# Patient Record
Sex: MALE | Race: WHITE | NOT HISPANIC OR LATINO | Employment: FULL TIME | URBAN - METROPOLITAN AREA
[De-identification: names, ages, dates, MRNs, and addresses within clinical notes are randomized per-mention and may not be internally consistent; named-entity substitution may affect disease eponyms.]

---

## 2020-09-24 ENCOUNTER — HOSPITAL ENCOUNTER (OUTPATIENT)
Dept: FAMILY MEDICINE CLINIC | Facility: CLINIC | Age: 55
Discharge: HOME | End: 2020-09-24
Attending: NURSE PRACTITIONER

## 2020-09-24 ENCOUNTER — OFFICE VISIT CONVERTED (OUTPATIENT)
Dept: FAMILY MEDICINE CLINIC | Facility: CLINIC | Age: 55
End: 2020-09-24
Attending: NURSE PRACTITIONER

## 2020-09-27 LAB
BACTERIA SPEC AEROBE CULT: ABNORMAL
CIPROFLOXACIN SUSC ISLT: <=0.5
CLINDAMYCIN SUSC ISLT: 0.25
DAPTOMYCIN SUSC ISLT: 0.25
DOXYCYCLINE SUSC ISLT: <=0.5
ERYTHROMYCIN SUSC ISLT: 0.5
GENTAMICIN SUSC ISLT: <=0.5
LEVOFLOXACIN SUSC ISLT: <=0.12
OXACILLIN SUSC ISLT: 0.5
RIFAMPIN SUSC ISLT: <=0.5
TETRACYCLINE SUSC ISLT: <=1
TIGECYCLINE SUSC ISLT: <=0.12
TMP SMX SUSC ISLT: <=10
VANCOMYCIN SUSC ISLT: 1

## 2020-10-06 ENCOUNTER — HOSPITAL ENCOUNTER (INPATIENT)
Facility: HOSPITAL | Age: 55
LOS: 8 days | Discharge: HOME HEALTH | End: 2020-10-14
Attending: THORACIC SURGERY (CARDIOTHORACIC VASCULAR SURGERY) | Admitting: THORACIC SURGERY (CARDIOTHORACIC VASCULAR SURGERY)

## 2020-10-06 ENCOUNTER — TELEPHONE (OUTPATIENT)
Dept: CARDIAC SURGERY | Facility: CLINIC | Age: 55
End: 2020-10-06

## 2020-10-06 DIAGNOSIS — Z98.890 POSTOPERATIVE HYPOXIA: ICD-10-CM

## 2020-10-06 DIAGNOSIS — I25.118 CORONARY ARTERY DISEASE OF NATIVE ARTERY OF NATIVE HEART WITH STABLE ANGINA PECTORIS (HCC): Primary | ICD-10-CM

## 2020-10-06 DIAGNOSIS — Z95.1 S/P CABG (CORONARY ARTERY BYPASS GRAFT): ICD-10-CM

## 2020-10-06 DIAGNOSIS — R09.02 POSTOPERATIVE HYPOXIA: ICD-10-CM

## 2020-10-06 LAB — GLUCOSE BLDC GLUCOMTR-MCNC: 251 MG/DL (ref 70–130)

## 2020-10-06 PROCEDURE — 82962 GLUCOSE BLOOD TEST: CPT

## 2020-10-06 RX ORDER — LISINOPRIL AND HYDROCHLOROTHIAZIDE 12.5; 1 MG/1; MG/1
1 TABLET ORAL DAILY
COMMUNITY
End: 2020-10-14 | Stop reason: HOSPADM

## 2020-10-06 RX ORDER — HYDRALAZINE HYDROCHLORIDE 20 MG/ML
10 INJECTION INTRAMUSCULAR; INTRAVENOUS ONCE
Status: COMPLETED | OUTPATIENT
Start: 2020-10-07 | End: 2020-10-06

## 2020-10-06 RX ADMIN — HYDRALAZINE HYDROCHLORIDE 10 MG: 20 INJECTION INTRAMUSCULAR; INTRAVENOUS at 23:37

## 2020-10-06 NOTE — TELEPHONE ENCOUNTER
I S/W JAMI AT [facility] BED BOARD [phone] TO REQUEST IN PATIENT CVU/CVI BED PER DR IBARRA. PT BEING ADMITTED BY DR IBARRA FOR CAD PER MARLINE ROUSSEAU. PT BEING TRANSFERRED FROM [facility] ROOM 214 PCU. NURSE TAKING CARE OF PT IS RAFAEL AND HER CONTACT NUMBER [phone]. PER FAX FROM JOSE LUIS MIR AT [facility], PT'S CATH TEST WAS NOT AVAILABLE WHEN RECORDS WERE REQUESTED. PER JAMI AT [facility] BED BOARD, THERE IS A WAIT ON PT BEDS AND PT WILL BE ADDED TO WAIT LIST.

## 2020-10-07 ENCOUNTER — ANESTHESIA EVENT (OUTPATIENT)
Dept: PERIOP | Facility: HOSPITAL | Age: 55
End: 2020-10-07

## 2020-10-07 ENCOUNTER — APPOINTMENT (OUTPATIENT)
Dept: GENERAL RADIOLOGY | Facility: HOSPITAL | Age: 55
End: 2020-10-07

## 2020-10-07 ENCOUNTER — APPOINTMENT (OUTPATIENT)
Dept: CARDIOLOGY | Facility: HOSPITAL | Age: 55
End: 2020-10-07

## 2020-10-07 ENCOUNTER — APPOINTMENT (OUTPATIENT)
Dept: RESPIRATORY THERAPY | Facility: HOSPITAL | Age: 55
End: 2020-10-07

## 2020-10-07 PROBLEM — I25.118 CORONARY ARTERY DISEASE OF NATIVE ARTERY OF NATIVE HEART WITH STABLE ANGINA PECTORIS (HCC): Status: ACTIVE | Noted: 2020-10-06

## 2020-10-07 PROBLEM — I25.10 CAD (CORONARY ARTERY DISEASE): Status: ACTIVE | Noted: 2020-10-07

## 2020-10-07 LAB
ABO GROUP BLD: NORMAL
ALBUMIN SERPL-MCNC: 3.9 G/DL (ref 3.5–5.2)
ALBUMIN/GLOB SERPL: 1.3 G/DL
ALP SERPL-CCNC: 119 U/L (ref 39–117)
ALT SERPL W P-5'-P-CCNC: 13 U/L (ref 1–41)
ANION GAP SERPL CALCULATED.3IONS-SCNC: 9.5 MMOL/L (ref 5–15)
APTT PPP: 25.7 SECONDS (ref 22.7–35.4)
APTT PPP: 30.7 SECONDS (ref 22.7–35.4)
ARTERIAL PATENCY WRIST A: POSITIVE
ARTICHOKE IGE QN: 89 MG/DL (ref 0–100)
AST SERPL-CCNC: 16 U/L (ref 1–40)
ATMOSPHERIC PRESS: 753.9 MMHG
B PARAPERT DNA SPEC QL NAA+PROBE: NOT DETECTED
B PERT DNA SPEC QL NAA+PROBE: NOT DETECTED
BACTERIA UR QL AUTO: NORMAL /HPF
BASE EXCESS BLDA CALC-SCNC: 1.3 MMOL/L (ref 0–2)
BASOPHILS # BLD AUTO: 0.05 10*3/MM3 (ref 0–0.2)
BASOPHILS NFR BLD AUTO: 0.6 % (ref 0–1.5)
BDY SITE: ABNORMAL
BH CV XLRA MEAS - DIST GSV CALF DIST LEFT: 0.2 CM
BH CV XLRA MEAS - DIST GSV CALF DIST RIGHT: 0.14 CM
BH CV XLRA MEAS - DIST GSV THIGH DIST LEFT: 0.4 CM
BH CV XLRA MEAS - DIST GSV THIGH DIST RIGHT: 0.25 CM
BH CV XLRA MEAS - DIST LSV CALF DIST LEFT: 0.26 CM
BH CV XLRA MEAS - DIST LSV CALF DIST RIGHT: 0.15 CM
BH CV XLRA MEAS - GSV ANKLE DIST LEFT: 0.16 CM
BH CV XLRA MEAS - GSV ANKLE DIST RIGHT: 0.14 CM
BH CV XLRA MEAS - GSV KNEE DIST LEFT: 0.32 CM
BH CV XLRA MEAS - GSV KNEE DIST RIGHT: 0.4 CM
BH CV XLRA MEAS - GSV ORIGIN DIST LEFT: 0.84 CM
BH CV XLRA MEAS - GSV ORIGIN DIST RIGHT: 0.77 CM
BH CV XLRA MEAS - MID GSV CALF LEFT: 0.3 CM
BH CV XLRA MEAS - MID GSV CALF RIGHT: 0.31 CM
BH CV XLRA MEAS - MID GSV THIGH  LEFT: 0.39 CM
BH CV XLRA MEAS - MID GSV THIGH  RIGHT: 0.24 CM
BH CV XLRA MEAS - MID LSV CALF DIST LEFT: 0.26 CM
BH CV XLRA MEAS - MID LSV CALF DIST RIGHT: 0.17 CM
BH CV XLRA MEAS - PROX GSV CALF DIST LEFT: 0.35 CM
BH CV XLRA MEAS - PROX GSV CALF DIST RIGHT: 0.19 CM
BH CV XLRA MEAS - PROX GSV THIGH  LEFT: 0.49 CM
BH CV XLRA MEAS - PROX GSV THIGH  RIGHT: 0.34 CM
BH CV XLRA MEAS - PROX LSV CALF DIST LEFT: 0.26 CM
BH CV XLRA MEAS - PROX LSV CALF DIST RIGHT: 0.39 CM
BH CV XLRA MEAS LEFT DIST CCA EDV: -28.1 CM/SEC
BH CV XLRA MEAS LEFT DIST CCA PSV: -106 CM/SEC
BH CV XLRA MEAS LEFT DIST ICA EDV: -32.4 CM/SEC
BH CV XLRA MEAS LEFT DIST ICA PSV: -90.5 CM/SEC
BH CV XLRA MEAS LEFT ICA/CCA RATIO: 1.02
BH CV XLRA MEAS LEFT MID ICA EDV: -48.7 CM/SEC
BH CV XLRA MEAS LEFT MID ICA PSV: -105 CM/SEC
BH CV XLRA MEAS LEFT PROX CCA EDV: 30.6 CM/SEC
BH CV XLRA MEAS LEFT PROX CCA PSV: 114 CM/SEC
BH CV XLRA MEAS LEFT PROX ECA EDV: -10 CM/SEC
BH CV XLRA MEAS LEFT PROX ECA PSV: -86.8 CM/SEC
BH CV XLRA MEAS LEFT PROX ICA EDV: -45.1 CM/SEC
BH CV XLRA MEAS LEFT PROX ICA PSV: -109 CM/SEC
BH CV XLRA MEAS LEFT PROX SCLA PSV: 131 CM/SEC
BH CV XLRA MEAS LEFT VERTEBRAL A EDV: -8.3 CM/SEC
BH CV XLRA MEAS LEFT VERTEBRAL A PSV: -36.1 CM/SEC
BH CV XLRA MEAS RIGHT DIST CCA EDV: -15.5 CM/SEC
BH CV XLRA MEAS RIGHT DIST CCA PSV: -90.7 CM/SEC
BH CV XLRA MEAS RIGHT DIST ICA EDV: -22.7 CM/SEC
BH CV XLRA MEAS RIGHT DIST ICA PSV: -49.6 CM/SEC
BH CV XLRA MEAS RIGHT ICA/CCA RATIO: 1.06
BH CV XLRA MEAS RIGHT MID ICA EDV: -35.4 CM/SEC
BH CV XLRA MEAS RIGHT MID ICA PSV: -95.9 CM/SEC
BH CV XLRA MEAS RIGHT PROX CCA EDV: 13.5 CM/SEC
BH CV XLRA MEAS RIGHT PROX CCA PSV: 93.2 CM/SEC
BH CV XLRA MEAS RIGHT PROX ECA EDV: -18.5 CM/SEC
BH CV XLRA MEAS RIGHT PROX ECA PSV: -126 CM/SEC
BH CV XLRA MEAS RIGHT PROX ICA EDV: -23.2 CM/SEC
BH CV XLRA MEAS RIGHT PROX ICA PSV: -49.1 CM/SEC
BH CV XLRA MEAS RIGHT PROX SCLA PSV: 174 CM/SEC
BH CV XLRA MEAS RIGHT VERTEBRAL A EDV: -15.2 CM/SEC
BH CV XLRA MEAS RIGHT VERTEBRAL A PSV: -40 CM/SEC
BILIRUB SERPL-MCNC: 0.4 MG/DL (ref 0–1.2)
BILIRUB UR QL STRIP: NEGATIVE
BLD GP AB SCN SERPL QL: NEGATIVE
BUN SERPL-MCNC: 12 MG/DL (ref 6–20)
BUN/CREAT SERPL: 16 (ref 7–25)
C PNEUM DNA NPH QL NAA+NON-PROBE: NOT DETECTED
CALCIUM SPEC-SCNC: 9.3 MG/DL (ref 8.6–10.5)
CHLORIDE SERPL-SCNC: 97 MMOL/L (ref 98–107)
CHOLEST SERPL-MCNC: 213 MG/DL (ref 0–200)
CLARITY UR: CLEAR
CLOSE TME COLL+ADP + EPINEP PNL BLD: 91 %
CO2 SERPL-SCNC: 22.5 MMOL/L (ref 22–29)
COLOR UR: YELLOW
CREAT SERPL-MCNC: 0.75 MG/DL (ref 0.76–1.27)
DEPRECATED RDW RBC AUTO: 44.2 FL (ref 37–54)
EOSINOPHIL # BLD AUTO: 0.13 10*3/MM3 (ref 0–0.4)
EOSINOPHIL NFR BLD AUTO: 1.6 % (ref 0.3–6.2)
ERYTHROCYTE [DISTWIDTH] IN BLOOD BY AUTOMATED COUNT: 13.2 % (ref 12.3–15.4)
FLUAV H1 2009 PAND RNA NPH QL NAA+PROBE: NOT DETECTED
FLUAV H1 HA GENE NPH QL NAA+PROBE: NOT DETECTED
FLUAV H3 RNA NPH QL NAA+PROBE: NOT DETECTED
FLUAV SUBTYP SPEC NAA+PROBE: NOT DETECTED
FLUBV RNA ISLT QL NAA+PROBE: NOT DETECTED
GFR SERPL CREATININE-BSD FRML MDRD: 109 ML/MIN/1.73
GLOBULIN UR ELPH-MCNC: 3.1 GM/DL
GLUCOSE BLDC GLUCOMTR-MCNC: 233 MG/DL (ref 70–130)
GLUCOSE BLDC GLUCOMTR-MCNC: 249 MG/DL (ref 70–130)
GLUCOSE BLDC GLUCOMTR-MCNC: 264 MG/DL (ref 70–130)
GLUCOSE BLDC GLUCOMTR-MCNC: 296 MG/DL (ref 70–130)
GLUCOSE SERPL-MCNC: 289 MG/DL (ref 65–99)
GLUCOSE UR STRIP-MCNC: ABNORMAL MG/DL
HADV DNA SPEC NAA+PROBE: NOT DETECTED
HBA1C MFR BLD: 9.4 % (ref 4.8–5.6)
HCO3 BLDA-SCNC: 26.2 MMOL/L (ref 22–28)
HCOV 229E RNA SPEC QL NAA+PROBE: NOT DETECTED
HCOV HKU1 RNA SPEC QL NAA+PROBE: NOT DETECTED
HCOV NL63 RNA SPEC QL NAA+PROBE: NOT DETECTED
HCOV OC43 RNA SPEC QL NAA+PROBE: NOT DETECTED
HCT VFR BLD AUTO: 48.5 % (ref 37.5–51)
HDLC SERPL-MCNC: 29 MG/DL (ref 40–60)
HGB BLD-MCNC: 16.3 G/DL (ref 13–17.7)
HGB UR QL STRIP.AUTO: NEGATIVE
HMPV RNA NPH QL NAA+NON-PROBE: NOT DETECTED
HPIV1 RNA SPEC QL NAA+PROBE: NOT DETECTED
HPIV2 RNA SPEC QL NAA+PROBE: NOT DETECTED
HPIV3 RNA NPH QL NAA+PROBE: NOT DETECTED
HPIV4 P GENE NPH QL NAA+PROBE: NOT DETECTED
HYALINE CASTS UR QL AUTO: NORMAL /LPF
IMM GRANULOCYTES # BLD AUTO: 0.08 10*3/MM3 (ref 0–0.05)
IMM GRANULOCYTES NFR BLD AUTO: 1 % (ref 0–0.5)
INR PPP: 0.95 (ref 0.9–1.1)
KETONES UR QL STRIP: NEGATIVE
LDLC SERPL CALC-MCNC: ABNORMAL MG/DL
LDLC/HDLC SERPL: ABNORMAL {RATIO}
LEFT ARM BP: NORMAL MMHG
LEUKOCYTE ESTERASE UR QL STRIP.AUTO: NEGATIVE
LYMPHOCYTES # BLD AUTO: 2.1 10*3/MM3 (ref 0.7–3.1)
LYMPHOCYTES NFR BLD AUTO: 25.9 % (ref 19.6–45.3)
M PNEUMO IGG SER IA-ACNC: NOT DETECTED
MAGNESIUM SERPL-MCNC: 1.9 MG/DL (ref 1.6–2.6)
MCH RBC QN AUTO: 30.7 PG (ref 26.6–33)
MCHC RBC AUTO-ENTMCNC: 33.6 G/DL (ref 31.5–35.7)
MCV RBC AUTO: 91.3 FL (ref 79–97)
MODALITY: ABNORMAL
MONOCYTES # BLD AUTO: 0.87 10*3/MM3 (ref 0.1–0.9)
MONOCYTES NFR BLD AUTO: 10.7 % (ref 5–12)
NEUTROPHILS NFR BLD AUTO: 4.89 10*3/MM3 (ref 1.7–7)
NEUTROPHILS NFR BLD AUTO: 60.2 % (ref 42.7–76)
NITRITE UR QL STRIP: NEGATIVE
NRBC BLD AUTO-RTO: 0 /100 WBC (ref 0–0.2)
NT-PROBNP SERPL-MCNC: 220.6 PG/ML (ref 0–900)
PCO2 BLDA: 41.2 MM HG (ref 35–45)
PH BLDA: 7.41 PH UNITS (ref 7.35–7.45)
PH UR STRIP.AUTO: 6 [PH] (ref 5–8)
PLATELET # BLD AUTO: 315 10*3/MM3 (ref 140–450)
PMV BLD AUTO: 9.3 FL (ref 6–12)
PO2 BLDA: 65.1 MM HG (ref 80–100)
POTASSIUM SERPL-SCNC: 3.9 MMOL/L (ref 3.5–5.2)
PROT SERPL-MCNC: 7 G/DL (ref 6–8.5)
PROT UR QL STRIP: ABNORMAL
PROTHROMBIN TIME: 12.6 SECONDS (ref 11.7–14.2)
RBC # BLD AUTO: 5.31 10*6/MM3 (ref 4.14–5.8)
RBC # UR: NORMAL /HPF
REF LAB TEST METHOD: NORMAL
RH BLD: NEGATIVE
RHINOVIRUS RNA SPEC NAA+PROBE: NOT DETECTED
RIGHT ARM BP: NORMAL MMHG
RSV RNA NPH QL NAA+NON-PROBE: NOT DETECTED
SAO2 % BLDCOA: 92.6 % (ref 92–99)
SARS-COV-2 RNA NPH QL NAA+NON-PROBE: NOT DETECTED
SODIUM SERPL-SCNC: 129 MMOL/L (ref 136–145)
SP GR UR STRIP: >=1.03 (ref 1–1.03)
SQUAMOUS #/AREA URNS HPF: NORMAL /HPF
T&S EXPIRATION DATE: NORMAL
TOTAL RATE: 16 BREATHS/MINUTE
TRIGL SERPL-MCNC: 790 MG/DL (ref 0–150)
UROBILINOGEN UR QL STRIP: ABNORMAL
VLDLC SERPL-MCNC: ABNORMAL MG/DL
WBC # BLD AUTO: 8.12 10*3/MM3 (ref 3.4–10.8)
WBC UR QL AUTO: NORMAL /HPF

## 2020-10-07 PROCEDURE — 81001 URINALYSIS AUTO W/SCOPE: CPT | Performed by: NURSE PRACTITIONER

## 2020-10-07 PROCEDURE — 25010000002 HYDRALAZINE PER 20 MG: Performed by: NURSE PRACTITIONER

## 2020-10-07 PROCEDURE — 25010000002 HYDRALAZINE PER 20 MG: Performed by: THORACIC SURGERY (CARDIOTHORACIC VASCULAR SURGERY)

## 2020-10-07 PROCEDURE — 86850 RBC ANTIBODY SCREEN: CPT | Performed by: NURSE PRACTITIONER

## 2020-10-07 PROCEDURE — 80053 COMPREHEN METABOLIC PANEL: CPT | Performed by: NURSE PRACTITIONER

## 2020-10-07 PROCEDURE — 85730 THROMBOPLASTIN TIME PARTIAL: CPT | Performed by: NURSE PRACTITIONER

## 2020-10-07 PROCEDURE — 36600 WITHDRAWAL OF ARTERIAL BLOOD: CPT

## 2020-10-07 PROCEDURE — 85610 PROTHROMBIN TIME: CPT | Performed by: NURSE PRACTITIONER

## 2020-10-07 PROCEDURE — 71046 X-RAY EXAM CHEST 2 VIEWS: CPT

## 2020-10-07 PROCEDURE — 80061 LIPID PANEL: CPT | Performed by: NURSE PRACTITIONER

## 2020-10-07 PROCEDURE — 83735 ASSAY OF MAGNESIUM: CPT | Performed by: NURSE PRACTITIONER

## 2020-10-07 PROCEDURE — 83036 HEMOGLOBIN GLYCOSYLATED A1C: CPT | Performed by: NURSE PRACTITIONER

## 2020-10-07 PROCEDURE — 86920 COMPATIBILITY TEST SPIN: CPT

## 2020-10-07 PROCEDURE — 82803 BLOOD GASES ANY COMBINATION: CPT

## 2020-10-07 PROCEDURE — 93010 ELECTROCARDIOGRAM REPORT: CPT | Performed by: INTERNAL MEDICINE

## 2020-10-07 PROCEDURE — 83880 ASSAY OF NATRIURETIC PEPTIDE: CPT | Performed by: NURSE PRACTITIONER

## 2020-10-07 PROCEDURE — 86900 BLOOD TYPING SEROLOGIC ABO: CPT | Performed by: NURSE PRACTITIONER

## 2020-10-07 PROCEDURE — 93970 EXTREMITY STUDY: CPT

## 2020-10-07 PROCEDURE — 99222 1ST HOSP IP/OBS MODERATE 55: CPT | Performed by: NURSE PRACTITIONER

## 2020-10-07 PROCEDURE — 82962 GLUCOSE BLOOD TEST: CPT

## 2020-10-07 PROCEDURE — 93005 ELECTROCARDIOGRAM TRACING: CPT | Performed by: NURSE PRACTITIONER

## 2020-10-07 PROCEDURE — 85576 BLOOD PLATELET AGGREGATION: CPT | Performed by: NURSE PRACTITIONER

## 2020-10-07 PROCEDURE — 0202U NFCT DS 22 TRGT SARS-COV-2: CPT | Performed by: NURSE PRACTITIONER

## 2020-10-07 PROCEDURE — 25010000002 HEPARIN (PORCINE) PER 1000 UNITS: Performed by: NURSE PRACTITIONER

## 2020-10-07 PROCEDURE — 86901 BLOOD TYPING SEROLOGIC RH(D): CPT | Performed by: NURSE PRACTITIONER

## 2020-10-07 PROCEDURE — 63710000001 INSULIN LISPRO (HUMAN) PER 5 UNITS: Performed by: NURSE PRACTITIONER

## 2020-10-07 PROCEDURE — 94640 AIRWAY INHALATION TREATMENT: CPT

## 2020-10-07 PROCEDURE — 83721 ASSAY OF BLOOD LIPOPROTEIN: CPT | Performed by: NURSE PRACTITIONER

## 2020-10-07 PROCEDURE — 94060 EVALUATION OF WHEEZING: CPT

## 2020-10-07 PROCEDURE — 93880 EXTRACRANIAL BILAT STUDY: CPT

## 2020-10-07 PROCEDURE — 85025 COMPLETE CBC W/AUTO DIFF WBC: CPT | Performed by: NURSE PRACTITIONER

## 2020-10-07 RX ORDER — DEXTROSE MONOHYDRATE 25 G/50ML
25 INJECTION, SOLUTION INTRAVENOUS
Status: DISCONTINUED | OUTPATIENT
Start: 2020-10-07 | End: 2020-10-08

## 2020-10-07 RX ORDER — CHLORHEXIDINE GLUCONATE 500 MG/1
1 CLOTH TOPICAL EVERY 12 HOURS
Status: DISCONTINUED | OUTPATIENT
Start: 2020-10-07 | End: 2020-10-08

## 2020-10-07 RX ORDER — LISINOPRIL 20 MG/1
20 TABLET ORAL EVERY 12 HOURS SCHEDULED
Status: DISCONTINUED | OUTPATIENT
Start: 2020-10-07 | End: 2020-10-08

## 2020-10-07 RX ORDER — MIDAZOLAM HYDROCHLORIDE 1 MG/ML
1 INJECTION INTRAMUSCULAR; INTRAVENOUS ONCE
Status: CANCELLED | OUTPATIENT
Start: 2020-10-08 | End: 2020-10-07

## 2020-10-07 RX ORDER — CHLORHEXIDINE GLUCONATE 0.12 MG/ML
15 RINSE ORAL EVERY 12 HOURS SCHEDULED
Status: DISCONTINUED | OUTPATIENT
Start: 2020-10-07 | End: 2020-10-08

## 2020-10-07 RX ORDER — CARVEDILOL 3.12 MG/1
3.12 TABLET ORAL
Status: COMPLETED | OUTPATIENT
Start: 2020-10-08 | End: 2020-10-08

## 2020-10-07 RX ORDER — NICOTINE POLACRILEX 4 MG
15 LOZENGE BUCCAL
Status: DISCONTINUED | OUTPATIENT
Start: 2020-10-07 | End: 2020-10-08

## 2020-10-07 RX ORDER — ISOSORBIDE MONONITRATE 60 MG/1
60 TABLET, EXTENDED RELEASE ORAL
Status: DISCONTINUED | OUTPATIENT
Start: 2020-10-07 | End: 2020-10-08

## 2020-10-07 RX ORDER — IPRATROPIUM BROMIDE AND ALBUTEROL SULFATE 2.5; .5 MG/3ML; MG/3ML
3 SOLUTION RESPIRATORY (INHALATION) EVERY 4 HOURS PRN
Status: DISCONTINUED | OUTPATIENT
Start: 2020-10-07 | End: 2020-10-08

## 2020-10-07 RX ORDER — NITROGLYCERIN 0.4 MG/1
0.4 TABLET SUBLINGUAL
Status: DISCONTINUED | OUTPATIENT
Start: 2020-10-07 | End: 2020-10-08

## 2020-10-07 RX ORDER — HYDRALAZINE HYDROCHLORIDE 20 MG/ML
20 INJECTION INTRAMUSCULAR; INTRAVENOUS EVERY 6 HOURS PRN
Status: DISCONTINUED | OUTPATIENT
Start: 2020-10-07 | End: 2020-10-08

## 2020-10-07 RX ORDER — PRAVASTATIN SODIUM 20 MG
20 TABLET ORAL NIGHTLY
Status: DISCONTINUED | OUTPATIENT
Start: 2020-10-07 | End: 2020-10-08

## 2020-10-07 RX ORDER — HEPARIN SODIUM 10000 [USP'U]/100ML
8.7 INJECTION, SOLUTION INTRAVENOUS
Status: DISCONTINUED | OUTPATIENT
Start: 2020-10-07 | End: 2020-10-08

## 2020-10-07 RX ORDER — CEFAZOLIN SODIUM 2 G/100ML
2 INJECTION, SOLUTION INTRAVENOUS
Status: COMPLETED | OUTPATIENT
Start: 2020-10-08 | End: 2020-10-08

## 2020-10-07 RX ORDER — ACETAMINOPHEN 325 MG/1
650 TABLET ORAL EVERY 4 HOURS PRN
Status: DISCONTINUED | OUTPATIENT
Start: 2020-10-07 | End: 2020-10-08

## 2020-10-07 RX ORDER — ASPIRIN 81 MG/1
81 TABLET, CHEWABLE ORAL DAILY
Status: DISCONTINUED | OUTPATIENT
Start: 2020-10-07 | End: 2020-10-08

## 2020-10-07 RX ORDER — HYDRALAZINE HYDROCHLORIDE 20 MG/ML
10 INJECTION INTRAMUSCULAR; INTRAVENOUS EVERY 6 HOURS PRN
Status: DISCONTINUED | OUTPATIENT
Start: 2020-10-07 | End: 2020-10-07

## 2020-10-07 RX ORDER — CARVEDILOL 12.5 MG/1
12.5 TABLET ORAL EVERY 12 HOURS SCHEDULED
Status: DISCONTINUED | OUTPATIENT
Start: 2020-10-07 | End: 2020-10-08

## 2020-10-07 RX ORDER — AMLODIPINE BESYLATE 5 MG/1
5 TABLET ORAL
Status: DISCONTINUED | OUTPATIENT
Start: 2020-10-07 | End: 2020-10-08

## 2020-10-07 RX ORDER — FAMOTIDINE 10 MG/ML
20 INJECTION, SOLUTION INTRAVENOUS ONCE
Status: CANCELLED | OUTPATIENT
Start: 2020-10-08 | End: 2020-10-07

## 2020-10-07 RX ORDER — ALBUTEROL SULFATE 2.5 MG/3ML
2.5 SOLUTION RESPIRATORY (INHALATION) ONCE
Status: COMPLETED | OUTPATIENT
Start: 2020-10-07 | End: 2020-10-07

## 2020-10-07 RX ORDER — HYDRALAZINE HYDROCHLORIDE 20 MG/ML
10 INJECTION INTRAMUSCULAR; INTRAVENOUS EVERY 4 HOURS PRN
Status: DISCONTINUED | OUTPATIENT
Start: 2020-10-07 | End: 2020-10-08

## 2020-10-07 RX ADMIN — CHLORHEXIDINE GLUCONATE 1 APPLICATION: 500 CLOTH TOPICAL at 22:32

## 2020-10-07 RX ADMIN — LISINOPRIL 20 MG: 20 TABLET ORAL at 18:11

## 2020-10-07 RX ADMIN — METOPROLOL TARTRATE 25 MG: 25 TABLET, FILM COATED ORAL at 09:06

## 2020-10-07 RX ADMIN — PRAVASTATIN SODIUM 20 MG: 20 TABLET ORAL at 22:26

## 2020-10-07 RX ADMIN — INSULIN LISPRO 6 UNITS: 100 INJECTION, SOLUTION INTRAVENOUS; SUBCUTANEOUS at 22:32

## 2020-10-07 RX ADMIN — MUPIROCIN 1 APPLICATION: 20 OINTMENT TOPICAL at 22:27

## 2020-10-07 RX ADMIN — ISOSORBIDE MONONITRATE 60 MG: 60 TABLET ORAL at 13:44

## 2020-10-07 RX ADMIN — AMLODIPINE BESYLATE 5 MG: 5 TABLET ORAL at 10:22

## 2020-10-07 RX ADMIN — HYDRALAZINE HYDROCHLORIDE 10 MG: 20 INJECTION INTRAMUSCULAR; INTRAVENOUS at 22:26

## 2020-10-07 RX ADMIN — HYDRALAZINE HYDROCHLORIDE 20 MG: 20 INJECTION INTRAMUSCULAR; INTRAVENOUS at 12:41

## 2020-10-07 RX ADMIN — ALBUTEROL SULFATE 2.5 MG: 2.5 SOLUTION RESPIRATORY (INHALATION) at 16:02

## 2020-10-07 RX ADMIN — INSULIN LISPRO 4 UNITS: 100 INJECTION, SOLUTION INTRAVENOUS; SUBCUTANEOUS at 18:11

## 2020-10-07 RX ADMIN — ASPIRIN 81 MG: 81 TABLET, CHEWABLE ORAL at 09:06

## 2020-10-07 RX ADMIN — INSULIN LISPRO 4 UNITS: 100 INJECTION, SOLUTION INTRAVENOUS; SUBCUTANEOUS at 13:43

## 2020-10-07 RX ADMIN — CARVEDILOL 12.5 MG: 12.5 TABLET, FILM COATED ORAL at 22:31

## 2020-10-07 RX ADMIN — CHLORHEXIDINE GLUCONATE 15 ML: 1.2 RINSE ORAL at 22:26

## 2020-10-07 RX ADMIN — HYDRALAZINE HYDROCHLORIDE 10 MG: 20 INJECTION INTRAMUSCULAR; INTRAVENOUS at 18:15

## 2020-10-07 RX ADMIN — ACETAMINOPHEN 650 MG: 325 TABLET, FILM COATED ORAL at 04:11

## 2020-10-07 RX ADMIN — HEPARIN SODIUM 8.7 UNITS/KG/HR: 10000 INJECTION, SOLUTION INTRAVENOUS at 10:21

## 2020-10-07 RX ADMIN — INSULIN LISPRO 6 UNITS: 100 INJECTION, SOLUTION INTRAVENOUS; SUBCUTANEOUS at 09:06

## 2020-10-07 RX ADMIN — HEPARIN SODIUM 14.7 UNITS/KG/HR: 10000 INJECTION, SOLUTION INTRAVENOUS at 23:12

## 2020-10-07 NOTE — PAYOR COMM NOTE
"Jd Velazquez (55 y.o. Male)     ATTN:  INITIAL CLINICALS FOR NURSE REVIEW, # [id number]    PLEASE REPLY TO [facility] DEPT: RASHAAD JOE LPN/CCP- [phone], [phone]        Date of Birth Social Security Number Address Home Phone MRN    1965  [id number] [place] [address] [phone] [id number]    [employer]abapt[employer] Marital Status          Christian        Admission Date Admission Type Admitting Provider Attending Provider Department, Room/Bed    10/6/20 Urgent Angelo Egan MD Pagni, Sebastian, MD [address],     Discharge Date Discharge Disposition Discharge Destination                       Attending Provider: Angelo Egan MD    Allergies: Hydrocodone    Isolation: None   Infection: None   Code Status: CPR    Ht: 182.9 cm (72\")   Wt: 115 kg (252 lb 11.2 oz)    Admission Cmt: None   Principal Problem: Coronary artery disease of native artery of native heart with stable angina pectoris (CMS/[facility]) [I25.118] More...                 Active Insurance as of 10/6/2020     Primary Coverage     Payor Plan Insurance Group Employer/Plan Group    [facility] [employer] [facility] [employer] [facility] PPO [id number]     Payor Plan Address Payor Plan Phone Number Payor Plan Fax Number Effective Dates    [address] [phone]  2020 - None Entered    [place] [address]       Subscriber Name Subscriber Birth Date Member ID       ARDEN VELAZQUEZ  [id number]                 Emergency Contacts      (Rel.) Home Phone Work Phone Mobile Phone    ARDEN VELAZQUEZ (Spouse) [phone] -- --               History & Physical      Suzy Kitchen APRN at 10/07/20 0740              Name: Jd Velazquez ADMIT: 10/6/2020   : 1965  PCP: Dacia Newsome APRN    MRN: [id number] LOS: 1 days   AGE/SEX: 55 y.o. male  ROOM:      CC: Chest Pain    Subjective     History of Present Illness  Patient is a 55 y.o. male with a past medical " "history of CAD with previous PCI to circumflex in 2014, hyperlipidemia, SHAHLA with home CPAP, hypertension, nicotine dependence, DM II, and obesity. He is a current smoker, reporting that he has smoked 1 PPD since he was 14. He denies any ETOH or illicit drug use. He states that he has been on statins but this was stopped by the patient due to muscle weakness which he associated with the statin. He also stopped his oral antihyperglycemic medications \"years ago\" because he lost 60lbs and felt that he did not really need them any more. [facility] complaints of substernal chest pressure while driving to work. The pain radiated down his left arm. He also reports that palpitations, nausea, and diaphoresis were associated with this episode of chest pain. He denies any dizziness, shortness of breath, lower extremity edema, cough, or fever. Upon arrival to the ER, his troponin was noted to be elevated and EKG showed ST depression. Nitro paste and heparin were given with relief of pain. He was taken for heart catherization which revealed multi-vessel coronary artery disease. He was placed on a heparin gtt following the catherization. He was transferred here to [facility] for cardiac surgery evaluation. He denies any chest pain or shortness of breath at this time. He is unsure about family history of coronary disease, as he is adopted.       Past Medical History:   Diagnosis Date   • CAD (coronary artery disease)    • Diabetes mellitus (CMS/HCC)    • Hyperlipidemia    • Hypertension    • Obesity      Past Surgical History:   Procedure Laterality Date   • ANKLE ARTHROSCOPY W/ OPEN REPAIR     • APPENDECTOMY     • CARDIAC CATHETERIZATION  2014    PCI to circumflex   • HERNIA REPAIR     • TONSILLECTOMY       History reviewed. No pertinent family history.  Social History     Tobacco Use   • Smoking status: Current Every Day Smoker     Packs/day: 1.00   • Smokeless tobacco: Never Used   Substance Use " Topics   • Alcohol use: Not on file   • Drug use: Not on file     Medications Prior to Admission   Medication Sig Dispense Refill Last Dose   • lisinopril-hydrochlorothiazide (PRINZIDE,ZESTORETIC) 10-12.5 MG per tablet Take 1 tablet by mouth Daily.        Allergies:  Hydrocodone    Review of Systems   Constitutional: Positive for diaphoresis and fatigue. Negative for chills.   HENT: Negative.    Eyes: Negative.    Respiratory: Positive for chest tightness. Negative for cough and shortness of breath.    Cardiovascular: Positive for chest pain and palpitations. Negative for leg swelling.   Gastrointestinal: Positive for nausea. Negative for constipation and vomiting.   Endocrine: Negative.    Genitourinary: Negative for dysuria, frequency and urgency.   Musculoskeletal: Negative for back pain and gait problem.   Skin: Negative for rash and wound.   Allergic/Immunologic: Negative.    Neurological: Negative for dizziness, seizures and syncope.   Hematological: Negative.    Psychiatric/Behavioral: Negative.         Objective    Vital Signs  Temp:  [96.9 °F (36.1 °C)-98 °F (36.7 °C)] 98 °F (36.7 °C)  Heart Rate:  [79-84] 82  Resp:  [18] 18  BP: (164-176)/(90-94) 164/90  SpO2:  [90 %-91 %] 90 %  on   ;   Device (Oxygen Therapy): room air  Body mass index is 34.27 kg/m².    Physical Exam  Vitals signs and nursing note reviewed.   Constitutional:       Appearance: Normal appearance.   HENT:      Head: Normocephalic and atraumatic.      Mouth/Throat:      Mouth: Mucous membranes are moist.      Pharynx: Oropharynx is clear.   Eyes:      Extraocular Movements: Extraocular movements intact.      Pupils: Pupils are equal, round, and reactive to light.   Neck:      Musculoskeletal: Normal range of motion and neck supple.   Cardiovascular:      Rate and Rhythm: Normal rate and regular rhythm.      Pulses: Normal pulses.      Heart sounds: Normal heart sounds. No murmur.   Pulmonary:      Effort: Pulmonary effort is normal.       Breath sounds: Decreased breath sounds present.   Abdominal:      General: Bowel sounds are normal.      Palpations: Abdomen is soft.   Musculoskeletal: Normal range of motion.      Right lower leg: Edema (trace) present.      Left lower leg: Edema (trace) present.   Skin:     General: Skin is warm and dry.      Capillary Refill: Capillary refill takes less than 2 seconds.   Neurological:      General: No focal deficit present.      Mental Status: He is alert and oriented to person, place, and time.   Psychiatric:         Mood and Affect: Mood normal.         Behavior: Behavior normal.         Thought Content: Thought content normal.         Judgment: Judgment normal.         Results Review:  I reviewed the patient's new clinical results.    Assessment & Plan   - multivessel coronary artery disease with previous PCI 2014  - hypertension--poorly controlled  - hyperlipidemia--patient reports previous intolerance to Lipitor--will try pravastatin  - DM II--non-compliant with medications  - SHAHLA with home CPAP  - nicotine dependence--encourage cessation--patient declines nicotine patch at this time  - obesity  **all new to this provider    Dr. Egan and Dr. Chiu have reviewed the films and recommends surgical revascularization  Will reorder heparin gtt--hold on call to OR  Tentative plan for surgery tomorrow morning with Dr. Chiu  Orders placed for preoperative testing    I discussed the patients findings and my recommendations with patient and nursing staff.    SOHAM Arce  10/07/20  07:40 EDT     Electronically signed by Jr Girma Chiu MD at 10/07/20 1542       Emergency Department Notes    No notes of this type exist for this encounter.         {Outbreak/Travel/Exposure Documentation......;  Question Available Choices Patient Response   Outbreak Screen: Do you currently have a new onset of the following symptoms?        Fever/Chils, Cough, Shortness of air, Loss of taste or smell, No,  Unknown  No (10/06/20 2258)   Outbreak Screen: In the last 14 days, have you had contact with anyone who is ill, has show any of the symptoms listed above and/or has been diagnosis with the 2019 Novel Coronavirus? This includes any immediate household members but excludes any patients with whom you have been in contact within your normal work duties wearing proper PPE, if you are a healthcare worker.  Yes, No, Unknown              No (10/06/20 2258)   Outbreak Screen: Who was notified?    Free text  (not recorded)   Travel Screen: Have you traveled in the last month? If so, to what country have you traveled? If [place] what state? Yes, No, Unknown  List of all countries  List of all States No (10/06/20 2258)  (not recorded)  (not recorded)   Infection Risk: Do you currently have the following symptoms?  (If cough is selected, the Tuberculosis Screen is performed.) Cough, Fever, Rash, No No (10/06/20 2258)   Tuberculosis Screen: Do you have any of the following Tuberculosis Risks?  · Have you lived or spent time with anyone who had or may have TB?  · Have you lived in or visited any of the following areas for more than one month: [place], [place], [place], [place] or [place], the [place] or [place]?  · Do you have HIV/AIDS?  · Have you lived in or worked in a nursing home, homeless shelter, correctional facility, or substance abuse treatment facility?   · No    If Yes do you have any of the following symptoms? Yes responses display to the right    If Yes, symptoms listed are:  Cough greater than or equal to 3 weeks, Loss of appetite, Unexplained weight loss, Night sweats, Bloody sputum or hemoptysis, Hoarseness, Fever, Fatigue, Chest pain, No (not recorded)  (not recorded)   Exposure Screen: Have you been exposed to any of these contagious diseases in the last month? Measles, Chickenpox, Meningitis, Pertussis, Whooping Cough, No No (10/06/20 2258)       Vital Signs (last day)     Date/Time   Temp   Temp src    Pulse   Resp   BP   Patient Position   SpO2    10/07/20 1221   97.3 (36.3)   Infrared   73   18   (!) 173/103   Sitting   --    10/07/20 0822   97.1 (36.2)   Infrared   93   18   (!) 181/104   Sitting   91    10/07/20 0321   98 (36.7)   Temporal   82   --   164/90   Lying   90    10/06/20 2337   --   --   84   --   --   --   --    10/06/20 2251   96.9 (36.1)   Temporal   79   18   176/94   Lying   91              Oxygen Therapy (last day)     Date/Time   SpO2   Device (Oxygen Therapy)   Flow (L/min)   Oxygen Concentration (%)   ETCO2 (mmHg)    10/07/20 0822   91   room air   --   --   --    10/07/20 0411   --   room air   --   --   --    10/07/20 0321   90   --   --   --   --    10/06/20 2307   --   room air   --   --   --    10/06/20 2251   91   room air   --   --   --              Intake & Output (last day)       10/06 0701 - 10/07 0700 10/07 0701 - 10/08 0700    P.O. 530 360    Total Intake(mL/kg) 530 (4.6) 360 (3.1)    Net +530 +360              Lines, Drains & Airways    Active LDAs     Name:   Placement date:   Placement time:   Site:   Days:    Peripheral IV 10/06/20 2336 Left Antecubital   10/06/20    2336    Antecubital   less than 1             Facility-Administered Medications as of 10/7/2020   Medication Dose Route Frequency Provider Last Rate Last Dose   • acetaminophen (TYLENOL) tablet 650 mg  650 mg Oral Q4H PRN Angelo Egan MD   650 mg at 10/07/20 0411   • amLODIPine (NORVASC) tablet 5 mg  5 mg Oral Q24H Suzy Kitchen APRN   5 mg at 10/07/20 1022   • aspirin chewable tablet 81 mg  81 mg Oral Daily Suzy Kitchen APRN   81 mg at 10/07/20 0906   • carvedilol (COREG) tablet 12.5 mg  12.5 mg Oral Q12H Suzy Kitchen APRN       • [START ON 10/8/2020] carvedilol (COREG) tablet 3.125 mg  3.125 mg Oral On Call to OR Suzy Kitchen, SOHAM       • [START ON 10/8/2020] ceFAZolin in dextrose (ANCEF) IVPB solution 2 g  2 g Intravenous On Call to OR Suzy Kitchen, APRN        • chlorhexidine (PERIDEX) 0.12 % solution 15 mL  15 mL Mouth/Throat Q12H Suzy Kitchen APRN       • Chlorhexidine Gluconate Cloth 2 % pads 1 application  1 application Topical Q12H Suzy Kitchen APRN       • dextrose (D50W) 25 g/ 50mL Intravenous Solution 25 g  25 g Intravenous Q15 Min PRN Suzy Kitchen APRN       • dextrose (GLUTOSE) oral gel 15 g  15 g Oral Q15 Min PRN Suzy Kitchen APRN       • glucagon (human recombinant) (GLUCAGEN DIAGNOSTIC) injection 1 mg  1 mg Subcutaneous Q15 Min PRN Suzy Kitchen APRN       • heparin 60642 units/250 mL (100 units/mL) in 0.45 % NaCl infusion  8.7 Units/kg/hr Intravenous Titrated Suzy Kitchen APRN 13.45 mL/hr at 10/07/20 1249 11.7 Units/kg/hr at 10/07/20 1249   • [COMPLETED] hydrALAZINE (APRESOLINE) injection 10 mg  10 mg Intravenous Once Angelo Egan MD   10 mg at 10/06/20 2337   • hydrALAZINE (APRESOLINE) injection 20 mg  20 mg Intravenous Q6H PRN Suzy Kitchen APRN   20 mg at 10/07/20 1241   • insulin lispro (humaLOG) injection 0-9 Units  0-9 Units Subcutaneous 4x Daily With Meals & Nightly Suzy Kitchen APRN   4 Units at 10/07/20 1343   • ipratropium-albuterol (DUO-NEB) nebulizer solution 3 mL  3 mL Nebulization Q4H PRN Suzy Kitchen APRN       • isosorbide mononitrate (IMDUR) 24 hr tablet 60 mg  60 mg Oral Q24H Prema Marroquin APRN   60 mg at 10/07/20 1344   • mupirocin (BACTROBAN) 2 % nasal ointment 1 application  1 application Each Nare Q12H Suzy Kitchen APRN       • nitroglycerin (NITROSTAT) SL tablet 0.4 mg  0.4 mg Sublingual Q5 Min PRN Suzy Kitchen APRN       • pravastatin (PRAVACHOL) tablet 20 mg  20 mg Oral Nightly Suzy Kitchen APRN         Lab Results (last 24 hours)     Procedure Component Value Units Date/Time    POC Glucose Once [[id number]]  (Abnormal) Collected: 10/07/20 1220    Specimen: Blood Updated: 10/07/20 1227     Glucose 233  mg/dL     COVID PRE-OP / PRE-PROCEDURE SCREENING ORDER (NO ISOLATION) - Swab, Nasopharynx [[id number]]  (Normal) Collected: 10/07/20 0911    Specimen: Swab from Nasopharynx Updated: 10/07/20 1142    Narrative:      The following orders were created for panel order COVID PRE-OP / PRE-PROCEDURE SCREENING ORDER (NO ISOLATION) - Swab, Nasopharynx.  Procedure                               Abnormality         Status                     ---------                               -----------         ------                     Respiratory Panel PCR w/...[[id number]]  Normal              Final result                 Please view results for these tests on the individual orders.    Respiratory Panel PCR w/COVID-19(SARS-CoV-2) [facility]A/[facility] In-House, NP Swab in UTM/VTM, 3-4 HR TAT - Swab, Nasopharynx [[id number]]  (Normal) Collected: 10/07/20 0911    Specimen: Swab from Nasopharynx Updated: 10/07/20 1142     ADENOVIRUS, PCR Not Detected     Coronavirus 229E Not Detected     Coronavirus HKU1 Not Detected     Coronavirus NL63 Not Detected     Coronavirus OC43 Not Detected     COVID19 Not Detected     Human Metapneumovirus Not Detected     Human Rhinovirus/Enterovirus Not Detected     Influenza A PCR Not Detected     Influenza A H1 Not Detected     Influenza A H1 2009 PCR Not Detected     Influenza A H3 Not Detected     Influenza B PCR Not Detected     Parainfluenza Virus 1 Not Detected     Parainfluenza Virus 2 Not Detected     Parainfluenza Virus 3 Not Detected     Parainfluenza Virus 4 Not Detected     RSV, PCR Not Detected     Bordetella pertussis pcr Not Detected     Bordetella parapertussis PCR Not Detected     Chlamydophila pneumoniae PCR Not Detected     Mycoplasma pneumo by PCR Not Detected    Narrative:      Fact sheet for providers: https://docs.[employer]/wp-content/uploads/WHM1203-6127-BP0.1-EUA-Provider-Fact-Sheet-3.pdf    Fact sheet for patients:  https://docs.[employer]/wp-content/uploads/YXU0020-9963-MY3.1-EUA-Patient-Fact-Sheet-1.pdf    LDL Cholesterol, Direct [[id number]]  (Normal) Collected: 10/07/20 0943    Specimen: Blood Updated: 10/07/20 1134     LDL Cholesterol  89 mg/dL     Narrative:      LDL Reference Ranges    (U.S. Department of Health and Human Services ATP III Classifications)    Optimal          <100 mg/dl  Near Optimal     100-129 mg/dl  Borderline High  130-159 mg/dl  High             160-189 mg/dl  Very High        >189 mg/dl      Lipid Panel [[id number]]  (Abnormal) Collected: 10/07/20 0943    Specimen: Blood Updated: 10/07/20 1054     Total Cholesterol 213 mg/dL      Triglycerides 790 mg/dL      HDL Cholesterol 29 mg/dL      LDL Cholesterol  --     Comment: Unable to calculate        VLDL Cholesterol --     Comment: Unable to calculate        LDL/HDL Ratio --     Comment: Unable to calculate       Narrative:      Cholesterol Reference Ranges  (U.S. Department of Health and Human Services ATP III Classifications)    Desirable          <200 mg/dL  Borderline High    200-239 mg/dL  High Risk          >240 mg/dL      Triglyceride Reference Ranges  (U.S. Department of Health and Human Services ATP III Classifications)    Normal           <150 mg/dL  Borderline High  150-199 mg/dL  High             200-499 mg/dL  Very High        >500 mg/dL    HDL Reference Ranges  (U.S. Department of Health and Human Services ATP III Classifcations)    Low     <40 mg/dl (major risk factor for CHD)  High    >60 mg/dl ('negative' risk factor for CHD)        LDL Reference Ranges  (U.S. Department of Health and Human Services ATP III Classifcations)    Optimal          <100 mg/dL  Near Optimal     100-129 mg/dL  Borderline High  130-159 mg/dL  High             160-189 mg/dL  Very High        >189 mg/dL    Urinalysis With Culture If Indicated - Urine, Clean Catch [[id number]]  (Abnormal) Collected: 10/07/20 1027    Specimen: Urine, Clean Catch Updated: 10/07/20  1046     Color, UA Yellow     Appearance, UA Clear     pH, UA 6.0     Specific Gravity, UA >=1.030     Glucose, UA >=1000 mg/dL (3+)     Ketones, UA Negative     Bilirubin, UA Negative     Blood, UA Negative     Protein, UA >=300 mg/dL (3+)     Leuk Esterase, UA Negative     Nitrite, UA Negative     Urobilinogen, UA 1.0 E.U./dL    Urinalysis, Microscopic Only - Urine, Clean Catch [[id number]] Collected: 10/07/20 1027    Specimen: Urine, Clean Catch Updated: 10/07/20 1046     RBC, UA 0-2 /HPF      WBC, UA 0-2 /HPF      Bacteria, UA None Seen /HPF      Squamous Epithelial Cells, UA 0-2 /HPF      Hyaline Casts, UA None Seen /LPF      Methodology Automated Microscopy    Protime-INR [[id number]]  (Normal) Collected: 10/07/20 0943    Specimen: Blood Updated: 10/07/20 1030     Protime 12.6 Seconds      INR 0.95    aPTT [[id number]]  (Normal) Collected: 10/07/20 0943    Specimen: Blood Updated: 10/07/20 1030     PTT 25.7 seconds     BNP [[id number]]  (Normal) Collected: 10/07/20 0943    Specimen: Blood Updated: 10/07/20 1025     proBNP 220.6 pg/mL     Narrative:      Among patients with dyspnea, NT-proBNP is highly sensitive for the detection of acute congestive heart failure. In addition NT-proBNP of <300 pg/ml effectively rules out acute congestive heart failure with 99% negative predictive value.    Results may be falsely decreased if patient taking Biotin.      Comprehensive Metabolic Panel [[id number]]  (Abnormal) Collected: 10/07/20 0943    Specimen: Blood Updated: 10/07/20 1025     Glucose 289 mg/dL      BUN 12 mg/dL      Creatinine 0.75 mg/dL      Sodium 129 mmol/L      Potassium 3.9 mmol/L      Chloride 97 mmol/L      CO2 22.5 mmol/L      Calcium 9.3 mg/dL      Total Protein 7.0 g/dL      Albumin 3.90 g/dL      ALT (SGPT) 13 U/L      AST (SGOT) 16 U/L      Alkaline Phosphatase 119 U/L      Total Bilirubin 0.4 mg/dL      eGFR Non African Amer 109 mL/min/1.73      Globulin 3.1 gm/dL      A/G Ratio 1.3 g/dL       BUN/Creatinine Ratio 16.0     Anion Gap 9.5 mmol/L     Narrative:      GFR Normal >60  Chronic Kidney Disease <60  Kidney Failure <15      Magnesium [[id number]]  (Normal) Collected: 10/07/20 0943    Specimen: Blood Updated: 10/07/20 1025     Magnesium 1.9 mg/dL     Hemoglobin A1c [[id number]]  (Abnormal) Collected: 10/07/20 0944    Specimen: Blood Updated: 10/07/20 1014     Hemoglobin A1C 9.40 %     Narrative:      Hemoglobin A1C Ranges:    Increased Risk for Diabetes  5.7% to 6.4%  Diabetes                     >= 6.5%  Diabetic Goal                < 7.0%    CBC & Differential [[id number]]  (Abnormal) Collected: 10/07/20 0943    Specimen: Blood Updated: 10/07/20 1003    Narrative:      The following orders were created for panel order CBC & Differential.  Procedure                               Abnormality         Status                     ---------                               -----------         ------                     CBC Auto Differential[[id number]]        Abnormal            Final result                 Please view results for these tests on the individual orders.    CBC Auto Differential [[id number]]  (Abnormal) Collected: 10/07/20 0943    Specimen: Blood Updated: 10/07/20 1003     WBC 8.12 10*3/mm3      RBC 5.31 10*6/mm3      Hemoglobin 16.3 g/dL      Hematocrit 48.5 %      MCV 91.3 fL      MCH 30.7 pg      MCHC 33.6 g/dL      RDW 13.2 %      RDW-SD 44.2 fl      MPV 9.3 fL      Platelets 315 10*3/mm3      Neutrophil % 60.2 %      Lymphocyte % 25.9 %      Monocyte % 10.7 %      Eosinophil % 1.6 %      Basophil % 0.6 %      Immature Grans % 1.0 %      Neutrophils, Absolute 4.89 10*3/mm3      Lymphocytes, Absolute 2.10 10*3/mm3      Monocytes, Absolute 0.87 10*3/mm3      Eosinophils, Absolute 0.13 10*3/mm3      Basophils, Absolute 0.05 10*3/mm3      Immature Grans, Absolute 0.08 10*3/mm3      nRBC 0.0 /100 WBC     Platelet Function ADP [[id number]] Collected: 10/07/20 0943    Specimen: Blood Updated: 10/07/20  0952     ADP Aggregation, % Platelet 91 %     POC Glucose Once [[id number]]  (Abnormal) Collected: 10/07/20 0852    Specimen: Blood Updated: 10/07/20 0854     Glucose 296 mg/dL     Blood Gas, Arterial [[id number]]  (Abnormal) Collected: 10/07/20 0815    Specimen: Arterial Blood Updated: 10/07/20 0817     Site Arterial: right radial     Fritz's Test Positive     pH, Arterial 7.411 pH units      pCO2, Arterial 41.2 mm Hg      pO2, Arterial 65.1 mm Hg      HCO3, Arterial 26.2 mmol/L      Base Excess, Arterial 1.3 mmol/L      O2 Saturation Calculated 92.6 %      Barometric Pressure for Blood Gas 753.9 mmHg      Modality Room Air     Rate 16 Breaths/minute     POC Glucose Once [[id number]]  (Abnormal) Collected: 10/06/20 2312    Specimen: Blood Updated: 10/06/20 2313     Glucose 251 mg/dL         Imaging Results (Last 24 Hours)     Procedure Component Value Units Date/Time    XR Chest PA & Lateral [[id number]] Collected: 10/07/20 1218     Updated: 10/07/20 1223    Narrative:      XR CHEST PA AND LATERAL-     HISTORY: Male who is 55 years-old,  preoperative evaluation     TECHNIQUE: Frontal and lateral views of the chest     COMPARISON: None available     FINDINGS: The heart is enlarged. Pulmonary vasculature is unremarkable..  Minimal likely atelectasis at the lung bases. No pleural effusion, or  pneumothorax. No acute osseous process.       Impression:      Minimal likely atelectasis at the lung bases. Cardiomegaly.  Follow-up as clinical indications persist.     This report was finalized on 10/7/2020 12:20 PM by Dr. Chico Sheridan M.D.           Orders (last 24 hrs)      Start     Ordered    10/08/20 0600  ceFAZolin in dextrose (ANCEF) IVPB solution 2 g  On Call to O.R.      10/07/20 1022    10/08/20 0600  Basic Metabolic Panel  Morning Draw      10/07/20 1022    10/08/20 0600  metoprolol tartrate (LOPRESSOR) tablet 12.5 mg  On Call to O.R.,   Status:  Discontinued      10/07/20 1022    10/08/20 0600  carvedilol  (COREG) tablet 3.125 mg  On Call to O.R.      10/07/20 1515    10/08/20 0430  aPTT  Daily     Comments: Cancel If Patient Has Infusion Changes      10/07/20 0805    10/08/20 0001  NPO Diet NPO Except: Sips with meds  Diet Effective Midnight      10/07/20 1022    10/08/20 0000  Clip Hair Chin to Ankles  Once      10/07/20 1022    10/07/20 2100  pravastatin (PRAVACHOL) tablet 20 mg  Nightly      10/07/20 0826    10/07/20 2100  Weigh Patient Night Before Surgery  Once     Comments: Need Actual Weight, Not Stated Weight    10/07/20 1022    10/07/20 2100  chlorhexidine (PERIDEX) 0.12 % solution 15 mL  Every 12 Hours Scheduled      10/07/20 1022    10/07/20 2100  Chlorhexidine Gluconate Cloth 2 % pads 1 application  Every 12 Hours      10/07/20 1022    10/07/20 2100  mupirocin (BACTROBAN) 2 % nasal ointment 1 application  Every 12 Hours Scheduled      10/07/20 1022    10/07/20 2100  carvedilol (COREG) tablet 12.5 mg  Every 12 Hours Scheduled      10/07/20 1449    10/07/20 1850  aPTT  Once      10/07/20 1441    10/07/20 1600  Strict Intake & Output  Every 8 Hours      10/07/20 1022    10/07/20 1411  Obtain Informed Consent  Once      10/07/20 1410    10/07/20 1409  Case request  Once      10/07/20 1409    10/07/20 1400  Instruct Patient on Coughing, Deep Breathing and Incentive Spirometry  Every 4 Hours While Awake      10/07/20 1022    10/07/20 1400  Instruct Patient on Coughing, Deep Breathing and Incentive Spirometry  Every 4 Hours While Awake      10/07/20 1022    10/07/20 1337  Inpatient Diabetes Educator Consult  Once     Comments: Diet and follow up care. noncompliant   Provider:  (Not yet assigned)    10/07/20 1336    10/07/20 1330  isosorbide mononitrate (IMDUR) 24 hr tablet 60 mg  Every 24 Hours Scheduled      10/07/20 1244    10/07/20 1228  POC Glucose Once  Once      10/07/20 1220    10/07/20 1200  Neurovascular Checks  Every 4 Hours      10/07/20 1022    10/07/20 1100  POC Glucose 4x Daily AC & at Bedtime  4  Times Daily Before Meals & at Bedtime     Comments: If bedtime blood glucose is greater than 350 mg/dl, call MD.      10/07/20 0739    10/07/20 1055  LDL Cholesterol, Direct  Once      10/07/20 1054    10/07/20 1044  Urinalysis, Microscopic Only - Urine, Clean Catch  Once      10/07/20 1043    10/07/20 1021  Lipid Panel  Once      10/07/20 1022    10/07/20 1020  Anticoagulant Adjustment Instructions  Continuous      10/07/20 1022    10/07/20 1019  Follow Anesthesia Guidelines / Standing Orders  Continuous      10/07/20 1022    10/07/20 1019  Measure Height  Once      10/07/20 1022    10/07/20 1019  Skin Assessment  Every Shift      10/07/20 1022    10/07/20 1019  POC Glucose Once  Once     Comments: Obtain glucose at 0400 the day of surgery, if greater than 200, initiate insulin IV protocol      10/07/20 1022    10/07/20 1019  Give Patient Pre-Op Education Booklet / Materials  Once     Comments: Give Patient Pre-Op Education Booklet / Materials    10/07/20 1022    10/07/20 1019  Obtain Informed Consent  Once,   Status:  Canceled      10/07/20 1022    10/07/20 1019  Notify Surgeon if Patient Currently Taking Metformin, Warfarin, Plavix, Effient, Ticlid, Brillinta, Pradaxa, Xarelto, Eliquis, Savaysa, Coumadin, Pletal, or  Any Other Antiplatelet / Anticoagulant  Once      10/07/20 1022    10/07/20 1019  Inpatient Anesthesiology Consult  Once     Specialty:  Anesthesiology  Provider:  Navid Ashley MD    10/07/20 1022    10/07/20 1019  Prepare RBC, 2 Units  Blood - Once      10/07/20 1022    10/07/20 1019  Prepare Platelet Pheresis, 2 Units  Blood - Once      10/07/20 1022    10/07/20 1016  Inpatient Internal Medicine Consult  Once     Specialty:  Internal Medicine  Provider:  Eliud Edge MD    10/07/20 1017    10/07/20 0938  Case request  Once,   Status:  Canceled      10/07/20 0938    10/07/20 0937  hydrALAZINE (APRESOLINE) injection 20 mg  Every 6 Hours PRN      10/07/20 0937    10/07/20 0926  aPTT  STAT       10/07/20 0926    10/07/20 0900  metoprolol tartrate (LOPRESSOR) tablet 25 mg  Every 12 Hours Scheduled,   Status:  Discontinued      10/07/20 0738    10/07/20 0900  aspirin chewable tablet 81 mg  Daily      10/07/20 0738    10/07/20 0900  heparin 40505 units/250 mL (100 units/mL) in 0.45 % NaCl infusion  Titrated      10/07/20 0805    10/07/20 0900  amLODIPine (NORVASC) tablet 5 mg  Every 24 Hours Scheduled      10/07/20 0813    10/07/20 0854  POC Glucose Once  Once      10/07/20 0852    10/07/20 0830  insulin lispro (humaLOG) injection 0-9 Units  4 Times Daily With Meals & Nightly      10/07/20 0739    10/07/20 0818  Blood Gas, Arterial  Once      10/07/20 0815    10/07/20 0818  Type & Screen  Once      10/07/20 0818    10/07/20 0818  COVID PRE-OP / PRE-PROCEDURE SCREENING ORDER (NO ISOLATION) - Swab, Nasopharynx  Once      10/07/20 0818    10/07/20 0818  Respiratory Panel PCR w/COVID-19(SARS-CoV-2) [facility]SA/ASHLEY/CHAYA/PAD/COR/MAD In-House, NP Swab in [facility], 3-4 HR TAT - Swab, Nasopharynx  PROCEDURE ONCE      10/07/20 0818    10/07/20 0817  Case request  Once,   Status:  Canceled      10/07/20 0818    10/07/20 0815  ipratropium-albuterol (DUO-NEB) nebulizer solution 3 mL  Every 4 Hours PRN      10/07/20 0815    10/07/20 0813  hydrALAZINE (APRESOLINE) injection 10 mg  Every 6 Hours PRN,   Status:  Discontinued      10/07/20 0813    10/07/20 0806  Pulmonary Function Test Spirometry Pre & Post Bronchodilator; COR/ASHLEY/ELSA/PAD - albuterol (PROVENTIL) nebulizer solution 0.083% 2.5 mg/3 mL  Once      10/07/20 0805    10/07/20 0805  Adjust Heparin Rate Based on aPTT Using Nomogram  Continuous      10/07/20 0805    10/07/20 0805  Verify All Anticoagulant Orders Are Discontinued Upon Initiation of Heparin Protocol (eg Enoxaparin, Fondaparinux, Apixaban, Dabigatran, Edoxaban, or Rivaroxaban)  Once      10/07/20 0805    10/07/20 0800  Vital Signs  Every 4 Hours      10/07/20 0740    10/07/20 0740  Do NOT Hold Basal or Correction  Scale Insulin When Patient is NPO, Hold Scheduled Mealtime (Bolus) Insulin if NPO  Continuous      10/07/20 0739    10/07/20 0740  Follow [facility] Hypoglycemia Standing Orders For Blood Glucose Less Than 70 mg/dL  Until Discontinued     Comments: ALERT PATIENT - NOT NPO & CAN SAFELY SWALLOW  Administer 4 oz Fruit Juice OR 4 oz Regular Soda OR 8 oz Milk OR 15-30 grams (1 tube) of Glucose Gel.  Recheck Blood Glucose Approximately 15 Minutes After Ingestion, Repeat Treatment & Continue to Recheck Blood Sugar Approximately Every 15 Minutes Until Blood Glucose is 70 or Higher.  Once Blood Glucose is 70 or Higher & if It Will Be More Than 60 Minutes Until Next Meal, Provide Appropriate Snack (Including Carbohydrate Food) Based on Meal Plan Order. Give Meal Tray As Soon As Possible.    PATIENT HAS IV ACCESS - UNRESPONSIVE, NPO OR UNABLE TO SAFELY SWALLOW  Administer 25g (50ml) D50W IV Push.  Recheck Blood Glucose Approximately 15 Minutes After Administration, if Blood Glucose Remains Less Than 70, Repeat Treatment   Recheck Blood Glucose Approximately 15 Minutes After 2nd Administration, if Blood Glucose Remains Less Than 70 After 2nd Dose of D50W, Contact Provider for Further Treatment Orders & Consider Adding IVF With D5W for Maintenance    PATIENT WITHOUT IV ACCESS - UNRESPONSIVE, NPO OR UNABLE TO SAFELY SWALLOW  Administer 1mg Glucagon SQ & Establish IV Access.  Turn Patient on Side - Nausea / Vomiting May Occur.  Recheck Blood Glucose Approximately 15 Minutes After Administration.  If Blood Glucose Remains Less Than 70, Administer 25g D50W IV Push (50ml).  Recheck Blood Glucose Approximately 15 Minutes After Administration of D50W, if Blood Glucose Remains Less Than 70, Contact Provider for Further Treatment Orders & Consider Adding IVF With D5 for Maintenance    Document Event & Patient Response to Interventions in EMR, Document Medications on MAR  Notify Provider if Hypoglycemia Treatment Needed    10/07/20 0739     10/07/20 0740  Intake & Output  Every Shift      10/07/20 0740    10/07/20 0740  Oxygen Therapy- Nasal Cannula; Titrate for SPO2: 90% - 95%  Continuous      10/07/20 0740    10/07/20 0740  Place Sequential Compression Device  Once      10/07/20 0740    10/07/20 0740  Maintain Sequential Compression Device  Continuous      10/07/20 0740    10/07/20 0740  Telemetry - Maintain IV Access  Continuous      10/07/20 0740    10/07/20 0740  Continuous Cardiac Monitoring  Continuous      10/07/20 0740    10/07/20 0740  May Be Off Telemetry for Tests  Continuous      10/07/20 0740    10/07/20 0740  ACLS Protocol For Life Threatening Dysrhythmias (Unless Code Status Indicates Otherwise)  Continuous      10/07/20 0740    10/07/20 0740  Notify Provider if ACLS Protocol Activated  Until Discontinued      10/07/20 0740    10/07/20 0740  Activity - Ad Ira  Until Discontinued      10/07/20 0740    10/07/20 0739  nitroglycerin (NITROSTAT) SL tablet 0.4 mg  Every 5 Minutes PRN      10/07/20 0740    10/07/20 0739  dextrose (D50W) 25 g/ 50mL Intravenous Solution 25 g  Every 15 Minutes PRN      10/07/20 0739    10/07/20 0739  glucagon (human recombinant) (GLUCAGEN DIAGNOSTIC) injection 1 mg  Every 15 Minutes PRN      10/07/20 0739    10/07/20 0739  dextrose (GLUTOSE) oral gel 15 g  Every 15 Minutes PRN      10/07/20 0739    10/07/20 0739  Place Sequential Compression Device  Once      10/07/20 0738    10/07/20 0739  Maintain Sequential Compression Device  Continuous      10/07/20 0738    10/07/20 0738  Duplex Vein Mapping Lower Extremity - Bilateral CAR  Once      10/07/20 0737    10/07/20 0738  ECG 12 Lead  Once      10/07/20 0737    10/07/20 0737  Protime-INR  Once      10/07/20 0737    10/07/20 0737  CBC & Differential  Once      10/07/20 0737    10/07/20 0737  Platelet Function ADP  Once      10/07/20 0737    10/07/20 0737  Blood Gas, Arterial  Once      10/07/20 0737    10/07/20 0737  Urinalysis With Culture If Indicated - Urine,  Clean Catch  Once      10/07/20 0737    10/07/20 0737  Hemoglobin A1c  Once      10/07/20 0737    10/07/20 0737  Magnesium  Once      10/07/20 0737    10/07/20 0737  Duplex Carotid Ultrasound CAR  Once      10/07/20 0737    10/07/20 0737  XR Chest PA & Lateral  1 Time Imaging      10/07/20 0737    10/07/20 0737  CBC Auto Differential  PROCEDURE ONCE      10/07/20 0737    10/07/20 0736  BNP  Once      10/07/20 0737    10/07/20 0736  Comprehensive Metabolic Panel  Once      10/07/20 0737    10/07/20 0736  Evaluation For Pre-Op PFT Need  Once      10/07/20 0737    10/07/20 0736  aPTT  Once,   Status:  Canceled      10/07/20 0737    10/07/20 0734  Code Status and Medical Interventions:  Continuous      10/07/20 0733    10/07/20 0734  Cardiac Monitoring  Continuous,   Status:  Canceled      10/07/20 0733    10/07/20 0733  Inpatient Admission  Once      10/07/20 0733    10/07/20 0408  acetaminophen (TYLENOL) tablet 650 mg  Every 4 Hours PRN      10/07/20 0409    10/07/20 0015  hydrALAZINE (APRESOLINE) injection 10 mg  Once      10/06/20 2322    10/06/20 2323  Diet Regular; Cardiac, Consistent Carbohydrate  Diet Effective Now      10/06/20 2322    10/06/20 2314  POC Glucose Once  Once      10/06/20 2312    Unscheduled  Telemetry - Pulse Oximetry  Continuous PRN     Comments: If Patient Develops Unresponsiveness, Acute Dyspnea, Cyanosis or Suspected Hypoxemia Start Continuous Pulse Ox Monitoring, Apply Oxygen & Notify Provider    10/07/20 0740    Unscheduled  Oxygen Therapy- Nasal Cannula; Titrate for SPO2: 90% - 95%  Continuous PRN     Comments: If Patient Develops Unresponsiveness, Acute Dyspnea, Cyanosis or Suspected Hypoxemia Start Continuous Pulse Ox Monitoring, Apply Oxygen & Notify Provider    10/07/20 0740    Unscheduled  ECG 12 Lead  As Needed     Comments: Nurse to Release if Patient Expericences Acute Chest Pain or Dysrhythmias    10/07/20 0740    Unscheduled  Potassium  As Needed     Comments: For Ventricular  Arrhythmias      10/07/20 0740    Unscheduled  Magnesium  As Needed     Comments: For Ventricular Arrhythmias      10/07/20 0740    Unscheduled  Troponin  As Needed     Comments: For Chest Pain      10/07/20 0740    Unscheduled  Digoxin Level  As Needed     Comments: For Atrial Arrhythmias      10/07/20 0740    Unscheduled  Blood Gas, Arterial  As Needed     Comments: Per O2 PolicyNotify Physician      10/07/20 0740    Unscheduled  Chlorhexidine Skin Prep - Chin to Toes 12 Hours Prior to Surgery & Morning of Surgery  As Needed     Comments: Chlorhexidine Skin wipes and instructions for all patients having a procedure requiring an outward incision if not allergic.  If allergic, give antibacterial skin wipes and instructions.  Do not use for facial cases or on any mucus membranes.    12 Hours Prior to Surgery & The Morning of Surgery    10/07/20 1022    --  lisinopril-hydrochlorothiazide (PRINZIDE,ZESTORETIC) 10-12.5 MG per tablet  Daily      10/06/20 2355                   Consult Notes (last 24 hours) (Notes from 10/06/20 1551 through 10/07/20 1551)      Luana Alexander APRN at 10/07/20 1216      Consult Orders    1. Inpatient Internal Medicine Consult [[id number]] ordered by Suzy Kitchen APRN          Attestation signed by Slick Ribera MD at 10/07/20 1522    I have reviewed this documentation and agree.  Discussed with SOHAM Alexander.  A1c noted to be over 9.  He was on diabetes meds at one time but stopped all of his medications because he was feeling bad.  Since he has not been on any medications prior to coming into the hospital diabetes educator has been consulted he is been started on sliding scale insulin here in the hospital.  Following surgery he may need some basal insulin but the hope would be that he could go home on oral regimen at discharge.  [facility] will continue to follow along with you in the postoperative setting and monitor his blood sugars here in the hospital.  Thank you  for the consult.    Electronically signed by Slick Ribera MD, 10/07/20, 3:21 PM EDT.                         Patient Name:  Jd Velazquez  YOB: 1965  MRN:  [id number]  Date of Admission:  10/6/2020  Date of Consult:  10/7/2020  Patient Care Team:  Dacia Newsome APRN as PCP - General (Nurse Practitioner)    Inpatient Internal Medicine Consult  Consult performed by: Luana Alexander APRN  Consult ordered by: Suzy Kitchen APRN  Reason for consult: Consult for medical evaluation of uncontrolled diabetes mellitus complicating plans for cardiac surgery.         Subjective   History of Present Illness  Mr. Velazquez is a 55 y.o. male with a history of multivessel CAD that has been admitted to [facility] by cardiothoracic surgery for elective surgical revascularization planned for tmrw. We were asked to see and assist with his medical problems, specifically relating to his  Uncontrolled DM2.  Patient states Niya years ago he took himself off of all of his medications including antihypertensive agents and diabetes medications without contacting his PCP.  He does not follow a diabetic diet but does state he tries to avoid sugar. He was previously treated with metformin.  Patient reports being recently evaluated by ophthalmology. At the time of my visit he denies any chest pain, SOA, nausea, vomiting or diarrhea. No recent illness and reports being in a normal state of health leading up to surgery.       Past Medical History:   Diagnosis Date   • CAD (coronary artery disease)    • Diabetes mellitus (CMS/HCC)    • Hyperlipidemia    • Hypertension    • Obesity      Past Surgical History:   Procedure Laterality Date   • ANKLE ARTHROSCOPY W/ OPEN REPAIR     • APPENDECTOMY     • CARDIAC CATHETERIZATION  2014    PCI to circumflex   • HERNIA REPAIR     • TONSILLECTOMY       History reviewed. No pertinent family history.  Social History     Tobacco Use   • Smoking status:  Current Every Day Smoker     Packs/day: 1.00   • Smokeless tobacco: Never Used   Substance Use Topics   • Alcohol use: Not on file   • Drug use: Not on file     Medications Prior to Admission   Medication Sig Dispense Refill Last Dose   • lisinopril-hydrochlorothiazide (PRINZIDE,ZESTORETIC) 10-12.5 MG per tablet Take 1 tablet by mouth Daily.        Allergies:  Hydrocodone    Review of Systems   Constitutional: Negative for appetite change and fatigue.   HENT: Negative for trouble swallowing.    Eyes: Negative for visual disturbance.   Respiratory: Negative for choking.    Cardiovascular: Negative for chest pain.   Gastrointestinal: Negative for abdominal distention, abdominal pain, blood in stool, constipation, diarrhea, nausea and vomiting.   Endocrine: Negative for cold intolerance, heat intolerance, polydipsia, polyphagia and polyuria.   Musculoskeletal: Negative for back pain.   Skin: Negative for pallor.   Allergic/Immunologic: Negative for immunocompromised state.   Neurological: Negative for dizziness.   Hematological: Does not bruise/bleed easily.   Psychiatric/Behavioral: Negative for confusion.       Objective      Vital Signs  Temp:  [96.9 °F (36.1 °C)-98 °F (36.7 °C)] 97.3 °F (36.3 °C)  Heart Rate:  [73-93] 73  Resp:  [18] 18  BP: (164-181)/() 173/103  Body mass index is 34.27 kg/m².    Physical Exam  Vitals signs and nursing note reviewed.   Constitutional:       Appearance: Normal appearance. He is well-developed. He is obese. He is not ill-appearing.   HENT:      Head: Normocephalic and atraumatic.   Eyes:      Conjunctiva/sclera: Conjunctivae normal.   Neck:      Musculoskeletal: Normal range of motion.      Trachea: No tracheal deviation.   Cardiovascular:      Rate and Rhythm: Normal rate and regular rhythm.   Pulmonary:      Effort: Pulmonary effort is normal. No respiratory distress.      Breath sounds: Normal breath sounds.   Abdominal:      General: Bowel sounds are normal. There is no  distension.      Palpations: Abdomen is soft. There is no mass.      Tenderness: There is no abdominal tenderness. There is no guarding or rebound.   Musculoskeletal: Normal range of motion.      Right lower leg: Edema present.      Left lower leg: Edema present.      Comments: Trace BLE edema   Skin:     General: Skin is warm and dry.   Neurological:      General: No focal deficit present.      Mental Status: He is alert and oriented to person, place, and time.   Psychiatric:         Judgment: Judgment normal.         Results Review:   I reviewed the patient's new clinical results.  I reviewed the patient's new imaging results and agree with the interpretation.  I reviewed the patient's other test results and agree with the interpretation  I personally viewed and interpreted the patient's EKG/Telemetry data  Results from last 7 days   Lab Units 10/07/20  0943   WBC 10*3/mm3 8.12   HEMOGLOBIN g/dL 16.3   HEMATOCRIT % 48.5   PLATELETS 10*3/mm3 315     Results from last 7 days   Lab Units 10/07/20  0943   SODIUM mmol/L 129*   POTASSIUM mmol/L 3.9   CHLORIDE mmol/L 97*   CO2 mmol/L 22.5   BUN mg/dL 12   CREATININE mg/dL 0.75*   CALCIUM mg/dL 9.3   BILIRUBIN mg/dL 0.4   ALK PHOS U/L 119*   ALT (SGPT) U/L 13   AST (SGOT) U/L 16   GLUCOSE mg/dL 289*     Results from last 7 days   Lab Units 10/07/20  0944   HEMOGLOBIN A1C % 9.40*           Assessment/Plan     Active Hospital Problems    Diagnosis POA   • **Coronary artery disease of native artery of native heart with stable angina pectoris (CMS/[facility]) [I25.118] Yes     Added automatically from request for surgery [id number]     • CAD (coronary artery disease) [I25.10] Yes   • Diabetes mellitus (CMS/[facility]) [E11.9] Yes   • Hyperlipidemia [E78.5] Yes   • Hypertension [I10] Yes   • Obesity [E66.9] Yes   • SHAHLA (obstructive sleep apnea) [G47.33] Unknown   • Tobacco abuse [Z72.0] Unknown       Mr. Velazquez is a 55 y.o. male who admitted for CORONARY ARTERY BYPASS WITH INTERNAL MAMMARY  ARTERY GRAFT, TRANSESOPHAGEAL ECHOCARDIOGRAM WITH ANESTHESIA.    Untreated diabetes mellitus 2  · Start HUMALOG - moderate dose correctional factor as needed.  · Monitor glucose TID AC. For any BG less than 70 mg/dL, treat per hospital protocol  · NCS diet. Consult diabetes educator for nutrition guidance.   · Will resume oral medications at discharge    HTN, uncontrolled  · Defer to cardiology   · Monitor renal function.    HLD- started on pravastatin    SHAHLA on CPAP    Tobacco abuse- cessation advised, declines patch  · He was encouraged to use incentive spirometer as instructed.      Thank you very much for asking [facility] to be involved in this patient's care. We will follow along with you.      SOHAM Narayan  [facility]  10/07/20  13:28 EDT    Electronically signed by Slick Ribera MD at 10/07/20 1522          Jd Vealzquez  Duplex Vein Mapping Lower Extremity - Bilateral CAR  Order# [id number]  Reading physician: Sohail Motley MD Ordering physician: Suzy Kitchen APRN Study date: 10/7/20   Patient Information    Patient Name   Jd Velazquez MRN   [id number] Sex   Male  (Age)   1965 (55 y.o.)   Clinical Indication    preop CABG   Admission Information    Admission Date/Time Discharge Date/Time Room/Bed   10/06/20  10:38 PM  /   Interpretation Summary    · The right greater saphenous vein is patent and of adequate size in the thigh.  · The left greater saphenous vein is patent and of adequate size in the thigh.  · The left greater saphenous vein is patent and of adequate size in the calf.  · The left small saphenous vein is patent and of adequate size.      Patient Hx Of Height, Weight, and Vitals    Height Weight BSA (Calculated - sq m) BMI (kg/m2) Pulse BP       73 173/103   Study Impression •   Right greater saphenous vein above knee:  good quality with adequate size.      •   Right greater saphenous vein below knee:  with inadequate size.       •   Right small saphenous vein:  with inadequate size.     •   Left greater saphenous vein above knee:  good quality with adequate size.      •   Left greater saphenous vein below knee:  good quality with adequate size.      •   Left small saphenous vein:  good quality with adequate size.   Cardiac History    Diagnosis Date Comment Source   CAD (coronary artery disease)   Provider   Diabetes mellitus (CMS/[facility])   Provider   Hyperlipidemia   Provider   Hypertension   Provider   Social History    Tobacco Use    Current Every Day Smoker; Smokes 1 pack/day.   Smokeless Tobacco: Never used smokeless tobacco.   Right Leg Vein Mapping Findings     Right Leg Measurements   GSV Origin 0.77 cm         GSV Thigh - prox 0.34 cm         GSV Thigh - mid 0.24 cm         GSV Thigh - dist 0.25 cm         GSV at Knee 0.4 cm         GSV Calf - prox 0.19 cm         GSV Calf - mid 0.31 cm         GSV Calf - dist 0.14 cm         GSV at Ankle 0.14 cm         SSV Calf - prox 0.39 cm         SSV Calf - mid 0.17 cm         SSV Calf - dist 0.15 cm            Left Leg Vein Mapping Findings     Left Leg Measurements   GSV Origin 0.84 cm         GSV Thigh - prox 0.49 cm         GSV Thigh - mid 0.39 cm         GSV Thigh - dist 0.4 cm         GSV at Knee 0.32 cm         GSV Calf - prox 0.35 cm         GSV Calf - mid 0.3 cm         GSV Calf - dist 0.2 cm         GSV at Ankle 0.16 cm         SSV Calf - prox 0.26 cm         SSV Calf - mid 0.26 cm         SSV Calf - dist 0.26 cm            Study Information    Physician Technologist Supporting Staff    Joanna Grande RVT    PACS Images     Show images for Duplex Vein Mapping Lower Extremity - Bilateral CAR    [employer] Images     Show images for Duplex Vein Mapping Lower Extremity - Bilateral CAR      Scans on Order [id number]    Scan on 10/7/2020 1303 by Joanna Grande RVT: LEV MAP FOR CABG       Signed    Electronically signed by Sohail Motley MD on 10/7/20 at 1243 EDT   Printable  Result Report    Result Report   Results Routing Tracking       Jd Velazquez  Duplex scan of extracranial arteries using B-mode, color flow, and/or spectral Doppler; bilateral  Order# [id number]  Reading physician: Sohail Motley MD Ordering physician: Suzy Kitchen APRN Study date: 10/7/20   Patient Information    Patient Name   Jd Velazquez MRN   [id number] Sex   Male  (Age)   1965 (55 y.o.)   Clinical Indication    preop surgery   Admission Information    Admission Date/Time Discharge Date/Time Room/Bed   10/06/20  10:38 PM  2213/1   Interpretation Summary    · Proximal right internal carotid artery mild stenosis.  · Proximal left internal carotid artery mild stenosis.      Patient Hx Of Height, Weight, and Vitals    Height Weight BSA (Calculated - sq m) BMI (kg/m2) Pulse BP       73 173/103   Study Findings    •     Right CCA Prox:  No plaque visualized.    •     Right CCA Dist:  No plaque visualized.   •     Right ICA Prox:  Plaque present.   •     Right ECA:  Plaque present.   •     Right Vertebral:  Antegrade flow noted.       •     Left CCA Prox:  No plaque visualized.   •     Left CCA Dist:  No plaque visualized.   •     Left ICA Prox:  Plaque present.   •     Left ECA:  Plaque present.   •     Left Vertebral:  Antegrade flow noted.   Study Impression •     Right ICA Prox:  Imaging indicates 16%-49% stenosis.        •     Left ICA Prox:  Imaging indicates 16-49% stenosis.   Cardiac History    Diagnosis Date Comment Source   CAD (coronary artery disease)   Provider   Diabetes mellitus (CMS/[facility])   Provider   Hyperlipidemia   Provider   Hypertension   Provider   Social History    Tobacco Use    Current Every Day Smoker; Smokes 1 pack/day.   Smokeless Tobacco: Never used smokeless tobacco.   Blood Pressure Measurements      Right Side Left Side   Blood Pressure 174/94 mmHg       164/78 mmHg            Carotid Velocities - Right Side     Systolic Diastolic   CCA Prox 93.2 cm/sec        13.5 cm/sec         CCA Mid           CCA Dist -90.7 cm/sec       -15.5 cm/sec         ICA Prox -49.1 cm/sec       -23.2 cm/sec         ICA Mid -95.9 cm/sec       -35.4 cm/sec         ICA Dist -49.6 cm/sec       -22.7 cm/sec         ECA -126 cm/sec       -18.5 cm/sec         Vertebral -40 cm/sec       -15.2 cm/sec         Subclavian 174 cm/sec             ICA/CCA 1.06                 Carotid Velocities - Left Side     Systolic Diastolic   CCA Prox 114 cm/sec       30.6 cm/sec         CCA Mid           CCA Dist -106 cm/sec       -28.1 cm/sec         ICA Prox -109 cm/sec       -45.1 cm/sec         ICA Mid -105 cm/sec       -48.7 cm/sec         ICA Dist -90.5 cm/sec       -32.4 cm/sec         ECA -86.8 cm/sec       -10 cm/sec         Vertebral -36.1 cm/sec       -8.3 cm/sec         Subclavian 131 cm/sec             ICA/CCA 1.02                       Study Information    Physician Technologist Supporting Staff    Joanna Grande, RVT    PACS Images     Show images for Duplex Carotid Ultrasound CAR    [employer] Images     Show images for Duplex Carotid Ultrasound CAR

## 2020-10-07 NOTE — PROGRESS NOTES
Discharge Planning Assessment  [facility]     Patient Name: Jd Velazquez  MRN: [id number]  Today's Date: 10/7/2020    Admit Date: 10/6/2020    Discharge Needs Assessment     Row Name 10/07/20 1525       Living Environment    Lives With  spouse;child(heath), adult    Name(s) of Who Lives With Patient  wife, Guillermina Velazquez, [phone]; son    Current Living Arrangements  home/apartment/condo    Primary Care Provided by  self    Provides Primary Care For  no one    Family Caregiver if Needed  spouse    Quality of Family Relationships  helpful;involved;supportive    Able to Return to Prior Arrangements  yes       Resource/Environmental Concerns    Resource/Environmental Concerns  none       Transition Planning    Patient/Family Anticipates Transition to  home with family;home with help/services    Patient/Family Anticipated Services at Transition  home health care    Transportation Anticipated  family or friend will provide       Discharge Needs Assessment    Equipment Currently Used at Home  none    Concerns to be Addressed  discharge planning    Outpatient/Agency/Support Group Needs  homecare agency    Discharge Facility/Level of Care Needs  home with home health    Discharge Coordination/Progress  Home Health referrals pending        Discharge Plan     Row Name 10/07/20 1526       Plan    Plan  Home with family, Home Health referrals pending    Provided Post Acute Provider List?  Yes    Post Acute Provider List  Home Health    Patient/Family in Agreement with Plan  yes    Plan Comments  CCP spoke with pt to verify information and discuss d/c planning. Pt resides with his wife and son in a single level home, uses no DME aside from cpap, and has no h/o home health or sub-acute rehab. Pt uses [facility] in [place]. Pt agrees to  referrals to [facility] agencies in [facility] to [facility] in [facility] services with his [employer] insurance. Referrals placed to all [facility] facilities per CMS. CCP to follow up after pt's surgery  Thursday. Toshia Miller LCSW        Continued Care and Services - Admitted Since 10/6/2020     Home Medical Care     Service Provider Request Status Selected Services Address Phone Fax    [facility]  Pending - Request Sent N/A [address], [facility] [address] [phone]    [facility]  Pending - Request Sent N/A [address], [facility] [address] [phone]    [facility] [place]  Pending - Request Sent N/A [address] [facility] [id number] [phone]    [facility]  Pending - Request Sent N/A [address] [facility] [address] [phone]    [facility]  Pending - Request Sent N/A [address], [facility] [address] [phone]    [facility]  Pending - Request Sent N/A [address], [facility] [address] [phone]                Demographic Summary     Row Name 10/07/20 1524       General Information    Admission Type  inpatient    Arrived From  home    Referral Source  admission list    Reason for Consult  discharge planning    Preferred Language  English        Functional Status     Row Name 10/07/20 1525       Functional Status    Usual Activity Tolerance  good    Current Activity Tolerance  good       Functional Status, IADL    Medications  independent    Meal Preparation  independent    Housekeeping  independent    Laundry  independent    Shopping  independent       Mental Status Summary    Recent Changes in Mental Status/Cognitive Functioning  no changes        Psychosocial    No documentation.       Abuse/Neglect    No documentation.       Legal    No documentation.       Substance Abuse    No documentation.       Patient Forms    No documentation.           Joanna Miller LCSW

## 2020-10-07 NOTE — CONSULTS
Patient Name:  Jd Velazquez  YOB: 1965  MRN:  [id number]  Date of Admission:  10/6/2020  Date of Consult:  10/7/2020  Patient Care Team:  Dacia Newsome APRN as PCP - General (Nurse Practitioner)    Inpatient Internal Medicine Consult  Consult performed by: Luana Alexander APRN  Consult ordered by: Suzy Kitchen APRN  Reason for consult: Consult for medical evaluation of uncontrolled diabetes mellitus complicating plans for cardiac surgery.         Subjective   History of Present Illness  Mr. Velazquez is a 55 y.o. male with a history of multivessel CAD that has been admitted to [facility] by cardiothoracic surgery for elective surgical revascularization planned for rw. We were asked to see and assist with his medical problems, specifically relating to his  Uncontrolled DM2.  Patient states Niya years ago he took himself off of all of his medications including antihypertensive agents and diabetes medications without contacting his PCP.  He does not follow a diabetic diet but does state he tries to avoid sugar. He was previously treated with metformin.  Patient reports being recently evaluated by ophthalmology. At the time of my visit he denies any chest pain, SOA, nausea, vomiting or diarrhea. No recent illness and reports being in a normal state of health leading up to surgery.       Past Medical History:   Diagnosis Date   • CAD (coronary artery disease)    • Diabetes mellitus (CMS/HCC)    • Hyperlipidemia    • Hypertension    • Obesity      Past Surgical History:   Procedure Laterality Date   • ANKLE ARTHROSCOPY W/ OPEN REPAIR     • APPENDECTOMY     • CARDIAC CATHETERIZATION  2014    PCI to circumflex   • HERNIA REPAIR     • TONSILLECTOMY       History reviewed. No pertinent family history.  Social History     Tobacco Use   • Smoking status: Current Every Day Smoker     Packs/day: 1.00   • Smokeless tobacco: Never Used   Substance Use Topics   •  Alcohol use: Not on file   • Drug use: Not on file     Medications Prior to Admission   Medication Sig Dispense Refill Last Dose   • lisinopril-hydrochlorothiazide (PRINZIDE,ZESTORETIC) 10-12.5 MG per tablet Take 1 tablet by mouth Daily.        Allergies:  Hydrocodone    Review of Systems   Constitutional: Negative for appetite change and fatigue.   HENT: Negative for trouble swallowing.    Eyes: Negative for visual disturbance.   Respiratory: Negative for choking.    Cardiovascular: Negative for chest pain.   Gastrointestinal: Negative for abdominal distention, abdominal pain, blood in stool, constipation, diarrhea, nausea and vomiting.   Endocrine: Negative for cold intolerance, heat intolerance, polydipsia, polyphagia and polyuria.   Musculoskeletal: Negative for back pain.   Skin: Negative for pallor.   Allergic/Immunologic: Negative for immunocompromised state.   Neurological: Negative for dizziness.   Hematological: Does not bruise/bleed easily.   Psychiatric/Behavioral: Negative for confusion.       Objective      Vital Signs  Temp:  [96.9 °F (36.1 °C)-98 °F (36.7 °C)] 97.3 °F (36.3 °C)  Heart Rate:  [73-93] 73  Resp:  [18] 18  BP: (164-181)/() 173/103  Body mass index is 34.27 kg/m².    Physical Exam  Vitals signs and nursing note reviewed.   Constitutional:       Appearance: Normal appearance. He is well-developed. He is obese. He is not ill-appearing.   HENT:      Head: Normocephalic and atraumatic.   Eyes:      Conjunctiva/sclera: Conjunctivae normal.   Neck:      Musculoskeletal: Normal range of motion.      Trachea: No tracheal deviation.   Cardiovascular:      Rate and Rhythm: Normal rate and regular rhythm.   Pulmonary:      Effort: Pulmonary effort is normal. No respiratory distress.      Breath sounds: Normal breath sounds.   Abdominal:      General: Bowel sounds are normal. There is no distension.      Palpations: Abdomen is soft. There is no mass.      Tenderness: There is no abdominal  tenderness. There is no guarding or rebound.   Musculoskeletal: Normal range of motion.      Right lower leg: Edema present.      Left lower leg: Edema present.      Comments: Trace BLE edema   Skin:     General: Skin is warm and dry.   Neurological:      General: No focal deficit present.      Mental Status: He is alert and oriented to person, place, and time.   Psychiatric:         Judgment: Judgment normal.         Results Review:   I reviewed the patient's new clinical results.  I reviewed the patient's new imaging results and agree with the interpretation.  I reviewed the patient's other test results and agree with the interpretation  I personally viewed and interpreted the patient's EKG/Telemetry data  Results from last 7 days   Lab Units 10/07/20  0943   WBC 10*3/mm3 8.12   HEMOGLOBIN g/dL 16.3   HEMATOCRIT % 48.5   PLATELETS 10*3/mm3 315     Results from last 7 days   Lab Units 10/07/20  0943   SODIUM mmol/L 129*   POTASSIUM mmol/L 3.9   CHLORIDE mmol/L 97*   CO2 mmol/L 22.5   BUN mg/dL 12   CREATININE mg/dL 0.75*   CALCIUM mg/dL 9.3   BILIRUBIN mg/dL 0.4   ALK PHOS U/L 119*   ALT (SGPT) U/L 13   AST (SGOT) U/L 16   GLUCOSE mg/dL 289*     Results from last 7 days   Lab Units 10/07/20  0944   HEMOGLOBIN A1C % 9.40*            Assessment/Plan     Active Hospital Problems    Diagnosis POA   • **Coronary artery disease of native artery of native heart with stable angina pectoris (CMS/HCC) [I25.118] Yes     Added automatically from request for surgery [id number]     • CAD (coronary artery disease) [I25.10] Yes   • Diabetes mellitus (CMS/HCC) [E11.9] Yes   • Hyperlipidemia [E78.5] Yes   • Hypertension [I10] Yes   • Obesity [E66.9] Yes   • SHAHLA (obstructive sleep apnea) [G47.33] Unknown   • Tobacco abuse [Z72.0] Unknown       Mr. Velazquez is a 55 y.o. male who admitted for CORONARY ARTERY BYPASS WITH INTERNAL MAMMARY ARTERY GRAFT, TRANSESOPHAGEAL ECHOCARDIOGRAM WITH ANESTHESIA.    Untreated diabetes mellitus 2  · Start  HUMALOG - moderate dose correctional factor as needed.  · Monitor glucose TID AC. For any BG less than 70 mg/dL, treat per hospital protocol  · NCS diet. Consult diabetes educator for nutrition guidance.   · Will resume oral medications at discharge    HTN, uncontrolled  · Defer to cardiology   · Monitor renal function.    HLD- started on pravastatin    SHAHLA on CPAP    Tobacco abuse- cessation advised, declines patch  · He was encouraged to use incentive spirometer as instructed.      Thank you very much for asking [facility] to be involved in this patient's care. We will follow along with you.      SOHAM Narayan  [facility]  10/07/20  13:28 EDT

## 2020-10-07 NOTE — ANESTHESIA PREPROCEDURE EVALUATION
Anesthesia Evaluation     Patient summary reviewed and Nursing notes reviewed   NPO Solid Status: > 8 hours  NPO Liquid Status: > 8 hours           Airway   Mallampati: II  Neck ROM: full  No difficulty expected  Dental    (+) poor dentition    Pulmonary     breath sounds clear to auscultation  (+) sleep apnea,   Cardiovascular     Rhythm: regular    (+) hypertension, CAD, angina, hyperlipidemia,       Neuro/Psych  GI/Hepatic/Renal/Endo    (+) obesity,   diabetes mellitus,     Musculoskeletal     Abdominal   (+) obese,    Substance History      OB/GYN          Other                        Anesthesia Plan    ASA 4     general   (A line, SG, introducer,BRET, postop vent, awareness discussed)  intravenous induction     Anesthetic plan, all risks, benefits, and alternatives have been provided, discussed and informed consent has been obtained with: patient.

## 2020-10-07 NOTE — DISCHARGE PLACEMENT REQUEST
"Jd Velazquez (55 y.o. Male)     Date of Birth Social Security Number Address Home Phone MRN    1965  [id number] [place] [place] [address] [phone] [id number]    [employer] Marital Status          Jehovah's witness        Admission Date Admission Type Admitting Provider Attending Provider Department, Room/Bed    10/6/20 Urgent Angelo Egan MD Pagni, Sebastian, MD [address] CVI, 2213/1    Discharge Date Discharge Disposition Discharge Destination                       Attending Provider: Angelo Egan MD    Allergies: Hydrocodone    Isolation: None   Infection: None   Code Status: CPR    Ht: 182.9 cm (72\")   Wt: 115 kg (252 lb 11.2 oz)    Admission Cmt: None   Principal Problem: Coronary artery disease of native artery of native heart with stable angina pectoris (CMS/[facility]) [I25.118] More...                 Active Insurance as of 10/6/2020     Primary Coverage     Payor Plan Insurance Group Employer/Plan Group    [facility] [employer] [facility] BLUE CROSS BLUE [facility] PPO [id number]     Payor Plan Address Payor Plan Phone Number Payor Plan Fax Number Effective Dates    [address] [phone]  1/1/2020 - None Entered    [facility] [id number]       Subscriber Name Subscriber Birth Date Member ID       ARDEN VELAZQUEZ  [id number]                 Emergency Contacts      (Rel.) Home Phone Work Phone Mobile Phone    SANDRAARDEN (Spouse) [phone] -- --          "

## 2020-10-07 NOTE — H&P
"    Name: Jd Velazquez ADMIT: 10/6/2020   : 1965  PCP: Dacia Newsome APRN    MRN: [id number] LOS: 1 days   AGE/SEX: 55 y.o. male  ROOM: [facility]/     CC: Chest Pain    Subjective     History of Present Illness  Patient is a 55 y.o. male with a past medical history of CAD with previous PCI to circumflex in , hyperlipidemia, SHAHLA with home CPAP, hypertension, nicotine dependence, DM II, and obesity. He is a current smoker, reporting that he has smoked 1 PPD since he was 14. He denies any ETOH or illicit drug use. He states that he has been on statins but this was stopped by the patient due to muscle weakness which he associated with the statin. He also stopped his oral antihyperglycemic medications \"years ago\" because he lost 60lbs and felt that he did not really need them any more. He presented to [facility] with complaints of substernal chest pressure while driving to work. The pain radiated down his left arm. He also reports that palpitations, nausea, and diaphoresis were associated with this episode of chest pain. He denies any dizziness, shortness of breath, lower extremity edema, cough, or fever. Upon arrival to the ER, his troponin was noted to be elevated and EKG showed ST depression. Nitro paste and heparin were given with relief of pain. He was taken for heart catherization which revealed multi-vessel coronary artery disease. He was placed on a heparin gtt following the catherization. He was transferred here to [facility] for cardiac surgery evaluation. He denies any chest pain or shortness of breath at this time. He is unsure about family history of coronary disease, as he is adopted.       Past Medical History:   Diagnosis Date   • CAD (coronary artery disease)    • Diabetes mellitus (CMS/HCC)    • Hyperlipidemia    • Hypertension    • Obesity      Past Surgical History:   Procedure Laterality Date   • ANKLE ARTHROSCOPY W/ OPEN REPAIR     • APPENDECTOMY     • CARDIAC " CATHETERIZATION  2014    PCI to circumflex   • HERNIA REPAIR     • TONSILLECTOMY       History reviewed. No pertinent family history.  Social History     Tobacco Use   • Smoking status: Current Every Day Smoker     Packs/day: 1.00   • Smokeless tobacco: Never Used   Substance Use Topics   • Alcohol use: Not on file   • Drug use: Not on file     Medications Prior to Admission   Medication Sig Dispense Refill Last Dose   • lisinopril-hydrochlorothiazide (PRINZIDE,ZESTORETIC) 10-12.5 MG per tablet Take 1 tablet by mouth Daily.        Allergies:  Hydrocodone    Review of Systems   Constitutional: Positive for diaphoresis and fatigue. Negative for chills.   HENT: Negative.    Eyes: Negative.    Respiratory: Positive for chest tightness. Negative for cough and shortness of breath.    Cardiovascular: Positive for chest pain and palpitations. Negative for leg swelling.   Gastrointestinal: Positive for nausea. Negative for constipation and vomiting.   Endocrine: Negative.    Genitourinary: Negative for dysuria, frequency and urgency.   Musculoskeletal: Negative for back pain and gait problem.   Skin: Negative for rash and wound.   Allergic/Immunologic: Negative.    Neurological: Negative for dizziness, seizures and syncope.   Hematological: Negative.    Psychiatric/Behavioral: Negative.         Objective    Vital Signs  Temp:  [96.9 °F (36.1 °C)-98 °F (36.7 °C)] 98 °F (36.7 °C)  Heart Rate:  [79-84] 82  Resp:  [18] 18  BP: (164-176)/(90-94) 164/90  SpO2:  [90 %-91 %] 90 %  on   ;   Device (Oxygen Therapy): room air  Body mass index is 34.27 kg/m².    Physical Exam  Vitals signs and nursing note reviewed.   Constitutional:       Appearance: Normal appearance.   HENT:      Head: Normocephalic and atraumatic.      Mouth/Throat:      Mouth: Mucous membranes are moist.      Pharynx: Oropharynx is clear.   Eyes:      Extraocular Movements: Extraocular movements intact.      Pupils: Pupils are equal, round, and reactive to light.    Neck:      Musculoskeletal: Normal range of motion and neck supple.   Cardiovascular:      Rate and Rhythm: Normal rate and regular rhythm.      Pulses: Normal pulses.      Heart sounds: Normal heart sounds. No murmur.   Pulmonary:      Effort: Pulmonary effort is normal.      Breath sounds: Decreased breath sounds present.   Abdominal:      General: Bowel sounds are normal.      Palpations: Abdomen is soft.   Musculoskeletal: Normal range of motion.      Right lower leg: Edema (trace) present.      Left lower leg: Edema (trace) present.   Skin:     General: Skin is warm and dry.      Capillary Refill: Capillary refill takes less than 2 seconds.   Neurological:      General: No focal deficit present.      Mental Status: He is alert and oriented to person, place, and time.   Psychiatric:         Mood and Affect: Mood normal.         Behavior: Behavior normal.         Thought Content: Thought content normal.         Judgment: Judgment normal.         Results Review:  I reviewed the patient's new clinical results.    Assessment & Plan   - multivessel coronary artery disease with previous PCI 2014  - hypertension--poorly controlled  - hyperlipidemia--patient reports previous intolerance to Lipitor--will try pravastatin  - DM II--non-compliant with medications  - SHAHLA with home CPAP  - nicotine dependence--encourage cessation--patient declines nicotine patch at this time  - obesity  **all new to this provider    Dr. Egan and Dr. Chiu have reviewed the films and recommends surgical revascularization  Will reorder heparin gtt--hold on call to OR  Tentative plan for surgery tomorrow morning with Dr. Chiu  Orders placed for preoperative testing    I discussed the patients findings and my recommendations with patient and nursing staff.    Suzy Kitchen, SOHAM  10/07/20  07:40 EDT

## 2020-10-07 NOTE — PROGRESS NOTES
Admitted with unstable angina and multivessel coronary disease.  His sodium is 129 and he stopped taking his blood pressure medicines.  We will plan CABG tomorrow morning.  I discussed all this with the patient.

## 2020-10-07 NOTE — PLAN OF CARE
Goal Outcome Evaluation:  Problem: Adult Inpatient Plan of Care  Goal: Plan of Care Review  Outcome: Ongoing, Progressing  Flowsheets (Taken 10/7/2020 0418)  Progress: no change  Plan of Care Reviewed With: patient  Outcome Summary: -170s. Iv hydralizine given. Tylenol give for CP. SR. RA. Plan for open heart soon.

## 2020-10-08 ENCOUNTER — APPOINTMENT (OUTPATIENT)
Dept: GENERAL RADIOLOGY | Facility: HOSPITAL | Age: 55
End: 2020-10-08

## 2020-10-08 ENCOUNTER — ANCILLARY PROCEDURE (OUTPATIENT)
Dept: PERIOP | Facility: HOSPITAL | Age: 55
End: 2020-10-08

## 2020-10-08 ENCOUNTER — ANESTHESIA (OUTPATIENT)
Dept: PERIOP | Facility: HOSPITAL | Age: 55
End: 2020-10-08

## 2020-10-08 LAB
ACT BLD: 114 SECONDS (ref 82–152)
ACT BLD: 296 SECONDS (ref 82–152)
ACT BLD: 307 SECONDS (ref 82–152)
ACT BLD: 323 SECONDS (ref 82–152)
ACT BLD: 87 SECONDS (ref 82–152)
ALBUMIN SERPL-MCNC: 4 G/DL (ref 3.5–5.2)
ALBUMIN SERPL-MCNC: 4.1 G/DL (ref 3.5–5.2)
ANION GAP SERPL CALCULATED.3IONS-SCNC: 10.2 MMOL/L (ref 5–15)
ANION GAP SERPL CALCULATED.3IONS-SCNC: 11.3 MMOL/L (ref 5–15)
ANION GAP SERPL CALCULATED.3IONS-SCNC: 12.4 MMOL/L (ref 5–15)
APTT PPP: 27.1 SECONDS (ref 22.7–35.4)
APTT PPP: 39.2 SECONDS (ref 22.7–35.4)
ARTERIAL PATENCY WRIST A: ABNORMAL
ATMOSPHERIC PRESS: 754.7 MMHG
ATMOSPHERIC PRESS: 755.5 MMHG
ATMOSPHERIC PRESS: 756.6 MMHG
BASE EXCESS BLDA CALC-SCNC: -1 MMOL/L (ref -5–5)
BASE EXCESS BLDA CALC-SCNC: -1.8 MMOL/L (ref 0–2)
BASE EXCESS BLDA CALC-SCNC: -2.5 MMOL/L (ref 0–2)
BASE EXCESS BLDA CALC-SCNC: -2.6 MMOL/L (ref 0–2)
BASE EXCESS BLDA CALC-SCNC: -3 MMOL/L (ref -5–5)
BASE EXCESS BLDA CALC-SCNC: -3 MMOL/L (ref -5–5)
BASE EXCESS BLDA CALC-SCNC: -4 MMOL/L (ref -5–5)
BASE EXCESS BLDA CALC-SCNC: -4 MMOL/L (ref -5–5)
BASOPHILS # BLD AUTO: 0.07 10*3/MM3 (ref 0–0.2)
BASOPHILS NFR BLD AUTO: 0.5 % (ref 0–1.5)
BDY SITE: ABNORMAL
BUN SERPL-MCNC: 16 MG/DL (ref 6–20)
BUN SERPL-MCNC: 16 MG/DL (ref 6–20)
BUN SERPL-MCNC: 17 MG/DL (ref 6–20)
BUN/CREAT SERPL: 13.2 (ref 7–25)
BUN/CREAT SERPL: 15.7 (ref 7–25)
BUN/CREAT SERPL: 19.8 (ref 7–25)
CA-I BLD-MCNC: 5.2 MG/DL (ref 4.6–5.4)
CA-I BLDA-SCNC: ABNORMAL MMOL/L
CA-I SERPL ISE-MCNC: 1.29 MMOL/L (ref 1.15–1.35)
CALCIUM SPEC-SCNC: 8.6 MG/DL (ref 8.6–10.5)
CALCIUM SPEC-SCNC: 8.8 MG/DL (ref 8.6–10.5)
CALCIUM SPEC-SCNC: 9.1 MG/DL (ref 8.6–10.5)
CHLORIDE SERPL-SCNC: 101 MMOL/L (ref 98–107)
CHLORIDE SERPL-SCNC: 102 MMOL/L (ref 98–107)
CHLORIDE SERPL-SCNC: 99 MMOL/L (ref 98–107)
CO2 BLDA-SCNC: 25 MMOL/L (ref 24–29)
CO2 BLDA-SCNC: 25 MMOL/L (ref 24–29)
CO2 BLDA-SCNC: 26 MMOL/L (ref 24–29)
CO2 BLDA-SCNC: 27 MMOL/L (ref 24–29)
CO2 BLDA-SCNC: 29 MMOL/L (ref 24–29)
CO2 SERPL-SCNC: 17.6 MMOL/L (ref 22–29)
CO2 SERPL-SCNC: 20.8 MMOL/L (ref 22–29)
CO2 SERPL-SCNC: 21.7 MMOL/L (ref 22–29)
CREAT SERPL-MCNC: 0.81 MG/DL (ref 0.76–1.27)
CREAT SERPL-MCNC: 1.02 MG/DL (ref 0.76–1.27)
CREAT SERPL-MCNC: 1.29 MG/DL (ref 0.76–1.27)
DEPRECATED RDW RBC AUTO: 42 FL (ref 37–54)
DEPRECATED RDW RBC AUTO: 42.1 FL (ref 37–54)
EOSINOPHIL # BLD AUTO: 0.14 10*3/MM3 (ref 0–0.4)
EOSINOPHIL NFR BLD AUTO: 0.9 % (ref 0.3–6.2)
ERYTHROCYTE [DISTWIDTH] IN BLOOD BY AUTOMATED COUNT: 13.1 % (ref 12.3–15.4)
ERYTHROCYTE [DISTWIDTH] IN BLOOD BY AUTOMATED COUNT: 13.2 % (ref 12.3–15.4)
FIBRINOGEN PPP-MCNC: 342 MG/DL (ref 219–464)
GFR SERPL CREATININE-BSD FRML MDRD: 58 ML/MIN/1.73
GFR SERPL CREATININE-BSD FRML MDRD: 76 ML/MIN/1.73
GFR SERPL CREATININE-BSD FRML MDRD: 99 ML/MIN/1.73
GLUCOSE BLDC GLUCOMTR-MCNC: 117 MG/DL (ref 70–130)
GLUCOSE BLDC GLUCOMTR-MCNC: 117 MG/DL (ref 70–130)
GLUCOSE BLDC GLUCOMTR-MCNC: 124 MG/DL (ref 70–130)
GLUCOSE BLDC GLUCOMTR-MCNC: 124 MG/DL (ref 70–130)
GLUCOSE BLDC GLUCOMTR-MCNC: 145 MG/DL (ref 70–130)
GLUCOSE BLDC GLUCOMTR-MCNC: 175 MG/DL (ref 70–130)
GLUCOSE BLDC GLUCOMTR-MCNC: 175 MG/DL (ref 70–130)
GLUCOSE BLDC GLUCOMTR-MCNC: 179 MG/DL (ref 70–130)
GLUCOSE BLDC GLUCOMTR-MCNC: 200 MG/DL (ref 70–130)
GLUCOSE BLDC GLUCOMTR-MCNC: 230 MG/DL (ref 70–130)
GLUCOSE BLDC GLUCOMTR-MCNC: 249 MG/DL (ref 70–130)
GLUCOSE BLDC GLUCOMTR-MCNC: 254 MG/DL (ref 70–130)
GLUCOSE BLDC GLUCOMTR-MCNC: 254 MG/DL (ref 70–130)
GLUCOSE BLDC GLUCOMTR-MCNC: 259 MG/DL (ref 70–130)
GLUCOSE BLDC GLUCOMTR-MCNC: 272 MG/DL (ref 70–130)
GLUCOSE BLDC GLUCOMTR-MCNC: 290 MG/DL (ref 70–130)
GLUCOSE SERPL-MCNC: 167 MG/DL (ref 65–99)
GLUCOSE SERPL-MCNC: 217 MG/DL (ref 65–99)
GLUCOSE SERPL-MCNC: 260 MG/DL (ref 65–99)
HCO3 BLDA-SCNC: 22.7 MMOL/L (ref 22–28)
HCO3 BLDA-SCNC: 23.2 MMOL/L (ref 22–26)
HCO3 BLDA-SCNC: 23.5 MMOL/L (ref 22–26)
HCO3 BLDA-SCNC: 24.2 MMOL/L (ref 22–28)
HCO3 BLDA-SCNC: 24.6 MMOL/L (ref 22–26)
HCO3 BLDA-SCNC: 24.8 MMOL/L (ref 22–28)
HCO3 BLDA-SCNC: 25.1 MMOL/L (ref 22–26)
HCO3 BLDA-SCNC: 26.8 MMOL/L (ref 22–26)
HCT VFR BLD AUTO: 37.6 % (ref 37.5–51)
HCT VFR BLD AUTO: 38.3 % (ref 37.5–51)
HCT VFR BLDA CALC: 36 % (ref 38–51)
HCT VFR BLDA CALC: 38 % (ref 38–51)
HCT VFR BLDA CALC: 39 % (ref 38–51)
HCT VFR BLDA CALC: 40 % (ref 38–51)
HCT VFR BLDA CALC: 43 % (ref 38–51)
HGB BLD-MCNC: 13 G/DL (ref 13–17.7)
HGB BLD-MCNC: 13.5 G/DL (ref 13–17.7)
HGB BLDA-MCNC: 12.2 G/DL (ref 12–17)
HGB BLDA-MCNC: 12.9 G/DL (ref 12–17)
HGB BLDA-MCNC: 13.3 G/DL (ref 12–17)
HGB BLDA-MCNC: 13.6 G/DL (ref 12–17)
HGB BLDA-MCNC: 14.6 G/DL (ref 12–17)
IMM GRANULOCYTES # BLD AUTO: 0.56 10*3/MM3 (ref 0–0.05)
IMM GRANULOCYTES NFR BLD AUTO: 3.8 % (ref 0–0.5)
INHALED O2 CONCENTRATION: 100 %
INHALED O2 CONCENTRATION: 40 %
INHALED O2 CONCENTRATION: 40 %
INR PPP: 1.1 (ref 0.9–1.1)
LYMPHOCYTES # BLD AUTO: 1.13 10*3/MM3 (ref 0.7–3.1)
LYMPHOCYTES NFR BLD AUTO: 7.6 % (ref 19.6–45.3)
MAGNESIUM SERPL-MCNC: 2.7 MG/DL (ref 1.6–2.6)
MAGNESIUM SERPL-MCNC: 2.9 MG/DL (ref 1.6–2.6)
MCH RBC QN AUTO: 30.8 PG (ref 26.6–33)
MCH RBC QN AUTO: 31.5 PG (ref 26.6–33)
MCHC RBC AUTO-ENTMCNC: 34.6 G/DL (ref 31.5–35.7)
MCHC RBC AUTO-ENTMCNC: 35.2 G/DL (ref 31.5–35.7)
MCV RBC AUTO: 89.1 FL (ref 79–97)
MCV RBC AUTO: 89.3 FL (ref 79–97)
MODALITY: ABNORMAL
MONOCYTES # BLD AUTO: 1.68 10*3/MM3 (ref 0.1–0.9)
MONOCYTES NFR BLD AUTO: 11.3 % (ref 5–12)
NEUTROPHILS NFR BLD AUTO: 11.25 10*3/MM3 (ref 1.7–7)
NEUTROPHILS NFR BLD AUTO: 75.9 % (ref 42.7–76)
NRBC BLD AUTO-RTO: 0 /100 WBC (ref 0–0.2)
O2 A-A PPRESDIFF RESPIRATORY: 0.1 MMHG
O2 A-A PPRESDIFF RESPIRATORY: 0.3 MMHG
O2 A-A PPRESDIFF RESPIRATORY: 0.3 MMHG
PCO2 BLDA: 40 MM HG (ref 35–45)
PCO2 BLDA: 44.6 MM HG (ref 35–45)
PCO2 BLDA: 52 MM HG (ref 35–45)
PCO2 BLDA: 52.3 MM HG (ref 35–45)
PCO2 BLDA: 56.6 MM HG (ref 35–45)
PCO2 BLDA: 57 MM HG (ref 35–45)
PCO2 BLDA: 63.3 MM HG (ref 35–45)
PCO2 BLDA: 64.6 MM HG (ref 35–45)
PEEP RESPIRATORY: 7.5 CM[H2O]
PH BLDA: 7.21 PH UNITS (ref 7.35–7.6)
PH BLDA: 7.23 PH UNITS (ref 7.35–7.6)
PH BLDA: 7.23 PH UNITS (ref 7.35–7.6)
PH BLDA: 7.24 PH UNITS (ref 7.35–7.6)
PH BLDA: 7.26 PH UNITS (ref 7.35–7.6)
PH BLDA: 7.28 PH UNITS (ref 7.35–7.45)
PH BLDA: 7.34 PH UNITS (ref 7.35–7.45)
PH BLDA: 7.36 PH UNITS (ref 7.35–7.45)
PHOSPHATE SERPL-MCNC: 4.1 MG/DL (ref 2.5–4.5)
PHOSPHATE SERPL-MCNC: 4.3 MG/DL (ref 2.5–4.5)
PLATELET # BLD AUTO: 223 10*3/MM3 (ref 140–450)
PLATELET # BLD AUTO: 240 10*3/MM3 (ref 140–450)
PMV BLD AUTO: 9.5 FL (ref 6–12)
PMV BLD AUTO: 9.5 FL (ref 6–12)
PO2 BLDA: 141 MMHG (ref 80–105)
PO2 BLDA: 163 MMHG (ref 80–105)
PO2 BLDA: 363 MMHG (ref 80–105)
PO2 BLDA: 396 MMHG (ref 80–105)
PO2 BLDA: 48 MMHG (ref 80–105)
PO2 BLDA: 70.9 MM HG (ref 80–100)
PO2 BLDA: 75.2 MM HG (ref 80–100)
PO2 BLDA: 89.8 MM HG (ref 80–100)
POTASSIUM BLDA-SCNC: 4.4 MMOL/L (ref 3.5–4.9)
POTASSIUM BLDA-SCNC: 4.8 MMOL/L (ref 3.5–4.9)
POTASSIUM BLDA-SCNC: 5.6 MMOL/L (ref 3.5–4.9)
POTASSIUM BLDA-SCNC: 6 MMOL/L (ref 3.5–4.9)
POTASSIUM BLDA-SCNC: 6.1 MMOL/L (ref 3.5–4.9)
POTASSIUM SERPL-SCNC: 4.1 MMOL/L (ref 3.5–5.2)
POTASSIUM SERPL-SCNC: 4.1 MMOL/L (ref 3.5–5.2)
POTASSIUM SERPL-SCNC: 4.5 MMOL/L (ref 3.5–5.2)
PROTHROMBIN TIME: 14.1 SECONDS (ref 11.7–14.2)
PSV: 8 CMH2O
RBC # BLD AUTO: 4.22 10*6/MM3 (ref 4.14–5.8)
RBC # BLD AUTO: 4.29 10*6/MM3 (ref 4.14–5.8)
SAO2 % BLDA: 100 % (ref 95–98)
SAO2 % BLDA: 100 % (ref 95–98)
SAO2 % BLDA: 73 % (ref 95–98)
SAO2 % BLDA: 99 % (ref 95–98)
SAO2 % BLDA: 99 % (ref 95–98)
SAO2 % BLDCOA: 93.4 % (ref 92–99)
SAO2 % BLDCOA: 94 % (ref 92–99)
SAO2 % BLDCOA: 95.6 % (ref 92–99)
SET MECH RESP RATE: 14
SET MECH RESP RATE: 18
SET MECH RESP RATE: 18
SODIUM SERPL-SCNC: 132 MMOL/L (ref 136–145)
TOTAL RATE: 14 BREATHS/MINUTE
TOTAL RATE: 18 BREATHS/MINUTE
VENTILATOR MODE: ABNORMAL
VT ON VENT VENT: 700 ML
VT ON VENT VENT: 750 ML
VT ON VENT VENT: 750 ML
WBC # BLD AUTO: 12.17 10*3/MM3 (ref 3.4–10.8)
WBC # BLD AUTO: 14.83 10*3/MM3 (ref 3.4–10.8)

## 2020-10-08 PROCEDURE — 86901 BLOOD TYPING SEROLOGIC RH(D): CPT

## 2020-10-08 PROCEDURE — 25010000003 CEFAZOLIN IN DEXTROSE 2-4 GM/100ML-% SOLUTION: Performed by: THORACIC SURGERY (CARDIOTHORACIC VASCULAR SURGERY)

## 2020-10-08 PROCEDURE — 93318 ECHO TRANSESOPHAGEAL INTRAOP: CPT | Performed by: ANESTHESIOLOGY

## 2020-10-08 PROCEDURE — 33533 CABG ARTERIAL SINGLE: CPT | Performed by: SPECIALIST/TECHNOLOGIST, OTHER

## 2020-10-08 PROCEDURE — 85027 COMPLETE CBC AUTOMATED: CPT | Performed by: THORACIC SURGERY (CARDIOTHORACIC VASCULAR SURGERY)

## 2020-10-08 PROCEDURE — 25010000002 HEPARIN (PORCINE) PER 1000 UNITS: Performed by: THORACIC SURGERY (CARDIOTHORACIC VASCULAR SURGERY)

## 2020-10-08 PROCEDURE — 25010000002 MIDAZOLAM PER 1 MG: Performed by: ANESTHESIOLOGY

## 2020-10-08 PROCEDURE — 93010 ELECTROCARDIOGRAM REPORT: CPT | Performed by: INTERNAL MEDICINE

## 2020-10-08 PROCEDURE — C1751 CATH, INF, PER/CENT/MIDLINE: HCPCS | Performed by: ANESTHESIOLOGY

## 2020-10-08 PROCEDURE — C1729 CATH, DRAINAGE: HCPCS | Performed by: THORACIC SURGERY (CARDIOTHORACIC VASCULAR SURGERY)

## 2020-10-08 PROCEDURE — 25010000002 PROPOFOL 10 MG/ML EMULSION: Performed by: ANESTHESIOLOGY

## 2020-10-08 PROCEDURE — P9041 ALBUMIN (HUMAN),5%, 50ML: HCPCS

## 2020-10-08 PROCEDURE — 85610 PROTHROMBIN TIME: CPT | Performed by: THORACIC SURGERY (CARDIOTHORACIC VASCULAR SURGERY)

## 2020-10-08 PROCEDURE — 85730 THROMBOPLASTIN TIME PARTIAL: CPT | Performed by: NURSE PRACTITIONER

## 2020-10-08 PROCEDURE — 85018 HEMOGLOBIN: CPT

## 2020-10-08 PROCEDURE — 33519 CABG ARTERY-VEIN THREE: CPT | Performed by: SPECIALIST/TECHNOLOGIST, OTHER

## 2020-10-08 PROCEDURE — 25010000002 ALBUMIN HUMAN 5% PER 50 ML

## 2020-10-08 PROCEDURE — 82962 GLUCOSE BLOOD TEST: CPT

## 2020-10-08 PROCEDURE — 94002 VENT MGMT INPAT INIT DAY: CPT

## 2020-10-08 PROCEDURE — 85025 COMPLETE CBC W/AUTO DIFF WBC: CPT | Performed by: THORACIC SURGERY (CARDIOTHORACIC VASCULAR SURGERY)

## 2020-10-08 PROCEDURE — 5A1221Z PERFORMANCE OF CARDIAC OUTPUT, CONTINUOUS: ICD-10-PCS | Performed by: THORACIC SURGERY (CARDIOTHORACIC VASCULAR SURGERY)

## 2020-10-08 PROCEDURE — P9041 ALBUMIN (HUMAN),5%, 50ML: HCPCS | Performed by: THORACIC SURGERY (CARDIOTHORACIC VASCULAR SURGERY)

## 2020-10-08 PROCEDURE — 33508 ENDOSCOPIC VEIN HARVEST: CPT | Performed by: SPECIALIST/TECHNOLOGIST, OTHER

## 2020-10-08 PROCEDURE — B24BZZ4 ULTRASONOGRAPHY OF HEART WITH AORTA, TRANSESOPHAGEAL: ICD-10-PCS | Performed by: ANESTHESIOLOGY

## 2020-10-08 PROCEDURE — 85014 HEMATOCRIT: CPT

## 2020-10-08 PROCEDURE — 25010000002 MAGNESIUM SULFATE PER 500 MG OF MAGNESIUM: Performed by: ANESTHESIOLOGY

## 2020-10-08 PROCEDURE — 85730 THROMBOPLASTIN TIME PARTIAL: CPT | Performed by: THORACIC SURGERY (CARDIOTHORACIC VASCULAR SURGERY)

## 2020-10-08 PROCEDURE — 25010000002 PROPOFOL 10 MG/ML EMULSION: Performed by: THORACIC SURGERY (CARDIOTHORACIC VASCULAR SURGERY)

## 2020-10-08 PROCEDURE — 85384 FIBRINOGEN ACTIVITY: CPT | Performed by: THORACIC SURGERY (CARDIOTHORACIC VASCULAR SURGERY)

## 2020-10-08 PROCEDURE — 94799 UNLISTED PULMONARY SVC/PX: CPT

## 2020-10-08 PROCEDURE — 02100Z9 BYPASS CORONARY ARTERY, ONE ARTERY FROM LEFT INTERNAL MAMMARY, OPEN APPROACH: ICD-10-PCS | Performed by: THORACIC SURGERY (CARDIOTHORACIC VASCULAR SURGERY)

## 2020-10-08 PROCEDURE — 25010000003 CEFAZOLIN IN DEXTROSE 2-4 GM/100ML-% SOLUTION: Performed by: NURSE PRACTITIONER

## 2020-10-08 PROCEDURE — 80048 BASIC METABOLIC PNL TOTAL CA: CPT | Performed by: NURSE PRACTITIONER

## 2020-10-08 PROCEDURE — 25010000002 PAPAVERINE PER 60 MG: Performed by: THORACIC SURGERY (CARDIOTHORACIC VASCULAR SURGERY)

## 2020-10-08 PROCEDURE — 25010000002 ALBUMIN HUMAN 5% PER 50 ML: Performed by: THORACIC SURGERY (CARDIOTHORACIC VASCULAR SURGERY)

## 2020-10-08 PROCEDURE — 71045 X-RAY EXAM CHEST 1 VIEW: CPT

## 2020-10-08 PROCEDURE — 85347 COAGULATION TIME ACTIVATED: CPT

## 2020-10-08 PROCEDURE — 33508 ENDOSCOPIC VEIN HARVEST: CPT | Performed by: THORACIC SURGERY (CARDIOTHORACIC VASCULAR SURGERY)

## 2020-10-08 PROCEDURE — 63710000001 INSULIN REGULAR HUMAN PER 5 UNITS: Performed by: ANESTHESIOLOGY

## 2020-10-08 PROCEDURE — 82803 BLOOD GASES ANY COMBINATION: CPT

## 2020-10-08 PROCEDURE — A4648 IMPLANTABLE TISSUE MARKER: HCPCS | Performed by: THORACIC SURGERY (CARDIOTHORACIC VASCULAR SURGERY)

## 2020-10-08 PROCEDURE — 25010000002 FENTANYL CITRATE (PF) 100 MCG/2ML SOLUTION: Performed by: ANESTHESIOLOGY

## 2020-10-08 PROCEDURE — 83735 ASSAY OF MAGNESIUM: CPT | Performed by: THORACIC SURGERY (CARDIOTHORACIC VASCULAR SURGERY)

## 2020-10-08 PROCEDURE — C1713 ANCHOR/SCREW BN/BN,TIS/BN: HCPCS | Performed by: THORACIC SURGERY (CARDIOTHORACIC VASCULAR SURGERY)

## 2020-10-08 PROCEDURE — 82330 ASSAY OF CALCIUM: CPT | Performed by: THORACIC SURGERY (CARDIOTHORACIC VASCULAR SURGERY)

## 2020-10-08 PROCEDURE — 82330 ASSAY OF CALCIUM: CPT

## 2020-10-08 PROCEDURE — 80069 RENAL FUNCTION PANEL: CPT | Performed by: THORACIC SURGERY (CARDIOTHORACIC VASCULAR SURGERY)

## 2020-10-08 PROCEDURE — 33519 CABG ARTERY-VEIN THREE: CPT | Performed by: THORACIC SURGERY (CARDIOTHORACIC VASCULAR SURGERY)

## 2020-10-08 PROCEDURE — 25010000002 PHENYLEPHRINE PER 1 ML: Performed by: ANESTHESIOLOGY

## 2020-10-08 PROCEDURE — 93005 ELECTROCARDIOGRAM TRACING: CPT | Performed by: THORACIC SURGERY (CARDIOTHORACIC VASCULAR SURGERY)

## 2020-10-08 PROCEDURE — 82947 ASSAY GLUCOSE BLOOD QUANT: CPT

## 2020-10-08 PROCEDURE — 021209W BYPASS CORONARY ARTERY, THREE ARTERIES FROM AORTA WITH AUTOLOGOUS VENOUS TISSUE, OPEN APPROACH: ICD-10-PCS | Performed by: THORACIC SURGERY (CARDIOTHORACIC VASCULAR SURGERY)

## 2020-10-08 PROCEDURE — 33533 CABG ARTERIAL SINGLE: CPT | Performed by: THORACIC SURGERY (CARDIOTHORACIC VASCULAR SURGERY)

## 2020-10-08 PROCEDURE — 06BQ4ZZ EXCISION OF LEFT SAPHENOUS VEIN, PERCUTANEOUS ENDOSCOPIC APPROACH: ICD-10-PCS | Performed by: THORACIC SURGERY (CARDIOTHORACIC VASCULAR SURGERY)

## 2020-10-08 PROCEDURE — 25010000002 HEPARIN (PORCINE) PER 1000 UNITS: Performed by: ANESTHESIOLOGY

## 2020-10-08 PROCEDURE — 86900 BLOOD TYPING SEROLOGIC ABO: CPT

## 2020-10-08 DEVICE — SS SUTURE, 3 PER SLEEVE
Type: IMPLANTABLE DEVICE | Site: STERNUM | Status: FUNCTIONAL
Brand: MYO/WIRE II

## 2020-10-08 DEVICE — SS SUTURE, 4 PER SLEEVE
Type: IMPLANTABLE DEVICE | Site: STERNUM | Status: FUNCTIONAL
Brand: MYO/WIRE II

## 2020-10-08 DEVICE — LIGACLIP MCA MULTIPLE CLIP APPLIERS, 20 MEDIUM CLIPS
Type: IMPLANTABLE DEVICE | Site: STERNUM | Status: FUNCTIONAL
Brand: LIGACLIP

## 2020-10-08 RX ORDER — AMOXICILLIN 250 MG
2 CAPSULE ORAL NIGHTLY
Status: DISCONTINUED | OUTPATIENT
Start: 2020-10-09 | End: 2020-10-14 | Stop reason: HOSPADM

## 2020-10-08 RX ORDER — ACETAMINOPHEN 325 MG/1
650 TABLET ORAL EVERY 4 HOURS
Status: DISCONTINUED | OUTPATIENT
Start: 2020-10-08 | End: 2020-10-09

## 2020-10-08 RX ORDER — DEXMEDETOMIDINE HYDROCHLORIDE 4 UG/ML
INJECTION INTRAVENOUS CONTINUOUS PRN
Status: DISCONTINUED | OUTPATIENT
Start: 2020-10-08 | End: 2020-10-08 | Stop reason: SURG

## 2020-10-08 RX ORDER — POTASSIUM CHLORIDE 29.8 MG/ML
20 INJECTION INTRAVENOUS
Status: DISCONTINUED | OUTPATIENT
Start: 2020-10-08 | End: 2020-10-09

## 2020-10-08 RX ORDER — ACETAMINOPHEN 650 MG/1
650 SUPPOSITORY RECTAL EVERY 4 HOURS PRN
Status: DISCONTINUED | OUTPATIENT
Start: 2020-10-09 | End: 2020-10-09

## 2020-10-08 RX ORDER — POTASSIUM CHLORIDE 7.45 MG/ML
10 INJECTION INTRAVENOUS
Status: DISCONTINUED | OUTPATIENT
Start: 2020-10-08 | End: 2020-10-14 | Stop reason: HOSPADM

## 2020-10-08 RX ORDER — POTASSIUM CHLORIDE 29.8 MG/ML
20 INJECTION INTRAVENOUS
Status: COMPLETED | OUTPATIENT
Start: 2020-10-08 | End: 2020-10-09

## 2020-10-08 RX ORDER — ATORVASTATIN CALCIUM 20 MG/1
40 TABLET, FILM COATED ORAL NIGHTLY
Status: DISCONTINUED | OUTPATIENT
Start: 2020-10-08 | End: 2020-10-09

## 2020-10-08 RX ORDER — ASPIRIN 81 MG/1
81 TABLET ORAL DAILY
Status: DISCONTINUED | OUTPATIENT
Start: 2020-10-09 | End: 2020-10-14 | Stop reason: HOSPADM

## 2020-10-08 RX ORDER — ONDANSETRON 2 MG/ML
4 INJECTION INTRAMUSCULAR; INTRAVENOUS EVERY 6 HOURS PRN
Status: DISCONTINUED | OUTPATIENT
Start: 2020-10-08 | End: 2020-10-14 | Stop reason: HOSPADM

## 2020-10-08 RX ORDER — BISACODYL 5 MG/1
10 TABLET, DELAYED RELEASE ORAL DAILY PRN
Status: DISCONTINUED | OUTPATIENT
Start: 2020-10-08 | End: 2020-10-14 | Stop reason: HOSPADM

## 2020-10-08 RX ORDER — FENTANYL CITRATE 50 UG/ML
50 INJECTION, SOLUTION INTRAMUSCULAR; INTRAVENOUS
Status: DISCONTINUED | OUTPATIENT
Start: 2020-10-08 | End: 2020-10-09

## 2020-10-08 RX ORDER — NOREPINEPHRINE BITARTRATE 1 MG/ML
INJECTION, SOLUTION INTRAVENOUS CONTINUOUS PRN
Status: DISCONTINUED | OUTPATIENT
Start: 2020-10-08 | End: 2020-10-08 | Stop reason: SURG

## 2020-10-08 RX ORDER — SODIUM CHLORIDE 9 MG/ML
30 INJECTION, SOLUTION INTRAVENOUS CONTINUOUS PRN
Status: DISCONTINUED | OUTPATIENT
Start: 2020-10-08 | End: 2020-10-08

## 2020-10-08 RX ORDER — HEPARIN SODIUM 5000 [USP'U]/ML
INJECTION, SOLUTION INTRAVENOUS; SUBCUTANEOUS AS NEEDED
Status: DISCONTINUED | OUTPATIENT
Start: 2020-10-08 | End: 2020-10-08 | Stop reason: HOSPADM

## 2020-10-08 RX ORDER — ACETAMINOPHEN 160 MG/5ML
650 SOLUTION ORAL EVERY 4 HOURS PRN
Status: DISCONTINUED | OUTPATIENT
Start: 2020-10-09 | End: 2020-10-09

## 2020-10-08 RX ORDER — NICARDIPINE HYDROCHLORIDE 2.5 MG/ML
INJECTION INTRAVENOUS AS NEEDED
Status: DISCONTINUED | OUTPATIENT
Start: 2020-10-08 | End: 2020-10-08 | Stop reason: SURG

## 2020-10-08 RX ORDER — HEPARIN SODIUM 1000 [USP'U]/ML
INJECTION, SOLUTION INTRAVENOUS; SUBCUTANEOUS AS NEEDED
Status: DISCONTINUED | OUTPATIENT
Start: 2020-10-08 | End: 2020-10-08 | Stop reason: SURG

## 2020-10-08 RX ORDER — PROPOFOL 10 MG/ML
VIAL (ML) INTRAVENOUS CONTINUOUS PRN
Status: DISCONTINUED | OUTPATIENT
Start: 2020-10-08 | End: 2020-10-08 | Stop reason: SURG

## 2020-10-08 RX ORDER — PAPAVERINE HYDROCHLORIDE 30 MG/ML
INJECTION INTRAMUSCULAR; INTRAVENOUS AS NEEDED
Status: DISCONTINUED | OUTPATIENT
Start: 2020-10-08 | End: 2020-10-08 | Stop reason: HOSPADM

## 2020-10-08 RX ORDER — AMINOCAPROIC ACID 250 MG/ML
INJECTION, SOLUTION INTRAVENOUS AS NEEDED
Status: DISCONTINUED | OUTPATIENT
Start: 2020-10-08 | End: 2020-10-08 | Stop reason: SURG

## 2020-10-08 RX ORDER — CEFAZOLIN SODIUM 2 G/100ML
2 INJECTION, SOLUTION INTRAVENOUS EVERY 8 HOURS
Status: COMPLETED | OUTPATIENT
Start: 2020-10-08 | End: 2020-10-10

## 2020-10-08 RX ORDER — ACETAMINOPHEN 650 MG/1
650 SUPPOSITORY RECTAL EVERY 4 HOURS
Status: DISCONTINUED | OUTPATIENT
Start: 2020-10-08 | End: 2020-10-09

## 2020-10-08 RX ORDER — FUROSEMIDE 10 MG/ML
40 INJECTION INTRAMUSCULAR; INTRAVENOUS EVERY 6 HOURS PRN
Status: DISCONTINUED | OUTPATIENT
Start: 2020-10-08 | End: 2020-10-09

## 2020-10-08 RX ORDER — SODIUM CHLORIDE 9 MG/ML
30 INJECTION, SOLUTION INTRAVENOUS CONTINUOUS PRN
Status: DISCONTINUED | OUTPATIENT
Start: 2020-10-08 | End: 2020-10-09

## 2020-10-08 RX ORDER — METOCLOPRAMIDE HYDROCHLORIDE 5 MG/ML
10 INJECTION INTRAMUSCULAR; INTRAVENOUS EVERY 6 HOURS
Status: DISCONTINUED | OUTPATIENT
Start: 2020-10-08 | End: 2020-10-09

## 2020-10-08 RX ORDER — ACETAMINOPHEN 325 MG/1
650 TABLET ORAL EVERY 4 HOURS PRN
Status: DISCONTINUED | OUTPATIENT
Start: 2020-10-09 | End: 2020-10-09

## 2020-10-08 RX ORDER — FENTANYL CITRATE 50 UG/ML
25 INJECTION, SOLUTION INTRAMUSCULAR; INTRAVENOUS
Status: DISCONTINUED | OUTPATIENT
Start: 2020-10-08 | End: 2020-10-09

## 2020-10-08 RX ORDER — ALBUMIN, HUMAN INJ 5% 5 %
SOLUTION INTRAVENOUS
Status: COMPLETED
Start: 2020-10-08 | End: 2020-10-08

## 2020-10-08 RX ORDER — LABETALOL HYDROCHLORIDE 5 MG/ML
INJECTION, SOLUTION INTRAVENOUS AS NEEDED
Status: DISCONTINUED | OUTPATIENT
Start: 2020-10-08 | End: 2020-10-08 | Stop reason: SURG

## 2020-10-08 RX ORDER — MIDAZOLAM HYDROCHLORIDE 1 MG/ML
INJECTION INTRAMUSCULAR; INTRAVENOUS AS NEEDED
Status: DISCONTINUED | OUTPATIENT
Start: 2020-10-08 | End: 2020-10-08 | Stop reason: SURG

## 2020-10-08 RX ORDER — SODIUM CHLORIDE 9 MG/ML
INJECTION, SOLUTION INTRAVENOUS CONTINUOUS PRN
Status: DISCONTINUED | OUTPATIENT
Start: 2020-10-08 | End: 2020-10-08 | Stop reason: SURG

## 2020-10-08 RX ORDER — POTASSIUM CHLORIDE 750 MG/1
40 CAPSULE, EXTENDED RELEASE ORAL AS NEEDED
Status: DISCONTINUED | OUTPATIENT
Start: 2020-10-08 | End: 2020-10-14 | Stop reason: HOSPADM

## 2020-10-08 RX ORDER — NOREPINEPHRINE BIT/0.9 % NACL 8 MG/250ML
.02-.3 INFUSION BOTTLE (ML) INTRAVENOUS CONTINUOUS PRN
Status: DISCONTINUED | OUTPATIENT
Start: 2020-10-08 | End: 2020-10-09

## 2020-10-08 RX ORDER — NALOXONE HCL 0.4 MG/ML
0.4 VIAL (ML) INJECTION
Status: DISCONTINUED | OUTPATIENT
Start: 2020-10-08 | End: 2020-10-09

## 2020-10-08 RX ORDER — SODIUM CHLORIDE 0.9 % (FLUSH) 0.9 %
30 SYRINGE (ML) INJECTION ONCE AS NEEDED
Status: DISCONTINUED | OUTPATIENT
Start: 2020-10-08 | End: 2020-10-09

## 2020-10-08 RX ORDER — BISACODYL 10 MG
10 SUPPOSITORY, RECTAL RECTAL DAILY PRN
Status: DISCONTINUED | OUTPATIENT
Start: 2020-10-09 | End: 2020-10-14 | Stop reason: HOSPADM

## 2020-10-08 RX ORDER — MILRINONE LACTATE 0.2 MG/ML
.25-.375 INJECTION, SOLUTION INTRAVENOUS CONTINUOUS PRN
Status: DISCONTINUED | OUTPATIENT
Start: 2020-10-08 | End: 2020-10-09

## 2020-10-08 RX ORDER — CYCLOBENZAPRINE HCL 10 MG
10 TABLET ORAL EVERY 8 HOURS PRN
Status: DISCONTINUED | OUTPATIENT
Start: 2020-10-09 | End: 2020-10-14 | Stop reason: HOSPADM

## 2020-10-08 RX ORDER — MIDAZOLAM HYDROCHLORIDE 1 MG/ML
2 INJECTION INTRAMUSCULAR; INTRAVENOUS
Status: DISCONTINUED | OUTPATIENT
Start: 2020-10-08 | End: 2020-10-09

## 2020-10-08 RX ORDER — FENTANYL CITRATE 50 UG/ML
INJECTION, SOLUTION INTRAMUSCULAR; INTRAVENOUS AS NEEDED
Status: DISCONTINUED | OUTPATIENT
Start: 2020-10-08 | End: 2020-10-08 | Stop reason: SURG

## 2020-10-08 RX ORDER — LIDOCAINE HYDROCHLORIDE 20 MG/ML
INJECTION, SOLUTION INFILTRATION; PERINEURAL AS NEEDED
Status: DISCONTINUED | OUTPATIENT
Start: 2020-10-08 | End: 2020-10-08 | Stop reason: SURG

## 2020-10-08 RX ORDER — SODIUM CHLORIDE 9 MG/ML
30 INJECTION, SOLUTION INTRAVENOUS CONTINUOUS
Status: DISCONTINUED | OUTPATIENT
Start: 2020-10-08 | End: 2020-10-09

## 2020-10-08 RX ORDER — DOPAMINE HYDROCHLORIDE 160 MG/100ML
2-20 INJECTION, SOLUTION INTRAVENOUS CONTINUOUS PRN
Status: DISCONTINUED | OUTPATIENT
Start: 2020-10-08 | End: 2020-10-09

## 2020-10-08 RX ORDER — MORPHINE SULFATE 4 MG/ML
INJECTION, SOLUTION INTRAMUSCULAR; INTRAVENOUS
Status: DISPENSED
Start: 2020-10-08 | End: 2020-10-08

## 2020-10-08 RX ORDER — PANTOPRAZOLE SODIUM 40 MG/1
40 TABLET, DELAYED RELEASE ORAL EVERY MORNING
Status: DISCONTINUED | OUTPATIENT
Start: 2020-10-09 | End: 2020-10-14 | Stop reason: HOSPADM

## 2020-10-08 RX ORDER — KETAMINE HYDROCHLORIDE 10 MG/ML
INJECTION INTRAMUSCULAR; INTRAVENOUS AS NEEDED
Status: DISCONTINUED | OUTPATIENT
Start: 2020-10-08 | End: 2020-10-08 | Stop reason: SURG

## 2020-10-08 RX ORDER — NITROGLYCERIN 20 MG/100ML
5-200 INJECTION INTRAVENOUS
Status: DISCONTINUED | OUTPATIENT
Start: 2020-10-08 | End: 2020-10-09

## 2020-10-08 RX ORDER — SODIUM CHLORIDE 0.9 % (FLUSH) 0.9 %
30 SYRINGE (ML) INJECTION ONCE AS NEEDED
Status: DISCONTINUED | OUTPATIENT
Start: 2020-10-08 | End: 2020-10-08

## 2020-10-08 RX ORDER — ALBUMIN, HUMAN INJ 5% 5 %
1500 SOLUTION INTRAVENOUS AS NEEDED
Status: DISCONTINUED | OUTPATIENT
Start: 2020-10-08 | End: 2020-10-09

## 2020-10-08 RX ORDER — POLYETHYLENE GLYCOL 3350 17 G/17G
17 POWDER, FOR SOLUTION ORAL DAILY
Status: DISCONTINUED | OUTPATIENT
Start: 2020-10-10 | End: 2020-10-09

## 2020-10-08 RX ORDER — CHLORHEXIDINE GLUCONATE 0.12 MG/ML
15 RINSE ORAL EVERY 12 HOURS
Status: DISCONTINUED | OUTPATIENT
Start: 2020-10-08 | End: 2020-10-11

## 2020-10-08 RX ORDER — ACETAMINOPHEN 10 MG/ML
1000 INJECTION, SOLUTION INTRAVENOUS ONCE
Status: COMPLETED | OUTPATIENT
Start: 2020-10-08 | End: 2020-10-08

## 2020-10-08 RX ORDER — MAGNESIUM SULFATE 1 G/100ML
1 INJECTION INTRAVENOUS EVERY 8 HOURS
Status: DISCONTINUED | OUTPATIENT
Start: 2020-10-08 | End: 2020-10-09

## 2020-10-08 RX ORDER — ALPRAZOLAM 0.25 MG/1
0.25 TABLET ORAL EVERY 8 HOURS PRN
Status: DISCONTINUED | OUTPATIENT
Start: 2020-10-08 | End: 2020-10-14 | Stop reason: HOSPADM

## 2020-10-08 RX ORDER — MEPERIDINE HYDROCHLORIDE 25 MG/ML
25 INJECTION INTRAMUSCULAR; INTRAVENOUS; SUBCUTANEOUS EVERY 4 HOURS PRN
Status: ACTIVE | OUTPATIENT
Start: 2020-10-08 | End: 2020-10-08

## 2020-10-08 RX ORDER — POTASSIUM CHLORIDE 1.5 G/1.77G
40 POWDER, FOR SOLUTION ORAL AS NEEDED
Status: DISCONTINUED | OUTPATIENT
Start: 2020-10-08 | End: 2020-10-14 | Stop reason: HOSPADM

## 2020-10-08 RX ORDER — ROCURONIUM BROMIDE 10 MG/ML
INJECTION, SOLUTION INTRAVENOUS AS NEEDED
Status: DISCONTINUED | OUTPATIENT
Start: 2020-10-08 | End: 2020-10-08 | Stop reason: SURG

## 2020-10-08 RX ORDER — ACETAMINOPHEN 160 MG/5ML
650 SOLUTION ORAL EVERY 4 HOURS
Status: DISCONTINUED | OUTPATIENT
Start: 2020-10-08 | End: 2020-10-09

## 2020-10-08 RX ORDER — MAGNESIUM SULFATE HEPTAHYDRATE 500 MG/ML
INJECTION, SOLUTION INTRAMUSCULAR; INTRAVENOUS AS NEEDED
Status: DISCONTINUED | OUTPATIENT
Start: 2020-10-08 | End: 2020-10-08 | Stop reason: SURG

## 2020-10-08 RX ORDER — PANTOPRAZOLE SODIUM 40 MG/10ML
40 INJECTION, POWDER, LYOPHILIZED, FOR SOLUTION INTRAVENOUS ONCE
Status: COMPLETED | OUTPATIENT
Start: 2020-10-08 | End: 2020-10-08

## 2020-10-08 RX ORDER — DEXTROSE MONOHYDRATE 25 G/50ML
25-50 INJECTION, SOLUTION INTRAVENOUS
Status: DISCONTINUED | OUTPATIENT
Start: 2020-10-08 | End: 2020-10-08

## 2020-10-08 RX ORDER — PROPOFOL 10 MG/ML
VIAL (ML) INTRAVENOUS AS NEEDED
Status: DISCONTINUED | OUTPATIENT
Start: 2020-10-08 | End: 2020-10-08 | Stop reason: SURG

## 2020-10-08 RX ORDER — HYDROCODONE BITARTRATE AND ACETAMINOPHEN 5; 325 MG/1; MG/1
2 TABLET ORAL EVERY 4 HOURS PRN
Status: DISCONTINUED | OUTPATIENT
Start: 2020-10-08 | End: 2020-10-09

## 2020-10-08 RX ORDER — OXYCODONE HYDROCHLORIDE 5 MG/1
10 TABLET ORAL EVERY 4 HOURS PRN
Status: DISCONTINUED | OUTPATIENT
Start: 2020-10-08 | End: 2020-10-14 | Stop reason: HOSPADM

## 2020-10-08 RX ADMIN — NICARDIPINE HYDROCHLORIDE 0.5 MG: 25 INJECTION INTRAVENOUS at 08:06

## 2020-10-08 RX ADMIN — SODIUM CHLORIDE 5 MG/HR: 9 INJECTION, SOLUTION INTRAVENOUS at 08:05

## 2020-10-08 RX ADMIN — FENTANYL CITRATE 100 MCG: 50 INJECTION, SOLUTION INTRAMUSCULAR; INTRAVENOUS at 07:06

## 2020-10-08 RX ADMIN — ALBUMIN HUMAN 250 ML: 0.05 INJECTION, SOLUTION INTRAVENOUS at 15:15

## 2020-10-08 RX ADMIN — PANTOPRAZOLE SODIUM 40 MG: 40 INJECTION, POWDER, FOR SOLUTION INTRAVENOUS at 14:09

## 2020-10-08 RX ADMIN — MAGNESIUM SULFATE HEPTAHYDRATE 2 G: 500 INJECTION, SOLUTION INTRAMUSCULAR; INTRAVENOUS at 10:02

## 2020-10-08 RX ADMIN — CYCLOBENZAPRINE 10 MG: 10 TABLET, FILM COATED ORAL at 22:13

## 2020-10-08 RX ADMIN — PHENYLEPHRINE HYDROCHLORIDE 100 MCG: 10 INJECTION INTRAVENOUS at 08:43

## 2020-10-08 RX ADMIN — SODIUM CHLORIDE 30 ML/HR: 9 INJECTION, SOLUTION INTRAVENOUS at 11:49

## 2020-10-08 RX ADMIN — PROPOFOL 50 MG: 10 INJECTION, EMULSION INTRAVENOUS at 06:59

## 2020-10-08 RX ADMIN — PHENYLEPHRINE HYDROCHLORIDE 100 MCG: 10 INJECTION INTRAVENOUS at 08:57

## 2020-10-08 RX ADMIN — KETAMINE HYDROCHLORIDE 10 MG: 10 INJECTION INTRAMUSCULAR; INTRAVENOUS at 07:58

## 2020-10-08 RX ADMIN — DEXMEDETOMIDINE HYDROCHLORIDE 1 MCG/KG/HR: 4 INJECTION INTRAVENOUS at 07:36

## 2020-10-08 RX ADMIN — NICARDIPINE HYDROCHLORIDE 0.3 MG: 25 INJECTION INTRAVENOUS at 08:00

## 2020-10-08 RX ADMIN — KETAMINE HYDROCHLORIDE 10 MG: 10 INJECTION INTRAMUSCULAR; INTRAVENOUS at 09:18

## 2020-10-08 RX ADMIN — NICARDIPINE HYDROCHLORIDE 0.3 MG: 25 INJECTION INTRAVENOUS at 10:28

## 2020-10-08 RX ADMIN — ROCURONIUM BROMIDE 50 MG: 10 INJECTION INTRAVENOUS at 09:56

## 2020-10-08 RX ADMIN — CEFAZOLIN SODIUM 2 G: 2 INJECTION, SOLUTION INTRAVENOUS at 10:22

## 2020-10-08 RX ADMIN — PROPOFOL 40 MCG/KG/MIN: 10 INJECTION, EMULSION INTRAVENOUS at 15:51

## 2020-10-08 RX ADMIN — PHENYLEPHRINE HYDROCHLORIDE 100 MCG: 10 INJECTION INTRAVENOUS at 07:40

## 2020-10-08 RX ADMIN — PHENYLEPHRINE HYDROCHLORIDE 100 MCG: 10 INJECTION INTRAVENOUS at 08:25

## 2020-10-08 RX ADMIN — DEXMEDETOMIDINE HYDROCHLORIDE 0.8 MCG/KG/HR: 100 INJECTION, SOLUTION, CONCENTRATE INTRAVENOUS at 15:06

## 2020-10-08 RX ADMIN — LABETALOL HYDROCHLORIDE 10 MG: 5 INJECTION, SOLUTION INTRAVENOUS at 08:12

## 2020-10-08 RX ADMIN — CEFAZOLIN SODIUM 2 G: 2 INJECTION, SOLUTION INTRAVENOUS at 07:06

## 2020-10-08 RX ADMIN — NICARDIPINE HYDROCHLORIDE 0.5 MG: 25 INJECTION INTRAVENOUS at 08:08

## 2020-10-08 RX ADMIN — PHENYLEPHRINE HYDROCHLORIDE 100 MCG: 10 INJECTION INTRAVENOUS at 08:59

## 2020-10-08 RX ADMIN — SODIUM CHLORIDE: 9 INJECTION, SOLUTION INTRAVENOUS at 07:35

## 2020-10-08 RX ADMIN — NICARDIPINE HYDROCHLORIDE 0.3 MG: 25 INJECTION INTRAVENOUS at 10:25

## 2020-10-08 RX ADMIN — AMINOCAPROIC ACID 10 G: 250 INJECTION, SOLUTION INTRAVENOUS at 10:22

## 2020-10-08 RX ADMIN — NICARDIPINE HYDROCHLORIDE 0.5 MG: 25 INJECTION INTRAVENOUS at 08:10

## 2020-10-08 RX ADMIN — HEPARIN SODIUM 30000 UNITS: 1000 INJECTION, SOLUTION INTRAVENOUS; SUBCUTANEOUS at 08:40

## 2020-10-08 RX ADMIN — PHENYLEPHRINE HYDROCHLORIDE 100 MCG: 10 INJECTION INTRAVENOUS at 07:48

## 2020-10-08 RX ADMIN — KETAMINE HYDROCHLORIDE 10 MG: 10 INJECTION INTRAMUSCULAR; INTRAVENOUS at 07:15

## 2020-10-08 RX ADMIN — FENTANYL CITRATE 100 MCG: 50 INJECTION, SOLUTION INTRAMUSCULAR; INTRAVENOUS at 07:58

## 2020-10-08 RX ADMIN — ROCURONIUM BROMIDE 50 MG: 10 INJECTION INTRAVENOUS at 06:58

## 2020-10-08 RX ADMIN — ATORVASTATIN CALCIUM 40 MG: 20 TABLET, FILM COATED ORAL at 20:07

## 2020-10-08 RX ADMIN — PROPOFOL 50 MCG/KG/MIN: 10 INJECTION, EMULSION INTRAVENOUS at 07:36

## 2020-10-08 RX ADMIN — PHENYLEPHRINE HYDROCHLORIDE 100 MCG: 10 INJECTION INTRAVENOUS at 08:45

## 2020-10-08 RX ADMIN — NICARDIPINE HYDROCHLORIDE 0.5 MG: 25 INJECTION INTRAVENOUS at 08:01

## 2020-10-08 RX ADMIN — PHENYLEPHRINE HYDROCHLORIDE 100 MCG: 10 INJECTION INTRAVENOUS at 08:39

## 2020-10-08 RX ADMIN — PHENYLEPHRINE HYDROCHLORIDE 100 MCG: 10 INJECTION INTRAVENOUS at 07:53

## 2020-10-08 RX ADMIN — ALBUMIN HUMAN 500 ML: 0.05 INJECTION, SOLUTION INTRAVENOUS at 16:43

## 2020-10-08 RX ADMIN — CARVEDILOL 3.12 MG: 3.12 TABLET, FILM COATED ORAL at 06:20

## 2020-10-08 RX ADMIN — NOREPINEPHRINE BITARTRATE 0.02 MCG/KG/MIN: 1 INJECTION, SOLUTION, CONCENTRATE INTRAVENOUS at 07:38

## 2020-10-08 RX ADMIN — SODIUM CHLORIDE 3.4 UNITS/HR: 900 INJECTION, SOLUTION INTRAVENOUS at 07:59

## 2020-10-08 RX ADMIN — PHENYLEPHRINE HYDROCHLORIDE 100 MCG: 10 INJECTION INTRAVENOUS at 07:56

## 2020-10-08 RX ADMIN — ACETAMINOPHEN 1000 MG: 10 INJECTION, SOLUTION INTRAVENOUS at 14:08

## 2020-10-08 RX ADMIN — NICARDIPINE HYDROCHLORIDE 0.3 MG: 25 INJECTION INTRAVENOUS at 10:23

## 2020-10-08 RX ADMIN — PHENYLEPHRINE HYDROCHLORIDE 100 MCG: 10 INJECTION INTRAVENOUS at 08:23

## 2020-10-08 RX ADMIN — PHENYLEPHRINE HYDROCHLORIDE 100 MCG: 10 INJECTION INTRAVENOUS at 10:20

## 2020-10-08 RX ADMIN — ALBUMIN HUMAN 12.5 G: 0.05 INJECTION, SOLUTION INTRAVENOUS at 14:30

## 2020-10-08 RX ADMIN — CEFAZOLIN SODIUM 2 G: 2 INJECTION, SOLUTION INTRAVENOUS at 19:27

## 2020-10-08 RX ADMIN — PHENYLEPHRINE HYDROCHLORIDE 100 MCG: 10 INJECTION INTRAVENOUS at 08:22

## 2020-10-08 RX ADMIN — MUPIROCIN 1 APPLICATION: 20 OINTMENT TOPICAL at 20:07

## 2020-10-08 RX ADMIN — ROCURONIUM BROMIDE 30 MG: 10 INJECTION INTRAVENOUS at 09:06

## 2020-10-08 RX ADMIN — FENTANYL CITRATE 100 MCG: 50 INJECTION, SOLUTION INTRAMUSCULAR; INTRAVENOUS at 07:22

## 2020-10-08 RX ADMIN — KETAMINE HYDROCHLORIDE 10 MG: 10 INJECTION INTRAMUSCULAR; INTRAVENOUS at 06:57

## 2020-10-08 RX ADMIN — PHENYLEPHRINE HYDROCHLORIDE 100 MCG: 10 INJECTION INTRAVENOUS at 08:24

## 2020-10-08 RX ADMIN — PHENYLEPHRINE HYDROCHLORIDE 100 MCG: 10 INJECTION INTRAVENOUS at 10:22

## 2020-10-08 RX ADMIN — MIDAZOLAM 2 MG: 1 INJECTION INTRAMUSCULAR; INTRAVENOUS at 06:47

## 2020-10-08 RX ADMIN — LIDOCAINE HYDROCHLORIDE 100 MG: 20 INJECTION, SOLUTION INFILTRATION; PERINEURAL at 06:57

## 2020-10-08 RX ADMIN — SODIUM CHLORIDE: 9 INJECTION, SOLUTION INTRAVENOUS at 06:42

## 2020-10-08 RX ADMIN — ALPRAZOLAM 0.25 MG: 0.25 TABLET ORAL at 20:06

## 2020-10-08 RX ADMIN — ROCURONIUM BROMIDE 20 MG: 10 INJECTION INTRAVENOUS at 07:26

## 2020-10-08 RX ADMIN — DEXMEDETOMIDINE HYDROCHLORIDE 0.3 MCG/KG/HR: 100 INJECTION, SOLUTION, CONCENTRATE INTRAVENOUS at 23:02

## 2020-10-08 RX ADMIN — PHENYLEPHRINE HYDROCHLORIDE 100 MCG: 10 INJECTION INTRAVENOUS at 08:40

## 2020-10-08 RX ADMIN — OXYCODONE 10 MG: 5 TABLET ORAL at 20:07

## 2020-10-08 RX ADMIN — PHENYLEPHRINE HYDROCHLORIDE 0.3 MCG/KG/MIN: 10 INJECTION, SOLUTION INTRAMUSCULAR; INTRAVENOUS; SUBCUTANEOUS at 06:58

## 2020-10-08 RX ADMIN — FENTANYL CITRATE 100 MCG: 50 INJECTION, SOLUTION INTRAMUSCULAR; INTRAVENOUS at 06:50

## 2020-10-08 RX ADMIN — AMINOCAPROIC ACID 10 G: 250 INJECTION, SOLUTION INTRAVENOUS at 07:35

## 2020-10-08 RX ADMIN — PHENYLEPHRINE HYDROCHLORIDE 100 MCG: 10 INJECTION INTRAVENOUS at 07:39

## 2020-10-08 RX ADMIN — CHLORHEXIDINE GLUCONATE 15 ML: 1.2 RINSE ORAL at 20:14

## 2020-10-08 RX ADMIN — KETAMINE HYDROCHLORIDE 10 MG: 10 INJECTION INTRAMUSCULAR; INTRAVENOUS at 10:44

## 2020-10-08 RX ADMIN — PROPOFOL 150 MG: 10 INJECTION, EMULSION INTRAVENOUS at 06:58

## 2020-10-08 RX ADMIN — PHENYLEPHRINE HYDROCHLORIDE 100 MCG: 10 INJECTION INTRAVENOUS at 07:44

## 2020-10-08 RX ADMIN — NICARDIPINE HYDROCHLORIDE 0.5 MG: 25 INJECTION INTRAVENOUS at 08:04

## 2020-10-08 RX ADMIN — ROCURONIUM BROMIDE 30 MG: 10 INJECTION INTRAVENOUS at 08:10

## 2020-10-08 RX ADMIN — PHENYLEPHRINE HYDROCHLORIDE 100 MCG: 10 INJECTION INTRAVENOUS at 07:38

## 2020-10-08 RX ADMIN — PROPOFOL 50 MCG/KG/MIN: 10 INJECTION, EMULSION INTRAVENOUS at 13:05

## 2020-10-08 RX ADMIN — FENTANYL CITRATE 100 MCG: 50 INJECTION, SOLUTION INTRAMUSCULAR; INTRAVENOUS at 10:23

## 2020-10-08 NOTE — ANESTHESIA POSTPROCEDURE EVALUATION
"Patient: Jd Velazquez    Procedure Summary     Date: 10/08/20 Room / Location: [facility] OR [address] [place] [address] MAIN OR    Anesthesia Start: 0639 Anesthesia Stop: 1128    Procedure: STERNOTOMY, CORONARY ARTERY BYPASS GRAFTING TIME 4 WITH LEFT KELSI  AND ENDOSCOPICALLY HARVESTED LEFT GREATER SAPHENOUS VEIN AND PRP. (N/A Chest) Diagnosis:       Coronary artery disease of native artery of native heart with stable angina pectoris (CMS/HCC)      (Coronary artery disease of native artery of native heart with stable angina pectoris (CMS/HCC) [I25.118])    Surgeon: Jr Girma Chiu MD Provider: Delbert Cornell MD    Anesthesia Type: general ASA Status: 4          Anesthesia Type: general    Vitals  Vitals Value Taken Time   BP 97/60 10/08/20 1330   Temp     Pulse 80 10/08/20 1336   Resp 14 10/08/20 1122   SpO2 95 % 10/08/20 1336   Vitals shown include unvalidated device data.        Post Anesthesia Care and Evaluation    Patient location during evaluation: bedside  Patient participation: complete - patient cannot participate  Level of consciousness: responsive to painful stimuli  Pain management: adequate  Airway patency: patent  Anesthetic complications: No anesthetic complications  PONV Status: none  Cardiovascular status: acceptable  Respiratory status: intubated and acceptable  Hydration status: acceptable    Comments: /64 (BP Location: Right arm)   Pulse 80   Temp 36.4 °C (97.5 °F) (Temporal)   Resp 14   Ht 182.9 cm (72\")   Wt 113 kg (250 lb)   SpO2 93%   BMI 33.91 kg/m²         "

## 2020-10-08 NOTE — ANESTHESIA PROCEDURE NOTES
Central Line      Patient reassessed immediately prior to procedure    Patient location during procedure: OR  Start time: 10/8/2020 7:28 AM  Stop Time:10/8/2020 7:31 AM  Indications: central pressure monitoring  Staff  Anesthesiologist: Delbert Cornell MD  Preanesthetic Checklist  Completed: patient identified, site marked, surgical consent, pre-op evaluation, timeout performed, IV checked, risks and benefits discussed and monitors and equipment checked  Central Line Prep  Sterile Tech:cap, gloves, gown, mask and sterile barriers  Prep: chloraprep  Patient monitoring: blood pressure monitoring, continuous pulse oximetry and EKG  Central Line Procedure  Laterality:right  Location:internal jugular  Catheter Type:[place]  Assessment  Post procedure:line sutured, biopatch applied and occlusive dressing applied  Assessement:chest x-ray ordered, no pneumothorax on x-ray and free fluid flow  Complications:no  Patient Tolerance:patient tolerated the procedure well with no apparent complications

## 2020-10-08 NOTE — ANESTHESIA PROCEDURE NOTES
Arterial Line      Patient reassessed immediately prior to procedure    Patient location during procedure: OR  Start time: 10/8/2020 6:47 AM  Stop Time:10/8/2020 6:53 AM       Line placed for hemodynamic monitoring, ABGs/Labs/ISTAT and MD/Surgeon request.  Performed By   Anesthesiologist: Delbert Cornell MD  Preanesthetic Checklist  Completed: patient identified, site marked, surgical consent, pre-op evaluation, timeout performed, IV checked, risks and benefits discussed and monitors and equipment checked  Arterial Line Prep   Sterile Tech: cap, gloves, mask and sterile barriers  Prep: ChloraPrep  Patient monitoring: blood pressure monitoring, continuous pulse oximetry and EKG  Arterial Line Procedure   Laterality:left  Location:  radial artery  Catheter size: 20 G   Guidance: ultrasound guided  PROCEDURE NOTE/ULTRASOUND INTERPRETATION.  Using ultrasound guidance the potential vascular sites for insertion of the catheter were visualized to determine the patency of the vessel to be used for vascular access.  After selecting the appropriate site for insertion, the needle was visualized under ultrasound being inserted into the radial artery, followed by ultrasound confirmation of wire and catheter placement. There were no abnormalities seen on ultrasound; an image was taken; and the patient tolerated the procedure with no complications.   Number of attempts: 1  Successful placement: yes  Post Assessment   Dressing Type: wrist guard applied, occlusive dressing applied and secured with tape.   Complications no  Circ/Move/Sens Assessment: normal and unchanged.   Patient Tolerance: patient tolerated the procedure well with no apparent complications

## 2020-10-08 NOTE — PROGRESS NOTES
Continued Stay Note  [facility]     Patient Name: Jd Velazquez  MRN: [id number]  Today's Date: 10/8/2020    Admit Date: 10/6/2020    Discharge Plan     Row Name 10/08/20 1705       Plan    Plan  10/8 Home w/ [facility]     Plan Comments  [facility]  has accepted and they will follow Pt at home when discharged.  Pt will d/c home with assistance of spouse.  [facility]        Discharge Codes    No documentation.             Luly Villatoro RN

## 2020-10-08 NOTE — ANESTHESIA PROCEDURE NOTES
Airway  Urgency: elective    Date/Time: 10/8/2020 7:05 AM  Airway not difficult    General Information and Staff    Patient location during procedure: OR  Anesthesiologist: Delbert Cornell MD    Indications and Patient Condition  Indications for airway management: airway protection    Preoxygenated: yes  Mask difficulty assessment: 1 - vent by mask    Final Airway Details  Final airway type: endotracheal airway      Successful airway: ETT  Cuffed: yes   Successful intubation technique: direct laryngoscopy  Facilitating devices/methods: Bougie  Endotracheal tube insertion site: oral  Blade: Franco  Blade size: 4  ETT size (mm): 7.5  Cormack-Lehane Classification: grade III - view of epiglottis only  Placement verified by: chest auscultation   Measured from: lips  ETT/EBT  to lips (cm): 21  Number of attempts at approach: 2  Assessment: lips, teeth, and gum same as pre-op and atraumatic intubation

## 2020-10-08 NOTE — OP NOTE
[facility] CARDIAC SURGERY OP NOTE    Preop Diagnosis: Coronary disease.  Non-STEMI.  LVH grade 6.  Noncompliant with medications.  Diabetes.  Hyponatremia.  Morbid obesity.  Nicotine addiction.  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    Postop Diagnosis: Same    Indications: This patient had a non-STEMI.  He had with taking his medications and his blood pressure came in at 170/110.  This was barely controlled.  The STS Risk and all risks and alternatives were discussed with the patient and family. Operation was advisable to prolong life and relieve symptoms.They understand and wish to proceed.    Procedure: CABG x4.  Skeletonized LIMA to proximal LAD.  Vein graft to right coronary artery.  Vein graft OM1.  Vein graft to D1.  Temporary cardiopulmonary bypass.  Antegrade and retrograde cold blood cardioplegia with warm reperfusion.  Neurologic monitoring.  Transesophageal echo.  Endoscopic vein harvest left greater saphenous vein.  Was counseled about his smoking.    Surgeon: Girma Chiu MD    Assistant: Kay Smith CSA    Anesthesia: GET    Findings : The heart was enlarged grade 6.  The vein was 4 mm in diameter.  The RCA was 2.5 mm.  OM1 was 2.5 mm.  D1 was 2 mm in the LAD was 2.5 mm.  The KELSI was 2 mm with excellent blood flow.  There were adhesions in the pericardial space secondary to old pericarditis.  There was a ramus intermedius that was too small to bypass.    Operative Procedure: A primary median sternotomy was made while the left greater saphenous vein was harvested with the endoscope.  The internal mammary artery was dissected off the left chest wall with a harmonic scalpel and was skeletonized.  He had adhesions from his previous pericarditis probably from the RCA myocardial infarction and the adhesions were taken down pre-and post bypass.  Cardiopulmonary bypass was established for 76 minutes drifting to 34 °C at appropriate flow rates.  The aorta was crossclamped 58 minutes and  we gave a liter of antegrade cold blood cardioplegia then 2 L of retrograde cold blood cardioplegia and repeated doses every 10 to 15 minutes to good effect.  3 veins were anastomosed the ascending aorta with 6-0 Prolene and we had to work around the massive amount of adipose tissue on the RV for these proximal anastomoses.  The first vein was sewn to the RCA, the second vein to OM1 and the third vein to the diagonal branch with 6-0 Prolene or 7-0 Prolene.  The mammary was sewn to the LAD with 7-0 Prolene.  A warm dose of retrograde cardioplegia was given and then was strong suction on the aortic needle vent the cross-clamp was released.  The patient was rewarmed and cardiopulmonary bypass weaned and discontinued.  Decannulation was affected and the usual devices were placed.  Hemostasis was obtained.  4 chest tubes were inserted.  We applied vancomycin platelet rich plasma.  Sternum was closed with stainless steel wires.  The fascia, soft tissues and skin were closed as usual.  The sponge, needle and instrument counts were noted to be correct.    Complications: None    Tubes: 4    Epicardial Wires: 3    Blood Loss: Minimal    Aortic cross-clamp time: 58 min    CPB time: 76 min     Specimen: None    Condition: Good      Patient Care Team:  Dacia Newsome APRN as PCP - General (Nurse Practitioner)        Girma Chiu MD  10/8/2020  10:53 EDT

## 2020-10-08 NOTE — ANESTHESIA PROCEDURE NOTES
Central Line      Patient reassessed immediately prior to procedure    Patient location during procedure: OR  Start time: 10/8/2020 7:14 AM  Stop Time:10/8/2020 7:28 AM  Indications: vascular access  Staff  Anesthesiologist: Delbert Cornell MD  Preanesthetic Checklist  Completed: patient identified, site marked, surgical consent, pre-op evaluation, timeout performed, IV checked, risks and benefits discussed and monitors and equipment checked  Central Line Prep  Sterile Tech:cap, gloves, gown, mask and sterile barriers  Prep: chloraprep  Patient monitoring: continuous pulse oximetry, blood pressure monitoring and EKG  Central Line Procedure  Laterality:right  Location:internal jugular  Catheter Type:Cordis and double lumen  Catheter Size:9 Fr  Guidance:ultrasound guided  PROCEDURE NOTE/ULTRASOUND INTERPRETATION.  Using ultrasound guidance the potential vascular sites for insertion of the catheter were visualized to determine the patency of the vessel to be used for vascular access.  After selecting the appropriate site for insertion, the needle was visualized under ultrasound being inserted into the internal jugular vein, followed by ultrasound confirmation of wire and catheter placement. There were no abnormalities seen on ultrasound; an image was taken; and the patient tolerated the procedure with no complications.   Assessment  Post procedure:biopatch applied, line sutured and occlusive dressing applied  Assessement:blood return through all ports, free fluid flow, placement verified by x-ray, Messi Test, no pneumothorax on x-ray and chest x-ray ordered  Complications:no  Patient Tolerance:patient tolerated the procedure well with no apparent complications

## 2020-10-08 NOTE — ANESTHESIA PROCEDURE NOTES
Procedure Performed: Emergent/Open-Heart Anesthesia BRET       Start Time:  10/8/2020 7:35 AM       End Time:   10/8/2020 7:46 AM    Preanesthesia Checklist:  Patient identified, IV assessed, risks and benefits discussed, monitors and equipment assessed, procedure being performed at surgeon's request and anesthesia consent obtained.    General Procedure Information  Diagnostic Indications for Echo:  assessment of ascending aorta, assessment of surgical repair and hemodynamic monitoring  Physician Requesting Echo: Jr Girma Chiu MD  CPT Code:  Atherosclerosis of aorta  Location performed:  OR  Intubated  Bite block placed  Heart visualized  Probe Insertion:  Easy  Probe Type:  Multiplane  Modalities:  Color flow mapping, pulse wave Doppler and continuous wave Doppler    Echocardiographic and Doppler Measurements    Ventricles    Right Ventricle:  Cavity size normal.  Thrombus not present.  Global function normal.    Left Ventricle:  Cavity size normal.  Hypertrophy present.  Thrombus not present.  Global Function mildly impaired.  Ejection Fraction 55%.          Valves    Aortic Valve:  Annulus normal.  Stenosis not present.  Mean Gradient: 9 mmHg.  Regurgitation mild.  Leaflets normal.  Leaflet motions normal.      Mitral Valve:  Annulus normal.  Stenosis not present.  Regurgitation mild.  Leaflets normal and thickened.  Leaflet motions normal.      Tricuspid Valve:  Annulus normal.  Stenosis not present.  Regurgitation absent.  Leaflets normal.  Leaflet motions normal.    Pulmonic Valve:  Annulus normal.  Regurgitation absent.          Aorta    Ascending Aorta:  Size normal.  Diameter 3.5 cm.  Dissection not present.  Plaque thickness less than 3 mm.  Mobile plaque not present.    Aortic Arch:  Size normal.  Diameter 2.6 cm.  Dissection not present.  Plaque thickness less than 3 mm.  Mobile plaque not present.    Descending Aorta:  Size normal.  Dissection not present.  Plaque thickness less than 3 mm.   Mobile plaque not present.          Atria    Right Atrium:  Spontaneous echo contrast not present.  Thrombus not present.  Tumor not present.  Device not present.      Left Atrium:  Spontaneous echo contrast not present.  Thrombus not present.  Tumor not present.  Device not present.    Left atrial appendage normal.      Septa    Atrial Septum:  Intra-atrial septal morphology aneurysmal.          Diastolic Function Measurements:  Diastolic Dysfunction Grade=  E= ms  A= ms  E/A Ratio= 1  DT= ms  S/D= 1  IVRT=    Other Findings  Pericardium:  normal  Pleural Effusion:  none  Pulmonary Venous Flow:  normal    Anesthesia Information  Performed Personally  Anesthesiologist:  Delbert Cornell MD      Echocardiogram Comments:       Postbypass results:  Post bypass   Normal RHF   LVEF 55-60% via visual estimation   Mild AI mild MR no AS no MS  No TR or TS

## 2020-10-08 NOTE — PROGRESS NOTES
Name: Jd Velazquez ADMIT: 10/6/2020   : 1965  PCP: Dacia Newsome APRN    MRN: [id number] LOS: 2 days   AGE/SEX: 55 y.o. male  ROOM:          CHART CHECK only. Monitor glucose today w/o adjustments post op given likely decreased PO  Intake. If glucose remains elevated will alter insulin regimen with possible addition of basal insulin.  Hopefully will be able to send home on oral regimen only.     Results from last 7 days   Lab Units 10/08/20  1037 10/08/20  0957 10/08/20  0934  10/07/20  0943   WBC 10*3/mm3  --   --   --   --  8.12   HEMOGLOBIN g/dL  --   --   --   --  16.3   HEMOGLOBIN, POC g/dL 12.9 13.6 13.3   < >  --    HEMATOCRIT %  --   --   --   --  48.5   HEMATOCRIT POC % 38 40 39   < >  --    PLATELETS 10*3/mm3  --   --   --   --  315    < > = values in this interval not displayed.     Results from last 7 days   Lab Units 10/08/20  0455 10/07/20  0943   SODIUM mmol/L 132* 129*   POTASSIUM mmol/L 4.1 3.9   CHLORIDE mmol/L 102 97*   CO2 mmol/L 17.6* 22.5   BUN mg/dL 16 12   CREATININE mg/dL 0.81 0.75*   CALCIUM mg/dL 9.1 9.3   BILIRUBIN mg/dL  --  0.4   ALK PHOS U/L  --  119*   ALT (SGPT) U/L  --  13   AST (SGOT) U/L  --  16   GLUCOSE mg/dL 260* 289*       Hemoglobin A1C   Date/Time Value Ref Range Status   10/07/2020 0944 9.40 (H) 4.80 - 5.60 % Final     Glucose   Date/Time Value Ref Range Status   10/08/2020 1037 272 (H) 70 - 130 mg/dL Final   10/08/2020 0957 290 (H) 70 - 130 mg/dL Final   10/08/2020 0934 259 (H) 70 - 130 mg/dL Final   10/08/2020 0920 254 (H) 70 - 130 mg/dL Final   10/08/2020 0801 254 (H) 70 - 130 mg/dL Final   10/08/2020 0519 249 (H) 70 - 130 mg/dL Final   10/07/2020 1933 264 (H) 70 - 130 mg/dL Final       Emergent/Open-Heart Anesthesia BRET  Delbert Cornell MD     10/8/2020  9:58 AM  Procedure Performed: Emergent/Open-Heart Anesthesia BRET     Start Time:        End Time:      Preanesthesia Checklist:  Patient identified, IV assessed, risks and benefits  discussed, monitors   and equipment assessed, procedure being performed at surgeon's request and   anesthesia consent obtained.    General Procedure Information  Diagnostic Indications for Echo:  assessment of ascending aorta,   assessment of surgical repair and hemodynamic monitoring  Physician Requesting Echo: Jr Girma Chiu MD  CPT Code:  Atherosclerosis of aorta  Location performed:  OR  Intubated  Bite block placed  Heart visualized  Probe Insertion:  Easy  Probe Type:  Multiplane  Modalities:  Color flow mapping, pulse wave Doppler and continuous wave   Doppler    Echocardiographic and Doppler Measurements    Ventricles    Right Ventricle:  Cavity size normal.  Thrombus not present.  Global function normal.    Left Ventricle:  Cavity size normal.  Hypertrophy present.  Thrombus not present.  Global   Function mildly impaired.  Ejection Fraction 55%.      Valves    Aortic Valve:  Annulus normal.  Stenosis not present.  Mean Gradient: 9 mmHg.    Regurgitation mild.  Leaflets normal.  Leaflet motions normal.      Mitral Valve:  Annulus normal.  Stenosis not present.  Regurgitation mild.  Leaflets   normal and thickened.  Leaflet motions normal.      Tricuspid Valve:  Annulus normal.  Stenosis not present.  Regurgitation absent.  Leaflets   normal.  Leaflet motions normal.    Pulmonic Valve:  Annulus normal.  Regurgitation absent.      Aorta    Ascending Aorta:  Size normal.  Diameter 3.5 cm.  Dissection not present.  Plaque thickness   less than 3 mm.  Mobile plaque not present.    Aortic Arch:  Size normal.  Diameter 2.6 cm.  Dissection not present.  Plaque thickness   less than 3 mm.  Mobile plaque not present.    Descending Aorta:  Size normal.  Dissection not present.  Plaque thickness less than 3 mm.    Mobile plaque not present.      Atria    Right Atrium:  Spontaneous echo contrast not present.  Thrombus not present.  Tumor not   present.  Device not present.      Left Atrium:  Spontaneous echo  contrast not present.  Thrombus not present.  Tumor not   present.  Device not present.    Left atrial appendage normal.    Septa    Atrial Septum:  Intra-atrial septal morphology aneurysmal.      Diastolic Function Measurements:  Diastolic Dysfunction Grade=  E= ms  A= ms  E/A Ratio= 1  DT= ms  S/D= 1  IVRT=    Other Findings  Pericardium:  normal  Pleural Effusion:  none  Pulmonary Venous Flow:  normal    Anesthesia Information  Performed Personally  Anesthesiologist:  Delbert Cornell MD    Echocardiogram Comments:       Postbypass results:    Scheduled Medications  acetaminophen, 650 mg, Oral, Q4H    Or  acetaminophen, 650 mg, Oral, Q4H    Or  acetaminophen, 650 mg, Rectal, Q4H  [START ON 10/9/2020] aspirin, 81 mg, Oral, Daily  atorvastatin, 40 mg, Oral, Nightly  ceFAZolin, 2 g, Intravenous, Q8H  chlorhexidine, 15 mL, Mouth/Throat, Q12H  [START ON 10/9/2020] enoxaparin, 40 mg, Subcutaneous, Daily  magnesium sulfate, 1 g, Intravenous, Q8H  metoclopramide, 10 mg, Intravenous, Q6H  metoprolol tartrate, 12.5 mg, Oral, Q12H  mupirocin, , Each Nare, BID  NON FORMULARY, 1,000 mg, Intravenous, Once  pantoprazole, 40 mg, Intravenous, Once    Followed by  [START ON 10/9/2020] pantoprazole, 40 mg, Oral, QAM  [START ON 10/10/2020] polyethylene glycol, 17 g, Oral, Daily  [START ON 10/9/2020] senna-docusate sodium, 2 tablet, Oral, Nightly    Infusions  clevidipine, 2-32 mg/hr  dexmedetomidine, 0.2-1.5 mcg/kg/hr  DOPamine, 2-20 mcg/kg/min  EPINEPHrine, 0.02-0.1 mcg/kg/min  insulin, 0-50 Units/hr  milrinone, 0.25-0.375 mcg/kg/min  niCARdipine, 5-15 mg/hr  nitroglycerin, 5-200 mcg/min  norepinephrine, 0.02-0.3 mcg/kg/min  phenylephrine, 0.2-2 mcg/kg/min  propofol, 5-50 mcg/kg/min  sodium chloride, 30 mL/hr  sodium chloride, 30 mL/hr  vasopressin, 0.02-0.1 Units/min    Diet  NPO Diet       Assessment/Plan     Active Hospital Problems    Diagnosis  POA   • **Coronary artery disease of native artery of native heart with stable  angina pectoris (CMS/HCC) [I25.118]  Yes   • CAD (coronary artery disease) [I25.10]  Yes   • Diabetes mellitus (CMS/HCC) [E11.9]  Yes   • Hyperlipidemia [E78.5]  Yes   • Hypertension [I10]  Yes   • Obesity [E66.9]  Yes   • SHAHLA (obstructive sleep apnea) [G47.33]  Unknown   • Tobacco abuse [Z72.0]  Unknown      Resolved Hospital Problems   No resolved problems to display.      SOHAM Narayan  [facility]  10/08/20  11:20 EDT

## 2020-10-09 ENCOUNTER — APPOINTMENT (OUTPATIENT)
Dept: GENERAL RADIOLOGY | Facility: HOSPITAL | Age: 55
End: 2020-10-09

## 2020-10-09 LAB
ALBUMIN SERPL-MCNC: 4.3 G/DL (ref 3.5–5.2)
ANION GAP SERPL CALCULATED.3IONS-SCNC: 8.8 MMOL/L (ref 5–15)
ARTERIAL PATENCY WRIST A: ABNORMAL
ATMOSPHERIC PRESS: 754.3 MMHG
BASE EXCESS BLDA CALC-SCNC: -2.1 MMOL/L (ref 0–2)
BASOPHILS # BLD AUTO: 0.02 10*3/MM3 (ref 0–0.2)
BASOPHILS NFR BLD AUTO: 0.2 % (ref 0–1.5)
BDY SITE: ABNORMAL
BUN SERPL-MCNC: 12 MG/DL (ref 6–20)
BUN/CREAT SERPL: 15.2 (ref 7–25)
CA-I BLD-MCNC: 5.1 MG/DL (ref 4.6–5.4)
CA-I SERPL ISE-MCNC: 1.27 MMOL/L (ref 1.15–1.35)
CALCIUM SPEC-SCNC: 8.4 MG/DL (ref 8.6–10.5)
CHLORIDE SERPL-SCNC: 104 MMOL/L (ref 98–107)
CO2 SERPL-SCNC: 24.2 MMOL/L (ref 22–29)
CREAT SERPL-MCNC: 0.79 MG/DL (ref 0.76–1.27)
DEPRECATED RDW RBC AUTO: 46.8 FL (ref 37–54)
EOSINOPHIL # BLD AUTO: 0.06 10*3/MM3 (ref 0–0.4)
EOSINOPHIL NFR BLD AUTO: 0.5 % (ref 0.3–6.2)
ERYTHROCYTE [DISTWIDTH] IN BLOOD BY AUTOMATED COUNT: 13.5 % (ref 12.3–15.4)
GAS FLOW AIRWAY: 11 LPM
GFR SERPL CREATININE-BSD FRML MDRD: 102 ML/MIN/1.73
GLUCOSE BLDC GLUCOMTR-MCNC: 104 MG/DL (ref 70–130)
GLUCOSE BLDC GLUCOMTR-MCNC: 105 MG/DL (ref 70–130)
GLUCOSE BLDC GLUCOMTR-MCNC: 106 MG/DL (ref 70–130)
GLUCOSE BLDC GLUCOMTR-MCNC: 118 MG/DL (ref 70–130)
GLUCOSE BLDC GLUCOMTR-MCNC: 127 MG/DL (ref 70–130)
GLUCOSE BLDC GLUCOMTR-MCNC: 135 MG/DL (ref 70–130)
GLUCOSE BLDC GLUCOMTR-MCNC: 147 MG/DL (ref 70–130)
GLUCOSE BLDC GLUCOMTR-MCNC: 155 MG/DL (ref 70–130)
GLUCOSE BLDC GLUCOMTR-MCNC: 166 MG/DL (ref 70–130)
GLUCOSE BLDC GLUCOMTR-MCNC: 195 MG/DL (ref 70–130)
GLUCOSE SERPL-MCNC: 107 MG/DL (ref 65–99)
HCO3 BLDA-SCNC: 23 MMOL/L (ref 22–28)
HCT VFR BLD AUTO: 40.1 % (ref 37.5–51)
HGB BLD-MCNC: 13.1 G/DL (ref 13–17.7)
IMM GRANULOCYTES # BLD AUTO: 0.18 10*3/MM3 (ref 0–0.05)
IMM GRANULOCYTES NFR BLD AUTO: 1.5 % (ref 0–0.5)
INR PPP: 1.06 (ref 0.9–1.1)
LYMPHOCYTES # BLD AUTO: 1.4 10*3/MM3 (ref 0.7–3.1)
LYMPHOCYTES NFR BLD AUTO: 11.9 % (ref 19.6–45.3)
MAGNESIUM SERPL-MCNC: 2.2 MG/DL (ref 1.6–2.6)
MCH RBC QN AUTO: 30.5 PG (ref 26.6–33)
MCHC RBC AUTO-ENTMCNC: 32.7 G/DL (ref 31.5–35.7)
MCV RBC AUTO: 93.5 FL (ref 79–97)
MODALITY: ABNORMAL
MONOCYTES # BLD AUTO: 1.83 10*3/MM3 (ref 0.1–0.9)
MONOCYTES NFR BLD AUTO: 15.5 % (ref 5–12)
NEUTROPHILS NFR BLD AUTO: 70.4 % (ref 42.7–76)
NEUTROPHILS NFR BLD AUTO: 8.31 10*3/MM3 (ref 1.7–7)
NRBC BLD AUTO-RTO: 0 /100 WBC (ref 0–0.2)
PCO2 BLDA: 39.5 MM HG (ref 35–45)
PH BLDA: 7.37 PH UNITS (ref 7.35–7.45)
PHOSPHATE SERPL-MCNC: 4.7 MG/DL (ref 2.5–4.5)
PLATELET # BLD AUTO: 227 10*3/MM3 (ref 140–450)
PMV BLD AUTO: 9.8 FL (ref 6–12)
PO2 BLDA: 63 MM HG (ref 80–100)
POTASSIUM SERPL-SCNC: 3.9 MMOL/L (ref 3.5–5.2)
PROTHROMBIN TIME: 13.7 SECONDS (ref 11.7–14.2)
RBC # BLD AUTO: 4.29 10*6/MM3 (ref 4.14–5.8)
SAO2 % BLDCOA: 91.2 % (ref 92–99)
SODIUM SERPL-SCNC: 137 MMOL/L (ref 136–145)
TOTAL RATE: 20 BREATHS/MINUTE
WBC # BLD AUTO: 11.8 10*3/MM3 (ref 3.4–10.8)

## 2020-10-09 PROCEDURE — 93010 ELECTROCARDIOGRAM REPORT: CPT | Performed by: INTERNAL MEDICINE

## 2020-10-09 PROCEDURE — 71045 X-RAY EXAM CHEST 1 VIEW: CPT

## 2020-10-09 PROCEDURE — 25010000003 CEFAZOLIN IN DEXTROSE 2-4 GM/100ML-% SOLUTION: Performed by: THORACIC SURGERY (CARDIOTHORACIC VASCULAR SURGERY)

## 2020-10-09 PROCEDURE — 97162 PT EVAL MOD COMPLEX 30 MIN: CPT

## 2020-10-09 PROCEDURE — 25010000003 POTASSIUM CHLORIDE PER 2 MEQ: Performed by: THORACIC SURGERY (CARDIOTHORACIC VASCULAR SURGERY)

## 2020-10-09 PROCEDURE — 85610 PROTHROMBIN TIME: CPT | Performed by: THORACIC SURGERY (CARDIOTHORACIC VASCULAR SURGERY)

## 2020-10-09 PROCEDURE — 83735 ASSAY OF MAGNESIUM: CPT | Performed by: THORACIC SURGERY (CARDIOTHORACIC VASCULAR SURGERY)

## 2020-10-09 PROCEDURE — 97110 THERAPEUTIC EXERCISES: CPT

## 2020-10-09 PROCEDURE — 80069 RENAL FUNCTION PANEL: CPT | Performed by: THORACIC SURGERY (CARDIOTHORACIC VASCULAR SURGERY)

## 2020-10-09 PROCEDURE — 94799 UNLISTED PULMONARY SVC/PX: CPT

## 2020-10-09 PROCEDURE — 25010000002 MAGNESIUM SULFATE IN D5W 1G/100ML (PREMIX) 1-5 GM/100ML-% SOLUTION: Performed by: THORACIC SURGERY (CARDIOTHORACIC VASCULAR SURGERY)

## 2020-10-09 PROCEDURE — 25010000002 ENOXAPARIN PER 10 MG: Performed by: THORACIC SURGERY (CARDIOTHORACIC VASCULAR SURGERY)

## 2020-10-09 PROCEDURE — 25010000002 FUROSEMIDE PER 20 MG: Performed by: THORACIC SURGERY (CARDIOTHORACIC VASCULAR SURGERY)

## 2020-10-09 PROCEDURE — 63710000001 INSULIN LISPRO (HUMAN) PER 5 UNITS: Performed by: NURSE PRACTITIONER

## 2020-10-09 PROCEDURE — 82962 GLUCOSE BLOOD TEST: CPT

## 2020-10-09 PROCEDURE — 82803 BLOOD GASES ANY COMBINATION: CPT

## 2020-10-09 PROCEDURE — 93005 ELECTROCARDIOGRAM TRACING: CPT | Performed by: THORACIC SURGERY (CARDIOTHORACIC VASCULAR SURGERY)

## 2020-10-09 PROCEDURE — 36600 WITHDRAWAL OF ARTERIAL BLOOD: CPT

## 2020-10-09 PROCEDURE — 85025 COMPLETE CBC W/AUTO DIFF WBC: CPT | Performed by: THORACIC SURGERY (CARDIOTHORACIC VASCULAR SURGERY)

## 2020-10-09 PROCEDURE — 82330 ASSAY OF CALCIUM: CPT | Performed by: THORACIC SURGERY (CARDIOTHORACIC VASCULAR SURGERY)

## 2020-10-09 PROCEDURE — 25010000002 FENTANYL CITRATE (PF) 100 MCG/2ML SOLUTION: Performed by: THORACIC SURGERY (CARDIOTHORACIC VASCULAR SURGERY)

## 2020-10-09 PROCEDURE — 25010000002 FUROSEMIDE PER 20 MG: Performed by: NURSE PRACTITIONER

## 2020-10-09 PROCEDURE — 25010000002 METOCLOPRAMIDE PER 10 MG: Performed by: THORACIC SURGERY (CARDIOTHORACIC VASCULAR SURGERY)

## 2020-10-09 RX ORDER — GABAPENTIN 300 MG/1
300 CAPSULE ORAL EVERY 12 HOURS SCHEDULED
Status: DISCONTINUED | OUTPATIENT
Start: 2020-10-09 | End: 2020-10-10

## 2020-10-09 RX ORDER — POLYETHYLENE GLYCOL 3350 17 G/17G
17 POWDER, FOR SOLUTION ORAL 2 TIMES DAILY
Status: DISCONTINUED | OUTPATIENT
Start: 2020-10-09 | End: 2020-10-14 | Stop reason: HOSPADM

## 2020-10-09 RX ORDER — HYDRALAZINE HYDROCHLORIDE 20 MG/ML
20 INJECTION INTRAMUSCULAR; INTRAVENOUS EVERY 6 HOURS PRN
Status: DISCONTINUED | OUTPATIENT
Start: 2020-10-09 | End: 2020-10-14 | Stop reason: HOSPADM

## 2020-10-09 RX ORDER — FUROSEMIDE 10 MG/ML
40 INJECTION INTRAMUSCULAR; INTRAVENOUS ONCE
Status: DISCONTINUED | OUTPATIENT
Start: 2020-10-09 | End: 2020-10-09

## 2020-10-09 RX ORDER — FUROSEMIDE 10 MG/ML
40 INJECTION INTRAMUSCULAR; INTRAVENOUS ONCE
Status: COMPLETED | OUTPATIENT
Start: 2020-10-09 | End: 2020-10-09

## 2020-10-09 RX ORDER — NICOTINE POLACRILEX 4 MG
15 LOZENGE BUCCAL
Status: DISCONTINUED | OUTPATIENT
Start: 2020-10-09 | End: 2020-10-14 | Stop reason: HOSPADM

## 2020-10-09 RX ORDER — IPRATROPIUM BROMIDE AND ALBUTEROL SULFATE 2.5; .5 MG/3ML; MG/3ML
3 SOLUTION RESPIRATORY (INHALATION) EVERY 4 HOURS PRN
Status: DISCONTINUED | OUTPATIENT
Start: 2020-10-09 | End: 2020-10-14 | Stop reason: HOSPADM

## 2020-10-09 RX ORDER — POTASSIUM CHLORIDE 750 MG/1
20 CAPSULE, EXTENDED RELEASE ORAL DAILY
Status: DISCONTINUED | OUTPATIENT
Start: 2020-10-09 | End: 2020-10-09

## 2020-10-09 RX ORDER — GUAIFENESIN 600 MG/1
1200 TABLET, EXTENDED RELEASE ORAL EVERY 12 HOURS SCHEDULED
Status: DISCONTINUED | OUTPATIENT
Start: 2020-10-09 | End: 2020-10-14 | Stop reason: HOSPADM

## 2020-10-09 RX ORDER — SODIUM CHLORIDE FOR INHALATION 7 %
4 VIAL, NEBULIZER (ML) INHALATION
Status: DISCONTINUED | OUTPATIENT
Start: 2020-10-09 | End: 2020-10-10

## 2020-10-09 RX ORDER — DEXTROSE MONOHYDRATE 25 G/50ML
25 INJECTION, SOLUTION INTRAVENOUS
Status: DISCONTINUED | OUTPATIENT
Start: 2020-10-09 | End: 2020-10-14 | Stop reason: HOSPADM

## 2020-10-09 RX ORDER — ATORVASTATIN CALCIUM 80 MG/1
80 TABLET, FILM COATED ORAL NIGHTLY
Status: DISCONTINUED | OUTPATIENT
Start: 2020-10-09 | End: 2020-10-12

## 2020-10-09 RX ORDER — METOPROLOL TARTRATE 50 MG/1
50 TABLET, FILM COATED ORAL EVERY 12 HOURS SCHEDULED
Status: DISCONTINUED | OUTPATIENT
Start: 2020-10-09 | End: 2020-10-10

## 2020-10-09 RX ORDER — POTASSIUM CHLORIDE 750 MG/1
20 CAPSULE, EXTENDED RELEASE ORAL ONCE
Status: COMPLETED | OUTPATIENT
Start: 2020-10-09 | End: 2020-10-09

## 2020-10-09 RX ADMIN — GUAIFENESIN 1200 MG: 600 TABLET, EXTENDED RELEASE ORAL at 09:50

## 2020-10-09 RX ADMIN — SODIUM CHLORIDE 4 ML: 7 NEBU SOLN,3 % NEBU at 19:41

## 2020-10-09 RX ADMIN — SODIUM CHLORIDE 4 ML: 7 NEBU SOLN,3 % NEBU at 11:09

## 2020-10-09 RX ADMIN — METOPROLOL TARTRATE: 25 TABLET, FILM COATED ORAL at 06:08

## 2020-10-09 RX ADMIN — CEFAZOLIN SODIUM 2 G: 2 INJECTION, SOLUTION INTRAVENOUS at 18:28

## 2020-10-09 RX ADMIN — GABAPENTIN 300 MG: 300 CAPSULE ORAL at 08:04

## 2020-10-09 RX ADMIN — GUAIFENESIN 1200 MG: 600 TABLET, EXTENDED RELEASE ORAL at 20:25

## 2020-10-09 RX ADMIN — ALPRAZOLAM 0.25 MG: 0.25 TABLET ORAL at 03:53

## 2020-10-09 RX ADMIN — OXYCODONE 10 MG: 5 TABLET ORAL at 08:54

## 2020-10-09 RX ADMIN — POTASSIUM CHLORIDE 20 MEQ: 29.8 INJECTION, SOLUTION INTRAVENOUS at 03:53

## 2020-10-09 RX ADMIN — CEFAZOLIN SODIUM 2 G: 2 INJECTION, SOLUTION INTRAVENOUS at 09:51

## 2020-10-09 RX ADMIN — FENTANYL CITRATE 25 MCG: 50 INJECTION, SOLUTION INTRAMUSCULAR; INTRAVENOUS at 03:45

## 2020-10-09 RX ADMIN — ACETAMINOPHEN 650 MG: 325 TABLET, FILM COATED ORAL at 08:54

## 2020-10-09 RX ADMIN — OXYCODONE 10 MG: 5 TABLET ORAL at 00:07

## 2020-10-09 RX ADMIN — METOPROLOL TARTRATE 25 MG: 25 TABLET, FILM COATED ORAL at 07:41

## 2020-10-09 RX ADMIN — METOCLOPRAMIDE HYDROCHLORIDE 10 MG: 5 INJECTION INTRAMUSCULAR; INTRAVENOUS at 06:07

## 2020-10-09 RX ADMIN — CHLORHEXIDINE GLUCONATE 15 ML: 1.2 RINSE ORAL at 08:04

## 2020-10-09 RX ADMIN — OXYCODONE 10 MG: 5 TABLET ORAL at 03:53

## 2020-10-09 RX ADMIN — IPRATROPIUM BROMIDE AND ALBUTEROL SULFATE 3 ML: 2.5; .5 SOLUTION RESPIRATORY (INHALATION) at 11:09

## 2020-10-09 RX ADMIN — MUPIROCIN 1 APPLICATION: 20 OINTMENT TOPICAL at 08:04

## 2020-10-09 RX ADMIN — ASPIRIN 81 MG: 81 TABLET, COATED ORAL at 08:05

## 2020-10-09 RX ADMIN — NICARDIPINE HYDROCHLORIDE 5 MG/HR: 2.5 INJECTION INTRAVENOUS at 00:07

## 2020-10-09 RX ADMIN — IPRATROPIUM BROMIDE AND ALBUTEROL SULFATE 3 ML: 2.5; .5 SOLUTION RESPIRATORY (INHALATION) at 19:41

## 2020-10-09 RX ADMIN — INSULIN LISPRO 3 UNITS: 100 INJECTION, SOLUTION INTRAVENOUS; SUBCUTANEOUS at 18:28

## 2020-10-09 RX ADMIN — ENOXAPARIN SODIUM 40 MG: 40 INJECTION SUBCUTANEOUS at 18:28

## 2020-10-09 RX ADMIN — INSULIN LISPRO 3 UNITS: 100 INJECTION, SOLUTION INTRAVENOUS; SUBCUTANEOUS at 11:57

## 2020-10-09 RX ADMIN — MAGNESIUM SULFATE HEPTAHYDRATE 1 G: 1 INJECTION, SOLUTION INTRAVENOUS at 03:52

## 2020-10-09 RX ADMIN — METOPROLOL TARTRATE 50 MG: 50 TABLET, FILM COATED ORAL at 20:25

## 2020-10-09 RX ADMIN — METOCLOPRAMIDE HYDROCHLORIDE 10 MG: 5 INJECTION INTRAMUSCULAR; INTRAVENOUS at 00:15

## 2020-10-09 RX ADMIN — ACETAMINOPHEN 650 MG: 325 TABLET, FILM COATED ORAL at 00:07

## 2020-10-09 RX ADMIN — OXYCODONE 10 MG: 5 TABLET ORAL at 22:19

## 2020-10-09 RX ADMIN — FUROSEMIDE 40 MG: 10 INJECTION, SOLUTION INTRAMUSCULAR; INTRAVENOUS at 07:41

## 2020-10-09 RX ADMIN — CEFAZOLIN SODIUM 2 G: 2 INJECTION, SOLUTION INTRAVENOUS at 01:59

## 2020-10-09 RX ADMIN — POTASSIUM CHLORIDE 20 MEQ: 750 CAPSULE, EXTENDED RELEASE ORAL at 16:30

## 2020-10-09 RX ADMIN — CHLORHEXIDINE GLUCONATE 15 ML: 1.2 RINSE ORAL at 20:26

## 2020-10-09 RX ADMIN — CYCLOBENZAPRINE 10 MG: 10 TABLET, FILM COATED ORAL at 20:25

## 2020-10-09 RX ADMIN — MUPIROCIN 1 APPLICATION: 20 OINTMENT TOPICAL at 20:25

## 2020-10-09 RX ADMIN — FUROSEMIDE 40 MG: 10 INJECTION, SOLUTION INTRAMUSCULAR; INTRAVENOUS at 21:52

## 2020-10-09 RX ADMIN — PANTOPRAZOLE SODIUM 40 MG: 40 TABLET, DELAYED RELEASE ORAL at 06:07

## 2020-10-09 RX ADMIN — OXYCODONE 10 MG: 5 TABLET ORAL at 16:30

## 2020-10-09 RX ADMIN — NICARDIPINE HYDROCHLORIDE 15 MG/HR: 2.5 INJECTION INTRAVENOUS at 03:19

## 2020-10-09 RX ADMIN — DOCUSATE SODIUM 50MG AND SENNOSIDES 8.6MG 2 TABLET: 8.6; 5 TABLET, FILM COATED ORAL at 20:26

## 2020-10-09 RX ADMIN — ATORVASTATIN CALCIUM 80 MG: 80 TABLET, FILM COATED ORAL at 20:25

## 2020-10-09 RX ADMIN — GABAPENTIN 300 MG: 300 CAPSULE ORAL at 20:25

## 2020-10-09 NOTE — PLAN OF CARE
Problem: Adult Inpatient Plan of Care  Goal: Plan of Care Review  Flowsheets (Taken 10/9/2020 0857)  Plan of Care Reviewed With: patient (Pended)  Outcome Summary: Pt is a 54 y/o male admitted to [facility] on 10/6/20 w/ c/o chest pain and underwent CABG x 4 and sternotomy on 10/8/20. Pt pmh consists of CAD, DM, HTN, HLD, obesity, and smoking. Pt PLOF was independent at [facility] w/ his wife w/o the use of any DME, and today required minAx 2 for STS transfers and ambulation of 20 ft. Pt demonstrates decreased balance, endurance, activity tolerance, general post op weakness and pain. Pt will benefit from skilled PT to address the aformentioned deficits. Pt anticipated to d/c to home w/ help from spouse and HH. (Pended)    Patient was intermittently wearing a face mask during this therapy encounter. Therapist used appropriate personal protective equipment including eye protection, mask, and gloves.  Mask used was standard procedure mask. Appropriate PPE was worn during the entire therapy session. Hand hygiene was completed before and after therapy session. Patient is not in enhanced droplet precautions.

## 2020-10-09 NOTE — PROGRESS NOTES
Name: Jd Velazquez ADMIT: 10/6/2020   : 1965  PCP: Dacia Newsome APRN    MRN: [id number] LOS: 3 days   AGE/SEX: 55 y.o. male  ROOM: [facility]/     Subjective   Subjective     He does report shortness of breath and chest pain.  He is weak with minimal ambulation and poor appetite.    He denies cough, fever, chills, dysuria, abdominal pain, nausea, vomiting.  No BM.  Review of Systems     Objective   Objective   Vital Signs  Temp:  [98.7 °F (37.1 °C)] 98.7 °F (37.1 °C)  Heart Rate:  [64-82] 82  Resp:  [15-20] 18  BP: (110-164)/(66-87) 140/72  Arterial Line BP: (104-168)/(52-76) 123/52  FiO2 (%):  [39 %] 39 %  SpO2:  [91 %-96 %] 95 %  on  Flow (L/min):  [4-88] 6;   Device (Oxygen Therapy): high-flow nasal cannula  Body mass index is 34.59 kg/m².  Physical Exam  Vitals signs reviewed.   Constitutional:       Appearance: He is well-developed. He is obese. He is ill-appearing.   HENT:      Head: Normocephalic and atraumatic.   Eyes:      Extraocular Movements: Extraocular movements intact.   Neck:      Musculoskeletal: Normal range of motion.   Cardiovascular:      Rate and Rhythm: Normal rate and regular rhythm.   Pulmonary:      Effort: No respiratory distress.      Breath sounds: No wheezing.      Comments: Decreased effort due to chest pain  Abdominal:      General: Bowel sounds are normal. There is no distension.      Palpations: Abdomen is soft. There is no mass.      Tenderness: There is no abdominal tenderness.      Hernia: No hernia is present.   Musculoskeletal:      Comments: Trace BLE edema   Skin:     General: Skin is warm and dry.   Neurological:      Mental Status: He is alert and oriented to person, place, and time.   Psychiatric:         Behavior: Behavior normal.         Thought Content: Thought content normal.         Judgment: Judgment normal.         Results Review     I reviewed the patient's new clinical results.  Results from last 7 days   Lab Units 10/09/20  0304  10/08/20  1530 10/08/20  1132 10/08/20  1037  10/07/20  0943   WBC 10*3/mm3 11.80* 12.17* 14.83*  --   --  8.12   HEMOGLOBIN g/dL 13.1 13.0 13.5  --   --  16.3   HEMOGLOBIN, POC g/dL  --   --   --  12.9   < >  --    PLATELETS 10*3/mm3 227 223 240  --   --  315    < > = values in this interval not displayed.     Results from last 7 days   Lab Units 10/09/20  0303 10/08/20  1530 10/08/20  1132 10/08/20  0455   SODIUM mmol/L 137 132* 132* 132*   POTASSIUM mmol/L 3.9 4.1 4.5 4.1   CHLORIDE mmol/L 104 101 99 102   CO2 mmol/L 24.2 20.8* 21.7* 17.6*   BUN mg/dL 12 16 17 16   CREATININE mg/dL 0.79 1.02 1.29* 0.81   GLUCOSE mg/dL 107* 167* 217* 260*   Estimated Creatinine Clearance: 140.6 mL/min (by C-G formula based on SCr of 0.79 mg/dL).  Results from last 7 days   Lab Units 10/09/20  0303 10/08/20  1530 10/08/20  1132 10/07/20  0943   ALBUMIN g/dL 4.30 4.10 4.00 3.90   BILIRUBIN mg/dL  --   --   --  0.4   ALK PHOS U/L  --   --   --  119*   AST (SGOT) U/L  --   --   --  16   ALT (SGPT) U/L  --   --   --  13     Results from last 7 days   Lab Units 10/09/20  0303 10/08/20  1530 10/08/20  1132 10/08/20  0455 10/07/20  0943   CALCIUM mg/dL 8.4* 8.6 8.8 9.1 9.3   ALBUMIN g/dL 4.30 4.10 4.00  --  3.90   MAGNESIUM mg/dL 2.2 2.7* 2.9*  --  1.9   PHOSPHORUS mg/dL 4.7* 4.3 4.1  --   --        COVID19   Date Value Ref Range Status   10/07/2020 Not Detected Not Detected - Ref. Range Final     Hemoglobin A1C   Date/Time Value Ref Range Status   10/07/2020 0944 9.40 (H) 4.80 - 5.60 % Final     Glucose   Date/Time Value Ref Range Status   10/09/2020 1155 155 (H) 70 - 130 mg/dL Final   10/09/2020 0954 135 (H) 70 - 130 mg/dL Final   10/09/2020 0851 127 70 - 130 mg/dL Final   10/09/2020 0704 105 70 - 130 mg/dL Final   10/09/2020 0602 118 70 - 130 mg/dL Final   10/09/2020 0406 106 70 - 130 mg/dL Final   10/09/2020 0156 104 70 - 130 mg/dL Final       XR Chest 1 View  Narrative: PORTABLE CHEST RADIOGRAPH     HISTORY: Postop open heart  surgery     COMPARISON: 10/08/2020     FINDINGS:  Endotracheal tube has been removed. Remaining tubes and lines are stable  in position. Cardiomegaly is identified. There is vascular congestion.  There is bibasilar atelectasis. There are bilateral pleural effusions.  No pneumothorax is identified.     Impression: Increasing bibasilar atelectasis.     This report was finalized on 10/9/2020 3:38 AM by Dr. Herminia Rodriguez M.D.       Scheduled Medications  aspirin, 81 mg, Oral, Daily  atorvastatin, 80 mg, Oral, Nightly  ceFAZolin, 2 g, Intravenous, Q8H  chlorhexidine, 15 mL, Mouth/Throat, Q12H  enoxaparin, 40 mg, Subcutaneous, Q24H  gabapentin, 300 mg, Oral, Q12H  guaiFENesin, 1,200 mg, Oral, Q12H  insulin lispro, 0-14 Units, Subcutaneous, TID AC  metoprolol tartrate, 50 mg, Oral, Q12H  mupirocin, , Each Nare, BID  pantoprazole, 40 mg, Oral, QAM  [START ON 10/10/2020] polyethylene glycol, 17 g, Oral, Daily  potassium chloride, 20 mEq, Oral, Once  senna-docusate sodium, 2 tablet, Oral, Nightly  sodium chloride, 4 mL, Nebulization, BID - RT    Infusions  clevidipine, 2-32 mg/hr  dexmedetomidine, 0.2-1.5 mcg/kg/hr, Last Rate: 0.3 mcg/kg/hr (10/08/20 2302)  DOPamine, 2-20 mcg/kg/min  EPINEPHrine, 0.02-0.1 mcg/kg/min  milrinone, 0.25-0.375 mcg/kg/min  niCARdipine, 5-15 mg/hr, Last Rate: Stopped (10/09/20 1015)  nitroglycerin, 5-200 mcg/min  norepinephrine, 0.02-0.3 mcg/kg/min, Last Rate: Stopped (10/08/20 1703)  phenylephrine, 0.2-2 mcg/kg/min  propofol, 5-50 mcg/kg/min, Last Rate: Stopped (10/08/20 1640)  sodium chloride, 30 mL/hr, Last Rate: 30 mL/hr (10/08/20 1149)  sodium chloride, 30 mL/hr, Last Rate: Stopped (10/09/20 1100)  vasopressin, 0.02-0.1 Units/min    Diet  Diet Full Liquid       Assessment/Plan     Active Hospital Problems    Diagnosis  POA   • **Coronary artery disease of native artery of native heart with stable angina pectoris (CMS/HCC) [I25.118]  Yes   • CAD (coronary artery disease) [I25.10]  Yes   •  Diabetes mellitus (CMS/[facility]) [E11.9]  Yes   • Hyperlipidemia [E78.5]  Yes   • Hypertension [I10]  Yes   • Obesity [E66.9]  Yes   • SHAHLA (obstructive sleep apnea) [G47.33]  Unknown   • Tobacco abuse [Z72.0]  Unknown      Resolved Hospital Problems   No resolved problems to display.       55 y.o. male admitted with Coronary artery disease of native artery of native heart with stable angina pectoris (CMS/[facility]).  A is following for DM2, untreated and uncontrolled       Mr. Velazquez is a 55 y.o. male who admitted for CORONARY ARTERY BYPASS WITH INTERNAL MAMMARY ARTERY GRAFT, TRANSESOPHAGEAL ECHOCARDIOGRAM WITH ANESTHESIA.     Untreated diabetes mellitus 2  · Patient now postop CABG.  Stop insulin drip and resume correctional Humalog  · Glucose currently stable but appetite is poor postop.  Anticipating increasing glucose once appetite improves.He may need basal insulin at that time but will monitor for now  · Ideally we will send him home on oral medications with dietary control      Leukocytosis likely reactive with no infectious symptoms  Constipation-increase bowel regimen and encourage ambulation     SHAHLA on CPAP     Tobacco abuse- cessation advised, declines patch  · He was encouraged to use incentive spirometer as instructed.    · [facility] will follow      SOHAM Narayan  [facility]  10/09/20  16:01 EDT    Patient was placed in face mask on first look.  I wore protective equipment throughout this patient encounter including a face mask, gloves and protective eyewear.  Hand hygiene was performed before donning protective equipment and after removal when leaving the room.

## 2020-10-09 NOTE — THERAPY EVALUATION
Patient Name: Jd Velazquez  : 1965    MRN: [id number]                              Today's Date: 10/9/2020       Admit Date: 10/6/2020    Visit Dx:     ICD-10-CM ICD-9-CM   1. Coronary artery disease of native artery of native heart with stable angina pectoris (CMS/HCC)  I25.118 414.01     413.9   2. S/P CABG (coronary artery bypass graft)  Z95.1 V45.81     Patient Active Problem List   Diagnosis   • CAD (coronary artery disease)   • Coronary artery disease of native artery of native heart with stable angina pectoris (CMS/HCC)   • Diabetes mellitus (CMS/[facility])   • Hyperlipidemia   • Hypertension   • Obesity   • SHAHLA (obstructive sleep apnea)   • Tobacco abuse     Past Medical History:   Diagnosis Date   • CAD (coronary artery disease)    • Diabetes mellitus (CMS/[facility])    • Hyperlipidemia    • Hypertension    • Obesity      Past Surgical History:   Procedure Laterality Date   • ANKLE ARTHROSCOPY W/ OPEN REPAIR     • APPENDECTOMY     • CARDIAC CATHETERIZATION      PCI to circumflex   • HERNIA REPAIR     • TONSILLECTOMY       General Information     Row Name 10/09/20 0853          Physical Therapy Time and Intention    Document Type  evaluation  (Pended)   -     Mode of Treatment  individual therapy;physical therapy  (Pended)   -     Row Name 10/09/20 0853          General Information    Patient Profile Reviewed  yes  (Pended)   -     Prior Level of Function  independent:  (Pended)   -     Existing Precautions/Restrictions  cardiac;fall;sternal  (Pended)   -     Row Name 10/09/20 0853          Living Environment    Lives With  spouse  (Pended)   -     Row Name 10/09/20 0853          Home Main Entrance    Number of Stairs, Main Entrance  none  (Pended)   -     Row Name 10/09/20 0853          Stairs Within Home, Primary    Number of Stairs, Within Home, Primary  other (see comments)  (Pended)  1 flight  -     Row Name 10/09/20 0853          Cognition    Orientation Status (Cognition)  oriented x  4  (Pended)   -     Row Name 10/09/20 0853          Safety Issues, Functional Mobility    Impairments Affecting Function (Mobility)  endurance/activity tolerance;pain;strength;balance  (Pended)   -       User Key  (r) = Recorded By, (t) = Taken By, (c) = Cosigned By    Initials Name Provider Type    Javy Souza PT Student        Mobility     Row Name 10/09/20 0854          Bed Mobility    Comment (Bed Mobility)  up in chair  (Pended)   -     Row Name 10/09/20 0854          Sit-Stand Transfer    Sit-Stand [place] (Transfers)  minimum assist (75% patient effort);verbal cues  (Pended)   -     Row Name 10/09/20 0854          Gait/Stairs (Locomotion)    [place] Level (Gait)  minimum assist (75% patient effort);contact guard;verbal cues  (Pended)   -     Distance in Feet (Gait)  20  (Pended)   -     Deviations/Abnormal Patterns (Gait)  base of support, narrow;jas decreased;gait speed decreased;stride length decreased;weight shifting decreased  (Pended)   -     Bilateral Gait Deviations  forward flexed posture  (Pended)   -     Comment (Gait/Stairs)  pt extremely rigid, and guarded.  (Pended)   -       User Key  (r) = Recorded By, (t) = Taken By, (c) = Cosigned By    Initials Name Provider Type    Javy Souza PT Student        Obj/Interventions     Row Name 10/09/20 0856          Range of Motion Comprehensive    General Range of Motion  no range of motion deficits identified  (Pended)   -     Row Name 10/09/20 0856          Strength Comprehensive (MMT)    General Manual Muscle Testing (MMT) Assessment  no strength deficits identified  (Pended)   -     Comment, General Manual Muscle Testing (MMT) Assessment  not formally assessed however pt able to lift all extremities against gravity and perform STS transfer and ambulate w/ [place] x 2  (Pended)   -     Row Name 10/09/20 0856          Motor Skills    Therapeutic Exercise  other (see comments)  (Pended)  cardiac  protocol x 5 lvl 2  -CJ     Row Name 10/09/20 0856          Balance    Balance Assessment  sitting static balance;sitting dynamic balance;standing static balance;standing dynamic balance  (Pended)   -     Static Sitting Balance  WFL  (Pended)   -     Dynamic Sitting Balance  mild impairment  (Pended)   -     Static Standing Balance  mild impairment  (Pended)   -     Dynamic Standing Balance  moderate impairment  (Pended)   -     Comment, Balance  pt lethargic and rigid  (Pended)   -     Row Name 10/09/20 0856          Sensory Assessment (Somatosensory)    Sensory Assessment (Somatosensory)  sensation intact  (Pended)   -       User Key  (r) = Recorded By, (t) = Taken By, (c) = Cosigned By    Initials Name Provider Type    Javy Souza PT Student        Goals/Plan     Row Name 10/09/20 0903          Patient Education Goal (PT)    Activity (Patient Education Goal, PT)  cardiac protocol x 5 lvl 5  (Pended)   -     Time Frame (Patient Education Goal, PT)  1 week  (Pended)   -       User Key  (r) = Recorded By, (t) = Taken By, (c) = Cosigned By    Initials Name Provider Type    Javy Souza PT Student        Clinical Impression     Row Name 10/09/20 0857          Pain    Additional Documentation  Pain Scale: Numbers Pre/Post-Treatment (Group)  (Pended)   -     Row Name 10/09/20 0857          Pain Scale: Numbers Pre/Post-Treatment    Pretreatment Pain Rating  10/10  (Pended)   -     Posttreatment Pain Rating  10/10  (Pended)   -     Pain Location - Orientation  incisional  (Pended)   -     Pain Location  chest  (Pended)   -     Pain Intervention(s)  Medication (See MAR);Repositioned  (Pended)   -     Row Name 10/09/20 0857          Plan of Care Review    Plan of Care Reviewed With  patient  (Pended)   -     Outcome Summary  Pt is a 54 y/o male admitted to [facility] on 10/6/20 w/ c/o chest pain and underwent CABG x 4 and sternotomy on 10/8/20. Pt pmh consists of CAD, DM, HTN,  HLD, obesity, and smoking. Pt PLOF was independent at [facility] w/ his wife w/o the use of any DME, and today required minAx 2 for STS transfers and ambulation of 20 ft. Pt demonstrates decreased balance, endurance, activity tolerance, general post op weakness and pain. Pt will benefit from skilled PT to address the aformentioned deficits. Pt anticipated to d/c to home w/ help from spouse and HH.  (Pended)   -     Row Name 10/09/20 0857          Therapy Assessment/Plan (PT)    Rehab Potential (PT)  good, to achieve stated therapy goals  (Pended)   -     Criteria for Skilled Interventions Met (PT)  yes  (Pended)   -     Row Name 10/09/20 0857          Vital Signs    Pre Systolic BP Rehab  147  (Pended)   -     Pre Treatment Diastolic BP  80  (Pended)   -     Post Systolic BP Rehab  138  (Pended)   -     Post Treatment Diastolic BP  71  (Pended)   -     Pretreatment Heart Rate (beats/min)  79  (Pended)   -     Posttreatment Heart Rate (beats/min)  80  (Pended)   -     Pre SpO2 (%)  92  (Pended)   -     O2 Delivery Pre Treatment  supplemental O2  (Pended)  8 L  -CJ     Post SpO2 (%)  92  (Pended)   -CJ     O2 Delivery Post Treatment  supplemental O2  (Pended)  8 L  -CJ     Pre Patient Position  Sitting  (Pended)   -     Post Patient Position  Sitting  (Pended)   -     Row Name 10/09/20 0857          Positioning and Restraints    Pre-Treatment Position  sitting in chair/recliner  (Pended)   -CJ     Post Treatment Position  chair  (Pended)   -     In Chair  reclined;encouraged to call for assist;with nsg  (Pended)   -       User Key  (r) = Recorded By, (t) = Taken By, (c) = Cosigned By    Initials Name Provider Type    Javy Souza PT Student        Outcome Measures     Row Name 10/09/20 0904          How much help from another person do you currently need...    Turning from your back to your side while in flat bed without using bedrails?  2  (Pended)   -     Moving from lying on back  to sitting on the side of a flat bed without bedrails?  2  (Pended)   -CJ     Moving to and from a bed to a chair (including a wheelchair)?  2  (Pended)   -CJ     Standing up from a chair using your arms (e.g., wheelchair, bedside chair)?  3  (Pended)   -CJ     Climbing 3-5 steps with a railing?  1  (Pended)   -CJ     To walk in hospital room?  2  (Pended)   -     AM-PAC 6 Clicks Score (PT)  12  (Pended)   -     Row Name 10/09/20 0904          Functional Assessment    Outcome Measure Options  AM-PAC 6 Clicks Basic Mobility (PT)  (Pended)   -       User Key  (r) = Recorded By, (t) = Taken By, (c) = Cosigned By    Initials Name Provider Type    Javy Souza PT Student        Physical Therapy Education                 Title: PT OT SLP Therapies (In Progress)     Topic: Physical Therapy (In Progress)     Point: Mobility training (In Progress)     Learning Progress Summary           Patient Acceptance, E, NR by  at 10/9/2020 0905                   Point: Home exercise program (In Progress)     Learning Progress Summary           Patient Acceptance, E, NR by  at 10/9/2020 0905                   Point: Body mechanics (Not Started)     Learner Progress:  Not documented in this visit.          Point: Precautions (Not Started)     Learner Progress:  Not documented in this visit.                      User Key     Initials Effective Dates Name Provider Type Discipline     09/16/20 -  Javy Cabrera PT Student PT              PT Recommendation and Plan     Plan of Care Reviewed With: (P) patient  Outcome Summary: (P) Pt is a 54 y/o male admitted to [facility] on 10/6/20 w/ c/o chest pain and underwent CABG x 4 and sternotomy on 10/8/20. Pt pmh consists of CAD, DM, HTN, HLD, obesity, and smoking. [facility] D[facility], and today required minAx 2 for STS transfers and ambulation of 20 ft. Pt demonstrates decreased balance, endurance, activity tolerance, general post  op weakness and pain. Pt will benefit from skilled PT to address the aformentioned deficits. Pt anticipated to d/c to home w/ help from spouse and HH.     Time Calculation:   PT Charges     Row Name 10/09/20 0906             Time Calculation    Start Time  0829  (Pended)   -      Stop Time  0853  (Pended)   -CHET      Time Calculation (min)  24 min  (Pended)   -      PT Received On  10/09/20  (Pended)   -      PT - Next Appointment  10/10/20  (Pended)   -      PT Goal Re-Cert Due Date  10/16/20  (Pended)   -         Time Calculation- PT    Total Timed Code Minutes- PT  12 minute(s)  (Pended)   -CHET        User Key  (r) = Recorded By, (t) = Taken By, (c) = Cosigned By    Initials Name Provider Type    Javy Souza PT Student        Therapy Charges for Today     Code Description Service Date Service Provider Modifiers Qty    57086621325 HC PT EVAL MOD COMPLEXITY 2 10/9/2020 Javy Cabrera GP 1    67326133352 HC PT THER SUPP EA 15 MIN 10/9/2020 Javy Cabrera GP 1    59225567319 HC PT THER PROC EA 15 MIN 10/9/2020 Javy Cabrera GP 1          PT G-Codes  Outcome Measure Options: (P) AM-PAC 6 Clicks Basic Mobility (PT)  AM-PAC 6 Clicks Score (PT): (P) 12    Javy Cabrera  10/9/2020

## 2020-10-09 NOTE — PROGRESS NOTES
LOS: 3 days   Patient Care Team:  Dacia Newsome APRN as PCP - General (Nurse Practitioner)    Chief Complaint: post op    Subjective:  Symptoms:  He reports shortness of breath and chest pain.    Diet:  No nausea.    Activity level: Impaired due to weakness.          Vital Signs  Heart Rate:  [64-88] 71  Resp:  [14-19] 15  BP: ()/(59-87) 164/82  Arterial Line BP: ()/(42-76) 123/52  FiO2 (%):  [39 %-99 %] 39 %  Body mass index is 34.59 kg/m².    Intake/Output Summary (Last 24 hours) at 10/9/2020 0659  Last data filed at 10/9/2020 0610  Gross per 24 hour   Intake 5424.15 ml   Output 5175 ml   Net 249.15 ml     I/O this shift:  In: 1961.2 [I.V.:1711.2; IV Piggyback:250]  Out: 1460 [Urine:1270; Chest Tube:190]    Chest tube drainage last 8 hours 100/65        10/06/20  2251 10/07/20  2100 10/09/20  0530   Weight: 115 kg (252 lb 11.2 oz) 113 kg (250 lb) 116 kg (255 lb 1.2 oz)         Objective    Results Review:        WBC WBC   Date Value Ref Range Status   10/09/2020 11.80 (H) 3.40 - 10.80 10*3/mm3 Final   10/08/2020 12.17 (H) 3.40 - 10.80 10*3/mm3 Final   10/08/2020 14.83 (H) 3.40 - 10.80 10*3/mm3 Final   10/07/2020 8.12 3.40 - 10.80 10*3/mm3 Final      HGB Hemoglobin   Date Value Ref Range Status   10/09/2020 13.1 13.0 - 17.7 g/dL Final   10/08/2020 13.0 13.0 - 17.7 g/dL Final   10/08/2020 13.5 13.0 - 17.7 g/dL Final   10/08/2020 12.9 12.0 - 17.0 g/dL Final   10/08/2020 13.6 12.0 - 17.0 g/dL Final   10/08/2020 13.3 12.0 - 17.0 g/dL Final   10/08/2020 12.2 12.0 - 17.0 g/dL Final   10/08/2020 14.6 12.0 - 17.0 g/dL Final   10/07/2020 16.3 13.0 - 17.7 g/dL Final      HCT Hematocrit   Date Value Ref Range Status   10/09/2020 40.1 37.5 - 51.0 % Final   10/08/2020 37.6 37.5 - 51.0 % Final   10/08/2020 38.3 37.5 - 51.0 % Final   10/08/2020 38 38 - 51 % Final   10/08/2020 40 38 - 51 % Final   10/08/2020 39 38 - 51 % Final   10/08/2020 36 (L) 38 - 51 % Final   10/08/2020 43 38 - 51 % Final   10/07/2020  48.5 37.5 - 51.0 % Final      Platelets Platelets   Date Value Ref Range Status   10/09/2020 227 140 - 450 10*3/mm3 Final   10/08/2020 223 140 - 450 10*3/mm3 Final   10/08/2020 240 140 - 450 10*3/mm3 Final   10/07/2020 315 140 - 450 10*3/mm3 Final        PT/INR:    Protime   Date Value Ref Range Status   10/09/2020 13.7 11.7 - 14.2 Seconds Final   10/08/2020 14.1 11.7 - 14.2 Seconds Final   10/07/2020 12.6 11.7 - 14.2 Seconds Final   /  INR   Date Value Ref Range Status   10/09/2020 1.06 0.90 - 1.10 Final   10/08/2020 1.10 0.90 - 1.10 Final   10/07/2020 0.95 0.90 - 1.10 Final       Sodium Sodium   Date Value Ref Range Status   10/09/2020 137 136 - 145 mmol/L Final   10/08/2020 132 (L) 136 - 145 mmol/L Final   10/08/2020 132 (L) 136 - 145 mmol/L Final   10/08/2020 132 (L) 136 - 145 mmol/L Final   10/07/2020 129 (L) 136 - 145 mmol/L Final      Potassium Potassium   Date Value Ref Range Status   10/09/2020 3.9 3.5 - 5.2 mmol/L Final   10/08/2020 4.1 3.5 - 5.2 mmol/L Final   10/08/2020 4.5 3.5 - 5.2 mmol/L Final   10/08/2020 4.1 3.5 - 5.2 mmol/L Final   10/07/2020 3.9 3.5 - 5.2 mmol/L Final      Chloride Chloride   Date Value Ref Range Status   10/09/2020 104 98 - 107 mmol/L Final   10/08/2020 101 98 - 107 mmol/L Final   10/08/2020 99 98 - 107 mmol/L Final   10/08/2020 102 98 - 107 mmol/L Final   10/07/2020 97 (L) 98 - 107 mmol/L Final      Bicarbonate CO2   Date Value Ref Range Status   10/09/2020 24.2 22.0 - 29.0 mmol/L Final   10/08/2020 20.8 (L) 22.0 - 29.0 mmol/L Final   10/08/2020 21.7 (L) 22.0 - 29.0 mmol/L Final   10/08/2020 17.6 (L) 22.0 - 29.0 mmol/L Final   10/07/2020 22.5 22.0 - 29.0 mmol/L Final      BUN BUN   Date Value Ref Range Status   10/09/2020 12 6 - 20 mg/dL Final   10/08/2020 16 6 - 20 mg/dL Final   10/08/2020 17 6 - 20 mg/dL Final   10/08/2020 16 6 - 20 mg/dL Final   10/07/2020 12 6 - 20 mg/dL Final      Creatinine Creatinine   Date Value Ref Range Status   10/09/2020 0.79 0.76 - 1.27 mg/dL Final    10/08/2020 1.02 0.76 - 1.27 mg/dL Final   10/08/2020 1.29 (H) 0.76 - 1.27 mg/dL Final   10/08/2020 0.81 0.76 - 1.27 mg/dL Final   10/07/2020 0.75 (L) 0.76 - 1.27 mg/dL Final      Calcium Calcium   Date Value Ref Range Status   10/09/2020 8.4 (L) 8.6 - 10.5 mg/dL Final   10/08/2020 8.6 8.6 - 10.5 mg/dL Final   10/08/2020 8.8 8.6 - 10.5 mg/dL Final   10/08/2020 9.1 8.6 - 10.5 mg/dL Final   10/07/2020 9.3 8.6 - 10.5 mg/dL Final      Magnesium Magnesium   Date Value Ref Range Status   10/09/2020 2.2 1.6 - 2.6 mg/dL Final   10/08/2020 2.7 (H) 1.6 - 2.6 mg/dL Final   10/08/2020 2.9 (H) 1.6 - 2.6 mg/dL Final   10/07/2020 1.9 1.6 - 2.6 mg/dL Final          aspirin, 81 mg, Oral, Daily  atorvastatin, 80 mg, Oral, Nightly  ceFAZolin, 2 g, Intravenous, Q8H  chlorhexidine, 15 mL, Mouth/Throat, Q12H  enoxaparin, 40 mg, Subcutaneous, Q24H  furosemide, 40 mg, Intravenous, Once  magnesium sulfate, 1 g, Intravenous, Q8H  metoclopramide, 10 mg, Intravenous, Q6H  metoprolol tartrate, 12.5 mg, Oral, Q12H  mupirocin, , Each Nare, BID  pantoprazole, 40 mg, Oral, QAM  [START ON 10/10/2020] polyethylene glycol, 17 g, Oral, Daily  potassium chloride, 20 mEq, Oral, Daily  senna-docusate sodium, 2 tablet, Oral, Nightly      clevidipine, 2-32 mg/hr  dexmedetomidine, 0.2-1.5 mcg/kg/hr, Last Rate: 0.3 mcg/kg/hr (10/08/20 2302)  DOPamine, 2-20 mcg/kg/min  EPINEPHrine, 0.02-0.1 mcg/kg/min  insulin, 0-50 Units/hr, Last Rate: 3.1 Units/hr (10/09/20 0200)  milrinone, 0.25-0.375 mcg/kg/min  niCARdipine, 5-15 mg/hr, Last Rate: 10 mg/hr (10/09/20 0637)  nitroglycerin, 5-200 mcg/min  norepinephrine, 0.02-0.3 mcg/kg/min, Last Rate: Stopped (10/08/20 1703)  phenylephrine, 0.2-2 mcg/kg/min  propofol, 5-50 mcg/kg/min, Last Rate: Stopped (10/08/20 1640)  sodium chloride, 30 mL/hr, Last Rate: 30 mL/hr (10/08/20 1149)  sodium chloride, 30 mL/hr, Last Rate: 30 mL/hr (10/08/20 1149)  vasopressin, 0.02-0.1 Units/min            Patient Active Problem List    Diagnosis Code   • CAD (coronary artery disease) I25.10   • Coronary artery disease of native artery of native heart with stable angina pectoris (CMS/[facility]) I25.118   • Diabetes mellitus (CMS/[facility]) E11.9   • Hyperlipidemia E78.5   • Hypertension I10   • Obesity E66.9   • SHAHLA (obstructive sleep apnea) G47.33   • Tobacco abuse Z72.0       Assessment & Plan    -NSTEMI, multivessel coronary artery disease with previous PCI 2014--s/p CABGx4 LIMA/EVH left SVG--POD#1 [place]  -ischemic cardiomyopathy  - hypertension--poorly controlled  - hyperlipidemia--patient reports previous intolerance to Lipitor--will try pravastatin  - DM II--non-compliant with medications  - SHAHLA with home CPAP  - nicotine dependence--encourage cessation--patient declines nicotine patch at this time  - obesity  -leukocytosis- probable reactive    Hypertensive on cardene-- increase beta blocker, wean as tolerated  Requiring quite a bit of O2 on 8L high flow  IV diurese  Add mucinex, duo neb/hypertonic saline  Encourage pulmonary toilet  Pain is an issue this morning  Will add Neurontin    Mobilize  Transfer to stepdown when able    Prema Albright, APRN  10/09/20  06:59 EDT

## 2020-10-09 NOTE — CONSULTS
Met with patient, discussed benefits of cardiac rehab. Provided phase II information along with the contact information for cardiac rehab here at [facility]. Requested for referral to be sent.   Explained if receiving home health would not be able to attend cardiac rehab until finished with home health. Instructed to bring a copy of After Visit Summary to initial assessment.

## 2020-10-10 ENCOUNTER — APPOINTMENT (OUTPATIENT)
Dept: GENERAL RADIOLOGY | Facility: HOSPITAL | Age: 55
End: 2020-10-10

## 2020-10-10 LAB
ANION GAP SERPL CALCULATED.3IONS-SCNC: 11 MMOL/L (ref 5–15)
BUN SERPL-MCNC: 13 MG/DL (ref 6–20)
BUN/CREAT SERPL: 16.3 (ref 7–25)
CALCIUM SPEC-SCNC: 8.8 MG/DL (ref 8.6–10.5)
CHLORIDE SERPL-SCNC: 99 MMOL/L (ref 98–107)
CO2 SERPL-SCNC: 23 MMOL/L (ref 22–29)
CREAT SERPL-MCNC: 0.8 MG/DL (ref 0.76–1.27)
DEPRECATED RDW RBC AUTO: 41.7 FL (ref 37–54)
ERYTHROCYTE [DISTWIDTH] IN BLOOD BY AUTOMATED COUNT: 12.5 % (ref 12.3–15.4)
GFR SERPL CREATININE-BSD FRML MDRD: 101 ML/MIN/1.73
GLUCOSE BLDC GLUCOMTR-MCNC: 188 MG/DL (ref 70–130)
GLUCOSE BLDC GLUCOMTR-MCNC: 225 MG/DL (ref 70–130)
GLUCOSE BLDC GLUCOMTR-MCNC: 251 MG/DL (ref 70–130)
GLUCOSE SERPL-MCNC: 203 MG/DL (ref 65–99)
HCT VFR BLD AUTO: 37.8 % (ref 37.5–51)
HGB BLD-MCNC: 12.9 G/DL (ref 13–17.7)
MCH RBC QN AUTO: 31.1 PG (ref 26.6–33)
MCHC RBC AUTO-ENTMCNC: 34.1 G/DL (ref 31.5–35.7)
MCV RBC AUTO: 91.1 FL (ref 79–97)
PLATELET # BLD AUTO: 224 10*3/MM3 (ref 140–450)
PMV BLD AUTO: 10 FL (ref 6–12)
POTASSIUM SERPL-SCNC: 4.1 MMOL/L (ref 3.5–5.2)
RBC # BLD AUTO: 4.15 10*6/MM3 (ref 4.14–5.8)
SODIUM SERPL-SCNC: 133 MMOL/L (ref 136–145)
WBC # BLD AUTO: 15.14 10*3/MM3 (ref 3.4–10.8)

## 2020-10-10 PROCEDURE — 80048 BASIC METABOLIC PNL TOTAL CA: CPT | Performed by: THORACIC SURGERY (CARDIOTHORACIC VASCULAR SURGERY)

## 2020-10-10 PROCEDURE — 94799 UNLISTED PULMONARY SVC/PX: CPT

## 2020-10-10 PROCEDURE — 71045 X-RAY EXAM CHEST 1 VIEW: CPT

## 2020-10-10 PROCEDURE — 25010000002 ENOXAPARIN PER 10 MG: Performed by: THORACIC SURGERY (CARDIOTHORACIC VASCULAR SURGERY)

## 2020-10-10 PROCEDURE — 25010000003 CEFAZOLIN IN DEXTROSE 2-4 GM/100ML-% SOLUTION: Performed by: THORACIC SURGERY (CARDIOTHORACIC VASCULAR SURGERY)

## 2020-10-10 PROCEDURE — 25010000002 FUROSEMIDE PER 20 MG: Performed by: NURSE PRACTITIONER

## 2020-10-10 PROCEDURE — 97110 THERAPEUTIC EXERCISES: CPT

## 2020-10-10 PROCEDURE — 93010 ELECTROCARDIOGRAM REPORT: CPT | Performed by: INTERNAL MEDICINE

## 2020-10-10 PROCEDURE — 82962 GLUCOSE BLOOD TEST: CPT

## 2020-10-10 PROCEDURE — 93005 ELECTROCARDIOGRAM TRACING: CPT | Performed by: THORACIC SURGERY (CARDIOTHORACIC VASCULAR SURGERY)

## 2020-10-10 PROCEDURE — 63710000001 INSULIN LISPRO (HUMAN) PER 5 UNITS: Performed by: THORACIC SURGERY (CARDIOTHORACIC VASCULAR SURGERY)

## 2020-10-10 PROCEDURE — 63710000001 INSULIN GLARGINE PER 5 UNITS: Performed by: HOSPITALIST

## 2020-10-10 PROCEDURE — 85027 COMPLETE CBC AUTOMATED: CPT | Performed by: THORACIC SURGERY (CARDIOTHORACIC VASCULAR SURGERY)

## 2020-10-10 RX ORDER — METOPROLOL TARTRATE 50 MG/1
100 TABLET, FILM COATED ORAL EVERY 12 HOURS SCHEDULED
Status: DISCONTINUED | OUTPATIENT
Start: 2020-10-10 | End: 2020-10-14 | Stop reason: HOSPADM

## 2020-10-10 RX ORDER — DOXYCYCLINE 100 MG/1
100 CAPSULE ORAL EVERY 12 HOURS SCHEDULED
Status: DISCONTINUED | OUTPATIENT
Start: 2020-10-10 | End: 2020-10-14 | Stop reason: HOSPADM

## 2020-10-10 RX ORDER — INSULIN GLARGINE 100 [IU]/ML
15 INJECTION, SOLUTION SUBCUTANEOUS DAILY
Status: DISCONTINUED | OUTPATIENT
Start: 2020-10-10 | End: 2020-10-11

## 2020-10-10 RX ORDER — ACETYLCYSTEINE 200 MG/ML
3 SOLUTION ORAL; RESPIRATORY (INHALATION)
Status: DISPENSED | OUTPATIENT
Start: 2020-10-10 | End: 2020-10-13

## 2020-10-10 RX ORDER — GABAPENTIN 400 MG/1
400 CAPSULE ORAL EVERY 12 HOURS SCHEDULED
Status: DISPENSED | OUTPATIENT
Start: 2020-10-10 | End: 2020-10-12

## 2020-10-10 RX ORDER — FUROSEMIDE 40 MG/1
40 TABLET ORAL DAILY
Status: DISCONTINUED | OUTPATIENT
Start: 2020-10-10 | End: 2020-10-12

## 2020-10-10 RX ORDER — POTASSIUM CHLORIDE 750 MG/1
20 CAPSULE, EXTENDED RELEASE ORAL DAILY
Status: DISCONTINUED | OUTPATIENT
Start: 2020-10-10 | End: 2020-10-14 | Stop reason: HOSPADM

## 2020-10-10 RX ORDER — LISINOPRIL 5 MG/1
5 TABLET ORAL
Status: DISCONTINUED | OUTPATIENT
Start: 2020-10-10 | End: 2020-10-14 | Stop reason: HOSPADM

## 2020-10-10 RX ORDER — FUROSEMIDE 10 MG/ML
40 INJECTION INTRAMUSCULAR; INTRAVENOUS ONCE
Status: COMPLETED | OUTPATIENT
Start: 2020-10-10 | End: 2020-10-10

## 2020-10-10 RX ADMIN — ACETYLCYSTEINE 3 ML: 200 SOLUTION ORAL; RESPIRATORY (INHALATION) at 20:32

## 2020-10-10 RX ADMIN — DOXYCYCLINE 100 MG: 100 CAPSULE ORAL at 22:00

## 2020-10-10 RX ADMIN — ALPRAZOLAM 0.25 MG: 0.25 TABLET ORAL at 22:13

## 2020-10-10 RX ADMIN — ACETYLCYSTEINE 3 ML: 200 SOLUTION ORAL; RESPIRATORY (INHALATION) at 11:13

## 2020-10-10 RX ADMIN — FUROSEMIDE 40 MG: 40 TABLET ORAL at 08:18

## 2020-10-10 RX ADMIN — CHLORHEXIDINE GLUCONATE 15 ML: 1.2 RINSE ORAL at 08:18

## 2020-10-10 RX ADMIN — METOPROLOL TARTRATE 50 MG: 50 TABLET, FILM COATED ORAL at 08:18

## 2020-10-10 RX ADMIN — POLYETHYLENE GLYCOL 3350 17 G: 17 POWDER, FOR SOLUTION ORAL at 08:18

## 2020-10-10 RX ADMIN — INSULIN LISPRO 8 UNITS: 100 INJECTION, SOLUTION INTRAVENOUS; SUBCUTANEOUS at 17:22

## 2020-10-10 RX ADMIN — IPRATROPIUM BROMIDE AND ALBUTEROL SULFATE 3 ML: 2.5; .5 SOLUTION RESPIRATORY (INHALATION) at 15:30

## 2020-10-10 RX ADMIN — DOXYCYCLINE 100 MG: 100 CAPSULE ORAL at 11:51

## 2020-10-10 RX ADMIN — ASPIRIN 81 MG: 81 TABLET, COATED ORAL at 08:17

## 2020-10-10 RX ADMIN — INSULIN GLARGINE 15 UNITS: 100 INJECTION, SOLUTION SUBCUTANEOUS at 18:24

## 2020-10-10 RX ADMIN — LISINOPRIL 5 MG: 5 TABLET ORAL at 11:52

## 2020-10-10 RX ADMIN — GUAIFENESIN 1200 MG: 600 TABLET, EXTENDED RELEASE ORAL at 08:17

## 2020-10-10 RX ADMIN — DOCUSATE SODIUM 50MG AND SENNOSIDES 8.6MG 2 TABLET: 8.6; 5 TABLET, FILM COATED ORAL at 20:47

## 2020-10-10 RX ADMIN — INSULIN LISPRO 5 UNITS: 100 INJECTION, SOLUTION INTRAVENOUS; SUBCUTANEOUS at 11:51

## 2020-10-10 RX ADMIN — ENOXAPARIN SODIUM 40 MG: 40 INJECTION SUBCUTANEOUS at 17:22

## 2020-10-10 RX ADMIN — IPRATROPIUM BROMIDE AND ALBUTEROL SULFATE 3 ML: 2.5; .5 SOLUTION RESPIRATORY (INHALATION) at 20:32

## 2020-10-10 RX ADMIN — ACETYLCYSTEINE 3 ML: 200 SOLUTION ORAL; RESPIRATORY (INHALATION) at 15:31

## 2020-10-10 RX ADMIN — METOPROLOL TARTRATE 100 MG: 50 TABLET, FILM COATED ORAL at 20:47

## 2020-10-10 RX ADMIN — OXYCODONE 10 MG: 5 TABLET ORAL at 05:19

## 2020-10-10 RX ADMIN — IPRATROPIUM BROMIDE AND ALBUTEROL SULFATE 3 ML: 2.5; .5 SOLUTION RESPIRATORY (INHALATION) at 11:14

## 2020-10-10 RX ADMIN — PANTOPRAZOLE SODIUM 40 MG: 40 TABLET, DELAYED RELEASE ORAL at 06:01

## 2020-10-10 RX ADMIN — ATORVASTATIN CALCIUM 80 MG: 80 TABLET, FILM COATED ORAL at 20:47

## 2020-10-10 RX ADMIN — GUAIFENESIN 1200 MG: 600 TABLET, EXTENDED RELEASE ORAL at 20:48

## 2020-10-10 RX ADMIN — FUROSEMIDE 40 MG: 10 INJECTION, SOLUTION INTRAMUSCULAR; INTRAVENOUS at 11:51

## 2020-10-10 RX ADMIN — POLYETHYLENE GLYCOL 3350 17 G: 17 POWDER, FOR SOLUTION ORAL at 20:49

## 2020-10-10 RX ADMIN — OXYCODONE 10 MG: 5 TABLET ORAL at 23:36

## 2020-10-10 RX ADMIN — GABAPENTIN 400 MG: 400 CAPSULE ORAL at 20:47

## 2020-10-10 RX ADMIN — CHLORHEXIDINE GLUCONATE 15 ML: 1.2 RINSE ORAL at 20:48

## 2020-10-10 RX ADMIN — MUPIROCIN 1 APPLICATION: 20 OINTMENT TOPICAL at 20:48

## 2020-10-10 RX ADMIN — GABAPENTIN 300 MG: 300 CAPSULE ORAL at 08:18

## 2020-10-10 RX ADMIN — POTASSIUM CHLORIDE 20 MEQ: 10 CAPSULE, COATED, EXTENDED RELEASE ORAL at 08:18

## 2020-10-10 RX ADMIN — CEFAZOLIN SODIUM 2 G: 2 INJECTION, SOLUTION INTRAVENOUS at 02:31

## 2020-10-10 RX ADMIN — INSULIN LISPRO 3 UNITS: 100 INJECTION, SOLUTION INTRAVENOUS; SUBCUTANEOUS at 07:42

## 2020-10-10 RX ADMIN — MUPIROCIN 1 APPLICATION: 20 OINTMENT TOPICAL at 08:18

## 2020-10-10 NOTE — THERAPY TREATMENT NOTE
Patient Name: Jd Velazquez  : 1965    MRN: [id number]                              Today's Date: 10/10/2020       Admit Date: 10/6/2020    Visit Dx:     ICD-10-CM ICD-9-CM   1. Coronary artery disease of native artery of native heart with stable angina pectoris (CMS/HCC)  I25.118 414.01     413.9   2. S/P CABG (coronary artery bypass graft)  Z95.1 V45.81     Patient Active Problem List   Diagnosis   • CAD (coronary artery disease)   • Coronary artery disease of native artery of native heart with stable angina pectoris (CMS/HCC)   • Diabetes mellitus (CMS/HCC)   • Hyperlipidemia   • Hypertension   • Obesity   • SHAHLA (obstructive sleep apnea)   • Tobacco abuse     Past Medical History:   Diagnosis Date   • CAD (coronary artery disease)    • Diabetes mellitus (CMS/[facility])    • Hyperlipidemia    • Hypertension    • Obesity      Past Surgical History:   Procedure Laterality Date   • ANKLE ARTHROSCOPY W/ OPEN REPAIR     • APPENDECTOMY     • CARDIAC CATHETERIZATION      PCI to circumflex   • CORONARY ARTERY BYPASS GRAFT N/A 10/8/2020    Procedure: STERNOTOMY, CORONARY ARTERY BYPASS GRAFTING TIME 4 WITH LEFT KELSI  AND ENDOSCOPICALLY HARVESTED LEFT GREATER SAPHENOUS VEIN AND PRP.;  Surgeon: Jr Girma Chui MD;  Location: [facility];  Service: Cardiothoracic;  Laterality: N/A;   • HERNIA REPAIR     • TONSILLECTOMY       General Information     Row Name 10/10/20 1023          Physical Therapy Time and Intention    Document Type  therapy note (daily note)  -DB     Mode of Treatment  individual therapy;physical therapy  -DB     Row Name 10/10/20 1023          General Information    Patient Profile Reviewed  yes  -DB     Row Name 10/10/20 1023          Safety Issues, Functional Mobility    Safety Issues Affecting Function (Mobility)  insight into deficits/self-awareness  -DB       User Key  (r) = Recorded By, (t) = Taken By, (c) = Cosigned By    Initials Name Provider Type    DB Lola Gregg PT Physical  Therapist        Mobility     Row Name 10/10/20 1024          Sit-Stand Transfer    Sit-Stand [place] (Transfers)  minimum assist (75% patient effort)  -DB     Assistive Device (Sit-Stand Transfers)  other (see comments) HHA  -DB     Row Name 10/10/20 1024          Gait/Stairs (Locomotion)    [place] Level (Gait)  minimum assist (75% patient effort);2 person assist;1 person to manage equipment  -DB     Assistive Device (Gait)  other (see comments) HHA and pt used wall and handrails in hallway  -DB     Distance in Feet (Gait)  50'  -DB     Deviations/Abnormal Patterns (Gait)  festinating/shuffling;gait speed decreased;jas decreased  -DB     Bilateral Gait Deviations  heel strike decreased  -DB       User Key  (r) = Recorded By, (t) = Taken By, (c) = Cosigned By    Initials Name Provider Type    DB Lola Gregg PT Physical Therapist        Obj/Interventions     Row Name 10/10/20 1025          Range of Motion Comprehensive    General Range of Motion  no range of motion deficits identified  -DB     Row Name 10/10/20 1025          Strength Comprehensive (MMT)    General Manual Muscle Testing (MMT) Assessment  no strength deficits identified  -DB     Row Name 10/10/20 1025          Motor Skills    Therapeutic Exercise  shoulder;hip;knee  -DB     Row Name 10/10/20 1025          Shoulder (Therapeutic Exercise)    Shoulder (Therapeutic Exercise)  AROM (active range of motion)  -DB     Shoulder AROM (Therapeutic Exercise)  horizontal aBduction/aDduction;10 repetitions  -DB     Row Name 10/10/20 1025          Hip (Therapeutic Exercise)    Hip (Therapeutic Exercise)  strengthening exercise  -DB     Hip Strengthening (Therapeutic Exercise)  marching while seated;10 repetitions  -DB     Row Name 10/10/20 1025          Knee (Therapeutic Exercise)    Knee (Therapeutic Exercise)  strengthening exercise  -DB     Knee Strengthening (Therapeutic Exercise)  LAQ (long arc quad);10 repetitions  -DB     Row Name 10/10/20  "1025          Balance    Static Sitting Balance  sitting in chair;WFL  -DB     Dynamic Sitting Balance  WFL;sitting in chair  -DB     Static Standing Balance  supported;mild impairment  -DB     Balance Interventions  standing;sit to stand;dynamic  -DB       User Key  (r) = Recorded By, (t) = Taken By, (c) = Cosigned By    Initials Name Provider Type    Lola Pisano, SONDRA Physical Therapist        Goals/Plan    No documentation.       Clinical Impression     Row Name 10/10/20 1027          Pain    Additional Documentation  Pain Scale: Numbers Pre/Post-Treatment (Group)  -DB     Row Name 10/10/20 1027          Pain Scale: Numbers Pre/Post-Treatment    Pretreatment Pain Rating  0/10 - no pain  -DB     Posttreatment Pain Rating  0/10 - no pain  -DB     Pain Location  chest  -DB     Pre/Posttreatment Pain Comment  \"pain only when I cough\"  -DB     Pain Intervention(s)  Repositioned;Ambulation/increased activity;Splinting  -DB     Row Name 10/10/20 1027          Plan of Care Review    Plan of Care Reviewed With  patient  -DB     Progress  improving  -DB     Outcome Summary  Pt agreeable to therapy this date. Pt able to perform STS Sarmad and ambulaute with Sarmad in the [facility] 50'. TS for eqipment management. Pt instructed for HHA, but kept using the handrail and declined use of RW. Pt returned to chair and performed guided exercises. Continues to benefit from PT.  -DB     Row Name 10/10/20 1027          Vital Signs    O2 Delivery Pre Treatment  supplemental O2  -DB     O2 Delivery Intra Treatment  supplemental O2  -DB     O2 Delivery Post Treatment  supplemental O2  -DB     Pre Patient Position  Sitting  -DB     Intra Patient Position  Standing  -DB     Post Patient Position  Sitting  -DB     Row Name 10/10/20 1027          Positioning and Restraints    Pre-Treatment Position  sitting in chair/recliner  -DB     Post Treatment Position  chair  -DB     In Chair  call light within reach;reclined;encouraged to call for " assist  -DB       User Key  (r) = Recorded By, (t) = Taken By, (c) = Cosigned By    Initials Name Provider Type    DB Lola Gregg PT Physical Therapist        Outcome Measures     Row Name 10/10/20 1031          How much help from another person do you currently need...    Turning from your back to your side while in flat bed without using bedrails?  2  -DB     Moving from lying on back to sitting on the side of a flat bed without bedrails?  2  -DB     Moving to and from a bed to a chair (including a wheelchair)?  3  -DB     Standing up from a chair using your arms (e.g., wheelchair, bedside chair)?  3  -DB     Climbing 3-5 steps with a railing?  1  -DB     To walk in hospital room?  3  -DB     AM-PAC 6 Clicks Score (PT)  14  -DB     Row Name 10/10/20 1031          Functional Assessment    Outcome Measure Options  AM-PAC 6 Clicks Basic Mobility (PT)  -DB       User Key  (r) = Recorded By, (t) = Taken By, (c) = Cosigned By    Initials Name Provider Type    DB Lola Gregg PT Physical Therapist        Physical Therapy Education                 Title: PT OT SLP Therapies (In Progress)     Topic: Physical Therapy (In Progress)     Point: Mobility training (In Progress)     Learning Progress Summary           Patient Acceptance, E, NR by DB at 10/10/2020 1031    Acceptance, E, NR by  at 10/9/2020 0905                   Point: Home exercise program (In Progress)     Learning Progress Summary           Patient Acceptance, E, NR by DB at 10/10/2020 1031    Acceptance, E, NR by  at 10/9/2020 0905                   Point: Body mechanics (In Progress)     Learning Progress Summary           Patient Acceptance, E, NR by DB at 10/10/2020 1031                   Point: Precautions (In Progress)     Learning Progress Summary           Patient Acceptance, E, NR by DB at 10/10/2020 1031                               User Key     Initials Effective Dates Name Provider Type [facility] 01/22/20 -  Lola Gregg  PT Physical Therapist PT    [facility] 09/16/20 -  Javy Cabrera PT Student PT              PT Recommendation and Plan     Plan of Care Reviewed With: patient  Progress: improving  Outcome Summary: Pt agreeable to therapy this date. Pt able to perform STS Sarmad and ambulaute with Sarmad in the hallway 50'. TS for eqipment management. Pt instructed for HHA, but kept using the handrail and declined use of RW. Pt returned to chair and performed guided exercises. Continues to benefit from PT.     Time Calculation:   PT Charges     Row Name 10/10/20 1033             Time Calculation    Start Time  0915  -DB      Stop Time  0932  -DB      Time Calculation (min)  17 min  -DB      PT Received On  10/10/20  -DB      PT - Next Appointment  10/11/20  -DB         Time Calculation- PT    Total Timed Code Minutes- PT  17 minute(s)  -DB        User Key  (r) = Recorded By, (t) = Taken By, (c) = Cosigned By    Initials Name Provider Type    DB Lola Gregg, PT Physical Therapist        Therapy Charges for Today     Code Description Service Date Service Provider Modifiers Qty    [id number] HC PT THER PROC EA 15 MIN 10/10/2020 Lola Gregg PT GP 1          PT G-Codes  Outcome Measure Options: AM-PAC 6 Clicks Basic Mobility (PT)  AM-PAC 6 Clicks Score (PT): 14    Lola Gregg PT  10/10/2020

## 2020-10-10 NOTE — PROGRESS NOTES
LOS: 4 days   Patient Care Team:  Dacia Newsome APRN as PCP - General (Nurse Practitioner)    Chief Complaint: post op    Subjective:  Symptoms:  He reports shortness of breath, chest pain and chest pressure.    Diet:  No nausea.    Activity level: Impaired due to weakness.          Vital Signs  Temp:  [96.7 °F (35.9 °C)-98.7 °F (37.1 °C)] 96.7 °F (35.9 °C)  Heart Rate:  [] 96  Resp:  [14-20] 16  BP: (122-151)/(66-82) 144/82  Body mass index is 34.07 kg/m².    Intake/Output Summary (Last 24 hours) at 10/10/2020 0820  Last data filed at 10/10/2020 0751  Gross per 24 hour   Intake 1206.5 ml   Output 3453 ml   Net -2246.5 ml     I/O this shift:  In: 120 [P.O.:120]  Out: -     Chest tube drainage last 8 hours 50/8        10/07/20  2100 10/09/20  0530 10/10/20  0513   Weight: 113 kg (250 lb) 116 kg (255 lb 1.2 oz) 114 kg (251 lb 3.2 oz)         Objective    Results Review:        WBC WBC   Date Value Ref Range Status   10/10/2020 15.14 (H) 3.40 - 10.80 10*3/mm3 Final   10/09/2020 11.80 (H) 3.40 - 10.80 10*3/mm3 Final   10/08/2020 12.17 (H) 3.40 - 10.80 10*3/mm3 Final   10/08/2020 14.83 (H) 3.40 - 10.80 10*3/mm3 Final   10/07/2020 8.12 3.40 - 10.80 10*3/mm3 Final      HGB Hemoglobin   Date Value Ref Range Status   10/10/2020 12.9 (L) 13.0 - 17.7 g/dL Final   10/09/2020 13.1 13.0 - 17.7 g/dL Final   10/08/2020 13.0 13.0 - 17.7 g/dL Final   10/08/2020 13.5 13.0 - 17.7 g/dL Final   10/08/2020 12.9 12.0 - 17.0 g/dL Final   10/08/2020 13.6 12.0 - 17.0 g/dL Final   10/08/2020 13.3 12.0 - 17.0 g/dL Final   10/08/2020 12.2 12.0 - 17.0 g/dL Final   10/08/2020 14.6 12.0 - 17.0 g/dL Final   10/07/2020 16.3 13.0 - 17.7 g/dL Final      HCT Hematocrit   Date Value Ref Range Status   10/10/2020 37.8 37.5 - 51.0 % Final   10/09/2020 40.1 37.5 - 51.0 % Final   10/08/2020 37.6 37.5 - 51.0 % Final   10/08/2020 38.3 37.5 - 51.0 % Final   10/08/2020 38 38 - 51 % Final   10/08/2020 40 38 - 51 % Final   10/08/2020 39 38 - 51  % Final   10/08/2020 36 (L) 38 - 51 % Final   10/08/2020 43 38 - 51 % Final   10/07/2020 48.5 37.5 - 51.0 % Final      Platelets Platelets   Date Value Ref Range Status   10/10/2020 224 140 - 450 10*3/mm3 Final   10/09/2020 227 140 - 450 10*3/mm3 Final   10/08/2020 223 140 - 450 10*3/mm3 Final   10/08/2020 240 140 - 450 10*3/mm3 Final   10/07/2020 315 140 - 450 10*3/mm3 Final        PT/INR:    Protime   Date Value Ref Range Status   10/09/2020 13.7 11.7 - 14.2 Seconds Final   10/08/2020 14.1 11.7 - 14.2 Seconds Final   10/07/2020 12.6 11.7 - 14.2 Seconds Final   /  INR   Date Value Ref Range Status   10/09/2020 1.06 0.90 - 1.10 Final   10/08/2020 1.10 0.90 - 1.10 Final   10/07/2020 0.95 0.90 - 1.10 Final       Sodium Sodium   Date Value Ref Range Status   10/10/2020 133 (L) 136 - 145 mmol/L Final   10/09/2020 137 136 - 145 mmol/L Final   10/08/2020 132 (L) 136 - 145 mmol/L Final   10/08/2020 132 (L) 136 - 145 mmol/L Final   10/08/2020 132 (L) 136 - 145 mmol/L Final   10/07/2020 129 (L) 136 - 145 mmol/L Final      Potassium Potassium   Date Value Ref Range Status   10/10/2020 4.1 3.5 - 5.2 mmol/L Final   10/09/2020 3.9 3.5 - 5.2 mmol/L Final   10/08/2020 4.1 3.5 - 5.2 mmol/L Final   10/08/2020 4.5 3.5 - 5.2 mmol/L Final   10/08/2020 4.1 3.5 - 5.2 mmol/L Final   10/07/2020 3.9 3.5 - 5.2 mmol/L Final      Chloride Chloride   Date Value Ref Range Status   10/10/2020 99 98 - 107 mmol/L Final   10/09/2020 104 98 - 107 mmol/L Final   10/08/2020 101 98 - 107 mmol/L Final   10/08/2020 99 98 - 107 mmol/L Final   10/08/2020 102 98 - 107 mmol/L Final   10/07/2020 97 (L) 98 - 107 mmol/L Final      Bicarbonate CO2   Date Value Ref Range Status   10/10/2020 23.0 22.0 - 29.0 mmol/L Final   10/09/2020 24.2 22.0 - 29.0 mmol/L Final   10/08/2020 20.8 (L) 22.0 - 29.0 mmol/L Final   10/08/2020 21.7 (L) 22.0 - 29.0 mmol/L Final   10/08/2020 17.6 (L) 22.0 - 29.0 mmol/L Final   10/07/2020 22.5 22.0 - 29.0 mmol/L Final      BUN BUN   Date  Value Ref Range Status   10/10/2020 13 6 - 20 mg/dL Final   10/09/2020 12 6 - 20 mg/dL Final   10/08/2020 16 6 - 20 mg/dL Final   10/08/2020 17 6 - 20 mg/dL Final   10/08/2020 16 6 - 20 mg/dL Final   10/07/2020 12 6 - 20 mg/dL Final      Creatinine Creatinine   Date Value Ref Range Status   10/10/2020 0.80 0.76 - 1.27 mg/dL Final   10/09/2020 0.79 0.76 - 1.27 mg/dL Final   10/08/2020 1.02 0.76 - 1.27 mg/dL Final   10/08/2020 1.29 (H) 0.76 - 1.27 mg/dL Final   10/08/2020 0.81 0.76 - 1.27 mg/dL Final   10/07/2020 0.75 (L) 0.76 - 1.27 mg/dL Final      Calcium Calcium   Date Value Ref Range Status   10/10/2020 8.8 8.6 - 10.5 mg/dL Final   10/09/2020 8.4 (L) 8.6 - 10.5 mg/dL Final   10/08/2020 8.6 8.6 - 10.5 mg/dL Final   10/08/2020 8.8 8.6 - 10.5 mg/dL Final   10/08/2020 9.1 8.6 - 10.5 mg/dL Final   10/07/2020 9.3 8.6 - 10.5 mg/dL Final      Magnesium Magnesium   Date Value Ref Range Status   10/09/2020 2.2 1.6 - 2.6 mg/dL Final   10/08/2020 2.7 (H) 1.6 - 2.6 mg/dL Final   10/08/2020 2.9 (H) 1.6 - 2.6 mg/dL Final   10/07/2020 1.9 1.6 - 2.6 mg/dL Final          aspirin, 81 mg, Oral, Daily  atorvastatin, 80 mg, Oral, Nightly  chlorhexidine, 15 mL, Mouth/Throat, Q12H  enoxaparin, 40 mg, Subcutaneous, Q24H  furosemide, 40 mg, Oral, Daily  gabapentin, 300 mg, Oral, Q12H  guaiFENesin, 1,200 mg, Oral, Q12H  insulin lispro, 0-14 Units, Subcutaneous, TID AC  lisinopril, 5 mg, Oral, Q24H  metoprolol tartrate, 50 mg, Oral, Q12H  mupirocin, , Each Nare, BID  pantoprazole, 40 mg, Oral, QAM  polyethylene glycol, 17 g, Oral, BID  potassium chloride, 20 mEq, Oral, Daily  senna-docusate sodium, 2 tablet, Oral, Nightly  sodium chloride, 4 mL, Nebulization, BID - RT               Patient Active Problem List   Diagnosis Code   • CAD (coronary artery disease) I25.10   • Coronary artery disease of native artery of native heart with stable angina pectoris (CMS/[facility]) I25.118   • Diabetes mellitus (CMS/[facility]) E11.9   • Hyperlipidemia E78.5   •  Hypertension I10   • Obesity E66.9   • SHAHLA (obstructive sleep apnea) G47.33   • Tobacco abuse Z72.0       Assessment & Plan    -NSTEMI, multivessel coronary artery disease with previous PCI 2014--s/p CABGx4 LIMA/EVH left SVG--POD#2 [place]  -ischemic cardiomyopathy  - hypertension--poorly controlled  - hyperlipidemia--patient reports previous intolerance to Lipitor--will try pravastatin  - DM II--non-compliant with medications  - SHAHLA with home CPAP  - nicotine dependence--encourage cessation--patient declines nicotine patch at this time  - obesity  -leukocytosis- probable reactive     Hypertensive on cardene-- increase beta blocker, wean as tolerated  Remains on quite a bit of O2 on 9L high flow  IV diurese  Increase beta blocker, add low dose lisinopril  Only getting 500 on IS, congested cough  Continue mucinex, duo neb, will switch to mucomyst   Discontinue chest tubes, hopefully he will be able to cough and deep breath more once these are out, reluctant to do so due to pain  Discontinue central line and mcduffie  Increase activity  Encourage pulmonary toilet  Routine care      Prema Albright, SOHAM  10/10/20  08:20 EDT

## 2020-10-10 NOTE — PROGRESS NOTES
Name: Jd Velazquez ADMIT: 10/6/2020   : 1965  PCP: Dacia Newsome APRN    MRN: [id number] LOS: 4 days   AGE/SEX: 55 y.o. male  ROOM: [facility]/     Subjective   Subjective   Feels better and is eager to discharge home.  Appetite improving but states he does not like the food.  Ate maybe 50% of lunch.    Review of Systems     Objective   Objective   Vital Signs  Temp:  [96 °F (35.6 °C)-98 °F (36.7 °C)] 96.7 °F (35.9 °C)  Heart Rate:  [] 88  Resp:  [14-20] 16  BP: (123-151)/(69-82) 123/69  SpO2:  [89 %-97 %] 90 %  on  Flow (L/min):  [7-11] 7;   Device (Oxygen Therapy): high-flow nasal cannula  Body mass index is 34.07 kg/m².  Physical Exam  Vitals signs reviewed.   Constitutional:       Appearance: He is well-developed. He is obese. He is ill-appearing.   HENT:      Head: Normocephalic and atraumatic.   Eyes:      Extraocular Movements: Extraocular movements intact.   Neck:      Musculoskeletal: Normal range of motion.   Cardiovascular:      Rate and Rhythm: Normal rate and regular rhythm.   Pulmonary:      Effort: No respiratory distress.      Breath sounds: No wheezing.      Comments: Decreased effort due to chest pain  Abdominal:      General: Bowel sounds are normal. There is no distension.      Palpations: Abdomen is soft. There is no mass.      Tenderness: There is no abdominal tenderness.      Hernia: No hernia is present.   Musculoskeletal:      Comments: Trace BLE edema   Skin:     General: Skin is warm and dry.   Neurological:      Mental Status: He is alert and oriented to person, place, and time.   Psychiatric:         Behavior: Behavior normal.         Thought Content: Thought content normal.         Judgment: Judgment normal.         Results Review     I reviewed the patient's new clinical results.  Results from last 7 days   Lab Units 10/10/20  0526 10/09/20  0303 10/08/20  1530 10/08/20  1132   WBC 10*3/mm3 15.14* 11.80* 12.17* 14.83*   HEMOGLOBIN g/dL 12.9* 13.1 13.0 13.5    PLATELETS 10*3/mm3 224 227 223 240     Results from last 7 days   Lab Units 10/10/20  0526 10/09/20  0303 10/08/20  1530 10/08/20  1132   SODIUM mmol/L 133* 137 132* 132*   POTASSIUM mmol/L 4.1 3.9 4.1 4.5   CHLORIDE mmol/L 99 104 101 99   CO2 mmol/L 23.0 24.2 20.8* 21.7*   BUN mg/dL 13 12 16 17   CREATININE mg/dL 0.80 0.79 1.02 1.29*   GLUCOSE mg/dL 203* 107* 167* 217*   Estimated Creatinine Clearance: 137.7 mL/min (by C-G formula based on SCr of 0.8 mg/dL).  Results from last 7 days   Lab Units 10/09/20  0303 10/08/20  1530 10/08/20  1132 10/07/20  0943   ALBUMIN g/dL 4.30 4.10 4.00 3.90   BILIRUBIN mg/dL  --   --   --  0.4   ALK PHOS U/L  --   --   --  119*   AST (SGOT) U/L  --   --   --  16   ALT (SGPT) U/L  --   --   --  13     Results from last 7 days   Lab Units 10/10/20  0526 10/09/20  0303 10/08/20  1530 10/08/20  1132  10/07/20  0943   CALCIUM mg/dL 8.8 8.4* 8.6 8.8   < > 9.3   ALBUMIN g/dL  --  4.30 4.10 4.00  --  3.90   MAGNESIUM mg/dL  --  2.2 2.7* 2.9*  --  1.9   PHOSPHORUS mg/dL  --  4.7* 4.3 4.1  --   --     < > = values in this interval not displayed.       COVID19   Date Value Ref Range Status   10/07/2020 Not Detected Not Detected - Ref. Range Final     Glucose   Date/Time Value Ref Range Status   10/10/2020 1543 251 (H) 70 - 130 mg/dL Final   10/10/2020 1033 225 (H) 70 - 130 mg/dL Final   10/10/2020 0654 188 (H) 70 - 130 mg/dL Final   10/09/2020 2019 195 (H) 70 - 130 mg/dL Final   10/09/2020 1650 166 (H) 70 - 130 mg/dL Final   10/09/2020 1155 155 (H) 70 - 130 mg/dL Final   10/09/2020 0954 135 (H) 70 - 130 mg/dL Final       Scheduled Medications  acetylcysteine, 3 mL, Nebulization, TID - RT  aspirin, 81 mg, Oral, Daily  atorvastatin, 80 mg, Oral, Nightly  chlorhexidine, 15 mL, Mouth/Throat, Q12H  doxycycline, 100 mg, Oral, Q12H  enoxaparin, 40 mg, Subcutaneous, Q24H  furosemide, 40 mg, Oral, Daily  gabapentin, 400 mg, Oral, Q12H  guaiFENesin, 1,200 mg, Oral, Q12H  insulin lispro, 0-14 Units,  Subcutaneous, TID AC  lisinopril, 5 mg, Oral, Q24H  metoprolol tartrate, 100 mg, Oral, Q12H  mupirocin, , Each Nare, BID  pantoprazole, 40 mg, Oral, QAM  polyethylene glycol, 17 g, Oral, BID  potassium chloride, 20 mEq, Oral, Daily  senna-docusate sodium, 2 tablet, Oral, Nightly    Infusions   Diet  Diet Regular; Cardiac, Consistent Carbohydrate       Assessment/Plan     Active Hospital Problems    Diagnosis  POA   • **Coronary artery disease of native artery of native heart with stable angina pectoris (CMS/[facility]) [I25.118]  Yes   • CAD (coronary artery disease) [I25.10]  Yes   • Type 2 diabetes mellitus with hyperglycemia, without long-term current use of insulin (CMS/[facility]) [E11.65]  Yes   • Hyperlipidemia [E78.5]  Yes   • Hypertension [I10]  Yes   • Obesity [E66.9]  Yes   • SHAHLA (obstructive sleep apnea) [G47.33]  Unknown   • Tobacco abuse [Z72.0]  Unknown      Resolved Hospital Problems   No resolved problems to display.         Untreated diabetes mellitus 2 - HgbA1c 9.4% on 10/7  · Patient now postop CABG.  Off insulin drip and on correctional Humalog only.  · Sugars elevated today.  Agree with plan to send home on oral agents and close outpatient follow-up.  Will add basal insulin for now and continue monitoring.      Hal Langley MD  [facility]  10/10/20  17:37 EDT

## 2020-10-10 NOTE — PLAN OF CARE
Problem: Adult Inpatient Plan of Care  Goal: Plan of Care Review  Recent Flowsheet Documentation  Taken 10/10/2020 1027 by Lola Gregg, PT  Progress: improving  Plan of Care Reviewed With: patient  Outcome Summary: Pt agreeable to therapy this date. Pt able to perform STS Sarmad and ambulaute with Sarmad in the hallway 50'. TS for eqipment management. Pt instructed for HHA, but kept using the handrail and declined use of RW. Pt returned to chair and performed guided exercises. Continues to benefit from PT.     Patient was intermittently wearing a face mask during this therapy encounter. Therapist used appropriate personal protective equipment including eye protection, mask, and gloves.  Mask used was standard procedure mask. Appropriate PPE was worn during the entire therapy session. Hand hygiene was completed before and after therapy session. Patient is not in enhanced droplet precautions.

## 2020-10-11 ENCOUNTER — APPOINTMENT (OUTPATIENT)
Dept: GENERAL RADIOLOGY | Facility: HOSPITAL | Age: 55
End: 2020-10-11

## 2020-10-11 LAB
ANION GAP SERPL CALCULATED.3IONS-SCNC: 11.5 MMOL/L (ref 5–15)
BH BB BLOOD EXPIRATION DATE: NORMAL
BH BB BLOOD EXPIRATION DATE: NORMAL
BH BB BLOOD TYPE BARCODE: 9500
BH BB BLOOD TYPE BARCODE: 9500
BH BB DISPENSE STATUS: NORMAL
BH BB DISPENSE STATUS: NORMAL
BH BB PRODUCT CODE: NORMAL
BH BB PRODUCT CODE: NORMAL
BH BB UNIT NUMBER: NORMAL
BH BB UNIT NUMBER: NORMAL
BUN SERPL-MCNC: 19 MG/DL (ref 6–20)
BUN/CREAT SERPL: 19.6 (ref 7–25)
CALCIUM SPEC-SCNC: 9.8 MG/DL (ref 8.6–10.5)
CHLORIDE SERPL-SCNC: 95 MMOL/L (ref 98–107)
CO2 SERPL-SCNC: 24.5 MMOL/L (ref 22–29)
CREAT SERPL-MCNC: 0.97 MG/DL (ref 0.76–1.27)
CROSSMATCH INTERPRETATION: NORMAL
CROSSMATCH INTERPRETATION: NORMAL
DEPRECATED RDW RBC AUTO: 42.9 FL (ref 37–54)
ERYTHROCYTE [DISTWIDTH] IN BLOOD BY AUTOMATED COUNT: 12.8 % (ref 12.3–15.4)
GFR SERPL CREATININE-BSD FRML MDRD: 81 ML/MIN/1.73
GLUCOSE BLDC GLUCOMTR-MCNC: 233 MG/DL (ref 70–130)
GLUCOSE BLDC GLUCOMTR-MCNC: 257 MG/DL (ref 70–130)
GLUCOSE BLDC GLUCOMTR-MCNC: 277 MG/DL (ref 70–130)
GLUCOSE SERPL-MCNC: 232 MG/DL (ref 65–99)
HCT VFR BLD AUTO: 37.9 % (ref 37.5–51)
HGB BLD-MCNC: 12.8 G/DL (ref 13–17.7)
MCH RBC QN AUTO: 31.1 PG (ref 26.6–33)
MCHC RBC AUTO-ENTMCNC: 33.8 G/DL (ref 31.5–35.7)
MCV RBC AUTO: 92 FL (ref 79–97)
PLATELET # BLD AUTO: 270 10*3/MM3 (ref 140–450)
PMV BLD AUTO: 9.9 FL (ref 6–12)
POTASSIUM SERPL-SCNC: 4.6 MMOL/L (ref 3.5–5.2)
RBC # BLD AUTO: 4.12 10*6/MM3 (ref 4.14–5.8)
SODIUM SERPL-SCNC: 131 MMOL/L (ref 136–145)
UNIT  ABO: NORMAL
UNIT  ABO: NORMAL
UNIT  RH: NORMAL
UNIT  RH: NORMAL
WBC # BLD AUTO: 14.96 10*3/MM3 (ref 3.4–10.8)

## 2020-10-11 PROCEDURE — 94799 UNLISTED PULMONARY SVC/PX: CPT

## 2020-10-11 PROCEDURE — 63710000001 INSULIN LISPRO (HUMAN) PER 5 UNITS: Performed by: THORACIC SURGERY (CARDIOTHORACIC VASCULAR SURGERY)

## 2020-10-11 PROCEDURE — 71046 X-RAY EXAM CHEST 2 VIEWS: CPT

## 2020-10-11 PROCEDURE — 85027 COMPLETE CBC AUTOMATED: CPT | Performed by: THORACIC SURGERY (CARDIOTHORACIC VASCULAR SURGERY)

## 2020-10-11 PROCEDURE — 82962 GLUCOSE BLOOD TEST: CPT

## 2020-10-11 PROCEDURE — 97110 THERAPEUTIC EXERCISES: CPT

## 2020-10-11 PROCEDURE — 25010000002 ENOXAPARIN PER 10 MG: Performed by: THORACIC SURGERY (CARDIOTHORACIC VASCULAR SURGERY)

## 2020-10-11 PROCEDURE — 80048 BASIC METABOLIC PNL TOTAL CA: CPT | Performed by: THORACIC SURGERY (CARDIOTHORACIC VASCULAR SURGERY)

## 2020-10-11 PROCEDURE — 63710000001 INSULIN LISPRO (HUMAN) PER 5 UNITS: Performed by: HOSPITALIST

## 2020-10-11 PROCEDURE — 63710000001 INSULIN GLARGINE PER 5 UNITS: Performed by: HOSPITALIST

## 2020-10-11 RX ORDER — INSULIN GLARGINE 100 [IU]/ML
20 INJECTION, SOLUTION SUBCUTANEOUS DAILY
Status: DISCONTINUED | OUTPATIENT
Start: 2020-10-12 | End: 2020-10-13

## 2020-10-11 RX ORDER — ACETAMINOPHEN 325 MG/1
650 TABLET ORAL EVERY 6 HOURS PRN
Status: DISCONTINUED | OUTPATIENT
Start: 2020-10-11 | End: 2020-10-14 | Stop reason: HOSPADM

## 2020-10-11 RX ORDER — TRAMADOL HYDROCHLORIDE 50 MG/1
50 TABLET ORAL EVERY 6 HOURS PRN
Status: DISCONTINUED | OUTPATIENT
Start: 2020-10-11 | End: 2020-10-14 | Stop reason: HOSPADM

## 2020-10-11 RX ADMIN — INSULIN LISPRO 5 UNITS: 100 INJECTION, SOLUTION INTRAVENOUS; SUBCUTANEOUS at 17:36

## 2020-10-11 RX ADMIN — PANTOPRAZOLE SODIUM 40 MG: 40 TABLET, DELAYED RELEASE ORAL at 06:55

## 2020-10-11 RX ADMIN — INSULIN LISPRO 3 UNITS: 100 INJECTION, SOLUTION INTRAVENOUS; SUBCUTANEOUS at 06:55

## 2020-10-11 RX ADMIN — ASPIRIN 81 MG: 81 TABLET, COATED ORAL at 10:22

## 2020-10-11 RX ADMIN — INSULIN GLARGINE 15 UNITS: 100 INJECTION, SOLUTION SUBCUTANEOUS at 10:21

## 2020-10-11 RX ADMIN — DOCUSATE SODIUM 50MG AND SENNOSIDES 8.6MG 2 TABLET: 8.6; 5 TABLET, FILM COATED ORAL at 20:01

## 2020-10-11 RX ADMIN — GUAIFENESIN 1200 MG: 600 TABLET, EXTENDED RELEASE ORAL at 10:22

## 2020-10-11 RX ADMIN — POLYETHYLENE GLYCOL 3350 17 G: 17 POWDER, FOR SOLUTION ORAL at 20:02

## 2020-10-11 RX ADMIN — POTASSIUM CHLORIDE 20 MEQ: 10 CAPSULE, COATED, EXTENDED RELEASE ORAL at 10:24

## 2020-10-11 RX ADMIN — ATORVASTATIN CALCIUM 80 MG: 80 TABLET, FILM COATED ORAL at 20:01

## 2020-10-11 RX ADMIN — ACETAMINOPHEN 650 MG: 325 TABLET, FILM COATED ORAL at 13:51

## 2020-10-11 RX ADMIN — MUPIROCIN 1 APPLICATION: 20 OINTMENT TOPICAL at 10:25

## 2020-10-11 RX ADMIN — MUPIROCIN 1 APPLICATION: 20 OINTMENT TOPICAL at 20:01

## 2020-10-11 RX ADMIN — INSULIN LISPRO 7 UNITS: 100 INJECTION, SOLUTION INTRAVENOUS; SUBCUTANEOUS at 17:36

## 2020-10-11 RX ADMIN — DOXYCYCLINE 100 MG: 100 CAPSULE ORAL at 20:01

## 2020-10-11 RX ADMIN — IPRATROPIUM BROMIDE AND ALBUTEROL SULFATE 3 ML: 2.5; .5 SOLUTION RESPIRATORY (INHALATION) at 15:24

## 2020-10-11 RX ADMIN — IPRATROPIUM BROMIDE AND ALBUTEROL SULFATE 3 ML: 2.5; .5 SOLUTION RESPIRATORY (INHALATION) at 19:33

## 2020-10-11 RX ADMIN — INSULIN LISPRO 7 UNITS: 100 INJECTION, SOLUTION INTRAVENOUS; SUBCUTANEOUS at 12:14

## 2020-10-11 RX ADMIN — LISINOPRIL 5 MG: 5 TABLET ORAL at 10:22

## 2020-10-11 RX ADMIN — METOPROLOL TARTRATE 100 MG: 50 TABLET, FILM COATED ORAL at 20:01

## 2020-10-11 RX ADMIN — ACETYLCYSTEINE 3 ML: 200 SOLUTION ORAL; RESPIRATORY (INHALATION) at 19:33

## 2020-10-11 RX ADMIN — DOXYCYCLINE 100 MG: 100 CAPSULE ORAL at 10:22

## 2020-10-11 RX ADMIN — POLYETHYLENE GLYCOL 3350 17 G: 17 POWDER, FOR SOLUTION ORAL at 10:24

## 2020-10-11 RX ADMIN — ACETYLCYSTEINE 3 ML: 200 SOLUTION ORAL; RESPIRATORY (INHALATION) at 15:25

## 2020-10-11 RX ADMIN — GUAIFENESIN 1200 MG: 600 TABLET, EXTENDED RELEASE ORAL at 20:01

## 2020-10-11 RX ADMIN — INSULIN LISPRO 5 UNITS: 100 INJECTION, SOLUTION INTRAVENOUS; SUBCUTANEOUS at 12:14

## 2020-10-11 RX ADMIN — GABAPENTIN 400 MG: 400 CAPSULE ORAL at 20:01

## 2020-10-11 RX ADMIN — TRAMADOL HYDROCHLORIDE 50 MG: 50 TABLET, FILM COATED ORAL at 10:25

## 2020-10-11 RX ADMIN — FUROSEMIDE 40 MG: 40 TABLET ORAL at 10:21

## 2020-10-11 RX ADMIN — METOPROLOL TARTRATE 100 MG: 50 TABLET, FILM COATED ORAL at 10:23

## 2020-10-11 RX ADMIN — ENOXAPARIN SODIUM 40 MG: 40 INJECTION SUBCUTANEOUS at 17:36

## 2020-10-11 NOTE — PLAN OF CARE
Problem: Adult Inpatient Plan of Care  Goal: Plan of Care Review  Recent Flowsheet Documentation  Taken 10/11/2020 1308 by Lola Gregg PT  Progress: improving  Plan of Care Reviewed With: patient  Outcome Summary: Pt agreeable to therapy this date. Pt was seated in chair at start of session. Sarmad to stand and pt had LOB backward into chair upon standing. trialed using a RW, although pt does not use at baseline. Pt demo'd improved balance with use of AD, Sarmad to stand at the RW and CGA with verbal cues for safety with the walker when performing gait. Pt has dec'd insight into deficits/need for assitance. Pt reported he didn't think he needed the walker when ambulating in the hallway but was reminded that without it, he had a LOB in the room. Pt ambulated CGA with dec'd gait speed 120' with RW in the hallway. Returned to bed, modA to assist with BLE back into bed. Pt continues to benefit from PT.   Patient was wearing a face mask during this therapy encounter. Therapist used appropriate personal protective equipment including eye protection, mask, and gloves.  Mask used was standard procedure mask. Appropriate PPE was worn during the entire therapy session. Hand hygiene was completed before and after therapy session. Patient is not in enhanced droplet precautions.

## 2020-10-11 NOTE — PLAN OF CARE
Goal Outcome Evaluation:  Plan of Care Reviewed With: patient  Progress: improving  Outcome Summary: Patient on normally on 6L high flow. Has to be bumped up to 10L after coughing. Still nonproductive cough but very congested. Walking, IS and flutter valve. SR and BP stable. AV wires removed. Gait still very unsteady. PO lasix given. Ultram given for pain. Patient drowsy at times.

## 2020-10-11 NOTE — PROGRESS NOTES
LOS: 5 days   Patient Care Team:  Dacia Newsome APRN as PCP - General (Nurse Practitioner)    Chief Complaint: post op    Subjective:  Symptoms:  He reports weakness.  No shortness of breath.    Diet:  No nausea.          Vital Signs  Temp:  [96 °F (35.6 °C)-98.4 °F (36.9 °C)] 98.4 °F (36.9 °C)  Heart Rate:  [] 72  Resp:  [16-18] 18  BP: (116-144)/(69-82) 116/69  Body mass index is 33.91 kg/m².    Intake/Output Summary (Last 24 hours) at 10/11/2020 0723  Last data filed at 10/10/2020 2049  Gross per 24 hour   Intake 1626 ml   Output 1900 ml   Net -274 ml     No intake/output data recorded.          10/09/20  0530 10/10/20  0513 10/11/20  0418   Weight: 116 kg (255 lb 1.2 oz) 114 kg (251 lb 3.2 oz) 113 kg (250 lb)         Objective    Results Review:        WBC WBC   Date Value Ref Range Status   10/11/2020 14.96 (H) 3.40 - 10.80 10*3/mm3 Final   10/10/2020 15.14 (H) 3.40 - 10.80 10*3/mm3 Final   10/09/2020 11.80 (H) 3.40 - 10.80 10*3/mm3 Final   10/08/2020 12.17 (H) 3.40 - 10.80 10*3/mm3 Final   10/08/2020 14.83 (H) 3.40 - 10.80 10*3/mm3 Final      HGB Hemoglobin   Date Value Ref Range Status   10/11/2020 12.8 (L) 13.0 - 17.7 g/dL Final   10/10/2020 12.9 (L) 13.0 - 17.7 g/dL Final   10/09/2020 13.1 13.0 - 17.7 g/dL Final   10/08/2020 13.0 13.0 - 17.7 g/dL Final   10/08/2020 13.5 13.0 - 17.7 g/dL Final   10/08/2020 12.9 12.0 - 17.0 g/dL Final   10/08/2020 13.6 12.0 - 17.0 g/dL Final   10/08/2020 13.3 12.0 - 17.0 g/dL Final   10/08/2020 12.2 12.0 - 17.0 g/dL Final   10/08/2020 14.6 12.0 - 17.0 g/dL Final      HCT Hematocrit   Date Value Ref Range Status   10/11/2020 37.9 37.5 - 51.0 % Final   10/10/2020 37.8 37.5 - 51.0 % Final   10/09/2020 40.1 37.5 - 51.0 % Final   10/08/2020 37.6 37.5 - 51.0 % Final   10/08/2020 38.3 37.5 - 51.0 % Final   10/08/2020 38 38 - 51 % Final   10/08/2020 40 38 - 51 % Final   10/08/2020 39 38 - 51 % Final   10/08/2020 36 (L) 38 - 51 % Final   10/08/2020 43 38 - 51 %  Final      Platelets Platelets   Date Value Ref Range Status   10/11/2020 270 140 - 450 10*3/mm3 Final   10/10/2020 224 140 - 450 10*3/mm3 Final   10/09/2020 227 140 - 450 10*3/mm3 Final   10/08/2020 223 140 - 450 10*3/mm3 Final   10/08/2020 240 140 - 450 10*3/mm3 Final        PT/INR:    Protime   Date Value Ref Range Status   10/09/2020 13.7 11.7 - 14.2 Seconds Final   10/08/2020 14.1 11.7 - 14.2 Seconds Final   /  INR   Date Value Ref Range Status   10/09/2020 1.06 0.90 - 1.10 Final   10/08/2020 1.10 0.90 - 1.10 Final       Sodium Sodium   Date Value Ref Range Status   10/11/2020 131 (L) 136 - 145 mmol/L Final   10/10/2020 133 (L) 136 - 145 mmol/L Final   10/09/2020 137 136 - 145 mmol/L Final   10/08/2020 132 (L) 136 - 145 mmol/L Final   10/08/2020 132 (L) 136 - 145 mmol/L Final      Potassium Potassium   Date Value Ref Range Status   10/11/2020 4.6 3.5 - 5.2 mmol/L Final   10/10/2020 4.1 3.5 - 5.2 mmol/L Final   10/09/2020 3.9 3.5 - 5.2 mmol/L Final   10/08/2020 4.1 3.5 - 5.2 mmol/L Final   10/08/2020 4.5 3.5 - 5.2 mmol/L Final      Chloride Chloride   Date Value Ref Range Status   10/11/2020 95 (L) 98 - 107 mmol/L Final   10/10/2020 99 98 - 107 mmol/L Final   10/09/2020 104 98 - 107 mmol/L Final   10/08/2020 101 98 - 107 mmol/L Final   10/08/2020 99 98 - 107 mmol/L Final      Bicarbonate CO2   Date Value Ref Range Status   10/11/2020 24.5 22.0 - 29.0 mmol/L Final   10/10/2020 23.0 22.0 - 29.0 mmol/L Final   10/09/2020 24.2 22.0 - 29.0 mmol/L Final   10/08/2020 20.8 (L) 22.0 - 29.0 mmol/L Final   10/08/2020 21.7 (L) 22.0 - 29.0 mmol/L Final      BUN BUN   Date Value Ref Range Status   10/11/2020 19 6 - 20 mg/dL Final   10/10/2020 13 6 - 20 mg/dL Final   10/09/2020 12 6 - 20 mg/dL Final   10/08/2020 16 6 - 20 mg/dL Final   10/08/2020 17 6 - 20 mg/dL Final      Creatinine Creatinine   Date Value Ref Range Status   10/11/2020 0.97 0.76 - 1.27 mg/dL Final   10/10/2020 0.80 0.76 - 1.27 mg/dL Final   10/09/2020 0.79  0.76 - 1.27 mg/dL Final   10/08/2020 1.02 0.76 - 1.27 mg/dL Final   10/08/2020 1.29 (H) 0.76 - 1.27 mg/dL Final      Calcium Calcium   Date Value Ref Range Status   10/11/2020 9.8 8.6 - 10.5 mg/dL Final   10/10/2020 8.8 8.6 - 10.5 mg/dL Final   10/09/2020 8.4 (L) 8.6 - 10.5 mg/dL Final   10/08/2020 8.6 8.6 - 10.5 mg/dL Final   10/08/2020 8.8 8.6 - 10.5 mg/dL Final      Magnesium Magnesium   Date Value Ref Range Status   10/09/2020 2.2 1.6 - 2.6 mg/dL Final   10/08/2020 2.7 (H) 1.6 - 2.6 mg/dL Final   10/08/2020 2.9 (H) 1.6 - 2.6 mg/dL Final          acetylcysteine, 3 mL, Nebulization, TID - RT  aspirin, 81 mg, Oral, Daily  atorvastatin, 80 mg, Oral, Nightly  chlorhexidine, 15 mL, Mouth/Throat, Q12H  doxycycline, 100 mg, Oral, Q12H  enoxaparin, 40 mg, Subcutaneous, Q24H  furosemide, 40 mg, Oral, Daily  gabapentin, 400 mg, Oral, Q12H  guaiFENesin, 1,200 mg, Oral, Q12H  insulin glargine, 15 Units, Subcutaneous, Daily  insulin lispro, 0-14 Units, Subcutaneous, TID AC  lisinopril, 5 mg, Oral, Q24H  metoprolol tartrate, 100 mg, Oral, Q12H  mupirocin, , Each Nare, BID  pantoprazole, 40 mg, Oral, QAM  polyethylene glycol, 17 g, Oral, BID  potassium chloride, 20 mEq, Oral, Daily  senna-docusate sodium, 2 tablet, Oral, Nightly               Patient Active Problem List   Diagnosis Code   • CAD (coronary artery disease) I25.10   • Coronary artery disease of native artery of native heart with stable angina pectoris (CMS/[facility]) I25.118   • Type 2 diabetes mellitus with hyperglycemia, without long-term current use of insulin (CMS/[facility]) E11.65   • Hyperlipidemia E78.5   • Hypertension I10   • Obesity E66.9   • SHAHLA (obstructive sleep apnea) G47.33   • Tobacco abuse Z72.0       Assessment & Plan    -NSTEMI, multivessel coronary artery disease with previous PCI 2014--s/p CABGx4 [facility]/EVH left SVG--POD#3 [place]  -ischemic cardiomyopathy  - hypertension--poorly controlled  - hyperlipidemia--patient reports previous intolerance to  Lipitor--will try pravastatin  - DM II--non-compliant with medications  - SHAHLA with home CPAP  - nicotine dependence--encourage cessation--patient declines nicotine patch at this time  - obesity  -leukocytosis- probable reactive       O2 requirements improved a bit now on 6L   Great response to IV diureses, switch to PO  Continue mucinex and nebs and aggressive pulmonary toilet  Increase activity, only walked 50ft with PT yesterday  Discontinue AV wires  Encourage pulmonary toilet  Routine care      Prema Albright, APRN  10/11/20  07:23 EDT

## 2020-10-11 NOTE — PROGRESS NOTES
BS remain elevated.  Will increase basal insulin and add scheduled AC humalog.  Continue to monitor.

## 2020-10-11 NOTE — THERAPY TREATMENT NOTE
Patient Name: Jd Velazquez  : 1965    MRN: [id number]                              Today's Date: 10/11/2020       Admit Date: 10/6/2020    Visit Dx:     ICD-10-CM ICD-9-CM   1. Coronary artery disease of native artery of native heart with stable angina pectoris (CMS/HCC)  I25.118 414.01     413.9   2. S/P CABG (coronary artery bypass graft)  Z95.1 V45.81     Patient Active Problem List   Diagnosis   • CAD (coronary artery disease)   • Coronary artery disease of native artery of native heart with stable angina pectoris (CMS/HCC)   • Type 2 diabetes mellitus with hyperglycemia, without long-term current use of insulin (CMS/[facility])   • Hyperlipidemia   • Hypertension   • Obesity   • SHAHLA (obstructive sleep apnea)   • Tobacco abuse     Past Medical History:   Diagnosis Date   • CAD (coronary artery disease)    • Diabetes mellitus (CMS/[facility])    • Hyperlipidemia    • Hypertension    • Obesity      Past Surgical History:   Procedure Laterality Date   • ANKLE ARTHROSCOPY W/ OPEN REPAIR     • APPENDECTOMY     • CARDIAC CATHETERIZATION      PCI to circumflex   • CORONARY ARTERY BYPASS GRAFT N/A 10/8/2020    Procedure: STERNOTOMY, CORONARY ARTERY BYPASS GRAFTING TIME 4 WITH LEFT KELSI  AND ENDOSCOPICALLY HARVESTED LEFT GREATER SAPHENOUS VEIN AND PRP.;  Surgeon: Jr Girma Chiu MD;  Location: [facility];  Service: Cardiothoracic;  Laterality: N/A;   • HERNIA REPAIR     • TONSILLECTOMY       General Information     Row Name 10/11/20 1305          Physical Therapy Time and Intention    Document Type  therapy note (daily note)  -DB     Mode of Treatment  individual therapy;physical therapy  -DB     Row Name 10/11/20 1309          General Information    Patient Profile Reviewed  yes  -DB     Row Name 10/11/20 1309          Safety Issues, Functional Mobility    Safety Issues Affecting Function (Mobility)  awareness of need for assistance;insight into deficits/self-awareness  -DB       User Key  (r) = Recorded By,  (t) = Taken By, (c) = Cosigned By    Initials Name Provider Type    DB Lola Gregg PT Physical Therapist        Mobility     Row Name 10/11/20 1305          Bed Mobility    Bed Mobility  sit-supine  -DB     Sit-Supine [place] (Bed Mobility)  moderate assist (50% patient effort) assist with BLE into bed  -DB     Assistive Device (Bed Mobility)  bed rails  -DB     Row Name 10/11/20 1305          Sit-Stand Transfer    Sit-Stand [place] (Transfers)  minimum assist (75% patient effort)  -DB     Assistive Device (Sit-Stand Transfers)  walker, front-wheeled  -DB     Row Name 10/11/20 1305          Gait/Stairs (Locomotion)    [place] Level (Gait)  contact guard;verbal cues;nonverbal cues (demo/gesture)  -DB     Assistive Device (Gait)  walker, front-wheeled  -DB     Distance in Feet (Gait)  140'  -DB     Deviations/Abnormal Patterns (Gait)  gait speed decreased;stride length decreased;festinating/shuffling  -DB     Bilateral Gait Deviations  heel strike decreased;forward flexed posture  -DB     Comment (Gait/Stairs)  pt had LOB backward into chair upon standing 1st attempt. trialed use of RW. pt demo'd improved balance with use of AD  -DB       User Key  (r) = Recorded By, (t) = Taken By, (c) = Cosigned By    Initials Name Provider Type    Lola Pisano PT Physical Therapist        Obj/Interventions     Row Name 10/11/20 1308          Range of Motion Comprehensive    General Range of Motion  no range of motion deficits identified  -DB     Row Name 10/11/20 1307          Strength Comprehensive (MMT)    General Manual Muscle Testing (MMT) Assessment  no strength deficits identified  -DB     Row Name 10/11/20 1307          Balance    Balance Assessment  sitting static balance;sitting dynamic balance;standing static balance  -DB     Static Sitting Balance  WFL;supported;sitting in chair  -DB     Dynamic Sitting Balance  WFL;supported;sitting in chair  -DB     Static Standing Balance  moderate  impairment;unsupported;standing  -DB       User Key  (r) = Recorded By, (t) = Taken By, (c) = Cosigned By    Initials Name Provider Type    Lola Pisano PT Physical Therapist        Goals/Plan    No documentation.       Clinical Impression     Row Name 10/11/20 1304          Pain Scale: Numbers Pre/Post-Treatment    Pretreatment Pain Rating  0/10 - no pain  -DB     Posttreatment Pain Rating  0/10 - no pain  -DB     Row Name 10/11/20 1309          Plan of Care Review    Plan of Care Reviewed With  patient  -DB     Progress  improving  -DB     Outcome Summary  Pt agreeable to therapy this date. Pt was seated in chair at start of session. Sarmad to stand and pt had LOB backward into chair upon standing. trialed using a RW, although pt does not use at baseline. Pt demo'd improved balance with use of AD, Sarmad to stand at the RW and CGA with verbal cues for safety with the walker when performing gait. Pt has dec'd insight into deficits/need for assitance. Pt reported he didn't think he needed the walker when ambulating in the hallway but was reminded that without it, he had a LOB in the room. Pt ambulated CGA with dec'd gait speed 120' with RW in the hallway. Returned to bed, modA to assist with BLE back into bed. Pt continues to benefit from PT.  -DB     Row Name 10/11/20 1305          Vital Signs    O2 Delivery Pre Treatment  supplemental O2 8L  -DB     O2 Delivery Intra Treatment  supplemental O2 8L  -DB     Post SpO2 (%)  95  -DB     O2 Delivery Post Treatment  supplemental O2 8L  -DB     Pre Patient Position  Sitting  -DB     Intra Patient Position  Standing  -DB     Post Patient Position  Supine  -DB     Row Name 10/11/20 1309          Positioning and Restraints    Pre-Treatment Position  sitting in chair/recliner  -DB     Post Treatment Position  bed  -DB     In Bed  notified nsg;supine;call light within reach;encouraged to call for assist;exit alarm on  -DB       User Key  (r) = Recorded By, (t) = Taken By,  (c) = Cosigned By    Initials Name Provider Type    Lola Pisano PT Physical Therapist        Outcome Measures     Row Name 10/11/20 1312          How much help from another person do you currently need...    Turning from your back to your side while in flat bed without using bedrails?  3  -DB     Moving from lying on back to sitting on the side of a flat bed without bedrails?  2  -DB     Moving to and from a bed to a chair (including a wheelchair)?  2  -DB     Standing up from a chair using your arms (e.g., wheelchair, bedside chair)?  2  -DB     Climbing 3-5 steps with a railing?  1  -DB     To walk in hospital room?  3  -DB     AM-PAC 6 Clicks Score (PT)  13  -DB     Row Name 10/11/20 1312          Functional Assessment    Outcome Measure Options  AM-PAC 6 Clicks Basic Mobility (PT)  -DB       User Key  (r) = Recorded By, (t) = Taken By, (c) = Cosigned By    Initials Name Provider Type    Lola Pisano PT Physical Therapist        Physical Therapy Education                 Title: PT OT SLP Therapies (In Progress)     Topic: Physical Therapy (In Progress)     Point: Mobility training (In Progress)     Learning Progress Summary           Patient Acceptance, E, NR by DB at 10/11/2020 1313    Acceptance, E, NR by DB at 10/10/2020 1031    Acceptance, E, NR by CHET at 10/9/2020 0905                   Point: Home exercise program (In Progress)     Learning Progress Summary           Patient Acceptance, E, NR by DB at 10/11/2020 1313    Acceptance, E, NR by DB at 10/10/2020 1031    Acceptance, E, NR by CHET at 10/9/2020 0905                   Point: Body mechanics (In Progress)     Learning Progress Summary           Patient Acceptance, E, NR by DB at 10/11/2020 1313    Acceptance, E, NR by DB at 10/10/2020 1031                   Point: Precautions (In Progress)     Learning Progress Summary           Patient Acceptance, E, NR by DB at 10/11/2020 1313    Acceptance, E, NR by DB at 10/10/2020 1031                                User Key     Initials Effective Dates Name Provider Type Discipline    DB 01/22/20 -  Lola Gregg PT Physical Therapist PT    CJ 09/16/20 -  Javy Cabrera PT Student PT              PT Recommendation and Plan     Plan of Care Reviewed With: patient  Progress: improving  Outcome Summary: Pt agreeable to therapy this date. Pt was seated in chair at start of session. Sarmad to stand and pt had LOB backward into chair upon standing. trialed using a RW, although pt does not use at baseline. Pt demo'd improved balance with use of AD, Sarmad to stand at the RW and CGA with verbal cues for safety with the walker when performing gait. Pt has dec'd insight into deficits/need for assitance. Pt reported he didn't think he needed the walker when ambulating in the hallway but was reminded that without it, he had a LOB in the room. Pt ambulated CGA with dec'd gait speed 120' with RW in the hallway. Returned to bed, modA to assist with BLE back into bed. Pt continues to benefit from PT.     Time Calculation:   PT Charges     Row Name 10/11/20 1314             Time Calculation    Start Time  1129  -DB      Stop Time  1146  -DB      Time Calculation (min)  17 min  -DB      PT Received On  10/11/20  -DB      PT - Next Appointment  10/12/20  -DB         Time Calculation- PT    Total Timed Code Minutes- PT  17 minute(s)  -DB        User Key  (r) = Recorded By, (t) = Taken By, (c) = Cosigned By    Initials Name Provider Type    DB Lola Gregg, SONDRA Physical Therapist        Therapy Charges for Today     Code Description Service Date Service Provider Modifiers Qty    80190559094 HC PT THER PROC EA 15 MIN 10/10/2020 Lola Gregg, PT GP 1    51677873490 HC PT THER PROC EA 15 MIN 10/11/2020 Lola Gregg, PT GP 1          PT G-Codes  Outcome Measure Options: AM-PAC 6 Clicks Basic Mobility (PT)  AM-PAC 6 Clicks Score (PT): 13    Lola Gregg PT  10/11/2020

## 2020-10-12 ENCOUNTER — APPOINTMENT (OUTPATIENT)
Dept: GENERAL RADIOLOGY | Facility: HOSPITAL | Age: 55
End: 2020-10-12

## 2020-10-12 LAB
ANION GAP SERPL CALCULATED.3IONS-SCNC: 8.6 MMOL/L (ref 5–15)
BUN SERPL-MCNC: 22 MG/DL (ref 6–20)
BUN/CREAT SERPL: 29.3 (ref 7–25)
CALCIUM SPEC-SCNC: 8.9 MG/DL (ref 8.6–10.5)
CHLORIDE SERPL-SCNC: 97 MMOL/L (ref 98–107)
CO2 SERPL-SCNC: 26.4 MMOL/L (ref 22–29)
CREAT SERPL-MCNC: 0.75 MG/DL (ref 0.76–1.27)
DEPRECATED RDW RBC AUTO: 41.2 FL (ref 37–54)
ERYTHROCYTE [DISTWIDTH] IN BLOOD BY AUTOMATED COUNT: 12.4 % (ref 12.3–15.4)
GFR SERPL CREATININE-BSD FRML MDRD: 109 ML/MIN/1.73
GLUCOSE BLDC GLUCOMTR-MCNC: 197 MG/DL (ref 70–130)
GLUCOSE BLDC GLUCOMTR-MCNC: 210 MG/DL (ref 70–130)
GLUCOSE BLDC GLUCOMTR-MCNC: 233 MG/DL (ref 70–130)
GLUCOSE BLDC GLUCOMTR-MCNC: 236 MG/DL (ref 70–130)
GLUCOSE SERPL-MCNC: 154 MG/DL (ref 65–99)
HCT VFR BLD AUTO: 33.3 % (ref 37.5–51)
HGB BLD-MCNC: 11.4 G/DL (ref 13–17.7)
MCH RBC QN AUTO: 30.9 PG (ref 26.6–33)
MCHC RBC AUTO-ENTMCNC: 34.2 G/DL (ref 31.5–35.7)
MCV RBC AUTO: 90.2 FL (ref 79–97)
PLATELET # BLD AUTO: 283 10*3/MM3 (ref 140–450)
PMV BLD AUTO: 9.8 FL (ref 6–12)
POTASSIUM SERPL-SCNC: 4.1 MMOL/L (ref 3.5–5.2)
RBC # BLD AUTO: 3.69 10*6/MM3 (ref 4.14–5.8)
SODIUM SERPL-SCNC: 132 MMOL/L (ref 136–145)
WBC # BLD AUTO: 10.71 10*3/MM3 (ref 3.4–10.8)

## 2020-10-12 PROCEDURE — 82962 GLUCOSE BLOOD TEST: CPT

## 2020-10-12 PROCEDURE — 63710000001 INSULIN GLARGINE PER 5 UNITS: Performed by: HOSPITALIST

## 2020-10-12 PROCEDURE — 85027 COMPLETE CBC AUTOMATED: CPT | Performed by: THORACIC SURGERY (CARDIOTHORACIC VASCULAR SURGERY)

## 2020-10-12 PROCEDURE — 63710000001 INSULIN LISPRO (HUMAN) PER 5 UNITS: Performed by: HOSPITALIST

## 2020-10-12 PROCEDURE — 97110 THERAPEUTIC EXERCISES: CPT

## 2020-10-12 PROCEDURE — 94799 UNLISTED PULMONARY SVC/PX: CPT

## 2020-10-12 PROCEDURE — 63710000001 INSULIN LISPRO (HUMAN) PER 5 UNITS: Performed by: THORACIC SURGERY (CARDIOTHORACIC VASCULAR SURGERY)

## 2020-10-12 PROCEDURE — 25010000002 FUROSEMIDE PER 20 MG: Performed by: NURSE PRACTITIONER

## 2020-10-12 PROCEDURE — 25010000002 ENOXAPARIN PER 10 MG: Performed by: THORACIC SURGERY (CARDIOTHORACIC VASCULAR SURGERY)

## 2020-10-12 PROCEDURE — 80048 BASIC METABOLIC PNL TOTAL CA: CPT | Performed by: THORACIC SURGERY (CARDIOTHORACIC VASCULAR SURGERY)

## 2020-10-12 PROCEDURE — 71045 X-RAY EXAM CHEST 1 VIEW: CPT

## 2020-10-12 PROCEDURE — 99024 POSTOP FOLLOW-UP VISIT: CPT | Performed by: NURSE PRACTITIONER

## 2020-10-12 RX ORDER — FUROSEMIDE 10 MG/ML
40 INJECTION INTRAMUSCULAR; INTRAVENOUS ONCE
Status: COMPLETED | OUTPATIENT
Start: 2020-10-12 | End: 2020-10-12

## 2020-10-12 RX ORDER — BISACODYL 10 MG
10 SUPPOSITORY, RECTAL RECTAL ONCE
Status: DISCONTINUED | OUTPATIENT
Start: 2020-10-12 | End: 2020-10-14 | Stop reason: HOSPADM

## 2020-10-12 RX ORDER — FUROSEMIDE 40 MG/1
40 TABLET ORAL DAILY
Status: DISCONTINUED | OUTPATIENT
Start: 2020-10-13 | End: 2020-10-14

## 2020-10-12 RX ORDER — ROSUVASTATIN CALCIUM 20 MG/1
20 TABLET, COATED ORAL NIGHTLY
Status: DISCONTINUED | OUTPATIENT
Start: 2020-10-12 | End: 2020-10-14 | Stop reason: HOSPADM

## 2020-10-12 RX ADMIN — POLYETHYLENE GLYCOL 3350 17 G: 17 POWDER, FOR SOLUTION ORAL at 08:55

## 2020-10-12 RX ADMIN — INSULIN LISPRO 5 UNITS: 100 INJECTION, SOLUTION INTRAVENOUS; SUBCUTANEOUS at 07:10

## 2020-10-12 RX ADMIN — PANTOPRAZOLE SODIUM 40 MG: 40 TABLET, DELAYED RELEASE ORAL at 08:55

## 2020-10-12 RX ADMIN — INSULIN GLARGINE 20 UNITS: 100 INJECTION, SOLUTION SUBCUTANEOUS at 08:56

## 2020-10-12 RX ADMIN — GUAIFENESIN 1200 MG: 600 TABLET, EXTENDED RELEASE ORAL at 21:24

## 2020-10-12 RX ADMIN — LISINOPRIL 5 MG: 5 TABLET ORAL at 08:55

## 2020-10-12 RX ADMIN — DOXYCYCLINE 100 MG: 100 CAPSULE ORAL at 08:54

## 2020-10-12 RX ADMIN — POTASSIUM CHLORIDE 20 MEQ: 10 CAPSULE, COATED, EXTENDED RELEASE ORAL at 08:55

## 2020-10-12 RX ADMIN — IPRATROPIUM BROMIDE AND ALBUTEROL SULFATE 3 ML: 2.5; .5 SOLUTION RESPIRATORY (INHALATION) at 14:22

## 2020-10-12 RX ADMIN — METOPROLOL TARTRATE 100 MG: 50 TABLET, FILM COATED ORAL at 21:23

## 2020-10-12 RX ADMIN — ACETYLCYSTEINE 3 ML: 200 SOLUTION ORAL; RESPIRATORY (INHALATION) at 14:22

## 2020-10-12 RX ADMIN — TRAMADOL HYDROCHLORIDE 50 MG: 50 TABLET, FILM COATED ORAL at 12:25

## 2020-10-12 RX ADMIN — FUROSEMIDE 40 MG: 40 INJECTION, SOLUTION INTRAMUSCULAR; INTRAVENOUS at 09:19

## 2020-10-12 RX ADMIN — INSULIN LISPRO 5 UNITS: 100 INJECTION, SOLUTION INTRAVENOUS; SUBCUTANEOUS at 12:18

## 2020-10-12 RX ADMIN — ROSUVASTATIN CALCIUM 20 MG: 20 TABLET, FILM COATED ORAL at 21:27

## 2020-10-12 RX ADMIN — METOPROLOL TARTRATE 100 MG: 50 TABLET, FILM COATED ORAL at 08:54

## 2020-10-12 RX ADMIN — INSULIN LISPRO 7 UNITS: 100 INJECTION, SOLUTION INTRAVENOUS; SUBCUTANEOUS at 12:19

## 2020-10-12 RX ADMIN — INSULIN LISPRO 7 UNITS: 100 INJECTION, SOLUTION INTRAVENOUS; SUBCUTANEOUS at 18:30

## 2020-10-12 RX ADMIN — ENOXAPARIN SODIUM 40 MG: 40 INJECTION SUBCUTANEOUS at 18:30

## 2020-10-12 RX ADMIN — INSULIN LISPRO 2 UNITS: 100 INJECTION, SOLUTION INTRAVENOUS; SUBCUTANEOUS at 18:30

## 2020-10-12 RX ADMIN — CYCLOBENZAPRINE 10 MG: 10 TABLET, FILM COATED ORAL at 12:25

## 2020-10-12 RX ADMIN — DOXYCYCLINE 100 MG: 100 CAPSULE ORAL at 21:24

## 2020-10-12 RX ADMIN — GUAIFENESIN 1200 MG: 600 TABLET, EXTENDED RELEASE ORAL at 08:55

## 2020-10-12 RX ADMIN — ASPIRIN 81 MG: 81 TABLET, COATED ORAL at 08:55

## 2020-10-12 RX ADMIN — INSULIN LISPRO 7 UNITS: 100 INJECTION, SOLUTION INTRAVENOUS; SUBCUTANEOUS at 07:11

## 2020-10-12 NOTE — PLAN OF CARE
Problem: Adult Inpatient Plan of Care  Goal: Plan of Care Review  Recent Flowsheet Documentation  Taken 10/12/2020 1145 by Tiera Landin, PT  Outcome Summary:   Limited progress towards goals during PT, seems confused during conversation and when providing instructions.  Able to ambulate 20' w/ min A using RW, several loss of balance episodes towards L   activity limted by incontinence issues.  Hopeful for progress for DC to home with home PT.    ..Patient was intermittently wearing a face mask during this therapy encounter. Therapist used appropriate personal protective equipment including eye protection, mask, and gloves.  Mask used was standard procedure mask. Appropriate PPE was worn during the entire therapy session. Hand hygiene was completed before and after therapy session. Patient is not in enhanced droplet precautions.

## 2020-10-12 NOTE — NURSING NOTE
"Diabetes Education  Assessment/Teaching    Patient Name:  Jd Velazquez  YOB: 1965  MRN: [id number]  Admit Date:  10/6/2020      Assessment Date:  10/12/2020    Most Recent Value   General Information    Referral From:  Other- RN staff. Meet with 56 y/o at bedside to initiate insulin instructions.    Height  182.9 cm (72\")   Height Method  Stated   Weight  114 kg (252 lb 4 oz)   Weight Method  Standing scale   Diabetes History   What type of diabetes do you have?  Type 2   Length of Diabetes Diagnosis  -- pt not able to tell me length of DM dx.    Do you test your blood sugar at home?  no   Have you had high blood sugar? (>140mg/dl)  yes -current a1C >9%.   Do you have any diabetes complications?  circulation problems   Education Preferences   Barriers to Learning  --acuity. pt still with O2. Cultural. E.g pt reports he dc'd all rx'd meds on his own 6 yrs ago.    Assessment Topics   Taking Medication - Assessment  Needs education [anticipate pt will dc new to insulin based on current orders and insulin needs. ]   Reducing Risk - Assessment  Needs education   Healthy Coping - Assessment  Competent. supportive spouse arrived to room.    Monitoring - Assessment  Competent   DM Goals   Monitoring - Goal  0-7 days from discharge   Contact Plan  Follow-up medical care            Most Recent Value   DM Education Needs   Meter  Free sample BG Meter provided to pt.    Meter Type  One Touch Verio meter/start kit. Give instructions for lancing device. Pt, spouse both report they are familiar with how to use a meter.    Frequency of Testing  AC/HS   Blood Glucose Target Range  -- target range as an outpt.    Medication  Oral, Insulin, Administration sites, Vial, correct use of insulin pen. Instruct pt, spouse on Lantus/Humalog names/scheduling/indication. Pt states a preference of insulin via pen at DC.   Reducing Risks  -- warn pt of increased risk for infection w/high BGs >180.   Healthy Coping  " Appropriate   Discharge Plan  Home, Follow-up with MD [home with home health. ]   Motivation  Moderate, Engaged   Teaching Method  Explanation, Discussion, Demonstration, Handouts   Patient Response  Needs reinforcement. Discuss plan of care with bedside RN.       Other Comments: Pls provide pt with One Touch Verio test strips rx and Delica lancets for dc.   Electronically signed by:  Sherly Franco RN, BSN, CDE   10/12/20 13:09 EDT

## 2020-10-12 NOTE — PROGRESS NOTES
" LOS: 6 days   Patient Care Team:  Dacia Newsome APRN as PCP - General (Nurse Practitioner)    Chief Complaint: post op    Subjective:  Symptoms:  He reports cough.  No shortness of breath or chest pain.    Diet:  Adequate intake.  No nausea or vomiting.    Activity level: Returning to normal.    Pain:  He complains of pain that is mild.  Pain is well controlled and requiring pain medication.      Vital Signs  Temp:  [98 °F (36.7 °C)-98.1 °F (36.7 °C)] 98 °F (36.7 °C)  Heart Rate:  [67-95] 95  Resp:  [16-18] 18  BP: (104-133)/(61-78) 125/72  Body mass index is 34.21 kg/m².    Intake/Output Summary (Last 24 hours) at 10/12/2020 0817  Last data filed at 10/12/2020 0744  Gross per 24 hour   Intake 750 ml   Output 1150 ml   Net -400 ml     I/O this shift:  In: 240 [P.O.:240]  Out: -           10/10/20  0513 10/11/20  0418 10/12/20  0539   Weight: 114 kg (251 lb 3.2 oz) 113 kg (250 lb) 114 kg (252 lb 4 oz)     Objective:  General Appearance:  Comfortable and in no acute distress.    Vital signs: (most recent): Blood pressure 125/72, pulse 95, temperature 98 °F (36.7 °C), temperature source Oral, resp. rate 18, height 182.9 cm (72\"), weight 114 kg (252 lb 4 oz), SpO2 95 %.  Vital signs are normal.  No fever.    Output: Producing urine.    Lungs:  Normal effort and normal respiratory rate.  There are decreased breath sounds.    Heart: Normal rate.  Regular rhythm.  (SR on tele monitor)  Abdomen: Abdomen is soft.  Bowel sounds are normal.     Extremities: There is dependent edema.  (Trace bilateral LE edema)  Pulses: Distal pulses are intact.    Neurological: Patient is alert and oriented to person, place and time.    Skin:  Warm and dry.  (Sternal wound clean, dry, and intact)        Results Review:        WBC WBC   Date Value Ref Range Status   10/12/2020 10.71 3.40 - 10.80 10*3/mm3 Final   10/11/2020 14.96 (H) 3.40 - 10.80 10*3/mm3 Final   10/10/2020 15.14 (H) 3.40 - 10.80 10*3/mm3 Final      HGB Hemoglobin "   Date Value Ref Range Status   10/12/2020 11.4 (L) 13.0 - 17.7 g/dL Final   10/11/2020 12.8 (L) 13.0 - 17.7 g/dL Final   10/10/2020 12.9 (L) 13.0 - 17.7 g/dL Final      HCT Hematocrit   Date Value Ref Range Status   10/12/2020 33.3 (L) 37.5 - 51.0 % Final   10/11/2020 37.9 37.5 - 51.0 % Final   10/10/2020 37.8 37.5 - 51.0 % Final      Platelets Platelets   Date Value Ref Range Status   10/12/2020 283 140 - 450 10*3/mm3 Final   10/11/2020 270 140 - 450 10*3/mm3 Final   10/10/2020 224 140 - 450 10*3/mm3 Final        PT/INR:  No results found for: PROTIME/No results found for: INR    Sodium Sodium   Date Value Ref Range Status   10/12/2020 132 (L) 136 - 145 mmol/L Final   10/11/2020 131 (L) 136 - 145 mmol/L Final   10/10/2020 133 (L) 136 - 145 mmol/L Final      Potassium Potassium   Date Value Ref Range Status   10/12/2020 4.1 3.5 - 5.2 mmol/L Final   10/11/2020 4.6 3.5 - 5.2 mmol/L Final   10/10/2020 4.1 3.5 - 5.2 mmol/L Final      Chloride Chloride   Date Value Ref Range Status   10/12/2020 97 (L) 98 - 107 mmol/L Final   10/11/2020 95 (L) 98 - 107 mmol/L Final   10/10/2020 99 98 - 107 mmol/L Final      Bicarbonate CO2   Date Value Ref Range Status   10/12/2020 26.4 22.0 - 29.0 mmol/L Final   10/11/2020 24.5 22.0 - 29.0 mmol/L Final   10/10/2020 23.0 22.0 - 29.0 mmol/L Final      BUN BUN   Date Value Ref Range Status   10/12/2020 22 (H) 6 - 20 mg/dL Final   10/11/2020 19 6 - 20 mg/dL Final   10/10/2020 13 6 - 20 mg/dL Final      Creatinine Creatinine   Date Value Ref Range Status   10/12/2020 0.75 (L) 0.76 - 1.27 mg/dL Final   10/11/2020 0.97 0.76 - 1.27 mg/dL Final   10/10/2020 0.80 0.76 - 1.27 mg/dL Final      Calcium Calcium   Date Value Ref Range Status   10/12/2020 8.9 8.6 - 10.5 mg/dL Final   10/11/2020 9.8 8.6 - 10.5 mg/dL Final   10/10/2020 8.8 8.6 - 10.5 mg/dL Final      Magnesium No results found for: MG       acetylcysteine, 3 mL, Nebulization, TID - RT  aspirin, 81 mg, Oral, Daily  atorvastatin, 80 mg,  Oral, Nightly  doxycycline, 100 mg, Oral, Q12H  enoxaparin, 40 mg, Subcutaneous, Q24H  furosemide, 40 mg, Oral, Daily  gabapentin, 400 mg, Oral, Q12H  guaiFENesin, 1,200 mg, Oral, Q12H  insulin glargine, 20 Units, Subcutaneous, Daily  insulin lispro, 0-14 Units, Subcutaneous, TID AC  insulin lispro, 7 Units, Subcutaneous, TID With Meals  lisinopril, 5 mg, Oral, Q24H  metoprolol tartrate, 100 mg, Oral, Q12H  mupirocin, , Each Nare, BID  pantoprazole, 40 mg, Oral, QAM  polyethylene glycol, 17 g, Oral, BID  potassium chloride, 20 mEq, Oral, Daily  senna-docusate sodium, 2 tablet, Oral, Nightly           Patient Active Problem List   Diagnosis Code   • CAD (coronary artery disease) I25.10   • Coronary artery disease of native artery of native heart with stable angina pectoris (CMS/[facility]) I25.118   • Type 2 diabetes mellitus with hyperglycemia, without long-term current use of insulin (CMS/[facility]) E11.65   • Hyperlipidemia E78.5   • Hypertension I10   • Obesity E66.9   • SHAHLA (obstructive sleep apnea) G47.33   • Tobacco abuse Z72.0       Assessment & Plan   -NSTEMI, multivessel coronary artery disease with previous PCI 2014--s/p CABGx4 [facility]/EVH left SVG--POD#4 [place]  - ischemic cardiomyopathy  - hypertension--poorly controlled  - hyperlipidemia--patient reports previous intolerance to Lipitor--will try crestor  - DM II--non-compliant with medications  - SHAHLA with home CPAP  - nicotine dependence--encourage cessation--patient declines nicotine patch at this time  - obesity  -leukocytosis- probable reactive--trending down    CV stable  Still requiring 6L hi flow--wean as able  Weight increased and CXR with congestion--IV diurese today  Continue mucinex and nebs and aggressive pulmonary toilet  Increase activity--starting to walk further with PT  If no BM by the afternoon--will give suppository  Continue routine care    Suzy Kitchen, SOHAM  10/12/20  08:17 EDT

## 2020-10-12 NOTE — PROGRESS NOTES
"DAILY PROGRESS NOTE  [facility]    Patient Identification:  Name: Jd Velazquez  Age: 55 y.o.  Sex: male  :  1965  MRN: [id number]         Primary Care Physician: Dacia Newsome APRN    Subjective:  Interval History:He is a little short of air. O 2 at 5 L .    Objective:    Scheduled Meds:acetylcysteine, 3 mL, Nebulization, TID - RT  aspirin, 81 mg, Oral, Daily  bisacodyl, 10 mg, Rectal, Once  doxycycline, 100 mg, Oral, Q12H  enoxaparin, 40 mg, Subcutaneous, Q24H  [START ON 10/13/2020] furosemide, 40 mg, Oral, Daily  guaiFENesin, 1,200 mg, Oral, Q12H  insulin glargine, 20 Units, Subcutaneous, Daily  insulin lispro, 0-14 Units, Subcutaneous, TID AC  insulin lispro, 7 Units, Subcutaneous, TID With Meals  lisinopril, 5 mg, Oral, Q24H  metoprolol tartrate, 100 mg, Oral, Q12H  mupirocin, , Each Nare, BID  pantoprazole, 40 mg, Oral, QAM  polyethylene glycol, 17 g, Oral, BID  potassium chloride, 20 mEq, Oral, Daily  rosuvastatin, 20 mg, Oral, Nightly  senna-docusate sodium, 2 tablet, Oral, Nightly      Continuous Infusions:     Vital signs in last 24 hours:  Temp:  [97.6 °F (36.4 °C)-98.1 °F (36.7 °C)] 97.7 °F (36.5 °C)  Heart Rate:  [72-95] 78  Resp:  [18] 18  BP: (109-131)/(64-78) 123/68    Intake/Output:    Intake/Output Summary (Last 24 hours) at 10/12/2020 1543  Last data filed at 10/12/2020 1256  Gross per 24 hour   Intake 750 ml   Output 1500 ml   Net -750 ml       Exam:  /68 (BP Location: Right arm, Patient Position: Lying)   Pulse 78   Temp 97.7 °F (36.5 °C) (Temporal)   Resp 18   Ht 182.9 cm (72\")   Wt 114 kg (252 lb 4 oz)   SpO2 94%   BMI 34.21 kg/m²     General Appearance:    Alert, cooperative, no distress   Head:    Normocephalic, without obvious abnormality, atraumatic   Eyes:       Throat:   Lips, tongue, gums normal   Neck:   Supple, symmetrical, trachea midline, no JVD   Lungs:     Clear to auscultation bilaterally, respirations unlabored   Chest Wall:    " Surgical changes    Heart:    Regular rate and rhythm, S1 and S2 normal, no murmur,no  Rub or gallop   Abdomen:     Soft, nontender, bowel sounds active, no masses, no organomegaly    Extremities:   Extremities normal, atraumatic, no cyanosis or edema   Pulses:      Skin:   Skin is warm and dry,  no rashes or palpable lesions   Neurologic:   no focal deficits noted      Lab Results (last 72 hours)     Procedure Component Value Units Date/Time    POC Glucose Once [[id number]]  (Abnormal) Collected: 10/12/20 1525    Specimen: Blood Updated: 10/12/20 1527     Glucose 197 mg/dL     POC Glucose Once [[id number]]  (Abnormal) Collected: 10/12/20 1025    Specimen: Blood Updated: 10/12/20 1026     Glucose 236 mg/dL     POC Glucose Once [[id number]]  (Abnormal) Collected: 10/12/20 0632    Specimen: Blood Updated: 10/12/20 0633     Glucose 210 mg/dL     Basic Metabolic Panel [[id number]]  (Abnormal) Collected: 10/12/20 0333    Specimen: Blood Updated: 10/12/20 0500     Glucose 154 mg/dL      BUN 22 mg/dL      Creatinine 0.75 mg/dL      Sodium 132 mmol/L      Potassium 4.1 mmol/L      Chloride 97 mmol/L      CO2 26.4 mmol/L      Calcium 8.9 mg/dL      eGFR Non African Amer 109 mL/min/1.73      BUN/Creatinine Ratio 29.3     Anion Gap 8.6 mmol/L     Narrative:      GFR Normal >60  Chronic Kidney Disease <60  Kidney Failure <15      CBC (No Diff) [[id number]]  (Abnormal) Collected: 10/12/20 0333    Specimen: Blood Updated: 10/12/20 0431     WBC 10.71 10*3/mm3      RBC 3.69 10*6/mm3      Hemoglobin 11.4 g/dL      Hematocrit 33.3 %      MCV 90.2 fL      MCH 30.9 pg      MCHC 34.2 g/dL      RDW 12.4 %      RDW-SD 41.2 fl      MPV 9.8 fL      Platelets 283 10*3/mm3     POC Glucose Once [[id number]]  (Abnormal) Collected: 10/11/20 1524    Specimen: Blood Updated: 10/11/20 1525     Glucose 257 mg/dL     POC Glucose Once [[id number]]  (Abnormal) Collected: 10/11/20 1038    Specimen: Blood Updated: 10/11/20 1040     Glucose 277 mg/dL     POC  Glucose Once [[id number]]  (Abnormal) Collected: 10/11/20 0540    Specimen: Blood Updated: 10/11/20 0542     Glucose 233 mg/dL     Basic Metabolic Panel [[id number]]  (Abnormal) Collected: 10/11/20 0311    Specimen: Blood Updated: 10/11/20 0431     Glucose 232 mg/dL      BUN 19 mg/dL      Creatinine 0.97 mg/dL      Sodium 131 mmol/L      Potassium 4.6 mmol/L      Chloride 95 mmol/L      CO2 24.5 mmol/L      Calcium 9.8 mg/dL      eGFR Non African Amer 81 mL/min/1.73      BUN/Creatinine Ratio 19.6     Anion Gap 11.5 mmol/L     Narrative:      GFR Normal >60  Chronic Kidney Disease <60  Kidney Failure <15      CBC (No Diff) [[id number]]  (Abnormal) Collected: 10/11/20 0311    Specimen: Blood Updated: 10/11/20 0405     WBC 14.96 10*3/mm3      RBC 4.12 10*6/mm3      Hemoglobin 12.8 g/dL      Hematocrit 37.9 %      MCV 92.0 fL      MCH 31.1 pg      MCHC 33.8 g/dL      RDW 12.8 %      RDW-SD 42.9 fl      MPV 9.9 fL      Platelets 270 10*3/mm3     POC Glucose Once [[id number]]  (Abnormal) Collected: 10/10/20 1543    Specimen: Blood Updated: 10/10/20 1547     Glucose 251 mg/dL     POC Glucose Once [[id number]]  (Abnormal) Collected: 10/10/20 1033    Specimen: Blood Updated: 10/10/20 1034     Glucose 225 mg/dL     Basic Metabolic Panel [[id number]]  (Abnormal) Collected: 10/10/20 0526    Specimen: Blood Updated: 10/10/20 0746     Glucose 203 mg/dL      BUN 13 mg/dL      Creatinine 0.80 mg/dL      Sodium 133 mmol/L      Potassium 4.1 mmol/L      Chloride 99 mmol/L      CO2 23.0 mmol/L      Calcium 8.8 mg/dL      eGFR Non African Amer 101 mL/min/1.73      BUN/Creatinine Ratio 16.3     Anion Gap 11.0 mmol/L     Narrative:      GFR Normal >60  Chronic Kidney Disease <60  Kidney Failure <15      POC Glucose Once [[id number]]  (Abnormal) Collected: 10/10/20 0654    Specimen: Blood Updated: 10/10/20 0655     Glucose 188 mg/dL     CBC (No Diff) [[id number]]  (Abnormal) Collected: 10/10/20 0526    Specimen: Blood Updated: 10/10/20 0551      WBC 15.14 10*3/mm3      RBC 4.15 10*6/mm3      Hemoglobin 12.9 g/dL      Hematocrit 37.8 %      MCV 91.1 fL      MCH 31.1 pg      MCHC 34.1 g/dL      RDW 12.5 %      RDW-SD 41.7 fl      MPV 10.0 fL      Platelets 224 10*3/mm3     Blood Gas, Arterial [[id number]]  (Abnormal) Collected: 10/09/20 2053    Specimen: Arterial Blood Updated: 10/09/20 2055     Site Arterial: right radial     Fritz's Test N/A     pH, Arterial 7.373 pH units      pCO2, Arterial 39.5 mm Hg      pO2, Arterial 63.0 mm Hg      HCO3, Arterial 23.0 mmol/L      Base Excess, Arterial -2.1 mmol/L      O2 Saturation Calculated 91.2 %      Barometric Pressure for Blood Gas 754.3 mmHg      Modality HFNC     Flow Rate 11 lpm      Rate 20 Breaths/minute     POC Glucose Once [[id number]]  (Abnormal) Collected: 10/09/20 2019    Specimen: Blood Updated: 10/09/20 2021     Glucose 195 mg/dL     POC Glucose Once [[id number]]  (Abnormal) Collected: 10/09/20 1650    Specimen: Blood Updated: 10/09/20 1657     Glucose 166 mg/dL         Data Review:  Results from last 7 days   Lab Units 10/12/20  0333 10/11/20  0311 10/10/20  0526   SODIUM mmol/L 132* 131* 133*   POTASSIUM mmol/L 4.1 4.6 4.1   CHLORIDE mmol/L 97* 95* 99   CO2 mmol/L 26.4 24.5 23.0   BUN mg/dL 22* 19 13   CREATININE mg/dL 0.75* 0.97 0.80   GLUCOSE mg/dL 154* 232* 203*   CALCIUM mg/dL 8.9 9.8 8.8     Results from last 7 days   Lab Units 10/12/20  0333 10/11/20  0311 10/10/20  0526   WBC 10*3/mm3 10.71 14.96* 15.14*   HEMOGLOBIN g/dL 11.4* 12.8* 12.9*   HEMATOCRIT % 33.3* 37.9 37.8   PLATELETS 10*3/mm3 283 270 224         Results from last 7 days   Lab Units 10/07/20  0944   HEMOGLOBIN A1C % 9.40*     No results found for: TROPONINT  Results from last 7 days   Lab Units 10/07/20  0943   CHOLESTEROL mg/dL 213*   TRIGLYCERIDES mg/dL 790*   HDL CHOL mg/dL 29*   LDL CHOL mg/dL 89     Results from last 7 days   Lab Units 10/07/20  0943   ALK PHOS U/L 119*   BILIRUBIN mg/dL 0.4   ALT (SGPT) U/L 13   AST (SGOT)  U/L 16         Results from last 7 days   Lab Units 10/07/20  0944   HEMOGLOBIN A1C % 9.40*     Glucose   Date/Time Value Ref Range Status   10/12/2020 1525 197 (H) 70 - 130 mg/dL Final   10/12/2020 1025 236 (H) 70 - 130 mg/dL Final   10/12/2020 0632 210 (H) 70 - 130 mg/dL Final   10/11/2020 1524 257 (H) 70 - 130 mg/dL Final   10/11/2020 1038 277 (H) 70 - 130 mg/dL Final   10/11/2020 0540 233 (H) 70 - 130 mg/dL Final   10/10/2020 1543 251 (H) 70 - 130 mg/dL Final   10/10/2020 1033 225 (H) 70 - 130 mg/dL Final     Results from last 7 days   Lab Units 10/09/20  0303 10/08/20  1132 10/07/20  0943   INR  1.06 1.10 0.95       Past Medical History:   Diagnosis Date   • CAD (coronary artery disease)    • Diabetes mellitus (CMS/[facility])    • Hyperlipidemia    • Hypertension    • Obesity        Assessment:  Active Hospital Problems    Diagnosis  POA   • **Coronary artery disease of native artery of native heart with stable angina pectoris (CMS/[facility]) [I25.118]  Yes   • CAD (coronary artery disease) [I25.10]  Yes   • Type 2 diabetes mellitus with hyperglycemia, without long-term current use of insulin (CMS/[facility]) [E11.65]  Yes   • Hyperlipidemia [E78.5]  Yes   • Hypertension [I10]  Yes   • Obesity [E66.9]  Yes   • SHAHLA (obstructive sleep apnea) [G47.33]  Unknown   • Tobacco abuse [Z72.0]  Unknown      Resolved Hospital Problems   No resolved problems to display.       Plan:  Will continue with current RX. Follow lab.    Ish Lindsey MD  10/12/2020  15:43 EDT

## 2020-10-12 NOTE — PLAN OF CARE
Goal Outcome Evaluation:  Plan of Care Reviewed With: patient  Progress: improving  Outcome Summary: VSS. Pt able to wean to 3L and occassionally to RA when pt awake. Sleeps most of the time when wife is present, when asleep he requires 5L, however uses cpap at home. Had BM today. Plan to continue to wean him down on O2 as discharge nears.

## 2020-10-12 NOTE — THERAPY TREATMENT NOTE
Patient Name: Jd Velazquez  : 1965    MRN: [id number]                              Today's Date: 10/12/2020       Admit Date: 10/6/2020    Visit Dx:     ICD-10-CM ICD-9-CM   1. Coronary artery disease of native artery of native heart with stable angina pectoris (CMS/HCC)  I25.118 414.01     413.9   2. S/P CABG (coronary artery bypass graft)  Z95.1 V45.81     Patient Active Problem List   Diagnosis   • CAD (coronary artery disease)   • Coronary artery disease of native artery of native heart with stable angina pectoris (CMS/HCC)   • Type 2 diabetes mellitus with hyperglycemia, without long-term current use of insulin (CMS/[facility])   • Hyperlipidemia   • Hypertension   • Obesity   • SHAHLA (obstructive sleep apnea)   • Tobacco abuse     Past Medical History:   Diagnosis Date   • CAD (coronary artery disease)    • Diabetes mellitus (CMS/[facility])    • Hyperlipidemia    • Hypertension    • Obesity      Past Surgical History:   Procedure Laterality Date   • ANKLE ARTHROSCOPY W/ OPEN REPAIR     • APPENDECTOMY     • CARDIAC CATHETERIZATION      PCI to circumflex   • CORONARY ARTERY BYPASS GRAFT N/A 10/8/2020    Procedure: STERNOTOMY, CORONARY ARTERY BYPASS GRAFTING TIME 4 WITH LEFT KELSI  AND ENDOSCOPICALLY HARVESTED LEFT GREATER SAPHENOUS VEIN AND PRP.;  Surgeon: Jr Girma Chiu MD;  Location: [facility];  Service: Cardiothoracic;  Laterality: N/A;   • HERNIA REPAIR     • TONSILLECTOMY       General Information     Row Name 10/12/20 1139          Physical Therapy Time and Intention    Document Type  therapy note (daily note)  -AR     Mode of Treatment  physical therapy  -AR     Row Name 10/12/20 1139          General Information    Patient Profile Reviewed  yes  -AR     Existing Precautions/Restrictions  cardiac;fall;sternal  -AR     Barriers to Rehab  none identified  -AR     Row Name 10/12/20 1139          Cognition    Orientation Status (Cognition)  oriented to;person;place  -AR     Row Name 10/12/20 4332           Safety Issues, Functional Mobility    Comment, Safety Issues/Impairments (Mobility)  seems confused during conversation and difficulty following instructions  -AR       User Key  (r) = Recorded By, (t) = Taken By, (c) = Cosigned By    Initials Name Provider Type    Tiera Ledesma, PT Physical Therapist        Mobility     Row Name 10/12/20 1141          Bed Mobility    Bed Mobility  supine-sit  -AR     Supine-Sit Bacon (Bed Mobility)  maximum assist (25% patient effort)  -AR     Sit-Supine Bacon (Bed Mobility)  not tested  -AR     Assistive Device (Bed Mobility)  bed rails;head of bed elevated;draw sheet  -AR     Comment (Bed Mobility)  HOB near flat, frequent cues for log rolling, cues for iniatinng movement.  difficulty processing commands and that he needs to initiate movements.  -AR     Row Name 10/12/20 1141          Sit-Stand Transfer    Sit-Stand Bacon (Transfers)  contact guard  -AR     Assistive Device (Sit-Stand Transfers)  walker, front-wheeled  -AR     Row Name 10/12/20 1141          Gait/Stairs (Locomotion)    Bacon Level (Gait)  minimum assist (75% patient effort)  -AR     Assistive Device (Gait)  walker, front-wheeled  -AR     Distance in Feet (Gait)  20' unsteady with few LOB towards L side. limited by incontinence issues.  RN notified.  -AR     Deviations/Abnormal Patterns (Gait)  festinating/shuffling;jas decreased  -AR     Bilateral Gait Deviations  heel strike decreased  -AR       User Key  (r) = Recorded By, (t) = Taken By, (c) = Cosigned By    Initials Name Provider Type    Tiera Ledesma, PT Physical Therapist        Obj/Interventions     Row Name 10/12/20 1145          Motor Skills    Therapeutic Exercise  -- B Ap , LAQ 10x  -AR       User Key  (r) = Recorded By, (t) = Taken By, (c) = Cosigned By    Initials Name Provider Type    Tiera Ledesma, PT Physical Therapist        Goals/Plan    No documentation.       Clinical Impression     Row  Name 10/12/20 1145          Pain    Additional Documentation  Pain Scale: Numbers Pre/Post-Treatment (Group)  -AR     Row Name 10/12/20 1145          Pain Scale: Numbers Pre/Post-Treatment    Pretreatment Pain Rating  0/10 - no pain  -AR     Posttreatment Pain Rating  0/10 - no pain  -AR     Pain Intervention(s)  Repositioned;Medication (See MAR)  -AR     Row Name 10/12/20 1145          Plan of Care Review    Outcome Summary  Limited progress towards goals during PT, seems confused during conversation and when providing instructions.  Able to ambulate 20' w/ min A using RW, several loss of balance episodes towards L; activity limted by incontinence issues.  Hopeful for progress for DC to home with home PT.  -AR     Row Name 10/12/20 1145          Therapy Assessment/Plan (PT)    Rehab Potential (PT)  good, to achieve stated therapy goals  -AR     Criteria for Skilled Interventions Met (PT)  yes  -AR     Row Name 10/12/20 1145          Vital Signs    O2 Delivery Pre Treatment  hi-flow 6L  -AR     O2 Delivery Intra Treatment  hi-flow  -AR     Post SpO2 (%)  94  -AR     O2 Delivery Post Treatment  hi-flow  -AR     Row Name 10/12/20 1145          Positioning and Restraints    Pre-Treatment Position  in bed  -AR     Post Treatment Position  chair  -AR     In Chair  notified nsg;reclined;sitting;call light within reach;encouraged to call for assist;exit alarm on  -AR       User Key  (r) = Recorded By, (t) = Taken By, (c) = Cosigned By    Initials Name Provider Type    Tiera Ledesma, PT Physical Therapist        Outcome Measures     Row Name 10/12/20 1141          How much help from another person do you currently need...    Turning from your back to your side while in flat bed without using bedrails?  3  -AR     Moving from lying on back to sitting on the side of a flat bed without bedrails?  2  -AR     Moving to and from a bed to a chair (including a wheelchair)?  3  -AR     Standing up from a chair using your arms  (e.g., wheelchair, bedside chair)?  3  -AR     Climbing 3-5 steps with a railing?  1  -AR     To walk in hospital room?  3  -AR     AM-PAC 6 Clicks Score (PT)  15  -AR     Row Name 10/12/20 1149          Functional Assessment    Outcome Measure Options  AM-PAC 6 Clicks Basic Mobility (PT)  -AR       User Key  (r) = Recorded By, (t) = Taken By, (c) = Cosigned By    Initials Name Provider Type    AR Tiera Landin, PT Physical Therapist        Physical Therapy Education                 Title: PT OT SLP Therapies (In Progress)     Topic: Physical Therapy (In Progress)     Point: Mobility training (In Progress)     Learning Progress Summary           Patient Acceptance, E, NR by AR at 10/12/2020 1151    Acceptance, E, NR by DB at 10/11/2020 1313    Acceptance, E, NR by DB at 10/10/2020 1031    Acceptance, E, NR by CJ at 10/9/2020 0905                   Point: Home exercise program (In Progress)     Learning Progress Summary           Patient Acceptance, E, NR by AR at 10/12/2020 1151    Acceptance, E, NR by DB at 10/11/2020 1313    Acceptance, E, NR by DB at 10/10/2020 1031    Acceptance, E, NR by CJ at 10/9/2020 0905                   Point: Body mechanics (In Progress)     Learning Progress Summary           Patient Acceptance, E, NR by AR at 10/12/2020 1151    Acceptance, E, NR by DB at 10/11/2020 1313    Acceptance, E, NR by DB at 10/10/2020 1031                   Point: Precautions (In Progress)     Learning Progress Summary           Patient Acceptance, E, NR by AR at 10/12/2020 1151    Acceptance, E, NR by DB at 10/11/2020 1313    Acceptance, E, NR by DB at 10/10/2020 1031                               User Key     Initials Effective Dates Name Provider Type Discipline    AR 04/03/18 -  Tiera Landin, PT Physical Therapist PT    DB 01/22/20 -  Lola Gregg, PT Physical Therapist PT    CJ 09/16/20 -  Javy Cabrera PT Student PT              PT Recommendation and Plan     Outcome Summary: Limited  progress towards goals during PT, seems confused during conversation and when providing instructions.  Able to ambulate 20' w/ min A using RW, several loss of balance episodes towards L; activity limted by incontinence issues.  Hopeful for progress for DC to home with home PT.     Time Calculation:   PT Charges     Row Name 10/12/20 1138             Time Calculation    Start Time  0947  -AR      Stop Time  1002  -AR      Time Calculation (min)  15 min  -AR      PT Received On  10/12/20  -AR      PT - Next Appointment  10/13/20  -AR        User Key  (r) = Recorded By, (t) = Taken By, (c) = Cosigned By    Initials Name Provider Type    AR Tiera Landin, PT Physical Therapist        Therapy Charges for Today     Code Description Service Date Service Provider Modifiers Qty    11455169079 HC PT THER PROC EA 15 MIN 10/12/2020 Tiera Landin PT GP 1          PT G-Codes  Outcome Measure Options: AM-PAC 6 Clicks Basic Mobility (PT)  AM-PAC 6 Clicks Score (PT): 15    Tiera Landin PT  10/12/2020

## 2020-10-12 NOTE — PAYOR COMM NOTE
"Jd Velazquez (55 y.o. Male)                  ATTENTION;   CONTINUED STAY CLINICALS FOR REVIEW . REPLY TO UR DEPT,                RASHAAD JOE LPN  [phone] OR CALL  / REF # [id number]        Date of Birth Social Security Number Address Home Phone MRN    [id number]  [id number] [place] [place] [address] [phone] [id number]    Zoroastrian Marital Status          Anglican        Admission Date Admission Type Admitting Provider Attending Provider Department, Room/Bed    10/6/20 Urgent Angelo Egan MD Pagni, Sebastian, MD [facility] CARDIOVASC UNIT, 2221/1    Discharge Date Discharge Disposition Discharge Destination                       Attending Provider: Angelo Egan MD    Allergies: Hydrocodone    Isolation: None   Infection: None   Code Status: CPR    Ht: 182.9 cm (72\")   Wt: 114 kg (252 lb 4 oz)    Admission Cmt: None   Principal Problem: Coronary artery disease of native artery of native heart with stable angina pectoris (CMS/[facility]) [I25.118] More...                 Active Insurance as of 10/6/2020     Primary Coverage     Payor Plan Insurance Group Employer/Plan Group    ANTHEM BLUE CROSS ANTHEM BLUE CROSS BLUE SHIELD PPO [id number]     Payor Plan Address Payor Plan Phone Number Payor Plan Fax Number Effective Dates    [address] [phone]  1/1/2020 - None Entered    [place] [address]       Subscriber Name Subscriber Birth Date Member ID       ARDEN VELAZQUEZ 6/12/1964 [id number]                 Emergency Contacts      (Rel.) Home Phone Work Phone Mobile Phone    ARDEN VELAZQUEZ (Spouse) [phone] -- [phone]            Vital Signs (last day)     Date/Time   Temp   Temp src   Pulse   Resp   BP   Patient Position   SpO2    10/12/20 1051   97.6 (36.4)   Temporal   72   18   109/64   Sitting   --    10/12/20 0744   98 (36.7)   Oral   95   18   125/72   Sitting   95    10/12/20 0337   98.1 (36.7)   Oral   --   18   123/78   " Lying   --    10/11/20 2001   --   --   --   --   131/68   --   --    10/11/20 1938   --   --   79   18   --   --   96    10/11/20 1933   --   --   78   18   --   --   95    10/11/20 1912   --   --   --   --   109/66   --   93    10/11/20 1525   98 (36.7)   Temporal   78   16   105/67   Sitting   98    10/11/20 1330   --   --   72   --   --   --   (!) 85    10/11/20 1245   --   --   67   --   114/64   --   92    10/11/20 1230   --   --   68   --   114/61   --   92    10/11/20 1215   --   --   68   --   104/63   --   92    10/11/20 1200   --   --   68   --   126/64   --   93    10/11/20 1155   --   --   69   --   126/71   Lying   --    10/11/20 1039   98.1 (36.7)   Temporal   93   16   133/77   Sitting   92    10/11/20 0719   97.4 (36.3)   Temporal   89   16   115/69   Sitting   91    10/11/20 0418   98.4 (36.9)   Oral   72   --   116/69   --   91              Oxygen Therapy (last day)     Date/Time   SpO2   Device (Oxygen Therapy)   Flow (L/min)   Oxygen Concentration (%)   ETCO2 (mmHg)    10/12/20 1051   --   high-flow nasal cannula   --   --   --    10/12/20 0850   --   high-flow nasal cannula   5   --   --    10/12/20 0744   95   high-flow nasal cannula   6   --   --    10/12/20 0337   --   high-flow nasal cannula   --   --   --    10/11/20 2015   --   high-flow nasal cannula   6   --   --    10/11/20 1938   96   high-flow nasal cannula   6   --   --    10/11/20 1933   95   high-flow nasal cannula   6   --   --    10/11/20 1912   93   --   --   --   --    10/11/20 1525   98   nasal cannula   --   --   --    10/11/20 1330   (!) 85   high-flow nasal cannula;humidified   9   --   --    10/11/20 1245   92   --   --   --   --    10/11/20 1230   92   --   --   --   --    10/11/20 1215   92   --   --   --   --    10/11/20 1200   93   high-flow nasal cannula   6   --   --    10/11/20 1039   92   nasal cannula   6   --   --    10/11/20 0719   91   nasal cannula   6   --   --    10/11/20 0418   91   --   --   --   --                    Orders (last 24 hrs)      Start     Ordered    10/13/20 0900  furosemide (LASIX) tablet 40 mg  Daily      10/12/20 0826    10/12/20 2100  rosuvastatin (CRESTOR) tablet 20 mg  Nightly      10/12/20 0833    10/12/20 1300  bisacodyl (DULCOLAX) suppository 10 mg  Once      10/12/20 0834    10/12/20 1027  POC Glucose Once  Once      10/12/20 1025    10/12/20 0915  furosemide (LASIX) injection 40 mg  Once      10/12/20 0826    10/12/20 0900  insulin glargine (LANTUS) injection 20 Units  Daily      10/11/20 1117    10/12/20 0634  POC Glucose Once  Once      10/12/20 0632    10/12/20 0300  XR Chest 1 View  1 Time Imaging      10/11/20 0809    10/11/20 1858  Inpatient Diabetes Educator Consult  Once     Provider:  (Not yet assigned)    10/11/20 1903    10/11/20 1526  POC Glucose Once  Once      10/11/20 1524    10/11/20 1346  acetaminophen (TYLENOL) tablet 650 mg  Every 6 Hours PRN      10/11/20 1346    10/11/20 1215  insulin lispro (humaLOG) injection 7 Units  3 Times Daily With Meals      10/11/20 1117    10/11/20 0807  traMADol (ULTRAM) tablet 50 mg  Every 6 Hours PRN      10/11/20 0807    10/11/20 0600  Clean Midsternal Incision & Chest Tube Sites With Hibiclens Beginning on POD 3  Daily      10/08/20 1113    10/10/20 2100  gabapentin (NEURONTIN) capsule 400 mg  Every 12 Hours Scheduled      10/10/20 0908    10/10/20 2100  metoprolol tartrate (LOPRESSOR) tablet 100 mg  Every 12 Hours Scheduled      10/10/20 0911    10/10/20 1100  doxycycline (MONODOX) capsule 100 mg  Every 12 Hours Scheduled      10/10/20 0909    10/10/20 1000  acetylcysteine (MUCOMYST) 20 % nebulizer solution 3 mL  3 Times Daily - RT      10/10/20 0907    10/10/20 0900  furosemide (LASIX) tablet 40 mg  Daily,   Status:  Discontinued      10/10/20 0638    10/10/20 0900  potassium chloride (MICRO-K) CR capsule 20 mEq  Daily      10/10/20 0638    10/10/20 0900  lisinopril (PRINIVIL,ZESTRIL) tablet 5 mg  Every 24 Hours Scheduled      10/10/20  0639    10/10/20 0600  Incision Care - Cleanse With Normal Saline & 4x4 Gauze Using Sterile Technique  Daily      10/08/20 1113    10/10/20 0600  Chest Tube & Pacing Wire Sites - Cleanse With Normal Saline & 4x4 Gauze Using Sterile Technique  Daily      10/08/20 1113    10/10/20 0600  CBC (No Diff)  Daily      10/08/20 1113    10/10/20 0600  Basic Metabolic Panel  Daily      10/08/20 1113    10/09/20 2100  sennosides-docusate (PERICOLACE) 8.6-50 MG per tablet 2 tablet  Nightly      10/08/20 1113    10/09/20 2100  atorvastatin (LIPITOR) tablet 80 mg  Nightly,   Status:  Discontinued      10/09/20 0544    10/09/20 2100  polyethylene glycol (MIRALAX) packet 17 g  2 times daily      10/09/20 1604    10/09/20 1800  enoxaparin (LOVENOX) syringe 40 mg  Every 24 Hours      10/08/20 1113    10/09/20 1400  Intake & Output  Every Shift      10/08/20 1113    10/09/20 1200  Vital Signs  Every 4 Hours     Comments: Perform Vitals Less Frequently Only if Patient Stable      10/08/20 1113    10/09/20 1153  hydrALAZINE (APRESOLINE) injection 20 mg  Every 6 Hours PRN      10/09/20 1154    10/09/20 1145  insulin lispro (humaLOG) injection 0-14 Units  3 Times Daily Before Meals      10/09/20 1058    10/09/20 1100  POC Glucose 4x Daily AC & at Bedtime  4 Times Daily Before Meals & at Bedtime     Comments: If bedtime blood glucose is greater than 350 mg/dl, call MD.      10/09/20 1058    10/09/20 1057  dextrose (D50W) 25 g/ 50mL Intravenous Solution 25 g  Every 15 Minutes PRN      10/09/20 1058    10/09/20 1057  glucagon (human recombinant) (GLUCAGEN DIAGNOSTIC) injection 1 mg  Every 15 Minutes PRN      10/09/20 1058    10/09/20 1057  dextrose (GLUTOSE) oral gel 15 g  Every 15 Minutes PRN      10/09/20 1058    10/09/20 0900  aspirin EC tablet 81 mg  Daily      10/08/20 1113    10/09/20 0900  guaiFENesin (MUCINEX) 12 hr tablet 1,200 mg  Every 12 Hours Scheduled      10/09/20 0729    10/09/20 0830  Oscillating Positive Expiratory Pressure  (OPEP)  4 Times Daily - RT      10/09/20 0736    10/09/20 0728  ipratropium-albuterol (DUO-NEB) nebulizer solution 3 mL  Every 4 Hours PRN      10/09/20 0729    10/09/20 0700  pantoprazole (PROTONIX) EC tablet 40 mg  Every Morning      10/08/20 1113    10/09/20 0546  Oscillating Positive Expiratory Pressure (OPEP)  Every 4 Hours - RT      10/09/20 0545    10/09/20 0000  bisacodyl (DULCOLAX) suppository 10 mg  Daily PRN      10/08/20 1113    10/09/20 0000  magnesium hydroxide (MILK OF MAGNESIA) suspension 2400 mg/10mL 10 mL  Daily PRN      10/08/20 1113    10/09/20 0000  cyclobenzaprine (FLEXERIL) tablet 10 mg  Every 8 Hours PRN      10/08/20 1113    10/08/20 2330  Patient May Use Home CPAP / BIPAP For Sleep  At Bedtime - RT      10/08/20 1113    10/08/20 2100  mupirocin (BACTROBAN) 2 % nasal ointment  2 Times Daily      10/08/20 1113    10/08/20 1200  Check Peripheral Pulses Every 4 Hours  Every 4 Hours      10/08/20 1113    10/08/20 1114  Daily Weights  Daily      10/08/20 1113    10/08/20 1113  Monitor QTc  Every Shift      10/08/20 1113    10/08/20 1113  Incentive Spirometry  Every Hour While Awake      10/08/20 1113    10/08/20 1112  potassium chloride (MICRO-K) CR capsule 40 mEq  As Needed      10/08/20 1113    10/08/20 1112  potassium chloride (KLOR-CON) packet 40 mEq  As Needed      10/08/20 1113    10/08/20 1112  potassium chloride 10 mEq in 100 mL IVPB  Every 1 Hour PRN      10/08/20 1113    10/08/20 1112  potassium chloride 10 mEq in 100 mL IVPB  Every 1 Hour PRN      10/08/20 1113    10/08/20 1112  oxyCODONE (ROXICODONE) immediate release tablet 10 mg  Every 4 Hours PRN      10/08/20 1113    10/08/20 1112  ondansetron (ZOFRAN) injection 4 mg  Every 6 Hours PRN      10/08/20 1113    10/08/20 1112  bisacodyl (DULCOLAX) EC tablet 10 mg  Daily PRN      10/08/20 1113    10/08/20 1112  ALPRAZolam (XANAX) tablet 0.25 mg  Every 8 Hours PRN      10/08/20 1113    10/08/20 0000  Ambulatory Referral to Cardiac Rehab       10/08/20 1233    Unscheduled  ECG 12 Lead  As Needed     Comments: Nurse to Release if Patient Expericences Acute Chest Pain or Dysrhythmias    10/07/20 0740    Unscheduled  Troponin  As Needed     Comments: For Chest Pain      10/07/20 0740    Unscheduled  Check Peripheral Pulses As Needed  As Needed      10/08/20 1113    Unscheduled  Pacemaker Settings - Initiate for Heart Rate Less Than 60 And / Or Hemodynamically Unstable  As Needed     Comments: Mode: AAI  Atrial rate: 90  Atrial mA: 10  Atrial mV: 0.5    10/08/20 1113    Unscheduled  Cleanse Incision With Normal Saline and [place] With Dry 4x4 Gauze if Incision is Draining  As Needed      10/08/20 1113    Unscheduled  Change Chest Tube Dressings PRN to Keep Dry - Do NOT Reinforce  As Needed      10/08/20 1113    Unscheduled  Oxygen Therapy- Nasal Cannula; 2 LPM; Titrate for SPO2: 92%  Continuous PRN      10/08/20 1113    Unscheduled  Patient May Use Home CPAP / BIPAP As Needed  As Needed      10/08/20 1113    Unscheduled  Blood Gas, Arterial  As Needed     Comments: 30 Minutes After Ventilator Changes, 30 Minutes After Extubation and PRN      10/08/20 1113    Unscheduled  CBC & Differential  As Needed     Comments: Chest Tube Drainage Greater Than 200mL/hr      10/08/20 1113    Unscheduled  aPTT  As Needed     Comments: Chest Tube Drainage Greater Than 200mL/hr      10/08/20 1113    Unscheduled  Protime-INR  As Needed     Comments: Chest Tube Drainage Greater Than 200mL/hr      10/08/20 1113    Unscheduled  Fibrinogen  As Needed     Comments: Chest Tube Drainage Greater Than 200mL/hr      10/08/20 1113    Unscheduled  Potassium  As Needed     Comments: Four hours after dose given.      10/08/20 1113    Unscheduled  Magnesium  As Needed     Comments: If potassium stays low after replacement      10/08/20 1113    Unscheduled  Bladder Scan if Patient Unable to Void 4-6 Hours After Catheter Removal  As Needed      10/10/20 0908    Unscheduled  If Bladder Scan  Volume is Less Than 500mL & Patient is Without Symptoms of Bladder Discomfort / Distention Monitor Every 1-2 Hours for Spontaneous Void  As Needed      10/10/20 0908    Unscheduled  Straight Cath Every 4-6 Hours As Needed If Patient is Unable to Void After 4-6 Hours, Bladder Scan Volume is Greater Than 500mL & Patient Has Symptoms of Bladder Discomfort / Distention  As Needed      10/10/20 0908    Unscheduled  Schedule / Prompt Voiding For Patients With Urinary Incontinence  As Needed      10/10/20 0908    --  lisinopril-hydrochlorothiazide (PRINZIDE,ZESTORETIC) 10-12.5 MG per tablet  Daily      10/06/20 4245    Signed and Held  midazolam (VERSED) injection 1 mg  Once,   Status:  Canceled      Signed and Held    Signed and Held  famotidine (PEPCID) injection 20 mg  Once,   Status:  Canceled      Signed and Held                   Physician Progress Notes       Suzy Kitchen APRN at 10/12/20 0817           LOS: 6 days   Patient Care Team:  Dacai Newsome APRN as PCP - General (Nurse Practitioner)    Chief Complaint: post op    Subjective:  Symptoms:  He reports cough.  No shortness of breath or chest pain.    Diet:  Adequate intake.  No nausea or vomiting.    Activity level: Returning to normal.    Pain:  He complains of pain that is mild.  Pain is well controlled and requiring pain medication.      Vital Signs  Temp:  [98 °F (36.7 °C)-98.1 °F (36.7 °C)] 98 °F (36.7 °C)  Heart Rate:  [67-95] 95  Resp:  [16-18] 18  BP: (104-133)/(61-78) 125/72  Body mass index is 34.21 kg/m².    Intake/Output Summary (Last 24 hours) at 10/12/2020 0817  Last data filed at 10/12/2020 0744  Gross per 24 hour   Intake 750 ml   Output 1150 ml   Net -400 ml     I/O this shift:  In: 240 [P.O.:240]  Out: -           10/10/20  0513 10/11/20  0418 10/12/20  0539   Weight: 114 kg (251 lb 3.2 oz) 113 kg (250 lb) 114 kg (252 lb 4 oz)     Objective:  General Appearance:  Comfortable and in no acute distress.    Vital signs: (most  "recent): Blood pressure 125/72, pulse 95, temperature 98 °F (36.7 °C), temperature source Oral, resp. rate 18, height 182.9 cm (72\"), weight 114 kg (252 lb 4 oz), SpO2 95 %.  Vital signs are normal.  No fever.    Output: Producing urine.    Lungs:  Normal effort and normal respiratory rate.  There are decreased breath sounds.    Heart: Normal rate.  Regular rhythm.  (SR on tele monitor)  Abdomen: Abdomen is soft.  Bowel sounds are normal.     Extremities: There is dependent edema.  (Trace bilateral LE edema)  Pulses: Distal pulses are intact.    Neurological: Patient is alert and oriented to person, place and time.    Skin:  Warm and dry.  (Sternal wound clean, dry, and intact)        Results Review:        WBC WBC   Date Value Ref Range Status   10/12/2020 10.71 3.40 - 10.80 10*3/mm3 Final   10/11/2020 14.96 (H) 3.40 - 10.80 10*3/mm3 Final   10/10/2020 15.14 (H) 3.40 - 10.80 10*3/mm3 Final      HGB Hemoglobin   Date Value Ref Range Status   10/12/2020 11.4 (L) 13.0 - 17.7 g/dL Final   10/11/2020 12.8 (L) 13.0 - 17.7 g/dL Final   10/10/2020 12.9 (L) 13.0 - 17.7 g/dL Final      HCT Hematocrit   Date Value Ref Range Status   10/12/2020 33.3 (L) 37.5 - 51.0 % Final   10/11/2020 37.9 37.5 - 51.0 % Final   10/10/2020 37.8 37.5 - 51.0 % Final      Platelets Platelets   Date Value Ref Range Status   10/12/2020 283 140 - 450 10*3/mm3 Final   10/11/2020 270 140 - 450 10*3/mm3 Final   10/10/2020 224 140 - 450 10*3/mm3 Final        PT/INR:  No results found for: PROTIME/No results found for: INR    Sodium Sodium   Date Value Ref Range Status   10/12/2020 132 (L) 136 - 145 mmol/L Final   10/11/2020 131 (L) 136 - 145 mmol/L Final   10/10/2020 133 (L) 136 - 145 mmol/L Final      Potassium Potassium   Date Value Ref Range Status   10/12/2020 4.1 3.5 - 5.2 mmol/L Final   10/11/2020 4.6 3.5 - 5.2 mmol/L Final   10/10/2020 4.1 3.5 - 5.2 mmol/L Final      Chloride Chloride   Date Value Ref Range Status   10/12/2020 97 (L) 98 - 107 " mmol/L Final   10/11/2020 95 (L) 98 - 107 mmol/L Final   10/10/2020 99 98 - 107 mmol/L Final      Bicarbonate CO2   Date Value Ref Range Status   10/12/2020 26.4 22.0 - 29.0 mmol/L Final   10/11/2020 24.5 22.0 - 29.0 mmol/L Final   10/10/2020 23.0 22.0 - 29.0 mmol/L Final      BUN BUN   Date Value Ref Range Status   10/12/2020 22 (H) 6 - 20 mg/dL Final   10/11/2020 19 6 - 20 mg/dL Final   10/10/2020 13 6 - 20 mg/dL Final      Creatinine Creatinine   Date Value Ref Range Status   10/12/2020 0.75 (L) 0.76 - 1.27 mg/dL Final   10/11/2020 0.97 0.76 - 1.27 mg/dL Final   10/10/2020 0.80 0.76 - 1.27 mg/dL Final      Calcium Calcium   Date Value Ref Range Status   10/12/2020 8.9 8.6 - 10.5 mg/dL Final   10/11/2020 9.8 8.6 - 10.5 mg/dL Final   10/10/2020 8.8 8.6 - 10.5 mg/dL Final      Magnesium No results found for: MG       acetylcysteine, 3 mL, Nebulization, TID - RT  aspirin, 81 mg, Oral, Daily  atorvastatin, 80 mg, Oral, Nightly  doxycycline, 100 mg, Oral, Q12H  enoxaparin, 40 mg, Subcutaneous, Q24H  furosemide, 40 mg, Oral, Daily  gabapentin, 400 mg, Oral, Q12H  guaiFENesin, 1,200 mg, Oral, Q12H  insulin glargine, 20 Units, Subcutaneous, Daily  insulin lispro, 0-14 Units, Subcutaneous, TID AC  insulin lispro, 7 Units, Subcutaneous, TID With Meals  lisinopril, 5 mg, Oral, Q24H  metoprolol tartrate, 100 mg, Oral, Q12H  mupirocin, , Each Nare, BID  pantoprazole, 40 mg, Oral, QAM  polyethylene glycol, 17 g, Oral, BID  potassium chloride, 20 mEq, Oral, Daily  senna-docusate sodium, 2 tablet, Oral, Nightly           Patient Active Problem List   Diagnosis Code   • CAD (coronary artery disease) I25.10   • Coronary artery disease of native artery of native heart with stable angina pectoris (CMS/[facility]) I25.118   • Type 2 diabetes mellitus with hyperglycemia, without long-term current use of insulin (CMS/[facility]) E11.65   • Hyperlipidemia E78.5   • Hypertension I10   • Obesity E66.9   • SHAHLA (obstructive sleep apnea) G47.33   • Tobacco  abuse Z72.0       Assessment & Plan   -NSTEMI, multivessel coronary artery disease with previous PCI 2014--s/p CABGx4 LIMA/EVH left SVG--POD#4 [place]  - ischemic cardiomyopathy  - hypertension--poorly controlled  - hyperlipidemia--patient reports previous intolerance to Lipitor--will try crestor  - DM II--non-compliant with medications  - SHAHLA with home CPAP  - nicotine dependence--encourage cessation--patient declines nicotine patch at this time  - obesity  -leukocytosis- probable reactive--trending down    CV stable  Still requiring 6L hi flow--wean as able  Weight increased and CXR with congestion--IV diurese today  Continue mucinex and nebs and aggressive pulmonary toilet  Increase activity--starting to walk further with PT  If no BM by the afternoon--will give suppository  Continue routine care    SOHAM Arce  10/12/20  08:17 EDT    Electronically signed by Suzy Kitchen APRN at 10/12/20 0834     Hal Langley MD at 10/11/20 1117        BS remain elevated.  Will increase basal insulin and add scheduled AC humalog.  Continue to monitor.      Electronically signed by Hal Langley MD at 10/11/20 1118     Prema Marroquin APRN at 10/11/20 0723     Attestation signed by Jr Girma Chiu MD at 10/11/20 0925    I have reviewed this documentation and agree.                     LOS: 5 days   Patient Care Team:  Dacia Newsome APRN as PCP - General (Nurse Practitioner)    Chief Complaint: post op    Subjective:  Symptoms:  He reports weakness.  No shortness of breath.    Diet:  No nausea.          Vital Signs  Temp:  [96 °F (35.6 °C)-98.4 °F (36.9 °C)] 98.4 °F (36.9 °C)  Heart Rate:  [] 72  Resp:  [16-18] 18  BP: (116-144)/(69-82) 116/69  Body mass index is 33.91 kg/m².    Intake/Output Summary (Last 24 hours) at 10/11/2020 0723  Last data filed at 10/10/2020 2049  Gross per 24 hour   Intake 1626 ml   Output 1900 ml   Net -274 ml     No intake/output data recorded.           10/09/20  0530 10/10/20  0513 10/11/20  0418   Weight: 116 kg (255 lb 1.2 oz) 114 kg (251 lb 3.2 oz) 113 kg (250 lb)         Objective    Results Review:        WBC WBC   Date Value Ref Range Status   10/11/2020 14.96 (H) 3.40 - 10.80 10*3/mm3 Final   10/10/2020 15.14 (H) 3.40 - 10.80 10*3/mm3 Final   10/09/2020 11.80 (H) 3.40 - 10.80 10*3/mm3 Final   10/08/2020 12.17 (H) 3.40 - 10.80 10*3/mm3 Final   10/08/2020 14.83 (H) 3.40 - 10.80 10*3/mm3 Final      HGB Hemoglobin   Date Value Ref Range Status   10/11/2020 12.8 (L) 13.0 - 17.7 g/dL Final   10/10/2020 12.9 (L) 13.0 - 17.7 g/dL Final   10/09/2020 13.1 13.0 - 17.7 g/dL Final   10/08/2020 13.0 13.0 - 17.7 g/dL Final   10/08/2020 13.5 13.0 - 17.7 g/dL Final   10/08/2020 12.9 12.0 - 17.0 g/dL Final   10/08/2020 13.6 12.0 - 17.0 g/dL Final   10/08/2020 13.3 12.0 - 17.0 g/dL Final   10/08/2020 12.2 12.0 - 17.0 g/dL Final   10/08/2020 14.6 12.0 - 17.0 g/dL Final      HCT Hematocrit   Date Value Ref Range Status   10/11/2020 37.9 37.5 - 51.0 % Final   10/10/2020 37.8 37.5 - 51.0 % Final   10/09/2020 40.1 37.5 - 51.0 % Final   10/08/2020 37.6 37.5 - 51.0 % Final   10/08/2020 38.3 37.5 - 51.0 % Final   10/08/2020 38 38 - 51 % Final   10/08/2020 40 38 - 51 % Final   10/08/2020 39 38 - 51 % Final   10/08/2020 36 (L) 38 - 51 % Final   10/08/2020 43 38 - 51 % Final      Platelets Platelets   Date Value Ref Range Status   10/11/2020 270 140 - 450 10*3/mm3 Final   10/10/2020 224 140 - 450 10*3/mm3 Final   10/09/2020 227 140 - 450 10*3/mm3 Final   10/08/2020 223 140 - 450 10*3/mm3 Final   10/08/2020 240 140 - 450 10*3/mm3 Final        PT/INR:    Protime   Date Value Ref Range Status   10/09/2020 13.7 11.7 - 14.2 Seconds Final   10/08/2020 14.1 11.7 - 14.2 Seconds Final   /  INR   Date Value Ref Range Status   10/09/2020 1.06 0.90 - 1.10 Final   10/08/2020 1.10 0.90 - 1.10 Final       Sodium Sodium   Date Value Ref Range Status   10/11/2020 131 (L) 136 - 145 mmol/L Final    10/10/2020 133 (L) 136 - 145 mmol/L Final   10/09/2020 137 136 - 145 mmol/L Final   10/08/2020 132 (L) 136 - 145 mmol/L Final   10/08/2020 132 (L) 136 - 145 mmol/L Final      Potassium Potassium   Date Value Ref Range Status   10/11/2020 4.6 3.5 - 5.2 mmol/L Final   10/10/2020 4.1 3.5 - 5.2 mmol/L Final   10/09/2020 3.9 3.5 - 5.2 mmol/L Final   10/08/2020 4.1 3.5 - 5.2 mmol/L Final   10/08/2020 4.5 3.5 - 5.2 mmol/L Final      Chloride Chloride   Date Value Ref Range Status   10/11/2020 95 (L) 98 - 107 mmol/L Final   10/10/2020 99 98 - 107 mmol/L Final   10/09/2020 104 98 - 107 mmol/L Final   10/08/2020 101 98 - 107 mmol/L Final   10/08/2020 99 98 - 107 mmol/L Final      Bicarbonate CO2   Date Value Ref Range Status   10/11/2020 24.5 22.0 - 29.0 mmol/L Final   10/10/2020 23.0 22.0 - 29.0 mmol/L Final   10/09/2020 24.2 22.0 - 29.0 mmol/L Final   10/08/2020 20.8 (L) 22.0 - 29.0 mmol/L Final   10/08/2020 21.7 (L) 22.0 - 29.0 mmol/L Final      BUN BUN   Date Value Ref Range Status   10/11/2020 19 6 - 20 mg/dL Final   10/10/2020 13 6 - 20 mg/dL Final   10/09/2020 12 6 - 20 mg/dL Final   10/08/2020 16 6 - 20 mg/dL Final   10/08/2020 17 6 - 20 mg/dL Final      Creatinine Creatinine   Date Value Ref Range Status   10/11/2020 0.97 0.76 - 1.27 mg/dL Final   10/10/2020 0.80 0.76 - 1.27 mg/dL Final   10/09/2020 0.79 0.76 - 1.27 mg/dL Final   10/08/2020 1.02 0.76 - 1.27 mg/dL Final   10/08/2020 1.29 (H) 0.76 - 1.27 mg/dL Final      Calcium Calcium   Date Value Ref Range Status   10/11/2020 9.8 8.6 - 10.5 mg/dL Final   10/10/2020 8.8 8.6 - 10.5 mg/dL Final   10/09/2020 8.4 (L) 8.6 - 10.5 mg/dL Final   10/08/2020 8.6 8.6 - 10.5 mg/dL Final   10/08/2020 8.8 8.6 - 10.5 mg/dL Final      Magnesium Magnesium   Date Value Ref Range Status   10/09/2020 2.2 1.6 - 2.6 mg/dL Final   10/08/2020 2.7 (H) 1.6 - 2.6 mg/dL Final   10/08/2020 2.9 (H) 1.6 - 2.6 mg/dL Final          acetylcysteine, 3 mL, Nebulization, TID - RT  aspirin, 81 mg,  Oral, Daily  atorvastatin, 80 mg, Oral, Nightly  chlorhexidine, 15 mL, Mouth/Throat, Q12H  doxycycline, 100 mg, Oral, Q12H  enoxaparin, 40 mg, Subcutaneous, Q24H  furosemide, 40 mg, Oral, Daily  gabapentin, 400 mg, Oral, Q12H  guaiFENesin, 1,200 mg, Oral, Q12H  insulin glargine, 15 Units, Subcutaneous, Daily  insulin lispro, 0-14 Units, Subcutaneous, TID AC  lisinopril, 5 mg, Oral, Q24H  metoprolol tartrate, 100 mg, Oral, Q12H  mupirocin, , Each Nare, BID  pantoprazole, 40 mg, Oral, QAM  polyethylene glycol, 17 g, Oral, BID  potassium chloride, 20 mEq, Oral, Daily  senna-docusate sodium, 2 tablet, Oral, Nightly               Patient Active Problem List   Diagnosis Code   • CAD (coronary artery disease) I25.10   • Coronary artery disease of native artery of native heart with stable angina pectoris (CMS/[facility]) I25.118   • Type 2 diabetes mellitus with hyperglycemia, without long-term current use of insulin (CMS/[facility]) E11.65   • Hyperlipidemia E78.5   • Hypertension I10   • Obesity E66.9   • SHAHLA (obstructive sleep apnea) G47.33   • Tobacco abuse Z72.0       Assessment & Plan    -NSTEMI, multivessel coronary artery disease with previous PCI 2014--s/p CABGx4 LIMA/EVH left SVG--POD#3 [employer]  -ischemic cardiomyopathy  - hypertension--poorly controlled  - hyperlipidemia--patient reports previous intolerance to Lipitor--will try pravastatin  - DM II--non-compliant with medications  - SHAHLA with home CPAP  - nicotine dependence--encourage cessation--patient declines nicotine patch at this time  - obesity  -leukocytosis- probable reactive       O2 requirements improved a bit now on 6L   Great response to IV diureses, switch to PO  Continue mucinex and nebs and aggressive pulmonary toilet  Increase activity, only walked 50ft with PT yesterday  Discontinue AV wires  Encourage pulmonary toilet  Routine care      SOHAM Whittington  10/11/20  07:23 EDT      Electronically signed by Jr Girma Chiu MD at 10/11/20 8739     Shivam  MD Hal at 10/10/20 1737              Name: Jd Velazquez ADMIT: 10/6/2020   : 1965  PCP: Dacia Newsome APRN    MRN: [id number] LOS: 4 days   AGE/SEX: 55 y.o. male  ROOM: [facility]     Subjective   Subjective   Feels better and is eager to discharge home.  Appetite improving but states he does not like the food.  Ate maybe 50% of lunch.    Review of Systems    Objective   Objective   Vital Signs  Temp:  [96 °F (35.6 °C)-98 °F (36.7 °C)] 96.7 °F (35.9 °C)  Heart Rate:  [] 88  Resp:  [14-20] 16  BP: (123-151)/(69-82) 123/69  SpO2:  [89 %-97 %] 90 %  on  Flow (L/min):  [7-11] 7;   Device (Oxygen Therapy): high-flow nasal cannula  Body mass index is 34.07 kg/m².  Physical Exam  Vitals signs reviewed.   Constitutional:       Appearance: He is well-developed. He is obese. He is ill-appearing.   HENT:      Head: Normocephalic and atraumatic.   Eyes:      Extraocular Movements: Extraocular movements intact.   Neck:      Musculoskeletal: Normal range of motion.   Cardiovascular:      Rate and Rhythm: Normal rate and regular rhythm.   Pulmonary:      Effort: No respiratory distress.      Breath sounds: No wheezing.      Comments: Decreased effort due to chest pain  Abdominal:      General: Bowel sounds are normal. There is no distension.      Palpations: Abdomen is soft. There is no mass.      Tenderness: There is no abdominal tenderness.      Hernia: No hernia is present.   Musculoskeletal:      Comments: Trace BLE edema   Skin:     General: Skin is warm and dry.   Neurological:      Mental Status: He is alert and oriented to person, place, and time.   Psychiatric:         Behavior: Behavior normal.         Thought Content: Thought content normal.         Judgment: Judgment normal.         Results Review     I reviewed the patient's new clinical results.  Results from last 7 days   Lab Units 10/10/20  0526 10/09/20  0303 10/08/20  1530 10/08/20  1132   WBC 10*3/mm3 15.14* 11.80* 12.17* 14.83*    HEMOGLOBIN g/dL 12.9* 13.1 13.0 13.5   PLATELETS 10*3/mm3 224 227 223 240     Results from last 7 days   Lab Units 10/10/20  0526 10/09/20  0303 10/08/20  1530 10/08/20  1132   SODIUM mmol/L 133* 137 132* 132*   POTASSIUM mmol/L 4.1 3.9 4.1 4.5   CHLORIDE mmol/L 99 104 101 99   CO2 mmol/L 23.0 24.2 20.8* 21.7*   BUN mg/dL 13 12 16 17   CREATININE mg/dL 0.80 0.79 1.02 1.29*   GLUCOSE mg/dL 203* 107* 167* 217*   Estimated Creatinine Clearance: 137.7 mL/min (by C-G formula based on SCr of 0.8 mg/dL).  Results from last 7 days   Lab Units 10/09/20  0303 10/08/20  1530 10/08/20  1132 10/07/20  0943   ALBUMIN g/dL 4.30 4.10 4.00 3.90   BILIRUBIN mg/dL  --   --   --  0.4   ALK PHOS U/L  --   --   --  119*   AST (SGOT) U/L  --   --   --  16   ALT (SGPT) U/L  --   --   --  13     Results from last 7 days   Lab Units 10/10/20  0526 10/09/20  0303 10/08/20  1530 10/08/20  1132  10/07/20  0943   CALCIUM mg/dL 8.8 8.4* 8.6 8.8   < > 9.3   ALBUMIN g/dL  --  4.30 4.10 4.00  --  3.90   MAGNESIUM mg/dL  --  2.2 2.7* 2.9*  --  1.9   PHOSPHORUS mg/dL  --  4.7* 4.3 4.1  --   --     < > = values in this interval not displayed.       COVID19   Date Value Ref Range Status   10/07/2020 Not Detected Not Detected - Ref. Range Final     Glucose   Date/Time Value Ref Range Status   10/10/2020 1543 251 (H) 70 - 130 mg/dL Final   10/10/2020 1033 225 (H) 70 - 130 mg/dL Final   10/10/2020 0654 188 (H) 70 - 130 mg/dL Final   10/09/2020 2019 195 (H) 70 - 130 mg/dL Final   10/09/2020 1650 166 (H) 70 - 130 mg/dL Final   10/09/2020 1155 155 (H) 70 - 130 mg/dL Final   10/09/2020 0954 135 (H) 70 - 130 mg/dL Final       Scheduled Medications  acetylcysteine, 3 mL, Nebulization, TID - RT  aspirin, 81 mg, Oral, Daily  atorvastatin, 80 mg, Oral, Nightly  chlorhexidine, 15 mL, Mouth/Throat, Q12H  doxycycline, 100 mg, Oral, Q12H  enoxaparin, 40 mg, Subcutaneous, Q24H  furosemide, 40 mg, Oral, Daily  gabapentin, 400 mg, Oral, Q12H  guaiFENesin, 1,200 mg,  Oral, Q12H  insulin lispro, 0-14 Units, Subcutaneous, TID AC  lisinopril, 5 mg, Oral, Q24H  metoprolol tartrate, 100 mg, Oral, Q12H  mupirocin, , Each Nare, BID  pantoprazole, 40 mg, Oral, QAM  polyethylene glycol, 17 g, Oral, BID  potassium chloride, 20 mEq, Oral, Daily  senna-docusate sodium, 2 tablet, Oral, Nightly    Infusions   Diet  Diet Regular; Cardiac, Consistent Carbohydrate      Assessment/Plan     Active Hospital Problems    Diagnosis  POA   • **Coronary artery disease of native artery of native heart with stable angina pectoris (CMS/[facility]) [I25.118]  Yes   • CAD (coronary artery disease) [I25.10]  Yes   • Type 2 diabetes mellitus with hyperglycemia, without long-term current use of insulin (CMS/[facility]) [E11.65]  Yes   • Hyperlipidemia [E78.5]  Yes   • Hypertension [I10]  Yes   • Obesity [E66.9]  Yes   • SHAHLA (obstructive sleep apnea) [G47.33]  Unknown   • Tobacco abuse [Z72.0]  Unknown      Resolved Hospital Problems   No resolved problems to display.         Untreated diabetes mellitus 2 - HgbA1c 9.4% on 10/7  · Patient now postop CABG.  Off insulin drip and on correctional Humalog only.  · Sugars elevated today.  Agree with plan to send home on oral agents and close outpatient follow-up.  Will add basal insulin for now and continue monitoring.      Hal Langley MD  [facility]  10/10/20  17:37 EDT      Electronically signed by Hal Langley MD at 10/10/20 7405

## 2020-10-13 LAB
ANION GAP SERPL CALCULATED.3IONS-SCNC: 10 MMOL/L (ref 5–15)
BUN SERPL-MCNC: 19 MG/DL (ref 6–20)
BUN/CREAT SERPL: 24.1 (ref 7–25)
CALCIUM SPEC-SCNC: 9.3 MG/DL (ref 8.6–10.5)
CHLORIDE SERPL-SCNC: 96 MMOL/L (ref 98–107)
CO2 SERPL-SCNC: 26 MMOL/L (ref 22–29)
CREAT SERPL-MCNC: 0.79 MG/DL (ref 0.76–1.27)
DEPRECATED RDW RBC AUTO: 42.6 FL (ref 37–54)
ERYTHROCYTE [DISTWIDTH] IN BLOOD BY AUTOMATED COUNT: 12.8 % (ref 12.3–15.4)
GFR SERPL CREATININE-BSD FRML MDRD: 102 ML/MIN/1.73
GLUCOSE BLDC GLUCOMTR-MCNC: 159 MG/DL (ref 70–130)
GLUCOSE BLDC GLUCOMTR-MCNC: 173 MG/DL (ref 70–130)
GLUCOSE BLDC GLUCOMTR-MCNC: 245 MG/DL (ref 70–130)
GLUCOSE BLDC GLUCOMTR-MCNC: 270 MG/DL (ref 70–130)
GLUCOSE SERPL-MCNC: 166 MG/DL (ref 65–99)
HCT VFR BLD AUTO: 32.6 % (ref 37.5–51)
HGB BLD-MCNC: 11.1 G/DL (ref 13–17.7)
MCH RBC QN AUTO: 31.2 PG (ref 26.6–33)
MCHC RBC AUTO-ENTMCNC: 34 G/DL (ref 31.5–35.7)
MCV RBC AUTO: 91.6 FL (ref 79–97)
PLATELET # BLD AUTO: 347 10*3/MM3 (ref 140–450)
PMV BLD AUTO: 9.6 FL (ref 6–12)
POTASSIUM SERPL-SCNC: 4.2 MMOL/L (ref 3.5–5.2)
RBC # BLD AUTO: 3.56 10*6/MM3 (ref 4.14–5.8)
SODIUM SERPL-SCNC: 132 MMOL/L (ref 136–145)
WBC # BLD AUTO: 9.36 10*3/MM3 (ref 3.4–10.8)

## 2020-10-13 PROCEDURE — 63710000001 INSULIN GLARGINE PER 5 UNITS: Performed by: HOSPITALIST

## 2020-10-13 PROCEDURE — 80048 BASIC METABOLIC PNL TOTAL CA: CPT | Performed by: THORACIC SURGERY (CARDIOTHORACIC VASCULAR SURGERY)

## 2020-10-13 PROCEDURE — 25010000002 ENOXAPARIN PER 10 MG: Performed by: THORACIC SURGERY (CARDIOTHORACIC VASCULAR SURGERY)

## 2020-10-13 PROCEDURE — 85027 COMPLETE CBC AUTOMATED: CPT | Performed by: NURSE PRACTITIONER

## 2020-10-13 PROCEDURE — 97110 THERAPEUTIC EXERCISES: CPT

## 2020-10-13 PROCEDURE — 94799 UNLISTED PULMONARY SVC/PX: CPT

## 2020-10-13 PROCEDURE — 63710000001 INSULIN LISPRO (HUMAN) PER 5 UNITS: Performed by: THORACIC SURGERY (CARDIOTHORACIC VASCULAR SURGERY)

## 2020-10-13 PROCEDURE — 94762 N-INVAS EAR/PLS OXIMTRY CONT: CPT

## 2020-10-13 PROCEDURE — 63710000001 INSULIN LISPRO (HUMAN) PER 5 UNITS: Performed by: HOSPITALIST

## 2020-10-13 PROCEDURE — 82962 GLUCOSE BLOOD TEST: CPT

## 2020-10-13 RX ORDER — POTASSIUM CHLORIDE 750 MG/1
20 CAPSULE, EXTENDED RELEASE ORAL ONCE
Status: COMPLETED | OUTPATIENT
Start: 2020-10-13 | End: 2020-10-13

## 2020-10-13 RX ORDER — INSULIN GLARGINE 100 [IU]/ML
30 INJECTION, SOLUTION SUBCUTANEOUS DAILY
Status: DISCONTINUED | OUTPATIENT
Start: 2020-10-14 | End: 2020-10-14 | Stop reason: HOSPADM

## 2020-10-13 RX ORDER — UREA 10 %
3 LOTION (ML) TOPICAL NIGHTLY PRN
Status: DISCONTINUED | OUTPATIENT
Start: 2020-10-13 | End: 2020-10-14 | Stop reason: HOSPADM

## 2020-10-13 RX ORDER — FUROSEMIDE 40 MG/1
40 TABLET ORAL ONCE
Status: COMPLETED | OUTPATIENT
Start: 2020-10-13 | End: 2020-10-13

## 2020-10-13 RX ADMIN — INSULIN LISPRO 7 UNITS: 100 INJECTION, SOLUTION INTRAVENOUS; SUBCUTANEOUS at 12:32

## 2020-10-13 RX ADMIN — ASPIRIN 81 MG: 81 TABLET, COATED ORAL at 08:22

## 2020-10-13 RX ADMIN — INSULIN LISPRO 3 UNITS: 100 INJECTION, SOLUTION INTRAVENOUS; SUBCUTANEOUS at 18:01

## 2020-10-13 RX ADMIN — INSULIN LISPRO 5 UNITS: 100 INJECTION, SOLUTION INTRAVENOUS; SUBCUTANEOUS at 12:32

## 2020-10-13 RX ADMIN — ROSUVASTATIN CALCIUM 20 MG: 20 TABLET, FILM COATED ORAL at 20:35

## 2020-10-13 RX ADMIN — ENOXAPARIN SODIUM 40 MG: 40 INJECTION SUBCUTANEOUS at 18:00

## 2020-10-13 RX ADMIN — DOXYCYCLINE 100 MG: 100 CAPSULE ORAL at 20:35

## 2020-10-13 RX ADMIN — INSULIN LISPRO 7 UNITS: 100 INJECTION, SOLUTION INTRAVENOUS; SUBCUTANEOUS at 18:00

## 2020-10-13 RX ADMIN — DOXYCYCLINE 100 MG: 100 CAPSULE ORAL at 08:22

## 2020-10-13 RX ADMIN — FUROSEMIDE 40 MG: 40 TABLET ORAL at 14:33

## 2020-10-13 RX ADMIN — INSULIN LISPRO 7 UNITS: 100 INJECTION, SOLUTION INTRAVENOUS; SUBCUTANEOUS at 06:52

## 2020-10-13 RX ADMIN — FUROSEMIDE 40 MG: 40 TABLET ORAL at 08:21

## 2020-10-13 RX ADMIN — POTASSIUM CHLORIDE 20 MEQ: 750 CAPSULE, EXTENDED RELEASE ORAL at 14:32

## 2020-10-13 RX ADMIN — INSULIN GLARGINE 20 UNITS: 100 INJECTION, SOLUTION SUBCUTANEOUS at 09:32

## 2020-10-13 RX ADMIN — INSULIN LISPRO 3 UNITS: 100 INJECTION, SOLUTION INTRAVENOUS; SUBCUTANEOUS at 06:51

## 2020-10-13 RX ADMIN — LISINOPRIL 5 MG: 5 TABLET ORAL at 08:22

## 2020-10-13 RX ADMIN — METOPROLOL TARTRATE 100 MG: 50 TABLET, FILM COATED ORAL at 20:35

## 2020-10-13 RX ADMIN — PANTOPRAZOLE SODIUM 40 MG: 40 TABLET, DELAYED RELEASE ORAL at 06:51

## 2020-10-13 RX ADMIN — METOPROLOL TARTRATE 100 MG: 50 TABLET, FILM COATED ORAL at 09:32

## 2020-10-13 RX ADMIN — MUPIROCIN 1 APPLICATION: 20 OINTMENT TOPICAL at 20:35

## 2020-10-13 RX ADMIN — GUAIFENESIN 1200 MG: 600 TABLET, EXTENDED RELEASE ORAL at 20:34

## 2020-10-13 RX ADMIN — POTASSIUM CHLORIDE 20 MEQ: 10 CAPSULE, COATED, EXTENDED RELEASE ORAL at 08:22

## 2020-10-13 RX ADMIN — GUAIFENESIN 1200 MG: 600 TABLET, EXTENDED RELEASE ORAL at 08:22

## 2020-10-13 NOTE — PLAN OF CARE
Problem: Adult Inpatient Plan of Care  Goal: Plan of Care Review  Outcome: Ongoing, Progressing  Flowsheets  Taken 10/13/2020 0914 by Tim Santana, PTA  Outcome Summary: Pt able to amb 150' with RWX and 3L o2  Taken 10/12/2020 1659 by Tiera Dawn, RN  Progress: improving  Taken 10/11/2020 1622 by Rene Jennings RN  Plan of Care Reviewed With: patient

## 2020-10-13 NOTE — PROGRESS NOTES
"DAILY PROGRESS NOTE  [facility]    Patient Identification:  Name: Jd Velazquez  Age: 55 y.o.  Sex: male  :  1965  MRN: [id number]         Primary Care Physician: Dacia Newsome APRN    Subjective:  Interval History:He is a little short of air. O 2 at 3 L .    Objective:    Scheduled Meds:aspirin, 81 mg, Oral, Daily  bisacodyl, 10 mg, Rectal, Once  doxycycline, 100 mg, Oral, Q12H  enoxaparin, 40 mg, Subcutaneous, Q24H  furosemide, 40 mg, Oral, Daily  furosemide, 40 mg, Oral, Once  guaiFENesin, 1,200 mg, Oral, Q12H  insulin glargine, 20 Units, Subcutaneous, Daily  insulin lispro, 0-14 Units, Subcutaneous, TID AC  insulin lispro, 7 Units, Subcutaneous, TID With Meals  lisinopril, 5 mg, Oral, Q24H  metoprolol tartrate, 100 mg, Oral, Q12H  mupirocin, , Each Nare, BID  pantoprazole, 40 mg, Oral, QAM  polyethylene glycol, 17 g, Oral, BID  potassium chloride, 20 mEq, Oral, Daily  potassium chloride, 20 mEq, Oral, Once  rosuvastatin, 20 mg, Oral, Nightly  senna-docusate sodium, 2 tablet, Oral, Nightly      Continuous Infusions:     Vital signs in last 24 hours:  Temp:  [96.4 °F (35.8 °C)-97.7 °F (36.5 °C)] 96.4 °F (35.8 °C)  Heart Rate:  [66-89] 66  Resp:  [18] 18  BP: (103-136)/(63-79) 103/70    Intake/Output:    Intake/Output Summary (Last 24 hours) at 10/13/2020 1407  Last data filed at 10/13/2020 1153  Gross per 24 hour   Intake 600 ml   Output 1875 ml   Net -1275 ml       Exam:  /70 (BP Location: Right arm, Patient Position: Lying)   Pulse 66   Temp 96.4 °F (35.8 °C) (Oral)   Resp 18   Ht 182.9 cm (72\")   Wt 113 kg (249 lb 9.6 oz)   SpO2 96%   BMI 33.85 kg/m²     General Appearance:    Alert, cooperative, no distress   Head:    Normocephalic, without obvious abnormality, atraumatic   Eyes:       Throat:   Lips, tongue, gums normal   Neck:   Supple, symmetrical, trachea midline, no JVD   Lungs:     Clear to auscultation bilaterally, respirations unlabored   Chest Wall:   "  Surgical changes    Heart:    Regular rate and rhythm, S1 and S2 normal, no murmur,no  Rub or gallop   Abdomen:     Soft, nontender, bowel sounds active, no masses, no organomegaly    Extremities:   Extremities normal, atraumatic, no cyanosis or edema   Pulses:      Skin:   Skin is warm and dry,  no rashes or palpable lesions   Neurologic:   no focal deficits noted      Lab Results (last 72 hours)     Procedure Component Value Units Date/Time    POC Glucose Once [[id number]]  (Abnormal) Collected: 10/12/20 1525    Specimen: Blood Updated: 10/12/20 1527     Glucose 197 mg/dL     POC Glucose Once [[id number]]  (Abnormal) Collected: 10/12/20 1025    Specimen: Blood Updated: 10/12/20 1026     Glucose 236 mg/dL     POC Glucose Once [[id number]]  (Abnormal) Collected: 10/12/20 0632    Specimen: Blood Updated: 10/12/20 0633     Glucose 210 mg/dL     Basic Metabolic Panel [[id number]]  (Abnormal) Collected: 10/12/20 0333    Specimen: Blood Updated: 10/12/20 0500     Glucose 154 mg/dL      BUN 22 mg/dL      Creatinine 0.75 mg/dL      Sodium 132 mmol/L      Potassium 4.1 mmol/L      Chloride 97 mmol/L      CO2 26.4 mmol/L      Calcium 8.9 mg/dL      eGFR Non African Amer 109 mL/min/1.73      BUN/Creatinine Ratio 29.3     Anion Gap 8.6 mmol/L     Narrative:      GFR Normal >60  Chronic Kidney Disease <60  Kidney Failure <15      CBC (No Diff) [[id number]]  (Abnormal) Collected: 10/12/20 0333    Specimen: Blood Updated: 10/12/20 0431     WBC 10.71 10*3/mm3      RBC 3.69 10*6/mm3      Hemoglobin 11.4 g/dL      Hematocrit 33.3 %      MCV 90.2 fL      MCH 30.9 pg      MCHC 34.2 g/dL      RDW 12.4 %      RDW-SD 41.2 fl      MPV 9.8 fL      Platelets 283 10*3/mm3     POC Glucose Once [[id number]]  (Abnormal) Collected: 10/11/20 1524    Specimen: Blood Updated: 10/11/20 1525     Glucose 257 mg/dL     POC Glucose Once [[id number]]  (Abnormal) Collected: 10/11/20 1038    Specimen: Blood Updated: 10/11/20 1040     Glucose 277 mg/dL     POC  Glucose Once [[id number]]  (Abnormal) Collected: 10/11/20 0540    Specimen: Blood Updated: 10/11/20 0542     Glucose 233 mg/dL     Basic Metabolic Panel [[id number]]  (Abnormal) Collected: 10/11/20 0311    Specimen: Blood Updated: 10/11/20 0431     Glucose 232 mg/dL      BUN 19 mg/dL      Creatinine 0.97 mg/dL      Sodium 131 mmol/L      Potassium 4.6 mmol/L      Chloride 95 mmol/L      CO2 24.5 mmol/L      Calcium 9.8 mg/dL      eGFR Non African Amer 81 mL/min/1.73      BUN/Creatinine Ratio 19.6     Anion Gap 11.5 mmol/L     Narrative:      GFR Normal >60  Chronic Kidney Disease <60  Kidney Failure <15      CBC (No Diff) [[id number]]  (Abnormal) Collected: 10/11/20 0311    Specimen: Blood Updated: 10/11/20 0405     WBC 14.96 10*3/mm3      RBC 4.12 10*6/mm3      Hemoglobin 12.8 g/dL      Hematocrit 37.9 %      MCV 92.0 fL      MCH 31.1 pg      MCHC 33.8 g/dL      RDW 12.8 %      RDW-SD 42.9 fl      MPV 9.9 fL      Platelets 270 10*3/mm3     POC Glucose Once [[id number]]  (Abnormal) Collected: 10/10/20 1543    Specimen: Blood Updated: 10/10/20 1547     Glucose 251 mg/dL     POC Glucose Once [[id number]]  (Abnormal) Collected: 10/10/20 1033    Specimen: Blood Updated: 10/10/20 1034     Glucose 225 mg/dL     Basic Metabolic Panel [[id number]]  (Abnormal) Collected: 10/10/20 0526    Specimen: Blood Updated: 10/10/20 0746     Glucose 203 mg/dL      BUN 13 mg/dL      Creatinine 0.80 mg/dL      Sodium 133 mmol/L      Potassium 4.1 mmol/L      Chloride 99 mmol/L      CO2 23.0 mmol/L      Calcium 8.8 mg/dL      eGFR Non African Amer 101 mL/min/1.73      BUN/Creatinine Ratio 16.3     Anion Gap 11.0 mmol/L     Narrative:      GFR Normal >60  Chronic Kidney Disease <60  Kidney Failure <15      POC Glucose Once [[id number]]  (Abnormal) Collected: 10/10/20 0654    Specimen: Blood Updated: 10/10/20 0655     Glucose 188 mg/dL     CBC (No Diff) [[id number]]  (Abnormal) Collected: 10/10/20 0526    Specimen: Blood Updated: 10/10/20 0551      WBC 15.14 10*3/mm3      RBC 4.15 10*6/mm3      Hemoglobin 12.9 g/dL      Hematocrit 37.8 %      MCV 91.1 fL      MCH 31.1 pg      MCHC 34.1 g/dL      RDW 12.5 %      RDW-SD 41.7 fl      MPV 10.0 fL      Platelets 224 10*3/mm3     Blood Gas, Arterial [[id number]]  (Abnormal) Collected: 10/09/20 2053    Specimen: Arterial Blood Updated: 10/09/20 2055     Site Arterial: right radial     Fritz's Test N/A     pH, Arterial 7.373 pH units      pCO2, Arterial 39.5 mm Hg      pO2, Arterial 63.0 mm Hg      HCO3, Arterial 23.0 mmol/L      Base Excess, Arterial -2.1 mmol/L      O2 Saturation Calculated 91.2 %      Barometric Pressure for Blood Gas 754.3 mmHg      Modality HFNC     Flow Rate 11 lpm      Rate 20 Breaths/minute     POC Glucose Once [[id number]]  (Abnormal) Collected: 10/09/20 2019    Specimen: Blood Updated: 10/09/20 2021     Glucose 195 mg/dL     POC Glucose Once [[id number]]  (Abnormal) Collected: 10/09/20 1650    Specimen: Blood Updated: 10/09/20 1657     Glucose 166 mg/dL         Data Review:  Results from last 7 days   Lab Units 10/13/20  0336 10/12/20  0333 10/11/20  0311   SODIUM mmol/L 132* 132* 131*   POTASSIUM mmol/L 4.2 4.1 4.6   CHLORIDE mmol/L 96* 97* 95*   CO2 mmol/L 26.0 26.4 24.5   BUN mg/dL 19 22* 19   CREATININE mg/dL 0.79 0.75* 0.97   GLUCOSE mg/dL 166* 154* 232*   CALCIUM mg/dL 9.3 8.9 9.8     Results from last 7 days   Lab Units 10/13/20  0336 10/12/20  0333 10/11/20  0311   WBC 10*3/mm3 9.36 10.71 14.96*   HEMOGLOBIN g/dL 11.1* 11.4* 12.8*   HEMATOCRIT % 32.6* 33.3* 37.9   PLATELETS 10*3/mm3 347 283 270         Results from last 7 days   Lab Units 10/07/20  0944   HEMOGLOBIN A1C % 9.40*     No results found for: TROPONINT  Results from last 7 days   Lab Units 10/07/20  0943   CHOLESTEROL mg/dL 213*   TRIGLYCERIDES mg/dL 790*   HDL CHOL mg/dL 29*   LDL CHOL mg/dL 89     Results from last 7 days   Lab Units 10/07/20  0943   ALK PHOS U/L 119*   BILIRUBIN mg/dL 0.4   ALT (SGPT) U/L 13   AST (SGOT)  U/L 16         Results from last 7 days   Lab Units 10/07/20  0944   HEMOGLOBIN A1C % 9.40*     Glucose   Date/Time Value Ref Range Status   10/13/2020 1027 245 (H) 70 - 130 mg/dL Final   10/13/2020 0529 173 (H) 70 - 130 mg/dL Final   10/12/2020 2018 233 (H) 70 - 130 mg/dL Final   10/12/2020 1525 197 (H) 70 - 130 mg/dL Final   10/12/2020 1025 236 (H) 70 - 130 mg/dL Final   10/12/2020 0632 210 (H) 70 - 130 mg/dL Final   10/11/2020 1524 257 (H) 70 - 130 mg/dL Final   10/11/2020 1038 277 (H) 70 - 130 mg/dL Final     Results from last 7 days   Lab Units 10/09/20  0303 10/08/20  1132 10/07/20  0943   INR  1.06 1.10 0.95       Past Medical History:   Diagnosis Date   • CAD (coronary artery disease)    • Diabetes mellitus (CMS/[facility])    • Hyperlipidemia    • Hypertension    • Obesity        Assessment:  Active Hospital Problems    Diagnosis  POA   • **Coronary artery disease of native artery of native heart with stable angina pectoris (CMS/[facility]) [I25.118]  Yes   • CAD (coronary artery disease) [I25.10]  Yes   • Type 2 diabetes mellitus with hyperglycemia, without long-term current use of insulin (CMS/[facility]) [E11.65]  Yes   • Hyperlipidemia [E78.5]  Yes   • Hypertension [I10]  Yes   • Obesity [E66.9]  Yes   • SHAHLA (obstructive sleep apnea) [G47.33]  Unknown   • Tobacco abuse [Z72.0]  Unknown      Resolved Hospital Problems   No resolved problems to display.       Plan:  Will continue with current RX and increase insulin. Follow lab.    Ish Lindsey MD  10/13/2020  14:07 EDT

## 2020-10-13 NOTE — THERAPY TREATMENT NOTE
Patient Name: Jd Velazquez  : 1965    MRN: [id number]                              Today's Date: 10/13/2020       Admit Date: 10/6/2020    Visit Dx:     ICD-10-CM ICD-9-CM   1. Coronary artery disease of native artery of native heart with stable angina pectoris (CMS/HCC)  I25.118 414.01     413.9   2. S/P CABG (coronary artery bypass graft)  Z95.1 V45.81     Patient Active Problem List   Diagnosis   • CAD (coronary artery disease)   • Coronary artery disease of native artery of native heart with stable angina pectoris (CMS/HCC)   • Type 2 diabetes mellitus with hyperglycemia, without long-term current use of insulin (CMS/[facility])   • Hyperlipidemia   • Hypertension   • Obesity   • SHAHLA (obstructive sleep apnea)   • Tobacco abuse     Past Medical History:   Diagnosis Date   • CAD (coronary artery disease)    • Diabetes mellitus (CMS/[facility])    • Hyperlipidemia    • Hypertension    • Obesity      Past Surgical History:   Procedure Laterality Date   • ANKLE ARTHROSCOPY W/ OPEN REPAIR     • APPENDECTOMY     • CARDIAC CATHETERIZATION      PCI to circumflex   • CORONARY ARTERY BYPASS GRAFT N/A 10/8/2020    Procedure: STERNOTOMY, CORONARY ARTERY BYPASS GRAFTING TIME 4 WITH LEFT EKLSI  AND ENDOSCOPICALLY HARVESTED LEFT GREATER SAPHENOUS VEIN AND PRP.;  Surgeon: Jr Girma Chiu MD;  Location: [facility];  Service: Cardiothoracic;  Laterality: N/A;   • HERNIA REPAIR     • TONSILLECTOMY       General Information     Row Name 10/13/20 0909          Physical Therapy Time and Intention    Document Type  therapy note (daily note)  -CW     Mode of Treatment  physical therapy  -CW     Row Name 10/13/20 0909          General Information    Patient Profile Reviewed  yes  -CW     Existing Precautions/Restrictions  cardiac;fall;sternal  -CW     Row Name 10/13/20 0909          Cognition    Orientation Status (Cognition)  oriented x 3  -CW     Row Name 10/13/20 0909          Safety Issues, Functional Mobility    Impairments  Affecting Function (Mobility)  endurance/activity tolerance  -       User Key  (r) = Recorded By, (t) = Taken By, (c) = Cosigned By    Initials Name Provider Type    CW Tim Santana PTA Physical Therapy Assistant        Mobility     Row Name 10/13/20 0910          Bed Mobility    Supine-Sit Serafina (Bed Mobility)  not tested  -     Sit-Supine Serafina (Bed Mobility)  not tested  -     Row Name 10/13/20 0910          Sit-Stand Transfer    Sit-Stand Serafina (Transfers)  supervision  -     Assistive Device (Sit-Stand Transfers)  walker, front-wheeled  -CW     Row Name 10/13/20 0910          Gait/Stairs (Locomotion)    Serafina Level (Gait)  contact guard  -     Assistive Device (Gait)  walker, front-wheeled  -     Distance in Feet (Gait)  150'  -       User Key  (r) = Recorded By, (t) = Taken By, (c) = Cosigned By    Initials Name Provider Type    CW Tim Santana PTA Physical Therapy Assistant        Obj/Interventions     Row Name 10/13/20 0911          Range of Motion Comprehensive    General Range of Motion  no range of motion deficits identified  -[facility] Name 10/13/20 0911          Strength Comprehensive (MMT)    General Manual Muscle Testing (MMT) Assessment  no strength deficits identified  -     Row Name 10/13/20 0911          Shoulder (Therapeutic Exercise)    Shoulder (Therapeutic Exercise)  AROM (active range of motion)  -     Shoulder AROM (Therapeutic Exercise)  horizontal aBduction/aDduction;10 repetitions  -[facility] Name 10/13/20 0911          Hip (Therapeutic Exercise)    Hip (Therapeutic Exercise)  strengthening exercise  -     Hip Strengthening (Therapeutic Exercise)  marching while seated;10 repetitions  -     Row Name 10/13/20 0911          Knee (Therapeutic Exercise)    Knee (Therapeutic Exercise)  strengthening exercise  -     Knee Strengthening (Therapeutic Exercise)  LAQ (long arc quad)  -       User Key  (r) = Recorded By, (t) =  Taken By, (c) = Cosigned By    Initials Name Provider Type    Tim Alvarado PTA Physical Therapy Assistant        Goals/Plan    No documentation.       Clinical Impression     Row Name 10/13/20 0912          Pain    Additional Documentation  Pain Scale: Numbers Pre/Post-Treatment (Group)  -CW     Row Name 10/13/20 0912          Pain Scale: Numbers Pre/Post-Treatment    Pretreatment Pain Rating  0/10 - no pain  -CW     Posttreatment Pain Rating  0/10 - no pain  -CW     Row Name 10/13/20 0912          Therapy Assessment/Plan (PT)    Rehab Potential (PT)  good, to achieve stated therapy goals  -CW     Criteria for Skilled Interventions Met (PT)  yes  -CW     Row Name 10/13/20 0912          Positioning and Restraints    Pre-Treatment Position  sitting in chair/recliner  -CW     Post Treatment Position  chair  -CW     In Chair  sitting;call light within reach;encouraged to call for assist;exit alarm on  -CW       User Key  (r) = Recorded By, (t) = Taken By, (c) = Cosigned By    Initials Name Provider Type    Tim Alvarado PTA Physical Therapy Assistant        Outcome Measures     Row Name 10/13/20 0912          How much help from another person do you currently need...    Turning from your back to your side while in flat bed without using bedrails?  3  -CW     Moving from lying on back to sitting on the side of a flat bed without bedrails?  3  -CW     Moving to and from a bed to a chair (including a wheelchair)?  4  -CW     Standing up from a chair using your arms (e.g., wheelchair, bedside chair)?  4  -CW     To walk in hospital room?  3  -CW     Row Name 10/13/20 0912          Functional Assessment    Outcome Measure Options  AM-PAC 6 Clicks Basic Mobility (PT)  -CW       User Key  (r) = Recorded By, (t) = Taken By, (c) = Cosigned By    Initials Name Provider Type    Tim Alvarado PTA Physical Therapy Assistant        Physical Therapy Education                 Title: PT OT SLP Therapies  (In Progress)     Topic: Physical Therapy (In Progress)     Point: Mobility training (In Progress)     Learning Progress Summary           Patient Acceptance, E, NR by AR at 10/12/2020 1151    Acceptance, E, NR by DB at 10/11/2020 1313    Acceptance, E, NR by DB at 10/10/2020 1031    Acceptance, E, NR by CJ at 10/9/2020 0905                   Point: Home exercise program (In Progress)     Learning Progress Summary           Patient Acceptance, E, NR by AR at 10/12/2020 1151    Acceptance, E, NR by DB at 10/11/2020 1313    Acceptance, E, NR by DB at 10/10/2020 1031    Acceptance, E, NR by CJ at 10/9/2020 0905                   Point: Body mechanics (In Progress)     Learning Progress Summary           Patient Acceptance, E, NR by AR at 10/12/2020 1151    Acceptance, E, NR by DB at 10/11/2020 1313    Acceptance, E, NR by DB at 10/10/2020 1031                   Point: Precautions (In Progress)     Learning Progress Summary           Patient Acceptance, E, NR by AR at 10/12/2020 1151    Acceptance, E, NR by DB at 10/11/2020 1313    Acceptance, E, NR by DB at 10/10/2020 1031                               User Key     Initials Effective Dates Name Provider Type Discipline    AR 04/03/18 -  Tiera Landin, PT Physical Therapist PT    DB 01/22/20 -  Lola Gregg, PT Physical Therapist PT     09/16/20 -  Javy Cabrera PT Student PT              PT Recommendation and Plan           Time Calculation:   PT Charges     Row Name 10/13/20 0913             Time Calculation    Start Time  0848  -CW      Stop Time  0913  -CW      Time Calculation (min)  25 min  -CW        User Key  (r) = Recorded By, (t) = Taken By, (c) = Cosigned By    Initials Name Provider Type    CW Tim Santana PTA Physical Therapy Assistant        Therapy Charges for Today     Code Description Service Date Service Provider Modifiers Qty    [id number] HC PT THER PROC EA 15 MIN 10/13/2020 Tim Santana PTA GP 2          PT  G-Codes  Outcome Measure Options: AM-PAC 6 Clicks Basic Mobility (PT)  AM-PAC 6 Clicks Score (PT): 15    Tim Santana, PTA  10/13/2020

## 2020-10-13 NOTE — PROGRESS NOTES
LOS: 7 days   Patient Care Team:  Dacia Newsome APRN as PCP - General (Nurse Practitioner)    Chief Complaint: post op    Subjective:  Symptoms:  No shortness of breath, chest pain or chest pressure.    Activity level: Impaired due to weakness.          Vital Signs  Temp:  [96.6 °F (35.9 °C)-97.7 °F (36.5 °C)] 97.7 °F (36.5 °C)  Heart Rate:  [72-89] 78  Resp:  [18] 18  BP: (109-136)/(63-79) 136/79  Body mass index is 33.85 kg/m².    Intake/Output Summary (Last 24 hours) at 10/13/2020 0909  Last data filed at 10/13/2020 0755  Gross per 24 hour   Intake 840 ml   Output 1375 ml   Net -535 ml     I/O this shift:  In: 240 [P.O.:240]  Out: -     Chest tube drainage last 8 hours        10/11/20  0418 10/12/20  0539 10/13/20  0430   Weight: 113 kg (250 lb) 114 kg (252 lb 4 oz) 113 kg (249 lb 9.6 oz)         Objective    Results Review:        WBC WBC   Date Value Ref Range Status   10/13/2020 9.36 3.40 - 10.80 10*3/mm3 Final   10/12/2020 10.71 3.40 - 10.80 10*3/mm3 Final   10/11/2020 14.96 (H) 3.40 - 10.80 10*3/mm3 Final      HGB Hemoglobin   Date Value Ref Range Status   10/13/2020 11.1 (L) 13.0 - 17.7 g/dL Final   10/12/2020 11.4 (L) 13.0 - 17.7 g/dL Final   10/11/2020 12.8 (L) 13.0 - 17.7 g/dL Final      HCT Hematocrit   Date Value Ref Range Status   10/13/2020 32.6 (L) 37.5 - 51.0 % Final   10/12/2020 33.3 (L) 37.5 - 51.0 % Final   10/11/2020 37.9 37.5 - 51.0 % Final      Platelets Platelets   Date Value Ref Range Status   10/13/2020 347 140 - 450 10*3/mm3 Final   10/12/2020 283 140 - 450 10*3/mm3 Final   10/11/2020 270 140 - 450 10*3/mm3 Final        PT/INR:  No results found for: PROTIME/No results found for: INR    Sodium Sodium   Date Value Ref Range Status   10/13/2020 132 (L) 136 - 145 mmol/L Final   10/12/2020 132 (L) 136 - 145 mmol/L Final   10/11/2020 131 (L) 136 - 145 mmol/L Final      Potassium Potassium   Date Value Ref Range Status   10/13/2020 4.2 3.5 - 5.2 mmol/L Final   10/12/2020 4.1 3.5 -  5.2 mmol/L Final   10/11/2020 4.6 3.5 - 5.2 mmol/L Final      Chloride Chloride   Date Value Ref Range Status   10/13/2020 96 (L) 98 - 107 mmol/L Final   10/12/2020 97 (L) 98 - 107 mmol/L Final   10/11/2020 95 (L) 98 - 107 mmol/L Final      Bicarbonate CO2   Date Value Ref Range Status   10/13/2020 26.0 22.0 - 29.0 mmol/L Final   10/12/2020 26.4 22.0 - 29.0 mmol/L Final   10/11/2020 24.5 22.0 - 29.0 mmol/L Final      BUN BUN   Date Value Ref Range Status   10/13/2020 19 6 - 20 mg/dL Final   10/12/2020 22 (H) 6 - 20 mg/dL Final   10/11/2020 19 6 - 20 mg/dL Final      Creatinine Creatinine   Date Value Ref Range Status   10/13/2020 0.79 0.76 - 1.27 mg/dL Final   10/12/2020 0.75 (L) 0.76 - 1.27 mg/dL Final   10/11/2020 0.97 0.76 - 1.27 mg/dL Final      Calcium Calcium   Date Value Ref Range Status   10/13/2020 9.3 8.6 - 10.5 mg/dL Final   10/12/2020 8.9 8.6 - 10.5 mg/dL Final   10/11/2020 9.8 8.6 - 10.5 mg/dL Final      Magnesium No results found for: MG       aspirin, 81 mg, Oral, Daily  bisacodyl, 10 mg, Rectal, Once  doxycycline, 100 mg, Oral, Q12H  enoxaparin, 40 mg, Subcutaneous, Q24H  furosemide, 40 mg, Oral, Daily  guaiFENesin, 1,200 mg, Oral, Q12H  insulin glargine, 20 Units, Subcutaneous, Daily  insulin lispro, 0-14 Units, Subcutaneous, TID AC  insulin lispro, 7 Units, Subcutaneous, TID With Meals  lisinopril, 5 mg, Oral, Q24H  metoprolol tartrate, 100 mg, Oral, Q12H  mupirocin, , Each Nare, BID  pantoprazole, 40 mg, Oral, QAM  polyethylene glycol, 17 g, Oral, BID  potassium chloride, 20 mEq, Oral, Daily  rosuvastatin, 20 mg, Oral, Nightly  senna-docusate sodium, 2 tablet, Oral, Nightly               Patient Active Problem List   Diagnosis Code   • CAD (coronary artery disease) I25.10   • Coronary artery disease of native artery of native heart with stable angina pectoris (CMS/[facility]) I25.118   • Type 2 diabetes mellitus with hyperglycemia, without long-term current use of insulin (CMS/[facility]) E11.65   •  Hyperlipidemia E78.5   • Hypertension I10   • Obesity E66.9   • SHAHLA (obstructive sleep apnea) G47.33   • Tobacco abuse Z72.0       Assessment & Plan    -NSTEMI, multivessel coronary artery disease with previous PCI 2014--s/p CABGx4 LIMA/EVH left SVG--POD#5 Maynard  - ischemic cardiomyopathy  - hypertension--poorly controlled  - hyperlipidemia--patient reports previous intolerance to Lipitor--will try crestor  - DM II--non-compliant with medications  - SHAHLA with home CPAP  - nicotine dependence--encourage cessation--patient declines nicotine patch at this time  - obesity  -leukocytosis- probable reactive--trending down     CV stable  On 3 L NC  Good response to IV lasix yesterday  Continue mucinex and nebs and aggressive pulmonary toilet  Increase activity only ambulating 20 ft with PT yesterday, today he states he did a lap around the unit  Will get overnight on home cpap   Continue routine care  May be about to go home with home health in the next 1-2 days    SOHAM Whittington  10/13/20  09:09 EDT

## 2020-10-14 ENCOUNTER — APPOINTMENT (OUTPATIENT)
Dept: GENERAL RADIOLOGY | Facility: HOSPITAL | Age: 55
End: 2020-10-14

## 2020-10-14 VITALS
WEIGHT: 254.2 LBS | BODY MASS INDEX: 34.43 KG/M2 | SYSTOLIC BLOOD PRESSURE: 120 MMHG | RESPIRATION RATE: 18 BRPM | HEIGHT: 72 IN | HEART RATE: 83 BPM | DIASTOLIC BLOOD PRESSURE: 68 MMHG | OXYGEN SATURATION: 89 % | TEMPERATURE: 96.9 F

## 2020-10-14 LAB
ANION GAP SERPL CALCULATED.3IONS-SCNC: 10.8 MMOL/L (ref 5–15)
BUN SERPL-MCNC: 17 MG/DL (ref 6–20)
BUN/CREAT SERPL: 20.2 (ref 7–25)
CALCIUM SPEC-SCNC: 9.7 MG/DL (ref 8.6–10.5)
CHLORIDE SERPL-SCNC: 95 MMOL/L (ref 98–107)
CO2 SERPL-SCNC: 27.2 MMOL/L (ref 22–29)
CREAT SERPL-MCNC: 0.84 MG/DL (ref 0.76–1.27)
GFR SERPL CREATININE-BSD FRML MDRD: 95 ML/MIN/1.73
GLUCOSE BLDC GLUCOMTR-MCNC: 118 MG/DL (ref 70–130)
GLUCOSE BLDC GLUCOMTR-MCNC: 163 MG/DL (ref 70–130)
GLUCOSE BLDC GLUCOMTR-MCNC: 175 MG/DL (ref 70–130)
GLUCOSE SERPL-MCNC: 159 MG/DL (ref 65–99)
POTASSIUM SERPL-SCNC: 4 MMOL/L (ref 3.5–5.2)
SODIUM SERPL-SCNC: 133 MMOL/L (ref 136–145)

## 2020-10-14 PROCEDURE — 63710000001 INSULIN GLARGINE PER 5 UNITS: Performed by: HOSPITALIST

## 2020-10-14 PROCEDURE — 63710000001 INSULIN LISPRO (HUMAN) PER 5 UNITS: Performed by: THORACIC SURGERY (CARDIOTHORACIC VASCULAR SURGERY)

## 2020-10-14 PROCEDURE — 80048 BASIC METABOLIC PNL TOTAL CA: CPT | Performed by: THORACIC SURGERY (CARDIOTHORACIC VASCULAR SURGERY)

## 2020-10-14 PROCEDURE — 71045 X-RAY EXAM CHEST 1 VIEW: CPT

## 2020-10-14 PROCEDURE — 82962 GLUCOSE BLOOD TEST: CPT

## 2020-10-14 PROCEDURE — 97110 THERAPEUTIC EXERCISES: CPT

## 2020-10-14 PROCEDURE — 99024 POSTOP FOLLOW-UP VISIT: CPT | Performed by: NURSE PRACTITIONER

## 2020-10-14 PROCEDURE — 63710000001 INSULIN LISPRO (HUMAN) PER 5 UNITS: Performed by: HOSPITALIST

## 2020-10-14 RX ORDER — AMOXICILLIN 250 MG
2 CAPSULE ORAL NIGHTLY PRN
Start: 2020-10-14 | End: 2022-04-08

## 2020-10-14 RX ORDER — INSULIN GLARGINE 100 [IU]/ML
30 INJECTION, SOLUTION SUBCUTANEOUS DAILY
Qty: 15 ML | Refills: 0 | Status: SHIPPED | OUTPATIENT
Start: 2020-10-15 | End: 2020-10-14 | Stop reason: HOSPADM

## 2020-10-14 RX ORDER — POTASSIUM CHLORIDE 750 MG/1
20 CAPSULE, EXTENDED RELEASE ORAL DAILY
Qty: 60 CAPSULE | Refills: 0 | Status: SHIPPED | OUTPATIENT
Start: 2020-10-15 | End: 2020-11-14

## 2020-10-14 RX ORDER — FUROSEMIDE 40 MG/1
40 TABLET ORAL
Status: DISCONTINUED | OUTPATIENT
Start: 2020-10-14 | End: 2020-10-14 | Stop reason: HOSPADM

## 2020-10-14 RX ORDER — TRAMADOL HYDROCHLORIDE 50 MG/1
50 TABLET ORAL EVERY 6 HOURS PRN
Qty: 28 TABLET | Refills: 0 | Status: SHIPPED | OUTPATIENT
Start: 2020-10-14 | End: 2020-10-21

## 2020-10-14 RX ORDER — LISINOPRIL 5 MG/1
5 TABLET ORAL
Qty: 30 TABLET | Refills: 2 | Status: SHIPPED | OUTPATIENT
Start: 2020-10-15 | End: 2021-06-17 | Stop reason: SDUPTHER

## 2020-10-14 RX ORDER — FUROSEMIDE 40 MG/1
40 TABLET ORAL DAILY
Qty: 30 TABLET | Refills: 0 | Status: SHIPPED | OUTPATIENT
Start: 2020-10-14 | End: 2021-06-17 | Stop reason: SDUPTHER

## 2020-10-14 RX ORDER — DOXYCYCLINE 100 MG/1
100 CAPSULE ORAL EVERY 12 HOURS SCHEDULED
Qty: 5 CAPSULE | Refills: 0 | Status: SHIPPED | OUTPATIENT
Start: 2020-10-14 | End: 2020-10-17

## 2020-10-14 RX ORDER — INSULIN DETEMIR 100 [IU]/ML
30 INJECTION, SOLUTION SUBCUTANEOUS DAILY
Qty: 9 ML | Refills: 0 | Status: SHIPPED | OUTPATIENT
Start: 2020-10-14 | End: 2021-06-17 | Stop reason: SDUPTHER

## 2020-10-14 RX ORDER — INSULIN LISPRO 100 [IU]/ML
7 INJECTION, SOLUTION INTRAVENOUS; SUBCUTANEOUS
Qty: 15 ML | Refills: 0 | Status: SHIPPED | OUTPATIENT
Start: 2020-10-14 | End: 2020-10-14 | Stop reason: HOSPADM

## 2020-10-14 RX ORDER — ASPIRIN 81 MG/1
81 TABLET ORAL DAILY
Qty: 30 TABLET | Refills: 2 | Status: SHIPPED | OUTPATIENT
Start: 2020-10-15 | End: 2021-01-13

## 2020-10-14 RX ORDER — GUAIFENESIN 600 MG/1
1200 TABLET, EXTENDED RELEASE ORAL EVERY 12 HOURS SCHEDULED
Qty: 28 TABLET | Refills: 0 | Status: SHIPPED | OUTPATIENT
Start: 2020-10-14 | End: 2020-10-21

## 2020-10-14 RX ORDER — ROSUVASTATIN CALCIUM 20 MG/1
20 TABLET, COATED ORAL NIGHTLY
Qty: 30 TABLET | Refills: 2 | Status: SHIPPED | OUTPATIENT
Start: 2020-10-14 | End: 2021-06-17 | Stop reason: SDUPTHER

## 2020-10-14 RX ORDER — METOPROLOL TARTRATE 100 MG/1
100 TABLET ORAL EVERY 12 HOURS SCHEDULED
Qty: 60 TABLET | Refills: 2 | Status: SHIPPED | OUTPATIENT
Start: 2020-10-14 | End: 2021-06-17 | Stop reason: SDUPTHER

## 2020-10-14 RX ORDER — CYCLOBENZAPRINE HCL 10 MG
10 TABLET ORAL EVERY 8 HOURS PRN
Qty: 40 TABLET | Refills: 0 | Status: SHIPPED | OUTPATIENT
Start: 2020-10-14 | End: 2020-10-14 | Stop reason: HOSPADM

## 2020-10-14 RX ADMIN — DOXYCYCLINE 100 MG: 100 CAPSULE ORAL at 08:26

## 2020-10-14 RX ADMIN — ASPIRIN 81 MG: 81 TABLET, COATED ORAL at 08:26

## 2020-10-14 RX ADMIN — FUROSEMIDE 40 MG: 40 TABLET ORAL at 08:27

## 2020-10-14 RX ADMIN — LISINOPRIL 5 MG: 5 TABLET ORAL at 08:26

## 2020-10-14 RX ADMIN — METOPROLOL TARTRATE 100 MG: 50 TABLET, FILM COATED ORAL at 08:26

## 2020-10-14 RX ADMIN — INSULIN LISPRO 3 UNITS: 100 INJECTION, SOLUTION INTRAVENOUS; SUBCUTANEOUS at 06:39

## 2020-10-14 RX ADMIN — INSULIN LISPRO 7 UNITS: 100 INJECTION, SOLUTION INTRAVENOUS; SUBCUTANEOUS at 10:55

## 2020-10-14 RX ADMIN — INSULIN LISPRO 7 UNITS: 100 INJECTION, SOLUTION INTRAVENOUS; SUBCUTANEOUS at 06:40

## 2020-10-14 RX ADMIN — INSULIN GLARGINE 30 UNITS: 100 INJECTION, SOLUTION SUBCUTANEOUS at 08:28

## 2020-10-14 RX ADMIN — PANTOPRAZOLE SODIUM 40 MG: 40 TABLET, DELAYED RELEASE ORAL at 06:17

## 2020-10-14 RX ADMIN — MUPIROCIN 1 APPLICATION: 20 OINTMENT TOPICAL at 08:25

## 2020-10-14 RX ADMIN — INSULIN LISPRO 3 UNITS: 100 INJECTION, SOLUTION INTRAVENOUS; SUBCUTANEOUS at 10:55

## 2020-10-14 RX ADMIN — INSULIN LISPRO 7 UNITS: 100 INJECTION, SOLUTION INTRAVENOUS; SUBCUTANEOUS at 08:25

## 2020-10-14 RX ADMIN — POTASSIUM CHLORIDE 20 MEQ: 10 CAPSULE, COATED, EXTENDED RELEASE ORAL at 08:27

## 2020-10-14 RX ADMIN — GUAIFENESIN 1200 MG: 600 TABLET, EXTENDED RELEASE ORAL at 08:26

## 2020-10-14 NOTE — PLAN OF CARE
Problem: Adult Inpatient Plan of Care  Goal: Plan of Care Review  10/14/2020 1132 by Javy Cabrera  Flowsheets (Taken 10/14/2020 1111)  Progress: improving (Pended)  Plan of Care Reviewed With: patient (Pended)  Outcome Summary: Pt agreeable w/ PT today and was able to increase his ambulation to 420 ft  w/ reduced support required. Pt gait improved w/ the removal of rwx when ambulating. Pt anticipated to d/c to home w/ assist and HH. (Pended)    Patient was intermittently wearing a face mask during this therapy encounter. Therapist used appropriate personal protective equipment including eye protection, mask, and gloves.  Mask used was standard procedure mask. Appropriate PPE was worn during the entire therapy session. Hand hygiene was completed before and after therapy session. Patient is not in enhanced droplet precautions.

## 2020-10-14 NOTE — PLAN OF CARE
Problem: Adult Inpatient Plan of Care  Goal: Plan of Care Review  Outcome: Ongoing, Progressing  Flowsheets (Taken 10/14/2020 0315)  Progress: improving  Plan of Care Reviewed With: patient  Outcome Summary: Pt. POD #5 CABG x4 LIMA LSVG. Running NSR on heart monitor. Plan for pt. to go home with home health in a day or two. Blood sugar check. Will continue to monitor.

## 2020-10-14 NOTE — DISCHARGE SUMMARY
Date of Admission:  10/6/2020  Date of Discharge:  10/14/2020    Discharge Diagnosis:   -NSTEMI, multivessel coronary artery disease with previous PCI 2014--s/p CABGx4 LIMA/EVH left SVG with Dr. Chiu  - ischemic cardiomyopathy  - hypertension  - hyperlipidemia  - DM II  - SHAHLA with home CPAP  - nicotine dependence  - leukocytosis- probable reactive    Presenting Problem/History of Present Illness  CAD (coronary artery disease) [I25.10]     Hospital Course  Patient is a 55 y.o. male who presented to [facility] with complaints of chest tightness. He underwent cardiac work up including a heart catherization which revealed multivessel coronary artery disease. He was transferred here on 10/6/20 for surgical evaluation. He was initiated on a heparin gtt upon arrival. Internal medicine was also consulted pre-operatively for management of DM. On 10/8/20 he underwent CABG x4 with skeletonized LIMA to proximal LAD, vein graft to RCA, vein graft to OM1 and D1 with Dr. Chiu (see op note for full report). Post-operatively he did well. He was successfully extubated, weaned from vasopressors and inotropes, and transferred to stepdown. He was noted to be hypertensive, requiring cardene gtt. His oral medications were optimized and this was succes He had higher oxygen requirements the first few days after surgery. With aggressive pulmonary toilet and aggressive diuretics, his oxygen requirements were greatly improved. His chest tubes, central line, and mcduffie were removed on POD#2. AV wire were removed on POD#3 without issue. His mobility was impaired, but this has progressively improved and is nearly at baseline. He was followed by internal med to help manage his blood sugars, and was seen by the diabetes educator. He is going to be discharged home on insulin therapy. An overnight oximetry was completed on his home CPAP machine with 1hr and 23 minutes of desaturation. He will need oxygen bled into his CPAP at  discharge. A walking oximetry was completed without any desaturation below 89%. He is tolerating the current medication regimen and has met PT goals. He is urinating and defecating without difficulty and is eating and drinking sufficiently. On POD#6 he was deemed ready for discharge home with home health. Sternal precautions were reviewed as were signs and symptoms of sternal wound infection. He is to follow up with his PCP in 1 week, cardiology in 2 weeks, and in our office on 11/13/20 at 1:30pm.    Procedures Performed  Procedure(s):  STERNOTOMY, CORONARY ARTERY BYPASS GRAFTING TIME 4 WITH LEFT KELSI  AND ENDOSCOPICALLY HARVESTED LEFT GREATER SAPHENOUS VEIN AND PRP.       Consults:   Consults     Date and Time Order Name Status Description    10/7/2020 1017 Inpatient Internal Medicine Consult Completed           Pertinent Test Results:    Lab Results   Component Value Date    WBC 9.36 10/13/2020    HGB 11.1 (L) 10/13/2020    HCT 32.6 (L) 10/13/2020    MCV 91.6 10/13/2020     10/13/2020      Lab Results   Component Value Date    GLUCOSE 159 (H) 10/14/2020    CALCIUM 9.7 10/14/2020     (L) 10/14/2020    K 4.0 10/14/2020    CO2 27.2 10/14/2020    CL 95 (L) 10/14/2020    BUN 17 10/14/2020    CREATININE 0.84 10/14/2020    EGFRIFNONA 95 10/14/2020    BCR 20.2 10/14/2020    ANIONGAP 10.8 10/14/2020     Lab Results   Component Value Date    INR 1.06 10/09/2020    PROTIME 13.7 10/09/2020     Condition on Discharge:  Stable    Vital Signs  Temp:  [97.3 °F (36.3 °C)-98.3 °F (36.8 °C)] 98.3 °F (36.8 °C)  Heart Rate:  [69-86] 83  Resp:  [18] 18  BP: (115-142)/(71-77) 135/77      Discharge Disposition  Home-Health Care Svc    Discharge Medications     Discharge Medications      New Medications      Instructions Start Date   aspirin 81 MG EC tablet   81 mg, Oral, Daily   Start Date: October 15, 2020     doxycycline 100 MG capsule  Commonly known as: MONODOX   100 mg, Oral, Every 12 Hours Scheduled      furosemide 40  MG tablet  Commonly known as: LASIX   40 mg, Oral, Daily      guaiFENesin 600 MG 12 hr tablet  Commonly known as: MUCINEX   1,200 mg, Oral, Every 12 Hours Scheduled      insulin lispro 100 UNIT/ML injection  Commonly known as: humaLOG   0-14 Units, Subcutaneous, 3 Times Daily Before Meals, Pens and needles      Insulin Lispro (1 Unit Dial) 100 UNIT/ML solution pen-injector  Commonly known as: HumaLOG KwikPen   7 Units, Subcutaneous, 3 Times Daily With Meals      Lantus SoloStar 100 UNIT/ML injection pen  Generic drug: Insulin Glargine   30 Units, Subcutaneous, Daily   Start Date: October 15, 2020     lisinopril 5 MG tablet  Commonly known as: PRINIVIL,ZESTRIL   5 mg, Oral, Every 24 Hours Scheduled   Start Date: October 15, 2020     metoprolol tartrate 100 MG tablet  Commonly known as: LOPRESSOR   100 mg, Oral, Every 12 Hours Scheduled      potassium chloride 10 MEQ CR capsule  Commonly known as: MICRO-K   20 mEq, Oral, Daily   Start Date: October 15, 2020     rosuvastatin 20 MG tablet  Commonly known as: CRESTOR   20 mg, Oral, Nightly      sennosides-docusate 8.6-50 MG per tablet  Commonly known as: PERICOLACE   2 tablets, Oral, Nightly PRN      traMADol 50 MG tablet  Commonly known as: ULTRAM   50 mg, Oral, Every 6 Hours PRN      Unifine Pentips 31G X 6 MM misc  Generic drug: Insulin Pen Needle   Use 4 times daily with insulin         Stop These Medications    lisinopril-hydrochlorothiazide 10-12.5 MG per tablet  Commonly known as: PRINZIDE,ZESTORETIC            Discharge Diet:     Activity at Discharge:     Follow-up Appointments  Future Appointments   Date Time Provider Department Center   11/13/2020  1:30 PM Suzy Kitchen APRN [facility] None     Additional Instructions for the Follow-ups that You Need to Schedule     Ambulatory Referral to Cardiac Rehab   As directed      Call MD With Problems / Concerns   As directed      Instructions:  Call office at [phone] for any drainage, increased redness,  or fever over 100.5    Order Comments: Instructions:  Call office at [phone] for any drainage, increased redness, or fever over 100.5          Discharge Follow-up with PCP   As directed       Currently Documented PCP:    Dacia Newsome APRN    PCP Phone Number:    [phone]     Follow Up Details: in 1 week         Discharge Follow-up with Specified Provider: SOHAM Walker 11/13/20 at 1:30pm   As directed      To: SOHAM Walker 11/13/20 at 1:30pm    Follow Up Details: arrive 15 minutes prior to appointment; bring all current medications to appointment         Discharge Follow-up with Specified Provider: Cardiologist; 2 Weeks   As directed      To: Cardiologist    Follow Up: 2 Weeks         Referral to Home Health   As directed      Face to Face Visit Date: 10/14/2020    Follow-up provider for Plan of Care?: I will be treating the patient on an ongoing basis.  Please send me the Plan of Care for signature.    Follow-up provider: JR KRISTI LERMA [[id number]]    Reason/Clinical Findings: post op open heart    Describe mobility limitations that make leaving home difficult: post operative weakness    Nursing/Therapeutic Services Requested: Skilled Nursing    Skilled nursing orders: Post CABG care    Frequency: 1 Week 1               Test Results Pending at Discharge       SOHAM Arce  10/14/20  14:31 EDT

## 2020-10-14 NOTE — PROGRESS NOTES
Case Management Discharge Note      Final Note: Pt d/c home with [facility] scheduled to follow.  [facility] will supply nocturnal oxygen to be bled through Pt CPAP (Bruce/Karen confirmed Pt approved for O2).  [facility] HOOD/CCP    Provided Post Acute Provider List?: Yes  Post Acute Provider List: Home Health    Selected Continued Care - Discharged on 10/14/2020 Admission date: 10/6/2020 - Discharge disposition: Home-Health Care Svc    Destination    No services have been selected for the patient.              Durable Medical Equipment    No services have been selected for the patient.              Dialysis/Infusion    No services have been selected for the patient.              Home Medical Care Coordination complete    Service Provider Selected Services Address Phone Fax    [facility]  [facility] [address] [address] [phone]          Therapy    No services have been selected for the patient.              Community Resources    No services have been selected for the patient.                       Final Discharge Disposition Code: 06 - home with home health care

## 2020-10-14 NOTE — PROGRESS NOTES
"DAILY PROGRESS NOTE  [facility]    Patient Identification:  Name: Jd Velazquez  Age: 55 y.o.  Sex: male  :  1965  MRN: [id number]         Primary Care Physician: Dacia Newsome APRN    Subjective:  Interval History:He is breathing better .    Objective:    Scheduled Meds:aspirin, 81 mg, Oral, Daily  bisacodyl, 10 mg, Rectal, Once  doxycycline, 100 mg, Oral, Q12H  enoxaparin, 40 mg, Subcutaneous, Q24H  furosemide, 40 mg, Oral, BID  guaiFENesin, 1,200 mg, Oral, Q12H  insulin glargine, 30 Units, Subcutaneous, Daily  insulin lispro, 0-14 Units, Subcutaneous, TID AC  insulin lispro, 7 Units, Subcutaneous, TID With Meals  lisinopril, 5 mg, Oral, Q24H  metoprolol tartrate, 100 mg, Oral, Q12H  mupirocin, , Each Nare, BID  pantoprazole, 40 mg, Oral, QAM  polyethylene glycol, 17 g, Oral, BID  potassium chloride, 20 mEq, Oral, Daily  rosuvastatin, 20 mg, Oral, Nightly  senna-docusate sodium, 2 tablet, Oral, Nightly      Continuous Infusions:     Vital signs in last 24 hours:  Temp:  [97.3 °F (36.3 °C)-98.3 °F (36.8 °C)] 98.3 °F (36.8 °C)  Heart Rate:  [69-86] 83  Resp:  [18] 18  BP: (115-142)/(71-77) 135/77    Intake/Output:    Intake/Output Summary (Last 24 hours) at 10/14/2020 1318  Last data filed at 10/14/2020 1138  Gross per 24 hour   Intake 720 ml   Output --   Net 720 ml       Exam:  /77 (BP Location: Right arm, Patient Position: Lying)   Pulse 83   Temp 98.3 °F (36.8 °C) (Temporal)   Resp 18   Ht 182.9 cm (72\")   Wt 115 kg (254 lb 3.2 oz)   SpO2 (!) 89%   BMI 34.48 kg/m²     General Appearance:    Alert, cooperative, no distress   Head:    Normocephalic, without obvious abnormality, atraumatic   Eyes:       Throat:   Lips, tongue, gums normal   Neck:   Supple, symmetrical, trachea midline, no JVD   Lungs:     Clear to auscultation bilaterally, respirations unlabored   Chest Wall:    Surgical changes    Heart:    Regular rate and rhythm, S1 and S2 normal, no murmur,no  " Rub or gallop   Abdomen:     Soft, nontender, bowel sounds active, no masses, no organomegaly    Extremities:   Extremities normal, atraumatic, no cyanosis or edema   Pulses:      Skin:   Skin is warm and dry,  no rashes or palpable lesions   Neurologic:   no focal deficits noted      Lab Results (last 72 hours)     Procedure Component Value Units Date/Time    POC Glucose Once [[id number]]  (Abnormal) Collected: 10/12/20 1525    Specimen: Blood Updated: 10/12/20 1527     Glucose 197 mg/dL     POC Glucose Once [[id number]]  (Abnormal) Collected: 10/12/20 1025    Specimen: Blood Updated: 10/12/20 1026     Glucose 236 mg/dL     POC Glucose Once [[id number]]  (Abnormal) Collected: 10/12/20 0632    Specimen: Blood Updated: 10/12/20 0633     Glucose 210 mg/dL     Basic Metabolic Panel [[id number]]  (Abnormal) Collected: 10/12/20 0333    Specimen: Blood Updated: 10/12/20 0500     Glucose 154 mg/dL      BUN 22 mg/dL      Creatinine 0.75 mg/dL      Sodium 132 mmol/L      Potassium 4.1 mmol/L      Chloride 97 mmol/L      CO2 26.4 mmol/L      Calcium 8.9 mg/dL      eGFR Non African Amer 109 mL/min/1.73      BUN/Creatinine Ratio 29.3     Anion Gap 8.6 mmol/L     Narrative:      GFR Normal >60  Chronic Kidney Disease <60  Kidney Failure <15      CBC (No Diff) [[id number]]  (Abnormal) Collected: 10/12/20 0333    Specimen: Blood Updated: 10/12/20 0431     WBC 10.71 10*3/mm3      RBC 3.69 10*6/mm3      Hemoglobin 11.4 g/dL      Hematocrit 33.3 %      MCV 90.2 fL      MCH 30.9 pg      MCHC 34.2 g/dL      RDW 12.4 %      RDW-SD 41.2 fl      MPV 9.8 fL      Platelets 283 10*3/mm3     POC Glucose Once [[id number]]  (Abnormal) Collected: 10/11/20 1524    Specimen: Blood Updated: 10/11/20 1525     Glucose 257 mg/dL     POC Glucose Once [[id number]]  (Abnormal) Collected: 10/11/20 1038    Specimen: Blood Updated: 10/11/20 1040     Glucose 277 mg/dL     POC Glucose Once [[id number]]  (Abnormal) Collected: 10/11/20 0540    Specimen: Blood Updated:  10/11/20 0542     Glucose 233 mg/dL     Basic Metabolic Panel [[id number]]  (Abnormal) Collected: 10/11/20 0311    Specimen: Blood Updated: 10/11/20 0431     Glucose 232 mg/dL      BUN 19 mg/dL      Creatinine 0.97 mg/dL      Sodium 131 mmol/L      Potassium 4.6 mmol/L      Chloride 95 mmol/L      CO2 24.5 mmol/L      Calcium 9.8 mg/dL      eGFR Non African Amer 81 mL/min/1.73      BUN/Creatinine Ratio 19.6     Anion Gap 11.5 mmol/L     Narrative:      GFR Normal >60  Chronic Kidney Disease <60  Kidney Failure <15      CBC (No Diff) [[id number]]  (Abnormal) Collected: 10/11/20 0311    Specimen: Blood Updated: 10/11/20 0405     WBC 14.96 10*3/mm3      RBC 4.12 10*6/mm3      Hemoglobin 12.8 g/dL      Hematocrit 37.9 %      MCV 92.0 fL      MCH 31.1 pg      MCHC 33.8 g/dL      RDW 12.8 %      RDW-SD 42.9 fl      MPV 9.9 fL      Platelets 270 10*3/mm3     POC Glucose Once [[id number]]  (Abnormal) Collected: 10/10/20 1543    Specimen: Blood Updated: 10/10/20 1547     Glucose 251 mg/dL     POC Glucose Once [[id number]]  (Abnormal) Collected: 10/10/20 1033    Specimen: Blood Updated: 10/10/20 1034     Glucose 225 mg/dL     Basic Metabolic Panel [[id number]]  (Abnormal) Collected: 10/10/20 0526    Specimen: Blood Updated: 10/10/20 0746     Glucose 203 mg/dL      BUN 13 mg/dL      Creatinine 0.80 mg/dL      Sodium 133 mmol/L      Potassium 4.1 mmol/L      Chloride 99 mmol/L      CO2 23.0 mmol/L      Calcium 8.8 mg/dL      eGFR Non African Amer 101 mL/min/1.73      BUN/Creatinine Ratio 16.3     Anion Gap 11.0 mmol/L     Narrative:      GFR Normal >60  Chronic Kidney Disease <60  Kidney Failure <15      POC Glucose Once [[id number]]  (Abnormal) Collected: 10/10/20 0654    Specimen: Blood Updated: 10/10/20 0655     Glucose 188 mg/dL     CBC (No Diff) [[id number]]  (Abnormal) Collected: 10/10/20 0526    Specimen: Blood Updated: 10/10/20 0551     WBC 15.14 10*3/mm3      RBC 4.15 10*6/mm3      Hemoglobin 12.9 g/dL      Hematocrit 37.8 %       MCV 91.1 fL      MCH 31.1 pg      MCHC 34.1 g/dL      RDW 12.5 %      RDW-SD 41.7 fl      MPV 10.0 fL      Platelets 224 10*3/mm3     Blood Gas, Arterial [[id number]]  (Abnormal) Collected: 10/09/20 2053    Specimen: Arterial Blood Updated: 10/09/20 2055     Site Arterial: right radial     Fritz's Test N/A     pH, Arterial 7.373 pH units      pCO2, Arterial 39.5 mm Hg      pO2, Arterial 63.0 mm Hg      HCO3, Arterial 23.0 mmol/L      Base Excess, Arterial -2.1 mmol/L      O2 Saturation Calculated 91.2 %      Barometric Pressure for Blood Gas 754.3 mmHg      Modality HFNC     Flow Rate 11 lpm      Rate 20 Breaths/minute     POC Glucose Once [[id number]]  (Abnormal) Collected: 10/09/20 2019    Specimen: Blood Updated: 10/09/20 2021     Glucose 195 mg/dL     POC Glucose Once [[id number]]  (Abnormal) Collected: 10/09/20 1650    Specimen: Blood Updated: 10/09/20 1657     Glucose 166 mg/dL         Data Review:  Results from last 7 days   Lab Units 10/14/20  0351 10/13/20  0336 10/12/20  0333   SODIUM mmol/L 133* 132* 132*   POTASSIUM mmol/L 4.0 4.2 4.1   CHLORIDE mmol/L 95* 96* 97*   CO2 mmol/L 27.2 26.0 26.4   BUN mg/dL 17 19 22*   CREATININE mg/dL 0.84 0.79 0.75*   GLUCOSE mg/dL 159* 166* 154*   CALCIUM mg/dL 9.7 9.3 8.9     Results from last 7 days   Lab Units 10/13/20  0336 10/12/20  0333 10/11/20  0311   WBC 10*3/mm3 9.36 10.71 14.96*   HEMOGLOBIN g/dL 11.1* 11.4* 12.8*   HEMATOCRIT % 32.6* 33.3* 37.9   PLATELETS 10*3/mm3 347 283 270             No results found for: TROPONINT            Invalid input(s): PROT, LABALBU          Glucose   Date/Time Value Ref Range Status   10/14/2020 1044 175 (H) 70 - 130 mg/dL Final   10/14/2020 0537 163 (H) 70 - 130 mg/dL Final   10/13/2020 2018 270 (H) 70 - 130 mg/dL Final   10/13/2020 1543 159 (H) 70 - 130 mg/dL Final   10/13/2020 1027 245 (H) 70 - 130 mg/dL Final   10/13/2020 0529 173 (H) 70 - 130 mg/dL Final   10/12/2020 2018 233 (H) 70 - 130 mg/dL Final   10/12/2020 1525 197  (H) 70 - 130 mg/dL Final     Results from last 7 days   Lab Units 10/09/20  0303 10/08/20  1132   INR  1.06 1.10       Past Medical History:   Diagnosis Date   • CAD (coronary artery disease)    • Diabetes mellitus (CMS/[facility])    • Hyperlipidemia    • Hypertension    • Obesity        Assessment:  Active Hospital Problems    Diagnosis  POA   • **Coronary artery disease of native artery of native heart with stable angina pectoris (CMS/[facility]) [I25.118]  Yes   • CAD (coronary artery disease) [I25.10]  Yes   • Type 2 diabetes mellitus with hyperglycemia, without long-term current use of insulin (CMS/[facility]) [E11.65]  Yes   • Hyperlipidemia [E78.5]  Yes   • Hypertension [I10]  Yes   • Obesity [E66.9]  Yes   • SHAHLA (obstructive sleep apnea) [G47.33]  Unknown   • Tobacco abuse [Z72.0]  Unknown      Resolved Hospital Problems   No resolved problems to display.       Plan:  Will continue with current RX and  insulin. Follow lab.  OK with DC plan. Ordered insulin for DC and recommend follow up PCP 1 week.    Ish Lindsey MD  10/14/2020  13:18 EDT

## 2020-10-14 NOTE — NURSING NOTE
"Diabetes Education  Assessment/Teaching    Patient Name:  Jd Velazquez  YOB: 1965  MRN: [id number]  Admit Date:  10/6/2020      Assessment Date:  10/14/2020    Most Recent Value   General Information    Referral From:  Other -RN staff. F/u with 54 y/o at bedside to review insulin scheduling and assess for further ed needs.    Height  182.9 cm (72\")   Height Method  Stated   Weight  115 kg (254 lb 3.2 oz)   Weight Method  Standing scale   Diabetes History   What type of diabetes do you have?  Type 2   Length of Diabetes Diagnosis  -- unk   Do you test your blood sugar at home?  no   Have you had high blood sugar? (>140mg/dl)  yes- current a1C >9%.   Do you have any diabetes complications?  circulation problems   Education Preferences   Barriers to Learning  -- pt appears anxious to dc home at this point.    Assessment Topics   Taking Medication - Assessment  --Review: insulin scheduling.    Healthy Coping - Assessment  Competent   Monitoring - Assessment  Competent   DM Goals   Monitoring - Goal  0-7 days from discharge   Contact Plan  Follow-up medical care            Most Recent Value   DM Education Needs   Meter  Free sample BG Meter provided to pt Monday.    Meter Type  One Touch -pt given free sample One Touch Verio start kit Monday.    Frequency of Testing  AC meals   Medication  Insulin -Review Lantus/Basaglar and Humalog scheduling and dosages from dc summary.    Problem Solving  Hypoglycemia -warn pt he is at increased risk for low BGs if he skips meals once he dcs. Advise pt call MD/PCP for repeat high BG (200 or more) second day.    Healthy Coping  Appropriate   Discharge Plan  Home, with home health, Follow-up with MD   Motivation  Moderate, Engaged   Teaching Method  Explanation, Discussion, Handouts   Patient Response  Verbalized understanding      Electronically signed by:  Sherly Franco, RN, BSN, CDE   10/14/20 16:40 EDT  "

## 2020-10-14 NOTE — PROGRESS NOTES
Exercise Oximetry    Patient Name:Jd Velazquez   MRN: [id number]   Date: 10/14/20             ROOM AIR BASELINE   SpO2% 93   Heart Rate 74   Blood Pressure      EXERCISE ON ROOM AIR SpO2% EXERCISE ON O2 @ LPM SpO2%   1 MINUTE 93 1 MINUTE    2 MINUTES 92 2 MINUTES    3 MINUTES 91 3 MINUTES    4 MINUTES 91 4 MINUTES    5 MINUTES 92 5 MINUTES    6 MINUTES 92 6 MINUTES               Distance Walked   Distance Walked   Dyspnea (Lauro Scale)  Dyspnea (Lauro Scale)   Fatigue (Lauro Scale)   Fatigue (Lauro Scale)   SpO2% Post Exercise  94 SpO2% Post Exercise   HR Post Exercise  81 HR Post Exercise   Time to Recovery   Time to Recovery     Comments: Patient tolerated walk well with no complaints of SOA

## 2020-10-14 NOTE — PROGRESS NOTES
Continued Stay Note  [facility]     Patient Name: Jd Velazquez  MRN: [id number]  Today's Date: 10/14/2020    Admit Date: 10/6/2020    Discharge Plan     Row Name 10/14/20 1450       Plan    Plan Comments  Call placed to pt's insurance to verify the denial reason.  Pt's insurance is requiring that his insulin and supplies be filled at a [facility] pharmacy.  Verified the formulary covered alternatives for the insulin.  Based upon this information, call placed to pt in his [facility] to [facility] and explained that he will need to pick his insulin up at [facility].  He is in agreement.  Pharmacy changed in [facility].  Call placed to Wesley PALAFOX at [facility].  He will transfer the test strips and pen needle prescriptions verbally to the new pharmacy at [phone].  Notified Dr. Lindsey who will rescript the formulary alternative products (Levemir and Novolog) to the [facility] now in [facility].  KRISTIAN Mauricio notified.  ANISH De La Cruz RN, CCP                              Discharge Codes    No documentation.       Expected Discharge Date and Time     Expected Discharge Date Expected Discharge Time    Oct 14, 2020             Kika De La Cruz RN

## 2020-10-14 NOTE — THERAPY TREATMENT NOTE
Patient Name: Jd Velazquez  : 1965    MRN: [id number]                              Today's Date: 10/14/2020       Admit Date: 10/6/2020    Visit Dx:     ICD-10-CM ICD-9-CM   1. Coronary artery disease of native artery of native heart with stable angina pectoris (CMS/HCC)  I25.118 414.01     413.9   2. S/P CABG (coronary artery bypass graft)  Z95.1 V45.81     Patient Active Problem List   Diagnosis   • CAD (coronary artery disease)   • Coronary artery disease of native artery of native heart with stable angina pectoris (CMS/HCC)   • Type 2 diabetes mellitus with hyperglycemia, without long-term current use of insulin (CMS/[facility])   • Hyperlipidemia   • Hypertension   • Obesity   • SHAHLA (obstructive sleep apnea)   • Tobacco abuse     Past Medical History:   Diagnosis Date   • CAD (coronary artery disease)    • Diabetes mellitus (CMS/[facility])    • Hyperlipidemia    • Hypertension    • Obesity      Past Surgical History:   Procedure Laterality Date   • ANKLE ARTHROSCOPY W/ OPEN REPAIR     • APPENDECTOMY     • CARDIAC CATHETERIZATION      PCI to circumflex   • CORONARY ARTERY BYPASS GRAFT N/A 10/8/2020    Procedure: STERNOTOMY, CORONARY ARTERY BYPASS GRAFTING TIME 4 WITH LEFT KELSI  AND ENDOSCOPICALLY HARVESTED LEFT GREATER SAPHENOUS VEIN AND PRP.;  Surgeon: Jr Girma Chiu MD;  Location: [facility];  Service: Cardiothoracic;  Laterality: N/A;   • HERNIA REPAIR     • TONSILLECTOMY       General Information     Row Name 10/14/20 110          Physical Therapy Time and Intention    Document Type  therapy note (daily note)  -EM (r) CHET (t) EM (c)     Mode of Treatment  physical therapy  -EM (r) CJ (t) EM (c)     Row Name 10/14/20 1109          General Information    Existing Precautions/Restrictions  cardiac;fall;sternal  -EM (r) CJ (t) EM (c)       User Key  (r) = Recorded By, (t) = Taken By, (c) = Cosigned By    Initials Name Provider Type    Luana Walker, PT Physical Therapist    HCET Cabrera,  Javy PT Student        Mobility     Row Name 10/14/20 1109          Bed Mobility    Comment (Bed Mobility)  up in chair  -EM (r) CJ (t) EM (c)     Row Name 10/14/20 1109          Sit-Stand Transfer    Sit-Stand [place] (Transfers)  supervision  -EM (r) CJ (t) EM (c)     Row Name 10/14/20 1109          Gait/Stairs (Locomotion)    [place] Level (Gait)  contact guard  -EM (r) CJ (t) EM (c)     Assistive Device (Gait)  other (see comments) walked 1.5 laps w/ rwx and 1.5 w/ CGA  -EM (r) CJ (t) EM (c)     Distance in Feet (Gait)  420  -EM (r) CJ (t) EM (c)     Deviations/Abnormal Patterns (Gait)  jas decreased;gait speed decreased  -EM (r) CJ (t) EM (c)     Comment (Gait/Stairs)  pt had no LOB or unsteadiness and gait improved when rwx removed  -EM (r) CJ (t) EM (c)       User Key  (r) = Recorded By, (t) = Taken By, (c) = Cosigned By    Initials Name Provider Type    Luana Walker PT Physical Therapist    Javy Souza PT Student        Obj/Interventions     Row Name 10/14/20 1111          Motor Skills    Therapeutic Exercise  other (see comments) cardiac protocol x 5 lvl 5  -EM (r) CJ (t) EM (c)       User Key  (r) = Recorded By, (t) = Taken By, (c) = Cosigned By    Initials Name Provider Type    Luana Walker PT Physical Therapist    Javy Souza PT Student        Goals/Plan    No documentation.       Clinical Impression     Row Name 10/14/20 1111          Pain Scale: Numbers Pre/Post-Treatment    Pretreatment Pain Rating  0/10 - no pain  -EM (r) CJ (t) EM (c)     Posttreatment Pain Rating  0/10 - no pain  -EM (r) CJ (t) EM (c)     Row Name 10/14/20 1111          Plan of Care Review    Plan of Care Reviewed With  patient  -EM (r) CJ (t) EM (c)     Progress  improving  -EM (r) CJ (t) EM (c)     Outcome Summary  Pt agreeable w/ PT today and was able to increase his ambulation to 420 ft  w/ reduced support required. Pt gait improved w/ the removal of rwx when  ambulating. Pt anticipated to d/c to home w/ assist and HH.  -EM (r) CJ (t) EM (c)     Row Name 10/14/20 1111          Vital Signs    Pre Systolic BP Rehab  135  -EM (r) CJ (t) EM (c)     Pre Treatment Diastolic BP  77  -EM (r) CJ (t) EM (c)     Pretreatment Heart Rate (beats/min)  74  -EM (r) CJ (t) EM (c)     Posttreatment Heart Rate (beats/min)  73  -EM (r) CJ (t) EM (c)     Pre SpO2 (%)  93  -EM (r) CJ (t) EM (c)     O2 Delivery Pre Treatment  room air  -EM (r) CJ (t) EM (c)     Post SpO2 (%)  93  -EM (r) CJ (t) EM (c)     O2 Delivery Post Treatment  room air  -EM (r) CJ (t) EM (c)     Pre Patient Position  Sitting  -EM (r) CJ (t) EM (c)     Post Patient Position  Sitting  -EM (r) CJ (t) EM (c)     Row Name 10/14/20 1111          Positioning and Restraints    Pre-Treatment Position  sitting in chair/recliner  -EM (r) CJ (t) EM (c)     Post Treatment Position  chair  -EM (r) CJ (t) EM (c)     In Chair  reclined;call light within reach;encouraged to call for assist;exit alarm on  -EM (r) CJ (t) EM (c)       User Key  (r) = Recorded By, (t) = Taken By, (c) = Cosigned By    Initials Name Provider Type    Luana Walker, PT Physical Therapist    Javy Souza PT Student        Outcome Measures     Row Name 10/14/20 1115          How much help from another person do you currently need...    Turning from your back to your side while in flat bed without using bedrails?  3  -EM (r) CJ (t) EM (c)     Moving from lying on back to sitting on the side of a flat bed without bedrails?  3  -EM (r) CJ (t) EM (c)     Moving to and from a bed to a chair (including a wheelchair)?  4  -EM (r) CJ (t) EM (c)     Standing up from a chair using your arms (e.g., wheelchair, bedside chair)?  4  -EM (r) CJ (t) EM (c)     Climbing 3-5 steps with a railing?  2  -EM (r) CJ (t) EM (c)     To walk in hospital room?  4  -EM (r) CJ (t) EM (c)     AM-PAC 6 Clicks Score (PT)  20  -EM (r) CJ (t)     Row Name 10/14/20 5265           Functional Assessment    Outcome Measure Options  AM-PAC 6 Clicks Basic Mobility (PT)  -EM (r) CJ (t) EM (c)       User Key  (r) = Recorded By, (t) = Taken By, (c) = Cosigned By    Initials Name Provider Type    Luana Walker, PT Physical Therapist     Javy Cabrera PT Student        Physical Therapy Education                 Title: PT OT SLP Therapies (In Progress)     Topic: Physical Therapy (In Progress)     Point: Mobility training (Done)     Learning Progress Summary           Patient Acceptance, E,TB, VU,DU by  at 10/14/2020 1115    Acceptance, E, NR by AR at 10/12/2020 1151    Acceptance, E, NR by DB at 10/11/2020 1313    Acceptance, E, NR by DB at 10/10/2020 1031    Acceptance, E, NR by  at 10/9/2020 0905                   Point: Home exercise program (Done)     Learning Progress Summary           Patient Acceptance, E,TB, VU,DU by  at 10/14/2020 1115    Acceptance, E, NR by AR at 10/12/2020 1151    Acceptance, E, NR by DB at 10/11/2020 1313    Acceptance, E, NR by DB at 10/10/2020 1031    Acceptance, E, NR by  at 10/9/2020 0905                   Point: Body mechanics (In Progress)     Learning Progress Summary           Patient Acceptance, E, NR by AR at 10/12/2020 1151    Acceptance, E, NR by DB at 10/11/2020 1313    Acceptance, E, NR by DB at 10/10/2020 1031                   Point: Precautions (In Progress)     Learning Progress Summary           Patient Acceptance, E, NR by AR at 10/12/2020 1151    Acceptance, E, NR by DB at 10/11/2020 1313    Acceptance, E, NR by DB at 10/10/2020 1031                               User Key     Initials Effective Dates Name Provider Type Discipline    AR 04/03/18 -  Tiera Landin, PT Physical Therapist PT    DB 01/22/20 -  Lola Gregg, PT Physical Therapist PT     09/16/20 -  Javy Cabrera PT Student PT              PT Recommendation and Plan     Plan of Care Reviewed With: patient  Progress: improving  Outcome Summary: Pt  agreeable w/ PT today and was able to increase his ambulation to 420 ft  w/ reduced support required. Pt gait improved w/ the removal of rwx when ambulating. Pt anticipated to d/c to home w/ assist and HH.     Time Calculation:   PT Charges     Row Name 10/14/20 1116             Time Calculation    Start Time  1003  -EM (r) CJ (t) EM (c)      Stop Time  1015  -EM (r) CJ (t) EM (c)      Time Calculation (min)  12 min  -EM (r) CJ (t)      PT Received On  10/14/20  -EM (r) CJ (t) EM (c)      PT - Next Appointment  10/15/20  -EM (r) CJ (t) EM (c)         Time Calculation- PT    Total Timed Code Minutes- PT  12 minute(s)  -EM (r) CJ (t) EM (c)        User Key  (r) = Recorded By, (t) = Taken By, (c) = Cosigned By    Initials Name Provider Type    EM Luana Pritchett, PT Physical Therapist    Javy Souza PT Student        Therapy Charges for Today     Code Description Service Date Service Provider Modifiers Qty    [id number] HC PT THER PROC EA 15 MIN 10/14/2020 Javy Cabrera GP 1          PT G-Codes  Outcome Measure Options: AM-PAC 6 Clicks Basic Mobility (PT)  AM-PAC 6 Clicks Score (PT): 20    Javy Cabrera  10/14/2020

## 2020-10-14 NOTE — PROGRESS NOTES
Continued Stay Note  [facility]     Patient Name: Jd Velazquez  MRN: [id number]  Today's Date: 10/14/2020    Admit Date: 10/6/2020    Discharge Plan     Row Name 10/14/20 1419       Plan    Plan  10/14 Home w/ Eva ARGUETA;  Karen called with referral for nocturnal oxygen to be bled through Pt CPAP.    Plan Comments  CCP advised Tamy/Eva  that Pt has is discharging home.  Pt has order for nocturnal oxygen to be bled through his CPAP.  Pt uses Karen [facility].  CCP called Bruce/Karen [phone] at 2:17 PM with referral for nocturnal oxygen.  CCP received a call from Wesley in [facility] advising issues with Pt insulin coverage.  CCP called Kika High Risk Coordinator and she is going to assist with this issue.  YUE HOOD/CCP        Discharge Codes    No documentation.       Expected Discharge Date and Time     Expected Discharge Date Expected Discharge Time    Oct 14, 2020             Luly Villatoro RN

## 2020-10-14 NOTE — PROGRESS NOTES
" LOS: 8 days   Patient Care Team:  Dacia Newsome APRN as PCP - General (Nurse Practitioner)    Chief Complaint: post op    Subjective:  Symptoms:  He reports shortness of breath (mild with exertion) and cough.  No chest pain.    Diet:  Adequate intake.  No nausea or vomiting.    Activity level: Returning to normal.    Pain:  He complains of pain that is mild.  Pain is well controlled and requiring pain medication.      Patient really wants to go home    Vital Signs  Temp:  [96.4 °F (35.8 °C)-98.3 °F (36.8 °C)] 98.3 °F (36.8 °C)  Heart Rate:  [66-86] 83  Resp:  [18] 18  BP: (103-142)/(70-77) 135/77  Body mass index is 34.48 kg/m².    Intake/Output Summary (Last 24 hours) at 10/14/2020 0818  Last data filed at 10/14/2020 0814  Gross per 24 hour   Intake 840 ml   Output 500 ml   Net 340 ml     I/O this shift:  In: 480 [P.O.:480]  Out: -           10/12/20  0539 10/13/20  0430 10/14/20  0327   Weight: 114 kg (252 lb 4 oz) 113 kg (249 lb 9.6 oz) 115 kg (254 lb 3.2 oz)     Objective:  General Appearance:  Comfortable and in no acute distress.    Vital signs: (most recent): Blood pressure 135/77, pulse 83, temperature 98.3 °F (36.8 °C), temperature source Temporal, resp. rate 18, height 182.9 cm (72\"), weight 115 kg (254 lb 3.2 oz), SpO2 (!) 89 %.  Vital signs are normal.  No fever.    Output: Producing urine and producing stool.    Lungs:  Normal effort and normal respiratory rate.  There are rales and decreased breath sounds (L>R).    Heart: Normal rate.  Regular rhythm.  (SR on tele monitor)  Abdomen: Abdomen is soft.  Bowel sounds are normal.     Extremities: There is dependent edema (mild bilateral edema).    Pulses: Distal pulses are intact.    Neurological: Patient is alert and oriented to person, place and time.    Skin:  Warm and dry.  (Sternal wound clean, dry, and intact)            Results Review:        WBC WBC   Date Value Ref Range Status   10/13/2020 9.36 3.40 - 10.80 10*3/mm3 Final   10/12/2020 " 10.71 3.40 - 10.80 10*3/mm3 Final      HGB Hemoglobin   Date Value Ref Range Status   10/13/2020 11.1 (L) 13.0 - 17.7 g/dL Final   10/12/2020 11.4 (L) 13.0 - 17.7 g/dL Final      HCT Hematocrit   Date Value Ref Range Status   10/13/2020 32.6 (L) 37.5 - 51.0 % Final   10/12/2020 33.3 (L) 37.5 - 51.0 % Final      Platelets Platelets   Date Value Ref Range Status   10/13/2020 347 140 - 450 10*3/mm3 Final   10/12/2020 283 140 - 450 10*3/mm3 Final        PT/INR:  No results found for: PROTIME/No results found for: INR    Sodium Sodium   Date Value Ref Range Status   10/14/2020 133 (L) 136 - 145 mmol/L Final   10/13/2020 132 (L) 136 - 145 mmol/L Final   10/12/2020 132 (L) 136 - 145 mmol/L Final      Potassium Potassium   Date Value Ref Range Status   10/14/2020 4.0 3.5 - 5.2 mmol/L Final   10/13/2020 4.2 3.5 - 5.2 mmol/L Final   10/12/2020 4.1 3.5 - 5.2 mmol/L Final      Chloride Chloride   Date Value Ref Range Status   10/14/2020 95 (L) 98 - 107 mmol/L Final   10/13/2020 96 (L) 98 - 107 mmol/L Final   10/12/2020 97 (L) 98 - 107 mmol/L Final      Bicarbonate CO2   Date Value Ref Range Status   10/14/2020 27.2 22.0 - 29.0 mmol/L Final   10/13/2020 26.0 22.0 - 29.0 mmol/L Final   10/12/2020 26.4 22.0 - 29.0 mmol/L Final      BUN BUN   Date Value Ref Range Status   10/14/2020 17 6 - 20 mg/dL Final   10/13/2020 19 6 - 20 mg/dL Final   10/12/2020 22 (H) 6 - 20 mg/dL Final      Creatinine Creatinine   Date Value Ref Range Status   10/14/2020 0.84 0.76 - 1.27 mg/dL Final   10/13/2020 0.79 0.76 - 1.27 mg/dL Final   10/12/2020 0.75 (L) 0.76 - 1.27 mg/dL Final      Calcium Calcium   Date Value Ref Range Status   10/14/2020 9.7 8.6 - 10.5 mg/dL Final   10/13/2020 9.3 8.6 - 10.5 mg/dL Final   10/12/2020 8.9 8.6 - 10.5 mg/dL Final      Magnesium No results found for: MG       aspirin, 81 mg, Oral, Daily  bisacodyl, 10 mg, Rectal, Once  doxycycline, 100 mg, Oral, Q12H  enoxaparin, 40 mg, Subcutaneous, Q24H  furosemide, 40 mg, Oral,  Daily  guaiFENesin, 1,200 mg, Oral, Q12H  insulin glargine, 30 Units, Subcutaneous, Daily  insulin lispro, 0-14 Units, Subcutaneous, TID AC  insulin lispro, 7 Units, Subcutaneous, TID With Meals  lisinopril, 5 mg, Oral, Q24H  metoprolol tartrate, 100 mg, Oral, Q12H  mupirocin, , Each Nare, BID  pantoprazole, 40 mg, Oral, QAM  polyethylene glycol, 17 g, Oral, BID  potassium chloride, 20 mEq, Oral, Daily  rosuvastatin, 20 mg, Oral, Nightly  senna-docusate sodium, 2 tablet, Oral, Nightly           Patient Active Problem List   Diagnosis Code   • CAD (coronary artery disease) I25.10   • Coronary artery disease of native artery of native heart with stable angina pectoris (CMS/[facility]) I25.118   • Type 2 diabetes mellitus with hyperglycemia, without long-term current use of insulin (CMS/[facility]) E11.65   • Hyperlipidemia E78.5   • Hypertension I10   • Obesity E66.9   • SHAHLA (obstructive sleep apnea) G47.33   • Tobacco abuse Z72.0       Assessment & Plan   -NSTEMI, multivessel coronary artery disease with previous PCI 2014--s/p CABGx4 LIMA/EVH left SVG--POD#6 [place]  - ischemic cardiomyopathy  - hypertension--poorly controlled  - hyperlipidemia--patient reports previous intolerance to Lipitor--will try crestor  - DM II--non-compliant with medications  - SHAHLA with home CPAP  - nicotine dependence--encourage cessation--patient declines nicotine patch at this time  - obesity  - leukocytosis- probable reactive--resolved     CV stable  Back and forth between RA and 2L NC--continue to wean as able  Continue mucinex and nebs and aggressive pulmonary toilet  CXR seems somewhat improved--still with persistent atelectasis/ left pleural effusion--change diuretics to BID  Activity improving--walked 150 feet with therapy yesterday  Overnight oximetry completed on home CPAP with 1hr and 23 min of desaturation below 89%; will check walking oximetry today on room air  Continue routine care  May be about to go home with home health later today or  tomorrow--will discuss with Dr. Apple Kitchen, SOHAM  10/14/20  08:18 EDT

## 2020-10-15 ENCOUNTER — READMISSION MANAGEMENT (OUTPATIENT)
Dept: CALL CENTER | Facility: HOSPITAL | Age: 55
End: 2020-10-15

## 2020-10-15 NOTE — OUTREACH NOTE
Prep Survey      Responses   [facility] facility patient discharged from?  [facility]   Is LACE score < 7 ?  No   Eligibility  Readm Mgmt   Discharge diagnosis  NSTEMI, multivessel coronary artery disease with previous PCI    Does the patient have one of the following disease processes/diagnoses(primary or secondary)?  Cardiothoracic surgery   Does the patient have Home health ordered?  Yes   What is the Home health agency?   [facility]     Is there a DME ordered?  Yes   What DME was ordered?  Oxygen per [facility]    Medication alerts for this patient  ASA, Lasix    Prep survey completed?  Yes          Anna Schilling RN

## 2020-10-15 NOTE — PAYOR COMM NOTE
"Jd Velazquez (55 y.o. Male)                     ATTENTION;  DC SUMMARY CASE REF # [id number]        Date of Birth Social Security Number Address Home Phone MRN    [id number]  [id number] [place] [address] [phone] [id number]    [employer] Marital Status          [employer]        Admission Date Admission Type Admitting Provider Attending Provider Department, Room/Bed    10/6/20 Urgent Angelo Egan MD  [facility] CARDIOVASC UNIT, 2221/1    Discharge Date Discharge Disposition Discharge Destination        10/14/2020 Home-Health Care Svc              Attending Provider: (none)   Allergies: Hydrocodone    Isolation: None   Infection: None   Code Status: Prior    Ht: 182.9 cm (72\")   Wt: 115 kg (254 lb 3.2 oz)    Admission Cmt: None   Principal Problem: Coronary artery disease of native artery of native heart with stable angina pectoris (CMS/[facility]) [I25.118] More...                 Active Insurance as of 10/6/2020     Primary Coverage     Payor Plan Insurance Group Employer/Plan Group    [facility] PPO [id number]     Payor Plan Address Payor Plan Phone Number Payor Plan Fax Number Effective Dates    [phone]  1/1/2020 - None Entered    [place] [address]       Subscriber Name Subscriber Birth Date Member ID       ARDEN VELAZQUEZ 6/12/1964 [id number]                 Emergency Contacts      (Rel.) Home Phone Work Phone Mobile Phone    ARDEN VELAZQUEZ (Spouse) [phone] -- [phone]               Discharge Summary      Suzy Kitchen, SOHAM at 10/14/20 1335     Attestation signed by Jr Girma Chiu MD at 10/14/20 1513    Ready for discharge.                  Date of Admission:  10/6/2020  Date of Discharge:  10/14/2020    Discharge Diagnosis:   -NSTEMI, multivessel coronary artery disease with previous PCI 2014--s/p CABGx4 LIMA/EVH left SVG with Dr. Chiu  - ischemic cardiomyopathy  - " hypertension  - hyperlipidemia  - DM II  - SHAHLA with home CPAP  - nicotine dependence  - leukocytosis- probable reactive    Presenting Problem/History of Present Illness  CAD (coronary artery disease) [I25.10]     Hospital Course  Patient is a 55 y.o. male who presented to [facility] with complaints of chest tightness. He underwent cardiac work up including a heart catherization which revealed multivessel coronary artery disease. He was transferred here on 10/6/20 for surgical evaluation. He was initiated on a heparin gtt upon arrival. Internal medicine was also consulted pre-operatively for management of DM. On 10/8/20 he underwent CABG x4 with skeletonized LIMA to proximal LAD, vein graft to RCA, vein graft to OM1 and D1 with Dr. Chiu (see op note for full report). Post-operatively he did well. He was successfully extubated, weaned from vasopressors and inotropes, and transferred to stepdown. He was noted to be hypertensive, requiring cardene gtt. His oral medications were optimized and this was succes He had higher oxygen requirements the first few days after surgery. With aggressive pulmonary toilet and aggressive diuretics, his oxygen requirements were greatly improved. His chest tubes, central line, and mcduffie were removed on POD#2. AV wire were removed on POD#3 without issue. His mobility was impaired, but this has progressively improved and is nearly at baseline. He was followed by internal med to help manage his blood sugars, and was seen by the diabetes educator. He is going to be discharged home on insulin therapy. An overnight oximetry was completed on his home CPAP machine with 1hr and 23 minutes of desaturation. He will need oxygen bled into his CPAP at discharge. A walking oximetry was completed without any desaturation below 89%. He is tolerating the current medication regimen and has met PT goals. He is urinating and defecating without difficulty and is eating and drinking  sufficiently. On POD#6 he was deemed ready for discharge home with home health. Sternal precautions were reviewed as were signs and symptoms of sternal wound infection. He is to follow up with his PCP in 1 week, cardiology in 2 weeks, and in our office on 11/13/20 at 1:30pm.    Procedures Performed  Procedure(s):  STERNOTOMY, CORONARY ARTERY BYPASS GRAFTING TIME 4 WITH LEFT KELSI  AND ENDOSCOPICALLY HARVESTED LEFT GREATER SAPHENOUS VEIN AND PRP.       Consults:   Consults     Date and Time Order Name Status Description    10/7/2020 1017 Inpatient Internal Medicine Consult Completed           Pertinent Test Results:    Lab Results   Component Value Date    WBC 9.36 10/13/2020    HGB 11.1 (L) 10/13/2020    HCT 32.6 (L) 10/13/2020    MCV 91.6 10/13/2020     10/13/2020      Lab Results   Component Value Date    GLUCOSE 159 (H) 10/14/2020    CALCIUM 9.7 10/14/2020     (L) 10/14/2020    K 4.0 10/14/2020    CO2 27.2 10/14/2020    CL 95 (L) 10/14/2020    BUN 17 10/14/2020    CREATININE 0.84 10/14/2020    EGFRIFNONA 95 10/14/2020    BCR 20.2 10/14/2020    ANIONGAP 10.8 10/14/2020     Lab Results   Component Value Date    INR 1.06 10/09/2020    PROTIME 13.7 10/09/2020     Condition on Discharge:  Stable    Vital Signs  Temp:  [97.3 °F (36.3 °C)-98.3 °F (36.8 °C)] 98.3 °F (36.8 °C)  Heart Rate:  [69-86] 83  Resp:  [18] 18  BP: (115-142)/(71-77) 135/77      Discharge Disposition  Home-Health Care Svc    Discharge Medications     Discharge Medications      New Medications      Instructions Start Date   aspirin 81 MG EC tablet   81 mg, Oral, Daily   Start Date: October 15, 2020     doxycycline 100 MG capsule  Commonly known as: MONODOX   100 mg, Oral, Every 12 Hours Scheduled      furosemide 40 MG tablet  Commonly known as: LASIX   40 mg, Oral, Daily      guaiFENesin 600 MG 12 hr tablet  Commonly known as: MUCINEX   1,200 mg, Oral, Every 12 Hours Scheduled      insulin lispro 100 UNIT/ML injection  Commonly known as:  humaLOG   0-14 Units, Subcutaneous, 3 Times Daily Before Meals, Pens and needles      Insulin Lispro (1 Unit Dial) 100 UNIT/ML solution pen-injector  Commonly known as: HumaLOG KwikPen   7 Units, Subcutaneous, 3 Times Daily With Meals      Lantus SoloStar 100 UNIT/ML injection pen  Generic drug: Insulin Glargine   30 Units, Subcutaneous, Daily   Start Date: October 15, 2020     lisinopril 5 MG tablet  Commonly known as: PRINIVIL,ZESTRIL   5 mg, Oral, Every 24 Hours Scheduled   Start Date: October 15, 2020     metoprolol tartrate 100 MG tablet  Commonly known as: LOPRESSOR   100 mg, Oral, Every 12 Hours Scheduled      potassium chloride 10 MEQ CR capsule  Commonly known as: MICRO-K   20 mEq, Oral, Daily   Start Date: October 15, 2020     rosuvastatin 20 MG tablet  Commonly known as: CRESTOR   20 mg, Oral, Nightly      sennosides-docusate 8.6-50 MG per tablet  Commonly known as: PERICOLACE   2 tablets, Oral, Nightly PRN      traMADol 50 MG tablet  Commonly known as: ULTRAM   50 mg, Oral, Every 6 Hours PRN      Unifine Pentips 31G X 6 MM misc  Generic drug: Insulin Pen Needle   Use 4 times daily with insulin         Stop These Medications    lisinopril-hydrochlorothiazide 10-12.5 MG per tablet  Commonly known as: PRINZIDE,ZESTORETIC            Discharge Diet:     Activity at Discharge:     Follow-up Appointments  Future Appointments   Date Time Provider Department Center   11/13/2020  1:30 PM Suzy Kitchen APRN [facility] None     Additional Instructions for the Follow-ups that You Need to Schedule     Ambulatory Referral to Cardiac Rehab   As directed      Call MD With Problems / Concerns   As directed      Instructions:  Call office at [phone] for any drainage, increased redness, or fever over 100.5    Order Comments: Instructions:  Call office at [phone] for any drainage, increased redness, or fever over 100.5          Discharge Follow-up with PCP   As directed       Currently Documented PCP:     Dacia Newsome APRN    PCP Phone Number:    [phone]     Follow Up Details: in 1 week         Discharge Follow-up with Specified Provider: SOHAM Walker 11/13/20 at 1:30pm   As directed      To: SOHAM Walker 11/13/20 at 1:30pm    Follow Up Details: arrive 15 minutes prior to appointment; bring all current medications to appointment         Discharge Follow-up with Specified Provider: Cardiologist; 2 Weeks   As directed      To: Cardiologist    Follow Up: 2 Weeks         Referral to Home Health   As directed      Face to Face Visit Date: 10/14/2020    Follow-up provider for Plan of Care?: I will be treating the patient on an ongoing basis.  Please send me the Plan of Care for signature.    Follow-up provider: JR GIRMA CHIU [[id number]]    Reason/Clinical Findings: post op open heart    Describe mobility limitations that make leaving home difficult: post operative weakness    Nursing/Therapeutic Services Requested: Skilled Nursing    Skilled nursing orders: Post CABG care    Frequency: 1 Week 1               Test Results Pending at Discharge       SOHAM Arce  10/14/20  14:31 EDT          Electronically signed by Jr Girma Chiu MD at 10/14/20 3824

## 2020-10-16 ENCOUNTER — OFFICE VISIT CONVERTED (OUTPATIENT)
Dept: FAMILY MEDICINE CLINIC | Facility: CLINIC | Age: 55
End: 2020-10-16
Attending: NURSE PRACTITIONER

## 2020-10-16 ENCOUNTER — HOSPITAL ENCOUNTER (OUTPATIENT)
Dept: FAMILY MEDICINE CLINIC | Facility: CLINIC | Age: 55
Discharge: HOME | End: 2020-10-16
Attending: NURSE PRACTITIONER

## 2020-10-16 LAB
APPEARANCE UR: CLEAR
BASOPHILS # BLD AUTO: 0.11 10*3/UL (ref 0–0.2)
BASOPHILS NFR BLD AUTO: 0.8 % (ref 0–3)
BILIRUB UR QL: NEGATIVE
COLOR UR: YELLOW
CONV ABS IMM GRAN: 0.52 10*3/UL (ref 0–0.2)
CONV BACTERIA: NEGATIVE
CONV COLLECTION SOURCE (UA): ABNORMAL
CONV CRYSTALS: ABNORMAL /[HPF]
CONV HYALINE CASTS IN URINE MICRO: ABNORMAL /[LPF]
CONV IMMATURE GRAN: 3.7 % (ref 0–1.8)
CONV UROBILINOGEN IN URINE BY AUTOMATED TEST STRIP: 1 {EHRLICHU}/DL (ref 0.1–1)
DEPRECATED RDW RBC AUTO: 50 FL (ref 35.1–43.9)
EOSINOPHIL # BLD AUTO: 0.23 10*3/UL (ref 0–0.7)
EOSINOPHIL # BLD AUTO: 1.6 % (ref 0–7)
ERYTHROCYTE [DISTWIDTH] IN BLOOD BY AUTOMATED COUNT: 13.9 % (ref 11.6–14.4)
GLUCOSE UR QL: NEGATIVE MG/DL
HCT VFR BLD AUTO: 38.7 % (ref 42–52)
HGB BLD-MCNC: 12 G/DL (ref 14–18)
HGB UR QL STRIP: NEGATIVE
KETONES UR QL STRIP: NEGATIVE MG/DL
LEUKOCYTE ESTERASE UR QL STRIP: NEGATIVE
LYMPHOCYTES # BLD AUTO: 2.27 10*3/UL (ref 1–5)
LYMPHOCYTES NFR BLD AUTO: 16.3 % (ref 20–45)
MCH RBC QN AUTO: 30.6 PG (ref 27–31)
MCHC RBC AUTO-ENTMCNC: 31 G/DL (ref 33–37)
MCV RBC AUTO: 98.7 FL (ref 80–96)
MONOCYTES # BLD AUTO: 2.4 10*3/UL (ref 0.2–1.2)
MONOCYTES NFR BLD AUTO: 17.2 % (ref 3–10)
NEUTROPHILS # BLD AUTO: 8.42 10*3/UL (ref 2–8)
NEUTROPHILS NFR BLD AUTO: 60.4 % (ref 30–85)
NITRITE UR QL STRIP: NEGATIVE
NRBC CBCN: 0 % (ref 0–0.7)
PH UR STRIP.AUTO: 5 [PH] (ref 5–8)
PLATELET # BLD AUTO: 490 10*3/UL (ref 130–400)
PMV BLD AUTO: 9.5 FL (ref 9.4–12.4)
PROT UR QL: 100 MG/DL
PSA SERPL-MCNC: 0.14 NG/ML (ref 0–4)
RBC # BLD AUTO: 3.92 10*6/UL (ref 4.7–6.1)
RBC #/AREA URNS HPF: ABNORMAL /[HPF]
SP GR UR: 1.02 (ref 1–1.03)
T4 FREE SERPL-MCNC: 1 NG/DL (ref 0.9–1.8)
TSH SERPL-ACNC: 6.91 M[IU]/L (ref 0.27–4.2)
WBC # BLD AUTO: 13.95 10*3/UL (ref 4.8–10.8)
WBC #/AREA URNS HPF: ABNORMAL /[HPF]

## 2020-10-17 LAB
25(OH)D3 SERPL-MCNC: 12.1 NG/ML (ref 30–100)
ALBUMIN SERPL-MCNC: 3.6 G/DL (ref 3.5–5)
ALBUMIN/GLOB SERPL: 0.9 {RATIO} (ref 1.4–2.6)
ALP SERPL-CCNC: 149 U/L (ref 56–119)
ALT SERPL-CCNC: 40 U/L (ref 10–40)
ANION GAP SERPL CALC-SCNC: 21 MMOL/L (ref 8–19)
AST SERPL-CCNC: 20 U/L (ref 15–50)
BILIRUB SERPL-MCNC: 0.61 MG/DL (ref 0.2–1.3)
BUN SERPL-MCNC: 16 MG/DL (ref 5–25)
BUN/CREAT SERPL: 16 {RATIO} (ref 6–20)
CALCIUM SERPL-MCNC: 9.7 MG/DL (ref 8.7–10.4)
CHLORIDE SERPL-SCNC: 95 MMOL/L (ref 99–111)
CHOLEST SERPL-MCNC: 118 MG/DL (ref 107–200)
CHOLEST/HDLC SERPL: 4.7 {RATIO} (ref 3–6)
CONV CO2: 21 MMOL/L (ref 22–32)
CONV CREATININE URINE, RANDOM: 91.6 MG/DL (ref 10–300)
CONV MICROALBUM.,U,RANDOM: 429.2 MG/L (ref 0–20)
CONV TOTAL PROTEIN: 7.6 G/DL (ref 6.3–8.2)
CREAT UR-MCNC: 1 MG/DL (ref 0.7–1.2)
EST. AVERAGE GLUCOSE BLD GHB EST-MCNC: 252 MG/DL
GFR SERPLBLD BASED ON 1.73 SQ M-ARVRAT: >60 ML/MIN/{1.73_M2}
GLOBULIN UR ELPH-MCNC: 4 G/DL (ref 2–3.5)
GLUCOSE SERPL-MCNC: 188 MG/DL (ref 70–99)
HBA1C MFR BLD: 10.4 % (ref 3.5–5.7)
HDLC SERPL-MCNC: 25 MG/DL (ref 40–60)
LDLC SERPL CALC-MCNC: 62 MG/DL (ref 70–100)
MICROALBUMIN/CREAT UR: 468.6 MG/G{CRE} (ref 0–25)
OSMOLALITY SERPL CALC.SUM OF ELEC: 282 MOSM/KG (ref 273–304)
POTASSIUM SERPL-SCNC: 4.2 MMOL/L (ref 3.5–5.3)
SODIUM SERPL-SCNC: 133 MMOL/L (ref 135–147)
TRIGL SERPL-MCNC: 155 MG/DL (ref 40–150)
VLDLC SERPL-MCNC: 31 MG/DL (ref 5–37)

## 2020-10-21 ENCOUNTER — READMISSION MANAGEMENT (OUTPATIENT)
Dept: CALL CENTER | Facility: HOSPITAL | Age: 55
End: 2020-10-21

## 2020-10-21 NOTE — OUTREACH NOTE
CT Surgery Week 1 Survey      Responses   [facility] patient discharged from?  [place]   Does the patient have one of the following disease processes/diagnoses(primary or secondary)?  Cardiothoracic surgery   Week 1 attempt successful?  No   Unsuccessful attempts  Attempt 1            Stephanie Mackay RN

## 2020-10-22 ENCOUNTER — READMISSION MANAGEMENT (OUTPATIENT)
Dept: CALL CENTER | Facility: HOSPITAL | Age: 55
End: 2020-10-22

## 2020-10-22 NOTE — OUTREACH NOTE
CT Surgery Week 2 Survey      Responses   [facility] patient discharged from?  [place]   Does the patient have one of the following disease processes/diagnoses(primary or secondary)?  Cardiothoracic surgery   Week 2 attempt successful?  Yes   Call start time  1623   Call end time  1633   Discharge diagnosis  NSTEMI, multivessel coronary artery disease with previous PCI    Medication alerts for this patient  ASA, Lasix    Meds reviewed with patient/caregiver?  Yes   Is the patient having any side effects they believe may be caused by any medication additions or changes?  No   Does the patient have all medications related to this admission filled (includes all antibiotics, pain medications, cardiac medications, etc.)  Yes   Is the patient taking all medications as directed (includes completed medication regime)?  Yes   Does the patient have a primary care provider?   Yes   Does the patient have an appointment scheduled with their C/T surgeon?  Yes   Has the patient kept scheduled appointments due by today?  N/A   What is the Home health agency?   [facility]     Has home health visited the patient within 72 hours of discharge?  N/A   Home health comments  Patient declined HH   What DME was ordered?  Oxygen per [facility]    Has all DME been delivered?  Yes   Psychosocial issues?  No   Did the patient receive a copy of their discharge instructions?  Yes   Nursing interventions  Reviewed instructions with patient   What is the patient's perception of their health status since discharge?  Improving   Is the patient/caregiver able to teach back normal signs of recovery?  Nausea and lack of appetite   Nursing interventions  Reassured on normal signs of recovery   Is the patient /caregiver able to teach back basic post-op care?  Continue use of incentive spirometry at least 1 week post discharge, Practice 'cough and deep breath', Drive as instructed by MD in discharge instructions, Take showers only when approved by  MD-sponge bathe until then, No tub bath, swimming, or hot tub until instructed by MD, Keep incision areas clean, dry and protected, Do not remove steri-strips, Lifting as instructed by MD in discharge instructions   Is the patient/caregiver able to teach back signs and symptoms of incisional infection?  Increased redness, swelling or pain at the incisonal site, Increased drainage or bleeding, Incisional warmth, Pus or odor from incision, Fever   Is the patient/caregiver able to teach back steps to recovery at home?  Set small, achievable goals for return to baseline health, Rest and rebuild strength, gradually increase activity, Weigh daily, Practice good oral hygiene, Eat a well-balance diet, Make a list of questions for surgeon's appointment   Is the patient /caregiver able to teach back the importance of cardiac rehab?  Yes   Nursing interventions  Provided education on importance of cardiac rehab   If the patient is a current smoker, are they able to teach back resources for cessation?  Smoking cessation medications, Smoking cessation classes, Smoking cessation support groups, [id number]   Is the patient/caregiver able to teach back the hierarchy of who to call/visit for symptoms/problems? PCP, Specialist, Home health nurse, Urgent Care, ED, 911  Yes   Additional teach back comments  Quit smoking prior to hospital admission   Week 2 call completed?  Yes   Wrap up additional comments  Pain rated 1. Gluose 141.  No daily weight.  /78 HR 76. Insomnia is an issue. Between his sleep apnea, being uncomfortable in the bed and not smoking having a hard time.  To talk to provider.           Naya Lock RN

## 2020-10-28 ENCOUNTER — READMISSION MANAGEMENT (OUTPATIENT)
Dept: CALL CENTER | Facility: HOSPITAL | Age: 55
End: 2020-10-28

## 2020-10-28 NOTE — OUTREACH NOTE
CT Surgery Week 3 Survey      Responses   [facility] patient discharged from?  [place]   Does the patient have one of the following disease processes/diagnoses(primary or secondary)?  Cardiothoracic surgery   Week 3 attempt successful?  No   Unsuccessful attempts  Attempt 1          Lilly Wong RN

## 2020-10-29 ENCOUNTER — OFFICE VISIT CONVERTED (OUTPATIENT)
Dept: FAMILY MEDICINE CLINIC | Facility: CLINIC | Age: 55
End: 2020-10-29
Attending: NURSE PRACTITIONER

## 2020-10-30 ENCOUNTER — OFFICE VISIT CONVERTED (OUTPATIENT)
Dept: CARDIOLOGY | Facility: CLINIC | Age: 55
End: 2020-10-30
Attending: INTERNAL MEDICINE

## 2020-10-30 ENCOUNTER — CONVERSION ENCOUNTER (OUTPATIENT)
Dept: CARDIOLOGY | Facility: CLINIC | Age: 55
End: 2020-10-30

## 2020-11-03 ENCOUNTER — READMISSION MANAGEMENT (OUTPATIENT)
Dept: CALL CENTER | Facility: HOSPITAL | Age: 55
End: 2020-11-03

## 2020-11-03 NOTE — OUTREACH NOTE
CT Surgery Week 3 Survey      Responses   [facility] patient discharged from?  [facility]   Does the patient have one of the following disease processes/diagnoses(primary or secondary)?  Cardiothoracic surgery   Week 3 attempt successful?  No   Unsuccessful attempts  Attempt 2          Maggie Zimmerman RN

## 2020-11-05 ENCOUNTER — CONVERSION ENCOUNTER (OUTPATIENT)
Dept: FAMILY MEDICINE CLINIC | Facility: CLINIC | Age: 55
End: 2020-11-05

## 2020-11-05 ENCOUNTER — OFFICE VISIT CONVERTED (OUTPATIENT)
Dept: FAMILY MEDICINE CLINIC | Facility: CLINIC | Age: 55
End: 2020-11-05
Attending: NURSE PRACTITIONER

## 2020-11-12 ENCOUNTER — CONVERSION ENCOUNTER (OUTPATIENT)
Dept: OTHER | Facility: HOSPITAL | Age: 55
End: 2020-11-12

## 2020-11-12 ENCOUNTER — TELEMEDICINE CONVERTED (OUTPATIENT)
Dept: FAMILY MEDICINE CLINIC | Facility: CLINIC | Age: 55
End: 2020-11-12
Attending: NURSE PRACTITIONER

## 2020-11-13 ENCOUNTER — OFFICE VISIT (OUTPATIENT)
Dept: CARDIAC SURGERY | Facility: CLINIC | Age: 55
End: 2020-11-13

## 2020-11-13 VITALS
WEIGHT: 251 LBS | BODY MASS INDEX: 34 KG/M2 | RESPIRATION RATE: 16 BRPM | HEART RATE: 71 BPM | HEIGHT: 72 IN | OXYGEN SATURATION: 96 % | TEMPERATURE: 98.4 F | DIASTOLIC BLOOD PRESSURE: 89 MMHG | SYSTOLIC BLOOD PRESSURE: 161 MMHG

## 2020-11-13 DIAGNOSIS — Z95.1 S/P CABG (CORONARY ARTERY BYPASS GRAFT): ICD-10-CM

## 2020-11-13 PROCEDURE — 99024 POSTOP FOLLOW-UP VISIT: CPT | Performed by: NURSE PRACTITIONER

## 2020-11-13 RX ORDER — INSULIN GLARGINE 100 [IU]/ML
50 INJECTION, SOLUTION SUBCUTANEOUS 2 TIMES DAILY
COMMUNITY
End: 2021-06-17 | Stop reason: SDUPTHER

## 2020-11-13 NOTE — PROGRESS NOTES
"CARDIOVASCULAR SURGERY FOLLOW-UP PROGRESS NOTE  Chief Complaint: post op        HPI:   Dear Dr. Newsome, SOHAM Maria and colleagues:    It was nice to see Jd Velazquez in follow up 5 weeks after surgery.  As you know, he is a 55 y.o. male with CAD, diabetes, hyponatremia, obesity, noncompliance who underwent CABGx4 by Dr. Chiu. He did well postoperatively and continues to do well. He comes in today complaining of nothing.  His activity level has been good.   He states he is eager to get back to work.  I would really like him to go to cardiac rehab but he is adamant he is going back today because he is ready to and does not want rehab, we will okay this but he is restricted from lifting more than 50 pounds for 3 more months.   His blood pressure is high, he states at home he is 130.      Physical Exam:         /89   Pulse 71   Temp 98.4 °F (36.9 °C)   Resp 16   Ht 182.9 cm (72\")   Wt 114 kg (251 lb)   SpO2 96%   BMI 34.04 kg/m²   Heart:  regular rate and rhythm  Lungs:  clear to auscultation bilaterally  Extremities:  no edema  Incision(s):  sternum stable, incisions healing    Assessment/Plan:     S/P CABG. Overall, he is doing well.    No significant post-op complications    No heavy lifting > 10 pounds for 1 more weeks  OK to drive if not taking narcotic pain medicine  OK to begin cardiac rehab  Follow-up as scheduled with cardiology  Follow-up as scheduled with PCP  No lifting of 50 lbs for 3 months      Thank you for allowing me to participate in the care of your   patient.  Regards,  SOHAM Whittington      "

## 2020-12-05 ENCOUNTER — HOSPITAL ENCOUNTER (OUTPATIENT)
Facility: HOSPITAL | Age: 55
Setting detail: OBSERVATION
Discharge: HOME | End: 2020-12-06
Attending: EMERGENCY MEDICINE | Admitting: THORACIC SURGERY (CARDIOTHORACIC VASCULAR SURGERY)

## 2020-12-05 ENCOUNTER — APPOINTMENT (OUTPATIENT)
Dept: CT IMAGING | Facility: HOSPITAL | Age: 55
End: 2020-12-05

## 2020-12-05 DIAGNOSIS — L03.313 CELLULITIS OF CHEST WALL: Primary | ICD-10-CM

## 2020-12-05 LAB
ANION GAP SERPL CALCULATED.3IONS-SCNC: 11.6 MMOL/L (ref 5–15)
B PARAPERT DNA SPEC QL NAA+PROBE: NOT DETECTED
B PERT DNA SPEC QL NAA+PROBE: NOT DETECTED
BASOPHILS # BLD AUTO: 0.07 10*3/MM3 (ref 0–0.2)
BASOPHILS NFR BLD AUTO: 0.7 % (ref 0–1.5)
BUN SERPL-MCNC: 13 MG/DL (ref 6–20)
BUN/CREAT SERPL: 14.4 (ref 7–25)
C PNEUM DNA NPH QL NAA+NON-PROBE: NOT DETECTED
CALCIUM SPEC-SCNC: 9.5 MG/DL (ref 8.6–10.5)
CHLORIDE SERPL-SCNC: 99 MMOL/L (ref 98–107)
CO2 SERPL-SCNC: 24.4 MMOL/L (ref 22–29)
CREAT SERPL-MCNC: 0.9 MG/DL (ref 0.76–1.27)
CRP SERPL-MCNC: 1.59 MG/DL (ref 0–0.5)
D-LACTATE SERPL-SCNC: 1.1 MMOL/L (ref 0.5–2)
DEPRECATED RDW RBC AUTO: 46.1 FL (ref 37–54)
EOSINOPHIL # BLD AUTO: 0.21 10*3/MM3 (ref 0–0.4)
EOSINOPHIL NFR BLD AUTO: 2 % (ref 0.3–6.2)
ERYTHROCYTE [DISTWIDTH] IN BLOOD BY AUTOMATED COUNT: 13.8 % (ref 12.3–15.4)
ERYTHROCYTE [SEDIMENTATION RATE] IN BLOOD: 18 MM/HR (ref 0–20)
FLUAV SUBTYP SPEC NAA+PROBE: NOT DETECTED
FLUBV RNA ISLT QL NAA+PROBE: NOT DETECTED
GFR SERPL CREATININE-BSD FRML MDRD: 88 ML/MIN/1.73
GLUCOSE SERPL-MCNC: 197 MG/DL (ref 65–99)
HADV DNA SPEC NAA+PROBE: NOT DETECTED
HCOV 229E RNA SPEC QL NAA+PROBE: NOT DETECTED
HCOV HKU1 RNA SPEC QL NAA+PROBE: NOT DETECTED
HCOV NL63 RNA SPEC QL NAA+PROBE: NOT DETECTED
HCOV OC43 RNA SPEC QL NAA+PROBE: NOT DETECTED
HCT VFR BLD AUTO: 39.1 % (ref 37.5–51)
HGB BLD-MCNC: 13 G/DL (ref 13–17.7)
HMPV RNA NPH QL NAA+NON-PROBE: NOT DETECTED
HOLD SPECIMEN: NORMAL
HPIV1 RNA SPEC QL NAA+PROBE: NOT DETECTED
HPIV2 RNA SPEC QL NAA+PROBE: NOT DETECTED
HPIV3 RNA NPH QL NAA+PROBE: NOT DETECTED
HPIV4 P GENE NPH QL NAA+PROBE: NOT DETECTED
IMM GRANULOCYTES # BLD AUTO: 0.07 10*3/MM3 (ref 0–0.05)
IMM GRANULOCYTES NFR BLD AUTO: 0.7 % (ref 0–0.5)
LYMPHOCYTES # BLD AUTO: 2.57 10*3/MM3 (ref 0.7–3.1)
LYMPHOCYTES NFR BLD AUTO: 24.3 % (ref 19.6–45.3)
M PNEUMO IGG SER IA-ACNC: NOT DETECTED
MCH RBC QN AUTO: 30.4 PG (ref 26.6–33)
MCHC RBC AUTO-ENTMCNC: 33.2 G/DL (ref 31.5–35.7)
MCV RBC AUTO: 91.4 FL (ref 79–97)
MONOCYTES # BLD AUTO: 1.28 10*3/MM3 (ref 0.1–0.9)
MONOCYTES NFR BLD AUTO: 12.1 % (ref 5–12)
NEUTROPHILS NFR BLD AUTO: 6.37 10*3/MM3 (ref 1.7–7)
NEUTROPHILS NFR BLD AUTO: 60.2 % (ref 42.7–76)
NRBC BLD AUTO-RTO: 0 /100 WBC (ref 0–0.2)
PLATELET # BLD AUTO: 379 10*3/MM3 (ref 140–450)
PMV BLD AUTO: 9.1 FL (ref 6–12)
POTASSIUM SERPL-SCNC: 3.9 MMOL/L (ref 3.5–5.2)
RBC # BLD AUTO: 4.28 10*6/MM3 (ref 4.14–5.8)
RHINOVIRUS RNA SPEC NAA+PROBE: NOT DETECTED
RSV RNA NPH QL NAA+NON-PROBE: NOT DETECTED
SARS-COV-2 RNA NPH QL NAA+NON-PROBE: NOT DETECTED
SODIUM SERPL-SCNC: 135 MMOL/L (ref 136–145)
WBC # BLD AUTO: 10.57 10*3/MM3 (ref 3.4–10.8)
WHOLE BLOOD HOLD SPECIMEN: NORMAL
WHOLE BLOOD HOLD SPECIMEN: NORMAL

## 2020-12-05 PROCEDURE — 71260 CT THORAX DX C+: CPT

## 2020-12-05 PROCEDURE — 87147 CULTURE TYPE IMMUNOLOGIC: CPT | Performed by: EMERGENCY MEDICINE

## 2020-12-05 PROCEDURE — 25010000002 VANCOMYCIN 10 G RECONSTITUTED SOLUTION: Performed by: EMERGENCY MEDICINE

## 2020-12-05 PROCEDURE — 86140 C-REACTIVE PROTEIN: CPT | Performed by: EMERGENCY MEDICINE

## 2020-12-05 PROCEDURE — 87070 CULTURE OTHR SPECIMN AEROBIC: CPT | Performed by: EMERGENCY MEDICINE

## 2020-12-05 PROCEDURE — G0378 HOSPITAL OBSERVATION PER HR: HCPCS

## 2020-12-05 PROCEDURE — 0202U NFCT DS 22 TRGT SARS-COV-2: CPT | Performed by: EMERGENCY MEDICINE

## 2020-12-05 PROCEDURE — 80048 BASIC METABOLIC PNL TOTAL CA: CPT | Performed by: EMERGENCY MEDICINE

## 2020-12-05 PROCEDURE — 96365 THER/PROPH/DIAG IV INF INIT: CPT

## 2020-12-05 PROCEDURE — 85025 COMPLETE CBC W/AUTO DIFF WBC: CPT | Performed by: EMERGENCY MEDICINE

## 2020-12-05 PROCEDURE — 25010000002 IOPAMIDOL 61 % SOLUTION: Performed by: EMERGENCY MEDICINE

## 2020-12-05 PROCEDURE — 25010000002 PIPERACILLIN SOD-TAZOBACTAM PER 1 G: Performed by: EMERGENCY MEDICINE

## 2020-12-05 PROCEDURE — 85652 RBC SED RATE AUTOMATED: CPT | Performed by: EMERGENCY MEDICINE

## 2020-12-05 PROCEDURE — 96375 TX/PRO/DX INJ NEW DRUG ADDON: CPT

## 2020-12-05 PROCEDURE — 83605 ASSAY OF LACTIC ACID: CPT | Performed by: EMERGENCY MEDICINE

## 2020-12-05 PROCEDURE — 87205 SMEAR GRAM STAIN: CPT | Performed by: EMERGENCY MEDICINE

## 2020-12-05 PROCEDURE — 87186 SC STD MICRODIL/AGAR DIL: CPT | Performed by: EMERGENCY MEDICINE

## 2020-12-05 PROCEDURE — 99284 EMERGENCY DEPT VISIT MOD MDM: CPT

## 2020-12-05 PROCEDURE — 87040 BLOOD CULTURE FOR BACTERIA: CPT | Performed by: EMERGENCY MEDICINE

## 2020-12-05 RX ORDER — SODIUM CHLORIDE 0.9 % (FLUSH) 0.9 %
10 SYRINGE (ML) INJECTION AS NEEDED
Status: DISCONTINUED | OUTPATIENT
Start: 2020-12-05 | End: 2020-12-06 | Stop reason: HOSPADM

## 2020-12-05 RX ADMIN — VANCOMYCIN HYDROCHLORIDE 2250 MG: 10 INJECTION, POWDER, LYOPHILIZED, FOR SOLUTION INTRAVENOUS at 22:59

## 2020-12-05 RX ADMIN — IOPAMIDOL 85 ML: 612 INJECTION, SOLUTION INTRAVENOUS at 21:25

## 2020-12-05 RX ADMIN — TAZOBACTAM SODIUM AND PIPERACILLIN SODIUM 3.38 G: 375; 3 INJECTION, SOLUTION INTRAVENOUS at 22:19

## 2020-12-06 VITALS
TEMPERATURE: 98.7 F | OXYGEN SATURATION: 95 % | SYSTOLIC BLOOD PRESSURE: 148 MMHG | HEART RATE: 72 BPM | RESPIRATION RATE: 18 BRPM | WEIGHT: 255.4 LBS | BODY MASS INDEX: 34.59 KG/M2 | HEIGHT: 72 IN | DIASTOLIC BLOOD PRESSURE: 83 MMHG

## 2020-12-06 PROBLEM — S21.101A STERNAL WOUND INFECTION: Status: ACTIVE | Noted: 2020-12-06

## 2020-12-06 PROBLEM — L08.9 STERNAL WOUND INFECTION: Status: ACTIVE | Noted: 2020-12-06

## 2020-12-06 LAB
ALBUMIN SERPL-MCNC: 4 G/DL (ref 3.5–5.2)
ALBUMIN/GLOB SERPL: 1.2 G/DL
ALP SERPL-CCNC: 116 U/L (ref 39–117)
ALT SERPL W P-5'-P-CCNC: 7 U/L (ref 1–41)
ANION GAP SERPL CALCULATED.3IONS-SCNC: 9.5 MMOL/L (ref 5–15)
AST SERPL-CCNC: 11 U/L (ref 1–40)
BASOPHILS # BLD AUTO: 0.07 10*3/MM3 (ref 0–0.2)
BASOPHILS NFR BLD AUTO: 0.7 % (ref 0–1.5)
BILIRUB SERPL-MCNC: 0.5 MG/DL (ref 0–1.2)
BUN SERPL-MCNC: 9 MG/DL (ref 6–20)
BUN/CREAT SERPL: 12.3 (ref 7–25)
CALCIUM SPEC-SCNC: 9 MG/DL (ref 8.6–10.5)
CHLORIDE SERPL-SCNC: 99 MMOL/L (ref 98–107)
CO2 SERPL-SCNC: 25.5 MMOL/L (ref 22–29)
CREAT SERPL-MCNC: 0.73 MG/DL (ref 0.76–1.27)
DEPRECATED RDW RBC AUTO: 45.1 FL (ref 37–54)
EOSINOPHIL # BLD AUTO: 0.21 10*3/MM3 (ref 0–0.4)
EOSINOPHIL NFR BLD AUTO: 2.1 % (ref 0.3–6.2)
ERYTHROCYTE [DISTWIDTH] IN BLOOD BY AUTOMATED COUNT: 13.9 % (ref 12.3–15.4)
GFR SERPL CREATININE-BSD FRML MDRD: 112 ML/MIN/1.73
GLOBULIN UR ELPH-MCNC: 3.3 GM/DL
GLUCOSE BLDC GLUCOMTR-MCNC: 242 MG/DL (ref 70–130)
GLUCOSE SERPL-MCNC: 124 MG/DL (ref 65–99)
HCT VFR BLD AUTO: 37.2 % (ref 37.5–51)
HGB BLD-MCNC: 12.6 G/DL (ref 13–17.7)
IMM GRANULOCYTES # BLD AUTO: 0.03 10*3/MM3 (ref 0–0.05)
IMM GRANULOCYTES NFR BLD AUTO: 0.3 % (ref 0–0.5)
LYMPHOCYTES # BLD AUTO: 2.37 10*3/MM3 (ref 0.7–3.1)
LYMPHOCYTES NFR BLD AUTO: 23.8 % (ref 19.6–45.3)
MCH RBC QN AUTO: 30.4 PG (ref 26.6–33)
MCHC RBC AUTO-ENTMCNC: 33.9 G/DL (ref 31.5–35.7)
MCV RBC AUTO: 89.9 FL (ref 79–97)
MONOCYTES # BLD AUTO: 1.43 10*3/MM3 (ref 0.1–0.9)
MONOCYTES NFR BLD AUTO: 14.3 % (ref 5–12)
NEUTROPHILS NFR BLD AUTO: 5.86 10*3/MM3 (ref 1.7–7)
NEUTROPHILS NFR BLD AUTO: 58.8 % (ref 42.7–76)
NRBC BLD AUTO-RTO: 0 /100 WBC (ref 0–0.2)
PLATELET # BLD AUTO: 351 10*3/MM3 (ref 140–450)
PMV BLD AUTO: 9.2 FL (ref 6–12)
POTASSIUM SERPL-SCNC: 4 MMOL/L (ref 3.5–5.2)
PROT SERPL-MCNC: 7.3 G/DL (ref 6–8.5)
RBC # BLD AUTO: 4.14 10*6/MM3 (ref 4.14–5.8)
SODIUM SERPL-SCNC: 134 MMOL/L (ref 136–145)
WBC # BLD AUTO: 9.97 10*3/MM3 (ref 3.4–10.8)

## 2020-12-06 PROCEDURE — G0378 HOSPITAL OBSERVATION PER HR: HCPCS

## 2020-12-06 PROCEDURE — 96372 THER/PROPH/DIAG INJ SC/IM: CPT

## 2020-12-06 PROCEDURE — 85025 COMPLETE CBC W/AUTO DIFF WBC: CPT | Performed by: THORACIC SURGERY (CARDIOTHORACIC VASCULAR SURGERY)

## 2020-12-06 PROCEDURE — 82962 GLUCOSE BLOOD TEST: CPT

## 2020-12-06 PROCEDURE — 80053 COMPREHEN METABOLIC PANEL: CPT | Performed by: THORACIC SURGERY (CARDIOTHORACIC VASCULAR SURGERY)

## 2020-12-06 PROCEDURE — 99024 POSTOP FOLLOW-UP VISIT: CPT | Performed by: THORACIC SURGERY (CARDIOTHORACIC VASCULAR SURGERY)

## 2020-12-06 PROCEDURE — 63710000001 INSULIN GLARGINE PER 5 UNITS: Performed by: INTERNAL MEDICINE

## 2020-12-06 PROCEDURE — 63710000001 INSULIN LISPRO (HUMAN) PER 5 UNITS: Performed by: INTERNAL MEDICINE

## 2020-12-06 PROCEDURE — 25010000002 ENOXAPARIN PER 10 MG: Performed by: THORACIC SURGERY (CARDIOTHORACIC VASCULAR SURGERY)

## 2020-12-06 RX ORDER — INSULIN GLARGINE 100 [IU]/ML
65 INJECTION, SOLUTION SUBCUTANEOUS EVERY 12 HOURS SCHEDULED
Status: DISCONTINUED | OUTPATIENT
Start: 2020-12-06 | End: 2020-12-06 | Stop reason: HOSPADM

## 2020-12-06 RX ORDER — ROSUVASTATIN CALCIUM 20 MG/1
20 TABLET, COATED ORAL NIGHTLY
Status: DISCONTINUED | OUTPATIENT
Start: 2020-12-06 | End: 2020-12-06

## 2020-12-06 RX ORDER — NICOTINE POLACRILEX 4 MG
15 LOZENGE BUCCAL
Status: DISCONTINUED | OUTPATIENT
Start: 2020-12-06 | End: 2020-12-06 | Stop reason: HOSPADM

## 2020-12-06 RX ORDER — METOPROLOL TARTRATE 50 MG/1
100 TABLET, FILM COATED ORAL EVERY 12 HOURS SCHEDULED
Status: DISCONTINUED | OUTPATIENT
Start: 2020-12-06 | End: 2020-12-06 | Stop reason: HOSPADM

## 2020-12-06 RX ORDER — NITROGLYCERIN 0.4 MG/1
0.4 TABLET SUBLINGUAL
Status: DISCONTINUED | OUTPATIENT
Start: 2020-12-06 | End: 2020-12-06 | Stop reason: HOSPADM

## 2020-12-06 RX ORDER — ROSUVASTATIN CALCIUM 20 MG/1
20 TABLET, COATED ORAL NIGHTLY
Status: DISCONTINUED | OUTPATIENT
Start: 2020-12-06 | End: 2020-12-06 | Stop reason: HOSPADM

## 2020-12-06 RX ORDER — ASPIRIN 81 MG/1
81 TABLET ORAL DAILY
Status: DISCONTINUED | OUTPATIENT
Start: 2020-12-06 | End: 2020-12-06

## 2020-12-06 RX ORDER — CEPHALEXIN 250 MG/1
250 CAPSULE ORAL 2 TIMES DAILY
Qty: 14 CAPSULE | Refills: 2 | Status: SHIPPED | OUTPATIENT
Start: 2020-12-06 | End: 2020-12-06

## 2020-12-06 RX ORDER — ASPIRIN 325 MG
325 TABLET ORAL DAILY
Status: DISCONTINUED | OUTPATIENT
Start: 2020-12-06 | End: 2020-12-06

## 2020-12-06 RX ORDER — DEXTROSE MONOHYDRATE 25 G/50ML
25 INJECTION, SOLUTION INTRAVENOUS
Status: DISCONTINUED | OUTPATIENT
Start: 2020-12-06 | End: 2020-12-06 | Stop reason: HOSPADM

## 2020-12-06 RX ORDER — ATORVASTATIN CALCIUM 20 MG/1
20 TABLET, FILM COATED ORAL NIGHTLY
Status: DISCONTINUED | OUTPATIENT
Start: 2020-12-06 | End: 2020-12-06

## 2020-12-06 RX ORDER — CEPHALEXIN 500 MG/1
1000 CAPSULE ORAL 3 TIMES DAILY
Qty: 42 CAPSULE | Refills: 0 | Status: SHIPPED | OUTPATIENT
Start: 2020-12-06 | End: 2020-12-13

## 2020-12-06 RX ORDER — SODIUM CHLORIDE 0.9 % (FLUSH) 0.9 %
10 SYRINGE (ML) INJECTION AS NEEDED
Status: DISCONTINUED | OUTPATIENT
Start: 2020-12-06 | End: 2020-12-06 | Stop reason: HOSPADM

## 2020-12-06 RX ORDER — ASPIRIN 81 MG/1
81 TABLET, CHEWABLE ORAL DAILY
Status: DISCONTINUED | OUTPATIENT
Start: 2020-12-06 | End: 2020-12-06 | Stop reason: HOSPADM

## 2020-12-06 RX ORDER — SODIUM CHLORIDE 0.9 % (FLUSH) 0.9 %
10 SYRINGE (ML) INJECTION EVERY 12 HOURS SCHEDULED
Status: DISCONTINUED | OUTPATIENT
Start: 2020-12-06 | End: 2020-12-06 | Stop reason: HOSPADM

## 2020-12-06 RX ORDER — LISINOPRIL 5 MG/1
5 TABLET ORAL
Status: DISCONTINUED | OUTPATIENT
Start: 2020-12-06 | End: 2020-12-06 | Stop reason: HOSPADM

## 2020-12-06 RX ORDER — CEPHALEXIN 250 MG/1
250 CAPSULE ORAL EVERY 12 HOURS SCHEDULED
Status: DISCONTINUED | OUTPATIENT
Start: 2020-12-06 | End: 2020-12-06

## 2020-12-06 RX ORDER — INSULIN GLARGINE 100 [IU]/ML
50 INJECTION, SOLUTION SUBCUTANEOUS 2 TIMES DAILY
Status: DISCONTINUED | OUTPATIENT
Start: 2020-12-06 | End: 2020-12-06

## 2020-12-06 RX ORDER — ONDANSETRON 2 MG/ML
4 INJECTION INTRAMUSCULAR; INTRAVENOUS EVERY 6 HOURS PRN
Status: DISCONTINUED | OUTPATIENT
Start: 2020-12-06 | End: 2020-12-06 | Stop reason: HOSPADM

## 2020-12-06 RX ORDER — CEPHALEXIN 500 MG/1
1000 CAPSULE ORAL EVERY 8 HOURS SCHEDULED
Status: DISCONTINUED | OUTPATIENT
Start: 2020-12-06 | End: 2020-12-06 | Stop reason: HOSPADM

## 2020-12-06 RX ORDER — AMOXICILLIN 250 MG
2 CAPSULE ORAL NIGHTLY PRN
Status: DISCONTINUED | OUTPATIENT
Start: 2020-12-06 | End: 2020-12-06 | Stop reason: HOSPADM

## 2020-12-06 RX ADMIN — ASPIRIN 81 MG: 81 TABLET, CHEWABLE ORAL at 10:35

## 2020-12-06 RX ADMIN — LISINOPRIL 5 MG: 5 TABLET ORAL at 10:35

## 2020-12-06 RX ADMIN — METOPROLOL TARTRATE 100 MG: 50 TABLET, FILM COATED ORAL at 10:35

## 2020-12-06 RX ADMIN — ENOXAPARIN SODIUM 30 MG: 30 INJECTION SUBCUTANEOUS at 10:35

## 2020-12-06 RX ADMIN — METOPROLOL TARTRATE 25 MG: 25 TABLET, FILM COATED ORAL at 00:21

## 2020-12-06 RX ADMIN — INSULIN GLARGINE 65 UNITS: 100 INJECTION, SOLUTION SUBCUTANEOUS at 10:35

## 2020-12-06 RX ADMIN — INSULIN LISPRO 8 UNITS: 100 INJECTION, SOLUTION INTRAVENOUS; SUBCUTANEOUS at 12:11

## 2020-12-06 RX ADMIN — Medication 10 ML: at 10:36

## 2020-12-06 NOTE — PROGRESS NOTES
I spoke woth Dr. Chiu. Plan to discharge patient home on Keflex and follow up in office early this week.    Plans communicated to nursing staff.    Keflex prescribed electronically.

## 2020-12-06 NOTE — CONSULTS
Patient Name:  Jd Velazquez  YOB: 1965  MRN:  [id number]  Date of Admission:  12/5/2020  Date of Consult:  12/6/2020  Patient Care Team:  Dacia Newsome APRN as PCP - General (Nurse Practitioner)    Consults  Evaluate status and make recommendations regarding treatment for diabetes, hypertension, obstructive sleep apnea.  Subjective   History of Present Illness  Mr. Velazquez is a 55 y.o. male that has been admitted to [facility] due to drainage from his chest wound after coronary bypass graft.  He has been admitted by the cardiothoracic surgery service and and we were asked to see and assist with his medical problems, specifically relating to his diabetes, hypertension, obstructive sleep apnea.    He is currently sitting up in bed alert.  He reports 2 days ago he noticed some drainage coming from his incision line.  Previously his CABG incision had healed fine and was completely closed.  There was no tenderness or drainage until about 2 days ago.  The drainage has been yellow and thick and moderate amount.  He has had no fevers.  No chills.  No chest pain shortness of breath.  No body aches and not feeling ill in any way.  He denies any previous history of MDROs.  He reports his blood sugars have been controlled well recently and have been running about 120-140.     Past Medical History:   Diagnosis Date   • CAD (coronary artery disease)    • Diabetes mellitus (CMS/HCC)    • Hyperlipidemia    • Hypertension    • Obesity      Past Surgical History:   Procedure Laterality Date   • ANKLE ARTHROSCOPY W/ OPEN REPAIR     • APPENDECTOMY     • CARDIAC CATHETERIZATION  2014    PCI to circumflex   • CORONARY ARTERY BYPASS GRAFT N/A 10/8/2020    Procedure: STERNOTOMY, CORONARY ARTERY BYPASS GRAFTING TIME 4 WITH LEFT KELSI  AND ENDOSCOPICALLY HARVESTED LEFT GREATER SAPHENOUS VEIN AND PRP.;  Surgeon: Jr Girma Chiu MD;  Location: [facility];  Service: Cardiothoracic;   Laterality: N/A;   • HERNIA REPAIR     • TONSILLECTOMY       History reviewed. No pertinent family history.  Social History     Tobacco Use   • Smoking status: Former Smoker     Packs/day: 1.00     Quit date: 10/20/2020     Years since quittin.1   • Smokeless tobacco: Never Used   Substance Use Topics   • Alcohol use: Never     Frequency: Never   • Drug use: Never     Medications Prior to Admission   Medication Sig Dispense Refill Last Dose   • aspirin 81 MG EC tablet Take 1 tablet by mouth Daily for 90 days. 30 tablet 2 2020 at Unknown time   • glucose blood test strip Test 4 times per day. 100 each 0 2020 at Unknown time   • insulin aspart (NovoLOG) 100 UNIT/ML injection 200-249 5 units, 250 -299 8 units, 300-349 10 units, 350-400 12 units, > 400 14 units, tid ac, pens and needles (Patient taking differently: Inject 60 Units under the skin into the appropriate area as directed. 200-249 5 units, 250 -299 8 units, 300-349 10 units, 350-400 12 units, > 400 14 units, tid ac, pens and needles) 10 mL 0 2020 at Unknown time   • Insulin Pen Needle (Unifine Pentips) 31G X 6 MM misc Use 4 times daily with insulin 100 each 0 2020 at Unknown time   • lisinopril (PRINIVIL,ZESTRIL) 5 MG tablet Take 1 tablet by mouth Daily for 90 days. 30 tablet 2 2020 at Unknown time   • metoprolol tartrate (LOPRESSOR) 100 MG tablet Take 1 tablet by mouth Every 12 (Twelve) Hours for 90 days. 60 tablet 2 2020 at Unknown time   • OneTouch Delica Lancets 33G misc test 4 times per day 100 each 0 2020 at Unknown time   • rosuvastatin (CRESTOR) 20 MG tablet Take 1 tablet by mouth Every Night for 90 days. 30 tablet 2 2020 at Unknown time   • furosemide (LASIX) 40 MG tablet Take 1 tablet by mouth Daily for 30 days. 30 tablet 0    • insulin detemir (Levemir) 100 UNIT/ML injection Inject 30 Units under the skin into the appropriate area as directed Daily for 30 days. Pens and needles 9 mL 0    • Insulin  Glargine (Lantus SoloStar) 100 UNIT/ML injection pen Inject 50 Units under the skin into the appropriate area as directed 2 (Two) Times a Day.      • sennosides-docusate (PERICOLACE) 8.6-50 MG per tablet Take 2 tablets by mouth At Night As Needed for Constipation.   Unknown at Unknown time     Allergies:  Hydrocodone    Review of Systems   Constitutional: Positive for chills.   HENT: Negative.    Eyes: Negative.    Respiratory: Negative for cough, chest tightness and shortness of breath.    Cardiovascular: Negative for chest pain, palpitations and leg swelling.   Gastrointestinal: Negative for abdominal pain, constipation, diarrhea, nausea and vomiting.   Genitourinary: Negative for difficulty urinating and dysuria.   Musculoskeletal: Negative for arthralgias and myalgias.   Skin: Positive for wound. Negative for rash.   Neurological: Negative for dizziness, weakness, light-headedness and headaches.   Psychiatric/Behavioral: Negative for confusion and decreased concentration.       Objective      Vital Signs  Temp:  [98 °F (36.7 °C)-98.7 °F (37.1 °C)] 98.7 °F (37.1 °C)  Heart Rate:  [] 72  Resp:  [18-20] 18  BP: (148-199)/(78-90) 148/83  Body mass index is 34.64 kg/m².    Physical Exam  Constitutional:       General: He is not in acute distress.     Appearance: He is obese. He is not ill-appearing or toxic-appearing.   HENT:      Head: Normocephalic and atraumatic.      Nose: Nose normal.      Mouth/Throat:      Mouth: Mucous membranes are moist.   Eyes:      General:         Right eye: No discharge.         Left eye: No discharge.      Extraocular Movements: Extraocular movements intact.      Conjunctiva/sclera: Conjunctivae normal.   Neck:      Musculoskeletal: Normal range of motion.   Cardiovascular:      Rate and Rhythm: Normal rate and regular rhythm.      Pulses: Normal pulses.      Heart sounds: Normal heart sounds. No murmur.   Pulmonary:      Effort: No respiratory distress.      Breath sounds:  Normal breath sounds.   Abdominal:      General: Bowel sounds are normal. There is no distension.      Palpations: Abdomen is soft.   Musculoskeletal: Normal range of motion.         General: Swelling (Trace amount to wesley. ankles. ) present. No tenderness.   Skin:     General: Skin is warm and dry.      Capillary Refill: Capillary refill takes less than 2 seconds.      Comments: Sternal chest wound approximated and healed proximally and distally.  Middle of wound has about a 1-1/2 mm hole with yellow crusted drainage extending up and down the wound.   Neurological:      General: No focal deficit present.      Mental Status: He is alert and oriented to person, place, and time.   Psychiatric:         Mood and Affect: Mood normal.         Results Review:   I reviewed the patient's new clinical results.  I reviewed the patient's new imaging results and agree with the interpretation.  I reviewed the patient's other test results and agree with the interpretation     Assessment/Plan     Active Hospital Problems    Diagnosis POA   • **Sternal wound infection [S21.101A, L08.9] Unknown   • CAD (coronary artery disease) [I25.10] Yes   • Type 2 diabetes mellitus with hyperglycemia, without long-term current use of insulin (CMS/[facility]) [E11.65] Yes   • Hyperlipidemia [E78.5] Yes   • Hypertension [I10] Yes   • Obesity [E66.9] Yes   • SHAHLA (obstructive sleep apnea) [G47.33] Yes       Diabetes mellitus  Hemoglobin A1c in October of this year was 9.4.  He reports his sugars have been well controlled recently.  We will start correctional sliding scale Humalog insulin and his regular Lantus dose of 65 units twice daily.  Monitor blood glucose before meals and at bedtime.    Hypertension  Resume home dose metoprolol and lisinopril.  Monitor.    Hyperlipidemia/CAD  Continue statin.    Obstructive sleep apnea  Family will bring up his home Pap for use at night.    Sternal wound infection  WBC normal.  Afebrile.  Wound culture has been sent.   On IV antibiotics.  Defer plan to cardiothoracic surgery.    Lovenox for DVT prophylaxis.    Patient is a full code.    Thank you very much for asking A to be involved in this patient's care. We will follow along with you.      SOHAM Whitten  [facility]  12/06/20  12:53 EST

## 2020-12-06 NOTE — ED NOTES
Pt here for c/o drainage from midsternal incision.  Had cabg in October, returned to work recently.  States incision was well healing on 11/13 when he saw surgeon, but in the last few days has developed drainage and pain when coughin.  Wound with creamy yellow drainage noted, and redness surrounding tissue.  I wore full protective equipment throughout this patient encounter including a face mask, eye shield and gloves. Hand hygiene/washing of hands was performed before donning protective equipment and after removal when leaving the room.       Nandini Marie RN  12/05/20 2030

## 2020-12-06 NOTE — ED PROVIDER NOTES
EMERGENCY DEPARTMENT ENCOUNTER    Room Number:  36/36  Date of encounter:  12/5/2020  PCP: Dacia Newsome APRN  Historian: Patient      HPI:  Chief Complaint: Wound drainage  A complete HPI/ROS/PMH/PSH/SH/FH are unobtainable due to: None    Context: Jd Velazquez is a 55 y.o. male who presents to the ED c/o wound drainage.  Patient recently had coronary artery bypass surgery on 10/8/2020.  He developed drainage from his sternotomy incision.  This is developed over the past few days.  Drainage is white and green in character.  It is constant.  He denies have any associated pain.  No fever.  No chest pain or shortness of breath.        PAST MEDICAL HISTORY  Active Ambulatory Problems     Diagnosis Date Noted   • CAD (coronary artery disease) 10/07/2020   • Coronary artery disease of native artery of native heart with stable angina pectoris (CMS/HCC) 10/06/2020   • Type 2 diabetes mellitus with hyperglycemia, without long-term current use of insulin (CMS/[facility])    • Hyperlipidemia    • Hypertension    • Obesity    • SHAHLA (obstructive sleep apnea)    • Tobacco abuse      Resolved Ambulatory Problems     Diagnosis Date Noted   • No Resolved Ambulatory Problems     Past Medical History:   Diagnosis Date   • Diabetes mellitus (CMS/[facility])          PAST SURGICAL HISTORY  Past Surgical History:   Procedure Laterality Date   • ANKLE ARTHROSCOPY W/ OPEN REPAIR     • APPENDECTOMY     • CARDIAC CATHETERIZATION  2014    PCI to circumflex   • CORONARY ARTERY BYPASS GRAFT N/A 10/8/2020    Procedure: STERNOTOMY, CORONARY ARTERY BYPASS GRAFTING TIME 4 WITH LEFT KELSI  AND ENDOSCOPICALLY HARVESTED LEFT GREATER SAPHENOUS VEIN AND PRP.;  Surgeon: Jr Girma Chiu MD;  Location: [facility];  Service: Cardiothoracic;  Laterality: N/A;   • HERNIA REPAIR     • TONSILLECTOMY           FAMILY HISTORY  No family history on file.      SOCIAL HISTORY  Social History     Socioeconomic History   • Marital status:      Spouse  name: Not on file   • Number of children: Not on file   • Years of education: Not on file   • Highest education level: Not on file   Tobacco Use   • Smoking status: Former Smoker     Packs/day: 1.00     Quit date: 10/20/2020     Years since quittin.1   • Smokeless tobacco: Never Used   Substance and Sexual Activity   • Alcohol use: Never     Frequency: Never   • Drug use: Never   • Sexual activity: Defer         ALLERGIES  Hydrocodone        REVIEW OF SYSTEMS  Review of Systems     All systems reviewed and negative except for those discussed in HPI.       PHYSICAL EXAM    I have reviewed the triage vital signs and nursing notes.    ED Triage Vitals   Temp Heart Rate Resp BP SpO2   20   98.1 °F (36.7 °C) 101 20 (!) 199/90 98 %      Temp src Heart Rate Source Patient Position BP Location FiO2 (%)   20 --   Tympanic Monitor Sitting Right arm        Physical Exam  GENERAL: not distressed  HENT: nares patent  EYES: no scleral icterus  CV: regular rhythm, regular rate  RESPIRATORY: normal effort, clear to auscultation bilaterally  ABDOMEN: soft, nontender  MUSCULOSKELETAL: no deformity  NEURO: alert, moves all extremities, follows commands  SKIN: warm, dry, 2 focal areas of purulent drainage from the middle of his sternotomy incision        LAB RESULTS  Recent Results (from the past 24 hour(s))   Lactic Acid, Plasma    Collection Time: 20  7:50 PM    Specimen: Arm, Right; Blood   Result Value Ref Range    Lactate 1.1 0.5 - 2.0 mmol/L   Light Blue Top    Collection Time: 20  7:54 PM   Result Value Ref Range    Extra Tube hold for add-on    Green Top (Gel)    Collection Time: 20  7:54 PM   Result Value Ref Range    Extra Tube Hold for add-ons.    Lavender Top    Collection Time: 20  7:54 PM   Result Value Ref Range    Extra Tube hold for add-on    Basic Metabolic Panel     Collection Time: 12/05/20  7:54 PM    Specimen: Blood   Result Value Ref Range    Glucose 197 (H) 65 - 99 mg/dL    BUN 13 6 - 20 mg/dL    Creatinine 0.90 0.76 - 1.27 mg/dL    Sodium 135 (L) 136 - 145 mmol/L    Potassium 3.9 3.5 - 5.2 mmol/L    Chloride 99 98 - 107 mmol/L    CO2 24.4 22.0 - 29.0 mmol/L    Calcium 9.5 8.6 - 10.5 mg/dL    eGFR Non African Amer 88 >60 mL/min/1.73    BUN/Creatinine Ratio 14.4 7.0 - 25.0    Anion Gap 11.6 5.0 - 15.0 mmol/L   CBC Auto Differential    Collection Time: 12/05/20  7:54 PM    Specimen: Blood   Result Value Ref Range    WBC 10.57 3.40 - 10.80 10*3/mm3    RBC 4.28 4.14 - 5.80 10*6/mm3    Hemoglobin 13.0 13.0 - 17.7 g/dL    Hematocrit 39.1 37.5 - 51.0 %    MCV 91.4 79.0 - 97.0 fL    MCH 30.4 26.6 - 33.0 pg    MCHC 33.2 31.5 - 35.7 g/dL    RDW 13.8 12.3 - 15.4 %    RDW-SD 46.1 37.0 - 54.0 fl    MPV 9.1 6.0 - 12.0 fL    Platelets 379 140 - 450 10*3/mm3    Neutrophil % 60.2 42.7 - 76.0 %    Lymphocyte % 24.3 19.6 - 45.3 %    Monocyte % 12.1 (H) 5.0 - 12.0 %    Eosinophil % 2.0 0.3 - 6.2 %    Basophil % 0.7 0.0 - 1.5 %    Immature Grans % 0.7 (H) 0.0 - 0.5 %    Neutrophils, Absolute 6.37 1.70 - 7.00 10*3/mm3    Lymphocytes, Absolute 2.57 0.70 - 3.10 10*3/mm3    Monocytes, Absolute 1.28 (H) 0.10 - 0.90 10*3/mm3    Eosinophils, Absolute 0.21 0.00 - 0.40 10*3/mm3    Basophils, Absolute 0.07 0.00 - 0.20 10*3/mm3    Immature Grans, Absolute 0.07 (H) 0.00 - 0.05 10*3/mm3    nRBC 0.0 0.0 - 0.2 /100 WBC   C-reactive Protein    Collection Time: 12/05/20  7:54 PM    Specimen: Blood   Result Value Ref Range    C-Reactive Protein 1.59 (H) 0.00 - 0.50 mg/dL   Sedimentation Rate    Collection Time: 12/05/20  7:54 PM    Specimen: Blood   Result Value Ref Range    Sed Rate 18 0 - 20 mm/hr       Ordered the above labs and independently reviewed the results.        RADIOLOGY  Ct Chest With Contrast    Result Date: 12/5/2020  CT SCAN OF THE CHEST WITH CONTRAST:  HISTORY: Purulent drainage from  mediastinal chest pain.  TECHNIQUE:  Axial images were obtained from the thoracic inlet to the lung bases following the administration of intravenous contrast. No adverse reaction. Multiplanar reformatted images were reconstructed from the helical source data.Radiation dose reduction techniques were utilized, including automated exposure control and exposure modulation based on body size.   COMPARISON:  None.  FINDINGS: Sternotomy wires. Overlying sternal soft tissue stranding and a focal soft tissue defect (series 2, image 49). No evidence of underlying fluid collection. The remaining cysts chest wall soft tissues are normal.   The visualized thyroid gland is unremarkable.  The heart is enlarged with coronary artery calcifications. No pericardial effusion.  The thoracic aorta is normal in caliber. Mild atherosclerotic calcifications. The main pulmonary artery is normal in caliber.  Multiple prominent mediastinal lymph nodes. No pathologically enlarged mediastinal, hilar or axillary adenopathy.  The central airways are patent. Mild emphysematous changes in the bilateral upper lobes. Atelectasis versus scarring at the bilateral lung bases. No focal consolidation or suspicious pulmonary nodules. No pleural effusions. No pneumothorax.  The partially visualized upper abdomen is unremarkable.  No acute osseous abnormality. No aggressive osseous lesions. Degenerative changes in the spine.      1.  Postoperative changes with sternotomy wires. Overlying anterior chest wall soft tissue stranding with a focal soft tissue defect. No evidence of abscess formation. 2.  No acute cardiopulmonary disease.   This report was finalized on 12/5/2020 9:43 PM by Dr. Alcides Vernon M.D.        I ordered the above noted radiological studies. Reviewed by me and discussed with radiologist.  See dictation for official radiology interpretation.      PROCEDURES    Procedures      MEDICATIONS GIVEN IN ER    Medications   sodium chloride 0.9 %  flush 10 mL (has no administration in time range)   piperacillin-tazobactam (ZOSYN) 3.375 g in iso-osmotic dextrose 50 ml (premix) (has no administration in time range)   vancomycin 2250 mg/500 mL 0.9% NS IVPB ([facility]) (has no administration in time range)   iopamidol (ISOVUE-300) 61 % injection 100 mL (85 mL Intravenous Given by Other 12/5/20 2125)         PROGRESS, DATA ANALYSIS, CONSULTS, AND MEDICAL DECISION MAKING    All labs have been independently reviewed by me.  All radiology studies have been reviewed by me and discussed with radiologist dictating the report.   EKG's independently viewed and interpreted by me.  Discussion below represents my analysis of pertinent findings related to patient's condition, differential diagnosis, treatment plan and final disposition.    Patient presents with purulent drainage from sternotomy incision.  I am concerned about abscess versus purulent cellulitis.  Differential diagnosis also includes osteomyelitis.    ED Course as of Dec 05 2211   Sat Dec 05, 2020   2029 WBC: 10.57 [TD]   2111 Sed Rate: 18 [TD]   2208 When I initially saw the patient, he expressed that he adamantly wanted to go home.  I told him that I recommended admission but that I would talk with the cardiothoracic surgeon once his results return.    [TD]   2208 Once the CT resulted, I discussed the case with Dr. Dc, cardiothoracic surgery.  He stated that the patient is too high risk to go home given the location of the purulent drainage.  I relayed this information to the patient and he is reluctantly agreeable to this plan.  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 Nonetheless, he is willing to stay.    [TD]   2209 Dr. Phan requested Zosyn for the patient. Given the purulent drainage, I am also going to give the patient vancomycin for MRSA coverage.     [TD]      ED Course User Index  [TD] Sohail Manning II, MD           PPE:  Both the patient and I wore a surgical mask throughout the entire patient encounter. I wore protective goggles.     AS OF 22:11 EST VITALS:    BP - 171/87  HR - 84  TEMP - 98.1 °F (36.7 °C) (Tympanic)  O2 SATS - 98%        DIAGNOSIS  Final diagnoses:   Purulent cellulitis of chest wall at sternotomy incision         DISPOSITION  Admit           Sohail Manning II, MD  12/05/20 2104

## 2020-12-06 NOTE — ED NOTES
Pt reports recent bypass surgery 10/06/20 with white/green drainage. Denies fevers.     Patient was placed in face mask during first look triage.  Patient was wearing a face mask throughout encounter.  I wore personal protective equipment throughout the encounter.  Hand hygiene was performed before and after patient encounter.        Anushka Pérez, RN  12/05/20 1922       Anushka Pérez RN  12/05/20 1924

## 2020-12-06 NOTE — H&P
History and physical    Reason for Consultation: I have drainage.    History of Present Illness:     This is a pleasant 55-year-old  gentleman well-known to our service.  He underwent coronary bypass x4 by Dr. Chiu in mid October 2020.  He followed up in mid November in our office and appeared to be doing well.  He presented to the emergency room yesterday with purulent sternal drainage which has been going on for a few days.  The patient did worry about presenting to the hospital because of the current Covid pandemic.  He admitted to subjective chills but denied any objective fevers.  He also has a periodic nonproductive cough.  No sternal clicking or popping has been appreciated.    I spoke with the ER physician last evening regarding his physical exam findings.  Because of the purulent drainage I felt that he should be admitted and placed on IV antibiotics.  A swab of his sternal wound has already been sent from the emergency room.  Fortunately his CT scan shows no evidence of sternal compromise and no evidence of any deep-seated infection.    He is sitting up in bed and in no distress.  He is little annoyed/worried that he needs to be in the hospital but he does understand that this is in his best interests.    Review of Systems   Constitutional: Positive for chills. Negative for activity change, appetite change, diaphoresis, fatigue, fever and unexpected weight change.   HENT: Negative for congestion, hearing loss, nosebleeds, postnasal drip, rhinorrhea and voice change.    Eyes: Negative for photophobia, pain, discharge, redness, itching and visual disturbance.   Respiratory: Negative for apnea, cough, choking, chest tightness, shortness of breath, wheezing and stridor.    Cardiovascular: Negative for chest pain, palpitations and leg swelling.   Gastrointestinal: Negative for abdominal distention, abdominal pain, anal bleeding, blood in stool, constipation, diarrhea, nausea and vomiting.    Endocrine: Negative for cold intolerance and heat intolerance.   Genitourinary: Negative for decreased urine volume, dysuria, flank pain, hematuria and urgency.   Musculoskeletal: Negative for arthralgias, back pain, gait problem, joint swelling, myalgias, neck pain and neck stiffness.   Skin: Negative for color change, pallor, rash and wound.   Allergic/Immunologic: Negative for environmental allergies, food allergies and immunocompromised state.   Neurological: Negative for dizziness, tremors, seizures, syncope, facial asymmetry, speech difficulty, weakness, light-headedness, numbness and headaches.   Hematological: Negative for adenopathy. Does not bruise/bleed easily.   Psychiatric/Behavioral: Negative for agitation, behavioral problems, confusion, decreased concentration, dysphoric mood, hallucinations, self-injury, sleep disturbance and suicidal ideas. The patient is not nervous/anxious and is not hyperactive.         Past Medical History:   Diagnosis Date   • CAD (coronary artery disease)    • Diabetes mellitus (CMS/HCC)    • Hyperlipidemia    • Hypertension    • Obesity      Past Surgical History:   Procedure Laterality Date   • ANKLE ARTHROSCOPY W/ OPEN REPAIR     • APPENDECTOMY     • CARDIAC CATHETERIZATION      PCI to circumflex   • CORONARY ARTERY BYPASS GRAFT N/A 10/8/2020    Procedure: STERNOTOMY, CORONARY ARTERY BYPASS GRAFTING TIME 4 WITH LEFT KELSI  AND ENDOSCOPICALLY HARVESTED LEFT GREATER SAPHENOUS VEIN AND PRP.;  Surgeon: Jr Girma Chiu MD;  Location: [facility];  Service: Cardiothoracic;  Laterality: N/A;   • HERNIA REPAIR     • TONSILLECTOMY       History reviewed. No pertinent family history.  Social History     Tobacco Use   • Smoking status: Former Smoker     Packs/day: 1.00     Quit date: 10/20/2020     Years since quittin.1   • Smokeless tobacco: Never Used   Substance Use Topics   • Alcohol use: Never     Frequency: Never   • Drug use: Never     Medications Prior to  Admission   Medication Sig Dispense Refill Last Dose   • aspirin 81 MG EC tablet Take 1 tablet by mouth Daily for 90 days. 30 tablet 2 12/5/2020 at Unknown time   • glucose blood test strip Test 4 times per day. 100 each 0 12/5/2020 at Unknown time   • insulin aspart (NovoLOG) 100 UNIT/ML injection 200-249 5 units, 250 -299 8 units, 300-349 10 units, 350-400 12 units, > 400 14 units, tid ac, pens and needles (Patient taking differently: Inject 60 Units under the skin into the appropriate area as directed. 200-249 5 units, 250 -299 8 units, 300-349 10 units, 350-400 12 units, > 400 14 units, tid ac, pens and needles) 10 mL 0 12/5/2020 at Unknown time   • Insulin Pen Needle (Unifine Pentips) 31G X 6 MM misc Use 4 times daily with insulin 100 each 0 12/5/2020 at Unknown time   • lisinopril (PRINIVIL,ZESTRIL) 5 MG tablet Take 1 tablet by mouth Daily for 90 days. 30 tablet 2 12/5/2020 at Unknown time   • metoprolol tartrate (LOPRESSOR) 100 MG tablet Take 1 tablet by mouth Every 12 (Twelve) Hours for 90 days. 60 tablet 2 12/5/2020 at Unknown time   • OneTouch Delica Lancets 33G misc test 4 times per day 100 each 0 12/5/2020 at Unknown time   • rosuvastatin (CRESTOR) 20 MG tablet Take 1 tablet by mouth Every Night for 90 days. 30 tablet 2 12/5/2020 at Unknown time   • furosemide (LASIX) 40 MG tablet Take 1 tablet by mouth Daily for 30 days. 30 tablet 0    • insulin detemir (Levemir) 100 UNIT/ML injection Inject 30 Units under the skin into the appropriate area as directed Daily for 30 days. Pens and needles 9 mL 0    • Insulin Glargine (Lantus SoloStar) 100 UNIT/ML injection pen Inject 50 Units under the skin into the appropriate area as directed 2 (Two) Times a Day.      • sennosides-docusate (PERICOLACE) 8.6-50 MG per tablet Take 2 tablets by mouth At Night As Needed for Constipation.   Unknown at Unknown time       Current Facility-Administered Medications:   •  aspirin chewable tablet 81 mg, 81 mg, Oral, Daily, Mike,  Tang Gaines MD, 81 mg at 12/06/20 1035  •  dextrose (D50W) 25 g/ 50mL Intravenous Solution 25 g, 25 g, Intravenous, Q15 Min PRN, Jr Girma Chiu MD  •  dextrose (D50W) 25 g/ 50mL Intravenous Solution 25 g, 25 g, Intravenous, Q15 Min PRN, Eliud Edge MD  •  dextrose (GLUTOSE) oral gel 15 g, 15 g, Oral, Q15 Min PRN, Jr Girma Chiu MD  •  dextrose (GLUTOSE) oral gel 15 g, 15 g, Oral, Q15 Min PRN, Eliud Edge MD  •  enoxaparin (LOVENOX) syringe 30 mg, 30 mg, Subcutaneous, Q24H, Tang Mckeon MD, 30 mg at 12/06/20 1035  •  glucagon (human recombinant) (GLUCAGEN DIAGNOSTIC) injection 1 mg, 1 mg, Subcutaneous, Q15 Min PRN, Jr Girma Chiu MD  •  glucagon (human recombinant) (GLUCAGEN DIAGNOSTIC) injection 1 mg, 1 mg, Subcutaneous, Q15 Min PRN, Eliud Edge MD  •  insulin glargine (LANTUS) injection 65 Units, 65 Units, Subcutaneous, Q12H, Eliud Edge MD, 65 Units at 12/06/20 1035  •  insulin lispro (humaLOG) injection 0-24 Units, 0-24 Units, Subcutaneous, TID AC, Eliud Edge MD  •  lisinopril (PRINIVIL,ZESTRIL) tablet 5 mg, 5 mg, Oral, Q24H, Jr Girma Chiu MD, 5 mg at 12/06/20 1035  •  metoprolol tartrate (LOPRESSOR) tablet 100 mg, 100 mg, Oral, Q12H, Jr Girma Chiu MD, 100 mg at 12/06/20 1035  •  nitroglycerin (NITROSTAT) SL tablet 0.4 mg, 0.4 mg, Sublingual, Q5 Min PRN, Tang Mckeon MD  •  ondansetron (ZOFRAN) injection 4 mg, 4 mg, Intravenous, Q6H PRN, Tang Mckeon MD  •  rosuvastatin (CRESTOR) tablet 20 mg, 20 mg, Oral, Nightly, Tang Mckeon MD  •  sennosides-docusate (PERICOLACE) 8.6-50 MG per tablet 2 tablet, 2 tablet, Oral, Nightly PRN, Jr Girma Chiu MD  •  [COMPLETED] Insert peripheral IV, , , Once **AND** sodium chloride 0.9 % flush 10 mL, 10 mL, Intravenous, PRN, Sohail Manning II, MD  •  sodium chloride 0.9 % flush 10 mL, 10 mL, Intravenous, Q12H, Tang Mckeon MD, 10 mL at 12/06/20 1036  •   "sodium chloride 0.9 % flush 10 mL, 10 mL, Intravenous, PRN, Tang Mckeon MD  aspirin, 81 mg, Oral, Daily  enoxaparin, 30 mg, Subcutaneous, Q24H  insulin glargine, 65 Units, Subcutaneous, Q12H  insulin lispro, 0-24 Units, Subcutaneous, TID AC  lisinopril, 5 mg, Oral, Q24H  metoprolol tartrate, 100 mg, Oral, Q12H  rosuvastatin, 20 mg, Oral, Nightly  sodium chloride, 10 mL, Intravenous, Q12H      Allergies:  Hydrocodone    Objective      Vital Signs  Temp:  [98 °F (36.7 °C)-98.7 °F (37.1 °C)] 98.7 °F (37.1 °C)  Heart Rate:  [] 82  Resp:  [18-20] 18  BP: (148-199)/(78-90) 148/83    Flowsheet Rows      First Filed Value   Admission Height  182.9 cm (72\") Documented at 12/05/2020 1922   Admission Weight  113 kg (250 lb) Documented at 12/05/2020 1922        182.9 cm (72\")    Physical Exam  Vitals signs reviewed.   Constitutional:       General: He is awake. He is not in acute distress.     Appearance: Normal appearance. He is obese. He is not ill-appearing, toxic-appearing or diaphoretic.   HENT:      Head: Normocephalic and atraumatic.      Nose: Nose normal. No congestion.      Mouth/Throat:      Mouth: Mucous membranes are moist.      Pharynx: Oropharynx is clear. No oropharyngeal exudate or posterior oropharyngeal erythema.   Eyes:      General: No scleral icterus.     Extraocular Movements: Extraocular movements intact.      Pupils: Pupils are equal, round, and reactive to light.   Neck:      Musculoskeletal: Normal range of motion and neck supple. No neck rigidity or muscular tenderness.      Thyroid: No thyroid mass, thyromegaly or thyroid tenderness.      Vascular: No carotid bruit, hepatojugular reflux or JVD.      Trachea: Trachea normal. No tracheal deviation.   Cardiovascular:      Rate and Rhythm: Normal rate.      Heart sounds: No murmur. No friction rub. No gallop.    Pulmonary:      Effort: Pulmonary effort is normal. No respiratory distress.      Breath sounds: Normal breath sounds. No stridor. " No wheezing, rhonchi or rales.   Chest:      Chest wall: Tenderness present.      Comments: Mid aspect of sternal incision and inferior aspect open at small areas.  Exudative/purulent drainage.  No erythema and minimal induration.  Sternum is  to palpation.  Sternum is stable to palpation.  Sternal wires are not exposed.  Abdominal:      General: Abdomen is flat. Bowel sounds are normal. There is no distension.   Musculoskeletal:         General: No swelling or deformity.      Right lower leg: Edema present.      Left lower leg: Edema present.   Lymphadenopathy:      Cervical: No cervical adenopathy.   Skin:     General: Skin is warm.      Capillary Refill: Capillary refill takes 2 to 3 seconds.      Coloration: Skin is not jaundiced or pale.      Findings: Erythema present. No bruising or lesion.   Neurological:      General: No focal deficit present.      Mental Status: He is alert and oriented to person, place, and time. Mental status is at baseline.      GCS: GCS eye subscore is 4. GCS verbal subscore is 5. GCS motor subscore is 6.      Cranial Nerves: Cranial nerves are intact. No cranial nerve deficit.      Sensory: Sensation is intact. No sensory deficit.      Motor: Motor function is intact. No weakness.      Coordination: Coordination is intact. Coordination normal.      Gait: Gait is intact.      Deep Tendon Reflexes: Reflexes normal.   Psychiatric:         Attention and Perception: Attention normal.         Mood and Affect: Mood normal.         Speech: Speech normal.         Behavior: Behavior normal. Behavior is cooperative.         Thought Content: Thought content normal.         Judgment: Judgment normal.       Results Review:       Assessment/Plan:     This is a pleasant 55-year-old  woman who is here with a coronary bypass by Dr. Chiu.  It appears he has a superficial infection of his sternal wound.  I placed him on broad-spectrum antibiotics and I am awaiting a wound culture  result.  I will communicate with Dr. Chiu later today; the patient might require a superficial debridement with a wound VAC.  That ultimate decision will be up to Dr. Chiu.    Tang Mckeon MD  12/06/20  10:55 EST

## 2020-12-07 NOTE — PROGRESS NOTES
Case Management Discharge Note      Final Note: Pt discharged home, no known needs. JAIR bazan RN         Selected Continued Care - Discharged on 12/6/2020 Admission date: 12/5/2020 - Discharge disposition: Home or Self Care    Destination    No services have been selected for the patient.              Durable Medical Equipment    No services have been selected for the patient.              Dialysis/Infusion    No services have been selected for the patient.              Home Medical Care    No services have been selected for the patient.              Therapy    No services have been selected for the patient.              Community Resources    No services have been selected for the patient.                Selected Continued Care - Prior Encounters Includes selections from prior encounters from 9/6/2020 to 12/6/2020    Discharged on 10/14/2020 Admission date: 10/6/2020 - Discharge disposition: Home-Health Care c    Home Medical Care     Service Provider Selected Services Address Phone Fax Patient Preferred    [facility] [address] [place] [id number] [phone] --                    Transportation Services  Private: Car    Final Discharge Disposition Code: 01 - home or self-care

## 2020-12-08 LAB
BACTERIA SPEC AEROBE CULT: ABNORMAL
GRAM STN SPEC: ABNORMAL
GRAM STN SPEC: ABNORMAL

## 2020-12-09 ENCOUNTER — OFFICE VISIT (OUTPATIENT)
Dept: CARDIAC SURGERY | Facility: CLINIC | Age: 55
End: 2020-12-09

## 2020-12-09 VITALS
WEIGHT: 256.5 LBS | HEART RATE: 70 BPM | RESPIRATION RATE: 20 BRPM | DIASTOLIC BLOOD PRESSURE: 82 MMHG | HEIGHT: 72 IN | SYSTOLIC BLOOD PRESSURE: 181 MMHG | TEMPERATURE: 94.1 F | OXYGEN SATURATION: 97 % | BODY MASS INDEX: 34.74 KG/M2

## 2020-12-09 DIAGNOSIS — L08.9 STERNAL WOUND INFECTION: Primary | ICD-10-CM

## 2020-12-09 DIAGNOSIS — Z95.1 S/P CABG X 4: ICD-10-CM

## 2020-12-09 DIAGNOSIS — S21.101A STERNAL WOUND INFECTION: Primary | ICD-10-CM

## 2020-12-09 PROCEDURE — 99024 POSTOP FOLLOW-UP VISIT: CPT | Performed by: NURSE PRACTITIONER

## 2020-12-09 RX ORDER — SULFAMETHOXAZOLE AND TRIMETHOPRIM 800; 160 MG/1; MG/1
1 TABLET ORAL 2 TIMES DAILY
Qty: 20 TABLET | Refills: 0 | Status: SHIPPED | OUTPATIENT
Start: 2020-12-09 | End: 2020-12-19

## 2020-12-09 RX ORDER — POTASSIUM CHLORIDE 1500 MG/1
20 TABLET, FILM COATED, EXTENDED RELEASE ORAL DAILY
COMMUNITY
Start: 2020-12-04 | End: 2021-06-17 | Stop reason: SDUPTHER

## 2020-12-09 NOTE — PROGRESS NOTES
"CARDIOVASCULAR SURGERY FOLLOW-UP PROGRESS NOTE  Chief Complaint: Superficial wound infection follow-up        HPI:   Dear Dr. Newsome, SOHAM Maria and colleagues:    It was nice to see Jd Velazquez in follow up 9 weeks after surgery.  As you know, he is a 55 y.o. male with CAD, diabetes, hyponatremia, obesity, noncompliance who underwent CABG x4 on 10/8/2020 with Dr. Chiu. He was readmitted on the hospital on 12/5/2020 with wound drainage, he underwent a CT of the chest that demonstrated superficial infection, he was sent home on Keflex 1 g 3 times daily, wound cultures from 12/5/2020 grew 2+ staph aureus with susceptibility resistant to pen G but susceptible to oxacillin.  He comes in today complaining of drainage from mid sternal incision.  I am able to express purulent material while palpating sternum, there is a mildly softened area on the proximal portion of the sternum as well.  Dr. Chiu was able to insert a Q-tip into midsternal area, the depth was not past the head of the Q-tip.  He saturated additional Q-tips and probed the area with Betadine solution.    Physical Exam:         BP (!) 181/82 (BP Location: Right arm, Patient Position: Sitting, Cuff Size: Adult)   Pulse 70   Temp 94.1 °F (34.5 °C) (Oral)   Resp 20   Ht 182.9 cm (72\")   Wt 116 kg (256 lb 8 oz)   SpO2 97%   BMI 34.79 kg/m²   Heart:  regular rate and rhythm, S1, S2 normal, no murmur, click, rub or gallop  Lungs:  clear to auscultation bilaterally      Assessment/Plan:     S/P CABG and sternal wound infection.     Post-op wound infection, discontinue cephalexin and initiate Bactrim DS for 10 days    Return to clinic in 1 week(s) with no new studies  Insert hydrogen peroxide saturated Q-tip into sternal wound twice a day  It is possible that the proximal portion will open as well, continue same treatment for that area    Continue lifting restriction of 10 lbs until 6 weeks and 50 lbs until 12 weeks from the date of " surgery, no excessive jarring motions or twisting motions until 12 weeks from the date of surgery      Thank you for allowing me to participate in the care of your patient.    Regards,  SOHAM Brooks

## 2020-12-10 LAB
BACTERIA SPEC AEROBE CULT: NORMAL
BACTERIA SPEC AEROBE CULT: NORMAL

## 2020-12-23 ENCOUNTER — OFFICE VISIT (OUTPATIENT)
Dept: CARDIAC SURGERY | Facility: CLINIC | Age: 55
End: 2020-12-23

## 2020-12-23 VITALS
HEIGHT: 72 IN | OXYGEN SATURATION: 100 % | RESPIRATION RATE: 20 BRPM | BODY MASS INDEX: 34.67 KG/M2 | HEART RATE: 82 BPM | SYSTOLIC BLOOD PRESSURE: 168 MMHG | WEIGHT: 256 LBS | TEMPERATURE: 96.9 F | DIASTOLIC BLOOD PRESSURE: 84 MMHG

## 2020-12-23 DIAGNOSIS — L08.9 STERNAL WOUND INFECTION: ICD-10-CM

## 2020-12-23 DIAGNOSIS — Z95.1 S/P CABG X 4: Primary | ICD-10-CM

## 2020-12-23 DIAGNOSIS — S21.101A STERNAL WOUND INFECTION: ICD-10-CM

## 2020-12-23 PROCEDURE — 99024 POSTOP FOLLOW-UP VISIT: CPT | Performed by: NURSE PRACTITIONER

## 2020-12-23 RX ORDER — LISINOPRIL 10 MG/1
10 TABLET ORAL DAILY
COMMUNITY
Start: 2020-12-11 | End: 2021-06-17 | Stop reason: SDUPTHER

## 2020-12-23 RX ORDER — SULFAMETHOXAZOLE AND TRIMETHOPRIM 800; 160 MG/1; MG/1
1 TABLET ORAL 2 TIMES DAILY
Qty: 20 TABLET | Refills: 0 | Status: SHIPPED | OUTPATIENT
Start: 2020-12-23 | End: 2021-01-02

## 2020-12-23 NOTE — PROGRESS NOTES
"CARDIOVASCULAR SURGERY FOLLOW-UP PROGRESS NOTE  Chief Complaint: Sternal wound infection follow-up        HPI:   Dear Dr. Newsome, SOHAM Maria and colleagues:    It was nice to see Jd Velazquez in follow up 11 weeks after surgery.  As you know, he is a 55 y.o. male with CAD, diabetes, hyponatremia, obesity, previous noncompliance who underwent CABG x4 on 10/8/2020 with Dr. Chiu. He was readmitted on the hospital on 12/5/2020 with wound drainage, he underwent a CT of the chest that demonstrated superficial infection, he was sent home on Keflex 1 g 3 times daily, wound cultures from 12/5/2020 grew 2+ staph aureus with susceptibility resistant to pen G but susceptible to oxacillin.  I saw him in conjunction with Dr. Chiu on 12/9/2020 at which time he was prescribed Bactrim DS, which he has completed, and he has been using a saturated hydrogen peroxide Q-tip to an open area midsternum.  I am able to express a small amount of purulent material when palpating the sternum, there is communication between 2 open portions, Dr. Chiu snipped the skin between the 2 areas in order to be able to apply wet-to-dry normal saline dressing.  The distal portion of the sternum has a small area that is not past the head of the Q-tip in depth.    Physical Exam:         /84 (BP Location: Left arm, Patient Position: Sitting, Cuff Size: Adult)   Pulse 82   Temp 96.9 °F (36.1 °C) (Oral)   Resp 20   Ht 182.9 cm (72\")   Wt 116 kg (256 lb)   SpO2 100%   BMI 34.72 kg/m²       Assessment/Plan:     S/P CABG and sternal wound infection. Overall, he is doing well.    Post-op wound infection, reinitiate another round of Bactrim DS for 10 days  Start normal saline packing to a midsternal wound, continue Betadine soaked Q-tips to distal sternum    Return to clinic in 1 week(s) with no new studies    Thank you for allowing me to participate in the care of your patient.    Regards,  SOHAM Brooks      "

## 2021-01-05 ENCOUNTER — OFFICE VISIT (OUTPATIENT)
Dept: CARDIAC SURGERY | Facility: CLINIC | Age: 56
End: 2021-01-05

## 2021-01-05 VITALS
RESPIRATION RATE: 20 BRPM | DIASTOLIC BLOOD PRESSURE: 98 MMHG | BODY MASS INDEX: 34.67 KG/M2 | SYSTOLIC BLOOD PRESSURE: 183 MMHG | WEIGHT: 256 LBS | TEMPERATURE: 97.8 F | HEIGHT: 72 IN | OXYGEN SATURATION: 98 % | HEART RATE: 79 BPM

## 2021-01-05 DIAGNOSIS — Z95.1 S/P CABG X 4: Primary | ICD-10-CM

## 2021-01-05 PROCEDURE — 99024 POSTOP FOLLOW-UP VISIT: CPT | Performed by: NURSE PRACTITIONER

## 2021-01-05 NOTE — PROGRESS NOTES
"CARDIOVASCULAR SURGERY FOLLOW-UP PROGRESS NOTE  Chief Complaint: Wound check        HPI:   Dear Dr. Newsome, SOHAM Maria and colleagues:    It was nice to see Jd Velazquez in follow up today after cardiac surgery.  As you know, he is a 55 y.o. male with CAD, DM, and obesity who underwent CABG at [facility] by Dr. Chiu on 10/8/2020. He developed wound drainage in early December and was admitted to the hospital and placed on antibiotics based on his wound culture of Staph aureus.  Dr. Chiu opened his wound in the office on 12/23/2020.  He has been doing wet to dry dressings to the opened area of his sternotomy.  He comes in today for a wound check.  There is no drainage and beefy pink tissue in wound bed.  His sternum is stable.  He denies any c/o fevers, chills, or malaise.  His BP is noted to be 183/98.  His pressure is always elevated when he comes to our office partly d/t his drive up here I am told.  He is alone for his appt.    Physical Exam:         BP (!) 183/98 (BP Location: Left arm, Patient Position: Sitting, Cuff Size: Adult)   Pulse 79   Temp 97.8 °F (36.6 °C) (Oral)   Resp 20   Ht 182.9 cm (72\")   Wt 116 kg (256 lb)   SpO2 98%   BMI 34.72 kg/m²   Extremities:  no edema  Incision(s):  mid chest no significant drainage, no significant erythema, slow healing, sternum stable    Assessment/Plan:     S/P CABG. Overall, he is doing well.    Sternal wound infections--continue wet to dry dressings.  Pt did not want to come back to [place] next week.      Follow up in 2 weeks but will call us if any changes to wound including erythema, drainage, pain, or fevers.    Thank you for allowing me to participate in the care of your patient.    Regards,  SOHAM Rainey      "

## 2021-01-19 ENCOUNTER — OFFICE VISIT (OUTPATIENT)
Dept: CARDIAC SURGERY | Facility: CLINIC | Age: 56
End: 2021-01-19

## 2021-01-19 VITALS
SYSTOLIC BLOOD PRESSURE: 183 MMHG | BODY MASS INDEX: 34.54 KG/M2 | TEMPERATURE: 98.4 F | HEIGHT: 72 IN | DIASTOLIC BLOOD PRESSURE: 94 MMHG | OXYGEN SATURATION: 96 % | HEART RATE: 73 BPM | WEIGHT: 255 LBS | RESPIRATION RATE: 20 BRPM

## 2021-01-19 DIAGNOSIS — S21.101A STERNAL WOUND INFECTION: ICD-10-CM

## 2021-01-19 DIAGNOSIS — L08.9 STERNAL WOUND INFECTION: ICD-10-CM

## 2021-01-19 DIAGNOSIS — Z95.1 S/P CABG X 4: Primary | ICD-10-CM

## 2021-01-19 PROCEDURE — 99024 POSTOP FOLLOW-UP VISIT: CPT | Performed by: NURSE PRACTITIONER

## 2021-01-19 RX ORDER — METOPROLOL TARTRATE 100 MG/1
100 TABLET ORAL 2 TIMES DAILY
COMMUNITY
End: 2021-06-17 | Stop reason: SDUPTHER

## 2021-01-19 RX ORDER — ROSUVASTATIN CALCIUM 20 MG/1
20 TABLET, COATED ORAL DAILY
COMMUNITY
End: 2021-06-17 | Stop reason: SDUPTHER

## 2021-01-19 NOTE — PROGRESS NOTES
"CARDIOVASCULAR SURGERY FOLLOW-UP PROGRESS NOTE  Chief Complaint: Wound check        HPI:   Dear Dr. Newsome, SOHAM Maria and colleagues:    It was nice to see Jd Velazquez in follow up today after cardiac surgery.  As you know, he is a 55 y.o. male with DM type 2, HTN, HLD, and untreated SHAHLA that continues to have a slow healing sternal wound.  He underwent CABG x4 by Dr. Chiu on 10/8/2020. I saw him 2 weeks ago for a wound check as well.  His sternal wound is essentially the same.  He has an area that is approximately 1 cm deep and healing from the inside out.  An additional area approximately 1.5 inches below the open area has scant straw colored drainage that with palpation expresses white drainage. His sternum is stable.  He denies fevers/chills/malaise.  Again his BP is 183/94 this morning.  He has not taken his BP meds today and reports his blood sugars have been \"so so.\"    Physical Exam:         BP (!) 183/94 (BP Location: Left arm, Patient Position: Sitting, Cuff Size: Adult)   Pulse 73   Temp 98.4 °F (36.9 °C) (Oral)   Resp 20   Ht 182.9 cm (72\")   Wt 116 kg (255 lb)   SpO2 96%   BMI 34.58 kg/m²     Incision(s):  As above    Assessment/Plan:     S/P CABG in October 2020. He continues with a slow healing sternal wound.  He is not on antibiotics.  I will d/w Dr. Chiu today if he wants to add antibiotics again.  I re-iterated importance of showering daily, antibacterial soap, and to call if increased drainage, redness, or fever/chills.  We also discussed importance of taking medications.  No restrictions of activity.    D/w Dr. Chiu, will not add antibiotics at this time.  Pt notified and re-iterated that he has increased drainage to call us.    Return to clinic in 2 weeks for wound check.      Thank you for allowing me to participate in the care of your patient.    Regards,  SOHAM Rainey      "

## 2021-02-02 ENCOUNTER — OFFICE VISIT (OUTPATIENT)
Dept: CARDIAC SURGERY | Facility: CLINIC | Age: 56
End: 2021-02-02

## 2021-02-02 VITALS
DIASTOLIC BLOOD PRESSURE: 100 MMHG | BODY MASS INDEX: 34.54 KG/M2 | HEIGHT: 72 IN | OXYGEN SATURATION: 96 % | WEIGHT: 255 LBS | HEART RATE: 80 BPM | SYSTOLIC BLOOD PRESSURE: 200 MMHG | TEMPERATURE: 97.8 F | RESPIRATION RATE: 20 BRPM

## 2021-02-02 DIAGNOSIS — S21.101A STERNAL WOUND INFECTION: ICD-10-CM

## 2021-02-02 DIAGNOSIS — Z95.1 S/P CABG X 4: Primary | ICD-10-CM

## 2021-02-02 DIAGNOSIS — L08.9 STERNAL WOUND INFECTION: ICD-10-CM

## 2021-02-02 PROCEDURE — 99024 POSTOP FOLLOW-UP VISIT: CPT | Performed by: NURSE PRACTITIONER

## 2021-02-02 NOTE — PROGRESS NOTES
"CARDIOVASCULAR SURGERY FOLLOW-UP PROGRESS NOTE  Chief Complaint: Wound check        HPI:   Dear Dr. Newsome, SOHAM Maria and colleagues:    It was nice to see Jd Velazquez in follow up today after cardiac surgery.  As you know, he is a 55 y.o. male with a slow healing sternal wound who underwent [facility] at [facility] by Dr. Chiu on 10/8/2020. He has been seen by me the last couple times.  His sternotomy wound is tightly packed with a wet to dry dressing.  With palpation milky drainage is noted and there is an opening that resembles a \"black head\" at the 6 o'clock location.  Drainage is also coming from that area but is not as thin.  Approximately one inch below open area is an area of fluctuance that also drained the same milky drainage.  He denies any fevers/chills/malaise.  He continues to work.  His BP today is 200/107--he did not take his medications this morning.  His last CT scan was in December.       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  He has not been on antibiotics recently.  I told him I would discuss the above findings with Dr. Chiu to see if he wanted to add antibiotics or see pt in clinic.  I discussed with him the importance of taking his medications and specifically discussed his BP readings.  We will call him back today after talking with Dr. Chiu.    D/w Dr. Chiu--no antibiotics at this time and continue to follow.  Will ask him to return in 2 weeks.    Thank you for allowing me to participate in the care of your patient.    Regards,  SOHAM Rainey      "

## 2021-02-15 ENCOUNTER — TELEPHONE (OUTPATIENT)
Dept: CARDIAC SURGERY | Facility: CLINIC | Age: 56
End: 2021-02-15

## 2021-02-17 ENCOUNTER — TELEPHONE (OUTPATIENT)
Dept: CARDIAC SURGERY | Facility: CLINIC | Age: 56
End: 2021-02-17

## 2021-02-17 NOTE — TELEPHONE ENCOUNTER
LEFT PT MSG ON HOME NUMBER AS WELL AS EMERGENCY CONTACT REGARDING R/S POST OP APPOINTMENT WITH TOM LEON APRN FROM 2/16/21 TO 2/23/21 DUE TO WEATHER.

## 2021-05-10 NOTE — H&P
History and Physical      Patient Name: Jd Velazquez   Patient ID: [id number]   Sex: Male   YOB: 1965    Primary Care Provider: Dacia ROUSSEAU    Visit Date: September 24, 2020    Provider: SOHAM Carrera   Location: [facility]   Location Address: [address] [place] [address]  [id number]   Location Phone: [phone]          Chief Complaint  · Establishing care      History Of Present Illness  Jd Velazquez is a 55 year old /White male who presents for evaluation and treatment of:      Establishing care    Patient has no PCP he had been seeing since 8.3.2016    Patient does not take any medication at this time, stopped all medications states he lost 62 lbs since last seen in 2016    C/O has an infected area on the back of his neck    Patient has a HX of Diabetes Mellitus 2, Hypothyroidism, hyperlipidemia, Allergic Rhinitis, and Hypertension    Patient was adopted and does not know his FMX     Diabetic foot exam  checking feet daily   Diabetic eye exam due  [employer] works    requests new prescription for cpap supplies states he has not seen the sleep center in several years            Past Medical History  Disease Name Date Onset Notes   Allergic rhinitis 01/12/2015 --    Diabetes --  --    Diabetes Mellitus, Type II --  --    Hyperlipemia --  --    Hyperlipidemia --  --    Hypertension --  --    Hypertension, Benign Essential 03/08/2016 --    Hypokalemia 02/23/2016 --    Hypothyroidism 04/24/2014 --    Insomnia, unspecified 06/15/2016 --    Microalbuminuria due to type 2 diabetes mellitus 10/15/2015 --    Mixed hyperlipidemia 10/15/2015 --          Past Surgical History  Procedure Name Date Notes   Ankle repair right --    Appendectomy --  --    Heel Spur --  --    Hernia --  umbilical   Knee replacement, left --  --    Knee replacement, right --  --          Medication List  Name Date Started Instructions    aspirin 81 mg oral tablet,chewable  chew 1 tablet (81 mg) by oral route once daily   EpiPen 0.3 mg/0.3 mL injection auto-injector  inject 0.3 milliliter (0.3 mg) by intramuscular route once as needed for anaphylaxis         Allergy List  Allergen Name Date Reaction Notes   NO KNOWN DRUG ALLERGIES --  --  --          Family Medical History  Disease Name Relative/Age Notes   Heart Disease  --          Social History  Finding Status Start/Stop Quantity Notes   Alcohol Use --  --/-- --  09/24/2020 - does not drink   lives with spouse --  --/-- --  --    . --  --/-- --  --    Recreational Drug Use Never --/-- --  no   Tobacco Current every day 13/-- 1 PPD current every day smoker, 0.5 pack per day, smoked 21-30 years   Unemployed. --  --/-- --  --          Immunizations  NameDate Admin Mfg Trade Name Lot Number Route Inj VIS Given VIS Publication   Ulgntszth16/24/2020 [facility] Fluzone Quadrivalent [id number] IM LD 09/24/2020    Comments: ndc [id number] patient tolerated well   Xpqikrnhq66/15/2015 [facility] Fluarix, quadrivalent, preservative free [id number] IM RD 10/15/2015 08/19/2014   Comments: Given injection. Pt left the office in stable condition.   Kdxxogilr77/29/2014 [facility] Fluarix, quadrivalent, preservative free  IM RD 10/29/2014 07/02/2012   Comments: pt left office in stable condition         Review of Systems  · Constitutional  o Denies  o : fatigue  · Eyes  o Denies  o : blurred vision, changes in vision  · HENT  o Denies  o : headaches  · Cardiovascular  o Denies  o : chest pain, irregular heart beats, rapid heart rate, dyspnea on exertion  · Respiratory  o Denies  o : shortness of breath, wheezing, cough  · Gastrointestinal  o Denies  o : nausea, vomiting, diarrhea, constipation, abdominal pain, blood in stools, melena  · Genitourinary  o Denies  o : frequency, dysuria, hematuria  · Integument  o Denies  o : rash, new skin lesions  · Musculoskeletal  o Denies  o : joint pain, joint swelling, muscle  "pain  · Endocrine  o Admits  o : central obesity  o Denies  o : polyuria, polydipsia      Vitals  Date Time BP Position Site L\R Cuff Size HR RR TEMP (F) WT  HT  BMI kg/m2 BSA m2 O2 Sat HC       06/15/2016 02:58 PM 98/55 Sitting    98 - R  97.6 329lbs 0oz    91 %    08/03/2016 01:52 /67 Sitting    81 - R  97.6 324lbs 9oz 5'  11\" 45.27 2.72 94 %    09/24/2020 09:16 /85 Sitting    65 - R  99.1 262lbs 6oz 6'   35.58 2.46 96 %          Physical Examination  · Constitutional  o Appearance  o : well-nourished, in no acute distress  · Eyes  o Conjunctivae  o : conjunctivae normal  o Sclerae  o : sclerae white  o Pupils and Irises  o : pupils equal and round  o Eyelids/Ocular Adnexae  o : eyelid appearance normal  · Ears, Nose, Mouth and Throat  o Ears  o :   § External Ears  § : external auditory canal appearance within normal limits, no discharge present  § Otoscopic Examination  § : tympanic membrane appearance within normal limits bilaterally  o Nose  o :   § External Nose  § : appearance normal  § Intranasal Exam  § : mucosa within normal limits  § Nasopharynx  § : no discharge present  o Oral Cavity  o :   § Oral Mucosa  § : oral mucosa normal  o Throat  o :   § Oropharynx  § : no inflammation or lesions present, tonsils within normal limits  · Neck  o Inspection/Palpation  o : normal appearance, trachea midline  o Thyroid  o : gland size normal, nontender  · Respiratory  o Respiratory Effort  o : breathing unlabored  o Inspection of Chest  o : normal appearance  o Auscultation of Lungs  o : normal breath sounds throughout inspiration and expiration  · Cardiovascular  o Heart  o :   § Auscultation of Heart  § : regular rate and rhythm, no murmurs  o Peripheral Vascular System  o :   § Carotid Arteries  § : no bruits present  § Extremities  § : trace bilateral LE edema  · Gastrointestinal  o Abdominal Examination  o : abdomen nontender to palpation, bowel sounds present  · Lymphatic  o Neck  o : no " lymphadenopathy present  · Skin and Subcutaneous Tissue  o General Inspection  o : indurated, erythematous, fluctuant area posterior neck tender to palpation with surrounding erythema  · Neurologic  o Mental Status Examination  o :   § Orientation  § : grossly oriented to person, place and time  o Gait and Station  o : normal gait, able to stand without difficulty  · Psychiatric  o Judgement and Insight  o : judgment and insight intact  o Mood and Affect  o : mood normal, affect appropriate          Assessment  · Screening for depression     V79.0/Z13.89  · Need for influenza vaccination     V04.81/Z23  · Screening for colon cancer     V76.51/Z12.11  · Screening for prostate cancer     V76.44/Z12.5  · Allergic rhinitis due to allergen     477.9/J30.9  · Type 2 diabetes mellitus with other specified complication, without long-term current use of insulin     250.80/E11.69  will obtain hgb a1c   · Essential hypertension     401.9/I10  will add Lisinopril/hctz   · Hyperlipidemia     272.4/E78.5  will obtain lipid panel   · Hypothyroidism     244.9/E03.9  will obtain thyroid profile   · Vitamin D deficiency     268.9/E55.9  will obtain level   · Abscess     682.9/L02.91  · Sleep apnea     780.57/G47.30      Plan  · Orders  o ACO-18: Negative screen for clinical depression using a standardized tool () - V79.0/Z13.89 - 09/24/2020  o Immunization Admin Fee (Single) ([facility]) (08440) - V04.81/Z23 - 09/24/2020  o Fluzone Quadrivalent Vaccine, age 6 months + (65556) - V04.81/Z23 - 09/24/2020   Vaccine - Influenza; Dose: 0.5; Site: Left Deltoid; Route: Intramuscular; Date: 09/24/2020 13:21:00; Exp: 06/30/2021; Lot: [id number]; Mfg: [employer] pasteur; TradeName: Fluzone Quadrivalent; Administered By: Joanna Sauceda Other; Comment: ndc [id number] patient tolerated well   Vaccine - Influenza; Dose: 0.5; Site: Left Deltoid; Route: Intramuscular; Date: 09/24/2020 13:21:00; Exp: 06/30/2021; Lot: [id number]; Mfg: sanofi pasteur; TradeName:  Fluzone Quadrivalent; Administered By: Joanna Sauceda Other; Comment: ndc [id number] patient tolerated well  o COLONOSCOPY REFERRAL (COLON) - V76.51/Z12.11 - 09/24/2020  o PSA Ultrasensitive, ANNUAL SCREENING [facility] (46316, ) - V76.44/Z12.5 - 09/24/2020  o Diabetes 2 Panel (Urine Microalbumin, CMP, Lipid, A1c, ) [facility] (35496, 24098, 97659, 10750) - - 09/24/2020  o CBC with Auto Diff [facility] (00359) - - 09/24/2020  o Urinalysis with Reflex Microscopy if abnormal ([facility]) (90436) - - 09/24/2020  o Thyroid Profile (74263, 89661, THYII) - - 09/24/2020  o OPHTHALMOLOGY CONSULTATION (OPHTH) - - 09/24/2020  o OPHTHALMOLOGY CONSULTATION (OPHTH) - 401.9/I10 - 09/24/2020  o Vitamin D Level (52528) - 268.9/E55.9 - 09/24/2020  o Vitamin D (25-Hydroxy) Level (27841) - - 09/24/2020  o ACO-39: Current medications updated and reviewed () - - 09/24/2020  o ACO-14: Influenza immunization administered or previously received () - - 09/24/2020  o Sleep Disorder Clinic Consultation (SLEEP) - - 09/24/2020  o I & D abscess, simple or single [facility] (82160) - 682.9/L02.91 - 09/24/2020  o Wound Culture with Sensitivities if indicated [facility] (29356) - 682.9/L02.91 - 09/24/2020  · Medications  o lisinopril-hydrochlorothiazide 10-12.5 mg oral tablet   SIG: take 1 tablet by oral route once daily for 30 days   DISP: (30) tablets with 1 refills  Prescribed on 09/24/2020     o Bactrim -160 mg oral tablet   SIG: take 1 tablet by oral route every 12 hours for 10 days   DISP: (20) tablets with 0 refills  Prescribed on 09/24/2020     o CPAP supplies   SIG: dx sleep apnea   DISP: (1) 1 with 0 refills  Prescribed on 09/24/2020     · Instructions  o Depression Screen completed and scanned into the EMR under the designated folder within the patient's documents.  o Today's PHQ-9 result is __0_  o Continue blood sugar monitoring daily and record. Bring your log to office visits. Call the office for readings below 70 and above 250 or any  complications.  o Daily foot care. Avoid walking barefoot. Annual Dilated Eye Exam.  o Discussed with patient blood pressure monitoring, hemoglobin A1C levels need to be below 7.0, and LDL (Lipid) goals below 70.  o Patient was educated/instructed on their diagnosis, treatment and medications prior to discharge from the clinic today.  · Disposition  o Follow Up in Office in 1 month or sooner if needed     written consent obtained  allergies verified  area prepped with betadine  area anesthetized with 3 cc lidocaine 1%  horizontal incision made across body of abscess  large amount of purulent drainage removed  wound culture obtained  area cleansed and dressed  after care instructions provided             Electronically Signed by: Dacia Newsome APRN -Author on September 24, 2020 01:41:41 PM

## 2021-05-13 NOTE — PROGRESS NOTES
Progress Note      Patient Name: Jd Velazquez   Patient ID: [id number]   Sex: Male   YOB: 1965    Primary Care Provider: Dacia ROUSSEAU   Referring Provider: Dacia ROUSSEAU    Visit Date: November 5, 2020    Provider: SOHAM Carrera   Location: [facility]   Location Address: [address] [place], [place] [id number]   Location Phone: [phone]          Chief Complaint  · 2 week follow up DM2       History Of Present Illness  Jd Velazquez is a 55 year old /White male who presents for evaluation and treatment of:      2 week follow up DM2   medication refill on Lantus     BS checked 5-6 times daily. was 255 this morning fasting.  am Fasting (148-255)  Before dinner(165-247)    lantus-50units twice daily  Humalog SS as needed 1-2 per day   directions AC and HS   some days only eats once     already received flu shot          Past Medical History  Disease Name Date Onset Notes   Allergic rhinitis 01/12/2015 --    Diabetes Mellitus, Type II --  --    Hyperlipidemia --  --    Hypertension --  --    Hypertension, Benign Essential 03/08/2016 --    Hypokalemia 02/23/2016 --    Hypothyroidism 04/24/2014 --    Insomnia, unspecified 06/15/2016 --    Microalbuminuria due to type 2 diabetes mellitus 10/15/2015 --    Mixed hyperlipidemia 10/15/2015 --          Past Surgical History  Procedure Name Date Notes   Ankle repair right --    Appendectomy --  --    Heel Spur --  --    Hernia --  umbilical   Knee replacement, left --  --    Knee replacement, right --  --          Medication List  Name Date Started Instructions   aspirin 81 mg oral tablet,chewable  chew 1 tablet (81 mg) by oral route once daily   CPAP supplies 09/24/2020 dx sleep apnea   EpiPen 0.3 mg/0.3 mL injection auto-injector  inject 0.3 milliliter (0.3 mg) by intramuscular route once as needed for anaphylaxis   furosemide 40 mg oral tablet  take 1  "tablet (40 mg) by oral route once daily   Humalog KwikPen Insulin 100 unit/mL subcutaneous insulin pen 10/15/2020 7 units 3 times daily with meals plus sliding scale--- 200-249 5u 250-299 8u 300-349 10u 350-400 12u >400 14 u   lancets 30 gauge miscellaneous misc 10/20/2020 test 4 times daily   Lantus Solostar U-100 Insulin 100 unit/mL (3 mL) subcutaneous insulin pen 11/05/2020 inject 60 units by subcutaneous route 2 times a day for 30 days   lisinopril 5 mg oral tablet  take 1 tablet (5 mg) by oral route once daily   metoprolol tartrate 100 mg oral tablet  take 1 tablet (100 mg) by oral route 2 times per day   OneTouch Verio Flex miscellaneous misc 10/20/2020 use as directed for 30 days   Pen Needle 31 gauge x 1/4\" miscellaneous needle  Use 4 times daily   potassium chloride 20 mEq oral tablet extended release  take 1 tablet (20 meq) by oral route once daily with food   rosuvastatin 20 mg oral tablet  take 1 tablet (20 mg) by oral route once daily at bedtime   Senna with Docusate Sodium 8.6-50 mg oral tablet  take 2 tablets by oral route once a day (at bedtime) as needed         Allergy List  Allergen Name Date Reaction Notes   NO KNOWN DRUG ALLERGIES --  --  --          Family Medical History  Disease Name Relative/Age Notes   Heart Disease  --          Social History  Finding Status Start/Stop Quantity Notes   Alcohol Use --  --/-- --  10/16/2020 - none 09/24/2020 - does not drink   lives with spouse --  --/-- --  --    . --  --/-- --  --    Recreational Drug Use Never --/-- --  no   Tobacco Former 13/54 1 PPD 10/16/2020 - none current every day smoker, 0.5 pack per day, smoked 21-30 years   Unemployed. --  --/-- --  --          Immunizations  NameDate Admin Mfg Trade Name Lot Number Route Inj VIS Given VIS Publication   Dziortfvn35/24/2020 [facility] Fluzone Quadrivalent [id number] IM LD 09/24/2020    Comments: ndc [id number] patient tolerated well         Review of Systems  · Constitutional  o Denies  o : fatigue, " fever, weight gain, weight loss, chills  · Eyes  o Denies  o : changes in vision  · HENT  o Denies  o : ear pain, sore throat  · Cardiovascular  o Denies  o : chest Pain, palpitations, edema (swelling)  · Respiratory  o Denies  o : frequent cough, shortness of breath  · Gastrointestinal  o Denies  o : nausea, vomiting, changes in bowel habits  · Genitourinary  o Denies  o : dysuria, urinary frequency, urinary urgency, polyuria  · Integument  o Denies  o : rash  · Neurologic  o Denies  o : headache, tingling or numbness, dizziness  · Musculoskeletal  o Denies  o : joint pain, myalgias  · Endocrine  o Denies  o : polydipsia, polyphagia  · Psychiatric  o Denies  o : mood changes, memory changes, SI/HI  · Heme-Lymph  o Denies  o : easy bruising, easy bleeding, lymph node enlargement or tenderness  · Allergic-Immunologic  o Denies  o : eczema, seasonal allergies, urticaria      Vitals  Date Time BP Position Site L\R Cuff Size HR RR TEMP (F) WT  HT  BMI kg/m2 BSA m2 O2 Sat FR L/min FiO2 HC       10/29/2020 09:57 /67 Sitting    68 - R 24 98.4 251lbs 4oz 6'   34.08 2.41 98 %      10/30/2020 03:06 /66 Sitting    74 - R   250lbs 0oz 6'   33.91 2.4       11/05/2020 10:00 /72 Sitting    73 - R 24 97.9 252lbs 4oz 6'   34.21 2.41 96 %      11/05/2020 10:17 /82 Sitting                       Physical Examination  · Constitutional  o Appearance  o : well-nourished, in no acute distress  · Respiratory  o Respiratory Effort  o : breathing unlabored  o Auscultation of Lungs  o : normal breath sounds throughout inspiration and expiration  · Cardiovascular  o Heart  o :   § Auscultation of Heart  § : regular rate and rhythm, no murmurs  o Peripheral Vascular System  o :   § Carotid Arteries  § : no bruits present  § Extremities  § : no clubbing or edema  · Skin and Subcutaneous Tissue  o General Inspection  o : pink, warm, dry   · Neurologic  o Gait and Station  o : normal gait, able to stand without  difficulty          Assessment  · Type 2 diabetes mellitus with other specified complication, with long-term current use of insulin     250.80/E11.69  increase lantus 60 units bid, sliding scale ac and hs   · Essential hypertension     401.9/I10  elevated in clinic will recheck manually       Plan  · Orders  o ACO-39: Current medications updated and reviewed (, 1159F) - - 11/05/2020  o ACO-14: Influenza immunization administered or previously received [facility] () - - 11/05/2020  · Medications  o Lantus Solostar U-100 Insulin 100 unit/mL (3 mL) subcutaneous insulin pen   SIG: inject 60 units by subcutaneous route 2 times a day for 30 days   DISP: (6) Pre-filled Pen Syringe with 5 refills  Adjusted on 11/05/2020     o Medications have been Reconciled  o Transition of Care or Provider Policy  · Instructions  o Patient was educated/instructed on their diagnosis, treatment and medications prior to discharge from the clinic today.  · Disposition  o follow up 2 weeks            Electronically Signed by: SOHAM Carrera -Author on November 5, 2020 10:18:02 AM

## 2021-05-13 NOTE — PROGRESS NOTES
Progress Note      Patient Name: Jd Velazquez   Patient ID: [id number]   Sex: Male   YOB: 1965    Primary Care Provider: Dacia ROUSSEAU    Visit Date: October 30, 2020    Provider: Harry Noel MD   Location: [facility]   Location Address: [address] [place] [address], [address]   [place]  [id number]   Location Phone: [phone]          Chief Complaint  · Post-CABG follow-up       History Of Present Illness  REFERRING CARE PROVIDER: Dacia ROUSSEAU   Jd Velazquez presents for a hospital follow-up today and states he is recovering well following his 4-vessel CABG at [facility] back on October 6th. He was admitted at [facility] in early October with substernal chest pain and was diagnosed with a non-ST elevation myocardial infarction. Patient's troponin level peaked at 0.54. Urgent left heart catheterization was performed and revealed severe multivessel coronary artery disease with very mildly reduced left ventricular systolic function and estimated ejection fraction of 45-50%. Accordingly, he was transferred to [facility] for CABG. He states his sternal wound is now well-healed, and Mr. Velazquez reports excellent compliance with his home medications. He has quit smoking in the last few weeks, and I congratulated him on this achievement today. His blood pressure is well controlled at 128/66 today. He is attempting to lose some weight and slowly increase his exercise regimen. He is agreeable to start a cardiac rehab program in the near future. He does not report any episodes of chest pain/pressure, exertional dyspnea, palpitations, PND, orthopnea, peripheral edema, lightheadedness or syncope.   PAST MEDICAL HISTORY: Coronary artery disease, s/p PCI to circumflex; Type II diabetes mellitus; hypertension; hyperlipidemia; obesity. PAST SURGICAL HISTORY: PCI to circumflex in 2014; tonsillectomy; hernia repair.   PSYCHOSOCIAL HISTORY: He never  used alcohol. He previously used tobacco but quit.   CURRENT MEDICATIONS: include aspirin 81 mg daily; Lasix 40 mg daily; Lantus; Humalog; Lisinopril 5 mg daily; Lopressor 100 mg b.i.d.; Potassium 10 mEq b.i.d.; Crestor 20 mg daily. The dosage and frequency of the medications were reviewed with the patient.       Review of Systems  · Cardiovascular  o Denies  o : palpitations (fast, fluttering, or skipping beats), swelling (feet, ankles, hands), shortness of breath while walking or lying flat, chest pain or angina pectoris   · Respiratory  o Admits  o : chronic or frequent cough      Vitals  Date Time BP Position Site L\R Cuff Size HR RR TEMP (F) WT  HT  BMI kg/m2 BSA m2 O2 Sat FR L/min FiO2 HC       10/30/2020 03:06 /66 Sitting    74 - R   250lbs 0oz 6'   33.91 2.4             Physical Examination  · Neck  o Inspection/Palpation  o : Supple. No JVD. No carotid bruit. No masses.   · Respiratory  o Auscultation of Lungs  o : Clear to auscultation bilaterally. No wheezing, rales or rhonchi. No tachypnea. Normal effort. No increased work of breathing. No dullness to percussion.   · Cardiovascular  o Heart  o : Regular rate and rhythm. S1 and S2 present. No S3 or S4 gallop. No murmur. No friction rub. PMI is mildly displaced.   · Gastrointestinal  o Abdominal Examination  o : Soft, obese, nontender, nondistended. Normal bowel sounds in all quadrants. No masses.   · Skin and Subcutaneous Tissue  o General Inspection  o : Warm and dry. No rashes or lesions. Normal skin turgor. Normal capillary refills.   · Extremities  o Extremities  o : No cyanosis, clubbing or edema. 2+ radial pulses bilaterally.                Assessment     1.  Multivessel coronary artery disease, s/p a recent 4-vessel CABG at [facility].  2.  Mildly reduced left ventricular systolic function prior to surgery.  3.  Type II diabetes mellitus.  4.  Obesity with a BMI of 34.1 today.  5.  Essential hypertension, well controlled on current  medical therapy.  6.  Mixed hyperlipidemia, on high-intensity statin therapy.  7.  Former longstanding tobacco abuse.  He recently quit smoking.       Plan     Will request his recent operative report and other records from [facility].  Continue current medical therapy with aspirin, Crestor 20 mg daily, Lopressor and Lisinopril.  Will defer management of his diabetes mellitus to his PCP.  Continued tobacco-cessation was strongly recommended.  We will refer him to the local cardiac rehab program and hopefully get him started in the next few weeks.  If he is continuing to do well with no new problems, I will just plan to see him back in the office in 9 months for reassessment.    Harry Noel MD  BP/dmd           This note was transcribed by Stephanie Gatica.  dmd/BP  The above service was transcribed by Stephanie Gatica, and I attest to the accuracy of the note.  BP               Electronically Signed by: Stephanie Gatica-, -Author on November 4, 2020 06:30:25 AM  Electronically Co-signed by: Harry Noel MD -Reviewer on November 10, 2020 10:47:48 AM

## 2021-05-13 NOTE — PROGRESS NOTES
Progress Note      Patient Name: Jd Velazquez   Patient ID: [id number]   Sex: Male   YOB: 1965    Primary Care Provider: Dacia ROUSSEAU    Visit Date: October 29, 2020    Provider: SOHAM Carrera   Location: [facility]   Location Address: [address] [place] [address]  [id number]   Location Phone: [phone]          Chief Complaint  · 1 month follow up for hyperlipidemia, hypertension, DM2      History Of Present Illness  Jd Velazquez is a 55 year old /White male who presents for evaluation and treatment of:      1 month follow up for hyperlipidemia, hypertension, DM2    pt wants to see if he needs a refill of potassium chloride (prescribed outside by Suzy Nieves)    c/o- not being able to sleep at night   tried ambien no relief     BS Checked at least twice daily. fasting (150-216)    PM around 161 (unsure of exact number)    Lantus 40 units twice daily  Humalog SS twice daily.    Diabetic eye exam-2020 at [facility] last week of Sept  Diabetic Foot exam- previously seen [facility] greater than 5 years prior   Last colonoscopy- order faxed for appt on 09/25/2020         Past Medical History  Disease Name Date Onset Notes   Allergic rhinitis 01/12/2015 --    Diabetes Mellitus, Type II --  --    Hyperlipidemia --  --    Hypertension --  --    Hypertension, Benign Essential 03/08/2016 --    Hypokalemia 02/23/2016 --    Hypothyroidism 04/24/2014 --    Insomnia, unspecified 06/15/2016 --    Microalbuminuria due to type 2 diabetes mellitus 10/15/2015 --    Mixed hyperlipidemia 10/15/2015 --          Past Surgical History  Procedure Name Date Notes   Ankle repair right --    Appendectomy --  --    Heel Spur --  --    Hernia --  umbilical   Knee replacement, left --  --    Knee replacement, right --  --          Medication List  Name Date Started Instructions   aspirin 81 mg oral tablet,chewable  chew 1  "tablet (81 mg) by oral route once daily   CPAP supplies 09/24/2020 dx sleep apnea   EpiPen 0.3 mg/0.3 mL injection auto-injector  inject 0.3 milliliter (0.3 mg) by intramuscular route once as needed for anaphylaxis   furosemide 40 mg oral tablet  take 1 tablet (40 mg) by oral route once daily   Humalog KwikPen Insulin 100 unit/mL subcutaneous insulin pen 10/15/2020 7 units 3 times daily with meals plus sliding scale--- 200-249 5u 250-299 8u 300-349 10u 350-400 12u >400 14 u   lancets 30 gauge miscellaneous misc 10/20/2020 test 4 times daily   Lantus Solostar U-100 Insulin 100 unit/mL (3 mL) subcutaneous insulin pen 10/16/2020 inject 30 units by subcutaneous route 2 times a day   lisinopril 5 mg oral tablet  take 1 tablet (5 mg) by oral route once daily   metoprolol tartrate 100 mg oral tablet  take 1 tablet (100 mg) by oral route 2 times per day   OneTouch Verio Flex miscellaneous misc 10/20/2020 use as directed for 30 days   Pen Needle 31 gauge x 1/4\" miscellaneous needle  Use 4 times daily   potassium chloride 20 mEq oral tablet extended release  take 1 tablet (20 meq) by oral route once daily with food   rosuvastatin 20 mg oral tablet  take 1 tablet (20 mg) by oral route once daily at bedtime   Senna with Docusate Sodium 8.6-50 mg oral tablet  take 2 tablets by oral route once a day (at bedtime) as needed   tramadol 50 mg oral tablet  take 1 tablet (50 mg) by oral route every 6 hours as needed         Allergy List  Allergen Name Date Reaction Notes   NO KNOWN DRUG ALLERGIES --  --  --          Family Medical History  Disease Name Relative/Age Notes   Heart Disease  --          Social History  Finding Status Start/Stop Quantity Notes   Alcohol Use --  --/-- --  10/16/2020 - none 09/24/2020 - does not drink   lives with spouse --  --/-- --  --    . --  --/-- --  --    Recreational Drug Use Never --/-- --  no   Tobacco Former 13/54 1 PPD 10/16/2020 - none current every day smoker, 0.5 pack per day, smoked " 21-30 years   Unemployed. --  --/-- --  --          Immunizations  NameDate Admin Mfg Trade Name Lot Number Route Inj VIS Given VIS Publication   [id number]/24/2020 [facility] Fluzone Quadrivalent [id number] IM LD 09/24/2020    Comments: ndc [id number] patient tolerated well         Review of Systems  · Constitutional  o Denies  o : fever, weight gain, chills  · Eyes  o Denies  o : changes in vision  · HENT  o Denies  o : ear pain, sore throat  · Cardiovascular  o Denies  o : chest Pain, palpitations, edema (swelling)  · Respiratory  o Denies  o : frequent cough, shortness of breath  · Gastrointestinal  o Denies  o : nausea, vomiting, changes in bowel habits  · Genitourinary  o Denies  o : dysuria  · Neurologic  o Denies  o : headache, dizziness  · Endocrine  o Denies  o : polydipsia, polyphagia      Vitals  Date Time BP Position Site L\R Cuff Size HR RR TEMP (F) WT  HT  BMI kg/m2 BSA m2 O2 Sat FR L/min FiO2 HC       09/24/2020 09:16 /85 Sitting    65 - R  99.1 262lbs 6oz 6'   35.58 2.46 96 %  21%    10/16/2020 10:31 /60 Sitting    70 - R 18 98.2 251lbs 8oz 6'   34.11 2.41 97 %  21%    10/29/2020 09:57 /67 Sitting    68 - R 24 98.4 251lbs 4oz 6'   34.08 2.41 98 %            Physical Examination  · Constitutional  o Appearance  o : well-nourished, in no acute distress  · Eyes  o Conjunctivae  o : conjunctivae normal  o Sclerae  o : sclerae white  o Pupils and Irises  o : pupils equal and round  o Eyelids/Ocular Adnexae  o : eyelid appearance normal, no exudates present  · Ears, Nose, Mouth and Throat  o Ears  o :   § External Ears  § : external auditory canal appearance within normal limits, no discharge present  § Otoscopic Examination  § : tympanic membrane appearance within normal limits bilaterally  o Nose  o :   § External Nose  § : appearance normal  § Intranasal Exam  § : mucosa within normal limits  § Nasopharynx  § : no discharge present  o Oral Cavity  o :   § Oral Mucosa  § : oral mucosa  normal  o Throat  o :   § Oropharynx  § : no inflammation or lesions present, tonsils within normal limits  · Neck  o Inspection/Palpation  o : normal appearance, trachea midline  o Thyroid  o : gland size normal, nontender  · Respiratory  o Respiratory Effort  o : breathing unlabored  o Auscultation of Lungs  o : normal breath sounds throughout inspiration and expiration  · Cardiovascular  o Heart  o :   § Auscultation of Heart  § : regular rate and rhythm, no murmurs  o Peripheral Vascular System  o :   § Carotid Arteries  § : no bruits present  § Extremities  § : no clubbing or edema  · Gastrointestinal  o Abdominal Examination  o : abdomen nontender to palpation, tone normal without rigidity or guarding, no masses present, bowel sounds present   · Lymphatic  o Neck  o : no lymphadenopathy present  · Skin and Subcutaneous Tissue  o General Inspection  o : pink, warm, dry   · Neurologic  o Mental Status Examination  o :   § Orientation  § : grossly oriented to person, place and time  o Gait and Station  o : normal gait, able to stand without difficulty  · Psychiatric  o Judgement and Insight  o : judgment and insight intact  o Mood and Affect  o : mood normal, affect appropriate          Assessment  · Type 2 diabetes mellitus with other specified complication, with long-term current use of insulin     250.80/E11.69  increase lantus to 50 units bid, use sliding scale ac and hs   · Essential hypertension     401.9/I10  currently controlled   · Hyperlipidemia     272.4/E78.5  stable on statin       Plan  · Orders  o Diabetes 2 Panel (Urine Microalbumin, CMP, Lipid, A1c, ) [facility] (38075, 63546, 80087, 69767) - - 01/29/2021  o CBC with Auto Diff [facility] (51527) - - 01/29/2021  o Urinalysis with Reflex Microscopy ([facility]) (20666) - - 01/29/2021  o Thyroid Profile (10869, 91276, THYII) - - 01/29/2021  o PODIATRY CONSULTATION (PODIA) - - 10/29/2020   dr Mathew   o ACO-39: Current medications updated and reviewed (, 3972E) -  - 10/29/2020  o ACO-14: Influenza immunization administered or previously received [facility] () - - 10/29/2020  · Medications  o Medications have been Reconciled  o Transition of Care or Provider Policy  · Instructions  o Patient was educated/instructed on their diagnosis, treatment and medications prior to discharge from the clinic today.  · Disposition  o follow up 2 weeks            Electronically Signed by: SOHAM Carrera -Author on October 29, 2020 10:13:11 AM

## 2021-05-13 NOTE — PROGRESS NOTES
Progress Note      Patient Name: Jd Velazquez   Patient ID: [id number]   Sex: Male   YOB: 1965    Primary Care Provider: Dacia ROUSSEAU   Referring Provider: Dacia ROUSSEAU    Visit Date: November 12, 2020    Provider: SOHAM Carrera   Location: [facility]   Location Address: [address] [place], [place] [id number]   Location Phone: [phone]          Chief Complaint  · 2 week follow up on DM2 AND HTN       History Of Present Illness  Video Conferencing Visit  Jd Velazquez is a 55 year old /White male who is presenting for evaluation via video conferencing via zoom. Verbal consent obtained before beginning visit.   The following staff were present during this visit: Joanna Sauceda   Jd Velazquez is a 55 year old /White male who presents for evaluation and treatment of:      2 week follow up on DM2 AND HTN      fasting    BS 4 times daily with sliding scale protocol     Basaglar  60 units in the am and 60 units in the PM  Started medication last night    Diabetic eye exam [facility], 9/2020  Diabetic foot exam was missed yesterday       Past Medical History  Disease Name Date Onset Notes   Allergic rhinitis 01/12/2015 --    Diabetes Mellitus, Type II --  --    Hyperlipidemia --  --    Hypertension --  --    Hypertension, Benign Essential 03/08/2016 --    Hypokalemia 02/23/2016 --    Hypothyroidism 04/24/2014 --    Insomnia, unspecified 06/15/2016 --    Microalbuminuria due to type 2 diabetes mellitus 10/15/2015 --    Mixed hyperlipidemia 10/15/2015 --          Past Surgical History  Procedure Name Date Notes   Ankle repair right --    Appendectomy --  --    Heel Spur --  --    Hernia --  umbilical   Knee replacement, left --  --    Knee replacement, right --  --          Medication List  Name Date Started Instructions   aspirin 81 mg oral tablet,chewable 11/06/2020 chew 1  "tablet (81 mg) by oral route once daily for 90 days   Basaglar KwikPen U-100 Insulin 100 unit/mL (3 mL) subcutaneous insulin pen 11/11/2020 inject by subcutaneous route 60 units twice daily   CPAP supplies 09/24/2020 dx sleep apnea   EpiPen 0.3 mg/0.3 mL injection auto-injector  inject 0.3 milliliter (0.3 mg) by intramuscular route once as needed for anaphylaxis   furosemide 40 mg oral tablet 11/06/2020 take 1 tablet (40 mg) by oral route once daily for 90 days   Humalog KwikPen Insulin 100 unit/mL subcutaneous insulin pen 10/15/2020 7 units 3 times daily with meals plus sliding scale--- 200-249 5u 250-299 8u 300-349 10u 350-400 12u >400 14 u   lancets 30 gauge miscellaneous misc 10/20/2020 test 4 times daily   lisinopril 10 mg oral tablet 11/12/2020 take 1 tablet (10 mg) by oral route once daily for 90 days   metoprolol tartrate 100 mg oral tablet 11/06/2020 take 1 tablet (100 mg) by oral route 2 times per day for 90 days   OneTouch Verio Flex miscellaneous misc 10/20/2020 use as directed for 30 days   Pen Needle 31 gauge x 1/4\" miscellaneous needle  Use 4 times daily   potassium chloride 20 mEq oral tablet extended release 11/06/2020 take 1 tablet (20 meq) by oral route once daily with food for 90 days   rosuvastatin 20 mg oral tablet 11/06/2020 take 1 tablet (20 mg) by oral route once daily at bedtime for 90 days   Senna with Docusate Sodium 8.6-50 mg oral tablet 11/06/2020 take 2 tablets by oral route once a day (at bedtime) as needed for 30 days         Allergy List  Allergen Name Date Reaction Notes   NO KNOWN DRUG ALLERGIES --  --  --        Allergies Reconciled  Family Medical History  Disease Name Relative/Age Notes   Heart Disease  --          Social History  Finding Status Start/Stop Quantity Notes   Alcohol Use --  --/-- --  10/16/2020 - none 09/24/2020 - does not drink   lives with spouse --  --/-- --  --    . --  --/-- --  --    Recreational Drug Use Never --/-- --  no   Tobacco Former 13/54 1 " PPD 10/16/2020 - none current every day smoker, 0.5 pack per day, smoked 21-30 years   Unemployed. --  --/-- --  --          Immunizations  NameDate Admin Mfg Trade Name Lot Number Route Inj VIS Given VIS Publication   [id number]/24/2020 [facility] Fluzone Quadrivalent [id number] IM LD 09/24/2020    Comments: ndc [id number] patient tolerated well         Review of Systems  · Constitutional  o Denies  o : fever, chills  · Eyes  o Denies  o : blurred vision, changes in vision  · HENT  o Denies  o : headaches  · Cardiovascular  o Denies  o : chest pain  · Respiratory  o Denies  o : shortness of breath, cough  · Gastrointestinal  o Denies  o : nausea, vomiting, diarrhea, constipation, abdominal pain  · Genitourinary  o Denies  o : dysuria  · Endocrine  o Denies  o : polyuria, polydipsia      Vitals  Date Time BP Position Site L\R Cuff Size HR RR TEMP (F) WT  HT  BMI kg/m2 BSA m2 O2 Sat FR L/min FiO2 HC       10/30/2020 03:06 /66 Sitting    74 - R   250lbs 0oz 6'   33.91 2.4       11/05/2020 10:00 /72 Sitting    73 - R 24 97.9 252lbs 4oz 6'   34.21 2.41 96 %      11/05/2020 10:17 /82 Sitting                 11/12/2020 11:46 /85 Sitting     68   6'                 Physical Examination  · Constitutional  o Appearance  o : well-nourished, in no acute distress  · Eyes  o Conjunctivae  o : conjunctivae normal  o Sclerae  o : sclerae white  o Eyelids/Ocular Adnexae  o : eyelid appearance normal  · Neck  o Inspection/Palpation  o : normal appearance, trachea midline  · Respiratory  o Respiratory Effort  o : breathing unlabored  · Cardiovascular  o Peripheral Vascular System  o :   § Extremities  § : no clubbing or edema  · Skin and Subcutaneous Tissue  o General Inspection  o : pink  · Neurologic  o Mental Status Examination  o :   § Orientation  § : grossly oriented to person, place and time  o Gait and Station  o : normal gait, able to stand without difficulty              Assessment  · Type 2 diabetes  mellitus with other specified complication, with long-term current use of insulin     250.80/E11.69  increase basaglar to 65 units BID and continue sliding scale protocol  · Essential hypertension     401.9/I10  increase Lisinopril to 10 mg po daily, follow up in 2 weeks       Plan  · Orders  o ACO-39: Current medications updated and reviewed (, 1159F) - - 11/12/2020  o ACO-14: Influenza immunization administered or previously received [facility] () - - 11/12/2020  · Medications  o lisinopril 10 mg oral tablet   SIG: take 1 tablet (10 mg) by oral route once daily for 90 days   DISP: (90) Tablet with 1 refills  Adjusted on 11/12/2020     · Instructions  o Patient was educated/instructed on their diagnosis, treatment and medications prior to discharge from the clinic today.  · Disposition  o follow up 2 weeks            Electronically Signed by: SOHAM Carrera -Author on November 12, 2020 01:11:29 PM

## 2021-05-13 NOTE — PROGRESS NOTES
Progress Note      Patient Name: Jd Velazquez   Patient ID: [id number]   Sex: Male   YOB: 1965    Primary Care Provider: Dacia ROUSSEAU    Visit Date: October 16, 2020    Provider: SOHAM Carrera   Location: [facility]   Location Address: [address] [place] [address]  [id number]   Location Phone: [phone]          Chief Complaint  · Follow up office visit within 7 calendar days of discharge from inpatient status (high complexity).      History Of Present Illness  FOLLOW UP OFFICE VISIT WITHIN 7 CALENDAR DAYS OF INPATIENT STATUS (SEVERE COMPLEXITY)  Jd Velazquez presents to office for follow up post discharge from inpatient status within 7 calendar days. Patient was contacted within 2 business days via phone conversation. Documentation of that phone contact is present in the patient's electronic chart. Patient was admitted to an inpatient faciliity on 10/06/2020 and discharged on 10/14/2020 due to: CAD of native artery of native heart with stable angina pectoris   Admitting MD: Angelo Egan   His discharge summary has been reviewed and placed in the patient's electronic chart.   Patient's problem list is: Allergic rhinitis, Diabetes Mellitus, Type II, Hyperlipidemia, Hypertension, Hypertension, Benign Essential, Hypokalemia, Hypothyroidism, Insomnia, unspecified, Microalbuminuria due to type 2 diabetes mellitus, and Mixed hyperlipidemia   Patient's outpatient medication list has been reconciled with the medication list from the discharge summary and has been reviewed with the patient. Current medication list is: aspirin 81 mg oral tablet,chewable, CPAP supplies, EpiPen 0.3 mg/0.3 mL injection auto-injector, furosemide 40 mg oral tablet, Humalog KwikPen Insulin 100 unit/mL subcutaneous insulin pen, lancets 30 gauge miscellaneous misc, Lantus Solostar U-100 Insulin 100 unit/mL (3 mL) subcutaneous insulin pen, lisinopril  "5 mg oral tablet, metoprolol tartrate 100 mg oral tablet, Mucinex 600 mg oral tablet extended release 12hr, Pen Needle 31 gauge x 1/4\" miscellaneous needle, potassium chloride 20 mEq oral tablet extended release, rosuvastatin 20 mg oral tablet, Senna with Docusate Sodium 8.6-50 mg oral tablet, and tramadol 50 mg oral tablet   Jd Velazquez is a 55 year old /White male who presents for evaluation and treatment of:      pt admitted to [facility] on 10-6-20.    Follow up Cardio Dr Stevenson 10/30/20  Follow up [facility] 11-13-20 dr Chiu     pt reports lantus 30 am  humulog 7 units 3x per day     glucose range before meals 127-187     pt concerned about lesion under left arm  change in color, growing larger in size       Past Medical History  Disease Name Date Onset Notes   Allergic rhinitis 01/12/2015 --    Diabetes Mellitus, Type II --  --    Hyperlipidemia --  --    Hypertension --  --    Hypertension, Benign Essential 03/08/2016 --    Hypokalemia 02/23/2016 --    Hypothyroidism 04/24/2014 --    Insomnia, unspecified 06/15/2016 --    Microalbuminuria due to type 2 diabetes mellitus 10/15/2015 --    Mixed hyperlipidemia 10/15/2015 --          Past Surgical History  Procedure Name Date Notes   Ankle repair right --    Appendectomy --  --    Heel Spur --  --    Hernia --  umbilical   Knee replacement, left --  --    Knee replacement, right --  --          Medication List  Name Date Started Instructions   aspirin 81 mg oral tablet,chewable  chew 1 tablet (81 mg) by oral route once daily   CPAP supplies 09/24/2020 dx sleep apnea   EpiPen 0.3 mg/0.3 mL injection auto-injector  inject 0.3 milliliter (0.3 mg) by intramuscular route once as needed for anaphylaxis   furosemide 40 mg oral tablet  take 1 tablet (40 mg) by oral route once daily   Humalog KwikPen Insulin 100 unit/mL subcutaneous insulin pen 10/15/2020 7 units 3 times daily with meals plus sliding scale--- 200-249 5u " "250-299 8u 300-349 10u 350-400 12u >400 14 u   lancets 30 gauge miscellaneous misc 10/20/2020 test 4 times daily   Lantus Solostar U-100 Insulin 100 unit/mL (3 mL) subcutaneous insulin pen 10/16/2020 inject 30 units by subcutaneous route 2 times a day   lisinopril 5 mg oral tablet  take 1 tablet (5 mg) by oral route once daily   metoprolol tartrate 100 mg oral tablet  take 1 tablet (100 mg) by oral route 2 times per day   Mucinex 600 mg oral tablet extended release 12hr  take 2 tablets (1,200 mg) by oral route every 12 hours   OneTouch Verio Flex miscellaneous misc 10/20/2020 use as directed for 30 days   Pen Needle 31 gauge x 1/4\" miscellaneous needle  Use 4 times daily   potassium chloride 20 mEq oral tablet extended release  take 1 tablet (20 meq) by oral route once daily with food   rosuvastatin 20 mg oral tablet  take 1 tablet (20 mg) by oral route once daily at bedtime   Senna with Docusate Sodium 8.6-50 mg oral tablet  take 2 tablets by oral route once a day (at bedtime) as needed   tramadol 50 mg oral tablet  take 1 tablet (50 mg) by oral route every 6 hours as needed         Allergy List  Allergen Name Date Reaction Notes   NO KNOWN DRUG ALLERGIES --  --  --          Family Medical History  Disease Name Relative/Age Notes   Heart Disease  --          Social History  Finding Status Start/Stop Quantity Notes   Alcohol Use --  --/-- --  10/16/2020 - none 09/24/2020 - does not drink   lives with spouse --  --/-- --  --    . --  --/-- --  --    Recreational Drug Use Never --/-- --  no   Tobacco Former 13/54 1 PPD 10/16/2020 - none current every day smoker, 0.5 pack per day, smoked 21-30 years   Unemployed. --  --/-- --  --          Immunizations  NameDate Admin Mfg Trade Name Lot Number Route Inj VIS Given VIS Publication   Njpuwkcor45/24/2020 PMC Fluzone Quadrivalent [id number] IM LD 09/24/2020    Comments: ndc [id number] patient tolerated well         Review of Systems  · Constitutional  o Denies  o : " "fatigue, fever, weight gain, weight loss, chills  · Eyes  o Denies  o : changes in vision  · HENT  o Denies  o : ear pain, sore throat  · Cardiovascular  o Denies  o : chest Pain, palpitations, edema (swelling)  · Respiratory  o Denies  o : frequent cough, shortness of breath  · Gastrointestinal  o Denies  o : nausea, vomiting, changes in bowel habits  · Genitourinary  o Denies  o : dysuria, urinary frequency, urinary urgency, polyuria  · Integument  o Denies  o : rash  · Neurologic  o Denies  o : headache, tingling or numbness, dizziness  · Musculoskeletal  o Denies  o : joint pain, myalgias  · Endocrine  o Denies  o : polydipsia, polyphagia  · Psychiatric  o Denies  o : mood changes, memory changes, SI/HI  · Heme-Lymph  o Denies  o : easy bruising, easy bleeding, lymph node enlargement or tenderness  · Allergic-Immunologic  o Denies  o : eczema, seasonal allergies, urticaria      Vitals  Date Time BP Position Site L\R Cuff Size HR RR TEMP (F) WT  HT  BMI kg/m2 BSA m2 O2 Sat FR L/min FiO2 HC       08/03/2016 01:52 /67 Sitting    81 - R  97.6 324lbs 9oz 5'  11\" 45.27 2.72 94 %      09/24/2020 09:16 /85 Sitting    65 - R  99.1 262lbs 6oz 6'   35.58 2.46 96 %  21%    10/16/2020 10:31 /60 Sitting    70 - R 18 98.2 251lbs 8oz 6'   34.11 2.41 97 %  21%          Physical Examination  · Constitutional  o Appearance  o : well-nourished, in no acute distress  · Eyes  o Conjunctivae  o : conjunctivae normal  o Sclerae  o : sclerae white  o Pupils and Irises  o : pupils equal and round  o Eyelids/Ocular Adnexae  o : eyelid appearance normal, no exudates present  · Ears, Nose, Mouth and Throat  o Ears  o :   § External Ears  § : external auditory canal appearance within normal limits, no discharge present  § Otoscopic Examination  § : tympanic membrane appearance within normal limits bilaterally, cerumen not present  o Nose  o :   § External Nose  § : appearance normal  § Intranasal Exam  § : mucosa within " normal limits, vestibules normal, no intranasal lesions present, septum midline, sinuses non tender to palpation  § Nasopharynx  § : no discharge present  o Oral Cavity  o :   § Oral Mucosa  § : oral mucosa normal  § Lips  § : lip appearance normal  § Teeth  § : normal dentation for age  o Throat  o :   § Oropharynx  § : no inflammation or lesions present, tonsils within normal limits  · Neck  o Inspection/Palpation  o : normal appearance, no masses or tenderness, trachea midline  o Range of Motion  o : cervical range of motion within normal limits  o Thyroid  o : gland size normal, nontender, no nodules or masses present on palpation  · Respiratory  o Respiratory Effort  o : breathing unlabored  o Inspection of Chest  o : normal appearance  o Auscultation of Lungs  o : normal breath sounds throughout inspiration and expiration  · Cardiovascular  o Heart  o :   § Auscultation of Heart  § : regular rate and rhythm, no murmurs, gallops or rubs  o Peripheral Vascular System  o :   § Carotid Arteries  § : normal pulses bilaterally, no bruits present  § Pedal Pulses  § : pulses 2 bilaterally  § Extremities  § : no clubbing or edema  · Gastrointestinal  o Abdominal Examination  o : abdomen nontender to palpation, tone normal without rigidity or guarding, no masses present, bowel sounds present in all four quadrants  · Lymphatic  o Neck  o : no lymphadenopathy present  · Skin and Subcutaneous Tissue  o General Inspection  o : no rashes or lesions present, no areas of discoloration  o Body Hair  o : hair normal for age, general body hair distribution normal for age  o Digits and Nails  o : no clubbing, cyanosis, deformities or edema present, normal appearing nails  · Neurologic  o Mental Status Examination  o :   § Orientation  § : grossly oriented to person, place and time  o Gait and Station  o : normal gait, able to stand without difficulty  · Psychiatric  o Judgement and Insight  o : judgment and insight intact  o Mood  and Affect  o : mood normal, affect appropriate          Assessment  · Diabetes mellitus, type 2     250.00/E11.9  · Essential hypertension     401.9/I10  · Hyperlipidemia     272.4/E78.5  · Skin lesion     709.9/L98.9      Plan  · Orders  o Discharge medications reconciled with the current medication list (1111F) - - 10/16/2020  o ACO-39: Current medications updated and reviewed (1159F, ) - - 10/16/2020  o ACO-14: Influenza immunization was not administered for reasons documented [facility] () - - 10/16/2020  o Shave biopsy of lesion of skin (36569) - 709.9/L98.9 - 10/16/2020  · Medications  o Lantus Solostar U-100 Insulin 100 unit/mL (3 mL) subcutaneous insulin pen   SIG: inject 30 units by subcutaneous route 2 times a day   DISP: (6) Pre-filled Pen Syringe with 5 refills  Adjusted on 10/16/2020     o Medications have been Reconciled  o Transition of Care or Provider Policy  · Instructions  o Patient discharge summary has been reviewed and placed in patient's electronic medical record.  o Patient received a phone call from my office within 2 business days of discharge from inpatient status, and documented within the patient's chart.  o Also patient was seen (face to face) for follow up evaluation within 7 calendar days of discharge from inpatient status for a high complexity issue.  o Patient was educated on their diagnosis, treatment and any medication changes while being evaluated today.  o Patient was educated/instructed on their diagnosis, treatment and medications prior to discharge from the clinic today.  o Flu vaccine declined.  · Disposition  o follow up 2 weeks            Electronically Signed by: Dimple Wynne MA -Author on October 21, 2020 11:17:55 AM  Electronically Co-signed by: SOHAM Carrera -Reviewer on October 22, 2020 07:52:46 PM

## 2021-05-14 VITALS
HEIGHT: 72 IN | SYSTOLIC BLOOD PRESSURE: 175 MMHG | WEIGHT: 262.37 LBS | BODY MASS INDEX: 35.54 KG/M2 | TEMPERATURE: 99.1 F | DIASTOLIC BLOOD PRESSURE: 85 MMHG | OXYGEN SATURATION: 96 % | HEART RATE: 65 BPM

## 2021-05-14 VITALS
BODY MASS INDEX: 34.48 KG/M2 | RESPIRATION RATE: 68 BRPM | SYSTOLIC BLOOD PRESSURE: 159 MMHG | DIASTOLIC BLOOD PRESSURE: 85 MMHG | HEIGHT: 72 IN

## 2021-05-14 VITALS
HEIGHT: 72 IN | DIASTOLIC BLOOD PRESSURE: 60 MMHG | SYSTOLIC BLOOD PRESSURE: 114 MMHG | BODY MASS INDEX: 34.06 KG/M2 | WEIGHT: 251.5 LBS | OXYGEN SATURATION: 97 % | HEART RATE: 70 BPM | RESPIRATION RATE: 18 BRPM | TEMPERATURE: 98.2 F

## 2021-05-14 VITALS
DIASTOLIC BLOOD PRESSURE: 67 MMHG | SYSTOLIC BLOOD PRESSURE: 125 MMHG | RESPIRATION RATE: 24 BRPM | HEART RATE: 68 BPM | HEIGHT: 72 IN | BODY MASS INDEX: 34.03 KG/M2 | OXYGEN SATURATION: 98 % | WEIGHT: 251.25 LBS | TEMPERATURE: 98.4 F

## 2021-05-14 VITALS
DIASTOLIC BLOOD PRESSURE: 72 MMHG | RESPIRATION RATE: 24 BRPM | WEIGHT: 252.25 LBS | HEART RATE: 73 BPM | TEMPERATURE: 97.9 F | SYSTOLIC BLOOD PRESSURE: 152 MMHG | BODY MASS INDEX: 34.17 KG/M2 | HEIGHT: 72 IN | OXYGEN SATURATION: 96 %

## 2021-05-14 VITALS
SYSTOLIC BLOOD PRESSURE: 128 MMHG | DIASTOLIC BLOOD PRESSURE: 66 MMHG | BODY MASS INDEX: 33.86 KG/M2 | WEIGHT: 250 LBS | HEART RATE: 74 BPM | HEIGHT: 72 IN

## 2021-06-14 ENCOUNTER — HOSPITAL ENCOUNTER (EMERGENCY)
Facility: HOSPITAL | Age: 56
Discharge: HOME | End: 2021-06-15
Attending: EMERGENCY MEDICINE | Admitting: EMERGENCY MEDICINE

## 2021-06-14 DIAGNOSIS — L02.91 ABSCESS: Primary | ICD-10-CM

## 2021-06-14 DIAGNOSIS — L03.312 CELLULITIS OF BACK EXCEPT BUTTOCK: ICD-10-CM

## 2021-06-14 PROCEDURE — 85025 COMPLETE CBC W/AUTO DIFF WBC: CPT | Performed by: EMERGENCY MEDICINE

## 2021-06-14 PROCEDURE — 80053 COMPREHEN METABOLIC PANEL: CPT | Performed by: EMERGENCY MEDICINE

## 2021-06-14 PROCEDURE — 96374 THER/PROPH/DIAG INJ IV PUSH: CPT

## 2021-06-14 PROCEDURE — 87040 BLOOD CULTURE FOR BACTERIA: CPT | Performed by: EMERGENCY MEDICINE

## 2021-06-14 PROCEDURE — 81001 URINALYSIS AUTO W/SCOPE: CPT | Performed by: EMERGENCY MEDICINE

## 2021-06-14 PROCEDURE — 36415 COLL VENOUS BLD VENIPUNCTURE: CPT

## 2021-06-14 PROCEDURE — 99283 EMERGENCY DEPT VISIT LOW MDM: CPT

## 2021-06-14 RX ORDER — SODIUM CHLORIDE 0.9 % (FLUSH) 0.9 %
10 SYRINGE (ML) INJECTION AS NEEDED
Status: DISCONTINUED | OUTPATIENT
Start: 2021-06-14 | End: 2021-06-15 | Stop reason: HOSPADM

## 2021-06-15 ENCOUNTER — APPOINTMENT (OUTPATIENT)
Dept: GENERAL RADIOLOGY | Facility: HOSPITAL | Age: 56
End: 2021-06-15

## 2021-06-15 VITALS
BODY MASS INDEX: 34.85 KG/M2 | HEART RATE: 92 BPM | SYSTOLIC BLOOD PRESSURE: 174 MMHG | RESPIRATION RATE: 20 BRPM | TEMPERATURE: 98.4 F | DIASTOLIC BLOOD PRESSURE: 88 MMHG | OXYGEN SATURATION: 94 % | HEIGHT: 72 IN | WEIGHT: 257.28 LBS

## 2021-06-15 LAB
ALBUMIN SERPL-MCNC: 4.1 G/DL (ref 3.5–5.2)
ALBUMIN/GLOB SERPL: 1.2 G/DL
ALP SERPL-CCNC: 134 U/L (ref 39–117)
ALT SERPL W P-5'-P-CCNC: 7 U/L (ref 1–41)
ANION GAP SERPL CALCULATED.3IONS-SCNC: 11.7 MMOL/L (ref 5–15)
AST SERPL-CCNC: 9 U/L (ref 1–40)
BACTERIA UR QL AUTO: ABNORMAL /HPF
BASOPHILS # BLD AUTO: 0.08 10*3/MM3 (ref 0–0.2)
BASOPHILS NFR BLD AUTO: 0.6 % (ref 0–1.5)
BILIRUB SERPL-MCNC: 0.3 MG/DL (ref 0–1.2)
BILIRUB UR QL STRIP: NEGATIVE
BUN SERPL-MCNC: 12 MG/DL (ref 6–20)
BUN/CREAT SERPL: 13.3 (ref 7–25)
CALCIUM SPEC-SCNC: 9.3 MG/DL (ref 8.6–10.5)
CHLORIDE SERPL-SCNC: 95 MMOL/L (ref 98–107)
CLARITY UR: CLEAR
CO2 SERPL-SCNC: 24.3 MMOL/L (ref 22–29)
COLOR UR: YELLOW
CREAT SERPL-MCNC: 0.9 MG/DL (ref 0.76–1.27)
D-LACTATE SERPL-SCNC: 2.1 MMOL/L (ref 0.5–2)
DEPRECATED RDW RBC AUTO: 47.2 FL (ref 37–54)
EOSINOPHIL # BLD AUTO: 0.13 10*3/MM3 (ref 0–0.4)
EOSINOPHIL NFR BLD AUTO: 1 % (ref 0.3–6.2)
ERYTHROCYTE [DISTWIDTH] IN BLOOD BY AUTOMATED COUNT: 13.9 % (ref 12.3–15.4)
GFR SERPL CREATININE-BSD FRML MDRD: 88 ML/MIN/1.73
GLOBULIN UR ELPH-MCNC: 3.5 GM/DL
GLUCOSE SERPL-MCNC: 360 MG/DL (ref 65–99)
GLUCOSE UR STRIP-MCNC: ABNORMAL MG/DL
HCT VFR BLD AUTO: 42.2 % (ref 37.5–51)
HGB BLD-MCNC: 14.2 G/DL (ref 13–17.7)
HGB UR QL STRIP.AUTO: ABNORMAL
HOLD SPECIMEN: NORMAL
HOLD SPECIMEN: NORMAL
HYALINE CASTS UR QL AUTO: ABNORMAL /LPF
IMM GRANULOCYTES # BLD AUTO: 0.13 10*3/MM3 (ref 0–0.05)
IMM GRANULOCYTES NFR BLD AUTO: 1 % (ref 0–0.5)
KETONES UR QL STRIP: NEGATIVE
LEUKOCYTE ESTERASE UR QL STRIP.AUTO: NEGATIVE
LYMPHOCYTES # BLD AUTO: 2.96 10*3/MM3 (ref 0.7–3.1)
LYMPHOCYTES NFR BLD AUTO: 22.8 % (ref 19.6–45.3)
MCH RBC QN AUTO: 31.2 PG (ref 26.6–33)
MCHC RBC AUTO-ENTMCNC: 33.6 G/DL (ref 31.5–35.7)
MCV RBC AUTO: 92.7 FL (ref 79–97)
MONOCYTES # BLD AUTO: 1.66 10*3/MM3 (ref 0.1–0.9)
MONOCYTES NFR BLD AUTO: 12.8 % (ref 5–12)
NEUTROPHILS NFR BLD AUTO: 61.8 % (ref 42.7–76)
NEUTROPHILS NFR BLD AUTO: 8.01 10*3/MM3 (ref 1.7–7)
NITRITE UR QL STRIP: NEGATIVE
NRBC BLD AUTO-RTO: 0 /100 WBC (ref 0–0.2)
PH UR STRIP.AUTO: 5.5 [PH] (ref 5–8)
PLATELET # BLD AUTO: 348 10*3/MM3 (ref 140–450)
PMV BLD AUTO: 9.2 FL (ref 6–12)
POTASSIUM SERPL-SCNC: 3.8 MMOL/L (ref 3.5–5.2)
PROT SERPL-MCNC: 7.6 G/DL (ref 6–8.5)
PROT UR QL STRIP: ABNORMAL
RBC # BLD AUTO: 4.55 10*6/MM3 (ref 4.14–5.8)
RBC # UR: ABNORMAL /HPF
REF LAB TEST METHOD: ABNORMAL
SODIUM SERPL-SCNC: 131 MMOL/L (ref 136–145)
SP GR UR STRIP: 1.02 (ref 1–1.03)
SQUAMOUS #/AREA URNS HPF: ABNORMAL /HPF
UROBILINOGEN UR QL STRIP: ABNORMAL
WBC # BLD AUTO: 12.97 10*3/MM3 (ref 3.4–10.8)
WBC UR QL AUTO: ABNORMAL /HPF
WHOLE BLOOD HOLD SPECIMEN: NORMAL

## 2021-06-15 PROCEDURE — 25010000002 CEFTRIAXONE PER 250 MG: Performed by: EMERGENCY MEDICINE

## 2021-06-15 PROCEDURE — 87040 BLOOD CULTURE FOR BACTERIA: CPT | Performed by: EMERGENCY MEDICINE

## 2021-06-15 PROCEDURE — 96374 THER/PROPH/DIAG INJ IV PUSH: CPT

## 2021-06-15 PROCEDURE — 83605 ASSAY OF LACTIC ACID: CPT | Performed by: EMERGENCY MEDICINE

## 2021-06-15 PROCEDURE — 71045 X-RAY EXAM CHEST 1 VIEW: CPT

## 2021-06-15 RX ORDER — DOXYCYCLINE HYCLATE 100 MG/1
100 TABLET, DELAYED RELEASE ORAL 2 TIMES DAILY
Qty: 14 TABLET | Refills: 0 | Status: SHIPPED | OUTPATIENT
Start: 2021-06-15 | End: 2021-06-17 | Stop reason: SDUPTHER

## 2021-06-15 RX ORDER — CEPHALEXIN 500 MG/1
500 CAPSULE ORAL 4 TIMES DAILY
Qty: 28 CAPSULE | Refills: 0 | Status: SHIPPED | OUTPATIENT
Start: 2021-06-15 | End: 2021-06-17 | Stop reason: SDUPTHER

## 2021-06-15 RX ORDER — LIDOCAINE HYDROCHLORIDE AND EPINEPHRINE 10; 10 MG/ML; UG/ML
10 INJECTION, SOLUTION INFILTRATION; PERINEURAL ONCE
Status: COMPLETED | OUTPATIENT
Start: 2021-06-15 | End: 2021-06-15

## 2021-06-15 RX ADMIN — LIDOCAINE HYDROCHLORIDE,EPINEPHRINE BITARTRATE 10 ML: 10; .01 INJECTION, SOLUTION INFILTRATION; PERINEURAL at 01:00

## 2021-06-15 RX ADMIN — SODIUM CHLORIDE 1000 ML: 9 INJECTION, SOLUTION INTRAVENOUS at 00:59

## 2021-06-15 RX ADMIN — SODIUM CHLORIDE 2 G: 9 INJECTION INTRAMUSCULAR; INTRAVENOUS; SUBCUTANEOUS at 00:53

## 2021-06-15 NOTE — ED PROVIDER NOTES
Time: 0013  Arrived by: Private vehicle  Chief Complaint: Wound  History provided by: Patient  History is limited by: N/A    History of Present Illness:  Patient is a 55 y.o. year old male that presents to the emergency department with a wound to his L scapular area that is painful and red. He noticed this today and is concerned for possible infection. Pt also c/o fever.    Similar Symptoms Previously: None stated.  Recently seen: No      Patient Care Team  Primary Care Provider: SOHAM Walsh.    Past Medical History:    Allergies   Allergen Reactions   • Hydrocodone Itching     Past Medical History:   Diagnosis Date   • CAD (coronary artery disease)    • Diabetes mellitus (CMS/HCC)    • Hyperlipidemia    • Hypertension    • Myocardial infarction (CMS/HCC)    • Obesity      Past Surgical History:   Procedure Laterality Date   • ANKLE ARTHROSCOPY W/ OPEN REPAIR     • APPENDECTOMY     • CARDIAC CATHETERIZATION  2014    PCI to circumflex   • CORONARY ARTERY BYPASS GRAFT N/A 10/8/2020    Procedure: STERNOTOMY, CORONARY ARTERY BYPASS GRAFTING TIME 4 WITH LEFT KELSI  AND ENDOSCOPICALLY HARVESTED LEFT GREATER SAPHENOUS VEIN AND PRP.;  Surgeon: Jr Girma Chiu MD;  Location: [facility];  Service: Cardiothoracic;  Laterality: N/A;   • HERNIA REPAIR     • TONSILLECTOMY       History reviewed. No pertinent family history.    Home Medications:  Prior to Admission medications    Medication Sig Start Date End Date Taking? Authorizing Provider   furosemide (LASIX) 40 MG tablet Take 1 tablet by mouth Daily for 30 days. 10/14/20 11/13/20  Suzy Kitchen APRN   glucose blood test strip Test 4 times per day. 10/14/20      insulin aspart (NovoLOG) 100 UNIT/ML injection 200-249 5 units, 250 -299 8 units, 300-349 10 units, 350-400 12 units, > 400 14 units, tid ac, pens and needles  Patient taking differently: Inject 60 Units under the skin into the appropriate area as directed. 200-249 5 units, 250 -299 8  units, 300-349 10 units, 350-400 12 units, > 400 14 units, tid ac, pens and needles 10/14/20   Ish Lindsey MD   insulin detemir (Levemir) 100 UNIT/ML injection Inject 30 Units under the skin into the appropriate area as directed Daily for 30 days. Pens and needles 10/14/20 11/13/20  Ish Lindsey MD   Insulin Glargine (Lantus SoloStar) 100 UNIT/ML injection pen Inject 50 Units under the skin into the appropriate area as directed 2 (Two) Times a Day.    Gagan Beltran MD   Insulin Pen Needle (Unifine Pentips) 31G X 6 MM misc Use 4 times daily with insulin 10/14/20      lisinopril (PRINIVIL,ZESTRIL) 10 MG tablet Take 10 mg by mouth Daily. 12/11/20   Gagan Beltran MD   lisinopril (PRINIVIL,ZESTRIL) 5 MG tablet Take 1 tablet by mouth Daily for 90 days. 10/15/20 1/13/21  Suzy Kitchen APRN   metoprolol tartrate (LOPRESSOR) 100 MG tablet Take 1 tablet by mouth Every 12 (Twelve) Hours for 90 days. 10/14/20 1/12/21  Suzy Kitchen APRN   metoprolol tartrate (LOPRESSOR) 100 MG tablet Take 100 mg by mouth 2 (Two) Times a Day.    Provider, Historical, MD   OneTouch Delica Lancets 33G misc test 4 times per day 10/14/20      potassium chloride ER (K-TAB) 20 MEQ tablet controlled-release ER tablet 20 mEq Daily. 12/4/20   Gagan Beltran MD   rosuvastatin (CRESTOR) 20 MG tablet Take 1 tablet by mouth Every Night for 90 days. 10/14/20 1/12/21  Suzy Kitchen APRN   rosuvastatin (CRESTOR) 20 MG tablet Take 20 mg by mouth Daily.    Gagan Beltran MD   sennosides-docusate (PERICOLACE) 8.6-50 MG per tablet Take 2 tablets by mouth At Night As Needed for Constipation. 10/14/20   Suzy Kitchen APRN        Social History:   Smoking: Former smoker (Quit 10/20/20 1 PPD). Alcohol: None. Illicit Drug Use: None. Recent travel: No.    Record Review:  I have reviewed the patient's records in Epic.    Review of Systems  Review of Systems   Constitutional: Positive for fever.  "Negative for chills.   HENT: Negative for nosebleeds.    Eyes: Negative for redness.   Respiratory: Negative for cough and shortness of breath.    Cardiovascular: Negative for chest pain.   Gastrointestinal: Negative for diarrhea and vomiting.   Genitourinary: Negative for dysuria and frequency.   Musculoskeletal: Negative for back pain and neck pain.   Skin: Positive for wound (painful and red wound to L scapular area). Negative for rash.   Neurological: Negative for seizures.        Physical Exam  BP (!) 248/95 (Patient Position: Sitting)   Pulse 108   Temp 100.5 °F (38.1 °C) (Oral)   Resp 18   Ht 182.9 cm (72\")   Wt 117 kg (257 lb 4.4 oz)   SpO2 97%   BMI 34.89 kg/m²     Physical Exam  Vitals and nursing note reviewed.   Constitutional:       General: He is not in acute distress.  HENT:      Head: Normocephalic and atraumatic.      Nose: Nose normal.      Mouth/Throat:      Mouth: Mucous membranes are moist.   Eyes:      General: No scleral icterus.  Cardiovascular:      Rate and Rhythm: Regular rhythm. Tachycardia present.      Heart sounds: Normal heart sounds. No murmur heard.     Pulmonary:      Effort: No respiratory distress.      Breath sounds: Normal breath sounds.   Abdominal:      Palpations: Abdomen is soft.      Tenderness: There is no abdominal tenderness.      Hernia: No hernia is present.   Musculoskeletal:         General: No tenderness. Normal range of motion.      Cervical back: Normal range of motion and neck supple. No tenderness.      Right lower leg: No edema.      Left lower leg: No edema.   Skin:     General: Skin is warm and dry.      Comments: He has an indurated area to the L scapula that is tender, warm, and erythematous.   Neurological:      Mental Status: He is alert. Mental status is at baseline.      Sensory: No sensory deficit.      Motor: No weakness.   Psychiatric:         Behavior: Behavior normal.                Medications in the Emergency Department:  Medications   "   sodium chloride 0.9 % flush 10 mL (has no administration in time range)   sodium chloride 0.9 % bolus 1,000 mL (1,000 mL Intravenous New Bag 6/15/21 0059)   lidocaine 1% - EPINEPHrine 1:735119 (XYLOCAINE W/EPI) 1 %-1:392991 injection 10 mL (10 mL Injection Given 6/15/21 0100)   cefTRIAXone (ROCEPHIN) in NS 2 GRAMS/20ml IV PUSH syringe (2 g Intravenous Given 6/15/21 0053)        Labs  Lab Results (last 24 hours)     Procedure Component Value Units Date/Time    CBC & Differential [[id number]]  (Abnormal) Collected: 06/14/21 2347    Specimen: Blood from Arm, Right Updated: 06/15/21 0000    Narrative:      The following orders were created for panel order CBC & Differential.  Procedure                               Abnormality         Status                     ---------                               -----------         ------                     CBC Auto Differential[[id number]]        Abnormal            Final result                 Please view results for these tests on the individual orders.    Comprehensive Metabolic Panel [[id number]]  (Abnormal) Collected: 06/14/21 2347    Specimen: Blood from Arm, Right Updated: 06/15/21 0019     Glucose 360 mg/dL      BUN 12 mg/dL      Creatinine 0.90 mg/dL      Sodium 131 mmol/L      Potassium 3.8 mmol/L      Chloride 95 mmol/L      CO2 24.3 mmol/L      Calcium 9.3 mg/dL      Total Protein 7.6 g/dL      Albumin 4.10 g/dL      ALT (SGPT) 7 U/L      AST (SGOT) 9 U/L      Alkaline Phosphatase 134 U/L      Total Bilirubin 0.3 mg/dL      eGFR Non African Amer 88 mL/min/1.73      Globulin 3.5 gm/dL      A/G Ratio 1.2 g/dL      BUN/Creatinine Ratio 13.3     Anion Gap 11.7 mmol/L     Narrative:      GFR Normal >60  Chronic Kidney Disease <60  Kidney Failure <15      Blood Culture - Blood, Arm, Right [[id number]] Collected: 06/14/21 2347    Specimen: Blood from Arm, Right Updated: 06/14/21 2356    CBC Auto Differential [[id number]]  (Abnormal) Collected: 06/14/21 2347    Specimen: Blood  from Arm, Right Updated: 06/15/21 0000     WBC 12.97 10*3/mm3      RBC 4.55 10*6/mm3      Hemoglobin 14.2 g/dL      Hematocrit 42.2 %      MCV 92.7 fL      MCH 31.2 pg      MCHC 33.6 g/dL      RDW 13.9 %      RDW-SD 47.2 fl      MPV 9.2 fL      Platelets 348 10*3/mm3      Neutrophil % 61.8 %      Lymphocyte % 22.8 %      Monocyte % 12.8 %      Eosinophil % 1.0 %      Basophil % 0.6 %      Immature Grans % 1.0 %      Neutrophils, Absolute 8.01 10*3/mm3      Lymphocytes, Absolute 2.96 10*3/mm3      Monocytes, Absolute 1.66 10*3/mm3      Eosinophils, Absolute 0.13 10*3/mm3      Basophils, Absolute 0.08 10*3/mm3      Immature Grans, Absolute 0.13 10*3/mm3      nRBC 0.0 /100 WBC     Urinalysis With Microscopic If Indicated (No Culture) - [[id number]]  (Abnormal) Collected: 06/14/21 2351    Specimen: Urine Updated: 06/15/21 0012     Color, UA Yellow     Appearance, UA Clear     pH, UA 5.5     Specific Gravity, UA 1.025     Glucose, UA >=1000 mg/dL (3+)     Ketones, UA Negative     Bilirubin, UA Negative     Blood, UA Trace     Protein,  mg/dL (2+)     Leuk Esterase, UA Negative     Nitrite, UA Negative     Urobilinogen, UA 1.0 E.U./dL    Urinalysis, Microscopic Only - Urine, Clean Catch [[id number]]  (Abnormal) Collected: 06/14/21 2351    Specimen: Urine Updated: 06/15/21 0012     RBC, UA 0-2 /HPF      WBC, UA 0-2 /HPF      Bacteria, UA None Seen /HPF      Squamous Epithelial Cells, UA 0-2 /HPF      Hyaline Casts, UA None Seen /LPF      Methodology Automated Microscopy    Blood Culture - Blood, Arm, Left [[id number]] Collected: 06/15/21 0017    Specimen: Blood from Arm, Left Updated: 06/15/21 0021    Lactic Acid, Plasma [[id number]]  (Abnormal) Collected: 06/15/21 0017    Specimen: Blood Updated: 06/15/21 0052     Lactate 2.1 mmol/L            Imaging:  XR Chest 1 View    Result Date: 6/15/2021  Narrative: PROCEDURE: XR CHEST 1 VW  [facility], , CHEST AP/PA 1 VIEW, 10/05/2020, 14:04.   INDICATIONS: ABSCESS ON LEFT UPPER BACK; FEVER; SEPSIS.  FINDINGS:Two PA upright portable views of the chest reveal borderline cardiac enlargement and no acute infiltrate.  No pleural effusion or pneumothorax is seen.  The patient has undergone median sternotomy and CABG surgery.  The thoracic aorta is atherosclerotic and ectatic.  There may be chronic calcified granulomatous disease of the chest.  External artifacts obscure detail.  No significant interval change is seen since the prior study.  CONCLUSION:No acute infiltrate is appreciated.       JOEL COLUNGA JR, MD       Electronically Signed and Approved By: JOEL COLUNGA JR, MD on 6/15/2021 at 0:49               Procedures/EKGs:  Incision & Drainage    Date/Time: 6/15/2021 12:47 AM  Performed by: Delbert Clarke DO  Authorized by: Delbert Clarke DO     Consent:     Consent obtained:  Verbal    Consent given by:  Patient  Location:     Type:  Abscess    Size:  2 cm    Location: L scapula.  Pre-procedure details:     Skin preparation:  Betadine  Anesthesia (see MAR for exact dosages):     Anesthesia method:  Local infiltration    Local anesthetic:  Lidocaine 1% WITH epi  Procedure details:     Needle aspiration: no      Incision types:  Single straight    Incision depth:  Subcutaneous    Scalpel blade:  11    Drainage:  Purulent    Drainage amount:  Scant    Packing materials:  None  Post-procedure details:     Patient tolerance of procedure:  Tolerated well, no immediate complications        Progress  ED Course as of Aroldo 15 0231   Tue Aroldo 15, 2021   0033 Bedside ultrasound shows that pt has an abscess. Plan to perform I&D.    [AM]      ED Course User Index  [AM] Adore Kruger                            Medical Decision Making:  MDM  Number of Diagnoses or Management Options     Amount and/or Complexity of Data Reviewed  Clinical lab tests: ordered and reviewed  Tests in the radiology section of CPT®: ordered and reviewed       Low-grade fever and tachycardia  associated with an infection of the skin of the back.  Small abscess associated which was drained with an incision and drainage.  The patient was given initial antibiotics.  Overall I do not feel that he is septic.  I think he will be amenable to oral antibiotics for cellulitis.  Prescriptions given and encouraged for close follow-up for wound checks.    Final diagnoses:   Abscess   Cellulitis of back except buttock        Disposition:  ED Disposition     ED Disposition Condition Comment    Discharge Stable           Documentation assistance provided by Delbert Clarke DO acting as scribe for Delbert Clarke DO. Information recorded by the scribe was done at my direction and has been verified and validated by me.     Adore Kruger  06/15/21 0101       Delbert Clarke DO  06/15/21 0231

## 2021-06-16 ENCOUNTER — TELEPHONE (OUTPATIENT)
Dept: FAMILY MEDICINE CLINIC | Facility: CLINIC | Age: 56
End: 2021-06-16

## 2021-06-16 NOTE — TELEPHONE ENCOUNTER
Caller: KEN FLORES    Relationship to patient: SELF    Best call back number:[phone]    New or established patient?  [] New  [x] Established    Date of discharge: 06/14/21    Facility discharged from: [facility], [place], operated by [facility]       Diagnosis/Symptoms: CELLULITIS DRAINED ON PATIENT'S BACK    Length of stay (If applicable): SAME DAY      PATIENT IS SCHEDULED FOR HOSPITAL FOLLOW UP ON 06/23/21 WITH DANO CALDERON BUT IS A PATIENT OF OSORIO DRAKE.

## 2021-06-16 NOTE — TELEPHONE ENCOUNTER
PATIENT STATED THAT HE RECEIVED A VOICEMAIL FROM OFFICE AND IT STATED THAT HOSPITAL FOLLOW UP WAS MOVED FROM 6/23 TO 6/17. PATIENT IS UNABLE TO ATTEND APPT TOMORROW AND NEEDS TO RESCHEDULE. ATTEMPTED TO WARM TRANSFER. PLEASE CALL TO ADVISE.

## 2021-06-17 ENCOUNTER — OFFICE VISIT (OUTPATIENT)
Dept: FAMILY MEDICINE CLINIC | Facility: CLINIC | Age: 56
End: 2021-06-17

## 2021-06-17 VITALS
WEIGHT: 258 LBS | HEIGHT: 72 IN | BODY MASS INDEX: 34.95 KG/M2 | DIASTOLIC BLOOD PRESSURE: 81 MMHG | TEMPERATURE: 99 F | HEART RATE: 85 BPM | SYSTOLIC BLOOD PRESSURE: 194 MMHG | OXYGEN SATURATION: 96 %

## 2021-06-17 DIAGNOSIS — E11.65 TYPE 2 DIABETES MELLITUS WITH HYPERGLYCEMIA, WITHOUT LONG-TERM CURRENT USE OF INSULIN (HCC): ICD-10-CM

## 2021-06-17 DIAGNOSIS — E78.5 HYPERLIPIDEMIA, UNSPECIFIED HYPERLIPIDEMIA TYPE: ICD-10-CM

## 2021-06-17 DIAGNOSIS — I10 HYPERTENSION, UNSPECIFIED TYPE: ICD-10-CM

## 2021-06-17 DIAGNOSIS — E03.9 HYPOTHYROIDISM, UNSPECIFIED TYPE: ICD-10-CM

## 2021-06-17 DIAGNOSIS — L03.312 CELLULITIS OF BACK: Primary | ICD-10-CM

## 2021-06-17 DIAGNOSIS — I25.118 CORONARY ARTERY DISEASE OF NATIVE ARTERY OF NATIVE HEART WITH STABLE ANGINA PECTORIS (HCC): ICD-10-CM

## 2021-06-17 DIAGNOSIS — J30.9 ALLERGIC RHINITIS, UNSPECIFIED SEASONALITY, UNSPECIFIED TRIGGER: ICD-10-CM

## 2021-06-17 DIAGNOSIS — L02.91 ABSCESS: ICD-10-CM

## 2021-06-17 LAB
ALBUMIN SERPL-MCNC: 4.3 G/DL (ref 3.5–5.2)
ALBUMIN/GLOB SERPL: 1.1 G/DL
ALP SERPL-CCNC: 129 U/L (ref 39–117)
ALT SERPL W P-5'-P-CCNC: 11 U/L (ref 1–41)
ANION GAP SERPL CALCULATED.3IONS-SCNC: 13.2 MMOL/L (ref 5–15)
AST SERPL-CCNC: 10 U/L (ref 1–40)
BACTERIA UR QL AUTO: ABNORMAL /HPF
BASOPHILS # BLD AUTO: 0.1 10*3/MM3 (ref 0–0.2)
BASOPHILS NFR BLD AUTO: 1.3 % (ref 0–1.5)
BILIRUB SERPL-MCNC: 0.2 MG/DL (ref 0–1.2)
BILIRUB UR QL STRIP: NEGATIVE
BUN SERPL-MCNC: 10 MG/DL (ref 6–20)
BUN/CREAT SERPL: 9.9 (ref 7–25)
CALCIUM SPEC-SCNC: 9.6 MG/DL (ref 8.6–10.5)
CHLORIDE SERPL-SCNC: 94 MMOL/L (ref 98–107)
CHOLEST SERPL-MCNC: 192 MG/DL (ref 0–200)
CLARITY UR: CLEAR
CO2 SERPL-SCNC: 24.8 MMOL/L (ref 22–29)
COLOR UR: YELLOW
CREAT SERPL-MCNC: 1.01 MG/DL (ref 0.76–1.27)
DEPRECATED RDW RBC AUTO: 46.9 FL (ref 37–54)
EOSINOPHIL # BLD AUTO: 0.27 10*3/MM3 (ref 0–0.4)
EOSINOPHIL NFR BLD AUTO: 3.5 % (ref 0.3–6.2)
ERYTHROCYTE [DISTWIDTH] IN BLOOD BY AUTOMATED COUNT: 13.8 % (ref 12.3–15.4)
GFR SERPL CREATININE-BSD FRML MDRD: 77 ML/MIN/1.73
GLOBULIN UR ELPH-MCNC: 3.9 GM/DL
GLUCOSE SERPL-MCNC: 417 MG/DL (ref 65–99)
GLUCOSE UR STRIP-MCNC: ABNORMAL MG/DL
HBA1C MFR BLD: 9.99 % (ref 4.8–5.6)
HCT VFR BLD AUTO: 43.1 % (ref 37.5–51)
HDLC SERPL-MCNC: 31 MG/DL (ref 40–60)
HGB BLD-MCNC: 14.2 G/DL (ref 13–17.7)
HGB UR QL STRIP.AUTO: ABNORMAL
HYALINE CASTS UR QL AUTO: ABNORMAL /LPF
IMM GRANULOCYTES # BLD AUTO: 0.14 10*3/MM3 (ref 0–0.05)
IMM GRANULOCYTES NFR BLD AUTO: 1.8 % (ref 0–0.5)
KETONES UR QL STRIP: NEGATIVE
LDLC SERPL CALC-MCNC: 114 MG/DL (ref 0–100)
LDLC/HDLC SERPL: 3.45 {RATIO}
LEUKOCYTE ESTERASE UR QL STRIP.AUTO: NEGATIVE
LYMPHOCYTES # BLD AUTO: 2.44 10*3/MM3 (ref 0.7–3.1)
LYMPHOCYTES NFR BLD AUTO: 31.2 % (ref 19.6–45.3)
MCH RBC QN AUTO: 30.5 PG (ref 26.6–33)
MCHC RBC AUTO-ENTMCNC: 32.9 G/DL (ref 31.5–35.7)
MCV RBC AUTO: 92.5 FL (ref 79–97)
MONOCYTES # BLD AUTO: 0.78 10*3/MM3 (ref 0.1–0.9)
MONOCYTES NFR BLD AUTO: 10 % (ref 5–12)
NEUTROPHILS NFR BLD AUTO: 4.08 10*3/MM3 (ref 1.7–7)
NEUTROPHILS NFR BLD AUTO: 52.2 % (ref 42.7–76)
NITRITE UR QL STRIP: NEGATIVE
NRBC BLD AUTO-RTO: 0 /100 WBC (ref 0–0.2)
PH UR STRIP.AUTO: 5.5 [PH] (ref 5–8)
PLATELET # BLD AUTO: 405 10*3/MM3 (ref 140–450)
PMV BLD AUTO: 9.9 FL (ref 6–12)
POTASSIUM SERPL-SCNC: 4.4 MMOL/L (ref 3.5–5.2)
PROT SERPL-MCNC: 8.2 G/DL (ref 6–8.5)
PROT UR QL STRIP: ABNORMAL
RBC # BLD AUTO: 4.66 10*6/MM3 (ref 4.14–5.8)
RBC # UR: ABNORMAL /HPF
REF LAB TEST METHOD: ABNORMAL
SODIUM SERPL-SCNC: 132 MMOL/L (ref 136–145)
SP GR UR STRIP: >1.03 (ref 1–1.03)
SQUAMOUS #/AREA URNS HPF: ABNORMAL /HPF
TRIGL SERPL-MCNC: 270 MG/DL (ref 0–150)
TSH SERPL DL<=0.05 MIU/L-ACNC: 4.61 UIU/ML (ref 0.27–4.2)
UROBILINOGEN UR QL STRIP: ABNORMAL
VLDLC SERPL-MCNC: 47 MG/DL (ref 5–40)
WBC # BLD AUTO: 7.81 10*3/MM3 (ref 3.4–10.8)
WBC UR QL AUTO: ABNORMAL /HPF

## 2021-06-17 PROCEDURE — 85025 COMPLETE CBC W/AUTO DIFF WBC: CPT | Performed by: NURSE PRACTITIONER

## 2021-06-17 PROCEDURE — 80053 COMPREHEN METABOLIC PANEL: CPT | Performed by: NURSE PRACTITIONER

## 2021-06-17 PROCEDURE — 82043 UR ALBUMIN QUANTITATIVE: CPT | Performed by: NURSE PRACTITIONER

## 2021-06-17 PROCEDURE — 82570 ASSAY OF URINE CREATININE: CPT | Performed by: NURSE PRACTITIONER

## 2021-06-17 PROCEDURE — 87205 SMEAR GRAM STAIN: CPT | Performed by: NURSE PRACTITIONER

## 2021-06-17 PROCEDURE — 87186 SC STD MICRODIL/AGAR DIL: CPT | Performed by: NURSE PRACTITIONER

## 2021-06-17 PROCEDURE — 87070 CULTURE OTHR SPECIMN AEROBIC: CPT | Performed by: NURSE PRACTITIONER

## 2021-06-17 PROCEDURE — 87147 CULTURE TYPE IMMUNOLOGIC: CPT | Performed by: NURSE PRACTITIONER

## 2021-06-17 PROCEDURE — 83036 HEMOGLOBIN GLYCOSYLATED A1C: CPT | Performed by: NURSE PRACTITIONER

## 2021-06-17 PROCEDURE — 81001 URINALYSIS AUTO W/SCOPE: CPT | Performed by: NURSE PRACTITIONER

## 2021-06-17 PROCEDURE — 99214 OFFICE O/P EST MOD 30 MIN: CPT | Performed by: NURSE PRACTITIONER

## 2021-06-17 PROCEDURE — 80061 LIPID PANEL: CPT | Performed by: NURSE PRACTITIONER

## 2021-06-17 PROCEDURE — 84443 ASSAY THYROID STIM HORMONE: CPT | Performed by: NURSE PRACTITIONER

## 2021-06-17 RX ORDER — INSULIN DETEMIR 100 [IU]/ML
66 INJECTION, SOLUTION SUBCUTANEOUS DAILY
Qty: 5 ML | Refills: 12 | Status: SHIPPED | OUTPATIENT
Start: 2021-06-17 | End: 2022-06-07 | Stop reason: SDUPTHER

## 2021-06-17 RX ORDER — EPINEPHRINE 0.3 MG/.3ML
0.3 INJECTION SUBCUTANEOUS
COMMUNITY

## 2021-06-17 RX ORDER — METOPROLOL TARTRATE 100 MG/1
100 TABLET ORAL EVERY 12 HOURS SCHEDULED
Qty: 60 TABLET | Refills: 2 | Status: SHIPPED | OUTPATIENT
Start: 2021-06-17 | End: 2021-09-14

## 2021-06-17 RX ORDER — POTASSIUM CHLORIDE 1500 MG/1
20 TABLET, FILM COATED, EXTENDED RELEASE ORAL DAILY
Qty: 90 TABLET | Refills: 1 | Status: SHIPPED | OUTPATIENT
Start: 2021-06-17 | End: 2022-01-12

## 2021-06-17 RX ORDER — LISINOPRIL 20 MG/1
20 TABLET ORAL DAILY
Qty: 100 TABLET | Refills: 1 | Status: SHIPPED | OUTPATIENT
Start: 2021-06-17 | End: 2022-01-12

## 2021-06-17 RX ORDER — FUROSEMIDE 40 MG/1
40 TABLET ORAL DAILY
Qty: 30 TABLET | Refills: 0 | Status: SHIPPED | OUTPATIENT
Start: 2021-06-17 | End: 2021-08-16

## 2021-06-17 RX ORDER — ROSUVASTATIN CALCIUM 20 MG/1
20 TABLET, COATED ORAL NIGHTLY
Qty: 30 TABLET | Refills: 2 | Status: SHIPPED | OUTPATIENT
Start: 2021-06-17 | End: 2022-04-08 | Stop reason: ALTCHOICE

## 2021-06-17 NOTE — PROGRESS NOTES
Follow Up Office Visit      Patient Name: Jd Velazquez  : 1965   MRN: [id number]     Chief Complaint:    Chief Complaint   Patient presents with   • Cellulitis   • Diabetes   • Hypertension   • Hyperlipidemia   • Coronary Artery Disease       History of Present Illness: Jd Velazquez is a 55 y.o. male who is here today to follow up for DM2, HYPERLIPIDEMIA, CAD, HTN, HX OF CABG X4   States he has been out of medications for blood pressure and cholesterol   Pt reports average glucose 160 at home  Also follow up ER [facility] on Abscess and Cellulitis of back except buttock    Subjective      Review of Systems:   Review of Systems   Constitutional: Positive for chills and fever.   HENT: Negative for postnasal drip, rhinorrhea, sneezing and sore throat.    Eyes: Negative for discharge.   Respiratory: Negative for cough and shortness of breath.    Cardiovascular: Negative for chest pain and leg swelling.   Gastrointestinal: Negative for abdominal distention, diarrhea, nausea and vomiting.   Endocrine: Negative for polydipsia and polyuria.   Genitourinary: Negative for dysuria.   Skin: Positive for wound.   Neurological: Positive for headaches. Negative for numbness.        Past Medical History:   Past Medical History:   Diagnosis Date   • Allergic rhinitis 2015   • Benign essential hypertension 2016   • CAD (coronary artery disease)    • Diabetes mellitus (CMS/[facility])    • Hyperlipidemia    • Hypertension    • Hypokalemia 2016   • Hypothyroidism 2014   • Insomnia, unspecified 06/15/2016   • Microalbuminuria due to type 2 diabetes mellitus (CMS/HCC) 10/15/2015   • Mixed hyperlipidemia 10/15/2015   • Myocardial infarction (CMS/[facility])    • Obesity        Past Surgical History:   Past Surgical History:   Procedure Laterality Date   • ANKLE ARTHROSCOPY W/ OPEN REPAIR     • APPENDECTOMY     • CARDIAC CATHETERIZATION      PCI to circumflex   • CORONARY ARTERY BYPASS GRAFT N/A  10/8/2020    Procedure: STERNOTOMY, CORONARY ARTERY BYPASS GRAFTING TIME 4 WITH LEFT KELSI  AND ENDOSCOPICALLY HARVESTED LEFT GREATER SAPHENOUS VEIN AND PRP.;  Surgeon: Jr Girma Chiu MD;  Location: [facility];  Service: Cardiothoracic;  Laterality: N/A;   • HEEL SPUR SURGERY     • HERNIA REPAIR     • REPLACEMENT TOTAL KNEE BILATERAL     • TONSILLECTOMY         Family History:   Family History   Problem Relation Age of Onset   • Heart disease Other        Social History:   Social History     Socioeconomic History   • Marital status:      Spouse name: Not on file   • Number of children: Not on file   • Years of education: Not on file   • Highest education level: Not on file   Tobacco Use   • Smoking status: Former Smoker     Packs/day: 1.00     Quit date: 10/20/2020     Years since quittin.6   • Smokeless tobacco: Never Used   Substance and Sexual Activity   • Alcohol use: Never   • Drug use: Never   • Sexual activity: Defer       Medications:     Current Outpatient Medications:   •  EPINEPHrine (EpiPen 2-Wang) 0.3 MG/0.3ML solution auto-injector injection, 0.3 mL., Disp: , Rfl:   •  furosemide (LASIX) 40 MG tablet, Take 1 tablet by mouth Daily for 30 days., Disp: 30 tablet, Rfl: 0  •  glucose blood test strip, Test 4 times per day., Disp: 100 each, Rfl: 0  •  insulin detemir (Levemir) 100 UNIT/ML injection, Inject 66 Units under the skin into the appropriate area as directed Daily., Disp: 5 mL, Rfl: 12  •  Insulin Pen Needle (Unifine Pentips) 31G X 6 MM misc, Use 4 times daily with insulin, Disp: 100 each, Rfl: 0  •  lisinopril (PRINIVIL,ZESTRIL) 20 MG tablet, Take 1 tablet by mouth Daily., Disp: 100 tablet, Rfl: 1  •  metoprolol tartrate (LOPRESSOR) 100 MG tablet, Take 1 tablet by mouth Every 12 (Twelve) Hours for 90 days., Disp: 60 tablet, Rfl: 2  •  OneTouch Delica Lancets 33G misc, test 4 times per day, Disp: 100 each, Rfl: 0  •  potassium chloride ER (K-TAB) 20 MEQ tablet controlled-release  "ER tablet, Take 1 tablet by mouth Daily., Disp: 90 tablet, Rfl: 1  •  rosuvastatin (CRESTOR) 20 MG tablet, Take 1 tablet by mouth Every Night for 90 days., Disp: 30 tablet, Rfl: 2  •  sennosides-docusate (PERICOLACE) 8.6-50 MG per tablet, Take 2 tablets by mouth At Night As Needed for Constipation., Disp: , Rfl:     Allergies:   Allergies   Allergen Reactions   • Hydrocodone Itching       PHQ-2 Total Score: 0   PHQ-9 Total Score: 0     Objective     Physical Exam:  Vital Signs:   Vitals:    06/17/21 1610   BP: (!) 194/81   Pulse: 85   Temp: 99 °F (37.2 °C)   SpO2: 96%   Weight: 117 kg (258 lb)   Height: 182.9 cm (72\")     Body mass index is 34.99 kg/m².     Physical Exam  Constitutional:       Appearance: Normal appearance.   HENT:      Head: Normocephalic.      Right Ear: Tympanic membrane normal.      Left Ear: Tympanic membrane normal.      Nose: Nose normal.      Mouth/Throat:      Mouth: Mucous membranes are moist.      Pharynx: Oropharynx is clear.   Eyes:      Conjunctiva/sclera: Conjunctivae normal.      Pupils: Pupils are equal, round, and reactive to light.   Cardiovascular:      Rate and Rhythm: Normal rate and regular rhythm.      Heart sounds: Normal heart sounds. No murmur heard.     Pulmonary:      Effort: Pulmonary effort is normal.      Breath sounds: Normal breath sounds.   Abdominal:      General: Abdomen is flat. Bowel sounds are normal.      Palpations: Abdomen is soft.   Musculoskeletal:      Cervical back: Neck supple.      Right lower leg: No edema.      Left lower leg: No edema.   Skin:     General: Skin is warm and dry.      Capillary Refill: Capillary refill takes less than 2 seconds.      Comments: Open wound left middle back with purulent drainage incision approximately 3 cm    Neurological:      Mental Status: He is alert and oriented to person, place, and time.   Psychiatric:         Mood and Affect: Mood normal.         Behavior: Behavior normal.             Assessment / Plan  "     Assessment/Plan:   Diagnoses and all orders for this visit:    1. Cellulitis of back (Primary)    2. Abscess    3. Hypothyroidism, unspecified type  -     TSH    4. Type 2 diabetes mellitus with hyperglycemia, without long-term current use of insulin (CMS/[facility])  -     Comprehensive Metabolic Panel  -     CBC Auto Differential  -     Microalbumin / Creatinine Urine Ratio - Urine, Clean Catch  -     Hemoglobin A1c    5. Hypertension, unspecified type  -     Urinalysis With Culture If Indicated -    6. Hyperlipidemia, unspecified hyperlipidemia type  -     Lipid Panel; Future    7. Coronary artery disease of native artery of native heart with stable angina pectoris (CMS/[facility])    8. Allergic rhinitis, unspecified seasonality, unspecified trigger    Other orders  -     furosemide (LASIX) 40 MG tablet; Take 1 tablet by mouth Daily for 30 days.  Dispense: 30 tablet; Refill: 0  -     lisinopril (PRINIVIL,ZESTRIL) 20 MG tablet; Take 1 tablet by mouth Daily.  Dispense: 100 tablet; Refill: 1  -     metoprolol tartrate (LOPRESSOR) 100 MG tablet; Take 1 tablet by mouth Every 12 (Twelve) Hours for 90 days.  Dispense: 60 tablet; Refill: 2  -     potassium chloride ER (K-TAB) 20 MEQ tablet controlled-release ER tablet; Take 1 tablet by mouth Daily.  Dispense: 90 tablet; Refill: 1  -     rosuvastatin (CRESTOR) 20 MG tablet; Take 1 tablet by mouth Every Night for 90 days.  Dispense: 30 tablet; Refill: 2  -     insulin detemir (Levemir) 100 UNIT/ML injection; Inject 66 Units under the skin into the appropriate area as directed Daily.  Dispense: 5 mL; Refill: 12         Diabetes mellitus type 2 will obtain hemoglobin A1c today in office  Hypertension uncontrolled we will refill medications with a blood pressure check in 2 weeks  Hyperlipidemia will obtain lipid panel and CMP to monitor statin  Coronary artery disease recommend continue use of statin follow-up with cardiology as scheduled  Hypothyroidism will obtain TSH to  monitor  Abscess cellulitis of back will obtain wound culture blood cultures were obtained in the ER continue current antibiotics until culture results are back continue to clean with soap and water daily and dress    Follow Up:   Return in about 3 months (around 9/17/2021).    SOHAM Broderick      Please note that portions of this note may have been completed with a voice recognition program. Efforts were made to edit the dictations, but occasionally words are mistranscribed.

## 2021-06-18 LAB
ALBUMIN UR-MCNC: 68 MG/DL
CREAT UR-MCNC: 51.9 MG/DL
MICROALBUMIN/CREAT UR: 1310.2 MG/G

## 2021-06-18 RX ORDER — LEVOTHYROXINE SODIUM 0.03 MG/1
25 TABLET ORAL DAILY
Qty: 90 TABLET | Refills: 1 | Status: SHIPPED | OUTPATIENT
Start: 2021-06-18 | End: 2022-03-01

## 2021-06-20 LAB
BACTERIA SPEC AEROBE CULT: NORMAL
BACTERIA SPEC AEROBE CULT: NORMAL

## 2021-06-21 LAB
BACTERIA SPEC AEROBE CULT: ABNORMAL
GRAM STN SPEC: ABNORMAL
GRAM STN SPEC: ABNORMAL

## 2021-07-30 ENCOUNTER — OFFICE VISIT (OUTPATIENT)
Dept: CARDIOLOGY | Facility: CLINIC | Age: 56
End: 2021-07-30

## 2021-07-30 VITALS
WEIGHT: 255 LBS | SYSTOLIC BLOOD PRESSURE: 145 MMHG | DIASTOLIC BLOOD PRESSURE: 65 MMHG | BODY MASS INDEX: 34.54 KG/M2 | HEIGHT: 72 IN | HEART RATE: 67 BPM

## 2021-07-30 DIAGNOSIS — E66.9 OBESITY (BMI 30.0-34.9): ICD-10-CM

## 2021-07-30 DIAGNOSIS — I10 ESSENTIAL HYPERTENSION: ICD-10-CM

## 2021-07-30 DIAGNOSIS — E78.2 MIXED HYPERLIPIDEMIA: ICD-10-CM

## 2021-07-30 DIAGNOSIS — I25.10 CORONARY ARTERY DISEASE INVOLVING NATIVE CORONARY ARTERY OF NATIVE HEART WITHOUT ANGINA PECTORIS: Primary | ICD-10-CM

## 2021-07-30 DIAGNOSIS — Z72.0 TOBACCO ABUSE: ICD-10-CM

## 2021-07-30 DIAGNOSIS — Z95.1 S/P CABG X 4: ICD-10-CM

## 2021-07-30 DIAGNOSIS — Z78.9 STATIN INTOLERANCE: ICD-10-CM

## 2021-07-30 PROCEDURE — 99406 BEHAV CHNG SMOKING 3-10 MIN: CPT | Performed by: INTERNAL MEDICINE

## 2021-07-30 PROCEDURE — 99214 OFFICE O/P EST MOD 30 MIN: CPT | Performed by: INTERNAL MEDICINE

## 2021-07-30 RX ORDER — LEVOCETIRIZINE DIHYDROCHLORIDE 5 MG/1
5 TABLET, FILM COATED ORAL EVERY EVENING
COMMUNITY
End: 2022-10-11 | Stop reason: ALTCHOICE

## 2021-07-30 NOTE — PROGRESS NOTES
Chief Complaint  Hypertension and Hyperlipidemia    Subjective            Jd Velazquez presents to [facility] CARDIOLOGY  History of Present Illness  Mr. Velazquez presents for routine follow up and states he is feeling well with no new problems reported.  He remains at his baseline physical activity level and does not report any episodes of chest pain/pressure, exertional dyspnea, or decreased exercise tolerance.  He has a history of multivessel coronary artery disease and underwent four-vessel CABG at [facility] in October 2020.  His LV systolic function was preserved per his post-surgical evaluation last year.  Mr. Velazquez states he is not tolerating therapy with rosuvastatin and has not taken this medication in months.  He previously failed to tolerate multiple other statins, including atorvastatin and simvastatin, due to severe myalgias.  His recent LDL was 114.  He does report good compliance with his other medications.  No palpitations, orthopnea, PND, peripheral edema, lightheadedness, or syncope reported.  Unfortunately, he states that he started smoking again (he quit after his CABG last year for several months) due to increased stress at his job.  He is currently smoking approximately 1/2 pack per day, but does express interest in quitting and is attempting to wean down his cigarette use.       Past Medical History:   Diagnosis Date   • Allergic rhinitis 01/12/2015   • Benign essential hypertension 03/08/2016   • CAD (coronary artery disease)    • Diabetes mellitus (CMS/[facility])    • Hyperlipidemia    • Hypertension    • Hypokalemia 02/23/2016   • Hypothyroidism 04/24/2014   • Insomnia, unspecified 06/15/2016   • Microalbuminuria due to type 2 diabetes mellitus (CMS/[facility]) 10/15/2015   • Mixed hyperlipidemia 10/15/2015   • Myocardial infarction (CMS/[facility])    • Obesity        Past Surgical History:   • ANKLE ARTHROSCOPY W/ OPEN REPAIR   • APPENDECTOMY   • CARDIAC CATHETERIZATION     PCI to circumflex   • CORONARY ARTERY BYPASS GRAFT    Procedure: STERNOTOMY, CORONARY ARTERY BYPASS GRAFTING TIME 4 WITH LEFT KELSI  AND ENDOSCOPICALLY HARVESTED LEFT GREATER SAPHENOUS VEIN AND PRP.;  Surgeon: Jr Girma Chiu MD;  Location: [facility];  Service: Cardiothoracic;  Laterality: N/A;   • HEEL SPUR SURGERY   • HERNIA REPAIR   • REPLACEMENT TOTAL KNEE BILATERAL   • TONSILLECTOMY       Social History     Tobacco Use   • Smoking status: Current Some Day Smoker     Packs/day: 1.00     Last attempt to quit: 10/20/2020     Years since quittin.7   • Smokeless tobacco: Never Used   Vaping Use   • Vaping Use: Never used   Substance Use Topics   • Alcohol use: Never   • Drug use: Never       Family History   Problem Relation Age of Onset   • Heart disease Other         Current Outpatient Medications on File Prior to Visit   Medication Sig   • levocetirizine (XYZAL) 5 MG tablet Take 5 mg by mouth Every Evening.   • levothyroxine (SYNTHROID, LEVOTHROID) 25 MCG tablet Take 1 tablet by mouth Daily.   • lisinopril (PRINIVIL,ZESTRIL) 20 MG tablet Take 1 tablet by mouth Daily.   • metoprolol tartrate (LOPRESSOR) 100 MG tablet Take 1 tablet by mouth Every 12 (Twelve) Hours for 90 days.   • OneTouch Delica Lancets 33G misc test 4 times per day   • potassium chloride ER (K-TAB) 20 MEQ tablet controlled-release ER tablet Take 1 tablet by mouth Daily.   • EPINEPHrine (EpiPen 2-Wang) 0.3 MG/0.3ML solution auto-injector injection 0.3 mL.   • furosemide (LASIX) 40 MG tablet Take 1 tablet by mouth Daily for 30 days.   • glucose blood test strip Test 4 times per day.   • insulin detemir (Levemir) 100 UNIT/ML injection Inject 66 Units under the skin into the appropriate area as directed Daily.   • Insulin Pen Needle (Unifine Pentips) 31G X 6 MM misc Use 4 times daily with insulin   • rosuvastatin (CRESTOR) 20 MG tablet Take 1 tablet by mouth Every Night for 90 days.   • sennosides-docusate (PERICOLACE) 8.6-50 MG per  "tablet Take 2 tablets by mouth At Night As Needed for Constipation.     No current facility-administered medications on file prior to visit.       Allergies   Allergen Reactions   • Hydrocodone Itching       Review of Systems   Constitutional: Positive for fatigue. Negative for chills, fever, unexpected weight gain and unexpected weight loss.   HENT: Negative for hearing loss, nosebleeds and sore throat.    Eyes: Negative for pain and visual disturbance.   Respiratory: Positive for cough. Negative for shortness of breath and wheezing.    Cardiovascular: Negative for chest pain, palpitations and leg swelling.   Gastrointestinal: Negative for abdominal pain, nausea and vomiting.   Endocrine: Negative for cold intolerance and heat intolerance.   Genitourinary: Negative for dysuria and hematuria.   Musculoskeletal: Positive for arthralgias. Negative for myalgias.   Skin: Negative for color change, pallor and rash.   Neurological: Negative for syncope, weakness, light-headedness and headache.   Hematological: Negative for adenopathy. Does not bruise/bleed easily.        Objective     /65 (BP Location: Left arm, Patient Position: Sitting)   Pulse 67   Ht 182.9 cm (72\")   Wt 116 kg (255 lb)   BMI 34.58 kg/m²       Physical Exam  Constitutional:       General: He is not in acute distress.     Appearance: Normal appearance.   HENT:      Head: Atraumatic.      Mouth/Throat:      Mouth: Mucous membranes are moist.      Pharynx: Oropharynx is clear. No oropharyngeal exudate.   Eyes:      General: No scleral icterus.     Conjunctiva/sclera: Conjunctivae normal.   Neck:      Vascular: No carotid bruit or JVD.   Cardiovascular:      Rate and Rhythm: Normal rate and regular rhythm.      Chest Wall: PMI is not displaced.      Pulses: Normal pulses.           Radial pulses are 2+ on the right side and 2+ on the left side.      Heart sounds: S1 normal and S2 normal. No murmur heard.   No friction rub. No gallop. No S3 or S4 " sounds.    Pulmonary:      Effort: Pulmonary effort is normal. No tachypnea or respiratory distress.      Breath sounds: Examination of the right-lower field reveals rhonchi. Rhonchi present. No decreased breath sounds, wheezing or rales.   Chest:      Chest wall: No tenderness.   Abdominal:      General: Bowel sounds are normal. There is no distension.      Palpations: Abdomen is soft. There is no mass.      Tenderness: There is no abdominal tenderness.      Comments: Obese.   Musculoskeletal:         General: No swelling, tenderness or deformity.      Cervical back: Neck supple. No tenderness.      Right lower leg: No edema.      Left lower leg: No edema.   Skin:     General: Skin is warm and dry.      Coloration: Skin is not jaundiced.      Findings: No erythema or rash.      Nails: There is no clubbing.   Neurological:      General: No focal deficit present.      Mental Status: He is alert and oriented to person, place, and time.      Motor: No weakness.   Psychiatric:         Mood and Affect: Mood normal.         Behavior: Behavior normal.         Result Review :     The following data was reviewed by: Harry Noel MD on 07/30/2021:    CMP    CMP 12/6/20 6/14/21 6/17/21   Glucose 124 (A) 360 (A) 417 (A)   BUN 9 12 10   Creatinine 0.73 (A) 0.90 1.01   eGFR Non African Am 112 88 77   Sodium 134 (A) 131 (A) 132 (A)   Potassium 4.0 3.8 4.4   Chloride 99 95 (A) 94 (A)   Calcium 9.0 9.3 9.6   Albumin 4.00 4.10 4.30   Total Bilirubin 0.5 0.3 0.2   Alkaline Phosphatase 116 134 (A) 129 (A)   AST (SGOT) 11 9 10   ALT (SGPT) 7 7 11   (A) Abnormal value            CBC    CBC 12/6/20 6/14/21 6/17/21   WBC 9.97 12.97 (A) 7.81   RBC 4.14 4.55 4.66   Hemoglobin 12.6 (A) 14.2 14.2   Hematocrit 37.2 (A) 42.2 43.1   MCV 89.9 92.7 92.5   MCH 30.4 31.2 30.5   MCHC 33.9 33.6 32.9   RDW 13.9 13.9 13.8   Platelets 351 348 405   (A) Abnormal value            Lipid Panel    Lipid Panel 10/7/20 10/7/20 10/16/20 6/17/21    0943 0943      Total Cholesterol 213 (A)   192   Total Cholesterol   118    Triglycerides 790 (A)  155 (A) 270 (A)   HDL Cholesterol 29 (A)  25 (A) 31 (A)   VLDL Cholesterol   31 47 (A)   LDL Cholesterol   89 62 (A) 114 (A)   LDL/HDL Ratio    3.45   (A) Abnormal value       Comments are available for some flowsheets but are not being displayed.              Assessment and Plan      Diagnoses and all orders for this visit:    1. Coronary artery disease involving native coronary artery of native heart without angina pectoris (Primary)  -     Alirocumab 75 MG/ML solution auto-injector; Inject 75 mg under the skin into the appropriate area as directed Every 14 (Fourteen) Days.  Dispense: 2 pen; Refill: 11  -     Lipid Panel; Future  -     Comprehensive Metabolic Panel; Future    2. S/P CABG x 4  -     Alirocumab 75 MG/ML solution auto-injector; Inject 75 mg under the skin into the appropriate area as directed Every 14 (Fourteen) Days.  Dispense: 2 pen; Refill: 11  -     Lipid Panel; Future    3. Mixed hyperlipidemia  -     Alirocumab 75 MG/ML solution auto-injector; Inject 75 mg under the skin into the appropriate area as directed Every 14 (Fourteen) Days.  Dispense: 2 pen; Refill: 11  -     Lipid Panel; Future  -     Comprehensive Metabolic Panel; Future    4. Statin intolerance  -     Alirocumab 75 MG/ML solution auto-injector; Inject 75 mg under the skin into the appropriate area as directed Every 14 (Fourteen) Days.  Dispense: 2 pen; Refill: 11  -     Lipid Panel; Future    5. Obesity (BMI 30.0-34.9)  -     Comprehensive Metabolic Panel; Future    6. Essential hypertension    7. Tobacco abuse    -CAD s/p 4 vessel CABG:  Stable, no anginal symptoms or significant exertional dyspnea reported.  Continue current medical therapy with aspirin and metoprolol.  He is unable to tolerate rosuvastatin due to severe myalgias and has failed to tolerate several other statins in the past, so will start Praluent 75 mg SQ every two weeks.   Smoking cessation was strongly recommended.    -Hyperlipidemia with statin intolerance:  Start Praluent as above.  Recent WTE=619.  He has failed to tolerate rosuvastatin, atorvastatin, and simvastatin in the past.    -Hypertension:  BP adequately controlled on current medication; continue same.    -Obesity:  BMI stable at 34.6 today.  Significant weight loss was recommended and both dietary and exercise strategies were reviewed to assist with this goal.    -Tobacco abuse: Extensive cessation counseling was provided.  He previously quit smoking last year, but has started again due to increased stress at his job.  He previously failed to tolerate Chantix and did not have success with nicotine replacement.  He is attempting to cut down on smoking on his own for now.      Follow Up     Return in about 8 months (around 3/30/2022).    Patient was given instructions and counseling regarding his condition or for health maintenance advice. Please see specific information pulled into the AVS if appropriate.     Jd Velazquez  reports that he has been smoking. He has been smoking about 1.00 pack per day. He has never used smokeless tobacco.. I have educated him on the risk of diseases from using tobacco products such as cancer, COPD and heart disease.     I advised him to quit and he is willing to quit. We have discussed the following method/s for tobacco cessation:  Counseling [place] OTC Cessation Products.  He will continue to work on weaning himself off cigarettes (he previously quit smoking for months), but wishes to defer setting a quit date for now.    I spent 5 minutes counseling the patient.

## 2021-08-09 ENCOUNTER — OFFICE VISIT (OUTPATIENT)
Dept: SLEEP MEDICINE | Facility: HOSPITAL | Age: 56
End: 2021-08-09

## 2021-08-09 VITALS
HEIGHT: 72 IN | WEIGHT: 258 LBS | BODY MASS INDEX: 34.95 KG/M2 | SYSTOLIC BLOOD PRESSURE: 165 MMHG | HEART RATE: 59 BPM | OXYGEN SATURATION: 96 % | DIASTOLIC BLOOD PRESSURE: 71 MMHG

## 2021-08-09 DIAGNOSIS — E66.9 CLASS 2 OBESITY: ICD-10-CM

## 2021-08-09 DIAGNOSIS — G47.33 OSA TREATED WITH BIPAP: Primary | ICD-10-CM

## 2021-08-09 DIAGNOSIS — I10 HYPERTENSION, UNSPECIFIED TYPE: ICD-10-CM

## 2021-08-09 DIAGNOSIS — Z95.1 S/P CABG X 4: ICD-10-CM

## 2021-08-09 PROCEDURE — 99244 OFF/OP CNSLTJ NEW/EST MOD 40: CPT | Performed by: INTERNAL MEDICINE

## 2021-08-09 NOTE — PROGRESS NOTES
[place] [id number], [facility]  [place]: [address] [place], [place] [address]  Phone: [phone]  Fax; [phone]      Jd Velazquez  1965  55 y.o.  male      Referring physician/provider and PCP Dacia Newsome APRN    Type of service: Initial Sleep Medicine Consult.  Date of service: 8/9/2021      Chief Complaint   Patient presents with   • Sleep Apnea   • Obesity       History of present illness;  Thank you for asking to see Jd Velazquez, 55 y.o..  Patient has severe sleep apnea with AHI of 96/h.  He had a diagnostic polysomnography on 13 April year 2016 at [place], underwent a in lab titration and is on a BiPAP of 23/19.  Unfortunately he lost the follow-up did not follow-up.  Since his last visit in 2016 he had a heart attack and had coronary artery bypass surgery.  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  He works in [place].  He says that he feels well rested when he wakes up.  Patient gives the following sleep history.  Sleep schedule:  Bedtime: 12 midnight  Wake time: 11 AM  Normally takes about 5 minutes to fall asleep  Average hours of sleep 8  Number of naps per day no    MEDICAL CONDITIONS (PMH)  1. Severe obstructive sleep apnea on BiPAP  2. Coronary artery disease status post bypass  3. Hypertension  4. Diabetes mellitus    Social history:  Do you drive a commercial vehicle:  No   Shift work:  Yes   Tobacco use:  No   Alcohol use: 2 per week  Caffeinated drinks: 3    Family Hx (your parents and siblings)  1. No history of sleep apnea    Medications: reviewed    Review of systems:  Sleep: Positve for snoring,witnessed apnea and daytime excessive sleepiness with Ze[place] Sleepiness Scale of Total score: 3   Kidney/ Bladder  Difficulty In Urination: negative  Bed Wetting: negative  Frequent Urination: negative  HEENT  Recurrent Nose Bleeds: negative  Ear pain: negative  Sores In Mouth: negative  Persistent  "Hoarseness: negative  Nasal Congestion: negative  Post Nasal Drip: negative  Musculoskeletal:  Neck Pain: negative  Temporomandibular Joint Pain: negative  General:  Fever: negative  Fatigue: positive  Vardiovascular:  Irregular Heartbeat: negative  Swollen Ankles Or Legs: negative  Respiratory:  Shortness Of Breath: negative  Wheezing: negative  Neuro/Paych:  Fainting Spells: negative  Dizziness: negative  Anxiety: negative  Depression: negative  Gastrointestinal:  Problem Swallowing: negative  Frequent Heartburn: negative  Abdominal Bloating: negative  Skin:  Rash: negative  Change In Nails: negative  Endocrine:  Excessive Thirst: negative  Always Too Cold: negative  Always Too Warm: negative  Hem/Lymphatic:  Swollen Glands: negative  Burses/ Bleeds Easily: negative    Physical exam:  CONSTITUTINONAL:  Vitals:    08/09/21 0900   BP: 165/71   Pulse: 59   SpO2: 96%   Weight: 117 kg (258 lb)   Height: 182.9 cm (72\")    Body mass index is 34.99 kg/m².   HEAD: atraumatic, normocephalic   EYES:pupils are round, equal and reacting to light and accommodation, conjunctiva normal  NOSE:no nasal septal defects, nasal passages are clear, no nasal polyps,   THOART: tonsils are not enlarged, tongue normal size, oral airway Mallampati class 3  NECK: , trachea is in the midline, thyroid not enlarged  RESPIRATORY SYSTEM: Breath sounds are normal, there are no wheezes  CARDIOVASULAR SYSTEM: Heart sounds are regular rhythm and normal rate, there are no murmurs or thrills, no edema  GASTROINTESTIAN: Soft and non-tender,liver not enlarged, no evidence of ascites  MUSCULOSKELETAL SYSTEM: Full range of motion's in all the 4 extremities, neck movement not restricted, temporomandibular joint movement normal and no tenderness  SKIN: Warm and moist, no cyanosis, no clubbing,  NEUROLOGICAL SYSTEM: Oriented x 3, no gross neurological defects, No speech defect, gait is normal  PYSCHATRIC SYSTEM: Mood is normal, thought content is normal    I " have reviewed the diagnostic polysomnography which was conducted on 13 April 2016 which shows severe sleep apnea with AHI of 96/h..    The Smart card downloaded on 8/9/2021 has been independently reviewed by me and discussed the data with the patient. It shows the following..  Compliance; 100%  > 4 hr use, 93%  Average use of the device 7 hours and 35 minutes per night  Residual AHI: 0.6 /hr (normal less than 5)  Mask type: Fullface  DME: [facility]  BiPAP 23/19      Labs reviewed.  TSH Results:  TSH    TSH 10/16/20 6/17/21   TSH 6.910 (A) 4.610 (A)   (A) Abnormal value             Most Recent A1C    HGBA1C Most Recent 6/17/21   Hemoglobin A1C 9.99 (A)   (A) Abnormal value               Assessment and plan:  Obstructive sleep apnea ( G 47.33).  The symptoms of sleep apnea have improved with the device and the treatment.  Patient's compliance with the device is excellent for treatment of sleep apnea.  I have independently reviewed the smart card down load and discussed with the patient the download data and encouarged the patient to continue to use the device.The residual AHI is acceptable. The device is benefiting the patient and the device is medically necessary. Without proper control of sleep apnea and good compliance there is a increased risk for hypertension, diabetes mellitus and nonrestorative sleep with hypersomnia which can increase risk for motor vehicle accidents.  Untreated sleep apnea is also a risk factor for development of atrial fibrillation, pulmonary hypertension and stroke.The patient is also instructed to get the supplies from the DME company and and change them on a regular basis.  A prescription for supplies has been sent to the DME company.  I have also discussed the good sleep hygiene habits and adequate amount of sleep needed for good health.  · Obesity class 1, patient's BMI is Body mass index is 34.99 kg/m².. I have discussed the relationship between weight and sleep  apnea.There is direct correction between weight and severity of sleep apnea.  Weight reduction is encouraged, as it is going to reduce the severity of sleep apnea. I have also discussed with the patient diet and exercise to achieve ideal body weight.  · Hypertension,    · History of coronary artery bypass surgery    I have also discussed with the patient the following  • Sleep hygiene: Maintaining a regular bedtime and wake time, not to watch television or work in bed, limit caffeine-containing beverages before bed time and avoid naps during the day  • Adequate amount of sleep.  Generally most people needs about 7 to 8 hours of sleep.    Return in about 1 year (around 8/9/2022) for Annual visit with [facility] download..  Patient's questions were answered      I once again thank you for asking me to see this patient in consultation and I have forwarded my opinion and treatment plan.  Please do not hesitate to call me if you have any questions.     Obey Collins MD  Sleep Medicine  Medical Director, [facility]  8/9/2021 ,

## 2021-08-16 RX ORDER — FUROSEMIDE 40 MG/1
TABLET ORAL
Qty: 30 TABLET | Refills: 1 | Status: SHIPPED | OUTPATIENT
Start: 2021-08-16 | End: 2021-10-14

## 2021-08-16 RX ORDER — LISINOPRIL 5 MG/1
TABLET ORAL
Qty: 90 TABLET | Refills: 1 | Status: SHIPPED | OUTPATIENT
Start: 2021-08-16 | End: 2022-03-24

## 2021-08-19 ENCOUNTER — TELEPHONE (OUTPATIENT)
Dept: CARDIOLOGY | Facility: CLINIC | Age: 56
End: 2021-08-19

## 2021-09-14 RX ORDER — METOPROLOL TARTRATE 100 MG/1
TABLET ORAL
Qty: 60 TABLET | Refills: 2 | Status: SHIPPED | OUTPATIENT
Start: 2021-09-14 | End: 2022-03-11

## 2021-10-14 RX ORDER — FUROSEMIDE 40 MG/1
TABLET ORAL
Qty: 27 TABLET | Refills: 0 | Status: SHIPPED | OUTPATIENT
Start: 2021-10-14 | End: 2021-11-15

## 2021-10-19 ENCOUNTER — PATIENT MESSAGE (OUTPATIENT)
Dept: CARDIOLOGY | Facility: CLINIC | Age: 56
End: 2021-10-19

## 2021-10-27 ENCOUNTER — TELEPHONE (OUTPATIENT)
Dept: FAMILY MEDICINE CLINIC | Facility: CLINIC | Age: 56
End: 2021-10-27

## 2021-10-27 NOTE — TELEPHONE ENCOUNTER
Caller: RAFAEL DANIELS     Relationship:     Best call back number:[phone]  What is the best time to reach you:ANYTIME    Who are you requesting to speak with (clinical staff, provider,  specific staff member): CLINICAL        What was the call regarding:SHE WAS CALLING IN TO STATE THAT SHE IS THE  FOR THE PATIENT WITH [facility].  SHE WANTED TO DISCUSS A PROGRAM THEY OFFER.    Do you require a callback:NO

## 2021-11-02 ENCOUNTER — PATIENT MESSAGE (OUTPATIENT)
Dept: CARDIOLOGY | Facility: CLINIC | Age: 56
End: 2021-11-02

## 2021-11-15 RX ORDER — FUROSEMIDE 40 MG/1
TABLET ORAL
Qty: 27 TABLET | Refills: 0 | Status: SHIPPED | OUTPATIENT
Start: 2021-11-15 | End: 2021-12-14

## 2021-12-14 RX ORDER — FUROSEMIDE 40 MG/1
TABLET ORAL
Qty: 27 TABLET | Refills: 0 | Status: SHIPPED | OUTPATIENT
Start: 2021-12-14 | End: 2022-06-07

## 2022-01-12 RX ORDER — LISINOPRIL 20 MG/1
TABLET ORAL
Qty: 30 TABLET | Refills: 1 | Status: SHIPPED | OUTPATIENT
Start: 2022-01-12 | End: 2022-04-08 | Stop reason: ALTCHOICE

## 2022-01-12 RX ORDER — POTASSIUM CHLORIDE 1500 MG/1
TABLET, FILM COATED, EXTENDED RELEASE ORAL
Qty: 30 TABLET | Refills: 0 | Status: SHIPPED | OUTPATIENT
Start: 2022-01-12 | End: 2022-02-22

## 2022-02-22 RX ORDER — POTASSIUM CHLORIDE 1500 MG/1
TABLET, FILM COATED, EXTENDED RELEASE ORAL
Qty: 30 TABLET | Refills: 0 | Status: SHIPPED | OUTPATIENT
Start: 2022-02-22 | End: 2022-03-28 | Stop reason: SDUPTHER

## 2022-03-01 RX ORDER — LEVOTHYROXINE SODIUM 0.03 MG/1
TABLET ORAL
Qty: 30 TABLET | Refills: 0 | Status: SHIPPED | OUTPATIENT
Start: 2022-03-01 | End: 2022-03-28 | Stop reason: SDUPTHER

## 2022-03-11 RX ORDER — METOPROLOL TARTRATE 100 MG/1
TABLET ORAL
Qty: 60 TABLET | Refills: 2 | Status: SHIPPED | OUTPATIENT
Start: 2022-03-11 | End: 2022-06-07 | Stop reason: SDUPTHER

## 2022-03-24 RX ORDER — LISINOPRIL 5 MG/1
TABLET ORAL
Qty: 30 TABLET | Refills: 2 | Status: SHIPPED | OUTPATIENT
Start: 2022-03-24 | End: 2022-04-08 | Stop reason: DRUGHIGH

## 2022-03-28 RX ORDER — POTASSIUM CHLORIDE 1500 MG/1
20 TABLET, FILM COATED, EXTENDED RELEASE ORAL DAILY
Qty: 30 TABLET | Refills: 0 | Status: SHIPPED | OUTPATIENT
Start: 2022-03-28 | End: 2022-04-25

## 2022-03-28 RX ORDER — LISINOPRIL 20 MG/1
TABLET ORAL
Qty: 30 TABLET | Refills: 1 | OUTPATIENT
Start: 2022-03-28

## 2022-03-28 RX ORDER — LEVOTHYROXINE SODIUM 0.03 MG/1
25 TABLET ORAL DAILY
Qty: 30 TABLET | Refills: 0 | Status: SHIPPED | OUTPATIENT
Start: 2022-03-28 | End: 2022-05-05

## 2022-04-08 ENCOUNTER — OFFICE VISIT (OUTPATIENT)
Dept: CARDIOLOGY | Facility: CLINIC | Age: 57
End: 2022-04-08

## 2022-04-08 VITALS
HEIGHT: 73 IN | HEART RATE: 66 BPM | DIASTOLIC BLOOD PRESSURE: 70 MMHG | SYSTOLIC BLOOD PRESSURE: 169 MMHG | WEIGHT: 263 LBS | BODY MASS INDEX: 34.85 KG/M2

## 2022-04-08 DIAGNOSIS — I25.10 CORONARY ARTERY DISEASE INVOLVING NATIVE CORONARY ARTERY OF NATIVE HEART WITHOUT ANGINA PECTORIS: Primary | ICD-10-CM

## 2022-04-08 DIAGNOSIS — Z95.1 S/P CABG X 4: ICD-10-CM

## 2022-04-08 DIAGNOSIS — E78.5 HYPERLIPIDEMIA LDL GOAL <70: ICD-10-CM

## 2022-04-08 DIAGNOSIS — Z72.0 TOBACCO ABUSE: ICD-10-CM

## 2022-04-08 DIAGNOSIS — I10 ESSENTIAL HYPERTENSION: ICD-10-CM

## 2022-04-08 PROBLEM — E66.9 OBESITY (BMI 30.0-34.9): Status: ACTIVE | Noted: 2022-04-08

## 2022-04-08 PROBLEM — E66.811 OBESITY (BMI 30.0-34.9): Status: ACTIVE | Noted: 2022-04-08

## 2022-04-08 PROCEDURE — 99214 OFFICE O/P EST MOD 30 MIN: CPT | Performed by: INTERNAL MEDICINE

## 2022-04-08 RX ORDER — AMLODIPINE BESYLATE 5 MG/1
5 TABLET ORAL DAILY
Qty: 90 TABLET | Refills: 3 | Status: SHIPPED | OUTPATIENT
Start: 2022-04-08 | End: 2022-06-07 | Stop reason: SDUPTHER

## 2022-04-08 RX ORDER — LISINOPRIL 20 MG/1
20 TABLET ORAL DAILY
Qty: 90 TABLET | Refills: 3 | Status: SHIPPED | OUTPATIENT
Start: 2022-04-08 | End: 2022-06-07

## 2022-04-08 NOTE — PROGRESS NOTES
Chief Complaint  Coronary Artery Disease and Hypertension    Subjective     {Problem List  Visit Diagnosis   Encounters  Notes  Medications  Labs  Result Review Imaging  Media :23}       Jd Velazquez presents to [facility]  History of Present Illness  Mr. Velazquez presents for routine follow-up today and states he has been feeling well.  No episodes of chest pain/pressure, exertional dyspnea, palpitations, orthopnea, PND, lightheadedness, or syncope reported.  He does experience some occasional very mild bilateral lower extremity edema, particularly after standing on his feet for 8-10 hours at work.  This symptom resolves with elevation of his legs for 1-2 hours.  He is tolerating therapy with Praluent very well with no side effects (other than some mild skin irritation after the first dose) reported.  He is intolerant of numerous statins and his last LDL level = 114 (prior to initiation of PCSK9 inhibitor therapy).  Unfortunately, he is still smoking approximately 1 pack per day, and he feels like he has too many stressors presently to consider quitting again.  He is agreeable to attempt to decrease his cigarette use.  Mr. Velazquez has a history of multivessel coronary artery disease and underwent four-vessel CABG at [facility] in October 2020.  His left ventricular systolic function was preserved per his post-surgical echo.  He states that his PCP recently decreased his lisinopril dose from 20 mg to 5 mg daily.  He reports that he received no communication regarding the rationale for this change, and his BP was significantly elevated today at 169/70 (multiple BP readings from his visits with other providers since his last visit here have also been significantly elevated).  The patient's latest labs showed normal renal function and he has no history of angioedema or reported side effects on lisinopril.      Past Medical History:   Diagnosis Date   • Allergic rhinitis  2015   • Benign essential hypertension 2016   • CAD (coronary artery disease)    • Diabetes mellitus (HCC)    • Hyperlipidemia    • Hypertension    • Hypokalemia 2016   • Hypothyroidism 2014   • Insomnia, unspecified 06/15/2016   • Microalbuminuria due to type 2 diabetes mellitus (HCC) 10/15/2015   • Mixed hyperlipidemia 10/15/2015   • Myocardial infarction (HCC)    • Obesity        Past Surgical History:   • ANKLE ARTHROSCOPY W/ OPEN REPAIR   • APPENDECTOMY   • CARDIAC CATHETERIZATION    PCI to circumflex   • CORONARY ARTERY BYPASS GRAFT    Procedure: STERNOTOMY, CORONARY ARTERY BYPASS GRAFTING TIME 4 WITH LEFT KELSI  AND ENDOSCOPICALLY HARVESTED LEFT GREATER SAPHENOUS VEIN AND PRP.;  Surgeon: Jr Girma Chiu MD;  Location: [facility];  Service: Cardiothoracic;  Laterality: N/A;   • HEEL SPUR SURGERY   • HERNIA REPAIR   • REPLACEMENT TOTAL KNEE BILATERAL   • TONSILLECTOMY       Social History     Tobacco Use   • Smoking status: Current Some Day Smoker     Packs/day: 1.00     Last attempt to quit: 10/20/2020     Years since quittin.4   • Smokeless tobacco: Never Used   Vaping Use   • Vaping Use: Never used   Substance Use Topics   • Alcohol use: Never   • Drug use: Never       Family History   Problem Relation Age of Onset   • Heart disease Other         Current Outpatient Medications on File Prior to Visit   Medication Sig   • Alirocumab 75 MG/ML solution auto-injector Inject 75 mg under the skin into the appropriate area as directed Every 14 (Fourteen) Days.   • EPINEPHrine (EPIPEN) 0.3 MG/0.3ML solution auto-injector injection 0.3 mL.   • glucose blood test strip Test 4 times per day. (Patient taking differently: Test 4 times per day.)   • Insulin Pen Needle (Unifine Pentips) 31G X 6 MM misc Use 4 times daily with insulin   • levocetirizine (XYZAL) 5 MG tablet Take 5 mg by mouth Every Evening.   • levothyroxine (SYNTHROID, LEVOTHROID) 25 MCG tablet Take 1 tablet by mouth Daily.  "  • metoprolol tartrate (LOPRESSOR) 100 MG tablet TAKE ONE TABLET BY MOUTH EVERY 12 HOURS   • OneTouch Delica Lancets 33G misc test 4 times per day (Patient taking differently: test 4 times per day)   • potassium chloride ER (K-TAB) 20 MEQ tablet controlled-release ER tablet Take 1 tablet by mouth Daily.   • [DISCONTINUED] lisinopril (PRINIVIL,ZESTRIL) 5 MG tablet TAKE ONE TABLET BY MOUTH DAILY   • furosemide (LASIX) 40 MG tablet TAKE ONE TABLET BY MOUTH DAILY   • insulin detemir (Levemir) 100 UNIT/ML injection Inject 66 Units under the skin into the appropriate area as directed Daily.   • [DISCONTINUED] lisinopril (PRINIVIL,ZESTRIL) 20 MG tablet TAKE ONE TABLET BY MOUTH DAILY   • [DISCONTINUED] rosuvastatin (CRESTOR) 20 MG tablet Take 1 tablet by mouth Every Night for 90 days.   • [DISCONTINUED] sennosides-docusate (PERICOLACE) 8.6-50 MG per tablet Take 2 tablets by mouth At Night As Needed for Constipation.     No current facility-administered medications on file prior to visit.       Allergies   Allergen Reactions   • Hydrocodone Itching       Review of Systems   Constitutional: Negative for chills and fever.   HENT: Negative for hearing loss, nosebleeds and sore throat.    Eyes: Negative for pain and visual disturbance.   Respiratory: Positive for cough. Negative for shortness of breath and wheezing.    Cardiovascular: Negative for chest pain and palpitations.   Gastrointestinal: Negative for abdominal pain, blood in stool and vomiting.   Genitourinary: Negative for dysuria and hematuria.   Musculoskeletal: Positive for back pain. Negative for joint swelling and myalgias.   Skin: Negative for color change, pallor and rash.   Neurological: Negative for tremors, syncope, light-headedness and headache.        Objective     /70   Pulse 66   Ht 185.4 cm (73\")   Wt 119 kg (263 lb)   BMI 34.70 kg/m²       Physical Exam  Constitutional:       General: He is not in acute distress.     Appearance: Normal " appearance.   HENT:      Head: Atraumatic.      Mouth/Throat:      Mouth: Mucous membranes are moist.      Pharynx: Oropharynx is clear. No oropharyngeal exudate.   Eyes:      General: No scleral icterus.     Conjunctiva/sclera: Conjunctivae normal.   Neck:      Vascular: No carotid bruit or JVD.   Cardiovascular:      Rate and Rhythm: Normal rate and regular rhythm.      Chest Wall: PMI is not displaced.      Pulses:           Radial pulses are 2+ on the right side and 2+ on the left side.      Heart sounds: S1 normal and S2 normal. No murmur heard.    No friction rub. No gallop. No S3 or S4 sounds.      Comments: Trace bilateral lower extremity edema.  Pulmonary:      Effort: Pulmonary effort is normal. Prolonged expiration present. No tachypnea or respiratory distress.      Breath sounds: No decreased breath sounds, wheezing, rhonchi or rales.   Chest:      Chest wall: No tenderness.   Abdominal:      General: Bowel sounds are normal. There is no distension.      Palpations: Abdomen is soft. There is no mass.      Tenderness: There is no abdominal tenderness.      Comments: Obese.   Musculoskeletal:         General: No swelling, tenderness or deformity.      Cervical back: Neck supple. No tenderness.   Skin:     General: Skin is warm and dry.      Coloration: Skin is not jaundiced.      Findings: No erythema or rash.      Nails: There is no clubbing.   Neurological:      General: No focal deficit present.      Mental Status: He is alert and oriented to person, place, and time.      Motor: No weakness.   Psychiatric:         Mood and Affect: Mood normal.         Behavior: Behavior normal.       Result Review :     The following data was reviewed by: Harry Noel MD on 04/08/2022:    CMP    CMP 6/14/21 6/17/21   Glucose 360 (A) 417 (A)   BUN 12 10   Creatinine 0.90 1.01   eGFR Non African Am 88 77   Sodium 131 (A) 132 (A)   Potassium 3.8 4.4   Chloride 95 (A) 94 (A)   Calcium 9.3 9.6   Albumin 4.10 4.30    Total Bilirubin 0.3 0.2   Alkaline Phosphatase 134 (A) 129 (A)   AST (SGOT) 9 10   ALT (SGPT) 7 11   (A) Abnormal value            CBC    CBC 6/14/21 6/17/21   WBC 12.97 (A) 7.81   RBC 4.55 4.66   Hemoglobin 14.2 14.2   Hematocrit 42.2 43.1   MCV 92.7 92.5   MCH 31.2 30.5   MCHC 33.6 32.9   RDW 13.9 13.8   Platelets 348 405   (A) Abnormal value            Lipid Panel    Lipid Panel 6/17/21   Total Cholesterol 192   Triglycerides 270 (A)   HDL Cholesterol 31 (A)   VLDL Cholesterol 47 (A)   LDL Cholesterol  114 (A)   LDL/HDL Ratio 3.45   (A) Abnormal value                 Assessment and Plan {CC Problem List  Visit Diagnosis  ROS  Review (Popup)  Health Maintenance  Quality  BestPractice  Medications  SmartSets  SnapShot Encounters  Media :23}     Diagnoses and all orders for this visit:    1. Coronary artery disease involving native coronary artery of native heart without angina pectoris (Primary)  -     Lipid Panel; Future  -     Comprehensive Metabolic Panel; Future  -     Hemoglobin A1c; Future    2. S/P CABG x 4  -     Hemoglobin A1c; Future    3. Essential hypertension  -     lisinopril (PRINIVIL,ZESTRIL) 20 MG tablet; Take 1 tablet by mouth Daily.  Dispense: 90 tablet; Refill: 3  -     amLODIPine (NORVASC) 5 MG tablet; Take 1 tablet by mouth Daily.  Dispense: 90 tablet; Refill: 3  -     Comprehensive Metabolic Panel; Future    4. Hyperlipidemia LDL goal <70  -     Lipid Panel; Future  -     Comprehensive Metabolic Panel; Future    5. Tobacco abuse    -CAD s/p 4 vessel CABG:  Stable, no anginal symptoms, exertional dyspnea, or other problems reported.  Continue aspirin and metoprolol at current doses.  He is intolerant of numerous statins, so we will continue therapy with alirocumab 75 mg every 14 days and will check a fasting lipid panel in the next two weeks.  I again strongly recommended that he stop smoking and reviewed some strategies to assist with this goal.    -Hypertension:  BP  significantly uncontrolled as above.  It is unclear why his PCP decreased his lisinopril dose to 5 mg daily.  In any case, I will start him back on lisinopril 20 mg daily and also start amlodipine 5 mg daily.  Will check labs as above in the next couple weeks.  He was strongly advised to keep a BP log at home and bring it to his next visit.    -Hyperlipidemia with severe statin intolerance:  Continue therapy with Praluent and check a FLP; last documented LDL = 114 (prior to initiation of Praluent therapy).    -Obesity:  BMI stable at 34.7 today.  I recommended a 40-50 lb weight loss and reviewed some potential dietary modifications to assist with this goal.      -Tobacco abuse: Cessation counseling was again provided.  He previously quit smoking after his CABG in 2020, but started again due to multiple stressors at his job and at home.  He previously failed to tolerate Chantix and did not have any success with nicotine replacement.  He will attempt to cut down on smoking, but does not feel he can quit completely at the present time.        Follow Up     Return in about 6 months (around 10/8/2022) for Next scheduled follow up.    Patient was given instructions and counseling regarding his condition or for health maintenance advice. Please see specific information pulled into the AVS if appropriate.     Jd Hu Marcus  reports that he has been smoking. He has been smoking about 1.00 pack per day. He has never used smokeless tobacco.. I have educated him on the risk of diseases from using tobacco products such as cancer, COPD and heart disease.     I advised him to quit and he is not ready to quit at present.

## 2022-04-13 ENCOUNTER — LAB (OUTPATIENT)
Dept: LAB | Facility: HOSPITAL | Age: 57
End: 2022-04-13

## 2022-04-13 DIAGNOSIS — I25.10 CORONARY ARTERY DISEASE INVOLVING NATIVE CORONARY ARTERY OF NATIVE HEART WITHOUT ANGINA PECTORIS: ICD-10-CM

## 2022-04-13 DIAGNOSIS — E78.5 HYPERLIPIDEMIA LDL GOAL <70: ICD-10-CM

## 2022-04-13 DIAGNOSIS — Z95.1 S/P CABG X 4: ICD-10-CM

## 2022-04-13 DIAGNOSIS — I10 ESSENTIAL HYPERTENSION: ICD-10-CM

## 2022-04-13 LAB
ALBUMIN SERPL-MCNC: 4.5 G/DL (ref 3.5–5.2)
ALBUMIN/GLOB SERPL: 1.6 G/DL
ALP SERPL-CCNC: 92 U/L (ref 39–117)
ALT SERPL W P-5'-P-CCNC: 14 U/L (ref 1–41)
ANION GAP SERPL CALCULATED.3IONS-SCNC: 14.4 MMOL/L (ref 5–15)
ARTICHOKE IGE QN: 63 MG/DL (ref 0–100)
AST SERPL-CCNC: 11 U/L (ref 1–40)
BILIRUB SERPL-MCNC: 0.3 MG/DL (ref 0–1.2)
BUN SERPL-MCNC: 11 MG/DL (ref 6–20)
BUN/CREAT SERPL: 13.4 (ref 7–25)
CALCIUM SPEC-SCNC: 9.2 MG/DL (ref 8.6–10.5)
CHLORIDE SERPL-SCNC: 99 MMOL/L (ref 98–107)
CHOLEST SERPL-MCNC: 230 MG/DL (ref 0–200)
CO2 SERPL-SCNC: 20.6 MMOL/L (ref 22–29)
CREAT SERPL-MCNC: 0.82 MG/DL (ref 0.76–1.27)
EGFRCR SERPLBLD CKD-EPI 2021: 103.7 ML/MIN/1.73
GLOBULIN UR ELPH-MCNC: 2.8 GM/DL
GLUCOSE SERPL-MCNC: 272 MG/DL (ref 65–99)
HBA1C MFR BLD: 10.7 % (ref 4.8–5.6)
HDLC SERPL-MCNC: 26 MG/DL (ref 40–60)
LDLC SERPL CALC-MCNC: ABNORMAL MG/DL
LDLC/HDLC SERPL: ABNORMAL {RATIO}
POTASSIUM SERPL-SCNC: 4.1 MMOL/L (ref 3.5–5.2)
PROT SERPL-MCNC: 7.3 G/DL (ref 6–8.5)
SODIUM SERPL-SCNC: 134 MMOL/L (ref 136–145)
TRIGL SERPL-MCNC: 902 MG/DL (ref 0–150)
VLDLC SERPL-MCNC: ABNORMAL MG/DL

## 2022-04-13 PROCEDURE — 80053 COMPREHEN METABOLIC PANEL: CPT

## 2022-04-13 PROCEDURE — 83036 HEMOGLOBIN GLYCOSYLATED A1C: CPT

## 2022-04-13 PROCEDURE — 83721 ASSAY OF BLOOD LIPOPROTEIN: CPT

## 2022-04-13 PROCEDURE — 80061 LIPID PANEL: CPT

## 2022-04-13 PROCEDURE — 36415 COLL VENOUS BLD VENIPUNCTURE: CPT

## 2022-04-25 RX ORDER — POTASSIUM CHLORIDE 1500 MG/1
TABLET, FILM COATED, EXTENDED RELEASE ORAL
Qty: 30 TABLET | Refills: 0 | Status: SHIPPED | OUTPATIENT
Start: 2022-04-25 | End: 2022-06-07

## 2022-05-05 RX ORDER — LEVOTHYROXINE SODIUM 0.03 MG/1
TABLET ORAL
Qty: 30 TABLET | Refills: 0 | Status: SHIPPED | OUTPATIENT
Start: 2022-05-05 | End: 2022-06-07 | Stop reason: SDUPTHER

## 2022-06-07 ENCOUNTER — OFFICE VISIT (OUTPATIENT)
Dept: FAMILY MEDICINE CLINIC | Facility: CLINIC | Age: 57
End: 2022-06-07

## 2022-06-07 VITALS
WEIGHT: 266 LBS | BODY MASS INDEX: 35.25 KG/M2 | TEMPERATURE: 97.5 F | HEIGHT: 73 IN | SYSTOLIC BLOOD PRESSURE: 174 MMHG | DIASTOLIC BLOOD PRESSURE: 62 MMHG | HEART RATE: 59 BPM | OXYGEN SATURATION: 98 %

## 2022-06-07 DIAGNOSIS — R60.9 EDEMA, UNSPECIFIED TYPE: ICD-10-CM

## 2022-06-07 DIAGNOSIS — Z72.0 TOBACCO ABUSE: ICD-10-CM

## 2022-06-07 DIAGNOSIS — E11.65 TYPE 2 DIABETES MELLITUS WITH HYPERGLYCEMIA, WITHOUT LONG-TERM CURRENT USE OF INSULIN: Primary | ICD-10-CM

## 2022-06-07 DIAGNOSIS — Z95.1 S/P CABG X 4: ICD-10-CM

## 2022-06-07 DIAGNOSIS — I25.118 CORONARY ARTERY DISEASE OF NATIVE ARTERY OF NATIVE HEART WITH STABLE ANGINA PECTORIS: ICD-10-CM

## 2022-06-07 DIAGNOSIS — E78.2 MIXED HYPERLIPIDEMIA: ICD-10-CM

## 2022-06-07 DIAGNOSIS — I10 PRIMARY HYPERTENSION: ICD-10-CM

## 2022-06-07 DIAGNOSIS — Z12.11 SCREENING FOR COLON CANCER: ICD-10-CM

## 2022-06-07 DIAGNOSIS — E03.9 HYPOTHYROIDISM, UNSPECIFIED TYPE: ICD-10-CM

## 2022-06-07 PROCEDURE — 99214 OFFICE O/P EST MOD 30 MIN: CPT | Performed by: NURSE PRACTITIONER

## 2022-06-07 RX ORDER — LEVOTHYROXINE SODIUM 0.03 MG/1
25 TABLET ORAL DAILY
Qty: 90 TABLET | Refills: 1 | Status: SHIPPED | OUTPATIENT
Start: 2022-06-07 | End: 2022-12-27

## 2022-06-07 RX ORDER — LISINOPRIL AND HYDROCHLOROTHIAZIDE 20; 12.5 MG/1; MG/1
2 TABLET ORAL DAILY
Qty: 180 TABLET | Refills: 1 | Status: SHIPPED | OUTPATIENT
Start: 2022-06-07 | End: 2022-10-11 | Stop reason: SDUPTHER

## 2022-06-07 RX ORDER — INSULIN DETEMIR 100 [IU]/ML
66 INJECTION, SOLUTION SUBCUTANEOUS DAILY
Qty: 5 ML | Refills: 12 | Status: SHIPPED | OUTPATIENT
Start: 2022-06-07 | End: 2022-10-11 | Stop reason: ALTCHOICE

## 2022-06-07 RX ORDER — AMLODIPINE BESYLATE 5 MG/1
5 TABLET ORAL NIGHTLY
Qty: 90 TABLET | Refills: 3 | Status: SHIPPED | OUTPATIENT
Start: 2022-06-07

## 2022-06-07 RX ORDER — POTASSIUM CHLORIDE 1500 MG/1
20 TABLET, FILM COATED, EXTENDED RELEASE ORAL DAILY
Qty: 90 TABLET | Refills: 1 | Status: CANCELLED | OUTPATIENT
Start: 2022-06-07

## 2022-06-07 RX ORDER — FUROSEMIDE 40 MG/1
40 TABLET ORAL DAILY
Qty: 90 TABLET | Refills: 1 | Status: CANCELLED | OUTPATIENT
Start: 2022-06-07

## 2022-06-07 RX ORDER — METOPROLOL TARTRATE 100 MG/1
100 TABLET ORAL EVERY 12 HOURS
Qty: 180 TABLET | Refills: 1 | Status: SHIPPED | OUTPATIENT
Start: 2022-06-07

## 2022-06-07 RX ORDER — METFORMIN HYDROCHLORIDE 500 MG/1
500 TABLET, EXTENDED RELEASE ORAL
Qty: 90 TABLET | Refills: 1 | Status: SHIPPED | OUTPATIENT
Start: 2022-06-07 | End: 2022-10-11 | Stop reason: ALTCHOICE

## 2022-06-07 NOTE — PROGRESS NOTES
Follow Up Office Visit      Patient Name: Jd Velazquez  : 1965   MRN: [id number]     Chief Complaint:    Chief Complaint   Patient presents with   • Diabetes   • Hypertension   • Hyperlipidemia   • Coronary Artery Disease   • Hypothyroidism       History of Present Illness: Jd Velazquez is a 56 y.o. male who is here today to follow up for 1 year follow up on HTN, Hyperlipidemia, DM2, hypothyroidism, Hx of CABG x4 and CAD    Review lab results obtained 2022    BS check- patient is not checking BS      C/o patient states he is taking 5mg Lisinopril at bedtime, there is no record of this, 5 mg was not prescribed to patient, lisinopril 20 mg was sent to pharmacy when patient was seen   Cardiologist follow up pt informed dr almanzar he was taking 5 mg that the pharmacy gave him, now patient is taking 25 mg total of lisinopril     Also patient reports he is not taking lasix and potassium     Pt c/o trace edema in bilateral lower legs       Foot exam checking daily trimming own toenails   Eye exam [facility] 2022   Colonoscopy NA  psa 10/2020  a1c 2022    Subjective      Review of Systems:   Review of Systems   Constitutional: Negative for fever.   HENT: Negative for ear pain, sinus pain and sore throat.    Eyes: Negative for visual disturbance.   Respiratory: Negative for shortness of breath.    Cardiovascular: Positive for leg swelling. Negative for chest pain.   Gastrointestinal: Negative for abdominal pain, diarrhea, nausea and vomiting.   Endocrine: Negative for heat intolerance and polyuria.   Genitourinary: Negative for dysuria.   Musculoskeletal: Negative for myalgias.   Allergic/Immunologic: Positive for environmental allergies.   Neurological: Negative for dizziness and headaches.        Past Medical History:   Past Medical History:   Diagnosis Date   • Allergic rhinitis 2015   • Benign essential hypertension 2016   • CAD (coronary artery disease)    •  Diabetes mellitus (HCC)    • Hyperlipidemia    • Hypertension    • Hypokalemia 2016   • Hypothyroidism 2014   • Insomnia, unspecified 06/15/2016   • Microalbuminuria due to type 2 diabetes mellitus (HCC) 10/15/2015   • Mixed hyperlipidemia 10/15/2015   • Myocardial infarction (HCC)    • Obesity        Past Surgical History:   Past Surgical History:   Procedure Laterality Date   • ANKLE ARTHROSCOPY W/ OPEN REPAIR     • APPENDECTOMY     • CARDIAC CATHETERIZATION      PCI to circumflex   • CORONARY ARTERY BYPASS GRAFT N/A 10/8/2020    Procedure: STERNOTOMY, CORONARY ARTERY BYPASS GRAFTING TIME 4 WITH LEFT KELSI  AND ENDOSCOPICALLY HARVESTED LEFT GREATER SAPHENOUS VEIN AND PRP.;  Surgeon: Jr Girma Chiu MD;  Location: [facility];  Service: Cardiothoracic;  Laterality: N/A;   • HEEL SPUR SURGERY     • HERNIA REPAIR     • REPLACEMENT TOTAL KNEE BILATERAL     • TONSILLECTOMY         Family History:   Family History   Problem Relation Age of Onset   • Heart disease Other        Social History:   Social History     Socioeconomic History   • Marital status:    Tobacco Use   • Smoking status: Current Some Day Smoker     Packs/day: 1.00     Last attempt to quit: 10/20/2020     Years since quittin.6   • Smokeless tobacco: Never Used   Vaping Use   • Vaping Use: Never used   Substance and Sexual Activity   • Alcohol use: Never   • Drug use: Never   • Sexual activity: Defer       Medications:     Current Outpatient Medications:   •  Alirocumab 75 MG/ML solution auto-injector, Inject 75 mg under the skin into the appropriate area as directed Every 14 (Fourteen) Days., Disp: 2 pen, Rfl: 11  •  amLODIPine (NORVASC) 5 MG tablet, Take 1 tablet by mouth Every Night., Disp: 90 tablet, Rfl: 3  •  Dulaglutide 0.75 MG/0.5ML solution pen-injector, Inject 0.75 mg under the skin into the appropriate area as directed 1 (One) Time Per Week., Disp: 4 pen, Rfl: 1  •  insulin detemir (Levemir) 100 UNIT/ML  "injection, Inject 66 Units under the skin into the appropriate area as directed Daily., Disp: 5 mL, Rfl: 12  •  levocetirizine (XYZAL) 5 MG tablet, Take 5 mg by mouth Every Evening., Disp: , Rfl:   •  levothyroxine (SYNTHROID, LEVOTHROID) 25 MCG tablet, Take 1 tablet by mouth Daily., Disp: 90 tablet, Rfl: 1  •  metoprolol tartrate (LOPRESSOR) 100 MG tablet, Take 1 tablet by mouth Every 12 (Twelve) Hours., Disp: 180 tablet, Rfl: 1  •  EPINEPHrine (EPIPEN) 0.3 MG/0.3ML solution auto-injector injection, 0.3 mL., Disp: , Rfl:   •  glucose blood test strip, Test 4 times per day. (Patient taking differently: Test 4 times per day.), Disp: 100 each, Rfl: 0  •  Insulin Pen Needle (Unifine Pentips) 31G X 6 MM misc, Use 4 times daily with insulin, Disp: 100 each, Rfl: 0  •  lisinopril-hydrochlorothiazide (Zestoretic) 20-12.5 MG per tablet, Take 2 tablets by mouth Daily., Disp: 180 tablet, Rfl: 1  •  metFORMIN ER (GLUCOPHAGE-XR) 500 MG 24 hr tablet, Take 1 tablet by mouth Daily With Breakfast., Disp: 90 tablet, Rfl: 1  •  OneTouch Delica Lancets 33G misc, test 4 times per day (Patient taking differently: test 4 times per day), Disp: 100 each, Rfl: 0    Allergies:   Allergies   Allergen Reactions   • Hydrocodone Itching   • Lortab [Hydrocodone-Acetaminophen] Itching         Objective     Physical Exam:  Vital Signs:   Vitals:    06/07/22 0958   BP: 155/64   Pulse: 59   Temp: 97.5 °F (36.4 °C)   SpO2: 98%   Weight: 121 kg (266 lb)   Height: 185.4 cm (73\")     Body mass index is 35.09 kg/m².     Physical Exam  HENT:      Right Ear: Tympanic membrane normal.      Left Ear: Tympanic membrane normal.      Nose: Nose normal.      Mouth/Throat:      Mouth: Mucous membranes are moist.   Eyes:      Conjunctiva/sclera: Conjunctivae normal.   Neck:      Vascular: No carotid bruit.   Cardiovascular:      Rate and Rhythm: Normal rate and regular rhythm.      Pulses:           Dorsalis pedis pulses are 2+ on the right side.        Posterior " tibial pulses are 2+ on the right side and 2+ on the left side.      Heart sounds: Normal heart sounds. No murmur heard.  Pulmonary:      Effort: Pulmonary effort is normal.      Breath sounds: Normal breath sounds.   Abdominal:      General: Bowel sounds are normal.      Palpations: Abdomen is soft.   Musculoskeletal:      Right lower leg: No edema.      Left lower leg: No edema.   Feet:      Right foot:      Protective Sensation: 10 sites tested. 10 sites sensed.      Skin integrity: Skin integrity normal.      Toenail Condition: Right toenails are normal.      Left foot:      Protective Sensation: 10 sites sensed.      Skin integrity: Skin integrity normal.      Toenail Condition: Left toenails are normal.   Skin:     General: Skin is warm and dry.   Neurological:      Mental Status: He is alert.   Psychiatric:         Mood and Affect: Mood normal.         Behavior: Behavior normal.             Assessment / Plan      Assessment/Plan:   Diagnoses and all orders for this visit:    1. Type 2 diabetes mellitus with hyperglycemia, without long-term current use of insulin (HCC) (Primary)  -     CBC Auto Differential; Future  -     Comprehensive Metabolic Panel; Future  -     Microalbumin / Creatinine Urine Ratio - Urine, Clean Catch; Future  -     Hemoglobin A1c; Future  -     Urinalysis With Culture If Indicated -; Future    2. Primary hypertension  -     amLODIPine (NORVASC) 5 MG tablet; Take 1 tablet by mouth Every Night.  Dispense: 90 tablet; Refill: 3    3. Mixed hyperlipidemia  -     Comprehensive Metabolic Panel; Future  -     Lipid Panel; Future    4. Hypothyroidism, unspecified type  -     TSH; Future    5. Coronary artery disease of native artery of native heart with stable angina pectoris (HCC)    6. S/P CABG x 4    7. Edema, unspecified type    8. Tobacco abuse    9. Screening for colon cancer  -     Ambulatory Referral For Screening Colonoscopy    Other orders  -     insulin detemir (Levemir) 100 UNIT/ML  "injection; Inject 66 Units under the skin into the appropriate area as directed Daily.  Dispense: 5 mL; Refill: 12  -     levothyroxine (SYNTHROID, LEVOTHROID) 25 MCG tablet; Take 1 tablet by mouth Daily.  Dispense: 90 tablet; Refill: 1  -     metoprolol tartrate (LOPRESSOR) 100 MG tablet; Take 1 tablet by mouth Every 12 (Twelve) Hours.  Dispense: 180 tablet; Refill: 1  -     lisinopril-hydrochlorothiazide (Zestoretic) 20-12.5 MG per tablet; Take 2 tablets by mouth Daily.  Dispense: 180 tablet; Refill: 1  -     Dulaglutide 0.75 MG/0.5ML solution pen-injector; Inject 0.75 mg under the skin into the appropriate area as directed 1 (One) Time Per Week.  Dispense: 4 pen; Refill: 1  -     metFORMIN ER (GLUCOPHAGE-XR) 500 MG 24 hr tablet; Take 1 tablet by mouth Daily With Breakfast.  Dispense: 90 tablet; Refill: 1         DM2 uncontrolled will add trulicity and metformin we will recheck hemoglobin A1c in 5 weeks with follow-up appointment  HTN uncontrolled and edema will increase lisinopril/hctz pressure check in 2 weeks follow-up in house in 5 weeks  Hyperlipidemia taking alirocumab injections per cardiology  Coronary artery disease status post CABG x4 currently taking Lopressor and  alirocumab injections per cardiology  Edema recommend elevation of legs when sitting compression stockings while up off at nighttime increase water intake avoid sodas decrease sodium intake      Follow Up:   Return in about 5 weeks (around 7/12/2022).    Nyasia Newsome, SOHAM    \"Please note that portions of this note were completed with a voice recognition program.\"    "

## 2022-07-13 ENCOUNTER — OFFICE VISIT (OUTPATIENT)
Dept: FAMILY MEDICINE CLINIC | Facility: CLINIC | Age: 57
End: 2022-07-13

## 2022-07-13 VITALS
OXYGEN SATURATION: 98 % | WEIGHT: 265 LBS | SYSTOLIC BLOOD PRESSURE: 121 MMHG | HEART RATE: 57 BPM | HEIGHT: 73 IN | DIASTOLIC BLOOD PRESSURE: 53 MMHG | BODY MASS INDEX: 35.12 KG/M2 | TEMPERATURE: 97.5 F

## 2022-07-13 DIAGNOSIS — E11.65 TYPE 2 DIABETES MELLITUS WITH HYPERGLYCEMIA, WITHOUT LONG-TERM CURRENT USE OF INSULIN: ICD-10-CM

## 2022-07-13 DIAGNOSIS — I25.118 CORONARY ARTERY DISEASE OF NATIVE ARTERY OF NATIVE HEART WITH STABLE ANGINA PECTORIS: ICD-10-CM

## 2022-07-13 DIAGNOSIS — E03.9 ACQUIRED HYPOTHYROIDISM: ICD-10-CM

## 2022-07-13 DIAGNOSIS — E78.2 MIXED HYPERLIPIDEMIA: ICD-10-CM

## 2022-07-13 DIAGNOSIS — E87.6 HYPOKALEMIA: ICD-10-CM

## 2022-07-13 DIAGNOSIS — I10 PRIMARY HYPERTENSION: ICD-10-CM

## 2022-07-13 DIAGNOSIS — Z12.11 SCREENING FOR COLON CANCER: ICD-10-CM

## 2022-07-13 DIAGNOSIS — F33.0 MILD EPISODE OF RECURRENT MAJOR DEPRESSIVE DISORDER: ICD-10-CM

## 2022-07-13 DIAGNOSIS — R60.9 EDEMA, UNSPECIFIED TYPE: ICD-10-CM

## 2022-07-13 PROCEDURE — 99214 OFFICE O/P EST MOD 30 MIN: CPT | Performed by: NURSE PRACTITIONER

## 2022-07-13 NOTE — PROGRESS NOTES
Follow Up Office Visit      Patient Name: Jd Velazquez  : 1965   MRN: [id number]     Chief Complaint:    Chief Complaint   Patient presents with   • Diabetes   • Hypertension   • Hyperlipidemia   • Hypothyroidism   • Coronary Artery Disease       History of Present Illness: Jd Velazquez is a 56 y.o. male who is here today to follow up for DM2, hypertension, hyperlipidemia, hypothyroidism, CAD, Hx of CABG x4 and CAD    BS- Doesn't check. Has supplies   A1C- 2022  Eye exam- 2022 [facility] [place]  Foot exam- self checking daily   psa- 10/2020  Colonoscopy- na     Patient was suppose to start Trulicity and received sample, but did not  from pharmacy     Pt c/o depression, father recently passed away       Subjective      Review of Systems:   Review of Systems   Constitutional: Negative for fever.   Eyes: Negative for visual disturbance.   Respiratory: Negative for shortness of breath.    Cardiovascular: Negative for chest pain.   Gastrointestinal: Negative for abdominal pain, diarrhea, nausea and vomiting.   Endocrine: Negative for polydipsia and polyuria.   Genitourinary: Negative for dysuria.   Musculoskeletal: Negative for myalgias.   Neurological: Negative for headaches.   Psychiatric/Behavioral: The patient is not nervous/anxious.         Past Medical History:   Past Medical History:   Diagnosis Date   • Allergic rhinitis 2015   • Benign essential hypertension 2016   • CAD (coronary artery disease)    • Diabetes mellitus (HCC)    • Hyperlipidemia    • Hypertension    • Hypokalemia 2016   • Hypothyroidism 2014   • Insomnia, unspecified 06/15/2016   • Microalbuminuria due to type 2 diabetes mellitus (HCC) 10/15/2015   • Mixed hyperlipidemia 10/15/2015   • Myocardial infarction (HCC)    • Obesity        Past Surgical History:   Past Surgical History:   Procedure Laterality Date   • ANKLE ARTHROSCOPY W/ OPEN REPAIR     • APPENDECTOMY     • CARDIAC CATHETERIZATION       PCI to circumflex   • CORONARY ARTERY BYPASS GRAFT N/A 10/8/2020    Procedure: STERNOTOMY, CORONARY ARTERY BYPASS GRAFTING TIME 4 WITH LEFT KELSI  AND ENDOSCOPICALLY HARVESTED LEFT GREATER SAPHENOUS VEIN AND PRP.;  Surgeon: Jr Girma Chiu MD;  Location: [facility];  Service: Cardiothoracic;  Laterality: N/A;   • HEEL SPUR SURGERY     • HERNIA REPAIR     • REPLACEMENT TOTAL KNEE BILATERAL     • TONSILLECTOMY         Family History:   Family History   Problem Relation Age of Onset   • Heart disease Other        Social History:   Social History     Socioeconomic History   • Marital status:    Tobacco Use   • Smoking status: Current Some Day Smoker     Packs/day: 1.00     Last attempt to quit: 10/20/2020     Years since quittin.7   • Smokeless tobacco: Never Used   Vaping Use   • Vaping Use: Never used   Substance and Sexual Activity   • Alcohol use: Never   • Drug use: Never   • Sexual activity: Defer       Medications:     Current Outpatient Medications:   •  Alirocumab 75 MG/ML solution auto-injector, Inject 75 mg under the skin into the appropriate area as directed Every 14 (Fourteen) Days., Disp: 2 pen, Rfl: 11  •  amLODIPine (NORVASC) 5 MG tablet, Take 1 tablet by mouth Every Night., Disp: 90 tablet, Rfl: 3  •  Dulaglutide 0.75 MG/0.5ML solution pen-injector, Inject 0.75 mg under the skin into the appropriate area as directed 1 (One) Time Per Week., Disp: 4 pen, Rfl: 1  •  EPINEPHrine (EPIPEN) 0.3 MG/0.3ML solution auto-injector injection, 0.3 mL., Disp: , Rfl:   •  glucose blood test strip, Test 4 times per day. (Patient taking differently: Test 4 times per day.), Disp: 100 each, Rfl: 0  •  insulin detemir (Levemir) 100 UNIT/ML injection, Inject 66 Units under the skin into the appropriate area as directed Daily., Disp: 5 mL, Rfl: 12  •  Insulin Pen Needle (Unifine Pentips) 31G X 6 MM misc, Use 4 times daily with insulin, Disp: 100 each, Rfl: 0  •  levocetirizine (XYZAL) 5 MG tablet,  "Take 5 mg by mouth Every Evening., Disp: , Rfl:   •  levothyroxine (SYNTHROID, LEVOTHROID) 25 MCG tablet, Take 1 tablet by mouth Daily., Disp: 90 tablet, Rfl: 1  •  lisinopril-hydrochlorothiazide (Zestoretic) 20-12.5 MG per tablet, Take 2 tablets by mouth Daily., Disp: 180 tablet, Rfl: 1  •  metFORMIN ER (GLUCOPHAGE-XR) 500 MG 24 hr tablet, Take 1 tablet by mouth Daily With Breakfast., Disp: 90 tablet, Rfl: 1  •  metoprolol tartrate (LOPRESSOR) 100 MG tablet, Take 1 tablet by mouth Every 12 (Twelve) Hours., Disp: 180 tablet, Rfl: 1  •  OneTouch Delica Lancets 33G misc, test 4 times per day (Patient taking differently: test 4 times per day), Disp: 100 each, Rfl: 0    Allergies:   Allergies   Allergen Reactions   • Hydrocodone Itching   • Lortab [Hydrocodone-Acetaminophen] Itching           Objective     Physical Exam:  Vital Signs:   Vitals:    07/13/22 1025   BP: 121/53   Pulse: 57   Temp: 97.5 °F (36.4 °C)   SpO2: 98%   Weight: 120 kg (265 lb)   Height: 185.4 cm (73\")     Body mass index is 34.96 kg/m².     Physical Exam  HENT:      Right Ear: Tympanic membrane normal.      Left Ear: Tympanic membrane normal.      Nose: Nose normal.      Mouth/Throat:      Mouth: Mucous membranes are moist.   Eyes:      Conjunctiva/sclera: Conjunctivae normal.   Neck:      Vascular: No carotid bruit.   Cardiovascular:      Rate and Rhythm: Normal rate and regular rhythm.      Heart sounds: Normal heart sounds. No murmur heard.  Pulmonary:      Effort: Pulmonary effort is normal.      Breath sounds: Normal breath sounds.   Abdominal:      General: Bowel sounds are normal.      Palpations: Abdomen is soft.   Musculoskeletal:      Right lower leg: No edema.      Left lower leg: No edema.   Skin:     General: Skin is warm and dry.   Neurological:      Mental Status: He is alert.   Psychiatric:         Mood and Affect: Mood normal.         Behavior: Behavior normal.             Assessment / Plan      Assessment/Plan:   Diagnoses and all " "orders for this visit:    1. Type 2 diabetes mellitus with hyperglycemia, without long-term current use of insulin (HCC)    2. Primary hypertension    3. Mixed hyperlipidemia    4. Acquired hypothyroidism    5. Coronary artery disease of native artery of native heart with stable angina pectoris (HCC)    6. Hypokalemia    7. Edema, unspecified type    8. Mild episode of recurrent major depressive disorder (HCC)    9. Screening for colon cancer  -     Ambulatory Referral For Screening Colonoscopy    Other orders  -     Dulaglutide 0.75 MG/0.5ML solution pen-injector; Inject 0.75 mg under the skin into the appropriate area as directed 1 (One) Time Per Week.  Dispense: 4 pen; Refill: 1       DM2 will obtain hgb a1c, recommend checking daily    HTN currently controllled   Hyperlipidemia will obtain lipid panel and cmp to monitor on alirocumab  Hypothyroidism will obtain tsh to monitor current synthroid dose 25 mcg daily denies palpitations   CAD continue to follow up with cardiology   Edema controlled at this time   Depression pt declines treatment or referral to counseling       Follow Up:   Return in about 6 months (around 1/13/2023).    Nyasia Newsome, SOHAM    \"Please note that portions of this note were completed with a voice recognition program.\"    "

## 2022-08-13 DIAGNOSIS — I25.10 CORONARY ARTERY DISEASE INVOLVING NATIVE CORONARY ARTERY OF NATIVE HEART WITHOUT ANGINA PECTORIS: ICD-10-CM

## 2022-08-13 DIAGNOSIS — E78.2 MIXED HYPERLIPIDEMIA: ICD-10-CM

## 2022-08-13 DIAGNOSIS — Z78.9 STATIN INTOLERANCE: ICD-10-CM

## 2022-08-13 DIAGNOSIS — Z95.1 S/P CABG X 4: ICD-10-CM

## 2022-08-15 RX ORDER — ALIROCUMAB 75 MG/ML
INJECTION, SOLUTION SUBCUTANEOUS
Qty: 2 ML | Refills: 3 | Status: SHIPPED | OUTPATIENT
Start: 2022-08-15

## 2022-09-13 ENCOUNTER — TELEPHONE (OUTPATIENT)
Dept: CARDIOLOGY | Facility: CLINIC | Age: 57
End: 2022-09-13

## 2022-09-13 NOTE — TELEPHONE ENCOUNTER
A PA for [employer] was started and approved.     this request is approved from 09/13/2022 to 09/13/2023

## 2022-10-11 ENCOUNTER — OFFICE VISIT (OUTPATIENT)
Dept: CARDIOLOGY | Facility: CLINIC | Age: 57
End: 2022-10-11

## 2022-10-11 VITALS
BODY MASS INDEX: 35.52 KG/M2 | OXYGEN SATURATION: 98 % | SYSTOLIC BLOOD PRESSURE: 118 MMHG | HEART RATE: 66 BPM | WEIGHT: 268 LBS | HEIGHT: 73 IN | DIASTOLIC BLOOD PRESSURE: 62 MMHG

## 2022-10-11 DIAGNOSIS — E66.01 SEVERE OBESITY (BMI 35.0-39.9) WITH COMORBIDITY: ICD-10-CM

## 2022-10-11 DIAGNOSIS — E78.5 HYPERLIPIDEMIA LDL GOAL <70: ICD-10-CM

## 2022-10-11 DIAGNOSIS — I25.10 CORONARY ARTERY DISEASE INVOLVING NATIVE CORONARY ARTERY OF NATIVE HEART WITHOUT ANGINA PECTORIS: Primary | ICD-10-CM

## 2022-10-11 DIAGNOSIS — Z72.0 TOBACCO ABUSE: ICD-10-CM

## 2022-10-11 DIAGNOSIS — I10 PRIMARY HYPERTENSION: ICD-10-CM

## 2022-10-11 PROCEDURE — 99214 OFFICE O/P EST MOD 30 MIN: CPT | Performed by: FAMILY MEDICINE

## 2022-10-11 RX ORDER — ASPIRIN 81 MG/1
81 TABLET, CHEWABLE ORAL DAILY
COMMUNITY

## 2022-10-11 RX ORDER — LISINOPRIL AND HYDROCHLOROTHIAZIDE 20; 12.5 MG/1; MG/1
2 TABLET ORAL DAILY
Qty: 180 TABLET | Refills: 1 | Status: SHIPPED | OUTPATIENT
Start: 2022-10-11

## 2022-10-11 NOTE — PROGRESS NOTES
Chief Complaint  Coronary Artery Disease, Hypertension, and Hyperlipidemia    Subjective        History of Present Illness  Jd Velazquez is a 57-year-old  male patient who presents to [facility] for routine follow-up of coronary artery disease, hypertension and hyperlipidemia.  History significatnn for CAD s/p CABG , and poorly controlled DM.   Reports overall has been doing very well recently, states his only concern is a recurrent problem with cellulitis to his neck, for which he has follow-up appointment scheduled with his PCP Ms. Newsome.  He denies having any episodes of chest pain, dyspnea, palpitations, syncope, orthopnea, PND, or peripheral edema.  Does report he previously was having some minor swelling, however his lisinopril was increased to have HCTZ and is well and this is helped.  He initially requested a refill of 5 mg lisinopril, after reviewing chart and clarifying with patient it appears that he had believed he was to take 20 of lisinopril in addition to 5 of lisinopril, at his last cardiology follow-up he was increased to 20 lisinopril, and 5 mg amlodipine added.  However he had inadvertently misunderstood, and has been taking an additional 5 mg of lisinopril from his PCP as well.  BP is very well controlled in office today at 118/62.   Has a history of statin intolerance, but now has good cholesterol control with Praluent.    [facility]  Past Medical History:   Diagnosis Date   • Allergic rhinitis 01/12/2015   • Benign essential hypertension 03/08/2016   • CAD (coronary artery disease)    • Diabetes mellitus (HCC)    • Hyperlipidemia    • Hypertension    • Hypokalemia 02/23/2016   • Hypothyroidism 04/24/2014   • Insomnia, unspecified 06/15/2016   • Microalbuminuria due to type 2 diabetes mellitus (HCC) 10/15/2015   • Mixed hyperlipidemia 10/15/2015   • Myocardial infarction (HCC)    • Obesity          ALLERGY  Allergies   Allergen Reactions   • Hydrocodone  Itching   • Lortab [Hydrocodone-Acetaminophen] Itching          SURGICALHX  Past Surgical History:   Procedure Laterality Date   • ANKLE ARTHROSCOPY W/ OPEN REPAIR     • APPENDECTOMY     • CARDIAC CATHETERIZATION      PCI to circumflex   • CORONARY ARTERY BYPASS GRAFT N/A 10/8/2020    Procedure: STERNOTOMY, CORONARY ARTERY BYPASS GRAFTING TIME 4 WITH LEFT KELSI  AND ENDOSCOPICALLY HARVESTED LEFT GREATER SAPHENOUS VEIN AND PRP.;  Surgeon: Jr Girma Chiu MD;  Location: [facility];  Service: Cardiothoracic;  Laterality: N/A;   • HEEL SPUR SURGERY     • HERNIA REPAIR     • REPLACEMENT TOTAL KNEE BILATERAL     • TONSILLECTOMY            SOC  Social History     Socioeconomic History   • Marital status:    Tobacco Use   • Smoking status: Some Days     Packs/day: 1.00     Types: Cigarettes     Last attempt to quit: 10/20/2020     Years since quittin.9   • Smokeless tobacco: Never   Vaping Use   • Vaping Use: Never used   Substance and Sexual Activity   • Alcohol use: Never   • Drug use: Never   • Sexual activity: Defer         FAMHX  Family History   Problem Relation Age of Onset   • Heart disease Other           MEDSIGONLY  Current Outpatient Medications on File Prior to Visit   Medication Sig   • amLODIPine (NORVASC) 5 MG tablet Take 1 tablet by mouth Every Night.   • Dulaglutide 0.75 MG/0.5ML solution pen-injector Inject 0.75 mg under the skin into the appropriate area as directed 1 (One) Time Per Week.   • EPINEPHrine (EPIPEN) 0.3 MG/0.3ML solution auto-injector injection 0.3 mL.   • glucose blood test strip Test 4 times per day. (Patient taking differently: Test 4 times per day.)   • Insulin Pen Needle (Unifine Pentips) 31G X 6 MM misc Use 4 times daily with insulin   • levothyroxine (SYNTHROID, LEVOTHROID) 25 MCG tablet Take 1 tablet by mouth Daily.   • metoprolol tartrate (LOPRESSOR) 100 MG tablet Take 1 tablet by mouth Every 12 (Twelve) Hours.   • OneTouch Delica Lancets 33G misc test 4 times  "per day (Patient taking differently: test 4 times per day)   • Praluent 75 MG/ML solution auto-injector INJECT ONE SYRINGE EVERY 14 DAYS   • [DISCONTINUED] insulin detemir (Levemir) 100 UNIT/ML injection Inject 66 Units under the skin into the appropriate area as directed Daily.   •  lisinopril-hydrochlorothiazide (Zestoretic) 20-12.5 MG per tablet Take 2 tablets by mouth Daily.   • [DISCONTINUED] metFORMIN ER (GLUCOPHAGE-XR) 500 MG 24 hr tablet Take 1 tablet by mouth Daily With Breakfast.   • [DISCONTINUED] levocetirizine (XYZAL) 5 MG tablet Take 5 mg by mouth Every Evening.     No current facility-administered medications on file prior to visit.       REVIEW OF SYSTEMS  Review of Systems   Constitutional: Negative for activity change, chills, fatigue and fever.   Respiratory: Negative for cough, chest tightness and shortness of breath.    Cardiovascular: Negative for chest pain, palpitations and leg swelling.   Gastrointestinal: Negative for abdominal pain, nausea and vomiting.   Skin: Negative for pallor and rash.   Neurological: Negative for syncope and light-headedness.        Objective   Vitals:    10/11/22 1001   BP: 118/62   Pulse: 66   SpO2: 98%   Weight: 122 kg (268 lb)   Height: 185.4 cm (73\")         Physical Exam  Constitutional:       General: He is not in acute distress.     Appearance: Normal appearance.   Eyes:      General: No scleral icterus.     Conjunctiva/sclera: Conjunctivae normal.   Neck:      Vascular: No JVD.   Cardiovascular:      Rate and Rhythm: Normal rate and regular rhythm.  No extrasystoles are present.     Heart sounds: Normal heart sounds, S1 normal and S2 normal.     No friction rub. No gallop.   Pulmonary:      Effort: Pulmonary effort is normal. No accessory muscle usage.      Breath sounds: Normal breath sounds. No wheezing, rhonchi or rales.   Abdominal:      General: Bowel sounds are normal. There is no distension.      Palpations: Abdomen is soft.   Musculoskeletal:         " General: Normal range of motion.      Right lower leg: No edema.      Left lower leg: No edema.   Skin:     General: Skin is warm and dry.      Findings: Erythema (Posterior neck) present.   Neurological:      Mental Status: He is alert and oriented to person, place, and time.           Result Review     The following data was reviewed by SOHAM Blanchard on 10/11/22.    proBNP   Date Value Ref Range Status   10/07/2020 220.6 0.0 - 900.0 pg/mL Final     CMP    CMP 4/13/22   Glucose 272 (A)   BUN 11   Creatinine 0.82   Sodium 134 (A)   Potassium 4.1   Chloride 99   Calcium 9.2   Albumin 4.50   Total Bilirubin 0.3   Alkaline Phosphatase 92   AST (SGOT) 11   ALT (SGPT) 14   (A) Abnormal value               Lab Results   Component Value Date    TSH 4.610 (H) 06/17/2021      Lab Results   Component Value Date    FREET4 1.0 10/16/2020      No results found for: DDIMERQUANT  Magnesium   Date Value Ref Range Status   10/09/2020 2.2 1.6 - 2.6 mg/dL Final      No results found for: DIGOXIN   Lab Results   Component Value Date    TROPONINT 0.68 (H) 10/05/2020           Lipid Panel    Lipid Panel 4/13/22 4/13/22    1021 1021   Total Cholesterol 230 (A)    Triglycerides 902 (A)    HDL Cholesterol 26 (A)    VLDL Cholesterol     LDL Cholesterol   63   LDL/HDL Ratio     (A) Abnormal value       Comments are available for some flowsheets but are not being displayed.              Assessment and Plan   Diagnoses and all orders for this visit:    1. Coronary artery disease involving native coronary artery of native heart without angina pectoris (Primary)  Assessment & Plan:  Without angina.  Continue beta-blocker, Praluent therapy (he is intolerant to statins),       2. Primary hypertension  Assessment & Plan:  Continue metoprolol, Zestoretic, and amlodipine.  He voiced understanding of clarification of dosing on lisinopril.  For now we will continue the 20/12.5 for simplicity of medication regiment.  since he is actually been  taking an additional 5 of lisinopril at home recently I did encourage him to keep a blood pressure log over the next 2 weeks, and we can reevaluate if his blood pressures trend upward.    Orders:  -     lisinopril-hydrochlorothiazide (Zestoretic) 20-12.5 MG per tablet; Take 2 tablets by mouth Daily.  Dispense: 180 tablet; Refill: 1    3. Hyperlipidemia LDL goal <70  Assessment & Plan:  LDL significantly improved, most recent level is below goal at 63.  Continue Praluent. (Intolerant to statins)      4. Severe obesity (BMI 35.0-39.9) with comorbidity (HCC)  Assessment & Plan:  Patient's (Body mass index is 35.36 kg/m².) indicates  he has severe obesity.  Weight has trended slightly up since his previous visit.  He has multiple comorbidities including coronary artery disease and diabetes.  Encouraged decrease calories, tight glucose control and weight loss to reduce his BMI.          5. Tobacco abuse  Assessment & Plan:  Strongly encourage smoking cessation.  Reviewed increased risk for worsening cardiovascular disease associated with smoking especially in light of his diabetes.  He has previously attempted Chantix without success.  He is not interested in medication treatments at this time.  He has however significantly reduced his smoking habits.        He has orders for repeat chemistries and cholesterol panel placed by his PCP that he will have prior to his next follow-up.  We will review these and make medication adjustments if needed.    Follow Up   Return in about 9 months (around 7/11/2023) for Next scheduled follow up, with Dr. Noel.    Patient was given instructions and counseling regarding his condition or for health maintenance advice. Please see specific information pulled into the AVS if appropriate.     Lora Magallon, APRN  10/11/22  10:21 EDT    Dictated Utilizing Dragon Dictation

## 2022-10-12 NOTE — ASSESSMENT & PLAN NOTE
Continue metoprolol, Zestoretic, and amlodipine.  He voiced understanding of clarification of dosing on lisinopril.  For now we will continue the 20/12.5 for simplicity of medication regiment.  since he is actually been taking an additional 5 of lisinopril at home recently I did encourage him to keep a blood pressure log over the next 2 weeks, and we can reevaluate if his blood pressures trend upward.

## 2022-10-12 NOTE — ASSESSMENT & PLAN NOTE
LDL significantly improved, most recent level is below goal at 63.  Continue Praluent. (Intolerant to statins)

## 2022-10-12 NOTE — ASSESSMENT & PLAN NOTE
Patient's (Body mass index is 35.36 kg/m².) indicates  he has severe obesity.  Weight has trended slightly up since his previous visit.  He has multiple comorbidities including coronary artery disease and diabetes.  Encouraged decrease calories, tight glucose control and weight loss to reduce his BMI.

## 2022-10-12 NOTE — ASSESSMENT & PLAN NOTE
Strongly encourage smoking cessation.  Reviewed increased risk for worsening cardiovascular disease associated with smoking especially in light of his diabetes.  He has previously attempted Chantix without success.  He is not interested in medication treatments at this time.  He has however significantly reduced his smoking habits.

## 2022-12-27 RX ORDER — LEVOTHYROXINE SODIUM 0.03 MG/1
TABLET ORAL
Qty: 30 TABLET | Refills: 0 | Status: SHIPPED | OUTPATIENT
Start: 2022-12-27 | End: 2023-02-08 | Stop reason: SDUPTHER

## 2023-02-08 RX ORDER — LEVOTHYROXINE SODIUM 0.03 MG/1
25 TABLET ORAL DAILY
Qty: 30 TABLET | Refills: 0 | Status: SHIPPED | OUTPATIENT
Start: 2023-02-08 | End: 2023-03-07

## 2023-03-07 RX ORDER — LEVOTHYROXINE SODIUM 0.03 MG/1
TABLET ORAL
Qty: 30 TABLET | Refills: 0 | Status: SHIPPED | OUTPATIENT
Start: 2023-03-07

## 2023-07-25 DIAGNOSIS — I10 PRIMARY HYPERTENSION: ICD-10-CM

## 2023-07-25 RX ORDER — LISINOPRIL AND HYDROCHLOROTHIAZIDE 20; 12.5 MG/1; MG/1
TABLET ORAL
Qty: 60 TABLET | Refills: 0 | Status: SHIPPED | OUTPATIENT
Start: 2023-07-25

## 2023-07-26 NOTE — PROGRESS NOTES
Chief Complaint  No chief complaint on file.    Subjective      Patient is here for follow-up on coronary disease and hypertension.  He is doing quite well.  He has no complaints or concerns today.  He has no chest discomfort or dyspnea.  He has no dizziness, presyncope or syncope.  He is not very physically active but has no significant limitations with routine daily activities.    Past Medical History:   Diagnosis Date    Allergic rhinitis 01/12/2015    Benign essential hypertension 03/08/2016    CAD (coronary artery disease)     Diabetes mellitus     Hyperlipidemia     Hypertension     Hypokalemia 02/23/2016    Hypothyroidism 04/24/2014    Insomnia, unspecified 06/15/2016    Microalbuminuria due to type 2 diabetes mellitus 10/15/2015    Mixed hyperlipidemia 10/15/2015    Myocardial infarction     Obesity          Current Outpatient Medications:     amLODIPine (NORVASC) 5 MG tablet, TAKE ONE TABLET BY MOUTH ONCE NIGHTLY, Disp: 30 tablet, Rfl: 0    aspirin 81 MG chewable tablet, Chew 1 tablet Daily., Disp: , Rfl:     Dulaglutide 0.75 MG/0.5ML solution pen-injector, Inject 0.75 mg under the skin into the appropriate area as directed 1 (One) Time Per Week., Disp: 4 pen, Rfl: 1    EPINEPHrine (EPIPEN) 0.3 MG/0.3ML solution auto-injector injection, 0.3 mL., Disp: , Rfl:     glucose blood test strip, Test 4 times per day. (Patient taking differently: Test 4 times per day.), Disp: 100 each, Rfl: 0    Insulin Pen Needle (Unifine Pentips) 31G X 6 MM misc, Use 4 times daily with insulin, Disp: 100 each, Rfl: 0    levothyroxine (SYNTHROID, LEVOTHROID) 25 MCG tablet, TAKE ONE TABLET BY MOUTH DAILY, Disp: 30 tablet, Rfl: 0    lisinopril-hydrochlorothiazide (PRINZIDE,ZESTORETIC) 20-12.5 MG per tablet, TAKE TWO TABLETS BY MOUTH DAILY, Disp: 60 tablet, Rfl: 0    metoprolol tartrate (LOPRESSOR) 100 MG tablet, Take 1 tablet by mouth Every 12 (Twelve) Hours., Disp: 180 tablet, Rfl: 1    OneTouch Delica Lancets 33G misc, test 4 times  per day (Patient taking differently: test 4 times per day), Disp: 100 each, Rfl: 0    Praluent 75 MG/ML solution auto-injector, INJECT ONE SYRINGE EVERY 14 DAYS, Disp: 2 mL, Rfl: 3    There are no discontinued medications.  Allergies   Allergen Reactions    Hydrocodone Itching    Lortab [Hydrocodone-Acetaminophen] Itching        Social History     Tobacco Use    Smoking status: Some Days     Packs/day: 1.00     Types: Cigarettes     Last attempt to quit: 10/20/2020     Years since quittin.7    Smokeless tobacco: Never   Vaping Use    Vaping Use: Never used   Substance Use Topics    Alcohol use: Never    Drug use: Never       Family History   Problem Relation Age of Onset    Heart disease Other         Objective     There were no vitals taken for this visit.      Physical Exam    General Appearance:   no acute distress  Alert and oriented x3  HENT:   lips not cyanotic  Atraumatic  Neck:  No jvd   supple  Respiratory:  no respiratory distress  normal breath sounds  no rales  Cardiovascular:  no S3, no S4   no murmur  no rub  Extremities  No cyanosis  lower extremity edema: none    Skin:   warm, dry  No rashes      Result Review :     proBNP   Date Value Ref Range Status   10/07/2020 220.6 0.0 - 900.0 pg/mL Final          Lab Results   Component Value Date    TSH 4.610 (H) 2021      Lab Results   Component Value Date    FREET4 1.0 10/16/2020      No results found for: DDIMERQUANT  Magnesium   Date Value Ref Range Status   10/09/2020 2.2 1.6 - 2.6 mg/dL Final      No results found for: DIGOXIN   Lab Results   Component Value Date    TROPONINT 0.68 (H) 10/05/2020             Lab Results   Component Value Date    POCTROP 1.58 (H) 10/05/2020       Results for orders placed in visit on 10/08/20    Emergent/Open-Heart Anesthesia BRET    Narrative  Procedure Performed: Emergent/Open-Heart Anesthesia BRET  Start Time:  10/8/2020 7:35 AM  End Time:   10/8/2020 7:46 AM    Preanesthesia Checklist:  Patient identified, IV  assessed, risks and benefits discussed, monitors and equipment assessed, procedure being performed at surgeon's request and anesthesia consent obtained.    General Procedure Information  Diagnostic Indications for Echo:  assessment of ascending aorta, assessment of surgical repair and hemodynamic monitoring  Physician Requesting Echo: Jr Girma Chiu MD  CPT Code:  Atherosclerosis of aorta  Location performed:  OR  Intubated  Bite block placed  Heart visualized  Probe Insertion:  Easy  Probe Type:  Multiplane  Modalities:  Color flow mapping, pulse wave Doppler and continuous wave Doppler    Echocardiographic and Doppler Measurements    Ventricles    Right Ventricle:  Cavity size normal.  Thrombus not present.  Global function normal.  Left Ventricle:  Cavity size normal.  Hypertrophy present.  Thrombus not present.  Global Function mildly impaired.  Ejection Fraction 55%.        Valves    Aortic Valve:  Annulus normal.  Stenosis not present.  Mean Gradient: 9 mmHg.  Regurgitation mild.  Leaflets normal.  Leaflet motions normal.    Mitral Valve:  Annulus normal.  Stenosis not present.  Regurgitation mild.  Leaflets normal and thickened.  Leaflet motions normal.    Tricuspid Valve:  Annulus normal.  Stenosis not present.  Regurgitation absent.  Leaflets normal.  Leaflet motions normal.  Pulmonic Valve:  Annulus normal.  Regurgitation absent.        Aorta    Ascending Aorta:  Size normal.  Diameter 3.5 cm.  Dissection not present.  Plaque thickness less than 3 mm.  Mobile plaque not present.  Aortic Arch:  Size normal.  Diameter 2.6 cm.  Dissection not present.  Plaque thickness less than 3 mm.  Mobile plaque not present.  Descending Aorta:  Size normal.  Dissection not present.  Plaque thickness less than 3 mm.  Mobile plaque not present.        Atria    Right Atrium:  Spontaneous echo contrast not present.  Thrombus not present.  Tumor not present.  Device not present.    Left Atrium:  Spontaneous echo  contrast not present.  Thrombus not present.  Tumor not present.  Device not present.  Left atrial appendage normal.      Septa    Atrial Septum:  Intra-atrial septal morphology aneurysmal.        Diastolic Function Measurements:  Diastolic Dysfunction Grade=  E= ms  A= ms  E/A Ratio= 1  DT= ms  S/D= 1  IVRT=    Other Findings  Pericardium:  normal  Pleural Effusion:  none  Pulmonary Venous Flow:  normal    Anesthesia Information  Performed Personally  Anesthesiologist:  Delbert Cornell MD      Echocardiogram Comments:  Postbypass results:  Post bypass  Normal RHF  LVEF 55-60% via visual estimation  Mild AI mild MR no AS no MS  No TR or TS       ECG 12 Lead    Date/Time: 7/27/2023 1:31 PM  Performed by: Tang Conte MD  Authorized by: Tang Conte MD   Comparison: compared with previous ECG   Similar to previous ECG  Rhythm: sinus rhythm  Other findings: T wave abnormality               Diagnoses and all orders for this visit:    1. Coronary artery disease involving native coronary artery of native heart without angina pectoris (Primary)    2. Mixed hyperlipidemia    3. Essential hypertension    4. Statin intolerance    5. S/P CABG x 4    6. Tobacco abuse      -CAD s/p 4 vessel CABG:  Stable without angina or anginal-like symptoms.  Continue current medical therapy.     -Hypertension:  BP is mildly elevated today which is unusual for him.  Continue current medications and blood pressure monitoring.  He will report to us if his blood pressure remains elevated.     -Hyperlipidemia with severe statin intolerance:  Continue therapy with Praluent.  Lipid profile from last year showed LDL at goal but highly elevated triglycerides. Will check lipid profile for follow-up.  If triglycerides remain elevated will initiate fenofibrate or Vascepa.     -Obesity: Lifestyle changes including regular exercise and weight loss were recommended.        Follow Up     No follow-ups on file.        Patient was given  instructions and counseling regarding his condition or for health maintenance advice. Please see specific information pulled into the AVS if appropriate.

## 2023-07-27 ENCOUNTER — OFFICE VISIT (OUTPATIENT)
Dept: CARDIOLOGY | Facility: CLINIC | Age: 58
End: 2023-07-27
Payer: COMMERCIAL

## 2023-07-27 VITALS
DIASTOLIC BLOOD PRESSURE: 81 MMHG | BODY MASS INDEX: 33.56 KG/M2 | HEART RATE: 74 BPM | WEIGHT: 253.2 LBS | HEIGHT: 73 IN | SYSTOLIC BLOOD PRESSURE: 144 MMHG

## 2023-07-27 DIAGNOSIS — E78.2 MIXED HYPERLIPIDEMIA: ICD-10-CM

## 2023-07-27 DIAGNOSIS — Z72.0 TOBACCO ABUSE: ICD-10-CM

## 2023-07-27 DIAGNOSIS — Z78.9 STATIN INTOLERANCE: ICD-10-CM

## 2023-07-27 DIAGNOSIS — Z95.1 S/P CABG X 4: ICD-10-CM

## 2023-07-27 DIAGNOSIS — I25.10 CORONARY ARTERY DISEASE INVOLVING NATIVE CORONARY ARTERY OF NATIVE HEART WITHOUT ANGINA PECTORIS: Primary | ICD-10-CM

## 2023-07-27 DIAGNOSIS — I10 ESSENTIAL HYPERTENSION: ICD-10-CM

## 2023-07-27 PROCEDURE — 99214 OFFICE O/P EST MOD 30 MIN: CPT | Performed by: INTERNAL MEDICINE

## 2023-07-27 RX ORDER — METOPROLOL TARTRATE 100 MG/1
100 TABLET ORAL EVERY 12 HOURS
Qty: 180 TABLET | Refills: 3 | Status: SHIPPED | OUTPATIENT
Start: 2023-07-27

## 2023-07-27 RX ORDER — ALIROCUMAB 75 MG/ML
75 INJECTION, SOLUTION SUBCUTANEOUS
Qty: 2 ML | Refills: 6 | Status: SHIPPED | OUTPATIENT
Start: 2023-07-27 | End: 2023-07-31

## 2023-07-28 ENCOUNTER — TELEPHONE (OUTPATIENT)
Dept: CARDIOLOGY | Facility: CLINIC | Age: 58
End: 2023-07-28
Payer: COMMERCIAL

## 2023-07-31 DIAGNOSIS — E78.2 MIXED HYPERLIPIDEMIA: Primary | ICD-10-CM

## 2023-07-31 NOTE — TELEPHONE ENCOUNTER
CAN YOU PLEASE SEND THAT REPATHA ORDER UNDER MEDICATION AND NOT UNDER CLINIC ADMINISTERED MEDICATION SO THAT IT WILL GO TO THE PHARMACY

## 2023-08-03 DIAGNOSIS — I10 PRIMARY HYPERTENSION: ICD-10-CM

## 2023-08-03 RX ORDER — AMLODIPINE BESYLATE 5 MG/1
5 TABLET ORAL NIGHTLY
Qty: 30 TABLET | Refills: 0 | Status: SHIPPED | OUTPATIENT
Start: 2023-08-03 | End: 2023-09-02

## 2023-08-20 NOTE — PLAN OF CARE
Goal Outcome Evaluation:  Plan of Care Reviewed With: patient     Outcome Summary: VSS, pt seen by rachel.  IV antibotics given in er.  wound clean with betadine.  discharge orders recieved.  Pt is to follow up outpatient with Dr. Chiu this week.  
None

## 2023-08-23 ENCOUNTER — OFFICE VISIT (OUTPATIENT)
Dept: FAMILY MEDICINE CLINIC | Facility: CLINIC | Age: 58
End: 2023-08-23
Payer: COMMERCIAL

## 2023-08-23 ENCOUNTER — LAB (OUTPATIENT)
Dept: LAB | Facility: HOSPITAL | Age: 58
End: 2023-08-23
Payer: COMMERCIAL

## 2023-08-23 VITALS
TEMPERATURE: 97.9 F | HEIGHT: 73 IN | DIASTOLIC BLOOD PRESSURE: 83 MMHG | WEIGHT: 248 LBS | OXYGEN SATURATION: 96 % | HEART RATE: 74 BPM | BODY MASS INDEX: 32.87 KG/M2 | SYSTOLIC BLOOD PRESSURE: 159 MMHG

## 2023-08-23 DIAGNOSIS — J30.9 ALLERGIC RHINITIS, UNSPECIFIED SEASONALITY, UNSPECIFIED TRIGGER: ICD-10-CM

## 2023-08-23 DIAGNOSIS — Z87.891 HISTORY OF SMOKING 30 OR MORE PACK YEARS: ICD-10-CM

## 2023-08-23 DIAGNOSIS — L30.9 DERMATITIS: ICD-10-CM

## 2023-08-23 DIAGNOSIS — Z11.59 NEED FOR HEPATITIS C SCREENING TEST: ICD-10-CM

## 2023-08-23 DIAGNOSIS — Z72.0 TOBACCO ABUSE: ICD-10-CM

## 2023-08-23 DIAGNOSIS — E11.65 TYPE 2 DIABETES MELLITUS WITH HYPERGLYCEMIA, WITHOUT LONG-TERM CURRENT USE OF INSULIN: Primary | ICD-10-CM

## 2023-08-23 DIAGNOSIS — L98.491 SKIN ULCER, LIMITED TO BREAKDOWN OF SKIN: ICD-10-CM

## 2023-08-23 DIAGNOSIS — I10 PRIMARY HYPERTENSION: ICD-10-CM

## 2023-08-23 DIAGNOSIS — E78.5 HYPERLIPIDEMIA LDL GOAL <70: ICD-10-CM

## 2023-08-23 DIAGNOSIS — Z79.899 MEDICATION MANAGEMENT: ICD-10-CM

## 2023-08-23 DIAGNOSIS — I25.118 CORONARY ARTERY DISEASE OF NATIVE ARTERY OF NATIVE HEART WITH STABLE ANGINA PECTORIS: ICD-10-CM

## 2023-08-23 DIAGNOSIS — G62.9 NEUROPATHY: ICD-10-CM

## 2023-08-23 DIAGNOSIS — E03.9 HYPOTHYROIDISM, UNSPECIFIED TYPE: ICD-10-CM

## 2023-08-23 DIAGNOSIS — Z12.5 SCREENING PSA (PROSTATE SPECIFIC ANTIGEN): ICD-10-CM

## 2023-08-23 DIAGNOSIS — Z12.11 ENCOUNTER FOR SCREENING COLONOSCOPY: ICD-10-CM

## 2023-08-23 DIAGNOSIS — Z95.1 S/P CABG X 4: ICD-10-CM

## 2023-08-23 LAB
ALBUMIN SERPL-MCNC: 4.2 G/DL (ref 3.5–5.2)
ALBUMIN/GLOB SERPL: 1.4 G/DL
ALP SERPL-CCNC: 110 U/L (ref 39–117)
ALT SERPL W P-5'-P-CCNC: 14 U/L (ref 1–41)
AMPHET+METHAMPHET UR QL: NEGATIVE
AMPHETAMINE INTERNAL CONTROL: NORMAL
AMPHETAMINES UR QL: NEGATIVE
ANION GAP SERPL CALCULATED.3IONS-SCNC: 15 MMOL/L (ref 5–15)
ARTICHOKE IGE QN: 77 MG/DL (ref 0–100)
AST SERPL-CCNC: 12 U/L (ref 1–40)
BARBITURATE INTERNAL CONTROL: NORMAL
BARBITURATES UR QL SCN: NEGATIVE
BASOPHILS # BLD AUTO: 0.07 10*3/MM3 (ref 0–0.2)
BASOPHILS NFR BLD AUTO: 0.9 % (ref 0–1.5)
BENZODIAZ UR QL SCN: NEGATIVE
BENZODIAZEPINE INTERNAL CONTROL: NORMAL
BILIRUB SERPL-MCNC: 0.4 MG/DL (ref 0–1.2)
BUN SERPL-MCNC: 11 MG/DL (ref 6–20)
BUN/CREAT SERPL: 12 (ref 7–25)
BUPRENORPHINE INTERNAL CONTROL: NORMAL
BUPRENORPHINE SERPL-MCNC: NEGATIVE NG/ML
CALCIUM SPEC-SCNC: 9.7 MG/DL (ref 8.6–10.5)
CANNABINOIDS SERPL QL: NEGATIVE
CHLORIDE SERPL-SCNC: 93 MMOL/L (ref 98–107)
CHOLEST SERPL-MCNC: 267 MG/DL (ref 0–200)
CO2 SERPL-SCNC: 22 MMOL/L (ref 22–29)
COCAINE INTERNAL CONTROL: NORMAL
COCAINE UR QL: NEGATIVE
CREAT SERPL-MCNC: 0.92 MG/DL (ref 0.76–1.27)
DEPRECATED RDW RBC AUTO: 43.1 FL (ref 37–54)
EGFRCR SERPLBLD CKD-EPI 2021: 97.6 ML/MIN/1.73
EOSINOPHIL # BLD AUTO: 0.08 10*3/MM3 (ref 0–0.4)
EOSINOPHIL NFR BLD AUTO: 1.1 % (ref 0.3–6.2)
ERYTHROCYTE [DISTWIDTH] IN BLOOD BY AUTOMATED COUNT: 13.3 % (ref 12.3–15.4)
EXPIRATION DATE: NORMAL
GLOBULIN UR ELPH-MCNC: 3.1 GM/DL
GLUCOSE SERPL-MCNC: 337 MG/DL (ref 65–99)
HBA1C MFR BLD: 13.3 % (ref 4.8–5.6)
HCT VFR BLD AUTO: 45.9 % (ref 37.5–51)
HCV AB SER DONR QL: NORMAL
HDLC SERPL-MCNC: 26 MG/DL (ref 40–60)
HGB BLD-MCNC: 15.6 G/DL (ref 13–17.7)
IMM GRANULOCYTES # BLD AUTO: 0.06 10*3/MM3 (ref 0–0.05)
IMM GRANULOCYTES NFR BLD AUTO: 0.8 % (ref 0–0.5)
LDLC SERPL CALC-MCNC: ABNORMAL MG/DL
LDLC/HDLC SERPL: ABNORMAL {RATIO}
LYMPHOCYTES # BLD AUTO: 2.31 10*3/MM3 (ref 0.7–3.1)
LYMPHOCYTES NFR BLD AUTO: 30.4 % (ref 19.6–45.3)
Lab: NORMAL
MCH RBC QN AUTO: 30.7 PG (ref 26.6–33)
MCHC RBC AUTO-ENTMCNC: 34 G/DL (ref 31.5–35.7)
MCV RBC AUTO: 90.4 FL (ref 79–97)
MDMA (ECSTASY) INTERNAL CONTROL: NORMAL
MDMA UR QL SCN: NEGATIVE
METHADONE INTERNAL CONTROL: NORMAL
METHADONE UR QL SCN: NEGATIVE
METHAMPHETAMINE INTERNAL CONTROL: NORMAL
MONOCYTES # BLD AUTO: 0.97 10*3/MM3 (ref 0.1–0.9)
MONOCYTES NFR BLD AUTO: 12.7 % (ref 5–12)
NEUTROPHILS NFR BLD AUTO: 4.12 10*3/MM3 (ref 1.7–7)
NEUTROPHILS NFR BLD AUTO: 54.1 % (ref 42.7–76)
NRBC BLD AUTO-RTO: 0 /100 WBC (ref 0–0.2)
OPIATES INTERNAL CONTROL: NORMAL
OPIATES UR QL: NEGATIVE
OXYCODONE INTERNAL CONTROL: NORMAL
OXYCODONE UR QL SCN: NEGATIVE
PCP UR QL SCN: NEGATIVE
PHENCYCLIDINE INTERNAL CONTROL: NORMAL
PLATELET # BLD AUTO: 340 10*3/MM3 (ref 140–450)
PMV BLD AUTO: 9.8 FL (ref 6–12)
POTASSIUM SERPL-SCNC: 4.2 MMOL/L (ref 3.5–5.2)
PROT SERPL-MCNC: 7.3 G/DL (ref 6–8.5)
PSA SERPL-MCNC: 0.1 NG/ML (ref 0–4)
RBC # BLD AUTO: 5.08 10*6/MM3 (ref 4.14–5.8)
SODIUM SERPL-SCNC: 130 MMOL/L (ref 136–145)
THC INTERNAL CONTROL: NORMAL
TRIGL SERPL-MCNC: 1120 MG/DL (ref 0–150)
TSH SERPL DL<=0.05 MIU/L-ACNC: 3.63 UIU/ML (ref 0.27–4.2)
VLDLC SERPL-MCNC: ABNORMAL MG/DL
WBC NRBC COR # BLD: 7.61 10*3/MM3 (ref 3.4–10.8)

## 2023-08-23 PROCEDURE — 80061 LIPID PANEL: CPT | Performed by: NURSE PRACTITIONER

## 2023-08-23 PROCEDURE — 83721 ASSAY OF BLOOD LIPOPROTEIN: CPT | Performed by: NURSE PRACTITIONER

## 2023-08-23 PROCEDURE — 80050 GENERAL HEALTH PANEL: CPT | Performed by: NURSE PRACTITIONER

## 2023-08-23 PROCEDURE — 86803 HEPATITIS C AB TEST: CPT | Performed by: NURSE PRACTITIONER

## 2023-08-23 PROCEDURE — G0103 PSA SCREENING: HCPCS | Performed by: NURSE PRACTITIONER

## 2023-08-23 PROCEDURE — 83036 HEMOGLOBIN GLYCOSYLATED A1C: CPT | Performed by: NURSE PRACTITIONER

## 2023-08-23 RX ORDER — LISINOPRIL AND HYDROCHLOROTHIAZIDE 20; 12.5 MG/1; MG/1
TABLET ORAL
Qty: 180 TABLET | Refills: 3 | Status: SHIPPED | OUTPATIENT
Start: 2023-08-23

## 2023-08-23 RX ORDER — CLOBETASOL PROPIONATE 0.5 MG/G
1 CREAM TOPICAL 2 TIMES DAILY
Qty: 60 G | Refills: 1 | Status: SHIPPED | OUTPATIENT
Start: 2023-08-23

## 2023-08-23 RX ORDER — GABAPENTIN 300 MG/1
CAPSULE ORAL
Qty: 90 CAPSULE | Refills: 0 | Status: SHIPPED | OUTPATIENT
Start: 2023-08-23

## 2023-08-23 RX ORDER — CETIRIZINE HYDROCHLORIDE 10 MG/1
10 TABLET ORAL NIGHTLY
Qty: 90 TABLET | Refills: 1 | Status: SHIPPED | OUTPATIENT
Start: 2023-08-23

## 2023-08-23 RX ORDER — SPIRONOLACTONE 50 MG/1
50 TABLET, FILM COATED ORAL DAILY
Qty: 90 TABLET | Refills: 1 | Status: SHIPPED | OUTPATIENT
Start: 2023-08-23

## 2023-08-23 NOTE — PROGRESS NOTES
Follow Up Office Visit      Patient Name: Jd Velazquez  : 1965   MRN: [id number]     Chief Complaint:    Chief Complaint   Patient presents with    Allergic Rhinitis    Coronary Artery Disease    Hyperlipidemia    Hypertension    Diabetes    Hypothyroidism    Insomnia    Depression       History of Present Illness: Jd Velazquez is a 57 y.o. male who is here today to follow up for HTN, hyperlipidemia, CAD, DM2, hypothyroidism, insomnia, depression, allergic rhinitis.    BS- Doesn't check,  last [facility] 2022 no follow up [facility]   [facility]- 2022  Eye exam- 2022 [facility] [place]  Foot exam- self checking daily   psa- 10/2020  Colonoscopy- none   Smoking since age 13   Smoking 0.5 ppd to 1 ppd   Last attempt to quit: 10/20/2020 after MI      C/o He has been having upper back itching with rash     He says the bottoms of his feet are numb. He says symptoms started a month and a half ago.     Also open wound right great toe     Also c/o swelling in bilateral feet/legs     Subjective      Review of Systems:   Review of Systems   Constitutional:  Negative for fever.   HENT:  Negative for ear pain and sore throat.    Eyes:  Negative for visual disturbance.   Respiratory:  Negative for shortness of breath.    Cardiovascular:  Positive for leg swelling. Negative for chest pain and palpitations.   Gastrointestinal:  Negative for abdominal pain, diarrhea, nausea and vomiting.   Genitourinary:  Negative for dysuria.   Musculoskeletal:  Negative for neck pain.   Skin:  Positive for rash.   Neurological:  Negative for headaches.      Past Medical History:   Past Medical History:   Diagnosis Date    Allergic rhinitis 2015    Benign essential hypertension 2016    CAD (coronary artery disease)     Diabetes mellitus     Hyperlipidemia     Hypertension     Hypokalemia 2016    Hypothyroidism 2014    Insomnia, unspecified 06/15/2016    Microalbuminuria due  to type 2 diabetes mellitus 10/15/2015    Mixed hyperlipidemia 10/15/2015    Myocardial infarction     Obesity        Past Surgical History:   Past Surgical History:   Procedure Laterality Date    ANKLE ARTHROSCOPY W/ OPEN REPAIR      APPENDECTOMY      CARDIAC CATHETERIZATION      PCI to circumflex    CORONARY ARTERY BYPASS GRAFT N/A 10/8/2020    Procedure: STERNOTOMY, CORONARY ARTERY BYPASS GRAFTING TIME 4 WITH LEFT KELSI  AND ENDOSCOPICALLY HARVESTED LEFT GREATER SAPHENOUS VEIN AND PRP.;  Surgeon: Jr Girma Chiu MD;  Location: [facility];  Service: Cardiothoracic;  Laterality: N/A;    HEEL SPUR SURGERY      HERNIA REPAIR      REPLACEMENT TOTAL KNEE BILATERAL      TONSILLECTOMY         Family History:   Family History   Problem Relation Age of Onset    Heart disease Other        Social History:   Social History     Socioeconomic History    Marital status:    Tobacco Use    Smoking status: Some Days     Packs/day: 1.00     Types: Cigarettes     Last attempt to quit: 10/20/2020     Years since quittin.8    Smokeless tobacco: Never   Vaping Use    Vaping Use: Never used   Substance and Sexual Activity    Alcohol use: Never    Drug use: Never    Sexual activity: Defer       Medications:     Current Outpatient Medications:     aspirin 81 MG chewable tablet, Chew 1 tablet Daily., Disp: , Rfl:     EPINEPHrine (EPIPEN) 0.3 MG/0.3ML solution auto-injector injection, 0.3 mL., Disp: , Rfl:     Evolocumab (REPATHA) solution auto-injector SureClick injection, Inject 1 mL under the skin into the appropriate area as directed Every 14 (Fourteen) Days., Disp: 2 mL, Rfl: 3    glucose blood test strip, Test 4 times per day. (Patient taking differently: Test 4 times per day.), Disp: 100 each, Rfl: 0    levothyroxine (SYNTHROID, LEVOTHROID) 25 MCG tablet, TAKE ONE TABLET BY MOUTH DAILY, Disp: 30 tablet, Rfl: 0    metoprolol tartrate (LOPRESSOR) 100 MG tablet, Take 1 tablet by mouth Every 12 (Twelve) Hours.,  "Disp: 180 tablet, Rfl: 3    OneTouch Delica Lancets 33G misc, test 4 times per day (Patient taking differently: test 4 times per day), Disp: 100 each, Rfl: 0    cetirizine (zyrTEC) 10 MG tablet, Take 1 tablet by mouth Every Night., Disp: 90 tablet, Rfl: 1    clobetasol (TEMOVATE) 0.05 % cream, Apply 1 application  topically to the appropriate area as directed 2 (Two) Times a Day., Disp: 60 g, Rfl: 1    gabapentin (NEURONTIN) 300 MG capsule, One tablet orally qhs x 3 days then take one tablet twice daily for 3 days then take one tablet orally three times daily., Disp: 90 capsule, Rfl: 0    lisinopril-hydrochlorothiazide (PRINZIDE,ZESTORETIC) 20-12.5 MG per tablet, TAKE 2 TABLETS BY MOUTH DAILY, Disp: 180 tablet, Rfl: 3    spironolactone (Aldactone) 50 MG tablet, Take 1 tablet by mouth Daily., Disp: 90 tablet, Rfl: 1    Allergies:   Allergies   Allergen Reactions    Hydrocodone Itching    Lortab [Hydrocodone-Acetaminophen] Itching               Objective     Physical Exam:  Vital Signs:   Vitals:    08/23/23 0957   BP: 159/83   Pulse: 74   Temp: 97.9 øF (36.6 øC)   SpO2: 96%   Weight: 112 kg (248 lb)   Height: 185.4 cm (72.99\")     Body mass index is 32.73 kg/mý.   BMI is >= 30 and <35. (Class 1 Obesity). The following options were offered after discussion;: exercise counseling/recommendations and nutrition counseling/recommendations       Physical Exam  HENT:      Right Ear: Tympanic membrane normal.      Left Ear: Tympanic membrane normal.      Nose: Nose normal.      Mouth/Throat:      Mouth: Mucous membranes are moist.   Eyes:      Conjunctiva/sclera: Conjunctivae normal.   Neck:      Vascular: No carotid bruit.   Cardiovascular:      Rate and Rhythm: Normal rate and regular rhythm.      Heart sounds: Normal heart sounds. No murmur heard.  Pulmonary:      Effort: Pulmonary effort is normal.      Breath sounds: Normal breath sounds.   Abdominal:      General: Bowel sounds are normal.      Palpations: Abdomen is " soft.   Musculoskeletal:      Right lower leg: Edema present.      Left lower leg: Edema present.   Feet:      Right foot:      Skin integrity: Ulcer present.      Toenail Condition: Right toenails are abnormally thick.      Left foot:      Toenail Condition: Left toenails are abnormally thick.      Comments: Open wound/pressure ulcer right great toe medial surface  Black center, no drainage, one centimeter in diameter   Skin:     General: Skin is warm and dry.      Findings: Rash present.      Comments: Scaly plaques upper back scattered   Neurological:      Mental Status: He is alert.   Psychiatric:         Mood and Affect: Mood normal.         Behavior: Behavior normal.           Assessment / Plan      Assessment/Plan:   Diagnoses and all orders for this visit:    1. Type 2 diabetes mellitus with hyperglycemia, without long-term current use of insulin (Primary)  -     CBC Auto Differential  -     Comprehensive Metabolic Panel  -     Hemoglobin A1c  -     Microalbumin / Creatinine Urine Ratio - Urine, Clean Catch  -     TSH  -     Urinalysis With Culture If Indicated -  -     Ambulatory Referral for Diabetic Eye Exam-Ophthalmology  -     Ambulatory Referral to Podiatry    2. Primary hypertension    3. Hyperlipidemia LDL goal <70  -     Comprehensive Metabolic Panel  -     Lipid Panel    4. Coronary artery disease of native artery of native heart with stable angina pectoris    5. S/P CABG x 4    6. Hypothyroidism, unspecified type    7. Allergic rhinitis, unspecified seasonality, unspecified trigger    8. Tobacco abuse    9. Screening PSA (prostate specific antigen)  -     PSA Screen    10. Encounter for screening colonoscopy  -     Ambulatory Referral For Screening Colonoscopy    11. Dermatitis    12. Skin ulcer, limited to breakdown of skin  -     Ambulatory Referral to Podiatry    13. History of smoking 30 or more pack years  -     CT Chest Low Dose Wo; Future    14. Need for hepatitis C screening test  -      "Hepatitis C Antibody    15. Neuropathy  -     gabapentin (NEURONTIN) 300 MG capsule; One tablet orally qhs x 3 days then take one tablet twice daily for 3 days then take one tablet orally three times daily.  Dispense: 90 capsule; Refill: 0    16. Medication management  -     POC Urine Drug Screen [facility]    Other orders  -     cetirizine (zyrTEC) 10 MG tablet; Take 1 tablet by mouth Every Night.  Dispense: 90 tablet; Refill: 1  -     clobetasol (TEMOVATE) 0.05 % cream; Apply 1 application  topically to the appropriate area as directed 2 (Two) Times a Day.  Dispense: 60 g; Refill: 1  -     spironolactone (Aldactone) 50 MG tablet; Take 1 tablet by mouth Daily.  Dispense: 90 tablet; Refill: 1       DM2 will obtain hgb A1c call with results and further recommendations  Hypertension uncontrolled with pedal edema we will stop amlodipine add spironolactone continue lisinopril hydrochlorothiazide blood pressure check in 2 weeks follow-up in 4 weeks  Hyperlipidemia LDL below goal of 70 currently on Repatha with cardiology  Coronary artery disease status post CABG x4 currently on aspirin Lopressor and Repatha follow-up with cardiology as scheduled  Hypothyroidism we will obtain TSH to monitor current Synthroid dose 25 mcg daily denies palpitations  Allergic rhinitis currently controlled with Zyrtec will provide refills  Tobacco abuse history of greater than 30 smoking pack years declines smoking cessation we will obtain CT low-dose of the chest  We will provide a referral to his screening colonoscopy denies blood in stool recent change in health habits or family history of colon cancer  Dermatitis we will prescribe clobetasol cream  Skin ulcer of great toe we will clean and dress in office provide a referral to podiatry  Neuropathy we will start gabapentin [place] reviewed urine drug screen obtained in office        Follow Up:   Return in about 4 weeks (around 9/20/2023).    Nyasia Newsome, SOHAM    \"Please note " "that portions of this note were completed with a voice recognition program.\"    "

## 2023-08-24 RX ORDER — DULAGLUTIDE 0.75 MG/.5ML
0.75 INJECTION, SOLUTION SUBCUTANEOUS WEEKLY
Qty: 2 ML | Refills: 1 | Status: SHIPPED | OUTPATIENT
Start: 2023-08-24

## 2023-08-24 RX ORDER — METFORMIN HYDROCHLORIDE 500 MG/1
500 TABLET, EXTENDED RELEASE ORAL
Qty: 90 TABLET | Refills: 1 | Status: SHIPPED | OUTPATIENT
Start: 2023-08-24

## 2023-08-28 LAB
ALBUMIN UR-MCNC: 12.4 MG/DL
BACTERIA UR QL AUTO: NORMAL /HPF
BILIRUB UR QL STRIP: NEGATIVE
CLARITY UR: CLEAR
COLOR UR: YELLOW
CREAT UR-MCNC: 37.1 MG/DL
GLUCOSE UR STRIP-MCNC: ABNORMAL MG/DL
HGB UR QL STRIP.AUTO: NEGATIVE
HYALINE CASTS UR QL AUTO: NORMAL /LPF
KETONES UR QL STRIP: NEGATIVE
LEUKOCYTE ESTERASE UR QL STRIP.AUTO: NEGATIVE
MICROALBUMIN/CREAT UR: 334.2 MG/G
NITRITE UR QL STRIP: NEGATIVE
PH UR STRIP.AUTO: 5.5 [PH] (ref 5–8)
PROT UR QL STRIP: ABNORMAL
RBC # UR STRIP: NORMAL /HPF
REF LAB TEST METHOD: NORMAL
SP GR UR STRIP: >=1.03 (ref 1–1.03)
SQUAMOUS #/AREA URNS HPF: NORMAL /HPF
UROBILINOGEN UR QL STRIP: ABNORMAL
WBC # UR STRIP: NORMAL /HPF

## 2023-08-28 PROCEDURE — 82570 ASSAY OF URINE CREATININE: CPT | Performed by: NURSE PRACTITIONER

## 2023-08-28 PROCEDURE — 81001 URINALYSIS AUTO W/SCOPE: CPT | Performed by: NURSE PRACTITIONER

## 2023-08-28 PROCEDURE — 82043 UR ALBUMIN QUANTITATIVE: CPT | Performed by: NURSE PRACTITIONER

## 2023-08-29 ENCOUNTER — OFFICE VISIT (OUTPATIENT)
Dept: FAMILY MEDICINE CLINIC | Facility: CLINIC | Age: 58
End: 2023-08-29
Payer: COMMERCIAL

## 2023-08-29 VITALS
DIASTOLIC BLOOD PRESSURE: 70 MMHG | HEART RATE: 112 BPM | SYSTOLIC BLOOD PRESSURE: 121 MMHG | HEIGHT: 73 IN | OXYGEN SATURATION: 94 % | TEMPERATURE: 97.4 F | WEIGHT: 246 LBS | BODY MASS INDEX: 32.6 KG/M2

## 2023-08-29 DIAGNOSIS — E78.5 HYPERLIPIDEMIA LDL GOAL <70: ICD-10-CM

## 2023-08-29 DIAGNOSIS — L03.115 CELLULITIS OF RIGHT LOWER EXTREMITY: ICD-10-CM

## 2023-08-29 DIAGNOSIS — Z95.1 S/P CABG X 4: ICD-10-CM

## 2023-08-29 DIAGNOSIS — E66.09 CLASS 1 OBESITY DUE TO EXCESS CALORIES WITH SERIOUS COMORBIDITY AND BODY MASS INDEX (BMI) OF 32.0 TO 32.9 IN ADULT: ICD-10-CM

## 2023-08-29 DIAGNOSIS — I10 PRIMARY HYPERTENSION: ICD-10-CM

## 2023-08-29 DIAGNOSIS — E87.1 HYPONATREMIA: ICD-10-CM

## 2023-08-29 DIAGNOSIS — E11.65 TYPE 2 DIABETES MELLITUS WITH HYPERGLYCEMIA, WITHOUT LONG-TERM CURRENT USE OF INSULIN: Primary | ICD-10-CM

## 2023-08-29 DIAGNOSIS — L29.9 ITCHING: ICD-10-CM

## 2023-08-29 DIAGNOSIS — I25.118 CORONARY ARTERY DISEASE OF NATIVE ARTERY OF NATIVE HEART WITH STABLE ANGINA PECTORIS: ICD-10-CM

## 2023-08-29 DIAGNOSIS — E03.9 ACQUIRED HYPOTHYROIDISM: ICD-10-CM

## 2023-08-29 PROBLEM — E66.01 SEVERE OBESITY (BMI 35.0-39.9) WITH COMORBIDITY: Status: RESOLVED | Noted: 2022-10-11 | Resolved: 2023-08-29

## 2023-08-29 PROBLEM — E66.811 CLASS 1 OBESITY DUE TO EXCESS CALORIES WITH SERIOUS COMORBIDITY AND BODY MASS INDEX (BMI) OF 32.0 TO 32.9 IN ADULT: Status: ACTIVE | Noted: 2022-10-11

## 2023-08-29 PROBLEM — E66.9 OBESITY (BMI 30.0-34.9): Status: RESOLVED | Noted: 2022-04-08 | Resolved: 2023-08-29

## 2023-08-29 PROBLEM — E66.811 OBESITY (BMI 30.0-34.9): Status: RESOLVED | Noted: 2022-04-08 | Resolved: 2023-08-29

## 2023-08-29 RX ORDER — PEN NEEDLE, DIABETIC 30 GX5/16"
1 NEEDLE, DISPOSABLE MISCELLANEOUS NIGHTLY
Qty: 100 EACH | Refills: 5 | Status: SHIPPED | OUTPATIENT
Start: 2023-08-29

## 2023-08-29 RX ORDER — DOXYCYCLINE 100 MG/1
100 CAPSULE ORAL 2 TIMES DAILY
Qty: 14 CAPSULE | Refills: 0 | Status: SHIPPED | OUTPATIENT
Start: 2023-08-29

## 2023-08-29 RX ORDER — HYDROXYZINE HYDROCHLORIDE 25 MG/1
25 TABLET, FILM COATED ORAL 3 TIMES DAILY PRN
Qty: 90 TABLET | Refills: 2 | Status: SHIPPED | OUTPATIENT
Start: 2023-08-29

## 2023-08-29 RX ORDER — INSULIN DETEMIR 100 [IU]/ML
10 INJECTION, SOLUTION SUBCUTANEOUS NIGHTLY
Qty: 3 ML | Refills: 12 | Status: SHIPPED | OUTPATIENT
Start: 2023-08-29

## 2023-08-29 NOTE — PROGRESS NOTES
Follow Up Office Visit      Patient Name: Jd Velazquez  : 1965   MRN: [id number]     Chief Complaint:    Chief Complaint   Patient presents with    Allergic Rhinitis    Coronary Artery Disease    Hyperlipidemia    Hypertension    Diabetes    Depression    Insomnia    Hypothyroidism       History of Present Illness: Jd Velazquez is a 57 y.o. male who is here today to follow up for HTN, DM2, hyperlipidemia, hypothyroidism, insomnia, CAD, allergic rhinitis.  Review lab results       A1C- 2023  Eye exam- 2022 [place]  Foot exam- self checking daily, scheduled podiatry tomorrow  psa- 2023  Colonoscopy- none     C/o redness lower right leg  Also with itching     Subjective      Review of Systems:   Review of Systems   Constitutional:  Negative for fever.   Eyes:  Negative for visual disturbance.   Respiratory:  Negative for cough and shortness of breath.    Cardiovascular:  Negative for chest pain.   Gastrointestinal:  Negative for abdominal pain, diarrhea, nausea and vomiting.   Genitourinary:  Negative for dysuria.   Skin:  Positive for rash.   Neurological:  Negative for headaches.      Past Medical History:   Past Medical History:   Diagnosis Date    Allergic rhinitis 2015    Benign essential hypertension 2016    CAD (coronary artery disease)     Diabetes mellitus     Hyperlipidemia     Hypertension     Hypokalemia 2016    Hypothyroidism 2014    Insomnia, unspecified 06/15/2016    Microalbuminuria due to type 2 diabetes mellitus 10/15/2015    Mixed hyperlipidemia 10/15/2015    Myocardial infarction     Obesity        Past Surgical History:   Past Surgical History:   Procedure Laterality Date    ANKLE ARTHROSCOPY W/ OPEN REPAIR      APPENDECTOMY      CARDIAC CATHETERIZATION      PCI to circumflex    CORONARY ARTERY BYPASS GRAFT N/A 10/8/2020    Procedure: STERNOTOMY, CORONARY ARTERY BYPASS GRAFTING TIME 4 WITH LEFT KELIS  AND ENDOSCOPICALLY HARVESTED LEFT  GREATER SAPHENOUS VEIN AND PRP.;  Surgeon: Jr Girma Chiu MD;  Location: [facility] MAIN OR;  Service: Cardiothoracic;  Laterality: N/A;    HEEL SPUR SURGERY      HERNIA REPAIR      REPLACEMENT TOTAL KNEE BILATERAL      TONSILLECTOMY         Family History:   Family History   Problem Relation Age of Onset    Heart disease Other        Social History:   Social History     Socioeconomic History    Marital status:    Tobacco Use    Smoking status: Some Days     Packs/day: 1.00     Types: Cigarettes     Last attempt to quit: 10/20/2020     Years since quittin.8    Smokeless tobacco: Never   Vaping Use    Vaping Use: Never used   Substance and Sexual Activity    Alcohol use: Never    Drug use: Never    Sexual activity: Defer       Medications:     Current Outpatient Medications:     aspirin 81 MG chewable tablet, Chew 1 tablet Daily., Disp: , Rfl:     cetirizine (zyrTEC) 10 MG tablet, Take 1 tablet by mouth Every Night., Disp: 90 tablet, Rfl: 1    clobetasol (TEMOVATE) 0.05 % cream, Apply 1 application  topically to the appropriate area as directed 2 (Two) Times a Day., Disp: 60 g, Rfl: 1    Dulaglutide (Trulicity) 0.75 MG/0.5ML solution pen-injector, Inject 0.75 mg under the skin into the appropriate area as directed 1 (One) Time Per Week., Disp: 2 mL, Rfl: 1    EPINEPHrine (EPIPEN) 0.3 MG/0.3ML solution auto-injector injection, 0.3 mL., Disp: , Rfl:     Evolocumab (REPATHA) solution auto-injector SureClick injection, Inject 1 mL under the skin into the appropriate area as directed Every 14 (Fourteen) Days., Disp: 2 mL, Rfl: 3    gabapentin (NEURONTIN) 300 MG capsule, One tablet orally qhs x 3 days then take one tablet twice daily for 3 days then take one tablet orally three times daily., Disp: 90 capsule, Rfl: 0    glucose blood test strip, Test 4 times per day. (Patient taking differently: Test 4 times per day.), Disp: 100 each, Rfl: 0    levothyroxine (SYNTHROID, LEVOTHROID) 25 MCG tablet, TAKE ONE  "TABLET BY MOUTH DAILY, Disp: 30 tablet, Rfl: 0    lisinopril-hydrochlorothiazide (PRINZIDE,ZESTORETIC) 20-12.5 MG per tablet, TAKE 2 TABLETS BY MOUTH DAILY, Disp: 180 tablet, Rfl: 3    metFORMIN ER (GLUCOPHAGE-XR) 500 MG 24 hr tablet, Take 1 tablet by mouth Daily With Breakfast., Disp: 90 tablet, Rfl: 1    metoprolol tartrate (LOPRESSOR) 100 MG tablet, Take 1 tablet by mouth Every 12 (Twelve) Hours., Disp: 180 tablet, Rfl: 3    OneTouch Delica Lancets 33G misc, test 4 times per day (Patient taking differently: test 4 times per day), Disp: 100 each, Rfl: 0    spironolactone (Aldactone) 50 MG tablet, Take 1 tablet by mouth Daily., Disp: 90 tablet, Rfl: 1    doxycycline (MONODOX) 100 MG capsule, Take 1 capsule by mouth 2 (Two) Times a Day., Disp: 14 capsule, Rfl: 0    hydrOXYzine (ATARAX) 25 MG tablet, Take 1 tablet by mouth 3 (Three) Times a Day As Needed for Itching., Disp: 90 tablet, Rfl: 2    insulin detemir (Levemir) 100 UNIT/ML injection, Inject 10 Units under the skin into the appropriate area as directed Every Night. Increase one unit per night goal fasting  2 hours after meals 140, Disp: 3 mL, Rfl: 12    Insulin Pen Needle (Pen Needles 3/16\") 31G X 5 MM misc, Use 1 each Every Night., Disp: 100 each, Rfl: 5    Allergies:   Allergies   Allergen Reactions    Hydrocodone Itching    Lortab [Hydrocodone-Acetaminophen] Itching           Objective     Physical Exam:  Vital Signs:   Vitals:    08/29/23 1343   BP: 121/70   Pulse: 112   Temp: 97.4 øF (36.3 øC)   SpO2: 94%   Weight: 112 kg (246 lb)   Height: 185.4 cm (72.99\")     Body mass index is 32.46 kg/mý.           Physical Exam  Cardiovascular:      Rate and Rhythm: Normal rate and regular rhythm.      Heart sounds: Normal heart sounds. No murmur heard.  Pulmonary:      Effort: Pulmonary effort is normal.      Breath sounds: Normal breath sounds.   Abdominal:      General: Bowel sounds are normal.      Palpations: Abdomen is soft.   Musculoskeletal:      " "Right lower leg: No edema.      Left lower leg: No edema.   Skin:     General: Skin is warm and dry.             Comments: Erythematous rash right lower extremity    Neurological:      Mental Status: He is alert.   Psychiatric:         Mood and Affect: Mood normal.         Behavior: Behavior normal.           Assessment / Plan      Assessment/Plan:   Diagnoses and all orders for this visit:    1. Type 2 diabetes mellitus with hyperglycemia, without long-term current use of insulin (Primary)    2. Primary hypertension    3. Hyperlipidemia LDL goal <70  -     Lipid Panel; Future  -     Comprehensive Metabolic Panel; Future    4. S/P CABG x 4    5. Coronary artery disease of native artery of native heart with stable angina pectoris    6. Acquired hypothyroidism    7. Cellulitis of right lower extremity    8. Itching    9. Hyponatremia    10. Class 1 obesity due to excess calories with serious comorbidity and body mass index (BMI) of 32.0 to 32.9 in adult    Other orders  -     insulin detemir (Levemir) 100 UNIT/ML injection; Inject 10 Units under the skin into the appropriate area as directed Every Night. Increase one unit per night goal fasting  2 hours after meals 140  Dispense: 3 mL; Refill: 12  -     hydrOXYzine (ATARAX) 25 MG tablet; Take 1 tablet by mouth 3 (Three) Times a Day As Needed for Itching.  Dispense: 90 tablet; Refill: 2  -     doxycycline (MONODOX) 100 MG capsule; Take 1 capsule by mouth 2 (Two) Times a Day.  Dispense: 14 capsule; Refill: 0  -     Insulin Pen Needle (Pen Needles 3/16\") 31G X 5 MM misc; Use 1 each Every Night.  Dispense: 100 each; Refill: 5       Diabetes mellitus type 2 with hyperglycemia uncontrolled we will start Levemir 10 units at nighttime increasing 1 unit per night with goal of 80-1 10 fasting 142 hours after meal patient to bring blood sugar logs in office in 2 weeks  Hypertension currently controlled lisinopril-hydrochlorothiazide and spironolactone  Hyperlipidemia LDL " "unable to be calculated as the triglycerides are significantly elevated will repeat CMP and lipid panel fasting decrease carb intake exercise 30 minutes daily  Status post CABG x4 with coronary artery disease continue Repatha and aspirin per cardiology keep follow-up as scheduled hypothyroidism TSH within goal range on current Synthroid dose 25 mcg daily  Cellulitis right lower extremity we will start doxycycline  Itching we will provide hydroxyzine as needed  Hyponatremia recommend increase salt in diet we will reassess at follow-up in 2 weeks  Class I obesity with serious comorbidity of diabetes mellitus type 2 hypertension hyperlipidemia we will continue Trulicity at this time to help aid in appetite control and weight loss discussed if tolerating up follow-up in 2 weeks will increase to 1.5 mg once weekly          Follow Up:   Return in about 2 weeks (around 9/12/2023).    Nyasia Newsome, APRN    \"Please note that portions of this note were completed with a voice recognition program.\"    "

## 2023-08-30 ENCOUNTER — OFFICE VISIT (OUTPATIENT)
Dept: PODIATRY | Facility: CLINIC | Age: 58
End: 2023-08-30
Payer: COMMERCIAL

## 2023-08-30 ENCOUNTER — TELEPHONE (OUTPATIENT)
Dept: FAMILY MEDICINE CLINIC | Facility: CLINIC | Age: 58
End: 2023-08-30
Payer: COMMERCIAL

## 2023-08-30 VITALS
DIASTOLIC BLOOD PRESSURE: 78 MMHG | HEIGHT: 73 IN | WEIGHT: 244 LBS | SYSTOLIC BLOOD PRESSURE: 132 MMHG | BODY MASS INDEX: 32.34 KG/M2 | TEMPERATURE: 96.9 F | OXYGEN SATURATION: 98 % | HEART RATE: 115 BPM

## 2023-08-30 DIAGNOSIS — E11.621 DIABETIC ULCER OF RIGHT GREAT TOE: ICD-10-CM

## 2023-08-30 DIAGNOSIS — I73.9 PERIPHERAL VASCULAR DISEASE: Primary | ICD-10-CM

## 2023-08-30 DIAGNOSIS — L97.519 DIABETIC ULCER OF RIGHT GREAT TOE: ICD-10-CM

## 2023-08-30 DIAGNOSIS — E11.65 UNCONTROLLED TYPE 2 DIABETES MELLITUS WITH HYPERGLYCEMIA: ICD-10-CM

## 2023-08-30 NOTE — PROGRESS NOTES
[facility] - PODIATRY    Today's Date: 08/30/23    Patient Name: Jd Velazquez  MRN: [id number]  CSN: [id number]  PCP: Dacia Newsome APRN,   Referring Provider: No ref. provider found    SUBJECTIVE     Chief Complaint   Patient presents with    Left Foot - Establish Care, Annual Exam, Diabetes    Right Foot - Establish Care, Annual Exam, Diabetes, Foot Ulcer     Great toe ulcer x 2 months, started as a little cut but has gotten worse   Using Mupirocin, was prescribed by Dermatologist   On doxycycline      HPI: Jd Velazquez, a 57 y.o.male, presents to clinic.    Patient is a 57-year-old male presenting with an ulcer to the right great toe.  Patient states he is diabetic.  States his sugars are uncontrolled and his last A1c was over 10.  He says that approximately 2 months ago he had a cut on his big toe.  It has not healed.  He states his feet are numb and hurt him constantly.  Patient has been using mupirocin cream and it has not helped.    Past Medical History:   Diagnosis Date    Allergic rhinitis 01/12/2015    Benign essential hypertension 03/08/2016    CAD (coronary artery disease)     Diabetes mellitus     Foot ulcer     Hyperlipidemia     Hypertension     Hypokalemia 02/23/2016    Hypothyroidism 04/24/2014    Insomnia, unspecified 06/15/2016    Microalbuminuria due to type 2 diabetes mellitus 10/15/2015    Mixed hyperlipidemia 10/15/2015    Myocardial infarction     Obesity      Past Surgical History:   Procedure Laterality Date    ANKLE ARTHROSCOPY W/ OPEN REPAIR      APPENDECTOMY      CARDIAC CATHETERIZATION  2014    PCI to circumflex    CORONARY ARTERY BYPASS GRAFT N/A 10/8/2020    Procedure: STERNOTOMY, CORONARY ARTERY BYPASS GRAFTING TIME 4 WITH LEFT KELSI  AND ENDOSCOPICALLY HARVESTED LEFT GREATER SAPHENOUS VEIN AND PRP.;  Surgeon: Jr Girma Chiu MD;  Location: [facility];  Service: Cardiothoracic;  Laterality: N/A;    HEEL SPUR SURGERY      HERNIA  REPAIR      REPLACEMENT TOTAL KNEE BILATERAL      TONSILLECTOMY       Family History   Problem Relation Age of Onset    Heart disease Other      Social History     Socioeconomic History    Marital status:    Tobacco Use    Smoking status: Every Day     Packs/day: 1.00     Types: Cigarettes    Smokeless tobacco: Never   Vaping Use    Vaping Use: Never used   Substance and Sexual Activity    Alcohol use: Never    Drug use: Never    Sexual activity: Defer     Allergies   Allergen Reactions    Hydrocodone Itching    Lortab [Hydrocodone-Acetaminophen] Itching     Current Outpatient Medications   Medication Sig Dispense Refill    aspirin 81 MG chewable tablet Chew 1 tablet Daily.      cetirizine (zyrTEC) 10 MG tablet Take 1 tablet by mouth Every Night. 90 tablet 1    clobetasol (TEMOVATE) 0.05 % cream Apply 1 application  topically to the appropriate area as directed 2 (Two) Times a Day. 60 g 1    doxycycline (MONODOX) 100 MG capsule Take 1 capsule by mouth 2 (Two) Times a Day. 14 capsule 0    Dulaglutide (Trulicity) 0.75 MG/0.5ML solution pen-injector Inject 0.75 mg under the skin into the appropriate area as directed 1 (One) Time Per Week. 2 mL 1    EPINEPHrine (EPIPEN) 0.3 MG/0.3ML solution auto-injector injection 0.3 mL.      Evolocumab (REPATHA) solution auto-injector SureClick injection Inject 1 mL under the skin into the appropriate area as directed Every 14 (Fourteen) Days. 2 mL 3    gabapentin (NEURONTIN) 300 MG capsule One tablet orally qhs x 3 days then take one tablet twice daily for 3 days then take one tablet orally three times daily. 90 capsule 0    glucose blood test strip Test 4 times per day. (Patient taking differently: Test 4 times per day.) 100 each 0    hydrOXYzine (ATARAX) 25 MG tablet Take 1 tablet by mouth 3 (Three) Times a Day As Needed for Itching. 90 tablet 2    insulin detemir (Levemir) 100 UNIT/ML injection Inject 10 Units under the skin into the appropriate area as directed Every  "Night. Increase one unit per night goal fasting  2 hours after meals 140 3 mL 12    Insulin Pen Needle (Pen Needles 3/16\") 31G X 5 MM misc Use 1 each Every Night. 100 each 5    levothyroxine (SYNTHROID, LEVOTHROID) 25 MCG tablet TAKE ONE TABLET BY MOUTH DAILY 30 tablet 0    lisinopril-hydrochlorothiazide (PRINZIDE,ZESTORETIC) 20-12.5 MG per tablet TAKE 2 TABLETS BY MOUTH DAILY 180 tablet 3    metFORMIN ER (GLUCOPHAGE-XR) 500 MG 24 hr tablet Take 1 tablet by mouth Daily With Breakfast. 90 tablet 1    metoprolol tartrate (LOPRESSOR) 100 MG tablet Take 1 tablet by mouth Every 12 (Twelve) Hours. 180 tablet 3    OneTouch Delica Lancets 33G misc test 4 times per day (Patient taking differently: test 4 times per day) 100 each 0    spironolactone (Aldactone) 50 MG tablet Take 1 tablet by mouth Daily. 90 tablet 1     No current facility-administered medications for this visit.     Review of Systems   Skin:         Ulcer toe   All other systems reviewed and are negative.    OBJECTIVE     Vitals:    08/30/23 1316   BP: 132/78   Pulse: 115   Temp: 96.9 øF (36.1 øC)   SpO2: 98%       WBC   Date Value Ref Range Status   08/23/2023 7.61 3.40 - 10.80 10*3/mm3 Final     RBC   Date Value Ref Range Status   08/23/2023 5.08 4.14 - 5.80 10*6/mm3 Final     Hemoglobin   Date Value Ref Range Status   08/23/2023 15.6 13.0 - 17.7 g/dL Final     Hematocrit   Date Value Ref Range Status   08/23/2023 45.9 37.5 - 51.0 % Final     MCV   Date Value Ref Range Status   08/23/2023 90.4 79.0 - 97.0 fL Final     MCH   Date Value Ref Range Status   08/23/2023 30.7 26.6 - 33.0 pg Final     MCHC   Date Value Ref Range Status   08/23/2023 34.0 31.5 - 35.7 g/dL Final     RDW   Date Value Ref Range Status   08/23/2023 13.3 12.3 - 15.4 % Final     RDW-SD   Date Value Ref Range Status   08/23/2023 43.1 37.0 - 54.0 fl Final     MPV   Date Value Ref Range Status   08/23/2023 9.8 6.0 - 12.0 fL Final     Platelets   Date Value Ref Range Status   08/23/2023 " 340 140 - 450 10*3/mm3 Final     Neutrophil %   Date Value Ref Range Status   08/23/2023 54.1 42.7 - 76.0 % Final     Lymphocyte %   Date Value Ref Range Status   08/23/2023 30.4 19.6 - 45.3 % Final     Monocyte %   Date Value Ref Range Status   08/23/2023 12.7 (H) 5.0 - 12.0 % Final     Eosinophil %   Date Value Ref Range Status   08/23/2023 1.1 0.3 - 6.2 % Final     Basophil %   Date Value Ref Range Status   08/23/2023 0.9 0.0 - 1.5 % Final     Immature Grans %   Date Value Ref Range Status   08/23/2023 0.8 (H) 0.0 - 0.5 % Final     Neutrophils, Absolute   Date Value Ref Range Status   08/23/2023 4.12 1.70 - 7.00 10*3/mm3 Final     Lymphocytes, Absolute   Date Value Ref Range Status   08/23/2023 2.31 0.70 - 3.10 10*3/mm3 Final     Monocytes, Absolute   Date Value Ref Range Status   08/23/2023 0.97 (H) 0.10 - 0.90 10*3/mm3 Final     Eosinophils, Absolute   Date Value Ref Range Status   08/23/2023 0.08 0.00 - 0.40 10*3/mm3 Final     Basophils, Absolute   Date Value Ref Range Status   08/23/2023 0.07 0.00 - 0.20 10*3/mm3 Final     Immature Grans, Absolute   Date Value Ref Range Status   08/23/2023 0.06 (H) 0.00 - 0.05 10*3/mm3 Final     nRBC   Date Value Ref Range Status   08/23/2023 0.0 0.0 - 0.2 /100 WBC Final         Lab Results   Component Value Date    GLUCOSE 337 (H) 08/23/2023    BUN 11 08/23/2023    CREATININE 0.92 08/23/2023    EGFRIFNONA 77 06/17/2021    BCR 12.0 08/23/2023    K 4.2 08/23/2023    CO2 22.0 08/23/2023    CALCIUM 9.7 08/23/2023    ALBUMIN 4.2 08/23/2023    LABIL2 0.9 (L) 10/16/2020    AST 12 08/23/2023    ALT 14 08/23/2023       Patient seen in no apparent distress.      PHYSICAL EXAM:     Foot/Ankle Exam    GENERAL  Appearance:  appears stated age  Orientation:  AAOx3  Affect:  appropriate  Gait:  unimpaired  Assistance:  independent  Right shoe gear: casual shoe  Left shoe gear: casual shoe    VASCULAR     Right Foot Vascularity   Dorsalis pedis:  1+  Posterior tibial:  1+  Skin temperature:   cool  Edema gradin+ and non-pitting  CFT:  < 3 seconds  Pedal hair growth:  Absent  Varicosities:  none     Left Foot Vascularity   Dorsalis pedis:  1+  Posterior tibial:  1+  Skin temperature:  cool  Edema gradin+ and non-pitting  CFT:  < 3 seconds  Pedal hair growth:  Absent  Varicosities:  none     NEUROLOGIC     Right Foot Neurologic   Light touch sensation: absent  Vibratory sensation: absent  Hot/Cold sensation: absent     Left Foot Neurologic   Light touch sensation: absent  Vibratory sensation: absent  Hot/Cold sensation:  absent    MUSCLE STRENGTH     Right Foot Muscle Strength   Foot dorsiflexion:  4  Foot plantar flexion:  4  Foot inversion:  4  Foot eversion:  4     Left Foot Muscle Strength   Foot dorsiflexion:  4  Foot plantar flexion:  4  Foot inversion:  4  Foot eversion:  4    RANGE OF MOTION     Right Foot Range of Motion   Foot and ankle ROM within normal limits       Left Foot Range of Motion   Foot and ankle ROM within normal limits      DERMATOLOGIC      Right Foot Dermatologic   Skin  Right foot skin is intact.      Left Foot Dermatologic   Skin  Left foot skin is intact.      Right foot additional comments: Ulcer to distal aspect of right great toe.  No erythema no malodor no drainage.  50% fibrotic.    RADIOLOGY:        No results found.    ASSESSMENT/PLAN     Diagnoses and all orders for this visit:    1. Peripheral vascular disease (Primary)  -     Doppler Arterial Multi Level Lower Extremity - Bilateral CAR; Future    2. Diabetic ulcer of right great toe    3. Uncontrolled type 2 diabetes mellitus with hyperglycemia      Daily dressing changes to right great toe  Offload foot, surgical shoe given  Check arterial studies  Finish antibiotics  Return to clinic in 2 weeks    Comprehensive lower extremity examination and evaluation was performed.    Discussed findings and treatment plan including risks, benefits, and treatment options with patient in detail. Patient agreed with  treatment plan.    Medications and allergies reviewed.  Reviewed available lab values along with other pertinent labs.  These were discussed with the patient.    An After Visit Summary was printed and given to the patient at discharge, including (if requested) any available informative/educational handouts regarding diagnosis, treatment, or medications. All questions were answered to patient/family satisfaction. Should symptoms fail to improve or worsen they agree to call or return to clinic or to go to the Emergency Department. Discussed the importance of following up with any needed screening tests/labs/specialist appointments and any requested follow-up recommended by me today. Importance of maintaining follow-up discussed and patient accepts that missed appointments can delay diagnosis and potentially lead to worsening of conditions.    Return in about 2 weeks (around 9/13/2023)., or sooner if acute issues arise.    This document has been electronically signed by Balwinder Costa DPM on August 30, 2023 13:45 EDT

## 2023-09-05 DIAGNOSIS — I10 PRIMARY HYPERTENSION: ICD-10-CM

## 2023-09-05 RX ORDER — AMLODIPINE BESYLATE 5 MG/1
TABLET ORAL
Qty: 30 TABLET | Refills: 0 | OUTPATIENT
Start: 2023-09-05

## 2023-09-12 ENCOUNTER — OFFICE VISIT (OUTPATIENT)
Dept: FAMILY MEDICINE CLINIC | Facility: CLINIC | Age: 58
End: 2023-09-12
Payer: COMMERCIAL

## 2023-09-12 VITALS
HEIGHT: 73 IN | HEART RATE: 94 BPM | DIASTOLIC BLOOD PRESSURE: 88 MMHG | OXYGEN SATURATION: 96 % | TEMPERATURE: 98.1 F | SYSTOLIC BLOOD PRESSURE: 150 MMHG | BODY MASS INDEX: 33.4 KG/M2 | WEIGHT: 252 LBS

## 2023-09-12 DIAGNOSIS — Z95.1 S/P CABG X 4: ICD-10-CM

## 2023-09-12 DIAGNOSIS — I10 PRIMARY HYPERTENSION: ICD-10-CM

## 2023-09-12 DIAGNOSIS — E87.1 HYPONATREMIA: ICD-10-CM

## 2023-09-12 DIAGNOSIS — G47.33 OSA TREATED WITH BIPAP: ICD-10-CM

## 2023-09-12 DIAGNOSIS — G62.9 NEUROPATHY: ICD-10-CM

## 2023-09-12 DIAGNOSIS — E11.65 TYPE 2 DIABETES MELLITUS WITH HYPERGLYCEMIA, WITHOUT LONG-TERM CURRENT USE OF INSULIN: Primary | ICD-10-CM

## 2023-09-12 DIAGNOSIS — M54.6 THORACIC SPINE PAIN: ICD-10-CM

## 2023-09-12 DIAGNOSIS — I25.10 CORONARY ARTERY DISEASE INVOLVING NATIVE CORONARY ARTERY OF NATIVE HEART WITHOUT ANGINA PECTORIS: ICD-10-CM

## 2023-09-12 DIAGNOSIS — F51.01 PRIMARY INSOMNIA: ICD-10-CM

## 2023-09-12 DIAGNOSIS — E78.5 HYPERLIPIDEMIA LDL GOAL <70: ICD-10-CM

## 2023-09-12 RX ORDER — LIDOCAINE 50 MG/G
3 PATCH TOPICAL EVERY 24 HOURS
Qty: 90 EACH | Refills: 5 | Status: SHIPPED | OUTPATIENT
Start: 2023-09-12

## 2023-09-12 RX ORDER — GABAPENTIN 300 MG/1
600 CAPSULE ORAL 3 TIMES DAILY
Qty: 540 CAPSULE | Refills: 1 | Status: SHIPPED | OUTPATIENT
Start: 2023-09-12

## 2023-09-12 RX ORDER — TRAZODONE HYDROCHLORIDE 50 MG/1
50 TABLET ORAL NIGHTLY
Qty: 90 TABLET | Refills: 1 | Status: SHIPPED | OUTPATIENT
Start: 2023-09-12

## 2023-09-12 NOTE — PROGRESS NOTES
Follow Up Office Visit      Patient Name: Jd Velazquez  : 1965   MRN: [id number]     Chief Complaint:    Chief Complaint   Patient presents with    hyponatremia    Diabetes    Hyperlipidemia    Hypertension    Hypothyroidism    Back Pain       History of Present Illness: Jd Velazquez is a 57 y.o. male who is here follow up DM2, HTN, hyperlipidemia, CAD, hypothyroidism, insomnia, depression, allergic rhinitis.   Review lab results    Follow up addition of levemir and increase of trulicity   Pt reports he is currently taking 15 units of levemir nightly   Fasting of 140's    Eye exam- 2022 [facility]  Foot exam-  dr young follow scheduled   psa- 10/2020  Colonoscopy- none, referral placed   Smoking since age 13   Smoking 0.5 ppd to 1 ppd   Last attempt to quit: 10/20/2020 after MI    Ct chest scheduled tra    He says he can't sleep due to his chronic middle back pain feels like pins and needles radiation across back between shoulder blades   Currently taking gabapentin 300 mg  TID minimal improvement   Also would like to discuss a sleeping medication.        Subjective      Review of Systems:   Review of Systems   Constitutional:  Negative for fever.   HENT:  Negative for ear pain and sore throat.    Respiratory:  Negative for cough.    Cardiovascular:  Negative for chest pain.   Gastrointestinal:  Negative for abdominal pain, diarrhea, nausea and vomiting.   Genitourinary:  Negative for dysuria.   Musculoskeletal:  Positive for back pain.   Neurological:  Positive for numbness.      Past Medical History:   Past Medical History:   Diagnosis Date    Allergic rhinitis 2015    Benign essential hypertension 2016    CAD (coronary artery disease)     Diabetes mellitus     Foot ulcer     Hyperlipidemia     Hypertension     Hypokalemia 2016    Hypothyroidism 2014    Insomnia, unspecified 06/15/2016    Microalbuminuria due to type 2 diabetes mellitus 10/15/2015    Mixed  hyperlipidemia 10/15/2015    Myocardial infarction     Obesity        Past Surgical History:   Past Surgical History:   Procedure Laterality Date    ANKLE ARTHROSCOPY W/ OPEN REPAIR      APPENDECTOMY      CARDIAC CATHETERIZATION  2014    PCI to circumflex    CORONARY ARTERY BYPASS GRAFT N/A 10/8/2020    Procedure: STERNOTOMY, CORONARY ARTERY BYPASS GRAFTING TIME 4 WITH LEFT KELSI  AND ENDOSCOPICALLY HARVESTED LEFT GREATER SAPHENOUS VEIN AND PRP.;  Surgeon: Jr Girma Chiu MD;  [facility] OR;  Service: Cardiothoracic;  Laterality: N/A;    HEEL SPUR SURGERY      HERNIA REPAIR      REPLACEMENT TOTAL KNEE BILATERAL      TONSILLECTOMY         Family History:   Family History   Problem Relation Age of Onset    Heart disease Other        Social History:   Social History     Socioeconomic History    Marital status:    Tobacco Use    Smoking status: Every Day     Packs/day: 1.00     Types: Cigarettes    Smokeless tobacco: Never   Vaping Use    Vaping Use: Never used   Substance and Sexual Activity    Alcohol use: Never    Drug use: Never    Sexual activity: Defer       Medications:     Current Outpatient Medications:     aspirin 81 MG chewable tablet, Chew 1 tablet Daily., Disp: , Rfl:     cetirizine (zyrTEC) 10 MG tablet, Take 1 tablet by mouth Every Night., Disp: 90 tablet, Rfl: 1    clobetasol (TEMOVATE) 0.05 % cream, Apply 1 application  topically to the appropriate area as directed 2 (Two) Times a Day., Disp: 60 g, Rfl: 1    EPINEPHrine (EPIPEN) 0.3 MG/0.3ML solution auto-injector injection, 0.3 mL., Disp: , Rfl:     Evolocumab (REPATHA) solution auto-injector SureClick injection, Inject 1 mL under the skin into the appropriate area as directed Every 14 (Fourteen) Days., Disp: 2 mL, Rfl: 3    gabapentin (NEURONTIN) 300 MG capsule, Take 2 capsules by mouth 3 (Three) Times a Day., Disp: 540 capsule, Rfl: 1    glucose blood test strip, Test 4 times per day. (Patient taking differently: Test 4 times  "per day.), Disp: 100 each, Rfl: 0    hydrOXYzine (ATARAX) 25 MG tablet, Take 1 tablet by mouth 3 (Three) Times a Day As Needed for Itching., Disp: 90 tablet, Rfl: 2    insulin detemir (Levemir) 100 UNIT/ML injection, Inject 10 Units under the skin into the appropriate area as directed Every Night. Increase one unit per night goal fasting  2 hours after meals 140, Disp: 3 mL, Rfl: 12    Insulin Pen Needle (Pen Needles 3/16\") 31G X 5 MM misc, Use 1 each Every Night., Disp: 100 each, Rfl: 5    levothyroxine (SYNTHROID, LEVOTHROID) 25 MCG tablet, TAKE ONE TABLET BY MOUTH DAILY, Disp: 30 tablet, Rfl: 0    lisinopril-hydrochlorothiazide (PRINZIDE,ZESTORETIC) 20-12.5 MG per tablet, TAKE 2 TABLETS BY MOUTH DAILY, Disp: 180 tablet, Rfl: 3    metFORMIN ER (GLUCOPHAGE-XR) 500 MG 24 hr tablet, Take 1 tablet by mouth Daily With Breakfast., Disp: 90 tablet, Rfl: 1    metoprolol tartrate (LOPRESSOR) 100 MG tablet, Take 1 tablet by mouth Every 12 (Twelve) Hours., Disp: 180 tablet, Rfl: 3    OneTouch Delica Lancets 33G misc, test 4 times per day (Patient taking differently: test 4 times per day), Disp: 100 each, Rfl: 0    spironolactone (Aldactone) 50 MG tablet, Take 1 tablet by mouth Daily., Disp: 90 tablet, Rfl: 1    Dulaglutide 1.5 MG/0.5ML solution pen-injector, Inject 1.5 mg under the skin into the appropriate area as directed 1 (One) Time Per Week., Disp: 2 mL, Rfl: 5    lidocaine (LIDODERM) 5 %, Place 3 patches on the skin as directed by provider Daily. Remove & Discard patch within 12 hours or as directed by MD, Disp: 90 each, Rfl: 5    traZODone (DESYREL) 50 MG tablet, Take 1 tablet by mouth Every Night., Disp: 90 tablet, Rfl: 1    Allergies:   Allergies   Allergen Reactions    Hydrocodone Itching    Lortab [Hydrocodone-Acetaminophen] Itching           Objective     Physical Exam:  Vital Signs:   Vitals:    09/12/23 1514 09/12/23 1547 09/12/23 1609   BP: (!) 186/97 (!) 190/98  Comment: maría 150/88   BP Location:  " "Right arm    Patient Position:  Sitting    Pulse: 94     Temp: 98.1 °F (36.7 °C)     SpO2: 96%     Weight: 114 kg (252 lb)     Height: 185.4 cm (73\")       Body mass index is 33.25 kg/m².           Physical Exam  HENT:      Right Ear: Tympanic membrane normal.      Left Ear: Tympanic membrane normal.      Nose: Nose normal.      Mouth/Throat:      Mouth: Mucous membranes are moist.   Eyes:      Conjunctiva/sclera: Conjunctivae normal.   Neck:      Vascular: No carotid bruit.   Cardiovascular:      Rate and Rhythm: Normal rate and regular rhythm.      Heart sounds: Normal heart sounds. No murmur heard.  Pulmonary:      Effort: Pulmonary effort is normal.      Breath sounds: Normal breath sounds.   Abdominal:      General: Bowel sounds are normal.      Palpations: Abdomen is soft.   Musculoskeletal:      Thoracic back: Tenderness present. No swelling or edema.        Back:       Right lower leg: No edema.      Left lower leg: No edema.   Skin:     General: Skin is warm and dry.   Neurological:      Mental Status: He is alert.   Psychiatric:         Mood and Affect: Mood normal.         Behavior: Behavior normal.           Assessment / Plan      Assessment/Plan:   Diagnoses and all orders for this visit:    1. Type 2 diabetes mellitus with hyperglycemia, without long-term current use of insulin (Primary)  -     CBC Auto Differential; Future  -     Comprehensive Metabolic Panel; Future  -     Hemoglobin A1c; Future  -     Microalbumin / Creatinine Urine Ratio - Urine, Clean Catch; Future  -     TSH; Future  -     Urinalysis With Culture If Indicated -; Future    2. Primary hypertension    3. Hyperlipidemia LDL goal <70  -     Comprehensive Metabolic Panel; Future  -     Lipid Panel; Future    4. Coronary artery disease involving native coronary artery of native heart without angina pectoris    5. S/P CABG x 4    6. Hyponatremia  -     Comprehensive metabolic panel; Future    7. Neuropathy  -     gabapentin (NEURONTIN) " "300 MG capsule; Take 2 capsules by mouth 3 (Three) Times a Day.  Dispense: 540 capsule; Refill: 1    8. Thoracic spine pain  -     XR Spine Thoracic 3 View; Future    9. Primary insomnia    10. SHAHLA treated with BiPAP    Other orders  -     Dulaglutide 1.5 MG/0.5ML solution pen-injector; Inject 1.5 mg under the skin into the appropriate area as directed 1 (One) Time Per Week.  Dispense: 2 mL; Refill: 5  -     lidocaine (LIDODERM) 5 %; Place 3 patches on the skin as directed by provider Daily. Remove & Discard patch within 12 hours or as directed by MD  Dispense: 90 each; Refill: 5  -     traZODone (DESYREL) 50 MG tablet; Take 1 tablet by mouth Every Night.  Dispense: 90 tablet; Refill: 1       Diabetes mellitus type 2 Home readings are improving will increase Levemir to 20 units nightly increase Trulicity to 1.5 mg once weekly we will follow-up in 4 weeks with blood sugar logs decrease added sugars carbs increase lean protein and exercise 30 minutes daily weight loss  Hypertension patient reports missing his blood pressure dose today blood pressure controlled on recent check August 30, 2023 in August 29, 2023 we will continue lisinopril-hydrochlorothiazide at this time  Hyperlipidemia LDL below goal on Repatha prescribed by cardiology  With coronary artery disease status post CABG x4 currently on aspirin and Repatha follow-up with cardiology as scheduled  Hyponatremia we will obtain CMP in office today to monitor  Neuropathy pain uncontrolled we will increase gabapentin to 600 mg 3 times daily  Thoracic spine pain will obtain x-ray call with results further recommendations provide lidocaine patches for pain  Insomnia patient reports compliance with CPAP we will start trazodone recommend good sleep hygiene monitor for morning sedation        Follow Up:   Return in about 4 weeks (around 10/10/2023).    Nyasia Newsome, SOHAM    \"Please note that portions of this note were completed with a voice recognition program.\"  "

## 2023-09-14 ENCOUNTER — OFFICE VISIT (OUTPATIENT)
Dept: PODIATRY | Facility: CLINIC | Age: 58
End: 2023-09-14
Payer: COMMERCIAL

## 2023-09-14 VITALS
DIASTOLIC BLOOD PRESSURE: 97 MMHG | HEART RATE: 102 BPM | WEIGHT: 248 LBS | TEMPERATURE: 98.6 F | HEIGHT: 73 IN | OXYGEN SATURATION: 93 % | SYSTOLIC BLOOD PRESSURE: 149 MMHG | BODY MASS INDEX: 32.87 KG/M2

## 2023-09-14 DIAGNOSIS — L97.519 DIABETIC ULCER OF RIGHT GREAT TOE: ICD-10-CM

## 2023-09-14 DIAGNOSIS — E11.65 UNCONTROLLED TYPE 2 DIABETES MELLITUS WITH HYPERGLYCEMIA: ICD-10-CM

## 2023-09-14 DIAGNOSIS — E11.621 DIABETIC ULCER OF RIGHT GREAT TOE: ICD-10-CM

## 2023-09-14 DIAGNOSIS — I73.9 PERIPHERAL VASCULAR DISEASE: Primary | ICD-10-CM

## 2023-09-15 NOTE — PROGRESS NOTES
[facility] - PODIATRY    Today's Date: 09/15/23    Patient Name: Jd Velazquez  MRN: [id number]  CSN: [id number]  PCP: Dacia Newsome APRN,   Referring Provider: No ref. provider found    SUBJECTIVE     Chief Complaint   Patient presents with    Right Foot - Follow-up, Foot Ulcer     Wearing post op shoe      HPI: Jd Velazquez, a 57 y.o.male, presents to clinic.    Patient is a 57-year-old male presenting with an ulcer to the right great toe.  Patient states he is diabetic.  States his sugars are uncontrolled and his last A1c was over 10.  He says that approximately 2 months ago he had a cut on his big toe.  It has not healed.  He states his feet are numb and hurt him constantly.  Patient has been using mupirocin cream and it has not helped.    9/14/2023-she is scheduled for ROXANA in a few weeks.  No changes to the wound.    Past Medical History:   Diagnosis Date    Allergic rhinitis 01/12/2015    Benign essential hypertension 03/08/2016    CAD (coronary artery disease)     Diabetes mellitus     Foot ulcer     Hyperlipidemia     Hypertension     Hypokalemia 02/23/2016    Hypothyroidism 04/24/2014    Insomnia, unspecified 06/15/2016    Microalbuminuria due to type 2 diabetes mellitus 10/15/2015    Mixed hyperlipidemia 10/15/2015    Myocardial infarction     Obesity      Past Surgical History:   Procedure Laterality Date    ANKLE ARTHROSCOPY W/ OPEN REPAIR      APPENDECTOMY      CARDIAC CATHETERIZATION  2014    PCI to circumflex    CORONARY ARTERY BYPASS GRAFT N/A 10/8/2020    Procedure: STERNOTOMY, CORONARY ARTERY BYPASS GRAFTING TIME 4 WITH LEFT KELSI  AND ENDOSCOPICALLY HARVESTED LEFT GREATER SAPHENOUS VEIN AND PRP.;  Surgeon: Jr Girma Chiu MD;  Location: [facility];  Service: Cardiothoracic;  Laterality: N/A;    HEEL SPUR SURGERY      HERNIA REPAIR      REPLACEMENT TOTAL KNEE BILATERAL      TONSILLECTOMY       Family History   Problem Relation Age of Onset    Heart  "disease Other      Social History     Socioeconomic History    Marital status:    Tobacco Use    Smoking status: Every Day     Packs/day: 1.00     Types: Cigarettes    Smokeless tobacco: Never   Vaping Use    Vaping Use: Never used   Substance and Sexual Activity    Alcohol use: Never    Drug use: Never    Sexual activity: Defer     Allergies   Allergen Reactions    Hydrocodone Itching    Lortab [Hydrocodone-Acetaminophen] Itching     Current Outpatient Medications   Medication Sig Dispense Refill    aspirin 81 MG chewable tablet Chew 1 tablet Daily.      cetirizine (zyrTEC) 10 MG tablet Take 1 tablet by mouth Every Night. 90 tablet 1    clobetasol (TEMOVATE) 0.05 % cream Apply 1 application  topically to the appropriate area as directed 2 (Two) Times a Day. 60 g 1    Dulaglutide 1.5 MG/0.5ML solution pen-injector Inject 1.5 mg under the skin into the appropriate area as directed 1 (One) Time Per Week. 2 mL 5    EPINEPHrine (EPIPEN) 0.3 MG/0.3ML solution auto-injector injection 0.3 mL.      Evolocumab (REPATHA) solution auto-injector SureClick injection Inject 1 mL under the skin into the appropriate area as directed Every 14 (Fourteen) Days. 2 mL 3    gabapentin (NEURONTIN) 300 MG capsule Take 2 capsules by mouth 3 (Three) Times a Day. 540 capsule 1    glucose blood test strip Test 4 times per day. (Patient taking differently: Test 4 times per day.) 100 each 0    hydrOXYzine (ATARAX) 25 MG tablet Take 1 tablet by mouth 3 (Three) Times a Day As Needed for Itching. 90 tablet 2    insulin detemir (Levemir) 100 UNIT/ML injection Inject 10 Units under the skin into the appropriate area as directed Every Night. Increase one unit per night goal fasting  2 hours after meals 140 3 mL 12    Insulin Pen Needle (Pen Needles 3/16\") 31G X 5 MM misc Use 1 each Every Night. 100 each 5    levothyroxine (SYNTHROID, LEVOTHROID) 25 MCG tablet TAKE ONE TABLET BY MOUTH DAILY 30 tablet 0    lidocaine (LIDODERM) 5 % Place 3 " patches on the skin as directed by provider Daily. Remove & Discard patch within 12 hours or as directed by MD 90 each 5    lisinopril-hydrochlorothiazide (PRINZIDE,ZESTORETIC) 20-12.5 MG per tablet TAKE 2 TABLETS BY MOUTH DAILY 180 tablet 3    metFORMIN ER (GLUCOPHAGE-XR) 500 MG 24 hr tablet Take 1 tablet by mouth Daily With Breakfast. 90 tablet 1    metoprolol tartrate (LOPRESSOR) 100 MG tablet Take 1 tablet by mouth Every 12 (Twelve) Hours. 180 tablet 3    OneTouch Delica Lancets 33G misc test 4 times per day (Patient taking differently: test 4 times per day) 100 each 0    spironolactone (Aldactone) 50 MG tablet Take 1 tablet by mouth Daily. 90 tablet 1    traZODone (DESYREL) 50 MG tablet Take 1 tablet by mouth Every Night. 90 tablet 1     No current facility-administered medications for this visit.     Review of Systems   Skin:         Ulcer toe   All other systems reviewed and are negative.    OBJECTIVE     Vitals:    09/14/23 1418   BP: 149/97   Pulse: 102   Temp: 98.6 °F (37 °C)   SpO2: 93%       WBC   Date Value Ref Range Status   08/23/2023 7.61 3.40 - 10.80 10*3/mm3 Final     RBC   Date Value Ref Range Status   08/23/2023 5.08 4.14 - 5.80 10*6/mm3 Final     Hemoglobin   Date Value Ref Range Status   08/23/2023 15.6 13.0 - 17.7 g/dL Final     Hematocrit   Date Value Ref Range Status   08/23/2023 45.9 37.5 - 51.0 % Final     MCV   Date Value Ref Range Status   08/23/2023 90.4 79.0 - 97.0 fL Final     MCH   Date Value Ref Range Status   08/23/2023 30.7 26.6 - 33.0 pg Final     MCHC   Date Value Ref Range Status   08/23/2023 34.0 31.5 - 35.7 g/dL Final     RDW   Date Value Ref Range Status   08/23/2023 13.3 12.3 - 15.4 % Final     RDW-SD   Date Value Ref Range Status   08/23/2023 43.1 37.0 - 54.0 fl Final     MPV   Date Value Ref Range Status   08/23/2023 9.8 6.0 - 12.0 fL Final     Platelets   Date Value Ref Range Status   08/23/2023 340 140 - 450 10*3/mm3 Final     Neutrophil %   Date Value Ref Range Status    2023 54.1 42.7 - 76.0 % Final     Lymphocyte %   Date Value Ref Range Status   2023 30.4 19.6 - 45.3 % Final     Monocyte %   Date Value Ref Range Status   2023 12.7 (H) 5.0 - 12.0 % Final     Eosinophil %   Date Value Ref Range Status   2023 1.1 0.3 - 6.2 % Final     Basophil %   Date Value Ref Range Status   2023 0.9 0.0 - 1.5 % Final     Immature Grans %   Date Value Ref Range Status   2023 0.8 (H) 0.0 - 0.5 % Final     Neutrophils, Absolute   Date Value Ref Range Status   2023 4.12 1.70 - 7.00 10*3/mm3 Final     Lymphocytes, Absolute   Date Value Ref Range Status   2023 2.31 0.70 - 3.10 10*3/mm3 Final     Monocytes, Absolute   Date Value Ref Range Status   2023 0.97 (H) 0.10 - 0.90 10*3/mm3 Final     Eosinophils, Absolute   Date Value Ref Range Status   2023 0.08 0.00 - 0.40 10*3/mm3 Final     Basophils, Absolute   Date Value Ref Range Status   2023 0.07 0.00 - 0.20 10*3/mm3 Final     Immature Grans, Absolute   Date Value Ref Range Status   2023 0.06 (H) 0.00 - 0.05 10*3/mm3 Final     nRBC   Date Value Ref Range Status   2023 0.0 0.0 - 0.2 /100 WBC Final         Lab Results   Component Value Date    GLUCOSE 337 (H) 2023    BUN 11 2023    CREATININE 0.92 2023    EGFRIFNONA 77 2021    BCR 12.0 2023    K 4.2 2023    CO2 22.0 2023    CALCIUM 9.7 2023    ALBUMIN 4.2 2023    LABIL2 0.9 (L) 10/16/2020    AST 12 2023    ALT 14 2023       Patient seen in no apparent distress.      PHYSICAL EXAM:     Foot/Ankle Exam    GENERAL  Appearance:  appears stated age  Orientation:  AAOx3  Affect:  appropriate  Gait:  unimpaired  Assistance:  independent  Right shoe gear: casual shoe  Left shoe gear: casual shoe    VASCULAR     Right Foot Vascularity   Dorsalis pedis:  1+  Posterior tibial:  1+  Skin temperature:  cool  Edema gradin+ and non-pitting  CFT:  < 3 seconds  Pedal hair  growth:  Absent  Varicosities:  none     Left Foot Vascularity   Dorsalis pedis:  1+  Posterior tibial:  1+  Skin temperature:  cool  Edema gradin+ and non-pitting  CFT:  < 3 seconds  Pedal hair growth:  Absent  Varicosities:  none     NEUROLOGIC     Right Foot Neurologic   Light touch sensation: absent  Vibratory sensation: absent  Hot/Cold sensation: absent     Left Foot Neurologic   Light touch sensation: absent  Vibratory sensation: absent  Hot/Cold sensation:  absent    MUSCLE STRENGTH     Right Foot Muscle Strength   Foot dorsiflexion:  4  Foot plantar flexion:  4  Foot inversion:  4  Foot eversion:  4     Left Foot Muscle Strength   Foot dorsiflexion:  4  Foot plantar flexion:  4  Foot inversion:  4  Foot eversion:  4    RANGE OF MOTION     Right Foot Range of Motion   Foot and ankle ROM within normal limits       Left Foot Range of Motion   Foot and ankle ROM within normal limits      DERMATOLOGIC      Right Foot Dermatologic   Skin  Right foot skin is intact.      Left Foot Dermatologic   Skin  Left foot skin is intact.      Right foot additional comments: Ulcer to distal aspect of right great toe.  No erythema no malodor no drainage.  50% fibrotic.    RADIOLOGY:        No results found.    ASSESSMENT/PLAN     Diagnoses and all orders for this visit:    1. Peripheral vascular disease (Primary)    2. Diabetic ulcer of right great toe    3. Uncontrolled type 2 diabetes mellitus with hyperglycemia      Daily dressing changes to right great toe  Offload foot  Check arterial studies  Return to clinic in 2 weeks    Comprehensive lower extremity examination and evaluation was performed.    Discussed findings and treatment plan including risks, benefits, and treatment options with patient in detail. Patient agreed with treatment plan.    Medications and allergies reviewed.  Reviewed available lab values along with other pertinent labs.  These were discussed with the patient.    An After Visit Summary was  printed and given to the patient at discharge, including (if requested) any available informative/educational handouts regarding diagnosis, treatment, or medications. All questions were answered to patient/family satisfaction. Should symptoms fail to improve or worsen they agree to call or return to clinic or to go to the Emergency Department. Discussed the importance of following up with any needed screening tests/labs/specialist appointments and any requested follow-up recommended by me today. Importance of maintaining follow-up discussed and patient accepts that missed appointments can delay diagnosis and potentially lead to worsening of conditions.    Return in about 1 month (around 10/14/2023)., or sooner if acute issues arise.    This document has been electronically signed by Balwinder Costa DPM on September 15, 2023 07:02 EDT

## 2023-09-26 ENCOUNTER — HOSPITAL ENCOUNTER (OUTPATIENT)
Dept: CT IMAGING | Facility: HOSPITAL | Age: 58
Discharge: HOME | End: 2023-09-26
Admitting: NURSE PRACTITIONER
Payer: COMMERCIAL

## 2023-09-26 DIAGNOSIS — Z87.891 HISTORY OF SMOKING 30 OR MORE PACK YEARS: ICD-10-CM

## 2023-09-26 PROCEDURE — 71271 CT THORAX LUNG CANCER SCR C-: CPT

## 2023-10-02 ENCOUNTER — TELEPHONE (OUTPATIENT)
Dept: CARDIOLOGY | Facility: CLINIC | Age: 58
End: 2023-10-02
Payer: COMMERCIAL

## 2023-10-02 ENCOUNTER — HOSPITAL ENCOUNTER (OUTPATIENT)
Dept: CARDIOLOGY | Facility: HOSPITAL | Age: 58
Discharge: HOME | End: 2023-10-02
Admitting: PODIATRIST
Payer: COMMERCIAL

## 2023-10-02 DIAGNOSIS — I73.9 PERIPHERAL VASCULAR DISEASE: ICD-10-CM

## 2023-10-02 LAB
BH CV LOWER ARTERIAL LEFT ABI RATIO: 0.88
BH CV LOWER ARTERIAL LEFT DORSALIS PEDIS SYS MAX: 117
BH CV LOWER ARTERIAL LEFT GREAT TOE SYS MAX: 72
BH CV LOWER ARTERIAL LEFT LOW THIGH SYS MAX: 143
BH CV LOWER ARTERIAL LEFT POPLITEAL SYS MAX: 138
BH CV LOWER ARTERIAL LEFT POST TIBIAL SYS MAX: 115
BH CV LOWER ARTERIAL LEFT TBI RATIO: 0.54
BH CV LOWER ARTERIAL RIGHT ABI RATIO: 0.59
BH CV LOWER ARTERIAL RIGHT DORSALIS PEDIS SYS MAX: 66
BH CV LOWER ARTERIAL RIGHT GREAT TOE SYS MAX: 51
BH CV LOWER ARTERIAL RIGHT LOW THIGH SYS MAX: 119
BH CV LOWER ARTERIAL RIGHT POPLITEAL SYS MAX: 75
BH CV LOWER ARTERIAL RIGHT POST TIBIAL SYS MAX: 78
BH CV LOWER ARTERIAL RIGHT TBI RATIO: 0.38
UPPER ARTERIAL LEFT ARM BRACHIAL SYS MAX: 128
UPPER ARTERIAL RIGHT ARM BRACHIAL SYS MAX: 133

## 2023-10-02 PROCEDURE — 93923 UPR/LXTR ART STDY 3+ LVLS: CPT

## 2023-10-02 PROCEDURE — 93923 UPR/LXTR ART STDY 3+ LVLS: CPT | Performed by: SURGERY

## 2023-10-03 NOTE — TELEPHONE ENCOUNTER
[employer] PA APPROVED    Your PA request has been approved. Additional information will be provided in the approval communication.

## 2023-10-04 ENCOUNTER — OFFICE VISIT (OUTPATIENT)
Dept: PODIATRY | Facility: CLINIC | Age: 58
End: 2023-10-04
Payer: COMMERCIAL

## 2023-10-04 VITALS
HEART RATE: 96 BPM | TEMPERATURE: 98.4 F | HEIGHT: 73 IN | WEIGHT: 248 LBS | DIASTOLIC BLOOD PRESSURE: 82 MMHG | BODY MASS INDEX: 32.87 KG/M2 | OXYGEN SATURATION: 95 % | SYSTOLIC BLOOD PRESSURE: 146 MMHG

## 2023-10-04 DIAGNOSIS — E11.621 DIABETIC ULCER OF RIGHT GREAT TOE: ICD-10-CM

## 2023-10-04 DIAGNOSIS — L97.519 DIABETIC ULCER OF RIGHT GREAT TOE: ICD-10-CM

## 2023-10-04 DIAGNOSIS — I73.9 PERIPHERAL VASCULAR DISEASE: Primary | ICD-10-CM

## 2023-10-04 PROCEDURE — 99213 OFFICE O/P EST LOW 20 MIN: CPT | Performed by: PODIATRIST

## 2023-10-05 ENCOUNTER — OFFICE VISIT (OUTPATIENT)
Dept: FAMILY MEDICINE CLINIC | Facility: CLINIC | Age: 58
End: 2023-10-05
Payer: COMMERCIAL

## 2023-10-05 VITALS
SYSTOLIC BLOOD PRESSURE: 138 MMHG | OXYGEN SATURATION: 98 % | DIASTOLIC BLOOD PRESSURE: 79 MMHG | HEIGHT: 73 IN | HEART RATE: 76 BPM | TEMPERATURE: 97 F | BODY MASS INDEX: 32.87 KG/M2 | WEIGHT: 248 LBS

## 2023-10-05 DIAGNOSIS — Z12.11 SCREENING FOR MALIGNANT NEOPLASM OF COLON: ICD-10-CM

## 2023-10-05 DIAGNOSIS — I25.10 CORONARY ARTERY DISEASE INVOLVING NATIVE CORONARY ARTERY OF NATIVE HEART WITHOUT ANGINA PECTORIS: ICD-10-CM

## 2023-10-05 DIAGNOSIS — M54.6 THORACIC SPINE PAIN: ICD-10-CM

## 2023-10-05 DIAGNOSIS — E11.65 TYPE 2 DIABETES MELLITUS WITH HYPERGLYCEMIA, WITHOUT LONG-TERM CURRENT USE OF INSULIN: Primary | ICD-10-CM

## 2023-10-05 DIAGNOSIS — E03.9 ACQUIRED HYPOTHYROIDISM: ICD-10-CM

## 2023-10-05 DIAGNOSIS — E87.1 HYPONATREMIA: ICD-10-CM

## 2023-10-05 DIAGNOSIS — I73.9 PERIPHERAL VASCULAR DISEASE: ICD-10-CM

## 2023-10-05 DIAGNOSIS — G62.9 NEUROPATHY: ICD-10-CM

## 2023-10-05 DIAGNOSIS — I10 PRIMARY HYPERTENSION: ICD-10-CM

## 2023-10-05 DIAGNOSIS — E78.5 HYPERLIPIDEMIA LDL GOAL <70: ICD-10-CM

## 2023-10-05 DIAGNOSIS — G47.09 OTHER INSOMNIA: ICD-10-CM

## 2023-10-05 DIAGNOSIS — Z79.899 MEDICATION MANAGEMENT: ICD-10-CM

## 2023-10-05 DIAGNOSIS — G47.33 OSA TREATED WITH BIPAP: ICD-10-CM

## 2023-10-05 LAB
AMPHET+METHAMPHET UR QL: NEGATIVE
AMPHETAMINE INTERNAL CONTROL: ABNORMAL
AMPHETAMINES UR QL: NEGATIVE
BARBITURATE INTERNAL CONTROL: ABNORMAL
BARBITURATES UR QL SCN: NEGATIVE
BENZODIAZ UR QL SCN: NEGATIVE
BENZODIAZEPINE INTERNAL CONTROL: ABNORMAL
BUPRENORPHINE INTERNAL CONTROL: ABNORMAL
BUPRENORPHINE SERPL-MCNC: NEGATIVE NG/ML
CANNABINOIDS SERPL QL: NEGATIVE
COCAINE INTERNAL CONTROL: ABNORMAL
COCAINE UR QL: NEGATIVE
EXPIRATION DATE: ABNORMAL
Lab: ABNORMAL
MDMA (ECSTASY) INTERNAL CONTROL: ABNORMAL
MDMA UR QL SCN: NEGATIVE
METHADONE INTERNAL CONTROL: ABNORMAL
METHADONE UR QL SCN: NEGATIVE
METHAMPHETAMINE INTERNAL CONTROL: ABNORMAL
OPIATES INTERNAL CONTROL: ABNORMAL
OPIATES UR QL: NEGATIVE
OXYCODONE INTERNAL CONTROL: ABNORMAL
OXYCODONE UR QL SCN: POSITIVE
PCP UR QL SCN: NEGATIVE
PHENCYCLIDINE INTERNAL CONTROL: ABNORMAL
THC INTERNAL CONTROL: ABNORMAL

## 2023-10-05 RX ORDER — SPIRONOLACTONE 50 MG/1
50 TABLET, FILM COATED ORAL DAILY
Qty: 90 TABLET | Refills: 1 | Status: SHIPPED | OUTPATIENT
Start: 2023-10-05

## 2023-10-05 RX ORDER — TRAZODONE HYDROCHLORIDE 100 MG/1
100 TABLET ORAL NIGHTLY
Qty: 90 TABLET | Refills: 1 | Status: SHIPPED | OUTPATIENT
Start: 2023-10-05

## 2023-10-05 RX ORDER — TRAMADOL HYDROCHLORIDE 50 MG/1
50 TABLET ORAL EVERY 6 HOURS PRN
Qty: 120 TABLET | Refills: 0 | Status: SHIPPED | OUTPATIENT
Start: 2023-10-05

## 2023-10-05 RX ORDER — GABAPENTIN 300 MG/1
600 CAPSULE ORAL 3 TIMES DAILY
Qty: 540 CAPSULE | Refills: 1 | Status: CANCELLED | OUTPATIENT
Start: 2023-10-05

## 2023-10-05 RX ORDER — LEVOTHYROXINE SODIUM 0.03 MG/1
25 TABLET ORAL DAILY
Qty: 30 TABLET | Refills: 0 | Status: SHIPPED | OUTPATIENT
Start: 2023-10-05

## 2023-10-05 RX ORDER — METFORMIN HYDROCHLORIDE 500 MG/1
500 TABLET, EXTENDED RELEASE ORAL
Qty: 90 TABLET | Refills: 1 | Status: SHIPPED | OUTPATIENT
Start: 2023-10-05

## 2023-10-05 RX ORDER — GABAPENTIN 800 MG/1
800 TABLET ORAL 3 TIMES DAILY
Qty: 270 TABLET | Refills: 1 | Status: SHIPPED | OUTPATIENT
Start: 2023-10-05

## 2023-10-05 RX ORDER — CETIRIZINE HYDROCHLORIDE 10 MG/1
10 TABLET ORAL NIGHTLY
Qty: 90 TABLET | Refills: 1 | Status: SHIPPED | OUTPATIENT
Start: 2023-10-05

## 2023-10-05 RX ORDER — DULAGLUTIDE 3 MG/.5ML
3 INJECTION, SOLUTION SUBCUTANEOUS WEEKLY
Qty: 2 ML | Refills: 6 | Status: SHIPPED | OUTPATIENT
Start: 2023-10-05

## 2023-10-05 NOTE — PROGRESS NOTES
Follow Up Office Visit      Patient Name: Jd Velazquez  : 1965   MRN: [id number]     Chief Complaint:    Chief Complaint   Patient presents with    Coronary Artery Disease    Hypertension    Hyperlipidemia    Diabetes    Hypothyroidism    Insomnia    Allergic Rhinitis       History of Present Illness: Jd Velazquez is a 58 y.o. male who is here today to follow up for HTN, hyperlipidemia, DM2, CAD, insomnia, allergic rhinitis, neuropathy .    Follow up increase levemir to 20 units and increase trulicity to 1.5 mg once weekly   Glucose readings 150's    Also follow up increase of gabapentin to 600 mg tid     Also follow up podiatry  consult  ordered arterial doppler     Interpretation Summary  Arterial doppler lower extremity        Right Conclusion: The right ROXANA is moderately reduced. Waveforms are consistent with tib-peroneal disease. Moderate digital ischemia.    Left Conclusion: The left ROXANA is mildly reduced. Waveforms are consistent with tib-peroneal disease. Moderate digital ischemia.   referred to vascular surgery     Eye exam- 2022 [facility] [place]  Foot exam-  dr young 10/4/23  psa- 2023  A1C- 2023  Colonoscopy- none, referral placed     Neuropathy pain and thoracic back pain uncontrolled   Increased dose of gabapentin to 600 mg tid no helpful  Did not obtain xray thoracic spine     Also patient complains of uncontrolled insomnia on trazodone 50 mg at nighttime     Subjective      Review of Systems:   Review of Systems   Constitutional:  Negative for fever.   HENT:  Negative for ear pain and sore throat.    Eyes:  Negative for visual disturbance.   Respiratory:  Negative for cough.    Cardiovascular:  Negative for chest pain.   Gastrointestinal:  Negative for abdominal pain, diarrhea, nausea and vomiting.   Genitourinary:  Negative for dysuria.   Musculoskeletal:  Positive for back pain.   Skin:  Negative for rash.   Neurological:  Positive for numbness.    Psychiatric/Behavioral:  Positive for sleep disturbance.       Past Medical History:   Past Medical History:   Diagnosis Date    Allergic rhinitis 01/12/2015    Benign essential hypertension 03/08/2016    CAD (coronary artery disease)     Diabetes mellitus     Foot ulcer     Hyperlipidemia     Hypertension     Hypokalemia 02/23/2016    Hypothyroidism 04/24/2014    Insomnia, unspecified 06/15/2016    Microalbuminuria due to type 2 diabetes mellitus 10/15/2015    Mixed hyperlipidemia 10/15/2015    Myocardial infarction     Obesity        Past Surgical History:   Past Surgical History:   Procedure Laterality Date    ANKLE ARTHROSCOPY W/ OPEN REPAIR      APPENDECTOMY      CARDIAC CATHETERIZATION  2014    PCI to circumflex    CORONARY ARTERY BYPASS GRAFT N/A 10/8/2020    Procedure: STERNOTOMY, CORONARY ARTERY BYPASS GRAFTING TIME 4 WITH LEFT KELSI  AND ENDOSCOPICALLY HARVESTED LEFT GREATER SAPHENOUS VEIN AND PRP.;  Surgeon: Jr Grima Chiu MD;  Location: [facility];  Service: Cardiothoracic;  Laterality: N/A;    HEEL SPUR SURGERY      HERNIA REPAIR      REPLACEMENT TOTAL KNEE BILATERAL      TONSILLECTOMY         Family History:   Family History   Problem Relation Age of Onset    Heart disease Other        Social History:   Social History     Socioeconomic History    Marital status:    Tobacco Use    Smoking status: Every Day     Packs/day: 1.00     Types: Cigarettes    Smokeless tobacco: Never   Vaping Use    Vaping Use: Never used   Substance and Sexual Activity    Alcohol use: Never    Drug use: Never    Sexual activity: Defer       Medications:     Current Outpatient Medications:     aspirin 81 MG chewable tablet, Chew 1 tablet Daily., Disp: , Rfl:     cetirizine (zyrTEC) 10 MG tablet, Take 1 tablet by mouth Every Night., Disp: 90 tablet, Rfl: 1    clobetasol (TEMOVATE) 0.05 % cream, Apply 1 application  topically to the appropriate area as directed 2 (Two) Times a Day., Disp: 60 g, Rfl: 1     "EPINEPHrine (EPIPEN) 0.3 MG/0.3ML solution auto-injector injection, 0.3 mL., Disp: , Rfl:     Evolocumab (REPATHA) solution auto-injector SureClick injection, Inject 1 mL under the skin into the appropriate area as directed Every 14 (Fourteen) Days., Disp: 2 mL, Rfl: 3    glucose blood test strip, Test 4 times per day. (Patient taking differently: Test 4 times per day.), Disp: 100 each, Rfl: 0    hydrOXYzine (ATARAX) 25 MG tablet, Take 1 tablet by mouth 3 (Three) Times a Day As Needed for Itching., Disp: 90 tablet, Rfl: 2    insulin detemir (Levemir) 100 UNIT/ML injection, Inject 10 Units under the skin into the appropriate area as directed Every Night. Increase one unit per night goal fasting  2 hours after meals 140, Disp: 3 mL, Rfl: 12    Insulin Pen Needle (Pen Needles 3/16\") 31G X 5 MM misc, Use 1 each Every Night., Disp: 100 each, Rfl: 5    levothyroxine (SYNTHROID, LEVOTHROID) 25 MCG tablet, Take 1 tablet by mouth Daily., Disp: 30 tablet, Rfl: 0    lidocaine (LIDODERM) 5 %, Place 3 patches on the skin as directed by provider Daily. Remove & Discard patch within 12 hours or as directed by MD, Disp: 90 each, Rfl: 5    lisinopril-hydrochlorothiazide (PRINZIDE,ZESTORETIC) 20-12.5 MG per tablet, TAKE 2 TABLETS BY MOUTH DAILY, Disp: 180 tablet, Rfl: 3    metFORMIN ER (GLUCOPHAGE-XR) 500 MG 24 hr tablet, Take 1 tablet by mouth Daily With Breakfast., Disp: 90 tablet, Rfl: 1    metoprolol tartrate (LOPRESSOR) 100 MG tablet, Take 1 tablet by mouth Every 12 (Twelve) Hours., Disp: 180 tablet, Rfl: 3    OneTouch Delica Lancets 33G misc, test 4 times per day (Patient taking differently: test 4 times per day), Disp: 100 each, Rfl: 0    spironolactone (Aldactone) 50 MG tablet, Take 1 tablet by mouth Daily., Disp: 90 tablet, Rfl: 1    traZODone (DESYREL) 100 MG tablet, Take 1 tablet by mouth Every Night., Disp: 90 tablet, Rfl: 1    Dulaglutide (Trulicity) 3 MG/0.5ML solution pen-injector, Inject 0.5 mL under the skin " "into the appropriate area as directed 1 (One) Time Per Week., Disp: 2 mL, Rfl: 6    gabapentin (Neurontin) 800 MG tablet, Take 1 tablet by mouth 3 (Three) Times a Day., Disp: 270 tablet, Rfl: 1    traMADol (ULTRAM) 50 MG tablet, Take 1 tablet by mouth Every 6 (Six) Hours As Needed for Moderate Pain., Disp: 120 tablet, Rfl: 0    Allergies:   Allergies   Allergen Reactions    Hydrocodone Itching    Lortab [Hydrocodone-Acetaminophen] Itching             Objective     Physical Exam:  Vital Signs:   Vitals:    10/05/23 0755 10/05/23 0837   BP: 138/79    Pulse: 76    Temp: 97 °F (36.1 °C)    SpO2: 98%    Weight: 114 kg (252 lb) 112 kg (248 lb)   Height: 185.4 cm (73\")      Body mass index is 32.72 kg/m².           Physical Exam  HENT:      Right Ear: Tympanic membrane normal.      Left Ear: Tympanic membrane normal.      Nose: Nose normal.      Mouth/Throat:      Mouth: Mucous membranes are moist.   Eyes:      Conjunctiva/sclera: Conjunctivae normal.   Neck:      Vascular: No carotid bruit.   Cardiovascular:      Rate and Rhythm: Normal rate and regular rhythm.      Heart sounds: Normal heart sounds. No murmur heard.  Pulmonary:      Effort: Pulmonary effort is normal.      Breath sounds: Normal breath sounds.   Abdominal:      General: Bowel sounds are normal.      Palpations: Abdomen is soft.   Musculoskeletal:      Right lower leg: No edema.      Left lower leg: No edema.   Skin:     General: Skin is warm and dry.   Neurological:      Mental Status: He is alert.   Psychiatric:         Mood and Affect: Mood normal.         Behavior: Behavior normal.           Assessment / Plan      Assessment/Plan:   Diagnoses and all orders for this visit:    1. Type 2 diabetes mellitus with hyperglycemia, without long-term current use of insulin (Primary)    2. Primary hypertension    3. Hyperlipidemia LDL goal <70    4. Coronary artery disease involving native coronary artery of native heart without angina pectoris    5. Acquired " hypothyroidism    6. Hyponatremia    7. Neuropathy  -     Ambulatory Referral to Pain Management  -     traMADol (ULTRAM) 50 MG tablet; Take 1 tablet by mouth Every 6 (Six) Hours As Needed for Moderate Pain.  Dispense: 120 tablet; Refill: 0  -     gabapentin (Neurontin) 800 MG tablet; Take 1 tablet by mouth 3 (Three) Times a Day.  Dispense: 270 tablet; Refill: 1    8. Thoracic spine pain  -     Ambulatory Referral to Pain Management  -     traMADol (ULTRAM) 50 MG tablet; Take 1 tablet by mouth Every 6 (Six) Hours As Needed for Moderate Pain.  Dispense: 120 tablet; Refill: 0    9. Screening for malignant neoplasm of colon  -     Ambulatory Referral For Screening Colonoscopy    10. Medication management  -     POC Urine Drug Screen [facility]    11. Other insomnia    12. SHAHLA treated with BiPAP    13. Peripheral vascular disease    Other orders  -     cetirizine (zyrTEC) 10 MG tablet; Take 1 tablet by mouth Every Night.  Dispense: 90 tablet; Refill: 1  -     metFORMIN ER (GLUCOPHAGE-XR) 500 MG 24 hr tablet; Take 1 tablet by mouth Daily With Breakfast.  Dispense: 90 tablet; Refill: 1  -     levothyroxine (SYNTHROID, LEVOTHROID) 25 MCG tablet; Take 1 tablet by mouth Daily.  Dispense: 30 tablet; Refill: 0  -     traZODone (DESYREL) 100 MG tablet; Take 1 tablet by mouth Every Night.  Dispense: 90 tablet; Refill: 1  -     spironolactone (Aldactone) 50 MG tablet; Take 1 tablet by mouth Daily.  Dispense: 90 tablet; Refill: 1  -     Dulaglutide (Trulicity) 3 MG/0.5ML solution pen-injector; Inject 0.5 mL under the skin into the appropriate area as directed 1 (One) Time Per Week.  Dispense: 2 mL; Refill: 6       Diabetes mellitus type 2 glucose numbers are improving goal is an average of 140 we will increase Trulicity 3 mg once week we will continue Levemir 20 units daily continue metformin we will obtain hemoglobin A1c prior to follow-up in 7 weeks if A1c remains elevated would add Jardiance  Hyperlipidemia currently  "taking Repatha prescribed by cardiology will obtain lipid panel to monitor  Hypothyroidism we will obtain labs to monitor current Synthroid dose 25 mcg daily denies palpitations  Hypertension currently controlled lisinopril-hydrochlorothiazide spironolactone and Lopressor  Hyponatremia we will obtain CMP to monitor   Coronary artery disease currently on aspirin per cardiology  Neuropathy pain uncontrolled we will increase gabapentin to 800 mg 3 times daily  Thoracic pain uncontrolled will refer to pain management and start tramadol [place] reviewed urine drug screen obtained in office  We will provide an order for screening colonoscopy denies blood in stool recent change in bowel habits and family history of colon cancer  Insomnia we will increase trazodone 100 mg daily recommend good sleep hygiene and compliance with BiPAP  Peripheral vascular disease Dr. Jennings podiatry has referred patient to vascular surgery    Follow Up:   Return in about 7 weeks (around 11/23/2023).    Nyasia Newsome, SOHAM    \"Please note that portions of this note were completed with a voice recognition program.\"    "

## 2023-10-09 NOTE — PROGRESS NOTES
[facility] - PODIATRY    Today's Date: 10/09/23    Patient Name: Jd Velazquez  MRN: [id number]  CSN: [id number]  PCP: Dacia Newsome APRN,   Referring Provider: No ref. provider found    SUBJECTIVE     Chief Complaint   Patient presents with    Right Foot - Follow-up     Here to discuss doppler results     HPI: Jd Velazquez, a 58 y.o.male, presents to clinic.    Patient is a 57-year-old male presenting with an ulcer to the right great toe.  Patient states he is diabetic.  States his sugars are uncontrolled and his last A1c was over 10.  He says that approximately 2 months ago he had a cut on his big toe.  It has not healed.  He states his feet are numb and hurt him constantly.  Patient has been using mupirocin cream and it has not helped.    9/14/2023-she is scheduled for ROXANA in a few weeks.  No changes to the wound.    10/4/2023-patient is here for follow-up of his ROXANA.  He continues to have the wound on his big toe.  Does not cause him any pain.    Past Medical History:   Diagnosis Date    Allergic rhinitis 01/12/2015    Benign essential hypertension 03/08/2016    CAD (coronary artery disease)     Diabetes mellitus     Foot ulcer     Hyperlipidemia     Hypertension     Hypokalemia 02/23/2016    Hypothyroidism 04/24/2014    Insomnia, unspecified 06/15/2016    Microalbuminuria due to type 2 diabetes mellitus 10/15/2015    Mixed hyperlipidemia 10/15/2015    Myocardial infarction     Obesity      Past Surgical History:   Procedure Laterality Date    ANKLE ARTHROSCOPY W/ OPEN REPAIR      APPENDECTOMY      CARDIAC CATHETERIZATION  2014    PCI to circumflex    CORONARY ARTERY BYPASS GRAFT N/A 10/8/2020    Procedure: STERNOTOMY, CORONARY ARTERY BYPASS GRAFTING TIME 4 WITH LEFT KELSI  AND ENDOSCOPICALLY HARVESTED LEFT GREATER SAPHENOUS VEIN AND PRP.;  Surgeon: Jr Girma Chiu MD;  Location: [facility];  Service: Cardiothoracic;  Laterality: N/A;    HEEL SPUR SURGERY       "HERNIA REPAIR      REPLACEMENT TOTAL KNEE BILATERAL      TONSILLECTOMY       Family History   Problem Relation Age of Onset    Heart disease Other      Social History     Socioeconomic History    Marital status:    Tobacco Use    Smoking status: Every Day     Packs/day: 1     Types: Cigarettes    Smokeless tobacco: Never   Vaping Use    Vaping Use: Never used   Substance and Sexual Activity    Alcohol use: Never    Drug use: Never    Sexual activity: Defer     Allergies   Allergen Reactions    Hydrocodone Itching    Lortab [Hydrocodone-Acetaminophen] Itching     Current Outpatient Medications   Medication Sig Dispense Refill    aspirin 81 MG chewable tablet Chew 1 tablet Daily.      clobetasol (TEMOVATE) 0.05 % cream Apply 1 application  topically to the appropriate area as directed 2 (Two) Times a Day. 60 g 1    EPINEPHrine (EPIPEN) 0.3 MG/0.3ML solution auto-injector injection 0.3 mL.      Evolocumab (REPATHA) solution auto-injector SureClick injection Inject 1 mL under the skin into the appropriate area as directed Every 14 (Fourteen) Days. 2 mL 3    glucose blood test strip Test 4 times per day. (Patient taking differently: Test 4 times per day.) 100 each 0    hydrOXYzine (ATARAX) 25 MG tablet Take 1 tablet by mouth 3 (Three) Times a Day As Needed for Itching. 90 tablet 2    insulin detemir (Levemir) 100 UNIT/ML injection Inject 10 Units under the skin into the appropriate area as directed Every Night. Increase one unit per night goal fasting  2 hours after meals 140 3 mL 12    Insulin Pen Needle (Pen Needles 3/16\") 31G X 5 MM misc Use 1 each Every Night. 100 each 5    lidocaine (LIDODERM) 5 % Place 3 patches on the skin as directed by provider Daily. Remove & Discard patch within 12 hours or as directed by MD 90 each 5    lisinopril-hydrochlorothiazide (PRINZIDE,ZESTORETIC) 20-12.5 MG per tablet TAKE 2 TABLETS BY MOUTH DAILY 180 tablet 3    metoprolol tartrate (LOPRESSOR) 100 MG tablet Take 1 " tablet by mouth Every 12 (Twelve) Hours. 180 tablet 3    OneTouch Delica Lancets 33G misc test 4 times per day (Patient taking differently: test 4 times per day) 100 each 0    cetirizine (zyrTEC) 10 MG tablet Take 1 tablet by mouth Every Night. 90 tablet 1    Dulaglutide (Trulicity) 3 MG/0.5ML solution pen-injector Inject 0.5 mL under the skin into the appropriate area as directed 1 (One) Time Per Week. 2 mL 6    gabapentin (Neurontin) 800 MG tablet Take 1 tablet by mouth 3 (Three) Times a Day. 270 tablet 1    levothyroxine (SYNTHROID, LEVOTHROID) 25 MCG tablet Take 1 tablet by mouth Daily. 30 tablet 0    metFORMIN ER (GLUCOPHAGE-XR) 500 MG 24 hr tablet Take 1 tablet by mouth Daily With Breakfast. 90 tablet 1    spironolactone (Aldactone) 50 MG tablet Take 1 tablet by mouth Daily. 90 tablet 1    traMADol (ULTRAM) 50 MG tablet Take 1 tablet by mouth Every 6 (Six) Hours As Needed for Moderate Pain. 120 tablet 0    traZODone (DESYREL) 100 MG tablet Take 1 tablet by mouth Every Night. 90 tablet 1     No current facility-administered medications for this visit.     Review of Systems   Skin:         Ulcer toe   All other systems reviewed and are negative.      OBJECTIVE     Vitals:    10/04/23 1526   BP: 146/82   Pulse: 96   Temp: 98.4 øF (36.9 øC)   SpO2: 95%       WBC   Date Value Ref Range Status   08/23/2023 7.61 3.40 - 10.80 10*3/mm3 Final     RBC   Date Value Ref Range Status   08/23/2023 5.08 4.14 - 5.80 10*6/mm3 Final     Hemoglobin   Date Value Ref Range Status   08/23/2023 15.6 13.0 - 17.7 g/dL Final     Hematocrit   Date Value Ref Range Status   08/23/2023 45.9 37.5 - 51.0 % Final     MCV   Date Value Ref Range Status   08/23/2023 90.4 79.0 - 97.0 fL Final     MCH   Date Value Ref Range Status   08/23/2023 30.7 26.6 - 33.0 pg Final     MCHC   Date Value Ref Range Status   08/23/2023 34.0 31.5 - 35.7 g/dL Final     RDW   Date Value Ref Range Status   08/23/2023 13.3 12.3 - 15.4 % Final     RDW-SD   Date Value  Ref Range Status   08/23/2023 43.1 37.0 - 54.0 fl Final     MPV   Date Value Ref Range Status   08/23/2023 9.8 6.0 - 12.0 fL Final     Platelets   Date Value Ref Range Status   08/23/2023 340 140 - 450 10*3/mm3 Final     Neutrophil %   Date Value Ref Range Status   08/23/2023 54.1 42.7 - 76.0 % Final     Lymphocyte %   Date Value Ref Range Status   08/23/2023 30.4 19.6 - 45.3 % Final     Monocyte %   Date Value Ref Range Status   08/23/2023 12.7 (H) 5.0 - 12.0 % Final     Eosinophil %   Date Value Ref Range Status   08/23/2023 1.1 0.3 - 6.2 % Final     Basophil %   Date Value Ref Range Status   08/23/2023 0.9 0.0 - 1.5 % Final     Immature Grans %   Date Value Ref Range Status   08/23/2023 0.8 (H) 0.0 - 0.5 % Final     Neutrophils, Absolute   Date Value Ref Range Status   08/23/2023 4.12 1.70 - 7.00 10*3/mm3 Final     Lymphocytes, Absolute   Date Value Ref Range Status   08/23/2023 2.31 0.70 - 3.10 10*3/mm3 Final     Monocytes, Absolute   Date Value Ref Range Status   08/23/2023 0.97 (H) 0.10 - 0.90 10*3/mm3 Final     Eosinophils, Absolute   Date Value Ref Range Status   08/23/2023 0.08 0.00 - 0.40 10*3/mm3 Final     Basophils, Absolute   Date Value Ref Range Status   08/23/2023 0.07 0.00 - 0.20 10*3/mm3 Final     Immature Grans, Absolute   Date Value Ref Range Status   08/23/2023 0.06 (H) 0.00 - 0.05 10*3/mm3 Final     nRBC   Date Value Ref Range Status   08/23/2023 0.0 0.0 - 0.2 /100 WBC Final         Lab Results   Component Value Date    GLUCOSE 337 (H) 08/23/2023    BUN 11 08/23/2023    CREATININE 0.92 08/23/2023    EGFRIFNONA 77 06/17/2021    BCR 12.0 08/23/2023    K 4.2 08/23/2023    CO2 22.0 08/23/2023    CALCIUM 9.7 08/23/2023    ALBUMIN 4.2 08/23/2023    LABIL2 0.9 (L) 10/16/2020    AST 12 08/23/2023    ALT 14 08/23/2023       Patient seen in no apparent distress.      PHYSICAL EXAM:     Foot/Ankle Exam    GENERAL  Appearance:  appears stated age  Orientation:  AAOx3  Affect:  appropriate  Gait:   unimpaired  Assistance:  independent  Right shoe gear: casual shoe  Left shoe gear: casual shoe    VASCULAR     Right Foot Vascularity   Dorsalis pedis:  1+  Posterior tibial:  1+  Skin temperature:  cool  Edema gradin+ and non-pitting  CFT:  < 3 seconds  Pedal hair growth:  Absent  Varicosities:  none     Left Foot Vascularity   Dorsalis pedis:  1+  Posterior tibial:  1+  Skin temperature:  cool  Edema gradin+ and non-pitting  CFT:  < 3 seconds  Pedal hair growth:  Absent  Varicosities:  none     NEUROLOGIC     Right Foot Neurologic   Light touch sensation: absent  Vibratory sensation: absent  Hot/Cold sensation: absent     Left Foot Neurologic   Light touch sensation: absent  Vibratory sensation: absent  Hot/Cold sensation:  absent    MUSCLE STRENGTH     Right Foot Muscle Strength   Foot dorsiflexion:  4  Foot plantar flexion:  4  Foot inversion:  4  Foot eversion:  4     Left Foot Muscle Strength   Foot dorsiflexion:  4  Foot plantar flexion:  4  Foot inversion:  4  Foot eversion:  4    RANGE OF MOTION     Right Foot Range of Motion   Foot and ankle ROM within normal limits       Left Foot Range of Motion   Foot and ankle ROM within normal limits      DERMATOLOGIC      Right Foot Dermatologic   Skin  Right foot skin is intact.      Left Foot Dermatologic   Skin  Left foot skin is intact.      Right foot additional comments: Ulcer to distal aspect of right great toe.  No erythema no malodor no drainage.  50% fibrotic.      RADIOLOGY:        Doppler Arterial Multi Level Lower Extremity - Bilateral CAR    Result Date: 10/2/2023  Narrative:   Right Conclusion: The right ROXANA is moderately reduced. Waveforms are consistent with tib-peroneal disease. Moderate digital ischemia.   Left Conclusion: The left ROXANA is mildly reduced. Waveforms are consistent with tib-peroneal disease. Moderate digital ischemia.     CT Chest Low Dose Cancer Screening WO    Result Date: 2023  Narrative: PROCEDURE: CT CHEST LOW  DOSE CANCER SCREENING WO  COMPARISON: None  REASON FOR SCREENING: Patient is 58 years of age, asymptomatic, and has a smoking history of more than 30 pack years. SMOKING STATUS: Current smoker.  40 years    SCREENING VISIT: Baseline.      TECHNIQUE: Axial unenhanced LDCT images from the apices through mid-kidney were obtained. Evaluation of solid organs and vascular structures is suboptimal due to the lack of IV contrast. Imaging was performed on a Siemens Somatom 128 CT scanner.  RECONSTRUCTED IMAGE WIDTH: 2 mm. RADIATION: CT Dose Index Vol (CTDIvol):  1.95  mGy  Dose Length Product (DLP):  78  mGy-cm   FINDINGS:  LUNG NODULES: None. LUNGS:  COPD: Mild emphysema. FIBROSIS: None LYMPH NODES: None.  OTHER FINDINGS: None.   RIGHT PLEURAL SPACE:  EFFUSION: None. CALCIFICATION: None. THICKENING: None. PNEUMOTHORAX: None.  LEFT PLEURAL SPACE:  EFFUSION: None. CALCIFICATION: None. THICKENING: None. PNEUMOTHORAX: None.  HEART:  SIZE: Normal. CORONARY CALC: Advanced. PERICARDIAL EFFUSION: None. OTHER FINDINGS: None.   UPPER ABDOMEN: None.  THORAX: Median sternotomy wires appear intact.  BASE OF NECK: None.       Impression:   1. No evidence of lung cancer. 2. No acute cardiopulmonary process. 3. Mild emphysema.  LUNG RADS:                           1 (negative, less than 1% chance of malignancy)     EDITA MAO MD       Electronically Signed and Approved By: EDITA MAO MD on 9/27/2023 at 9:44              ASSESSMENT/PLAN     Diagnoses and all orders for this visit:    1. Peripheral vascular disease (Primary)  -     Ambulatory Referral to Vascular Surgery    2. Diabetic ulcer of right great toe  -     Ambulatory Referral to Vascular Surgery      ROXANA reviewed, referral to vascular surgery for further evaluation.  Dressing changes daily to foot wound  Offload foot  Return to clinic in 3 to 4 weeks    Comprehensive lower extremity examination and evaluation was performed.    Discussed findings and treatment plan  including risks, benefits, and treatment options with patient in detail. Patient agreed with treatment plan.    Medications and allergies reviewed.  Reviewed available lab values along with other pertinent labs.  These were discussed with the patient.    An After Visit Summary was printed and given to the patient at discharge, including (if requested) any available informative/educational handouts regarding diagnosis, treatment, or medications. All questions were answered to patient/family satisfaction. Should symptoms fail to improve or worsen they agree to call or return to clinic or to go to the Emergency Department. Discussed the importance of following up with any needed screening tests/labs/specialist appointments and any requested follow-up recommended by me today. Importance of maintaining follow-up discussed and patient accepts that missed appointments can delay diagnosis and potentially lead to worsening of conditions.    Return in about 1 month (around 11/4/2023)., or sooner if acute issues arise.    This document has been electronically signed by Balwinder Costa DPM on October 9, 2023 07:11 EDT

## 2023-10-10 ENCOUNTER — OFFICE VISIT (OUTPATIENT)
Dept: VASCULAR SURGERY | Facility: HOSPITAL | Age: 58
End: 2023-10-10
Payer: COMMERCIAL

## 2023-10-10 VITALS
HEART RATE: 88 BPM | RESPIRATION RATE: 16 BRPM | DIASTOLIC BLOOD PRESSURE: 60 MMHG | TEMPERATURE: 98.7 F | OXYGEN SATURATION: 97 % | SYSTOLIC BLOOD PRESSURE: 102 MMHG | BODY MASS INDEX: 32.87 KG/M2 | WEIGHT: 248 LBS | HEIGHT: 73 IN

## 2023-10-10 DIAGNOSIS — I70.25 ATHEROSCLEROSIS OF NATIVE ARTERIES OF THE EXTREMITIES WITH ULCERATION: Primary | ICD-10-CM

## 2023-10-10 PROCEDURE — G0463 HOSPITAL OUTPT CLINIC VISIT: HCPCS | Performed by: NURSE PRACTITIONER

## 2023-10-10 NOTE — PROGRESS NOTES
[facility] Vascular Surgery New Patient Office Note     Date of Encounter: 10/10/2023     MRN Number: [id number]  Name: Jd Velazquez  Phone Number: [phone]     Referred By: Balwinder Costa DPM  PCP: Dacia Newsome APRN    Chief Complaint:    Chief Complaint   Patient presents with    Peripheral Vascular Disease     PATIENT IS HERE AS A NEW PATIENT WITH POSSIBLE PERIPHERAL VASCULAR DISEASE. PATIENT HAS HAD AN ARTERIAL DOPPLER COMPLETED RECENTLY. PATIENT DENIES CLAUDICATION.        Subjective      History of Present Illness:    Jd Velazquez is a 58 y.o. male presents as a referral from podiatry for a nonhealing wound on the right foot great toe.  He is a diabetic, last A1c was 13.3 in August 2023.  He is a smoker, smokes approximately half pack per day which is down from previously smoking 2 packs/day.  He states the ulceration began as a very small cut and progressed to approximately 2 cm x 3 cm. It's about 95 % slough, no odor and minimal drainage. He does take a daily aspirin and Repatha.     Review of Systems:  ROS  Review of Systems   Constitutional: Negative.   HENT: Negative.    Cardiovascular: Negative.    Respiratory: Negative.    Skin: Ulceration great toe right foot   musculoskeletal: Negative.    Gastrointestinal: Negative.    Neurological: Negative.    Psychiatric/Behavioral: Negative.     I have reviewed the following portions of the patient's history: problem list, current medications, allergies, past surgical history, past medical history, past social history, past family history, and ROS and confirm it's accurate.    Allergies:  Allergies   Allergen Reactions    Lortab [Hydrocodone-Acetaminophen] Itching       Medications:      Current Outpatient Medications:     aspirin 81 MG chewable tablet, Chew 1 tablet Daily., Disp: , Rfl:     cetirizine (zyrTEC) 10 MG tablet, Take 1 tablet by mouth Every Night., Disp: 90 tablet, Rfl: 1    clobetasol (TEMOVATE)  "0.05 % cream, Apply 1 application  topically to the appropriate area as directed 2 (Two) Times a Day., Disp: 60 g, Rfl: 1    Dulaglutide (Trulicity) 3 MG/0.5ML solution pen-injector, Inject 0.5 mL under the skin into the appropriate area as directed 1 (One) Time Per Week., Disp: 2 mL, Rfl: 6    EPINEPHrine (EPIPEN) 0.3 MG/0.3ML solution auto-injector injection, 0.3 mL., Disp: , Rfl:     Evolocumab (REPATHA) solution auto-injector SureClick injection, Inject 1 mL under the skin into the appropriate area as directed Every 14 (Fourteen) Days., Disp: 2 mL, Rfl: 3    gabapentin (Neurontin) 800 MG tablet, Take 1 tablet by mouth 3 (Three) Times a Day., Disp: 270 tablet, Rfl: 1    glucose blood test strip, Test 4 times per day. (Patient taking differently: Test 4 times per day.), Disp: 100 each, Rfl: 0    hydrOXYzine (ATARAX) 25 MG tablet, Take 1 tablet by mouth 3 (Three) Times a Day As Needed for Itching., Disp: 90 tablet, Rfl: 2    insulin detemir (Levemir) 100 UNIT/ML injection, Inject 10 Units under the skin into the appropriate area as directed Every Night. Increase one unit per night goal fasting  2 hours after meals 140, Disp: 3 mL, Rfl: 12    Insulin Pen Needle (Pen Needles 3/16\") 31G X 5 MM misc, Use 1 each Every Night., Disp: 100 each, Rfl: 5    levothyroxine (SYNTHROID, LEVOTHROID) 25 MCG tablet, Take 1 tablet by mouth Daily., Disp: 30 tablet, Rfl: 0    lidocaine (LIDODERM) 5 %, Place 3 patches on the skin as directed by provider Daily. Remove & Discard patch within 12 hours or as directed by MD, Disp: 90 each, Rfl: 5    lisinopril-hydrochlorothiazide (PRINZIDE,ZESTORETIC) 20-12.5 MG per tablet, TAKE 2 TABLETS BY MOUTH DAILY, Disp: 180 tablet, Rfl: 3    metFORMIN ER (GLUCOPHAGE-XR) 500 MG 24 hr tablet, Take 1 tablet by mouth Daily With Breakfast., Disp: 90 tablet, Rfl: 1    metoprolol tartrate (LOPRESSOR) 100 MG tablet, Take 1 tablet by mouth Every 12 (Twelve) Hours., Disp: 180 tablet, Rfl: 3    OneTouch " Delica Lancets 33G misc, test 4 times per day (Patient taking differently: test 4 times per day), Disp: 100 each, Rfl: 0    spironolactone (Aldactone) 50 MG tablet, Take 1 tablet by mouth Daily., Disp: 90 tablet, Rfl: 1    traMADol (ULTRAM) 50 MG tablet, Take 1 tablet by mouth Every 6 (Six) Hours As Needed for Moderate Pain., Disp: 120 tablet, Rfl: 0    traZODone (DESYREL) 100 MG tablet, Take 1 tablet by mouth Every Night., Disp: 90 tablet, Rfl: 1    History:   Past Medical History:   Diagnosis Date    Allergic rhinitis 01/12/2015    Benign essential hypertension 03/08/2016    CAD (coronary artery disease)     Diabetes mellitus     Foot ulcer     Hyperlipidemia     Hypertension     Hypokalemia 02/23/2016    Hypothyroidism 04/24/2014    Insomnia, unspecified 06/15/2016    Microalbuminuria due to type 2 diabetes mellitus 10/15/2015    Mixed hyperlipidemia 10/15/2015    Myocardial infarction     Obesity        Past Surgical History:   Procedure Laterality Date    ANKLE ARTHROSCOPY W/ OPEN REPAIR      APPENDECTOMY      CARDIAC CATHETERIZATION  2014    PCI to circumflex    CORONARY ARTERY BYPASS GRAFT N/A 10/8/2020    Procedure: STERNOTOMY, CORONARY ARTERY BYPASS GRAFTING TIME 4 WITH LEFT KELSI  AND ENDOSCOPICALLY HARVESTED LEFT GREATER SAPHENOUS VEIN AND PRP.;  Surgeon: Jr Girma Chiu MD;  Location: [facility];  Service: Cardiothoracic;  Laterality: N/A;    HEEL SPUR SURGERY      HERNIA REPAIR      REPLACEMENT TOTAL KNEE BILATERAL      TONSILLECTOMY         Social History     Socioeconomic History    Marital status:    Tobacco Use    Smoking status: Every Day     Packs/day: .5     Types: Cigarettes    Smokeless tobacco: Never   Vaping Use    Vaping Use: Never used   Substance and Sexual Activity    Alcohol use: Never    Drug use: Never    Sexual activity: Defer        Family History   Problem Relation Age of Onset    Heart disease Other        Objective     Physical Exam:  Vitals:    10/10/23 1400   BP:  "102/60   BP Location: Right arm   Patient Position: Sitting   Cuff Size: Large Adult   Pulse: 88   Resp: 16   Temp: 98.7 øF (37.1 øC)   TempSrc: Temporal   SpO2: 97%   Weight: 112 kg (248 lb)   Height: 185.4 cm (73\")   PainSc:   2   PainLoc: Toe      Body mass index is 32.72 kg/mý.    Physical Exam   Physical Exam  Constitutional:       Appearance: Normal appearance.   HENT:      Head: Normocephalic.   Cardiovascular:      Rate and Rhythm: Normal rate.      Pulses: Normal pulses.      Comments: Right foot: Nonpalpable DP and posterior tibial pulse.   Pulmonary:      Effort: Pulmonary effort is normal.   Musculoskeletal:         General: Normal range of motion.      Cervical back: Normal range of motion.   Skin:     General: Skin is warm and dry.      Capillary Refill: Capillary refill takes less than 2 seconds.      Comments: Right foot great toe distal: Slough, no odor or drainage.   Neurological:      General: No focal deficit present.      Mental Status: Alert and oriented to person, place, and time.   Psychiatric:         Mood and Affect: Mood normal.         Behavior: Behavior normal.    Imaging/Labs:  I have reviewed I have reviewed the preliminary results of the arterial Doppler performed on 10/2/2023.  Arterial Doppler reveals the right ROXANA at 0.59 with a toe brachial index of 0.38, the left ROXANA is at 0.88 with a toe brachial index of 0.54.    CT Chest Low Dose Cancer Screening WO    Result Date: 9/27/2023    1. No evidence of lung cancer. 2. No acute cardiopulmonary process. 3. Mild emphysema.  LUNG RADS:                           1 (negative, less than 1% chance of malignancy)     EDITA MAO MD       Electronically Signed and Approved By: EDITA MAO MD on 9/27/2023 at 9:44                 Assessment / Plan      Assessment / Plan:  Diagnoses and all orders for this visit:    1. Atherosclerosis of native arteries of the extremities with ulceration (Primary)  -     CT angio abdominal aorta bilat " iliofem runoff w wo contrast; Future    Mr. Velazquez is a diabetic with a nonhealing ulceration to his right foot great toe.  The ABIs were 0.59 on the right with a toe brachial index of 0.38. The left ROXANA was 0.88.  I recommend that we obtain a CTA abdomen pelvis with runoff and follow-up with Dr. Puga.     Patient Education: I have advised smoking cessation (he states he has cut back from 2 PPD to a half pack per day) along with lifestyle modifications to help manage glucose and promote wound healing.       Follow Up:   No follow-ups on file.   Or sooner for any further concerns or worsening sign and symptoms. If unable to reach us in the office please dial 911 or go to the nearest emergency department.      SOHAM Kimble  [facility] Vascular Surgery

## 2023-10-11 ENCOUNTER — TRANSCRIBE ORDERS (OUTPATIENT)
Dept: GENERAL RADIOLOGY | Facility: HOSPITAL | Age: 58
End: 2023-10-11
Payer: COMMERCIAL

## 2023-10-11 ENCOUNTER — HOSPITAL ENCOUNTER (OUTPATIENT)
Dept: GENERAL RADIOLOGY | Facility: HOSPITAL | Age: 58
Discharge: HOME | End: 2023-10-11
Admitting: STUDENT IN AN ORGANIZED HEALTH CARE EDUCATION/TRAINING PROGRAM
Payer: COMMERCIAL

## 2023-10-11 DIAGNOSIS — M48.04 SPINAL STENOSIS OF THORACIC REGION: ICD-10-CM

## 2023-10-11 DIAGNOSIS — M48.04 SPINAL STENOSIS OF THORACIC REGION: Primary | ICD-10-CM

## 2023-10-11 PROCEDURE — 72072 X-RAY EXAM THORAC SPINE 3VWS: CPT

## 2023-10-17 ENCOUNTER — HOSPITAL ENCOUNTER (OUTPATIENT)
Dept: CT IMAGING | Facility: HOSPITAL | Age: 58
Discharge: HOME | End: 2023-10-17
Admitting: NURSE PRACTITIONER
Payer: COMMERCIAL

## 2023-10-17 DIAGNOSIS — I70.25 ATHEROSCLEROSIS OF NATIVE ARTERIES OF THE EXTREMITIES WITH ULCERATION: ICD-10-CM

## 2023-10-17 PROCEDURE — 25510000001 IOPAMIDOL PER 1 ML: Performed by: NURSE PRACTITIONER

## 2023-10-17 PROCEDURE — 75635 CT ANGIO ABDOMINAL ARTERIES: CPT

## 2023-10-17 RX ADMIN — IOPAMIDOL 100 ML: 755 INJECTION, SOLUTION INTRAVENOUS at 19:19

## 2023-10-18 ENCOUNTER — OFFICE VISIT (OUTPATIENT)
Dept: VASCULAR SURGERY | Facility: HOSPITAL | Age: 58
End: 2023-10-18
Payer: COMMERCIAL

## 2023-10-18 VITALS
HEART RATE: 68 BPM | SYSTOLIC BLOOD PRESSURE: 100 MMHG | TEMPERATURE: 96.6 F | OXYGEN SATURATION: 96 % | DIASTOLIC BLOOD PRESSURE: 58 MMHG | RESPIRATION RATE: 18 BRPM

## 2023-10-18 DIAGNOSIS — I70.25 ATHEROSCLEROSIS OF NATIVE ARTERIES OF THE EXTREMITIES WITH ULCERATION: Primary | ICD-10-CM

## 2023-10-18 PROCEDURE — G0463 HOSPITAL OUTPT CLINIC VISIT: HCPCS | Performed by: SURGERY

## 2023-10-18 RX ORDER — CEFAZOLIN SODIUM 2 G/100ML
2000 INJECTION, SOLUTION INTRAVENOUS ONCE
OUTPATIENT
Start: 2023-10-18 | End: 2023-10-18

## 2023-10-18 NOTE — PROGRESS NOTES
[facility]   Follow up Office    Patient Name: Jd Velazquez  : 1965  MRN: [id number]  Primary Care Physician:  Dacia Newsome APRN      Subjective   Subjective     HPI:    Jd Velazquez is a 58 y.o. male here for follow-up for a right foot wound that is not healing after a CTA.  Doing well.  No new complaints.      Objective     Vitals:   Temp:  [96.6 °F (35.9 °C)] 96.6 °F (35.9 °C)  Heart Rate:  [68] 68  Resp:  [18] 18  BP: (100)/(58) 100/58    Physical Exam      General: Alert, no acute distress.  HEENT: PERRLA  Abdomen: Benign  Extremities: Symmetric.  Neuro: No gross deficits    Diagnostic studies: A CTA dated 10/17/2023 has been reviewed.  Moderate right SFA stenosis, severe tibioperoneal disease.    Assessment & Plan   Assessment / Plan     Diagnoses and all orders for this visit:    1. Atherosclerosis of native arteries of the extremities with ulceration (Primary)  -     Case Request; Standing  -     ceFAZolin in dextrose (ANCEF) IVPB solution 2,000 mg  -     Case Request    Other orders  -     Follow Anesthesia Guidelines / Protocol; Future  -     Follow Anesthesia Guidelines / Protocol; Standing  -     Verify NPO Status; Standing  -     Obtain Informed Consent; Standing  -     CBC & Differential; Standing  -     Basic Metabolic Panel; Standing  -     Insert Peripheral IV; Standing  -     Saline Lock & Maintain IV Access; Standing       Assessment/Plan:   Mr. Velazquez has a nonhealing right foot wound.  He also has significant tibioperoneal disease based on a CTA.  Plan angiography with possible endovascular intervention.  I have discussed with the patient in detail the mechanics of the procedure, the indications, benefits, risks, alternatives as well as potential complications to include but not limited to infection, bleeding, inability to cross the occlusion, vascular injury requiring surgical repair.  He appears to understand and desires to  proceed.        Electronically signed by Robert Puga MD, 10/18/23, 1:16 PM EDT.

## 2023-10-25 PROCEDURE — 76937 US GUIDE VASCULAR ACCESS: CPT | Performed by: SURGERY

## 2023-10-25 PROCEDURE — S0260 H&P FOR SURGERY: HCPCS | Performed by: SURGERY

## 2023-10-25 NOTE — H&P
[facility]   HISTORY AND PHYSICAL    Patient Name: Jd Velazquez  : 1965  MRN: [id number]  Primary Care Physician:  Dacia Newsome APRN  Date of admission: (Not on file)    Subjective   Subjective     Chief Complaint: Nonhealing right foot wound    HPI:    Jd Velazquez is a 58 y.o. male with a nonhealing right foot wound.    Review of Systems    Non contributory except for the History of Present Illness    Personal History     Past Medical History:   Diagnosis Date    Allergic rhinitis 2015    Benign essential hypertension 2016    CAD (coronary artery disease)     Diabetes mellitus     Foot ulcer     Hyperlipidemia     Hypertension     Hypokalemia 2016    Hypothyroidism 2014    Insomnia, unspecified 06/15/2016    Microalbuminuria due to type 2 diabetes mellitus 10/15/2015    Mixed hyperlipidemia 10/15/2015    Myocardial infarction     Obesity        Past Surgical History:   Procedure Laterality Date    ANKLE ARTHROSCOPY W/ OPEN REPAIR      APPENDECTOMY      CARDIAC CATHETERIZATION      PCI to circumflex    CORONARY ARTERY BYPASS GRAFT N/A 10/8/2020    Procedure: STERNOTOMY, CORONARY ARTERY BYPASS GRAFTING TIME 4 WITH LEFT KELSI  AND ENDOSCOPICALLY HARVESTED LEFT GREATER SAPHENOUS VEIN AND PRP.;  Surgeon: Jr Girma Chiu MD;  Location: [facility];  Service: Cardiothoracic;  Laterality: N/A;    HEEL SPUR SURGERY      HERNIA REPAIR      REPLACEMENT TOTAL KNEE BILATERAL      TONSILLECTOMY         Family History: family history includes Heart disease in an other family member. Otherwise pertinent FHx was reviewed and not pertinent to current issue.    Social History:  reports that he has been smoking cigarettes. He has been smoking an average of .5 packs per day. He has never used smokeless tobacco. He reports that he does not drink alcohol and does not use drugs.    Home Medications:  No current facility-administered medications on file prior to  "encounter.     Current Outpatient Medications on File Prior to Encounter   Medication Sig    aspirin 81 MG chewable tablet Chew 1 tablet Daily.    cetirizine (zyrTEC) 10 MG tablet Take 1 tablet by mouth Every Night.    clobetasol (TEMOVATE) 0.05 % cream Apply 1 application  topically to the appropriate area as directed 2 (Two) Times a Day.    Dulaglutide (Trulicity) 3 MG/0.5ML solution pen-injector Inject 0.5 mL under the skin into the appropriate area as directed 1 (One) Time Per Week.    EPINEPHrine (EPIPEN) 0.3 MG/0.3ML solution auto-injector injection 0.3 mL.    Evolocumab (REPATHA) solution auto-injector SureClick injection Inject 1 mL under the skin into the appropriate area as directed Every 14 (Fourteen) Days.    gabapentin (Neurontin) 800 MG tablet Take 1 tablet by mouth 3 (Three) Times a Day.    glucose blood test strip Test 4 times per day. (Patient taking differently: Test 4 times per day.)    hydrOXYzine (ATARAX) 25 MG tablet Take 1 tablet by mouth 3 (Three) Times a Day As Needed for Itching.    insulin detemir (Levemir) 100 UNIT/ML injection Inject 10 Units under the skin into the appropriate area as directed Every Night. Increase one unit per night goal fasting  2 hours after meals 140    Insulin Pen Needle (Pen Needles 3/16\") 31G X 5 MM misc Use 1 each Every Night.    levothyroxine (SYNTHROID, LEVOTHROID) 25 MCG tablet Take 1 tablet by mouth Daily.    lidocaine (LIDODERM) 5 % Place 3 patches on the skin as directed by provider Daily. Remove & Discard patch within 12 hours or as directed by MD    lisinopril-hydrochlorothiazide (PRINZIDE,ZESTORETIC) 20-12.5 MG per tablet TAKE 2 TABLETS BY MOUTH DAILY    metFORMIN ER (GLUCOPHAGE-XR) 500 MG 24 hr tablet Take 1 tablet by mouth Daily With Breakfast.    metoprolol tartrate (LOPRESSOR) 100 MG tablet Take 1 tablet by mouth Every 12 (Twelve) Hours.    OneTouch Delica Lancets 33G misc test 4 times per day (Patient taking differently: test 4 times per day) "    spironolactone (Aldactone) 50 MG tablet Take 1 tablet by mouth Daily.    traMADol (ULTRAM) 50 MG tablet Take 1 tablet by mouth Every 6 (Six) Hours As Needed for Moderate Pain.    traZODone (DESYREL) 100 MG tablet Take 1 tablet by mouth Every Night. (Patient taking differently: Take 0.5 tablets by mouth Every Night.)          Allergies:  Allergies   Allergen Reactions    Lortab [Hydrocodone-Acetaminophen] Itching       Objective   Objective     Vitals:        Physical Exam    General: Awake, alert, NAD   Eyes:  AI   Neck: Supple   Lungs: Clear   Heart: RRR   Abdomen: benign   Musculoskeletal:  normal motor tone, symmetric   Skin: warm, normal turgor   Neuro: strength 5/5 all extremities   Pulses: Nonpalpable right pedal pulses.    Diagnostic studies:   A CTA dated 10/17/2023 has been reviewed.  Moderate right SFA stenosis, severe tibioperoneal disease.       Assessment & Plan   Assessment / Plan     Active Hospital Problems:  Active Hospital Problems    Diagnosis     **Atherosclerosis of native arteries of the extremities with ulceration        Diagnoses and all orders for this visit:    1. Atherosclerosis of native arteries of the extremities with ulceration  -     Cardiac Catheterization/Vascular Study; Standing  -     Cardiac Catheterization/Vascular Study        Assessment/plan:   Mr. Velazquez has a nonhealing right foot wound.  He also has significant tibioperoneal disease based on a CTA.  Plan angiography with possible endovascular intervention.  I have discussed with the patient in detail the mechanics of the procedure, the indications, benefits, risks, alternatives as well as potential complications to include but not limited to infection, bleeding, inability to cross the occlusion, vascular injury requiring surgical repair.  He appears to understand and desires to proceed.         Electronically signed by Robert Puga MD, 10/25/23, 3:39 PM EDT.

## 2023-10-26 ENCOUNTER — HOSPITAL ENCOUNTER (OUTPATIENT)
Facility: HOSPITAL | Age: 58
Setting detail: HOSPITAL OUTPATIENT SURGERY
Discharge: HOME | End: 2023-10-26
Attending: SURGERY | Admitting: SURGERY
Payer: COMMERCIAL

## 2023-10-26 VITALS
DIASTOLIC BLOOD PRESSURE: 69 MMHG | SYSTOLIC BLOOD PRESSURE: 164 MMHG | HEIGHT: 72 IN | BODY MASS INDEX: 33.18 KG/M2 | TEMPERATURE: 97.5 F | WEIGHT: 245 LBS | RESPIRATION RATE: 18 BRPM | HEART RATE: 86 BPM | OXYGEN SATURATION: 92 %

## 2023-10-26 DIAGNOSIS — I70.25 ATHEROSCLEROSIS OF NATIVE ARTERIES OF THE EXTREMITIES WITH ULCERATION: ICD-10-CM

## 2023-10-26 LAB
ANION GAP SERPL CALCULATED.3IONS-SCNC: 14.7 MMOL/L (ref 5–15)
BUN SERPL-MCNC: 9 MG/DL (ref 6–20)
BUN/CREAT SERPL: 10.3 (ref 7–25)
CALCIUM SPEC-SCNC: 9.6 MG/DL (ref 8.6–10.5)
CHLORIDE SERPL-SCNC: 92 MMOL/L (ref 98–107)
CO2 SERPL-SCNC: 24.3 MMOL/L (ref 22–29)
CREAT SERPL-MCNC: 0.87 MG/DL (ref 0.76–1.27)
EGFRCR SERPLBLD CKD-EPI 2021: 100.6 ML/MIN/1.73
GLUCOSE SERPL-MCNC: 252 MG/DL (ref 65–99)
POTASSIUM SERPL-SCNC: 4.1 MMOL/L (ref 3.5–5.2)
SODIUM SERPL-SCNC: 131 MMOL/L (ref 136–145)

## 2023-10-26 PROCEDURE — 80048 BASIC METABOLIC PNL TOTAL CA: CPT | Performed by: SURGERY

## 2023-10-26 PROCEDURE — 75710 ARTERY X-RAYS ARM/LEG: CPT | Performed by: SURGERY

## 2023-10-26 PROCEDURE — 25010000002 HEPARIN (PORCINE) PER 1000 UNITS: Performed by: SURGERY

## 2023-10-26 PROCEDURE — C1894 INTRO/SHEATH, NON-LASER: HCPCS | Performed by: SURGERY

## 2023-10-26 PROCEDURE — C1725 CATH, TRANSLUMIN NON-LASER: HCPCS | Performed by: SURGERY

## 2023-10-26 PROCEDURE — 0 IODIXANOL PER 1 ML: Performed by: SURGERY

## 2023-10-26 PROCEDURE — C1769 GUIDE WIRE: HCPCS | Performed by: SURGERY

## 2023-10-26 PROCEDURE — 99152 MOD SED SAME PHYS/QHP 5/>YRS: CPT | Performed by: SURGERY

## 2023-10-26 PROCEDURE — C2623 CATH, TRANSLUMIN, DRUG-COAT: HCPCS | Performed by: SURGERY

## 2023-10-26 PROCEDURE — 25010000002 CEFAZOLIN IN DEXTROSE 2000 MG/ 100 ML SOLUTION: Performed by: SURGERY

## 2023-10-26 PROCEDURE — 99153 MOD SED SAME PHYS/QHP EA: CPT | Performed by: SURGERY

## 2023-10-26 PROCEDURE — 25010000002 MIDAZOLAM PER 1MG: Performed by: SURGERY

## 2023-10-26 PROCEDURE — 37228 PR REVSC OPN/PRQ TIB/PERO W/ANGIOPLASTY UNI: CPT | Performed by: SURGERY

## 2023-10-26 PROCEDURE — 25010000002 FENTANYL CITRATE (PF) 50 MCG/ML SOLUTION: Performed by: SURGERY

## 2023-10-26 PROCEDURE — C1887 CATHETER, GUIDING: HCPCS | Performed by: SURGERY

## 2023-10-26 PROCEDURE — 25010000002 HYDRALAZINE PER 20 MG: Performed by: SURGERY

## 2023-10-26 PROCEDURE — C1760 CLOSURE DEV, VASC: HCPCS | Performed by: SURGERY

## 2023-10-26 RX ORDER — HEPARIN SODIUM 1000 [USP'U]/ML
INJECTION, SOLUTION INTRAVENOUS; SUBCUTANEOUS
Status: DISCONTINUED | OUTPATIENT
Start: 2023-10-26 | End: 2023-10-26 | Stop reason: HOSPADM

## 2023-10-26 RX ORDER — CEFAZOLIN SODIUM 2 G/100ML
2000 INJECTION, SOLUTION INTRAVENOUS ONCE
Status: COMPLETED | OUTPATIENT
Start: 2023-10-26 | End: 2023-10-26

## 2023-10-26 RX ORDER — MIDAZOLAM HYDROCHLORIDE 2 MG/2ML
INJECTION, SOLUTION INTRAMUSCULAR; INTRAVENOUS
Status: DISCONTINUED | OUTPATIENT
Start: 2023-10-26 | End: 2023-10-26 | Stop reason: HOSPADM

## 2023-10-26 RX ORDER — HYDRALAZINE HYDROCHLORIDE 20 MG/ML
INJECTION INTRAMUSCULAR; INTRAVENOUS
Status: DISCONTINUED | OUTPATIENT
Start: 2023-10-26 | End: 2023-10-26 | Stop reason: HOSPADM

## 2023-10-26 RX ORDER — ENOXAPARIN SODIUM 100 MG/ML
40 INJECTION SUBCUTANEOUS DAILY
Status: DISCONTINUED | OUTPATIENT
Start: 2023-10-27 | End: 2023-10-26 | Stop reason: HOSPADM

## 2023-10-26 RX ORDER — SODIUM CHLORIDE 9 MG/ML
50 INJECTION, SOLUTION INTRAVENOUS CONTINUOUS
Status: DISCONTINUED | OUTPATIENT
Start: 2023-10-26 | End: 2023-10-26 | Stop reason: HOSPADM

## 2023-10-26 RX ORDER — LIDOCAINE HYDROCHLORIDE 20 MG/ML
INJECTION, SOLUTION INFILTRATION; PERINEURAL
Status: DISCONTINUED | OUTPATIENT
Start: 2023-10-26 | End: 2023-10-26 | Stop reason: HOSPADM

## 2023-10-26 RX ORDER — IODIXANOL 320 MG/ML
INJECTION, SOLUTION INTRAVASCULAR
Status: DISCONTINUED | OUTPATIENT
Start: 2023-10-26 | End: 2023-10-26 | Stop reason: HOSPADM

## 2023-10-26 RX ORDER — FENTANYL CITRATE 50 UG/ML
INJECTION, SOLUTION INTRAMUSCULAR; INTRAVENOUS
Status: DISCONTINUED | OUTPATIENT
Start: 2023-10-26 | End: 2023-10-26 | Stop reason: HOSPADM

## 2023-10-26 RX ADMIN — CEFAZOLIN SODIUM 2000 MG: 2 INJECTION, SOLUTION INTRAVENOUS at 08:18

## 2023-10-26 NOTE — PROGRESS NOTES
Angiogram performed.  Occlusion of all tibial peroneal vessels.  Successful recanalization with subsequent percutaneous transluminal angioplasty of the anterior tibialis.  For details find full report under chart review, cardiology tab.

## 2023-10-26 NOTE — Clinical Note
Allergies reviewed.  H&P note has been confirmed for the patient. Procedural consent has been signed.  Staff has reviewed the patient's labs.
Calculated contrast threshold is 441.24 mL.
Hemostasis started on the left femoral artery. Mynx was used in achieving hemostasis. Closure device deployed in the vessel. Hemostasis achieved successfully.
Max pressure = 14 ash.
No in lab complications
Patient was given Post Procedure instruction by the staff.
Prepped: groin. Prepped with: ChloraPrep. The patient was draped in a sterile fashion.
Removed intact
Replaced previous sheath in the left femoral artery.
Report was  verbally given at bedside. .
The lesion was dilated with multiple inflations.
The physician has confirmed that the patient has been reassessed and is appropriate for moderate sedation
The right DP pulse is non-palpable. The right PT pulse is non-palpable.
Total duration = 300 seconds.
catheter removed.
catheter removed.
guidewire removed.
inserted over wire.
inserted over wire.
using micropuncture technique with ultrasound guidance into the left femoral artery. Sheath insertion not delayed.
2 G

## 2023-10-26 NOTE — DISCHARGE INSTRUCTIONS
May remove dressing in 1 day and wash area.  Follow-up in the office in 2 weeks, call for appointment.  Resume home diet.  Resume home medications.  No lifting greater than 15 pounds for 3 days.  Hold metformin for 2 days, may resume on Saturday, 10/28/2023.

## 2023-10-26 NOTE — NURSING NOTE
Patient presented for angiogram today.  Tolerated well.  Patient to discharge home with self-care.  Discharge instructions provided to patient.  Patient verbalized understanding of discharge instructions.  IV removed with tip intact.

## 2023-11-01 RX ORDER — LEVOTHYROXINE SODIUM 0.03 MG/1
25 TABLET ORAL DAILY
Qty: 30 TABLET | Refills: 0 | Status: SHIPPED | OUTPATIENT
Start: 2023-11-01

## 2023-11-15 ENCOUNTER — OFFICE VISIT (OUTPATIENT)
Dept: VASCULAR SURGERY | Facility: HOSPITAL | Age: 58
End: 2023-11-15
Payer: COMMERCIAL

## 2023-11-15 VITALS
SYSTOLIC BLOOD PRESSURE: 140 MMHG | RESPIRATION RATE: 18 BRPM | TEMPERATURE: 96.9 F | HEART RATE: 88 BPM | DIASTOLIC BLOOD PRESSURE: 76 MMHG | OXYGEN SATURATION: 96 %

## 2023-11-15 DIAGNOSIS — I70.25 ATHEROSCLEROSIS OF NATIVE ARTERIES OF THE EXTREMITIES WITH ULCERATION: Primary | ICD-10-CM

## 2023-11-15 PROCEDURE — 99214 OFFICE O/P EST MOD 30 MIN: CPT | Performed by: NURSE PRACTITIONER

## 2023-11-15 PROCEDURE — G0463 HOSPITAL OUTPT CLINIC VISIT: HCPCS | Performed by: NURSE PRACTITIONER

## 2023-11-15 RX ORDER — CEPHALEXIN 500 MG/1
500 CAPSULE ORAL 4 TIMES DAILY
Qty: 40 CAPSULE | Refills: 0 | Status: SHIPPED | OUTPATIENT
Start: 2023-11-15 | End: 2023-11-25

## 2023-11-15 RX ORDER — AMITRIPTYLINE HYDROCHLORIDE 50 MG/1
50 TABLET, FILM COATED ORAL NIGHTLY
COMMUNITY

## 2023-11-15 NOTE — PROGRESS NOTES
[facility]     Progress Note    Patient Name: Jd Vealzquez  : 1965  MRN: [id number]  Primary Care Physician:  Dacia Newsome APRN  Date of admission: (Not on file)    Subjective   Subjective     Mr. Velaqzuez presents for follow up, he had an angiogram with angioplasty of the anterior tibial artery performed by Dr. Puga on 10/26/2023. He states his fooot is still hurting, very tender to touch escpecailly the top of the foot. His lower leg is also still very tender. He denies any claudication. He had to cancel his appointment with Dr. Costa because it was on the same day as his endovascular procedure.  He is a diabetic, does not check his sugars regularly, last A1c in August was 13.3. He is also a smoker, smokes approximately half pack per day.  He does take a 81 mg aspirin daily.     Objective   Objective     Vitals:   Temp:  [96.9 °F (36.1 °C)] 96.9 °F (36.1 °C)  Heart Rate:  [88] 88  Resp:  [18] 18  BP: (140)/(76) 140/76    Physical Exam   General: Alert, no acute distress.  Extremities: Right foot: Erythema from midfoot to toes, warm. Nonpalpable pedal pulses, doppler detected pulses.  Skin: right foot, great toe: erythema, slough and eschar, it does have an odor, no drainage.   Neuro: No gross deficits.    Result Review    Result Review:  I have personally reviewed the results from the time of this admission to 11/15/2023 16:08 EST and agree with these findings:  []  Laboratory  []  Microbiology  []  Radiology  []  EKG/Telemetry   []  Cardiology/Vascular   []  Pathology  []  Old records  []  Other:    Most notable findings include:     Assessment & Plan   Assessment / Plan     Assessment/Plan:  Diagnoses and all orders for this visit:    1. Atherosclerosis of native arteries of the extremities with ulceration (Primary)  -     Duplex Lower Extremity Art / Grafts - Right CAR; Future  -     cephalexin (KEFLEX) 500 MG capsule; Take 1 capsule by mouth 4 (Four) Times a Day for 10 days.   Dispense: 40 capsule; Refill: 0    Mr. Velazquez had a angiogram with angioplasty of the peroneal artery performed Dr. Puga on 10/26/23. His foot is red, warm and there is an odor coming from the great toe. I will order a right lower extremity duplex and follow-up with Dr. Puga along with starting him on a antibiotic.  He has an appointment with Dr. Costa on the 22nd who is following him for his toe, we have made his office aware of the changes in his wound.     I have answered all of his questions and he is in agreement with the plan at this time.  Thank you for allowing me to participate in your patient's care.      Active Hospital Problems:  There are no active hospital problems to display for this patient.          Electronically signed by SOHAM Kimble, 11/15/23, 1:56 PM EST.

## 2023-11-22 ENCOUNTER — OFFICE VISIT (OUTPATIENT)
Dept: PODIATRY | Facility: CLINIC | Age: 58
End: 2023-11-22
Payer: COMMERCIAL

## 2023-11-22 VITALS
WEIGHT: 242 LBS | DIASTOLIC BLOOD PRESSURE: 80 MMHG | SYSTOLIC BLOOD PRESSURE: 155 MMHG | HEART RATE: 68 BPM | OXYGEN SATURATION: 96 % | TEMPERATURE: 98.4 F | BODY MASS INDEX: 32.82 KG/M2

## 2023-11-22 DIAGNOSIS — I96 DRY GANGRENE: ICD-10-CM

## 2023-11-22 DIAGNOSIS — I73.9 PERIPHERAL VASCULAR DISEASE: Primary | ICD-10-CM

## 2023-11-22 DIAGNOSIS — E11.65 UNCONTROLLED TYPE 2 DIABETES MELLITUS WITH HYPERGLYCEMIA: ICD-10-CM

## 2023-11-22 NOTE — PROGRESS NOTES
[facility] - PODIATRY    Today's Date: 11/22/23    Patient Name: Jd Velazquez  MRN: [id number]  CSN: [id number]  PCP: Dacia Newsome APRN,   Referring Provider: No ref. provider found    SUBJECTIVE     Chief Complaint   Patient presents with    Right Foot - Follow-up     Diabetic ulcer of right great toe     HPI: Jd Velazquez, a 58 y.o.male, presents to clinic.    Patient is a 57-year-old male presenting with an ulcer to the right great toe.  Patient states he is diabetic.  States his sugars are uncontrolled and his last A1c was over 10.  He says that approximately 2 months ago he had a cut on his big toe.  It has not healed.  He states his feet are numb and hurt him constantly.  Patient has been using mupirocin cream and it has not helped.    11/22/2023-patient recently had angiogram done with Dr. Puga for his right lower extremity.  Patient says over the course the last 3 weeks the tip of his toes turned black.    Past Medical History:   Diagnosis Date    Allergic rhinitis 01/12/2015    Benign essential hypertension 03/08/2016    CAD (coronary artery disease)     Diabetes mellitus     Foot ulcer     Hyperlipidemia     Hypertension     Hypokalemia 02/23/2016    Hypothyroidism 04/24/2014    Insomnia, unspecified 06/15/2016    Microalbuminuria due to type 2 diabetes mellitus 10/15/2015    Mixed hyperlipidemia 10/15/2015    Myocardial infarction     Obesity      Past Surgical History:   Procedure Laterality Date    ANKLE ARTHROSCOPY W/ OPEN REPAIR      APPENDECTOMY      CARDIAC CATHETERIZATION  2014    PCI to circumflex    CARDIAC CATHETERIZATION Right 10/26/2023    Procedure: Aortogram with right leg angiogram, possible angioplasty or stenting;  Surgeon: Robert Puga MD;  [facility];  Service: Vascular;  Laterality: Right;    CORONARY ARTERY BYPASS GRAFT N/A 10/8/2020    Procedure: STERNOTOMY, CORONARY ARTERY BYPASS GRAFTING TIME 4 WITH LEFT  KELSI  AND ENDOSCOPICALLY HARVESTED LEFT GREATER SAPHENOUS VEIN AND PRP.;  Surgeon: Jr Girma Chiu MD;  Location: [facility] MAIN OR;  Service: Cardiothoracic;  Laterality: N/A;    HEEL SPUR SURGERY      HERNIA REPAIR      REPLACEMENT TOTAL KNEE BILATERAL      TONSILLECTOMY       Family History   Problem Relation Age of Onset    Heart disease Other      Social History     Socioeconomic History    Marital status:    Tobacco Use    Smoking status: Every Day     Packs/day: .5     Types: Cigarettes    Smokeless tobacco: Never   Vaping Use    Vaping Use: Never used   Substance and Sexual Activity    Alcohol use: Never    Drug use: Never    Sexual activity: Defer     Allergies   Allergen Reactions    Lortab [Hydrocodone-Acetaminophen] Itching     Current Outpatient Medications   Medication Sig Dispense Refill    amitriptyline (ELAVIL) 50 MG tablet Take 1 tablet by mouth Every Night.      aspirin 81 MG chewable tablet Chew 1 tablet Daily.      cephalexin (KEFLEX) 500 MG capsule Take 1 capsule by mouth 4 (Four) Times a Day for 10 days. 40 capsule 0    cetirizine (zyrTEC) 10 MG tablet Take 1 tablet by mouth Every Night. 90 tablet 1    clobetasol (TEMOVATE) 0.05 % cream Apply 1 application  topically to the appropriate area as directed 2 (Two) Times a Day. 60 g 1    Dulaglutide (Trulicity) 3 MG/0.5ML solution pen-injector Inject 0.5 mL under the skin into the appropriate area as directed 1 (One) Time Per Week. 2 mL 6    EPINEPHrine (EPIPEN) 0.3 MG/0.3ML solution auto-injector injection 0.3 mL.      Evolocumab (REPATHA) solution auto-injector SureClick injection Inject 1 mL under the skin into the appropriate area as directed Every 14 (Fourteen) Days. 2 mL 3    gabapentin (Neurontin) 800 MG tablet Take 1 tablet by mouth 3 (Three) Times a Day. 270 tablet 1    glucose blood test strip Test 4 times per day. (Patient taking differently: Test 4 times per day.) 100 each 0    hydrOXYzine (ATARAX) 25 MG tablet Take 1 tablet by  "mouth 3 (Three) Times a Day As Needed for Itching. 90 tablet 2    insulin detemir (Levemir) 100 UNIT/ML injection Inject 10 Units under the skin into the appropriate area as directed Every Night. Increase one unit per night goal fasting  2 hours after meals 140 3 mL 12    Insulin Pen Needle (Pen Needles 3/16\") 31G X 5 MM misc Use 1 each Every Night. 100 each 5    levothyroxine (SYNTHROID, LEVOTHROID) 25 MCG tablet TAKE 1 TABLET BY MOUTH DAILY 30 tablet 0    lidocaine (LIDODERM) 5 % Place 3 patches on the skin as directed by provider Daily. Remove & Discard patch within 12 hours or as directed by MD 90 each 5    lisinopril-hydrochlorothiazide (PRINZIDE,ZESTORETIC) 20-12.5 MG per tablet TAKE 2 TABLETS BY MOUTH DAILY 180 tablet 3    metoprolol tartrate (LOPRESSOR) 100 MG tablet Take 1 tablet by mouth Every 12 (Twelve) Hours. 180 tablet 3    OneTouch Delica Lancets 33G misc test 4 times per day (Patient taking differently: test 4 times per day) 100 each 0    spironolactone (Aldactone) 50 MG tablet Take 1 tablet by mouth Daily. 90 tablet 1    traMADol (ULTRAM) 50 MG tablet Take 1 tablet by mouth Every 6 (Six) Hours As Needed for Moderate Pain. 120 tablet 0    traZODone (DESYREL) 100 MG tablet Take 1 tablet by mouth Every Night. (Patient taking differently: Take 0.5 tablets by mouth Every Night.) 90 tablet 1     No current facility-administered medications for this visit.     Review of Systems   Skin:         Ulcer toe   All other systems reviewed and are negative.      OBJECTIVE     Vitals:    11/22/23 0813   BP: 155/80   Pulse: 68   Temp: 98.4 °F (36.9 °C)   SpO2: 96%       WBC   Date Value Ref Range Status   08/23/2023 7.61 3.40 - 10.80 10*3/mm3 Final     RBC   Date Value Ref Range Status   08/23/2023 5.08 4.14 - 5.80 10*6/mm3 Final     Hemoglobin   Date Value Ref Range Status   08/23/2023 15.6 13.0 - 17.7 g/dL Final     Hematocrit   Date Value Ref Range Status   08/23/2023 45.9 37.5 - 51.0 % Final     MCV   Date " Value Ref Range Status   08/23/2023 90.4 79.0 - 97.0 fL Final     MCH   Date Value Ref Range Status   08/23/2023 30.7 26.6 - 33.0 pg Final     MCHC   Date Value Ref Range Status   08/23/2023 34.0 31.5 - 35.7 g/dL Final     RDW   Date Value Ref Range Status   08/23/2023 13.3 12.3 - 15.4 % Final     RDW-SD   Date Value Ref Range Status   08/23/2023 43.1 37.0 - 54.0 fl Final     MPV   Date Value Ref Range Status   08/23/2023 9.8 6.0 - 12.0 fL Final     Platelets   Date Value Ref Range Status   08/23/2023 340 140 - 450 10*3/mm3 Final     Neutrophil %   Date Value Ref Range Status   08/23/2023 54.1 42.7 - 76.0 % Final     Lymphocyte %   Date Value Ref Range Status   08/23/2023 30.4 19.6 - 45.3 % Final     Monocyte %   Date Value Ref Range Status   08/23/2023 12.7 (H) 5.0 - 12.0 % Final     Eosinophil %   Date Value Ref Range Status   08/23/2023 1.1 0.3 - 6.2 % Final     Basophil %   Date Value Ref Range Status   08/23/2023 0.9 0.0 - 1.5 % Final     Immature Grans %   Date Value Ref Range Status   08/23/2023 0.8 (H) 0.0 - 0.5 % Final     Neutrophils, Absolute   Date Value Ref Range Status   08/23/2023 4.12 1.70 - 7.00 10*3/mm3 Final     Lymphocytes, Absolute   Date Value Ref Range Status   08/23/2023 2.31 0.70 - 3.10 10*3/mm3 Final     Monocytes, Absolute   Date Value Ref Range Status   08/23/2023 0.97 (H) 0.10 - 0.90 10*3/mm3 Final     Eosinophils, Absolute   Date Value Ref Range Status   08/23/2023 0.08 0.00 - 0.40 10*3/mm3 Final     Basophils, Absolute   Date Value Ref Range Status   08/23/2023 0.07 0.00 - 0.20 10*3/mm3 Final     Immature Grans, Absolute   Date Value Ref Range Status   08/23/2023 0.06 (H) 0.00 - 0.05 10*3/mm3 Final     nRBC   Date Value Ref Range Status   08/23/2023 0.0 0.0 - 0.2 /100 WBC Final         Lab Results   Component Value Date    GLUCOSE 252 (H) 10/26/2023    BUN 9 10/26/2023    CREATININE 0.87 10/26/2023    EGFRIFNONA 77 06/17/2021    BCR 10.3 10/26/2023    K 4.1 10/26/2023    CO2 24.3  10/26/2023    CALCIUM 9.6 10/26/2023    ALBUMIN 4.2 2023    LABIL2 0.9 (L) 10/16/2020    AST 12 2023    ALT 14 2023       Patient seen in no apparent distress.      PHYSICAL EXAM:     Foot/Ankle Exam    GENERAL  Appearance:  appears stated age  Orientation:  AAOx3  Affect:  appropriate  Gait:  unimpaired  Assistance:  independent  Right shoe gear: casual shoe  Left shoe gear: casual shoe    VASCULAR     Right Foot Vascularity   Dorsalis pedis:  1+  Posterior tibial:  1+  Skin temperature:  cool  Edema gradin+ and non-pitting  CFT:  < 3 seconds  Pedal hair growth:  Absent  Varicosities:  none     Left Foot Vascularity   Dorsalis pedis:  1+  Posterior tibial:  1+  Skin temperature:  cool  Edema gradin+ and non-pitting  CFT:  < 3 seconds  Pedal hair growth:  Absent  Varicosities:  none     NEUROLOGIC     Right Foot Neurologic   Light touch sensation: absent  Vibratory sensation: absent  Hot/Cold sensation: absent     Left Foot Neurologic   Light touch sensation: absent  Vibratory sensation: absent  Hot/Cold sensation:  absent    MUSCLE STRENGTH     Right Foot Muscle Strength   Foot dorsiflexion:  4  Foot plantar flexion:  4  Foot inversion:  4  Foot eversion:  4     Left Foot Muscle Strength   Foot dorsiflexion:  4  Foot plantar flexion:  4  Foot inversion:  4  Foot eversion:  4    RANGE OF MOTION     Right Foot Range of Motion   Foot and ankle ROM within normal limits       Left Foot Range of Motion   Foot and ankle ROM within normal limits      DERMATOLOGIC      Right Foot Dermatologic   Skin  Right foot skin is intact.      Left Foot Dermatologic   Skin  Left foot skin is intact.      Right foot additional comments: Dry gangrene to distal aspect of right great toe.  No erythema no malodor no drainage.      RADIOLOGY:        Cardiac Catheterization/Vascular Study    Result Date: 10/26/2023  Narrative: Procedure Report Patient Name:  Jd Velazquez YOB: 1965 Date of  Surgery:  10/26/2023  Indications: Nonhealing right foot wound Pre-op Diagnosis: Nonhealing right foot wound    Post-op Diagnosis: Same Surgeon: Robert Puga MD, FACS Procedure: Ultrasound-guided left femoral access, placement of catheter into aorta, right lower extremity angiogram, right anterior tibial artery select, percutaneous transluminal angioplasty of the right anterior tibialis, left femoral angiogram.  Mynx.  Supervised and monitored conscious sedation 74 minutes. Anesthesia: Moderate sedation Estimated Blood Loss: 10 mL Specimen: None   Findings: The right common femoral and profundofemoral arteries are patent and without significant stenosis.  The superficial femoral artery is patent with mild to moderate diffuse irregularities but no significant stenosis.  The popliteal artery is patent.  All tibials completely occluded immediately following the end of the popliteal artery.  There is reconstitution of the anterior tibialis and peroneal in the mid leg.  Moderate flow into the foot.  The anterior tibialis was selected and recanalized and following percutaneous transluminal angioplasty an excellent result was obtained with only mild residual stenosis at the arch.  A left femoral angiogram demonstrates that the patient is a candidate for use of a closure device.  Upon completion of the procedure a Mynx device was deployed obtaining satisfactory hemostasis.  The patient tolerated the procedure well. Complications: None Description of Procedure: The patient was brought into the Cath Lab and was placed in the supine position.  He was then given intravenous sedation.  He was then prepped and draped in the usual aseptic fashion over both groins.  A timeout was taken to confirm patient and procedure.  Local anesthesia was then administered at the projected site of access.  Using ultrasound guidance a micropuncture system was used to gain access into the left common femoral artery.  This was exchanged over a  Bentson guidewire for a 5 Qatari sheath.  An Omni Flush catheter was then advanced into the infrarenal aorta and used to select the right common iliac artery and advanced down to the right common femoral artery.  A right lower extremity runoff was obtained.  Once the above disease was identified the decision was made to intervene.  The patient was given systemic anticoagulation.  The existing catheter and sheath were removed over a Bentson guidewire and exchanged  for a 6 Fr 55 cm long flexor sheath which was advanced into the mid right superficial femoral artery.  A stiff exchange length angled Glidewire was then using combination with a 135 cm angled Navicross catheter to engage the occluded vessels, select the anterior tibialis and cross the occlusion.  Once in the mid distal anterior tibialis a selective angiogram was obtained demonstrating an intraluminal location.  The wire was replaced with a 300 cm long V14 wire and the level of occlusion angioplastied to 8 ash using a 3 mm x 210 mm balloon.  It was left inflated for 5 minutes.  It was then deflated and removed.  Repeat angiography demonstrated significant improvement with 2 areas of moderate residual stenosis in the mid proximal anterior tibialis and at the arch.  The decision was made to use a slightly larger balloon and a tapered 3.5 x 4 mm 210 mm long balloon brought to the lesion and the entire length of the lesion again angioplastied this time to 6 ash for 5 minutes.  The balloon was then deflated and removed.  Excellent results were obtained with only a mild irregularity at the arch.  The decision was made to treat this lesion with a drug-coated balloon.  A 4 mm x 4 cm In.pact balloon was then brought to the level of the lesion and inflated to 14 ash for 5 minutes.  The balloon was then deflated and removed.  Repeat angiography demonstrated excellent results with no significant residual stenosis and no change in the outflow.  The decision was made to  terminate the procedure.  The guidewire was removed and the destination sheath withdrawn to the left external iliac artery.  Imaging revealed that the patient was a satisfactory candidate for use of a closure device.  The left groin and sheath were reprepped and a 6 [place]bourger Mynx device used to obtain hemostasis.  Hemostasis appeared satisfactory.  The patient tolerated the procedure well. Robert Puga MD Date: 10/26/2023  Time: 09:56 EDT      ASSESSMENT/PLAN     Diagnoses and all orders for this visit:    1. Peripheral vascular disease (Primary)    2. Uncontrolled type 2 diabetes mellitus with hyperglycemia    3. Dry gangrene        Patient with dry gangrene to distal tip of the right great toe.  No acute signs of infection. Discussed possible amputation of the right great toe pending blood flow.  Patient has follow-up appointment on 12/4.  We will have him follow-up in 1-2 weeks.  Betadine daily to the toe.  Any worsening symptoms or signs of infection patient to go to the emergency department.    Comprehensive lower extremity examination and evaluation was performed.    Discussed findings and treatment plan including risks, benefits, and treatment options with patient in detail. Patient agreed with treatment plan.    Medications and allergies reviewed.  Reviewed available lab values along with other pertinent labs.  These were discussed with the patient.    An After Visit Summary was printed and given to the patient at discharge, including (if requested) any available informative/educational handouts regarding diagnosis, treatment, or medications. All questions were answered to patient/family satisfaction. Should symptoms fail to improve or worsen they agree to call or return to clinic or to go to the Emergency Department. Discussed the importance of following up with any needed screening tests/labs/specialist appointments and any requested follow-up recommended by me today. Importance of maintaining follow-up  discussed and patient accepts that missed appointments can delay diagnosis and potentially lead to worsening of conditions.    No follow-ups on file., or sooner if acute issues arise.    This document has been electronically signed by Balwinder Costa DPM on November 22, 2023 08:30 EST

## 2023-11-27 RX ORDER — EVOLOCUMAB 140 MG/ML
INJECTION, SOLUTION SUBCUTANEOUS
Qty: 2 ML | Refills: 3 | Status: SHIPPED | OUTPATIENT
Start: 2023-11-27

## 2023-11-29 RX ORDER — METFORMIN HYDROCHLORIDE 500 MG/1
TABLET, EXTENDED RELEASE ORAL
COMMUNITY
Start: 2023-11-25

## 2023-11-29 RX ORDER — AMANTADINE HYDROCHLORIDE 100 MG/1
TABLET ORAL
COMMUNITY

## 2023-11-29 RX ORDER — AMLODIPINE BESYLATE 5 MG/1
TABLET ORAL
COMMUNITY

## 2023-11-30 ENCOUNTER — TELEPHONE (OUTPATIENT)
Dept: FAMILY MEDICINE CLINIC | Facility: CLINIC | Age: 58
End: 2023-11-30

## 2023-11-30 ENCOUNTER — OFFICE VISIT (OUTPATIENT)
Dept: FAMILY MEDICINE CLINIC | Facility: CLINIC | Age: 58
End: 2023-11-30
Payer: COMMERCIAL

## 2023-11-30 VITALS
OXYGEN SATURATION: 95 % | HEIGHT: 72 IN | SYSTOLIC BLOOD PRESSURE: 127 MMHG | BODY MASS INDEX: 33.46 KG/M2 | TEMPERATURE: 97.4 F | HEART RATE: 74 BPM | WEIGHT: 247 LBS | DIASTOLIC BLOOD PRESSURE: 72 MMHG

## 2023-11-30 DIAGNOSIS — E66.09 CLASS 1 OBESITY DUE TO EXCESS CALORIES WITH SERIOUS COMORBIDITY AND BODY MASS INDEX (BMI) OF 32.0 TO 32.9 IN ADULT: ICD-10-CM

## 2023-11-30 DIAGNOSIS — I25.10 CORONARY ARTERY DISEASE INVOLVING NATIVE CORONARY ARTERY OF NATIVE HEART WITHOUT ANGINA PECTORIS: ICD-10-CM

## 2023-11-30 DIAGNOSIS — I10 PRIMARY HYPERTENSION: ICD-10-CM

## 2023-11-30 DIAGNOSIS — E87.1 HYPONATREMIA: ICD-10-CM

## 2023-11-30 DIAGNOSIS — Z95.1 S/P CABG X 4: ICD-10-CM

## 2023-11-30 DIAGNOSIS — E78.5 HYPERLIPIDEMIA LDL GOAL <70: ICD-10-CM

## 2023-11-30 DIAGNOSIS — R60.9 EDEMA, UNSPECIFIED TYPE: ICD-10-CM

## 2023-11-30 DIAGNOSIS — E11.65 TYPE 2 DIABETES MELLITUS WITH HYPERGLYCEMIA, WITHOUT LONG-TERM CURRENT USE OF INSULIN: Primary | ICD-10-CM

## 2023-11-30 DIAGNOSIS — G47.09 OTHER INSOMNIA: ICD-10-CM

## 2023-11-30 DIAGNOSIS — E03.9 ACQUIRED HYPOTHYROIDISM: ICD-10-CM

## 2023-11-30 DIAGNOSIS — G62.9 NEUROPATHY: ICD-10-CM

## 2023-11-30 DIAGNOSIS — Z72.0 TOBACCO ABUSE: ICD-10-CM

## 2023-11-30 LAB
ALBUMIN SERPL-MCNC: 4.2 G/DL (ref 3.5–5.2)
ALBUMIN/GLOB SERPL: 1.3 G/DL
ALP SERPL-CCNC: 120 U/L (ref 39–117)
ALT SERPL W P-5'-P-CCNC: 6 U/L (ref 1–41)
ANION GAP SERPL CALCULATED.3IONS-SCNC: 14.6 MMOL/L (ref 5–15)
AST SERPL-CCNC: 6 U/L (ref 1–40)
BASOPHILS # BLD AUTO: 0.07 10*3/MM3 (ref 0–0.2)
BASOPHILS NFR BLD AUTO: 0.8 % (ref 0–1.5)
BILIRUB SERPL-MCNC: 0.3 MG/DL (ref 0–1.2)
BUN SERPL-MCNC: 9 MG/DL (ref 6–20)
BUN/CREAT SERPL: 9.3 (ref 7–25)
CALCIUM SPEC-SCNC: 9.9 MG/DL (ref 8.6–10.5)
CHLORIDE SERPL-SCNC: 97 MMOL/L (ref 98–107)
CHOLEST SERPL-MCNC: 205 MG/DL (ref 0–200)
CO2 SERPL-SCNC: 23.4 MMOL/L (ref 22–29)
CREAT SERPL-MCNC: 0.97 MG/DL (ref 0.76–1.27)
DEPRECATED RDW RBC AUTO: 45.3 FL (ref 37–54)
EGFRCR SERPLBLD CKD-EPI 2021: 91.1 ML/MIN/1.73
EOSINOPHIL # BLD AUTO: 0.08 10*3/MM3 (ref 0–0.4)
EOSINOPHIL NFR BLD AUTO: 1 % (ref 0.3–6.2)
ERYTHROCYTE [DISTWIDTH] IN BLOOD BY AUTOMATED COUNT: 13.4 % (ref 12.3–15.4)
GLOBULIN UR ELPH-MCNC: 3.3 GM/DL
GLUCOSE SERPL-MCNC: 223 MG/DL (ref 65–99)
HBA1C MFR BLD: 10.7 % (ref 4.8–5.6)
HCT VFR BLD AUTO: 45.2 % (ref 37.5–51)
HDLC SERPL-MCNC: 26 MG/DL (ref 40–60)
HGB BLD-MCNC: 15 G/DL (ref 13–17.7)
IMM GRANULOCYTES # BLD AUTO: 0.06 10*3/MM3 (ref 0–0.05)
IMM GRANULOCYTES NFR BLD AUTO: 0.7 % (ref 0–0.5)
LDLC SERPL CALC-MCNC: 104 MG/DL (ref 0–100)
LDLC/HDLC SERPL: 3.51 {RATIO}
LYMPHOCYTES # BLD AUTO: 2.84 10*3/MM3 (ref 0.7–3.1)
LYMPHOCYTES NFR BLD AUTO: 33.8 % (ref 19.6–45.3)
MCH RBC QN AUTO: 30.7 PG (ref 26.6–33)
MCHC RBC AUTO-ENTMCNC: 33.2 G/DL (ref 31.5–35.7)
MCV RBC AUTO: 92.4 FL (ref 79–97)
MONOCYTES # BLD AUTO: 0.84 10*3/MM3 (ref 0.1–0.9)
MONOCYTES NFR BLD AUTO: 10 % (ref 5–12)
NEUTROPHILS NFR BLD AUTO: 4.52 10*3/MM3 (ref 1.7–7)
NEUTROPHILS NFR BLD AUTO: 53.7 % (ref 42.7–76)
NRBC BLD AUTO-RTO: 0 /100 WBC (ref 0–0.2)
PLATELET # BLD AUTO: 429 10*3/MM3 (ref 140–450)
PMV BLD AUTO: 9.4 FL (ref 6–12)
POTASSIUM SERPL-SCNC: 4.4 MMOL/L (ref 3.5–5.2)
PROT SERPL-MCNC: 7.5 G/DL (ref 6–8.5)
RBC # BLD AUTO: 4.89 10*6/MM3 (ref 4.14–5.8)
SODIUM SERPL-SCNC: 135 MMOL/L (ref 136–145)
TRIGL SERPL-MCNC: 439 MG/DL (ref 0–150)
TSH SERPL DL<=0.05 MIU/L-ACNC: 4.78 UIU/ML (ref 0.27–4.2)
VLDLC SERPL-MCNC: 75 MG/DL (ref 5–40)
WBC NRBC COR # BLD AUTO: 8.41 10*3/MM3 (ref 3.4–10.8)

## 2023-11-30 PROCEDURE — 80061 LIPID PANEL: CPT | Performed by: INTERNAL MEDICINE

## 2023-11-30 PROCEDURE — 83036 HEMOGLOBIN GLYCOSYLATED A1C: CPT | Performed by: NURSE PRACTITIONER

## 2023-11-30 PROCEDURE — 80050 GENERAL HEALTH PANEL: CPT | Performed by: NURSE PRACTITIONER

## 2023-11-30 RX ORDER — LANCETS 33 GAUGE
EACH MISCELLANEOUS
Qty: 100 EACH | Refills: 0 | Status: SHIPPED | OUTPATIENT
Start: 2023-11-30

## 2023-11-30 RX ORDER — SUVOREXANT 20 MG/1
20 TABLET, FILM COATED ORAL NIGHTLY PRN
Qty: 90 TABLET | Refills: 0 | Status: SHIPPED | OUTPATIENT
Start: 2023-11-30 | End: 2023-11-30 | Stop reason: CLARIF

## 2023-11-30 RX ORDER — LEVOTHYROXINE SODIUM 0.03 MG/1
25 TABLET ORAL DAILY
Qty: 90 TABLET | Refills: 1 | Status: SHIPPED | OUTPATIENT
Start: 2023-11-30

## 2023-11-30 RX ORDER — SPIRONOLACTONE 50 MG/1
50 TABLET, FILM COATED ORAL DAILY
Qty: 90 TABLET | Refills: 1 | Status: SHIPPED | OUTPATIENT
Start: 2023-11-30

## 2023-11-30 RX ORDER — CETIRIZINE HYDROCHLORIDE 10 MG/1
10 TABLET ORAL NIGHTLY
Qty: 90 TABLET | Refills: 1 | Status: SHIPPED | OUTPATIENT
Start: 2023-11-30

## 2023-11-30 RX ORDER — PREGABALIN 225 MG/1
225 CAPSULE ORAL 2 TIMES DAILY
Qty: 180 CAPSULE | Refills: 1 | Status: SHIPPED | OUTPATIENT
Start: 2023-11-30

## 2023-11-30 RX ORDER — TRAZODONE HYDROCHLORIDE 100 MG/1
100 TABLET ORAL NIGHTLY
Qty: 90 TABLET | Refills: 1 | Status: CANCELLED | OUTPATIENT
Start: 2023-11-30

## 2023-11-30 NOTE — TELEPHONE ENCOUNTER
Spoke to patient and he is ok with trying Restoril. I told him to let us know in 30 days if he feels like it is working or not. If not we can try to do the Belsomra prior auth again.

## 2023-11-30 NOTE — PROGRESS NOTES
Follow Up Office Visit      Patient Name: Jd Velazquez  : 1965   MRN: [id number]     Chief Complaint:    Chief Complaint   Patient presents with    Diabetes    Hyperlipidemia    Hypertension    Peripheral Neuropathy    Insomnia    Coronary Artery Disease    Hypothyroidism       History of Present Illness: Jd Velazquez is a 58 y.o. male who is here today to follow up for DM2, HTN hyperlipidemia, CAD, insomnia, allergic rhinitis, hypothyroidism, and neuropathy    Follow up increase trazodone to 100 mg no improvement in sleep, has tried ambien   Using CPAP machine nightly     Also follow up  increase gabapentin to 800  mg daily, no improvement in neuropathic pain     Currently attending pain management for chronic low back pain, tried amitriptyline, states this medication does nothing     Eye exam- 2022 [place] [place]  Last eye exam a few years prior   Glucose readings, not recently checked states his  test strips    Foot exam-  dr young 10/4/23  psa- 2023  A1C- 2023  Colonoscopy- none, referral placed     Follow up vascular surgery dr edwards 10/26/2023 significant tibioperoneal disease based on a CTA.  angiography with possible endovascular intervention         Subjective      Review of Systems:   Review of Systems   Constitutional:  Negative for fever.   HENT:  Negative for ear pain and sore throat.    Eyes:  Negative for visual disturbance.   Respiratory:  Negative for cough.    Cardiovascular:  Negative for chest pain and palpitations.   Gastrointestinal:  Negative for abdominal pain, diarrhea, nausea and vomiting.   Genitourinary:  Negative for dysuria.   Neurological:  Negative for headaches.   Psychiatric/Behavioral:  Positive for sleep disturbance.         Past Medical History:   Past Medical History:   Diagnosis Date    Allergic rhinitis 2015    Benign essential hypertension 2016    CAD (coronary artery disease)     Diabetes mellitus     Foot ulcer      Hyperlipidemia     Hypertension     Hypokalemia 02/23/2016    Hypothyroidism 04/24/2014    Insomnia, unspecified 06/15/2016    Microalbuminuria due to type 2 diabetes mellitus 10/15/2015    Mixed hyperlipidemia 10/15/2015    Myocardial infarction     Obesity        Past Surgical History:   Past Surgical History:   Procedure Laterality Date    ANKLE ARTHROSCOPY W/ OPEN REPAIR      APPENDECTOMY      CARDIAC CATHETERIZATION  2014    PCI to circumflex    CARDIAC CATHETERIZATION Right 10/26/2023    Procedure: Aortogram with right leg angiogram, possible angioplasty or stenting;  Surgeon: Robert Puga MD;  Location: [facility] CATH INVASIVE LOCATION;  Service: Vascular;  Laterality: Right;    CORONARY ARTERY BYPASS GRAFT N/A 10/8/2020    Procedure: STERNOTOMY, CORONARY ARTERY BYPASS GRAFTING TIME 4 WITH LEFT KELSI  AND ENDOSCOPICALLY HARVESTED LEFT GREATER SAPHENOUS VEIN AND PRP.;  Surgeon: Jr Girma Chiu MD;  Location: [facility] MAIN OR;  Service: Cardiothoracic;  Laterality: N/A;    HEEL SPUR SURGERY      HERNIA REPAIR      REPLACEMENT TOTAL KNEE BILATERAL      TONSILLECTOMY         Family History:   Family History   Problem Relation Age of Onset    Heart disease Other        Social History:   Social History     Socioeconomic History    Marital status:    Tobacco Use    Smoking status: Every Day     Packs/day: .5     Types: Cigarettes    Smokeless tobacco: Never   Vaping Use    Vaping Use: Never used   Substance and Sexual Activity    Alcohol use: Never    Drug use: Never    Sexual activity: Defer       Medications:     Current Outpatient Medications:     amantadine (SYMMETREL) 100 MG tablet, , Disp: , Rfl:     amLODIPine (NORVASC) 5 MG tablet, Take 1 tablet every day by oral route for 30 days., Disp: , Rfl:     aspirin 81 MG chewable tablet, Chew 1 tablet Daily., Disp: , Rfl:     cetirizine (zyrTEC) 10 MG tablet, Take 1 tablet by mouth Every Night., Disp: 90 tablet, Rfl: 1    clobetasol (TEMOVATE) 0.05 % cream,  "Apply 1 application  topically to the appropriate area as directed 2 (Two) Times a Day., Disp: 60 g, Rfl: 1    Dulaglutide (Trulicity) 3 MG/0.5ML solution pen-injector, Inject 0.5 mL under the skin into the appropriate area as directed 1 (One) Time Per Week., Disp: 2 mL, Rfl: 6    EPINEPHrine (EPIPEN) 0.3 MG/0.3ML solution auto-injector injection, 0.3 mL., Disp: , Rfl:     glucose blood test strip, Test 4 times per day. (Patient taking differently: Test 4 times per day.), Disp: 100 each, Rfl: 0    hydrOXYzine (ATARAX) 25 MG tablet, Take 1 tablet by mouth 3 (Three) Times a Day As Needed for Itching., Disp: 90 tablet, Rfl: 2    insulin detemir (Levemir) 100 UNIT/ML injection, Inject 10 Units under the skin into the appropriate area as directed Every Night. Increase one unit per night goal fasting  2 hours after meals 140, Disp: 3 mL, Rfl: 12    Insulin Pen Needle (Pen Needles 3/16\") 31G X 5 MM misc, Use 1 each Every Night., Disp: 100 each, Rfl: 5    levothyroxine (SYNTHROID, LEVOTHROID) 25 MCG tablet, Take 1 tablet by mouth Daily., Disp: 90 tablet, Rfl: 1    lidocaine (LIDODERM) 5 %, Place 3 patches on the skin as directed by provider Daily. Remove & Discard patch within 12 hours or as directed by MD, Disp: 90 each, Rfl: 5    lisinopril-hydrochlorothiazide (PRINZIDE,ZESTORETIC) 20-12.5 MG per tablet, TAKE 2 TABLETS BY MOUTH DAILY, Disp: 180 tablet, Rfl: 3    metFORMIN ER (GLUCOPHAGE-XR) 500 MG 24 hr tablet, , Disp: , Rfl:     metoprolol tartrate (LOPRESSOR) 100 MG tablet, Take 1 tablet by mouth Every 12 (Twelve) Hours., Disp: 180 tablet, Rfl: 3    OneTouch Delica Lancets 33G misc, test 4 times per day, Disp: 100 each, Rfl: 0    Repatha SureClick solution auto-injector SureClick injection, INJECT 1ML UNDER THE SKIN INTO THE APPROPRIATE AREA AS DIRECTED EVERY 14 DAYS, Disp: 2 mL, Rfl: 3    spironolactone (Aldactone) 50 MG tablet, Take 1 tablet by mouth Daily., Disp: 90 tablet, Rfl: 1    traMADol (ULTRAM) 50 MG " "tablet, Take 1 tablet by mouth Every 6 (Six) Hours As Needed for Moderate Pain., Disp: 120 tablet, Rfl: 0    pregabalin (Lyrica) 225 MG capsule, Take 1 capsule by mouth 2 (Two) Times a Day., Disp: 180 capsule, Rfl: 1    Suvorexant (Belsomra) 20 MG tablet, Take 20 mg by mouth At Night As Needed (insomnia)., Disp: 90 tablet, Rfl: 0    Allergies:   Allergies   Allergen Reactions    Lortab [Hydrocodone-Acetaminophen] Itching               Objective     Physical Exam:  Vital Signs:   Vitals:    11/30/23 0846   BP: 127/72   Pulse: 74   Temp: 97.4 °F (36.3 °C)   SpO2: 95%   Weight: 112 kg (247 lb)   Height: 182.9 cm (72\")     Body mass index is 33.5 kg/m².           Physical Exam  HENT:      Right Ear: Tympanic membrane normal.      Left Ear: Tympanic membrane normal.      Nose: Nose normal.      Mouth/Throat:      Mouth: Mucous membranes are moist.   Eyes:      Conjunctiva/sclera: Conjunctivae normal.   Neck:      Vascular: No carotid bruit.   Cardiovascular:      Rate and Rhythm: Normal rate and regular rhythm.      Heart sounds: Normal heart sounds. No murmur heard.  Pulmonary:      Effort: Pulmonary effort is normal.      Breath sounds: Normal breath sounds.   Abdominal:      General: Bowel sounds are normal.      Palpations: Abdomen is soft.   Musculoskeletal:      Right lower leg: No edema.      Left lower leg: No edema.   Skin:     General: Skin is warm and dry.   Neurological:      Mental Status: He is alert.   Psychiatric:         Mood and Affect: Mood normal.         Behavior: Behavior normal.             Assessment / Plan      Assessment/Plan:   Diagnoses and all orders for this visit:    1. Type 2 diabetes mellitus with hyperglycemia, without long-term current use of insulin (Primary)  -     Ambulatory Referral for Diabetic Eye Exam-Ophthalmology  -     CBC Auto Differential  -     Cancel: Comprehensive Metabolic Panel  -     Hemoglobin A1c  -     TSH    2. Primary hypertension    3. Hyperlipidemia LDL goal " <70  -     Cancel: Lipid Panel  -     Comprehensive Metabolic Panel  -     Cancel: Comprehensive Metabolic Panel  -     Cancel: Lipid Panel    4. Coronary artery disease involving native coronary artery of native heart without angina pectoris  -     Lipid Panel    5. S/P CABG x 4    6. Acquired hypothyroidism    7. Class 1 obesity due to excess calories with serious comorbidity and body mass index (BMI) of 32.0 to 32.9 in adult    8. Neuropathy  -     pregabalin (Lyrica) 225 MG capsule; Take 1 capsule by mouth 2 (Two) Times a Day.  Dispense: 180 capsule; Refill: 1    9. Other insomnia  -     Suvorexant (Belsomra) 20 MG tablet; Take 20 mg by mouth At Night As Needed (insomnia).  Dispense: 90 tablet; Refill: 0    10. Edema, unspecified type    11. Tobacco abuse    12. Hyponatremia  -     Cancel: Comprehensive metabolic panel    Other orders  -     cetirizine (zyrTEC) 10 MG tablet; Take 1 tablet by mouth Every Night.  Dispense: 90 tablet; Refill: 1  -     spironolactone (Aldactone) 50 MG tablet; Take 1 tablet by mouth Daily.  Dispense: 90 tablet; Refill: 1  -     OneTouch Delica Lancets 33G misc; test 4 times per day  Dispense: 100 each; Refill: 0  -     levothyroxine (SYNTHROID, LEVOTHROID) 25 MCG tablet; Take 1 tablet by mouth Daily.  Dispense: 90 tablet; Refill: 1         Diabetes mellitus type 2 obtain hemoglobin A1c to monitor provide a referral for diabetic eye exam we will call with results and further recommendation as patient has not been checking his blood sugars at home as recommended  Hypertension currently controlled at this time lisinopril-hydrochlorothiazide and Norvasc  Hyperlipidemia with LDL goal below 70 with coronary artery disease is post CABG will obtain lipid panel to monitor currently on Repatha  Hypothyroidism we will obtain labs to monitor current Synthroid dose 25 mcg daily denies palpitations  Neuropathy uncontrolled with gabapentin 800 mg 3 times daily we will change to Lyrica 225 mg twice  "daily urine drug screen obtained [facility]ALA reviewed we will follow-up in 90 days  Insomnia uncontrolled with trazodone and Ambien recommend good sleep hygiene patient reports compliance with the CPAP machine we will try Belsomra  Edema currently controlled with spironolactone will obtain CMP to monitor electrolyte levels and renal function      Follow Up:   Return in about 3 months (around 2/29/2024).    Nyasia Newsome, APRN    \"Please note that portions of this note were completed with a voice recognition program.\"    "

## 2023-12-01 DIAGNOSIS — G47.09 OTHER INSOMNIA: Primary | ICD-10-CM

## 2023-12-01 RX ORDER — TEMAZEPAM 30 MG/1
30 CAPSULE ORAL NIGHTLY PRN
Qty: 30 CAPSULE | Refills: 0 | Status: SHIPPED | OUTPATIENT
Start: 2023-12-01

## 2023-12-04 ENCOUNTER — HOSPITAL ENCOUNTER (OUTPATIENT)
Dept: CARDIOLOGY | Facility: HOSPITAL | Age: 58
Discharge: HOME | End: 2023-12-04
Admitting: NURSE PRACTITIONER
Payer: COMMERCIAL

## 2023-12-04 DIAGNOSIS — E11.65 TYPE 2 DIABETES MELLITUS WITH HYPERGLYCEMIA, WITHOUT LONG-TERM CURRENT USE OF INSULIN: Primary | ICD-10-CM

## 2023-12-04 DIAGNOSIS — I70.25 ATHEROSCLEROSIS OF NATIVE ARTERIES OF THE EXTREMITIES WITH ULCERATION: ICD-10-CM

## 2023-12-04 LAB
BH CV GRAFT BRACHIAL PRESSURE LEFT: 164 MMHG
BH CV GRAFT BRACHIAL PRESSURE RIGHT: 149 MMHG
BH CV LEA LEFT DPA PRESSURE: 171 MMHG
BH CV LEA LEFT PTA PRESSURE: 155 MMHG
BH CV LEA RIGHT ANT TIBIAL A MID EDV: 15.4 CM/S
BH CV LEA RIGHT ANT TIBIAL A MID PSV: 76.4 CM/S
BH CV LEA RIGHT ANT TIBIAL A PROX EDV: 54.9 CM/S
BH CV LEA RIGHT ANT TIBIAL A PROX PSV: 535.1 CM/S
BH CV LEA RIGHT CFA DISTAL EDV: 6 CM/S
BH CV LEA RIGHT CFA DISTAL PSV: 120 CM/S
BH CV LEA RIGHT CFA PROX EDV: 6 CM/S
BH CV LEA RIGHT CFA PROX PSV: 132 CM/S
BH CV LEA RIGHT DFA PROX EDV: 6 CM/S
BH CV LEA RIGHT DFA PROX PSV: 119 CM/S
BH CV LEA RIGHT DPA PRESSURE: 78 MMHG
BH CV LEA RIGHT PERONEAL  MID EDV: 5.2 CM/S
BH CV LEA RIGHT PERONEAL  MID PSV: 11.3 CM/S
BH CV LEA RIGHT PERONEAL  PROX EDV: -8.8 CM/S
BH CV LEA RIGHT PERONEAL  PROX PSV: -36.9 CM/S
BH CV LEA RIGHT POPITEAL A  DISTAL EDV: 2.2 CM/S
BH CV LEA RIGHT POPITEAL A  DISTAL PSV: 24.5 CM/S
BH CV LEA RIGHT POPITEAL A  MID EDV: 4 CM/S
BH CV LEA RIGHT POPITEAL A  MID PSV: 27 CM/S
BH CV LEA RIGHT POPITEAL A  PROX EDV: 1.57 CM/S
BH CV LEA RIGHT POPITEAL A  PROX PSV: 32.3 CM/S
BH CV LEA RIGHT PTA DISTAL EDV: 5.9 CM/S
BH CV LEA RIGHT PTA DISTAL PSV: 18 CM/S
BH CV LEA RIGHT PTA MID EDV: 5.2 CM/S
BH CV LEA RIGHT PTA MID PSV: 15.8 CM/S
BH CV LEA RIGHT PTA PRESSURE: 90 MMHG
BH CV LEA RIGHT PTA PROX EDV: 5 CM/S
BH CV LEA RIGHT PTA PROX PSV: 11.5 CM/S
BH CV LEA RIGHT SFA DISTAL EDV: -6.9 CM/S
BH CV LEA RIGHT SFA DISTAL PSV: -103.2 CM/S
BH CV LEA RIGHT SFA MID EDV: -2.9 CM/S
BH CV LEA RIGHT SFA MID PSV: -78.1 CM/S
BH CV LEA RIGHT SFA PROX EDV: 3.9 CM/S
BH CV LEA RIGHT SFA PROX PSV: 59.5 CM/S
BH CV LEA RIGHT TIBEOPERONEAL EDV: 2 CM/S
BH CV LEA RIGHT TIBEOPERONEAL PSV: 16 CM/S
BH CV LOWER ARTERIAL LEFT ABI RATIO: 1.04
BH CV LOWER ARTERIAL RIGHT ABI RATIO: 0.55
RIGHT GROIN CFA SYS: 120.5 CM/SEC

## 2023-12-04 PROCEDURE — 93926 LOWER EXTREMITY STUDY: CPT

## 2023-12-04 PROCEDURE — 93926 LOWER EXTREMITY STUDY: CPT | Performed by: SURGERY

## 2023-12-06 ENCOUNTER — OFFICE VISIT (OUTPATIENT)
Dept: PODIATRY | Facility: CLINIC | Age: 58
End: 2023-12-06
Payer: COMMERCIAL

## 2023-12-06 ENCOUNTER — OFFICE VISIT (OUTPATIENT)
Dept: VASCULAR SURGERY | Facility: HOSPITAL | Age: 58
End: 2023-12-06
Payer: COMMERCIAL

## 2023-12-06 VITALS
TEMPERATURE: 97 F | RESPIRATION RATE: 16 BRPM | OXYGEN SATURATION: 97 % | DIASTOLIC BLOOD PRESSURE: 80 MMHG | HEART RATE: 109 BPM | SYSTOLIC BLOOD PRESSURE: 160 MMHG

## 2023-12-06 VITALS
DIASTOLIC BLOOD PRESSURE: 103 MMHG | OXYGEN SATURATION: 96 % | WEIGHT: 242 LBS | TEMPERATURE: 98.4 F | SYSTOLIC BLOOD PRESSURE: 173 MMHG | BODY MASS INDEX: 32.82 KG/M2 | HEART RATE: 100 BPM

## 2023-12-06 DIAGNOSIS — I96 DRY GANGRENE: ICD-10-CM

## 2023-12-06 DIAGNOSIS — I70.269 ATHEROSCLEROSIS OF NATIVE ARTERY OF LOWER EXTREMITY WITH GANGRENE, UNSPECIFIED LATERALITY: Primary | ICD-10-CM

## 2023-12-06 DIAGNOSIS — E11.65 UNCONTROLLED TYPE 2 DIABETES MELLITUS WITH HYPERGLYCEMIA: ICD-10-CM

## 2023-12-06 DIAGNOSIS — I73.9 PERIPHERAL VASCULAR DISEASE: Primary | ICD-10-CM

## 2023-12-06 PROCEDURE — 99214 OFFICE O/P EST MOD 30 MIN: CPT | Performed by: SURGERY

## 2023-12-06 PROCEDURE — G0463 HOSPITAL OUTPT CLINIC VISIT: HCPCS | Performed by: SURGERY

## 2023-12-06 RX ORDER — CEFAZOLIN SODIUM 2 G/100ML
2000 INJECTION, SOLUTION INTRAVENOUS ONCE
OUTPATIENT
Start: 2023-12-06 | End: 2023-12-06

## 2023-12-06 NOTE — PROGRESS NOTES
[facility]   Follow up Office    Patient Name: Jd Velazquez  : 1965  MRN: [id number]  Primary Care Physician:  Dacia Newsome APRN      Subjective   Subjective     HPI:    Jd Velazquez is a 58 y.o. male here for follow-up after angiography with endovascular intervention of the right lower extremity.  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  Podiatry has discussed the possibility of amputation.      Objective     Vitals:   Temp:  [97 °F (36.1 °C)] 97 °F (36.1 °C)  Heart Rate:  [109] 109  Resp:  [16] 16  BP: (160)/(80) 160/80    Physical Exam      General: Alert, no acute distress.  HEENT: PERRLA  Abdomen: Benign  Extremities: Symmetric.  Neuro: No gross deficits    Diagnostic studies: A duplex in the office today demonstrates a patent right anterior tibialis however there is a significantly elevated velocity at the origin up to 535 cm/s.  The right ROXANA is 0.55, the left ROXANA is 1.    Assessment & Plan   Assessment / Plan     Diagnoses and all orders for this visit:    1. 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 Velazquez had a good result after angiography with endovascular intervention after recanalization of the right anterior tibialis.  The duplex however demonstrates significant restenosis at the origin.  I am recommending that we proceed to repeat angiography with plans for likely the use of a drug-coated  balloon.  I have discussed with the patient in detail the mechanics of the procedure, the indications, benefits, risks, alternatives as well as potential complications to include but not limited to infection, bleeding, inability to cross the occlusion, vascular injury requiring surgical repair.  He appears to understand and desires to proceed.        Electronically signed by Robert Puga MD, 12/06/23, 1:37 PM EST.

## 2023-12-07 NOTE — PROGRESS NOTES
[facility] - PODIATRY    Today's Date: 12/07/23    Patient Name: Jd Velazquez  MRN: [id number]  CSN: [id number]  PCP: Dacia Newsome APRN,   Referring Provider: No ref. provider found    SUBJECTIVE     Chief Complaint   Patient presents with    Right Foot - Follow-up     Dry gangrene           Diabetic ulcer right great toe     HPI: Jd Velazquez, a 58 y.o.male, presents to clinic.    Patient is a 57-year-old male presenting with an ulcer to the right great toe.  Patient states he is diabetic.  States his sugars are uncontrolled and his last A1c was over 10.  He says that approximately 2 months ago he had a cut on his big toe.  It has not healed.  He states his feet are numb and hurt him constantly.  Patient has been using mupirocin cream and it has not helped.    11/22/2023-patient recently had angiogram done with Dr. Pgua for his right lower extremity.  Patient says over the course the last 3 weeks the tip of his toes turned black.    12/6/2023-patient is scheduled for revascularization in 9 days with Dr. Puga.  States the wound has not changed on his big toe.      Past Medical History:   Diagnosis Date    Allergic rhinitis 01/12/2015    Benign essential hypertension 03/08/2016    CAD (coronary artery disease)     Diabetes mellitus     Foot ulcer     Hyperlipidemia     Hypertension     Hypokalemia 02/23/2016    Hypothyroidism 04/24/2014    Insomnia, unspecified 06/15/2016    Microalbuminuria due to type 2 diabetes mellitus 10/15/2015    Mixed hyperlipidemia 10/15/2015    Myocardial infarction     Obesity      Past Surgical History:   Procedure Laterality Date    ANKLE ARTHROSCOPY W/ OPEN REPAIR      APPENDECTOMY      CARDIAC CATHETERIZATION  2014    PCI to circumflex    CARDIAC CATHETERIZATION Right 10/26/2023    Procedure: Aortogram with right leg angiogram, possible angioplasty or stenting;  Surgeon: Robert Puga MD;  Location: [facility] INVASIVE LOCATION;  Service:  "Vascular;  Laterality: Right;    CORONARY ARTERY BYPASS GRAFT N/A 10/8/2020    Procedure: STERNOTOMY, CORONARY ARTERY BYPASS GRAFTING TIME 4 WITH LEFT KELSI  AND ENDOSCOPICALLY HARVESTED LEFT GREATER SAPHENOUS VEIN AND PRP.;  Surgeon: Jr Girma Chiu MD;  Location: [facility] OR;  Service: Cardiothoracic;  Laterality: N/A;    HEEL SPUR SURGERY      HERNIA REPAIR      REPLACEMENT TOTAL KNEE BILATERAL      TONSILLECTOMY       Family History   Problem Relation Age of Onset    Heart disease Other      Social History     Socioeconomic History    Marital status:    Tobacco Use    Smoking status: Every Day     Packs/day: .5     Types: Cigarettes    Smokeless tobacco: Never   Vaping Use    Vaping Use: Never used   Substance and Sexual Activity    Alcohol use: Never    Drug use: Never    Sexual activity: Defer     Allergies   Allergen Reactions    Lortab [Hydrocodone-Acetaminophen] Itching     Current Outpatient Medications   Medication Sig Dispense Refill    amantadine (SYMMETREL) 100 MG tablet       amLODIPine (NORVASC) 5 MG tablet Take 1 tablet every day by oral route for 30 days.      aspirin 81 MG chewable tablet Chew 1 tablet Daily.      cetirizine (zyrTEC) 10 MG tablet Take 1 tablet by mouth Every Night. 90 tablet 1    clobetasol (TEMOVATE) 0.05 % cream Apply 1 application  topically to the appropriate area as directed 2 (Two) Times a Day. 60 g 1    EPINEPHrine (EPIPEN) 0.3 MG/0.3ML solution auto-injector injection 0.3 mL.      glucose blood test strip Test 4 times per day. (Patient taking differently: Test 4 times per day.) 100 each 0    hydrOXYzine (ATARAX) 25 MG tablet Take 1 tablet by mouth 3 (Three) Times a Day As Needed for Itching. 90 tablet 2    insulin detemir (Levemir) 100 UNIT/ML injection Inject 10 Units under the skin into the appropriate area as directed Every Night. Increase one unit per night goal fasting  2 hours after meals 140 3 mL 12    Insulin Pen Needle (Pen Needles 3/16\") 31G X " 5 MM misc Use 1 each Every Night. 100 each 5    levothyroxine (SYNTHROID, LEVOTHROID) 25 MCG tablet Take 1 tablet by mouth Daily. 90 tablet 1    lidocaine (LIDODERM) 5 % Place 3 patches on the skin as directed by provider Daily. Remove & Discard patch within 12 hours or as directed by MD 90 each 5    lisinopril-hydrochlorothiazide (PRINZIDE,ZESTORETIC) 20-12.5 MG per tablet TAKE 2 TABLETS BY MOUTH DAILY 180 tablet 3    metFORMIN ER (GLUCOPHAGE-XR) 500 MG 24 hr tablet       metoprolol tartrate (LOPRESSOR) 100 MG tablet Take 1 tablet by mouth Every 12 (Twelve) Hours. 180 tablet 3    OneTouch Delica Lancets 33G misc test 4 times per day 100 each 0    pregabalin (Lyrica) 225 MG capsule Take 1 capsule by mouth 2 (Two) Times a Day. 180 capsule 1    Repatha SureClick solution auto-injector SureClick injection INJECT 1ML UNDER THE SKIN INTO THE APPROPRIATE AREA AS DIRECTED EVERY 14 DAYS 2 mL 3    spironolactone (Aldactone) 50 MG tablet Take 1 tablet by mouth Daily. 90 tablet 1    temazepam (Restoril) 30 MG capsule Take 1 capsule by mouth At Night As Needed for Sleep. 30 capsule 0    Tirzepatide (Mounjaro) 5 MG/0.5ML solution pen-injector Inject 0.5 mL under the skin into the appropriate area as directed 1 (One) Time Per Week. 2 mL 5    traMADol (ULTRAM) 50 MG tablet Take 1 tablet by mouth Every 6 (Six) Hours As Needed for Moderate Pain. 120 tablet 0     No current facility-administered medications for this visit.     Review of Systems   Skin:         Ulcer toe   All other systems reviewed and are negative.      OBJECTIVE     Vitals:    12/06/23 1436   BP: (!) 173/103   Pulse:    Temp:    SpO2:        WBC   Date Value Ref Range Status   11/30/2023 8.41 3.40 - 10.80 10*3/mm3 Final     RBC   Date Value Ref Range Status   11/30/2023 4.89 4.14 - 5.80 10*6/mm3 Final     Hemoglobin   Date Value Ref Range Status   11/30/2023 15.0 13.0 - 17.7 g/dL Final     Hematocrit   Date Value Ref Range Status   11/30/2023 45.2 37.5 - 51.0 %  Final     MCV   Date Value Ref Range Status   11/30/2023 92.4 79.0 - 97.0 fL Final     MCH   Date Value Ref Range Status   11/30/2023 30.7 26.6 - 33.0 pg Final     MCHC   Date Value Ref Range Status   11/30/2023 33.2 31.5 - 35.7 g/dL Final     RDW   Date Value Ref Range Status   11/30/2023 13.4 12.3 - 15.4 % Final     RDW-SD   Date Value Ref Range Status   11/30/2023 45.3 37.0 - 54.0 fl Final     MPV   Date Value Ref Range Status   11/30/2023 9.4 6.0 - 12.0 fL Final     Platelets   Date Value Ref Range Status   11/30/2023 429 140 - 450 10*3/mm3 Final     Neutrophil %   Date Value Ref Range Status   11/30/2023 53.7 42.7 - 76.0 % Final     Lymphocyte %   Date Value Ref Range Status   11/30/2023 33.8 19.6 - 45.3 % Final     Monocyte %   Date Value Ref Range Status   11/30/2023 10.0 5.0 - 12.0 % Final     Eosinophil %   Date Value Ref Range Status   11/30/2023 1.0 0.3 - 6.2 % Final     Basophil %   Date Value Ref Range Status   11/30/2023 0.8 0.0 - 1.5 % Final     Immature Grans %   Date Value Ref Range Status   11/30/2023 0.7 (H) 0.0 - 0.5 % Final     Neutrophils, Absolute   Date Value Ref Range Status   11/30/2023 4.52 1.70 - 7.00 10*3/mm3 Final     Lymphocytes, Absolute   Date Value Ref Range Status   11/30/2023 2.84 0.70 - 3.10 10*3/mm3 Final     Monocytes, Absolute   Date Value Ref Range Status   11/30/2023 0.84 0.10 - 0.90 10*3/mm3 Final     Eosinophils, Absolute   Date Value Ref Range Status   11/30/2023 0.08 0.00 - 0.40 10*3/mm3 Final     Basophils, Absolute   Date Value Ref Range Status   11/30/2023 0.07 0.00 - 0.20 10*3/mm3 Final     Immature Grans, Absolute   Date Value Ref Range Status   11/30/2023 0.06 (H) 0.00 - 0.05 10*3/mm3 Final     nRBC   Date Value Ref Range Status   11/30/2023 0.0 0.0 - 0.2 /100 WBC Final         Lab Results   Component Value Date    GLUCOSE 223 (H) 11/30/2023    BUN 9 11/30/2023    CREATININE 0.97 11/30/2023    EGFRIFNONA 77 06/17/2021    BCR 9.3 11/30/2023    K 4.4 11/30/2023     CO2 23.4 2023    CALCIUM 9.9 2023    ALBUMIN 4.2 2023    LABIL2 0.9 (L) 10/16/2020    AST 6 2023    ALT 6 2023       Patient seen in no apparent distress.      PHYSICAL EXAM:     Foot/Ankle Exam    GENERAL  Appearance:  appears stated age  Orientation:  AAOx3  Affect:  appropriate  Gait:  unimpaired  Assistance:  independent  Right shoe gear: casual shoe  Left shoe gear: casual shoe    VASCULAR     Right Foot Vascularity   Dorsalis pedis:  1+  Posterior tibial:  1+  Skin temperature:  cool  Edema gradin+ and non-pitting  CFT:  < 3 seconds  Pedal hair growth:  Absent  Varicosities:  none     Left Foot Vascularity   Dorsalis pedis:  1+  Posterior tibial:  1+  Skin temperature:  cool  Edema gradin+ and non-pitting  CFT:  < 3 seconds  Pedal hair growth:  Absent  Varicosities:  none     NEUROLOGIC     Right Foot Neurologic   Light touch sensation: absent  Vibratory sensation: absent  Hot/Cold sensation: absent     Left Foot Neurologic   Light touch sensation: absent  Vibratory sensation: absent  Hot/Cold sensation:  absent    MUSCLE STRENGTH     Right Foot Muscle Strength   Foot dorsiflexion:  4  Foot plantar flexion:  4  Foot inversion:  4  Foot eversion:  4     Left Foot Muscle Strength   Foot dorsiflexion:  4  Foot plantar flexion:  4  Foot inversion:  4  Foot eversion:  4    RANGE OF MOTION     Right Foot Range of Motion   Foot and ankle ROM within normal limits       Left Foot Range of Motion   Foot and ankle ROM within normal limits      DERMATOLOGIC      Right Foot Dermatologic   Skin  Right foot skin is intact.      Left Foot Dermatologic   Skin  Left foot skin is intact.      Right foot additional comments: Dry gangrene to distal aspect of right great toe.  No erythema no malodor no drainage.      RADIOLOGY:        Duplex Lower Extremity Art / Grafts - Right CAR    Result Date: 2023  Narrative:   Moderate decreased right ankle-brachial index.  Normal left  ankle-brachial.   Patent right common femoral, profunda femoris, superficial femoral, and popliteal arteries with triphasic waveforms.  Atherosclerotic plaque is present diffusely.   Patent tibial peroneal trunk.   Patent anterior tibial artery with high-grade origin stenosis by velocity elevation.   Color flow noted in posterior tibial and peroneal arteries.  Continuous patency of the peroneal artery with axial imaging is not well demonstrated.  Monophasic waveforms are noted.      ASSESSMENT/PLAN     Diagnoses and all orders for this visit:    1. Peripheral vascular disease (Primary)    2. Uncontrolled type 2 diabetes mellitus with hyperglycemia    3. Dry gangrene        Patient for revascularization with Dr. Puga.  Discussed he will likely will need toe amputation pending revascularization.  The worsening symptoms patient to call the office or go to the emergency department.    Comprehensive lower extremity examination and evaluation was performed.    Discussed findings and treatment plan including risks, benefits, and treatment options with patient in detail. Patient agreed with treatment plan.    Medications and allergies reviewed.  Reviewed available lab values along with other pertinent labs.  These were discussed with the patient.    An After Visit Summary was printed and given to the patient at discharge, including (if requested) any available informative/educational handouts regarding diagnosis, treatment, or medications. All questions were answered to patient/family satisfaction. Should symptoms fail to improve or worsen they agree to call or return to clinic or to go to the Emergency Department. Discussed the importance of following up with any needed screening tests/labs/specialist appointments and any requested follow-up recommended by me today. Importance of maintaining follow-up discussed and patient accepts that missed appointments can delay diagnosis and potentially lead to worsening of  conditions.    Return in about 2 weeks (around 12/20/2023)., or sooner if acute issues arise.    This document has been electronically signed by Balwinder Costa DPM on December 7, 2023 07:15 EST

## 2023-12-14 PROCEDURE — S0260 H&P FOR SURGERY: HCPCS | Performed by: SURGERY

## 2023-12-14 NOTE — H&P
[facility]   HISTORY AND PHYSICAL    Patient Name: Jd Velazquez  : 1965  MRN: [id number]  Primary Care Physician:  Dacia Newsome APRN  Date of admission: (Not on file)    Subjective   Subjective     Chief Complaint: Right foot ulcer    HPI:    Jd Velazquez is a 58 y.o. male with the right foot wound that has failed to heal probably.  He previously underwent angiography with endovascular intervention with excellent results however on surveillance there is severe restenosis at the origin of the anterior tibialis.  At this time for repeat intervention.    Review of Systems    Non contributory except for the History of Present Illness    Personal History     Past Medical History:   Diagnosis Date    Allergic rhinitis 2015    Benign essential hypertension 2016    CAD (coronary artery disease)     Diabetes mellitus     Foot ulcer     Hyperlipidemia     Hypertension     Hypokalemia 2016    Hypothyroidism 2014    Insomnia, unspecified 06/15/2016    Microalbuminuria due to type 2 diabetes mellitus 10/15/2015    Mixed hyperlipidemia 10/15/2015    Myocardial infarction     Obesity        Past Surgical History:   Procedure Laterality Date    ANKLE ARTHROSCOPY W/ OPEN REPAIR      APPENDECTOMY      CARDIAC CATHETERIZATION      PCI to circumflex    CARDIAC CATHETERIZATION Right 10/26/2023    Procedure: Aortogram with right leg angiogram, possible angioplasty or stenting;  Surgeon: Robert Puga MD;  Location: [facility] CATH INVASIVE LOCATION;  Service: Vascular;  Laterality: Right;    CORONARY ARTERY BYPASS GRAFT N/A 10/8/2020    Procedure: STERNOTOMY, CORONARY ARTERY BYPASS GRAFTING TIME 4 WITH LEFT KELSI  AND ENDOSCOPICALLY HARVESTED LEFT GREATER SAPHENOUS VEIN AND PRP.;  Surgeon: Jr Girma Chiu MD;  Location: [facility] OR;  Service: Cardiothoracic;  Laterality: N/A;    HEEL SPUR SURGERY      HERNIA REPAIR      REPLACEMENT TOTAL KNEE BILATERAL       "TONSILLECTOMY         Family History: family history includes Heart disease in an other family member. Otherwise pertinent FHx was reviewed and not pertinent to current issue.    Social History:  reports that he has been smoking cigarettes. He has been smoking an average of .5 packs per day. He has never used smokeless tobacco. He reports that he does not drink alcohol and does not use drugs.    Home Medications:  No current facility-administered medications on file prior to encounter.     Current Outpatient Medications on File Prior to Encounter   Medication Sig    amantadine (SYMMETREL) 100 MG tablet     amLODIPine (NORVASC) 5 MG tablet Take 1 tablet every day by oral route for 30 days.    aspirin 81 MG chewable tablet Chew 1 tablet Daily.    cetirizine (zyrTEC) 10 MG tablet Take 1 tablet by mouth Every Night.    clobetasol (TEMOVATE) 0.05 % cream Apply 1 application  topically to the appropriate area as directed 2 (Two) Times a Day.    EPINEPHrine (EPIPEN) 0.3 MG/0.3ML solution auto-injector injection 0.3 mL.    glucose blood test strip Test 4 times per day. (Patient taking differently: Test 4 times per day.)    hydrOXYzine (ATARAX) 25 MG tablet Take 1 tablet by mouth 3 (Three) Times a Day As Needed for Itching.    insulin detemir (Levemir) 100 UNIT/ML injection Inject 10 Units under the skin into the appropriate area as directed Every Night. Increase one unit per night goal fasting  2 hours after meals 140    Insulin Pen Needle (Pen Needles 3/16\") 31G X 5 MM misc Use 1 each Every Night.    levothyroxine (SYNTHROID, LEVOTHROID) 25 MCG tablet Take 1 tablet by mouth Daily.    lidocaine (LIDODERM) 5 % Place 3 patches on the skin as directed by provider Daily. Remove & Discard patch within 12 hours or as directed by MD    lisinopril-hydrochlorothiazide (PRINZIDE,ZESTORETIC) 20-12.5 MG per tablet TAKE 2 TABLETS BY MOUTH DAILY    metFORMIN ER (GLUCOPHAGE-XR) 500 MG 24 hr tablet     metoprolol tartrate (LOPRESSOR) " 100 MG tablet Take 1 tablet by mouth Every 12 (Twelve) Hours.    OneTouch Delica Lancets 33G misc test 4 times per day    pregabalin (Lyrica) 225 MG capsule Take 1 capsule by mouth 2 (Two) Times a Day.    Repatha SureClick solution auto-injector SureClick injection INJECT 1ML UNDER THE SKIN INTO THE APPROPRIATE AREA AS DIRECTED EVERY 14 DAYS    spironolactone (Aldactone) 50 MG tablet Take 1 tablet by mouth Daily.    temazepam (Restoril) 30 MG capsule Take 1 capsule by mouth At Night As Needed for Sleep.    Tirzepatide (Mounjaro) 5 MG/0.5ML solution pen-injector Inject 0.5 mL under the skin into the appropriate area as directed 1 (One) Time Per Week.    traMADol (ULTRAM) 50 MG tablet Take 1 tablet by mouth Every 6 (Six) Hours As Needed for Moderate Pain.          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 Velazquez had a good result after angiography with endovascular intervention after recanalization of the right anterior tibialis.  The duplex however demonstrates significant restenosis at the origin.  I am recommending that we proceed to repeat angiography with plans for likely the use of a drug-coated balloon.  I have discussed with the  patient in detail the mechanics of the procedure, the indications, benefits, risks, alternatives as well as potential complications to include but not limited to infection, bleeding, inability to cross the occlusion, vascular injury requiring surgical repair.  He appears to understand and desires to proceed.         Electronically signed by Robert Puga MD, 12/14/23, 1:47 PM EST.

## 2023-12-14 NOTE — H&P (VIEW-ONLY)
[facility]   HISTORY AND PHYSICAL    Patient Name: Jd Velazquez  : 1965  MRN: [id number]  Primary Care Physician:  Dacia Newsome APRN  Date of admission: (Not on file)    Subjective   Subjective     Chief Complaint: Right foot ulcer    HPI:    Jd Velazquez is a 58 y.o. male with the right foot wound that has failed to heal probably.  He previously underwent angiography with endovascular intervention with excellent results however on surveillance there is severe restenosis at the origin of the anterior tibialis.  At this time for repeat intervention.    Review of Systems    Non contributory except for the History of Present Illness    Personal History     Past Medical History:   Diagnosis Date    Allergic rhinitis 2015    Benign essential hypertension 2016    CAD (coronary artery disease)     Diabetes mellitus     Foot ulcer     Hyperlipidemia     Hypertension     Hypokalemia 2016    Hypothyroidism 2014    Insomnia, unspecified 06/15/2016    Microalbuminuria due to type 2 diabetes mellitus 10/15/2015    Mixed hyperlipidemia 10/15/2015    Myocardial infarction     Obesity        Past Surgical History:   Procedure Laterality Date    ANKLE ARTHROSCOPY W/ OPEN REPAIR      APPENDECTOMY      CARDIAC CATHETERIZATION      PCI to circumflex    CARDIAC CATHETERIZATION Right 10/26/2023    Procedure: Aortogram with right leg angiogram, possible angioplasty or stenting;  Surgeon: Robert Puga MD;  Location: [facility] CATH INVASIVE LOCATION;  Service: Vascular;  Laterality: Right;    CORONARY ARTERY BYPASS GRAFT N/A 10/8/2020    Procedure: STERNOTOMY, CORONARY ARTERY BYPASS GRAFTING TIME 4 WITH LEFT KELSI  AND ENDOSCOPICALLY HARVESTED LEFT GREATER SAPHENOUS VEIN AND PRP.;  Surgeon: Jr Girma Chiu MD;  Location: [facility] OR;  Service: Cardiothoracic;  Laterality: N/A;    HEEL SPUR SURGERY      HERNIA REPAIR      REPLACEMENT TOTAL KNEE BILATERAL       "TONSILLECTOMY         Family History: family history includes Heart disease in an other family member. Otherwise pertinent FHx was reviewed and not pertinent to current issue.    Social History:  reports that he has been smoking cigarettes. He has been smoking an average of .5 packs per day. He has never used smokeless tobacco. He reports that he does not drink alcohol and does not use drugs.    Home Medications:  No current facility-administered medications on file prior to encounter.     Current Outpatient Medications on File Prior to Encounter   Medication Sig    amantadine (SYMMETREL) 100 MG tablet     amLODIPine (NORVASC) 5 MG tablet Take 1 tablet every day by oral route for 30 days.    aspirin 81 MG chewable tablet Chew 1 tablet Daily.    cetirizine (zyrTEC) 10 MG tablet Take 1 tablet by mouth Every Night.    clobetasol (TEMOVATE) 0.05 % cream Apply 1 application  topically to the appropriate area as directed 2 (Two) Times a Day.    EPINEPHrine (EPIPEN) 0.3 MG/0.3ML solution auto-injector injection 0.3 mL.    glucose blood test strip Test 4 times per day. (Patient taking differently: Test 4 times per day.)    hydrOXYzine (ATARAX) 25 MG tablet Take 1 tablet by mouth 3 (Three) Times a Day As Needed for Itching.    insulin detemir (Levemir) 100 UNIT/ML injection Inject 10 Units under the skin into the appropriate area as directed Every Night. Increase one unit per night goal fasting  2 hours after meals 140    Insulin Pen Needle (Pen Needles 3/16\") 31G X 5 MM misc Use 1 each Every Night.    levothyroxine (SYNTHROID, LEVOTHROID) 25 MCG tablet Take 1 tablet by mouth Daily.    lidocaine (LIDODERM) 5 % Place 3 patches on the skin as directed by provider Daily. Remove & Discard patch within 12 hours or as directed by MD    lisinopril-hydrochlorothiazide (PRINZIDE,ZESTORETIC) 20-12.5 MG per tablet TAKE 2 TABLETS BY MOUTH DAILY    metFORMIN ER (GLUCOPHAGE-XR) 500 MG 24 hr tablet     metoprolol tartrate (LOPRESSOR) " 100 MG tablet Take 1 tablet by mouth Every 12 (Twelve) Hours.    OneTouch Delica Lancets 33G misc test 4 times per day    pregabalin (Lyrica) 225 MG capsule Take 1 capsule by mouth 2 (Two) Times a Day.    Repatha SureClick solution auto-injector SureClick injection INJECT 1ML UNDER THE SKIN INTO THE APPROPRIATE AREA AS DIRECTED EVERY 14 DAYS    spironolactone (Aldactone) 50 MG tablet Take 1 tablet by mouth Daily.    temazepam (Restoril) 30 MG capsule Take 1 capsule by mouth At Night As Needed for Sleep.    Tirzepatide (Mounjaro) 5 MG/0.5ML solution pen-injector Inject 0.5 mL under the skin into the appropriate area as directed 1 (One) Time Per Week.    traMADol (ULTRAM) 50 MG tablet Take 1 tablet by mouth Every 6 (Six) Hours As Needed for Moderate Pain.          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 Velazquez had a good result after angiography with endovascular intervention after recanalization of the right anterior tibialis.  The duplex however demonstrates significant restenosis at the origin.  I am recommending that we proceed to repeat angiography with plans for likely the use of a drug-coated balloon.  I have discussed with the  patient in detail the mechanics of the procedure, the indications, benefits, risks, alternatives as well as potential complications to include but not limited to infection, bleeding, inability to cross the occlusion, vascular injury requiring surgical repair.  He appears to understand and desires to proceed.         Electronically signed by Robert Puga MD, 12/14/23, 1:47 PM EST.

## 2023-12-15 ENCOUNTER — HOSPITAL ENCOUNTER (OUTPATIENT)
Facility: HOSPITAL | Age: 58
Setting detail: HOSPITAL OUTPATIENT SURGERY
Discharge: HOME | End: 2023-12-15
Attending: SURGERY | Admitting: SURGERY
Payer: COMMERCIAL

## 2023-12-15 ENCOUNTER — TELEPHONE (OUTPATIENT)
Dept: CARDIOLOGY | Facility: CLINIC | Age: 58
End: 2023-12-15
Payer: COMMERCIAL

## 2023-12-15 VITALS
BODY MASS INDEX: 32.1 KG/M2 | HEART RATE: 73 BPM | WEIGHT: 236.99 LBS | TEMPERATURE: 98.3 F | SYSTOLIC BLOOD PRESSURE: 131 MMHG | HEIGHT: 72 IN | RESPIRATION RATE: 20 BRPM | OXYGEN SATURATION: 94 % | DIASTOLIC BLOOD PRESSURE: 69 MMHG

## 2023-12-15 DIAGNOSIS — I70.269 ATHEROSCLEROSIS OF NATIVE ARTERY OF LOWER EXTREMITY WITH GANGRENE, UNSPECIFIED LATERALITY: ICD-10-CM

## 2023-12-15 LAB
ANION GAP SERPL CALCULATED.3IONS-SCNC: 12.3 MMOL/L (ref 5–15)
BASOPHILS # BLD AUTO: 0.07 10*3/MM3 (ref 0–0.2)
BASOPHILS NFR BLD AUTO: 0.7 % (ref 0–1.5)
BUN SERPL-MCNC: 13 MG/DL (ref 6–20)
BUN/CREAT SERPL: 11.7 (ref 7–25)
CALCIUM SPEC-SCNC: 9.4 MG/DL (ref 8.6–10.5)
CHLORIDE SERPL-SCNC: 95 MMOL/L (ref 98–107)
CO2 SERPL-SCNC: 23.7 MMOL/L (ref 22–29)
CREAT SERPL-MCNC: 1.11 MG/DL (ref 0.76–1.27)
DEPRECATED RDW RBC AUTO: 47.2 FL (ref 37–54)
EGFRCR SERPLBLD CKD-EPI 2021: 77.5 ML/MIN/1.73
EOSINOPHIL # BLD AUTO: 0.07 10*3/MM3 (ref 0–0.4)
EOSINOPHIL NFR BLD AUTO: 0.7 % (ref 0.3–6.2)
ERYTHROCYTE [DISTWIDTH] IN BLOOD BY AUTOMATED COUNT: 13.7 % (ref 12.3–15.4)
GLUCOSE SERPL-MCNC: 167 MG/DL (ref 65–99)
HCT VFR BLD AUTO: 44.2 % (ref 37.5–51)
HGB BLD-MCNC: 14.2 G/DL (ref 13–17.7)
IMM GRANULOCYTES # BLD AUTO: 0.05 10*3/MM3 (ref 0–0.05)
IMM GRANULOCYTES NFR BLD AUTO: 0.5 % (ref 0–0.5)
LYMPHOCYTES # BLD AUTO: 3.45 10*3/MM3 (ref 0.7–3.1)
LYMPHOCYTES NFR BLD AUTO: 34.9 % (ref 19.6–45.3)
MCH RBC QN AUTO: 30 PG (ref 26.6–33)
MCHC RBC AUTO-ENTMCNC: 32.1 G/DL (ref 31.5–35.7)
MCV RBC AUTO: 93.4 FL (ref 79–97)
MONOCYTES # BLD AUTO: 1.24 10*3/MM3 (ref 0.1–0.9)
MONOCYTES NFR BLD AUTO: 12.5 % (ref 5–12)
NEUTROPHILS NFR BLD AUTO: 5.01 10*3/MM3 (ref 1.7–7)
NEUTROPHILS NFR BLD AUTO: 50.7 % (ref 42.7–76)
NRBC BLD AUTO-RTO: 0 /100 WBC (ref 0–0.2)
PLATELET # BLD AUTO: 428 10*3/MM3 (ref 140–450)
PMV BLD AUTO: 8.9 FL (ref 6–12)
POTASSIUM SERPL-SCNC: 4.4 MMOL/L (ref 3.5–5.2)
RBC # BLD AUTO: 4.73 10*6/MM3 (ref 4.14–5.8)
SODIUM SERPL-SCNC: 131 MMOL/L (ref 136–145)
WBC NRBC COR # BLD AUTO: 9.89 10*3/MM3 (ref 3.4–10.8)

## 2023-12-15 PROCEDURE — 25010000002 MIDAZOLAM PER 1MG: Performed by: SURGERY

## 2023-12-15 PROCEDURE — C1894 INTRO/SHEATH, NON-LASER: HCPCS | Performed by: SURGERY

## 2023-12-15 PROCEDURE — C1760 CLOSURE DEV, VASC: HCPCS | Performed by: SURGERY

## 2023-12-15 PROCEDURE — C1769 GUIDE WIRE: HCPCS | Performed by: SURGERY

## 2023-12-15 PROCEDURE — C1887 CATHETER, GUIDING: HCPCS | Performed by: SURGERY

## 2023-12-15 PROCEDURE — 25010000002 CEFAZOLIN IN DEXTROSE 2000 MG/ 100 ML SOLUTION: Performed by: SURGERY

## 2023-12-15 PROCEDURE — 0 IODIXANOL PER 1 ML: Performed by: SURGERY

## 2023-12-15 PROCEDURE — 76937 US GUIDE VASCULAR ACCESS: CPT | Performed by: SURGERY

## 2023-12-15 PROCEDURE — 75710 ARTERY X-RAYS ARM/LEG: CPT | Performed by: SURGERY

## 2023-12-15 PROCEDURE — 99153 MOD SED SAME PHYS/QHP EA: CPT | Performed by: SURGERY

## 2023-12-15 PROCEDURE — 36246 INS CATH ABD/L-EXT ART 2ND: CPT | Performed by: SURGERY

## 2023-12-15 PROCEDURE — 25010000002 FENTANYL CITRATE (PF) 50 MCG/ML SOLUTION: Performed by: SURGERY

## 2023-12-15 PROCEDURE — 80048 BASIC METABOLIC PNL TOTAL CA: CPT | Performed by: SURGERY

## 2023-12-15 PROCEDURE — 99152 MOD SED SAME PHYS/QHP 5/>YRS: CPT | Performed by: SURGERY

## 2023-12-15 PROCEDURE — 85025 COMPLETE CBC W/AUTO DIFF WBC: CPT | Performed by: SURGERY

## 2023-12-15 RX ORDER — ENOXAPARIN SODIUM 100 MG/ML
40 INJECTION SUBCUTANEOUS DAILY
Status: CANCELLED | OUTPATIENT
Start: 2023-12-16

## 2023-12-15 RX ORDER — CEFAZOLIN SODIUM 2 G/100ML
2000 INJECTION, SOLUTION INTRAVENOUS ONCE
Status: DISCONTINUED | OUTPATIENT
Start: 2023-12-15 | End: 2023-12-15 | Stop reason: HOSPADM

## 2023-12-15 RX ORDER — IODIXANOL 320 MG/ML
INJECTION, SOLUTION INTRAVASCULAR
Status: DISCONTINUED | OUTPATIENT
Start: 2023-12-15 | End: 2023-12-15 | Stop reason: HOSPADM

## 2023-12-15 RX ORDER — LIDOCAINE HYDROCHLORIDE 20 MG/ML
INJECTION, SOLUTION INFILTRATION; PERINEURAL
Status: DISCONTINUED | OUTPATIENT
Start: 2023-12-15 | End: 2023-12-15 | Stop reason: HOSPADM

## 2023-12-15 RX ORDER — MIDAZOLAM HYDROCHLORIDE 2 MG/2ML
INJECTION, SOLUTION INTRAMUSCULAR; INTRAVENOUS
Status: DISCONTINUED | OUTPATIENT
Start: 2023-12-15 | End: 2023-12-15 | Stop reason: HOSPADM

## 2023-12-15 RX ORDER — FENTANYL CITRATE 50 UG/ML
INJECTION, SOLUTION INTRAMUSCULAR; INTRAVENOUS
Status: DISCONTINUED | OUTPATIENT
Start: 2023-12-15 | End: 2023-12-15 | Stop reason: HOSPADM

## 2023-12-15 RX ORDER — SODIUM CHLORIDE 9 MG/ML
75 INJECTION, SOLUTION INTRAVENOUS CONTINUOUS
Status: CANCELLED | OUTPATIENT
Start: 2023-12-15 | End: 2023-12-15

## 2023-12-15 RX ORDER — CLOPIDOGREL BISULFATE 75 MG/1
75 TABLET ORAL DAILY
Qty: 30 TABLET | Refills: 5 | Status: SHIPPED | OUTPATIENT
Start: 2023-12-15 | End: 2024-06-12

## 2023-12-15 RX ADMIN — CEFAZOLIN SODIUM 2000 MG: 2 INJECTION, SOLUTION INTRAVENOUS at 08:12

## 2023-12-15 NOTE — Clinical Note
Hemostasis started on the left femoral artery. Mynx was used in achieving hemostasis. Closure device deployed in the vessel. Hemostasis achieved successfully.

## 2023-12-15 NOTE — DISCHARGE INSTRUCTIONS
DISCHARGE INSTRUCTIONS  SURGICAL / AMBULATORY  PROCEDURES      For your surgery you had:  IV sedation.     You may experience dizziness, drowsiness, or light-headedness for several hours following surgery/procedure.  Do not stay alone today or tonight.  Limit your activity for 24 hours.  Resume your diet slowly.  Follow whatever special dietary instructions you may have been given by your doctor.  You should not drive or operate machinery, drink alcohol, or sign legally binding documents for 24 hours or while you are taking pain medication.    NOTIFY YOUR DOCTOR IF YOU EXPERIENCE ANY OF THE FOLLOWING:  Temperature greater than 101 degrees Fahrenheit  Shaking Chills  Redness or excessive drainage from incision  Nausea, vomiting and/or pain that is not controlled by prescribed medications  Increase in bleeding or bleeding that is excessive  Unable to urinate in 6 hours after surgery  If unable to reach your doctor, please go to the closest Emergency Room  You remove dressing:     [x] in 24 hours   [] in 48 hours   [] Other:    Skin adhesive flakes off in 10-14 days.  You may shower tomorrow, no submerging of incision for 2 weeks.  Apply an ice pack 24-48 hours.    Medications per physician instructions as indicated on After Visit Summary.    Last dose of pain medication was given at:   .    SPECIAL INSTRUCTIONS:  May remove dressing in 1 day and wash area.  Follow-up in the office in 2 weeks, call for appointment.  Resume home diet.  Resume home medications.  No lifting greater than 15 pounds for 3 days.  Hold metformin for 48 hours, may resume on Sunday, 12/17/2023.

## 2023-12-15 NOTE — TELEPHONE ENCOUNTER
Procedure: Vascular Surgery fem bypass    Medication Directive: NA    PMH: CAD, HLD, HTN, S/P CABG X 4 10/08/2020    Last Seen: 07/27/2023

## 2023-12-15 NOTE — Clinical Note
A 5 fr sheath was  inserted using micropuncture technique with ultrasound guidance into the left femoral artery. Sheath insertion not delayed.

## 2023-12-15 NOTE — TELEPHONE ENCOUNTER
The patient has undergone cardiovascular evaluation from a cardiac standpoint only. Patient is moderate risk and is ok to proceed.

## 2023-12-15 NOTE — PROGRESS NOTES
Angiogram performed.  Right popliteal artery reoccluded.  There is complete occlusion of the proximal tibioperoneal vessels.  There is reconstitution of the distal anterior tibialis with limited visualization of flow into the foot.  For details find full report under chart review, cardiology tab.

## 2023-12-18 ENCOUNTER — PREP FOR SURGERY (OUTPATIENT)
Dept: OTHER | Facility: HOSPITAL | Age: 58
End: 2023-12-18
Payer: COMMERCIAL

## 2023-12-18 DIAGNOSIS — I70.269 ATHEROSCLEROSIS OF NATIVE ARTERY OF LOWER EXTREMITY WITH GANGRENE, UNSPECIFIED LATERALITY: Primary | ICD-10-CM

## 2023-12-18 RX ORDER — CEFAZOLIN SODIUM 2 G/100ML
2 INJECTION, SOLUTION INTRAVENOUS ONCE
OUTPATIENT
Start: 2023-12-18 | End: 2023-12-18

## 2023-12-19 NOTE — PRE-PROCEDURE INSTRUCTIONS
IMPORTANT INSTRUCTIONS - PRE-ADMISSION TESTING  DO NOT EAT OR CHEW anything after midnight the night before your procedure.    You may have CLEAR liquids up to __2_ hours prior to ARRIVAL time.   Take the following medications the morning of your procedure with JUST A SIP OF WATER:  _METOPROLOL, ASPIRIN, LEVOTHRYOXINE, LYRICA _____________    DO NOT BRING your medications to the hospital with you, UNLESS something has changed since your PRE-Admission Testing appointment.  Hold all vitamins, supplements, and NSAIDS (Non- steroidal anti-inflammatory meds) for one week prior to surgery (you MAY take Tylenol or Acetaminophen).  If you are diabetic, check your blood sugar the morning of your procedure. If it is less than 70 or if you are feeling symptomatic, call the following number for further instructions: [phone]  Use your inhalers/nebulizers as usual, the morning of your procedure. BRING YOUR INHALERS with you.   Bring your CPAP or BIPAP to hospital, ONLY IF YOU WILL BE SPENDING THE NIGHT.   Make sure you have a ride home and have someone who will stay with you the day of your procedure after you go home.  If you have any questions, please call your Pre-Admission Testing Nurse, DANIELLE __ at [phone]____.   Per anesthesia request, do not smoke for 24 hours before your procedure or as instructed by your surgeon.    PREOPERATIVE (BEFORE SURGERY)              BATHING INSTRUCTIONS  Instructions:    You will need to shower 1  time utilizing the soap provided; at the times indicated   below:     12/26/23 MORNING OF SURGERY      Wash your hair and face with normal shampoo and soap, rinse it well before using the surgical soap.      In the shower, wet the skin completely with water from your neck to your feet. Apply the cleanser to your   body ONLY FROM THE NECK TO YOUR FEET.     Do NOT USE THE CLEANSER ON YOUR FACE, HEAD, OR GENITAL (PRIVATE) AREAS.   Keep it out of your eyes, ears, and mouth because of the  risk of injury to those areas.      Scrub with a clean washcloth for each bath utilizing the soap provided from the top of your body to the   bottom starting at the neck area.      Pay close attention to your armpits, groin area, and the site of surgery.      Wash your body gently for 5 minutes. Stand outside the stream or turn off the water while scrubbing your   body. Do NOT wash with your regular soap after the surgical cleanser is used.      RINSE THE CLEANSER OFF COMPLETELY with plenty of water. Rinse the area again thoroughly.      Dry off with a clean towel. The surgical soap can cause dryness; however do NOT APPLY LOTION,   CREAM, POWDER, and/or DEODORANT AFTER SHOWERING.     Be sure to where clean clothes after showering.      Ensure CLEAN BED LINENS AFTER FIRST wash with the surgical soap.      NO PETS ALLOWED IN THE BED with you after utilizing the surgical soap.

## 2023-12-22 ENCOUNTER — OFFICE VISIT (OUTPATIENT)
Dept: VASCULAR SURGERY | Facility: HOSPITAL | Age: 58
End: 2023-12-22
Payer: COMMERCIAL

## 2023-12-22 VITALS
HEART RATE: 72 BPM | TEMPERATURE: 97.9 F | DIASTOLIC BLOOD PRESSURE: 80 MMHG | RESPIRATION RATE: 16 BRPM | OXYGEN SATURATION: 95 % | SYSTOLIC BLOOD PRESSURE: 132 MMHG

## 2023-12-22 DIAGNOSIS — I70.269 ATHEROSCLEROSIS OF NATIVE ARTERY OF LOWER EXTREMITY WITH GANGRENE, UNSPECIFIED LATERALITY: Primary | ICD-10-CM

## 2023-12-22 PROCEDURE — G0463 HOSPITAL OUTPT CLINIC VISIT: HCPCS | Performed by: SURGERY

## 2023-12-22 NOTE — PROGRESS NOTES
Psychiatric   Follow up Office    Patient Name: Jd Velazquez  : 1965  MRN: [id number]  Primary Care Physician:  Dacia Newsome APRN      Subjective   Subjective     HPI:    Jd Velazquez is a 58 y.o. male here in follow-up to discuss further management after angiography of the right lower extremity on 12/15/2023.  He has gangrenous changes in the right foot.      Objective     Vitals:   Temp:  [97.9 °F (36.6 °C)] 97.9 °F (36.6 °C)  Heart Rate:  [72] 72  Resp:  [16] 16  BP: (132)/(80) 132/80    Physical Exam      General: Alert, no acute distress.  HEENT: PERRLA  Abdomen: Benign  Extremities: Symmetric.  Neuro: No gross deficits    Diagnostic studies: The angiogram from 12/15/2023 demonstrates occlusion of the popliteal artery and proximal anterior tibialis with what appears to be a reasonable anterior tibialis as a target.    Assessment & Plan   Assessment / Plan     Diagnoses and all orders for this visit:    1. 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 Velazquez has severe peripheral vascular disease of the right lower extremity resulting in gangrenous changes of his right foot.  At this time I am recommending that we proceed to a right femoral to anterior tibial artery bypass graft.  I have discussed with him in detail the mechanics of the procedure, the indications, benefits, risks, alternatives, as well as potential complications to include but not limited to infection, bleeding, hematoma, transfusion, reoperation.  He appears to understand and desires to proceed.        Electronically signed by Robert Puga MD, 23, 2:17 PM EST.

## 2024-01-03 ENCOUNTER — PRE-ADMISSION TESTING (OUTPATIENT)
Dept: PREADMISSION TESTING | Facility: HOSPITAL | Age: 59
End: 2024-01-03
Payer: COMMERCIAL

## 2024-01-03 ENCOUNTER — TELEPHONE (OUTPATIENT)
Dept: FAMILY MEDICINE CLINIC | Facility: CLINIC | Age: 59
End: 2024-01-03
Payer: COMMERCIAL

## 2024-01-03 VITALS
HEART RATE: 84 BPM | OXYGEN SATURATION: 95 % | DIASTOLIC BLOOD PRESSURE: 78 MMHG | SYSTOLIC BLOOD PRESSURE: 160 MMHG | WEIGHT: 245.37 LBS | BODY MASS INDEX: 34.35 KG/M2 | HEIGHT: 71 IN | TEMPERATURE: 98.2 F

## 2024-01-03 DIAGNOSIS — I73.9 PAD (PERIPHERAL ARTERY DISEASE): Primary | ICD-10-CM

## 2024-01-03 LAB
ABO GROUP BLD: NORMAL
ALBUMIN SERPL-MCNC: 4.2 G/DL (ref 3.5–5.2)
ALBUMIN/GLOB SERPL: 1.1 G/DL
ALP SERPL-CCNC: 143 U/L (ref 39–117)
ALT SERPL W P-5'-P-CCNC: 6 U/L (ref 1–41)
ANION GAP SERPL CALCULATED.3IONS-SCNC: 12.9 MMOL/L (ref 5–15)
AST SERPL-CCNC: 6 U/L (ref 1–40)
BILIRUB SERPL-MCNC: 0.3 MG/DL (ref 0–1.2)
BUN SERPL-MCNC: 8 MG/DL (ref 6–20)
BUN/CREAT SERPL: 8.2 (ref 7–25)
CALCIUM SPEC-SCNC: 9.5 MG/DL (ref 8.6–10.5)
CHLORIDE SERPL-SCNC: 100 MMOL/L (ref 98–107)
CO2 SERPL-SCNC: 25.1 MMOL/L (ref 22–29)
CREAT SERPL-MCNC: 0.97 MG/DL (ref 0.76–1.27)
EGFRCR SERPLBLD CKD-EPI 2021: 91.1 ML/MIN/1.73
GLOBULIN UR ELPH-MCNC: 4 GM/DL
GLUCOSE SERPL-MCNC: 189 MG/DL (ref 65–99)
POTASSIUM SERPL-SCNC: 4.3 MMOL/L (ref 3.5–5.2)
PROT SERPL-MCNC: 8.2 G/DL (ref 6–8.5)
RH BLD: NEGATIVE
SODIUM SERPL-SCNC: 138 MMOL/L (ref 136–145)

## 2024-01-03 PROCEDURE — 80053 COMPREHEN METABOLIC PANEL: CPT

## 2024-01-03 PROCEDURE — 36415 COLL VENOUS BLD VENIPUNCTURE: CPT

## 2024-01-03 RX ORDER — TRAMADOL HYDROCHLORIDE 100 MG/1
100 TABLET, COATED ORAL EVERY 6 HOURS PRN
COMMUNITY

## 2024-01-03 NOTE — TELEPHONE ENCOUNTER
Hospital nurse called and patient and he is out of his one touch test strips.  Lancets were sent in 11/30/23 but needs test strips.  He has not been checking his sugar cause he was out of his test strips.  Send to [facility] on [employer].

## 2024-01-03 NOTE — DISCHARGE INSTRUCTIONS
IMPORTANT INSTRUCTIONS - PRE-ADMISSION TESTING  DO NOT EAT OR CHEW anything after midnight the night before your procedure.    You may have CLEAR liquids up to __2__ hours prior to ARRIVAL time.   Take the following medications the morning of your procedure with JUST A SIP OF WATER:  CYMBALTA, LEVOTHYROXINE, METOPROLOL, PREGABALIN, IF NEEDED TRAMADOL _______________________________________________________________________________________________________________________________________________________________________________________    DO NOT BRING your medications to the hospital with you, UNLESS something has changed since your PRE-Admission Testing appointment.  Hold all vitamins, supplements, and NSAIDS (Non- steroidal anti-inflammatory meds) for one week prior to surgery (you MAY take Tylenol or Acetaminophen).  If you are diabetic, check your blood sugar the morning of your procedure. If it is less than 70 or if you are feeling symptomatic, call the following number for further instructions: [phone]  Use your inhalers/nebulizers as usual, the morning of your procedure. BRING YOUR INHALERS with you.   Bring your CPAP or BIPAP to hospital, ONLY IF YOU WILL BE SPENDING THE NIGHT.   Make sure you have a ride home and have someone who will stay with you the day of your procedure after you go home.  If you have any questions, please call your Pre-Admission Testing Nurse, __DANIELLE _ at [phone]____.   Per anesthesia request, do not smoke for 24 hours before your procedure or as instructed by your surgeon.      Clear Liquid Diet        Find out when you need to start a clear liquid diet.   Think of “clear liquids” as anything you could read a newspaper through. This includes things like water, broth, sports drinks, or tea WITHOUT any kind of milk or cream.           Once you are told to start a clear liquid diet, only drink these things until 2 hours before arrival to the hospital or when the hospital says  to stop. Total volume limitation: 8 oz.       Clear liquids you CAN drink:   Water   Clear broth: beef, chicken, vegetable, or bone broth with nothing in it   Gatorade   Lemonade or Bradley-aid   Soda   Tea, coffee (NO cream or honey)   Jell-O (without fruit)   Popsicles (without fruit or cream)   Italian ices   Juice without pulp: apple, white, grape   You may use salt, pepper, and sugar  NO RED LIQUIDS     Do NOT drink:   Milk or cream   Soy milk, almond milk, coconut milk, or other non-dairy drinks and   creamers   Milkshakes or smoothies   Tomato juice   Orange juice   Grapefruit juice   Cream soups or any other than broth         Clear Liquid Diet:  Do NOT eat any solid food.  Do NOT eat or suck on mints or candy.  Do NOT chew gum.  Do NOT drink thick liquids like milk or juice with pulp in it.  Do NOT add milk, cream, or anything like soy milk or almond milk to coffee or tea.       PREOPERATIVE (BEFORE SURGERY)              BATHING INSTRUCTIONS  Instructions:    You will need to shower 1  times utilizing the soap provided; at the times indicated   below:     MORNING OF SURGERY 01/9/24     Wash your hair and face with normal shampoo and soap, rinse it well before using the surgical soap.      In the shower, wet the skin completely with water from your neck to your feet. Apply the cleanser to your   body ONLY FROM THE NECK TO YOUR FEET.     Do NOT USE THE CLEANSER ON YOUR FACE, HEAD, OR GENITAL (PRIVATE) AREAS.   Keep it out of your eyes, ears, and mouth because of the risk of injury to those areas.      Scrub with a clean washcloth for each bath utilizing the soap provided from the top of your body to the   bottom starting at the neck area.      Pay close attention to your armpits, groin area, and the site of surgery.      Wash your body gently for 5 minutes. Stand outside the stream or turn off the water while scrubbing your   body. Do NOT wash with your regular soap after the surgical cleanser is used.       RINSE THE CLEANSER OFF COMPLETELY with plenty of water. Rinse the area again thoroughly.      Dry off with a clean towel. The surgical soap can cause dryness; however do NOT APPLY LOTION,   CREAM, POWDER, and/or DEODORANT AFTER SHOWERING.     Be sure to where clean clothes after showering.      Ensure CLEAN BED LINENS AFTER FIRST wash with the surgical soap.      NO PETS ALLOWED IN THE BED with you after utilizing the surgical soap.

## 2024-01-04 DIAGNOSIS — E11.65 TYPE 2 DIABETES MELLITUS WITH HYPERGLYCEMIA, WITHOUT LONG-TERM CURRENT USE OF INSULIN: Primary | ICD-10-CM

## 2024-01-04 RX ORDER — BLOOD SUGAR DIAGNOSTIC
1 STRIP MISCELLANEOUS DAILY
COMMUNITY
End: 2024-01-04 | Stop reason: SDUPTHER

## 2024-01-04 RX ORDER — BLOOD SUGAR DIAGNOSTIC
1 STRIP MISCELLANEOUS DAILY
Qty: 100 STRIP | Refills: 2 | Status: SHIPPED | OUTPATIENT
Start: 2024-01-04

## 2024-01-09 ENCOUNTER — APPOINTMENT (OUTPATIENT)
Dept: GENERAL RADIOLOGY | Facility: HOSPITAL | Age: 59
End: 2024-01-09
Payer: COMMERCIAL

## 2024-01-09 ENCOUNTER — ANESTHESIA EVENT (OUTPATIENT)
Dept: PERIOP | Facility: HOSPITAL | Age: 59
End: 2024-01-09
Payer: COMMERCIAL

## 2024-01-09 ENCOUNTER — HOSPITAL ENCOUNTER (INPATIENT)
Facility: HOSPITAL | Age: 59
LOS: 2 days | Discharge: HOME | End: 2024-01-11
Attending: SURGERY | Admitting: SURGERY
Payer: COMMERCIAL

## 2024-01-09 ENCOUNTER — ANESTHESIA (OUTPATIENT)
Dept: PERIOP | Facility: HOSPITAL | Age: 59
End: 2024-01-09
Payer: COMMERCIAL

## 2024-01-09 DIAGNOSIS — I70.269 ATHEROSCLEROSIS OF NATIVE ARTERY OF LOWER EXTREMITY WITH GANGRENE, UNSPECIFIED LATERALITY: ICD-10-CM

## 2024-01-09 DIAGNOSIS — Z78.9 DECREASED ACTIVITIES OF DAILY LIVING (ADL): Primary | ICD-10-CM

## 2024-01-09 DIAGNOSIS — R26.2 DIFFICULTY WALKING: ICD-10-CM

## 2024-01-09 LAB
ABO GROUP BLD: NORMAL
BLD GP AB SCN SERPL QL: NEGATIVE
GLUCOSE BLDC GLUCOMTR-MCNC: 206 MG/DL (ref 70–99)
GLUCOSE BLDC GLUCOMTR-MCNC: 209 MG/DL (ref 70–99)
GLUCOSE BLDC GLUCOMTR-MCNC: 209 MG/DL (ref 70–99)
RH BLD: NEGATIVE
T&S EXPIRATION DATE: NORMAL

## 2024-01-09 PROCEDURE — 82948 REAGENT STRIP/BLOOD GLUCOSE: CPT

## 2024-01-09 PROCEDURE — 25010000002 BUPIVACAINE (PF) 0.5 % SOLUTION 10 ML VIAL: Performed by: SURGERY

## 2024-01-09 PROCEDURE — 35666 BPG FEM-ANT TIB PST TIB/PRNL: CPT | Performed by: SURGERY

## 2024-01-09 PROCEDURE — 73590 X-RAY EXAM OF LOWER LEG: CPT

## 2024-01-09 PROCEDURE — 86901 BLOOD TYPING SEROLOGIC RH(D): CPT | Performed by: SURGERY

## 2024-01-09 PROCEDURE — 25010000002 MIDAZOLAM PER 1MG: Performed by: ANESTHESIOLOGY

## 2024-01-09 PROCEDURE — 25010000002 SUGAMMADEX 200 MG/2ML SOLUTION: Performed by: NURSE ANESTHETIST, CERTIFIED REGISTERED

## 2024-01-09 PROCEDURE — 94799 UNLISTED PULMONARY SVC/PX: CPT

## 2024-01-09 PROCEDURE — 25010000002 CEFAZOLIN IN DEXTROSE 2000 MG/ 100 ML SOLUTION: Performed by: SURGERY

## 2024-01-09 PROCEDURE — 25010000002 DEXAMETHASONE PER 1 MG: Performed by: NURSE ANESTHETIST, CERTIFIED REGISTERED

## 2024-01-09 PROCEDURE — 041K0KQ BYPASS RIGHT FEMORAL ARTERY TO LOWER EXTREMITY ARTERY WITH NONAUTOLOGOUS TISSUE SUBSTITUTE, OPEN APPROACH: ICD-10-PCS | Performed by: SURGERY

## 2024-01-09 PROCEDURE — 25010000002 HEPARIN (PORCINE) PER 1000 UNITS: Performed by: NURSE ANESTHETIST, CERTIFIED REGISTERED

## 2024-01-09 PROCEDURE — 73501 X-RAY EXAM HIP UNI 1 VIEW: CPT

## 2024-01-09 PROCEDURE — 25010000002 CEFAZOLIN IN DEXTROSE 2-4 GM/100ML-% SOLUTION: Performed by: SURGERY

## 2024-01-09 PROCEDURE — 25810000003 LACTATED RINGERS PER 1000 ML: Performed by: ANESTHESIOLOGY

## 2024-01-09 PROCEDURE — 63710000001 INSULIN LISPRO (HUMAN) PER 5 UNITS: Performed by: SURGERY

## 2024-01-09 PROCEDURE — 25010000002 PROTAMINE SULFATE PER 10 MG: Performed by: NURSE ANESTHETIST, CERTIFIED REGISTERED

## 2024-01-09 PROCEDURE — C1768 GRAFT, VASCULAR: HCPCS | Performed by: SURGERY

## 2024-01-09 PROCEDURE — 25810000003 SODIUM CHLORIDE 0.9 % SOLUTION: Performed by: NURSE ANESTHETIST, CERTIFIED REGISTERED

## 2024-01-09 PROCEDURE — 25010000002 LIDOCAINE 1 % SOLUTION 20 ML VIAL: Performed by: SURGERY

## 2024-01-09 PROCEDURE — 25010000002 CEFAZOLIN PER 500 MG: Performed by: SURGERY

## 2024-01-09 PROCEDURE — 88311 DECALCIFY TISSUE: CPT | Performed by: SURGERY

## 2024-01-09 PROCEDURE — 25010000002 FENTANYL CITRATE (PF) 50 MCG/ML SOLUTION: Performed by: NURSE ANESTHETIST, CERTIFIED REGISTERED

## 2024-01-09 PROCEDURE — 04CK0ZZ EXTIRPATION OF MATTER FROM RIGHT FEMORAL ARTERY, OPEN APPROACH: ICD-10-PCS | Performed by: SURGERY

## 2024-01-09 PROCEDURE — 25010000002 ONDANSETRON PER 1 MG: Performed by: NURSE ANESTHETIST, CERTIFIED REGISTERED

## 2024-01-09 PROCEDURE — 35666 BPG FEM-ANT TIB PST TIB/PRNL: CPT

## 2024-01-09 PROCEDURE — 86900 BLOOD TYPING SEROLOGIC ABO: CPT | Performed by: SURGERY

## 2024-01-09 PROCEDURE — 25010000002 PROPOFOL 10 MG/ML EMULSION: Performed by: NURSE ANESTHETIST, CERTIFIED REGISTERED

## 2024-01-09 PROCEDURE — 86850 RBC ANTIBODY SCREEN: CPT | Performed by: SURGERY

## 2024-01-09 PROCEDURE — 88304 TISSUE EXAM BY PATHOLOGIST: CPT | Performed by: SURGERY

## 2024-01-09 PROCEDURE — C1757 CATH, THROMBECTOMY/EMBOLECT: HCPCS | Performed by: SURGERY

## 2024-01-09 PROCEDURE — 0 HYDROMORPHONE 1 MG/ML SOLUTION: Performed by: NURSE ANESTHETIST, CERTIFIED REGISTERED

## 2024-01-09 PROCEDURE — 25010000002 HEPARIN (PORCINE) PER 1000 UNITS: Performed by: SURGERY

## 2024-01-09 DEVICE — GRFT VASC PROPATEN TW REMV/RNG 5MM 90CM: Type: IMPLANTABLE DEVICE | Site: LEG | Status: FUNCTIONAL

## 2024-01-09 DEVICE — HEMOST ABS SURGIFOAM SZ100 8X12 10MM: Type: IMPLANTABLE DEVICE | Site: LEG | Status: FUNCTIONAL

## 2024-01-09 DEVICE — LIGACLIP MCA MULTIPLE CLIP APPLIERS, 20 SMALL CLIPS
Type: IMPLANTABLE DEVICE | Site: LEG | Status: FUNCTIONAL
Brand: LIGACLIP

## 2024-01-09 DEVICE — LIGACLIP MCA MULTIPLE CLIP APPLIERS, 20 MEDIUM CLIPS
Type: IMPLANTABLE DEVICE | Site: LEG | Status: FUNCTIONAL
Brand: LIGACLIP

## 2024-01-09 RX ORDER — AMLODIPINE BESYLATE 5 MG/1
5 TABLET ORAL NIGHTLY
Status: DISCONTINUED | OUTPATIENT
Start: 2024-01-09 | End: 2024-01-11 | Stop reason: HOSPADM

## 2024-01-09 RX ORDER — DULOXETIN HYDROCHLORIDE 30 MG/1
30 CAPSULE, DELAYED RELEASE ORAL DAILY
Status: DISCONTINUED | OUTPATIENT
Start: 2024-01-09 | End: 2024-01-11 | Stop reason: HOSPADM

## 2024-01-09 RX ORDER — HYDROCHLOROTHIAZIDE 12.5 MG/1
12.5 TABLET ORAL
Status: DISCONTINUED | OUTPATIENT
Start: 2024-01-09 | End: 2024-01-11 | Stop reason: HOSPADM

## 2024-01-09 RX ORDER — PROMETHAZINE HYDROCHLORIDE 25 MG/1
25 SUPPOSITORY RECTAL ONCE AS NEEDED
Status: DISCONTINUED | OUTPATIENT
Start: 2024-01-09 | End: 2024-01-09 | Stop reason: HOSPADM

## 2024-01-09 RX ORDER — SPIRONOLACTONE 25 MG/1
50 TABLET ORAL DAILY
Status: DISCONTINUED | OUTPATIENT
Start: 2024-01-09 | End: 2024-01-11 | Stop reason: HOSPADM

## 2024-01-09 RX ORDER — PROTAMINE SULFATE 10 MG/ML
INJECTION, SOLUTION INTRAVENOUS AS NEEDED
Status: DISCONTINUED | OUTPATIENT
Start: 2024-01-09 | End: 2024-01-09 | Stop reason: SURG

## 2024-01-09 RX ORDER — METOPROLOL TARTRATE 50 MG/1
100 TABLET, FILM COATED ORAL EVERY 12 HOURS
Status: DISCONTINUED | OUTPATIENT
Start: 2024-01-09 | End: 2024-01-11 | Stop reason: HOSPADM

## 2024-01-09 RX ORDER — DEXMEDETOMIDINE HYDROCHLORIDE 100 UG/ML
INJECTION, SOLUTION INTRAVENOUS AS NEEDED
Status: DISCONTINUED | OUTPATIENT
Start: 2024-01-09 | End: 2024-01-09 | Stop reason: SURG

## 2024-01-09 RX ORDER — SODIUM CHLORIDE, SODIUM LACTATE, POTASSIUM CHLORIDE, CALCIUM CHLORIDE 600; 310; 30; 20 MG/100ML; MG/100ML; MG/100ML; MG/100ML
9 INJECTION, SOLUTION INTRAVENOUS CONTINUOUS PRN
Status: DISCONTINUED | OUTPATIENT
Start: 2024-01-09 | End: 2024-01-09 | Stop reason: HOSPADM

## 2024-01-09 RX ORDER — CEFAZOLIN SODIUM 2 G/100ML
2 INJECTION, SOLUTION INTRAVENOUS EVERY 8 HOURS
Qty: 200 ML | Refills: 0 | Status: COMPLETED | OUTPATIENT
Start: 2024-01-09 | End: 2024-01-10

## 2024-01-09 RX ORDER — OXYCODONE HYDROCHLORIDE 5 MG/1
5 TABLET ORAL
Status: DISCONTINUED | OUTPATIENT
Start: 2024-01-09 | End: 2024-01-09 | Stop reason: HOSPADM

## 2024-01-09 RX ORDER — NICOTINE POLACRILEX 4 MG
15 LOZENGE BUCCAL
Status: DISCONTINUED | OUTPATIENT
Start: 2024-01-09 | End: 2024-01-11 | Stop reason: HOSPADM

## 2024-01-09 RX ORDER — ACETAMINOPHEN 500 MG
1000 TABLET ORAL ONCE
Status: COMPLETED | OUTPATIENT
Start: 2024-01-09 | End: 2024-01-09

## 2024-01-09 RX ORDER — PROMETHAZINE HYDROCHLORIDE 12.5 MG/1
25 TABLET ORAL ONCE AS NEEDED
Status: DISCONTINUED | OUTPATIENT
Start: 2024-01-09 | End: 2024-01-09 | Stop reason: HOSPADM

## 2024-01-09 RX ORDER — LABETALOL HYDROCHLORIDE 5 MG/ML
10 INJECTION, SOLUTION INTRAVENOUS EVERY 6 HOURS PRN
Status: ACTIVE | OUTPATIENT
Start: 2024-01-09 | End: 2024-01-10

## 2024-01-09 RX ORDER — INSULIN LISPRO 100 [IU]/ML
2-7 INJECTION, SOLUTION INTRAVENOUS; SUBCUTANEOUS
Status: DISCONTINUED | OUTPATIENT
Start: 2024-01-09 | End: 2024-01-11 | Stop reason: HOSPADM

## 2024-01-09 RX ORDER — CLOPIDOGREL BISULFATE 75 MG/1
75 TABLET ORAL DAILY
Status: DISCONTINUED | OUTPATIENT
Start: 2024-01-09 | End: 2024-01-11 | Stop reason: HOSPADM

## 2024-01-09 RX ORDER — CEFAZOLIN SODIUM 2 G/100ML
2 INJECTION, SOLUTION INTRAVENOUS ONCE
Status: COMPLETED | OUTPATIENT
Start: 2024-01-09 | End: 2024-01-09

## 2024-01-09 RX ORDER — OXYCODONE HYDROCHLORIDE AND ACETAMINOPHEN 5; 325 MG/1; MG/1
1 TABLET ORAL EVERY 6 HOURS PRN
Status: DISCONTINUED | OUTPATIENT
Start: 2024-01-09 | End: 2024-01-11 | Stop reason: HOSPADM

## 2024-01-09 RX ORDER — MEPERIDINE HYDROCHLORIDE 25 MG/ML
12.5 INJECTION INTRAMUSCULAR; INTRAVENOUS; SUBCUTANEOUS
Status: DISCONTINUED | OUTPATIENT
Start: 2024-01-09 | End: 2024-01-09 | Stop reason: HOSPADM

## 2024-01-09 RX ORDER — EPHEDRINE SULFATE 50 MG/ML
INJECTION, SOLUTION INTRAVENOUS AS NEEDED
Status: DISCONTINUED | OUTPATIENT
Start: 2024-01-09 | End: 2024-01-09 | Stop reason: SURG

## 2024-01-09 RX ORDER — LIDOCAINE HYDROCHLORIDE 20 MG/ML
INJECTION, SOLUTION EPIDURAL; INFILTRATION; INTRACAUDAL; PERINEURAL AS NEEDED
Status: DISCONTINUED | OUTPATIENT
Start: 2024-01-09 | End: 2024-01-09 | Stop reason: SURG

## 2024-01-09 RX ORDER — LABETALOL HYDROCHLORIDE 5 MG/ML
10 INJECTION, SOLUTION INTRAVENOUS EVERY 6 HOURS PRN
Status: DISCONTINUED | OUTPATIENT
Start: 2024-01-10 | End: 2024-01-11 | Stop reason: HOSPADM

## 2024-01-09 RX ORDER — ONDANSETRON 2 MG/ML
4 INJECTION INTRAMUSCULAR; INTRAVENOUS ONCE AS NEEDED
Status: DISCONTINUED | OUTPATIENT
Start: 2024-01-09 | End: 2024-01-09 | Stop reason: HOSPADM

## 2024-01-09 RX ORDER — ONDANSETRON 2 MG/ML
INJECTION INTRAMUSCULAR; INTRAVENOUS AS NEEDED
Status: DISCONTINUED | OUTPATIENT
Start: 2024-01-09 | End: 2024-01-09 | Stop reason: SURG

## 2024-01-09 RX ORDER — SODIUM CHLORIDE 450 MG/100ML
75 INJECTION, SOLUTION INTRAVENOUS CONTINUOUS
Status: DISCONTINUED | OUTPATIENT
Start: 2024-01-09 | End: 2024-01-10

## 2024-01-09 RX ORDER — ENOXAPARIN SODIUM 100 MG/ML
40 INJECTION SUBCUTANEOUS DAILY
Status: DISCONTINUED | OUTPATIENT
Start: 2024-01-10 | End: 2024-01-11 | Stop reason: HOSPADM

## 2024-01-09 RX ORDER — PREGABALIN 75 MG/1
225 CAPSULE ORAL 2 TIMES DAILY
Status: DISCONTINUED | OUTPATIENT
Start: 2024-01-09 | End: 2024-01-11 | Stop reason: HOSPADM

## 2024-01-09 RX ORDER — MIDAZOLAM HYDROCHLORIDE 2 MG/2ML
2 INJECTION, SOLUTION INTRAMUSCULAR; INTRAVENOUS ONCE
Status: COMPLETED | OUTPATIENT
Start: 2024-01-09 | End: 2024-01-09

## 2024-01-09 RX ORDER — OXYCODONE AND ACETAMINOPHEN 10; 325 MG/1; MG/1
1 TABLET ORAL EVERY 6 HOURS PRN
Status: DISCONTINUED | OUTPATIENT
Start: 2024-01-09 | End: 2024-01-11 | Stop reason: HOSPADM

## 2024-01-09 RX ORDER — IBUPROFEN 600 MG/1
1 TABLET ORAL
Status: DISCONTINUED | OUTPATIENT
Start: 2024-01-09 | End: 2024-01-11 | Stop reason: HOSPADM

## 2024-01-09 RX ORDER — PROPOFOL 10 MG/ML
VIAL (ML) INTRAVENOUS AS NEEDED
Status: DISCONTINUED | OUTPATIENT
Start: 2024-01-09 | End: 2024-01-09 | Stop reason: SURG

## 2024-01-09 RX ORDER — ONDANSETRON 2 MG/ML
4 INJECTION INTRAMUSCULAR; INTRAVENOUS EVERY 6 HOURS PRN
Status: DISCONTINUED | OUTPATIENT
Start: 2024-01-09 | End: 2024-01-11 | Stop reason: HOSPADM

## 2024-01-09 RX ORDER — ASPIRIN 81 MG/1
81 TABLET, CHEWABLE ORAL DAILY
Status: DISCONTINUED | OUTPATIENT
Start: 2024-01-09 | End: 2024-01-11 | Stop reason: HOSPADM

## 2024-01-09 RX ORDER — FENTANYL CITRATE 50 UG/ML
INJECTION, SOLUTION INTRAMUSCULAR; INTRAVENOUS AS NEEDED
Status: DISCONTINUED | OUTPATIENT
Start: 2024-01-09 | End: 2024-01-09 | Stop reason: SURG

## 2024-01-09 RX ORDER — LISINOPRIL 10 MG/1
10 TABLET ORAL
Status: DISCONTINUED | OUTPATIENT
Start: 2024-01-09 | End: 2024-01-11 | Stop reason: HOSPADM

## 2024-01-09 RX ORDER — HEPARIN SODIUM 1000 [USP'U]/ML
INJECTION, SOLUTION INTRAVENOUS; SUBCUTANEOUS AS NEEDED
Status: DISCONTINUED | OUTPATIENT
Start: 2024-01-09 | End: 2024-01-09 | Stop reason: SURG

## 2024-01-09 RX ORDER — SODIUM CHLORIDE 9 MG/ML
INJECTION, SOLUTION INTRAVENOUS CONTINUOUS PRN
Status: DISCONTINUED | OUTPATIENT
Start: 2024-01-09 | End: 2024-01-09 | Stop reason: SURG

## 2024-01-09 RX ORDER — DEXTROSE MONOHYDRATE 25 G/50ML
25 INJECTION, SOLUTION INTRAVENOUS
Status: DISCONTINUED | OUTPATIENT
Start: 2024-01-09 | End: 2024-01-11 | Stop reason: HOSPADM

## 2024-01-09 RX ORDER — ROCURONIUM BROMIDE 10 MG/ML
INJECTION, SOLUTION INTRAVENOUS AS NEEDED
Status: DISCONTINUED | OUTPATIENT
Start: 2024-01-09 | End: 2024-01-09 | Stop reason: SURG

## 2024-01-09 RX ORDER — DEXAMETHASONE SODIUM PHOSPHATE 4 MG/ML
INJECTION, SOLUTION INTRA-ARTICULAR; INTRALESIONAL; INTRAMUSCULAR; INTRAVENOUS; SOFT TISSUE AS NEEDED
Status: DISCONTINUED | OUTPATIENT
Start: 2024-01-09 | End: 2024-01-09 | Stop reason: SURG

## 2024-01-09 RX ORDER — LEVOTHYROXINE SODIUM 0.03 MG/1
25 TABLET ORAL
Status: DISCONTINUED | OUTPATIENT
Start: 2024-01-10 | End: 2024-01-11 | Stop reason: HOSPADM

## 2024-01-09 RX ORDER — MORPHINE SULFATE 2 MG/ML
2 INJECTION, SOLUTION INTRAMUSCULAR; INTRAVENOUS
Status: DISCONTINUED | OUTPATIENT
Start: 2024-01-09 | End: 2024-01-11 | Stop reason: HOSPADM

## 2024-01-09 RX ORDER — HYDRALAZINE HYDROCHLORIDE 20 MG/ML
10 INJECTION INTRAMUSCULAR; INTRAVENOUS EVERY 6 HOURS PRN
Status: DISCONTINUED | OUTPATIENT
Start: 2024-01-09 | End: 2024-01-11 | Stop reason: HOSPADM

## 2024-01-09 RX ADMIN — CEFAZOLIN SODIUM 2 G: 2 INJECTION, SOLUTION INTRAVENOUS at 19:27

## 2024-01-09 RX ADMIN — INSULIN LISPRO 3 UNITS: 100 INJECTION, SOLUTION INTRAVENOUS; SUBCUTANEOUS at 20:54

## 2024-01-09 RX ADMIN — SODIUM CHLORIDE 75 ML/HR: 4.5 INJECTION, SOLUTION INTRAVENOUS at 22:00

## 2024-01-09 RX ADMIN — ASPIRIN 81 MG CHEWABLE TABLET 81 MG: 81 TABLET CHEWABLE at 18:14

## 2024-01-09 RX ADMIN — DEXAMETHASONE SODIUM PHOSPHATE 4 MG: 4 INJECTION, SOLUTION INTRAMUSCULAR; INTRAVENOUS at 12:44

## 2024-01-09 RX ADMIN — DEXMEDETOMIDINE HYDROCHLORIDE 20 MCG: 100 INJECTION, SOLUTION, CONCENTRATE INTRAVENOUS at 13:05

## 2024-01-09 RX ADMIN — PROTAMINE SULFATE 10 MG: 10 INJECTION, SOLUTION INTRAVENOUS at 15:03

## 2024-01-09 RX ADMIN — SODIUM CHLORIDE: 9 INJECTION, SOLUTION INTRAVENOUS at 13:20

## 2024-01-09 RX ADMIN — SUGAMMADEX 200 MG: 100 INJECTION, SOLUTION INTRAVENOUS at 16:25

## 2024-01-09 RX ADMIN — SODIUM CHLORIDE, POTASSIUM CHLORIDE, SODIUM LACTATE AND CALCIUM CHLORIDE 9 ML/HR: 600; 310; 30; 20 INJECTION, SOLUTION INTRAVENOUS at 11:14

## 2024-01-09 RX ADMIN — HYDROCHLOROTHIAZIDE 12.5 MG: 12.5 TABLET ORAL at 18:17

## 2024-01-09 RX ADMIN — CEFAZOLIN SODIUM 2 G: 2 INJECTION, SOLUTION INTRAVENOUS at 12:21

## 2024-01-09 RX ADMIN — LISINOPRIL 10 MG: 10 TABLET ORAL at 18:16

## 2024-01-09 RX ADMIN — CLOPIDOGREL BISULFATE 75 MG: 75 TABLET ORAL at 18:15

## 2024-01-09 RX ADMIN — DEXMEDETOMIDINE HYDROCHLORIDE 40 MCG: 100 INJECTION, SOLUTION, CONCENTRATE INTRAVENOUS at 12:30

## 2024-01-09 RX ADMIN — ROCURONIUM BROMIDE 100 MG: 10 INJECTION, SOLUTION INTRAVENOUS at 12:22

## 2024-01-09 RX ADMIN — SODIUM CHLORIDE, POTASSIUM CHLORIDE, SODIUM LACTATE AND CALCIUM CHLORIDE: 600; 310; 30; 20 INJECTION, SOLUTION INTRAVENOUS at 13:20

## 2024-01-09 RX ADMIN — ACETAMINOPHEN 1000 MG: 500 TABLET ORAL at 11:14

## 2024-01-09 RX ADMIN — DULOXETINE HYDROCHLORIDE 30 MG: 30 CAPSULE, DELAYED RELEASE ORAL at 18:16

## 2024-01-09 RX ADMIN — EPHEDRINE SULFATE 10 MG: 50 INJECTION INTRAVENOUS at 13:08

## 2024-01-09 RX ADMIN — ROCURONIUM BROMIDE 50 MG: 10 INJECTION, SOLUTION INTRAVENOUS at 13:23

## 2024-01-09 RX ADMIN — SPIRONOLACTONE 50 MG: 25 TABLET ORAL at 18:18

## 2024-01-09 RX ADMIN — PROTAMINE SULFATE 10 MG: 10 INJECTION, SOLUTION INTRAVENOUS at 15:04

## 2024-01-09 RX ADMIN — PROPOFOL 150 MG: 10 INJECTION, EMULSION INTRAVENOUS at 12:22

## 2024-01-09 RX ADMIN — PROTAMINE SULFATE 10 MG: 10 INJECTION, SOLUTION INTRAVENOUS at 15:06

## 2024-01-09 RX ADMIN — PROTAMINE SULFATE 10 MG: 10 INJECTION, SOLUTION INTRAVENOUS at 15:05

## 2024-01-09 RX ADMIN — OXYCODONE AND ACETAMINOPHEN 1 TABLET: 10; 325 TABLET ORAL at 20:54

## 2024-01-09 RX ADMIN — HYDROMORPHONE HYDROCHLORIDE 0.5 MG: 1 INJECTION, SOLUTION INTRAMUSCULAR; INTRAVENOUS; SUBCUTANEOUS at 15:36

## 2024-01-09 RX ADMIN — MIDAZOLAM HYDROCHLORIDE 2 MG: 1 INJECTION, SOLUTION INTRAMUSCULAR; INTRAVENOUS at 11:14

## 2024-01-09 RX ADMIN — PREGABALIN 225 MG: 75 CAPSULE ORAL at 20:54

## 2024-01-09 RX ADMIN — ROCURONIUM BROMIDE 30 MG: 10 INJECTION, SOLUTION INTRAVENOUS at 14:42

## 2024-01-09 RX ADMIN — INSULIN LISPRO 3 UNITS: 100 INJECTION, SOLUTION INTRAVENOUS; SUBCUTANEOUS at 18:27

## 2024-01-09 RX ADMIN — HEPARIN SODIUM 8000 UNITS: 1000 INJECTION INTRAVENOUS; SUBCUTANEOUS at 13:57

## 2024-01-09 RX ADMIN — PROTAMINE SULFATE 10 MG: 10 INJECTION, SOLUTION INTRAVENOUS at 15:07

## 2024-01-09 RX ADMIN — FENTANYL CITRATE 100 MCG: 50 INJECTION, SOLUTION INTRAMUSCULAR; INTRAVENOUS at 12:22

## 2024-01-09 RX ADMIN — EPHEDRINE SULFATE 10 MG: 50 INJECTION INTRAVENOUS at 13:19

## 2024-01-09 RX ADMIN — PROTAMINE SULFATE 10 MG: 10 INJECTION, SOLUTION INTRAVENOUS at 15:02

## 2024-01-09 RX ADMIN — AMLODIPINE BESYLATE 5 MG: 5 TABLET ORAL at 20:54

## 2024-01-09 RX ADMIN — LIDOCAINE HYDROCHLORIDE 80 MG: 20 INJECTION, SOLUTION EPIDURAL; INFILTRATION; INTRACAUDAL; PERINEURAL at 12:21

## 2024-01-09 RX ADMIN — ONDANSETRON 4 MG: 2 INJECTION INTRAMUSCULAR; INTRAVENOUS at 12:44

## 2024-01-09 NOTE — INTERVAL H&P NOTE
H&P reviewed.  The patient was examined and there are no changes to the H&P     Mr. Velazquez has severe peripheral vascular disease of the right lower extremity resulting in gangrene.  At this time I am recommending that we proceed to a right femoral-tibial bypass graft.  I have discussed with him in detail the mechanics of the procedure, the indications, benefits, risks, alternatives, as well as potential complications to include but not limited to infection, bleeding, hematoma, transfusion, reoperation.  He appears to understand and desires to proceed.

## 2024-01-09 NOTE — ANESTHESIA POSTPROCEDURE EVALUATION
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        Post Anesthesia Care and Evaluation    Patient location during evaluation: bedside  Patient participation: complete - patient participated  Level of consciousness: awake and alert  Pain management: adequate    Airway patency: patent  Anesthetic complications: No anesthetic complications  PONV Status: none  Cardiovascular status: acceptable  Respiratory status: acceptable  Hydration status: acceptable    Comments: An Anesthesiologist personally participated in the most demanding procedures (including induction and emergence if applicable) in the anesthesia plan, monitored the course of anesthesia administration at frequent intervals and remained physically present and available for immediate diagnosis and treatment of emergencies.

## 2024-01-09 NOTE — ANESTHESIA PREPROCEDURE EVALUATION
Anesthesia Evaluation     Patient summary reviewed and Nursing notes reviewed   no history of anesthetic complications:   NPO Solid Status: > 8 hours  NPO Liquid Status: > 2 hours           Airway   Mallampati: II  TM distance: >3 FB  Neck ROM: full  No difficulty expected  Dental      Pulmonary - normal exam    breath sounds clear to auscultation  (+) a smoker Current,sleep apnea  Cardiovascular - normal exam  Exercise tolerance: good (4-7 METS)    PT is on anticoagulation therapy  Rhythm: regular  Rate: normal    (+) hypertension, past MI , CAD, CABG, PVD, hyperlipidemia      Neuro/Psych- negative ROS  GI/Hepatic/Renal/Endo    (+) obesity, diabetes mellitus, thyroid problem     Musculoskeletal     Abdominal    Substance History      OB/GYN          Other        ROS/Med Hx Other: >4METS,HX HTN,SHAHLA,DM,NSTEMI/CABG 10/6/20,     BRET EF 55-60%,MILD AI,MR, NO VALVE STENOSIS.     LAST CARDS OV 7/27/23. CARDS CLEARANCE-MODERATE RISK. KT                  Anesthesia Plan    ASA 3     general and Richardson     (Patient understands anesthesia not responsible for dental damage.)  intravenous induction     Anesthetic plan, risks, benefits, and alternatives have been provided, discussed and informed consent has been obtained with: patient.    Use of blood products discussed with patient .    Plan discussed with CRNA.    CODE STATUS:

## 2024-01-09 NOTE — OP NOTE
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 Item Serial No.  Lot No. LRB No. Used Action   CLIPAPPLR M/ ENDO LIGACLIP 9 3/8IN SM - [id number] Implant CLIPAPPLR M/ ENDO LIGACLIP 9 3/8IN   [employer]  DIV OF J AND J [id number] Right 1 Implanted   CLIPAPPLR M/ ENDO LIGACLIP9 3/8IN MD - [id number] Implant CLIPAPPLR M/ ENDO LIGACLIP9 3/8IN MD  [employer]  DIV OF J AND J [id number] Right 1 Implanted   GRFT VASC PROPATEN TW REMV/RNG 5MM 90CM - [id number] - [id number] Implant GRFT VASC PROPATEN TW REMV/RNG 5MM 90CM [id number] NA Right 1 Implanted   HEMOST ABS SURGIFOAM  8X12 10MM - [id number] Implant HEMOST ABS SURGIFOAM  8X12 10MM  [employer]  DIV OF J AND J [id number] Right 1 Implanted       Specimen: Right femoral plaque       Findings: Strong biphasic Doppler signal in the native anterior tibialis artery distal to the graft 90% dependent on the graft.    Complications: None    Description of Procedure: The patient was brought into the operating room and was placed in the supine position.  He was then induced into general anesthesia.  He was then prepped and draped in the usual aseptic fashion from the right ankle circumferentially all the way to the mid abdomen to include the bilateral  groins.  A stockinette was applied to the right foot was secured in place using Coban.  Ioban was applied to all the exposed areas of the skin.  A timeout was taken to confirm patient, procedure and laterality.  Local anesthesia was administered at the projected site of incision in the right lower leg as well as in the right groin.  An incision was then made in the right lateral lower leg about mid portion of the leg over the expected location of the center of the anterior compartment.  The subcutaneous tissue was traversed using electrocautery.  The crural fascia was traversed using electrocautery.  The muscle structures of the anterior compartment were then  in the anterior bundle dissected.  The anterior tibialis artery was then dissected and circumferential control obtained using a vessel loop.  The right groin was then inspected and the location of the common femoral artery identified.  An incision was then made in the subcutaneous tissue traversed using a combination of blunt and sharp dissection and electrocautery.  The dissection was deepened down to the femoral sheath which was opened longitudinally using electrocautery.  Blunt and sharp dissection were then used to dissect the common femoral, superficial femoral and profundofemoral arteries.  Circumferential control was obtained of all vessels using Vesseloops.  A tunnel was then created from the right anterior tibial artery in the mid leg to the groin along the lateral aspect of the lower leg then anteriorly in the thigh.  A 5 mm mm Propaten thin-walled PTFE brought through the tunnel.  Extreme care was taken to ensure that the dotted blue line was always on top.  The patient was given systemic anticoagulation.  3 minutes were allowed for the heparin to circulate.  The common femoral artery, superficial femoral and profundofemoral arteries were then clamped.  A longitudinal arteriotomy made using an 11 blade and extended using Kaminski scissors.   The end of the graft was then trimmed and fashioned to me the dimensions of the arteriotomy.  An anastomosis was created using 5-0 Prolene in a running fashion.  Just prior to completion of the anastomosis all vessels were bled.  The anastomosis was completed.  Blood flow through the graft was tested and found to be excellent.  A tourniquet was then applied to the right distal thigh over a soft cloth.  An Esmarch was then applied from the right foot all the way to the tourniquet.  The tourniquet was then inflated to 250 mmHg.  Total tourniquet time was 31 minutes.  The right anterior tibialis artery was then again exposed.  A longitudinal arteriotomy was made using a [place] blade.  The arteriotomy was extended using Kaminski scissors.  The graft was then trimmed in length and fashioned to me the dimensions of the arteriotomy.  A #3 Rizwana was passed distally to ensure that no thrombus was present.  It came back without any associated thrombus on several passes, a total of 3.  The Rizwana was advanced 28 cm from the point of entry of the arteriotomy.  An anastomosis was created using 7-0 Prolene in a running fashion.  Just prior to completion of the anastomosis a #2 dilator was passed distally and advanced without any difficulty.  The tourniquet was then let down and blood flow reestablished and tested through the graft.  There was excellent blood flow.  The anastomosis was completed.  Blood flow was reestablished.  Doppler examination revealed the above-mentioned findings.  The wounds were inspected for hemostasis and hemostasis assured.  Protamine was administered to assist with hemostasis.  Repeat Doppler examination after the protamine revealed the above-mentioned findings.  All wounds were then approximated in layers with the groin wound approximated using 2-0 Vicryl in a running fashion for approximation of the fascia followed by 2-0 Vicryl in a running fashion for approximation of the deep and superficial  subcutaneous tissue followed by 3-0 Vicryl in a running subcuticular stitch for approximation of the skin edges.  The right lower leg area wound was approximated in 3 layers using 2-0 Vicryl in multiple simple interrupted stitches.  An attempt was made at running this layer but it was pulling through the fascia for which reason the decision was made to interrupt.  It was followed by 3-0 Vicryl in a running fashion for approximation of the dermis and 4-0 Monocryl in a running subcuticular stitch for approximation of the skin edges.  Dermabond was applied to both wounds.  Because of missing 7-0 Prolene needle, BV 1, x-rays were obtained to ensure that the needle was not in the wounds.  Multiple clips can be identified in the x-rays but no needle.  The needle was missing when a suture inadvertently was snagged by a moving hand and the suture snapped.  The patient tolerated the procedure well.    Assistant: Nat Gonzalez RN; Elza Travis RN CSA  was responsible for performing the following activities: First assist and their skilled assistance was necessary for the success of this case.    Robert Puga MD     Date: 1/9/2024  Time: 16:37 EST

## 2024-01-10 LAB
ANION GAP SERPL CALCULATED.3IONS-SCNC: 12.6 MMOL/L (ref 5–15)
BASOPHILS # BLD AUTO: 0.04 10*3/MM3 (ref 0–0.2)
BASOPHILS NFR BLD AUTO: 0.4 % (ref 0–1.5)
BUN SERPL-MCNC: 11 MG/DL (ref 6–20)
BUN/CREAT SERPL: 13.1 (ref 7–25)
CALCIUM SPEC-SCNC: 9.1 MG/DL (ref 8.6–10.5)
CHLORIDE SERPL-SCNC: 94 MMOL/L (ref 98–107)
CO2 SERPL-SCNC: 23.4 MMOL/L (ref 22–29)
CREAT SERPL-MCNC: 0.84 MG/DL (ref 0.76–1.27)
DEPRECATED RDW RBC AUTO: 45.1 FL (ref 37–54)
EGFRCR SERPLBLD CKD-EPI 2021: 101.7 ML/MIN/1.73
EOSINOPHIL # BLD AUTO: 0.02 10*3/MM3 (ref 0–0.4)
EOSINOPHIL NFR BLD AUTO: 0.2 % (ref 0.3–6.2)
ERYTHROCYTE [DISTWIDTH] IN BLOOD BY AUTOMATED COUNT: 13.7 % (ref 12.3–15.4)
GLUCOSE BLDC GLUCOMTR-MCNC: 171 MG/DL (ref 70–99)
GLUCOSE BLDC GLUCOMTR-MCNC: 181 MG/DL (ref 70–99)
GLUCOSE BLDC GLUCOMTR-MCNC: 200 MG/DL (ref 70–99)
GLUCOSE BLDC GLUCOMTR-MCNC: 205 MG/DL (ref 70–99)
GLUCOSE SERPL-MCNC: 170 MG/DL (ref 65–99)
HCT VFR BLD AUTO: 37.3 % (ref 37.5–51)
HGB BLD-MCNC: 12.3 G/DL (ref 13–17.7)
IMM GRANULOCYTES # BLD AUTO: 0.12 10*3/MM3 (ref 0–0.05)
IMM GRANULOCYTES NFR BLD AUTO: 1.1 % (ref 0–0.5)
LYMPHOCYTES # BLD AUTO: 2.26 10*3/MM3 (ref 0.7–3.1)
LYMPHOCYTES NFR BLD AUTO: 20.6 % (ref 19.6–45.3)
MAGNESIUM SERPL-MCNC: 1.7 MG/DL (ref 1.6–2.6)
MCH RBC QN AUTO: 29.7 PG (ref 26.6–33)
MCHC RBC AUTO-ENTMCNC: 33 G/DL (ref 31.5–35.7)
MCV RBC AUTO: 90.1 FL (ref 79–97)
MONOCYTES # BLD AUTO: 1.36 10*3/MM3 (ref 0.1–0.9)
MONOCYTES NFR BLD AUTO: 12.4 % (ref 5–12)
NEUTROPHILS NFR BLD AUTO: 65.3 % (ref 42.7–76)
NEUTROPHILS NFR BLD AUTO: 7.18 10*3/MM3 (ref 1.7–7)
NRBC BLD AUTO-RTO: 0 /100 WBC (ref 0–0.2)
PHOSPHATE SERPL-MCNC: 3.9 MG/DL (ref 2.5–4.5)
PLATELET # BLD AUTO: 370 10*3/MM3 (ref 140–450)
PMV BLD AUTO: 8.9 FL (ref 6–12)
POTASSIUM SERPL-SCNC: 4.2 MMOL/L (ref 3.5–5.2)
RBC # BLD AUTO: 4.14 10*6/MM3 (ref 4.14–5.8)
SODIUM SERPL-SCNC: 130 MMOL/L (ref 136–145)
WBC NRBC COR # BLD AUTO: 10.98 10*3/MM3 (ref 3.4–10.8)

## 2024-01-10 PROCEDURE — 80048 BASIC METABOLIC PNL TOTAL CA: CPT | Performed by: SURGERY

## 2024-01-10 PROCEDURE — 83735 ASSAY OF MAGNESIUM: CPT | Performed by: PHYSICIAN ASSISTANT

## 2024-01-10 PROCEDURE — 63710000001 INSULIN LISPRO (HUMAN) PER 5 UNITS: Performed by: SURGERY

## 2024-01-10 PROCEDURE — 82948 REAGENT STRIP/BLOOD GLUCOSE: CPT

## 2024-01-10 PROCEDURE — 84100 ASSAY OF PHOSPHORUS: CPT | Performed by: PHYSICIAN ASSISTANT

## 2024-01-10 PROCEDURE — 99024 POSTOP FOLLOW-UP VISIT: CPT | Performed by: SURGERY

## 2024-01-10 PROCEDURE — 25010000002 CEFAZOLIN IN DEXTROSE 2-4 GM/100ML-% SOLUTION: Performed by: SURGERY

## 2024-01-10 PROCEDURE — 25010000002 ENOXAPARIN PER 10 MG: Performed by: SURGERY

## 2024-01-10 PROCEDURE — 85025 COMPLETE CBC W/AUTO DIFF WBC: CPT | Performed by: SURGERY

## 2024-01-10 PROCEDURE — 63710000001 INSULIN DETEMIR PER 5 UNITS: Performed by: SURGERY

## 2024-01-10 RX ORDER — TRAZODONE HYDROCHLORIDE 50 MG/1
50 TABLET ORAL NIGHTLY
COMMUNITY
Start: 2024-01-06

## 2024-01-10 RX ADMIN — PREGABALIN 225 MG: 75 CAPSULE ORAL at 09:26

## 2024-01-10 RX ADMIN — INSULIN LISPRO 2 UNITS: 100 INJECTION, SOLUTION INTRAVENOUS; SUBCUTANEOUS at 20:52

## 2024-01-10 RX ADMIN — LEVOTHYROXINE SODIUM 25 MCG: 0.03 TABLET ORAL at 05:50

## 2024-01-10 RX ADMIN — SPIRONOLACTONE 50 MG: 25 TABLET ORAL at 09:26

## 2024-01-10 RX ADMIN — METOPROLOL TARTRATE 100 MG: 25 TABLET, FILM COATED ORAL at 17:43

## 2024-01-10 RX ADMIN — INSULIN LISPRO 2 UNITS: 100 INJECTION, SOLUTION INTRAVENOUS; SUBCUTANEOUS at 07:36

## 2024-01-10 RX ADMIN — OXYCODONE AND ACETAMINOPHEN 1 TABLET: 10; 325 TABLET ORAL at 12:12

## 2024-01-10 RX ADMIN — ENOXAPARIN SODIUM 40 MG: 100 INJECTION SUBCUTANEOUS at 09:26

## 2024-01-10 RX ADMIN — INSULIN DETEMIR 10 UNITS: 100 INJECTION, SOLUTION SUBCUTANEOUS at 20:52

## 2024-01-10 RX ADMIN — DULOXETINE HYDROCHLORIDE 30 MG: 30 CAPSULE, DELAYED RELEASE ORAL at 09:26

## 2024-01-10 RX ADMIN — INSULIN LISPRO 3 UNITS: 100 INJECTION, SOLUTION INTRAVENOUS; SUBCUTANEOUS at 17:49

## 2024-01-10 RX ADMIN — AMLODIPINE BESYLATE 5 MG: 5 TABLET ORAL at 20:52

## 2024-01-10 RX ADMIN — PREGABALIN 225 MG: 75 CAPSULE ORAL at 20:52

## 2024-01-10 RX ADMIN — SODIUM CHLORIDE 75 ML/HR: 4.5 INJECTION, SOLUTION INTRAVENOUS at 09:01

## 2024-01-10 RX ADMIN — CEFAZOLIN SODIUM 2 G: 2 INJECTION, SOLUTION INTRAVENOUS at 03:58

## 2024-01-10 RX ADMIN — ASPIRIN 81 MG CHEWABLE TABLET 81 MG: 81 TABLET CHEWABLE at 09:26

## 2024-01-10 RX ADMIN — HYDROCHLOROTHIAZIDE 12.5 MG: 12.5 TABLET ORAL at 09:26

## 2024-01-10 RX ADMIN — CLOPIDOGREL BISULFATE 75 MG: 75 TABLET ORAL at 09:26

## 2024-01-10 RX ADMIN — INSULIN LISPRO 3 UNITS: 100 INJECTION, SOLUTION INTRAVENOUS; SUBCUTANEOUS at 12:12

## 2024-01-10 RX ADMIN — METOPROLOL TARTRATE 100 MG: 25 TABLET, FILM COATED ORAL at 05:50

## 2024-01-10 RX ADMIN — LISINOPRIL 10 MG: 10 TABLET ORAL at 09:26

## 2024-01-10 RX ADMIN — OXYCODONE AND ACETAMINOPHEN 1 TABLET: 10; 325 TABLET ORAL at 18:41

## 2024-01-10 NOTE — PLAN OF CARE
Goal Outcome Evaluation:         Patient arrived to unit from PACU after Vascular surgery. Patient still has dopplerable pulses in right foot. Right great toe was redressed and it necrotic and looks like gangrene, MD aware.     Patient's vitals signs remained normal throughout the shift. Arterial line was discontinued this AM along with Smith catheter. Urine output was substantial. Patient required pain medications once during the shift.     Anticipate patient to be discharged home this evening. Continue with current plan of care for now.

## 2024-01-10 NOTE — SIGNIFICANT NOTE
Wound Eval / Progress Noted     [facility]     Patient Name: Jd Velazquez  : 1965  MRN: [id number]  Today's Date: 1/10/2024                 Admit Date: 2024    Visit Dx:    ICD-10-CM ICD-9-CM   1. 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, unspecified laterality  I70.269 440.24         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, unspecified laterality        Past Medical History:   Diagnosis Date    Allergic rhinitis 2015    Anesthesia     DIFFICULTY WAKING UP POST SURGERY    Benign essential hypertension 2016    CAD (coronary artery disease)     Diabetes mellitus     Foot ulcer     Hyperlipidemia     Hypertension     Hypokalemia 2016    Hypothyroidism 2014    Insomnia, unspecified 06/15/2016    Microalbuminuria due to type 2 diabetes mellitus 10/15/2015    Mixed hyperlipidemia 10/15/2015    Myocardial infarction     Obesity     Skin cancer         Past Surgical History:   Procedure Laterality Date    ANKLE ARTHROSCOPY W/ OPEN REPAIR      APPENDECTOMY      CARDIAC CATHETERIZATION      PCI to circumflex    CARDIAC CATHETERIZATION Right 10/26/2023    Procedure: Aortogram with right leg angiogram, possible angioplasty or stenting;  Surgeon: Robert Puga MD;  Location: [facility] CATH INVASIVE LOCATION;  Service: Vascular;  Laterality: Right;    CARDIAC CATHETERIZATION Right 12/15/2023    Procedure: Right leg angiogram, possible angioplasty or stenting;  Surgeon: Robert Puga MD;  Location: [facility] CATH INVASIVE LOCATION;  Service: Vascular;  Laterality: Right;    CORONARY ARTERY BYPASS GRAFT N/A 10/08/2020    Procedure: STERNOTOMY, CORONARY ARTERY BYPASS GRAFTING TIME 4 WITH LEFT KELIS  AND ENDOSCOPICALLY HARVESTED LEFT GREATER SAPHENOUS VEIN AND PRP.;  Surgeon: Jr Girma Chiu MD;  Location: [facility] OR;  Service: Cardiothoracic;  Laterality: N/A;    FEMORAL TIBIAL BYPASS Right 2024    Procedure: Right  femoral-tibial bypass graft;  Surgeon: Robert Puga MD;  Location: [facility] MAIN OR;  Service: Vascular;  Laterality: Right;    HEEL SPUR SURGERY      HERNIA REPAIR      KNEE ARTHROSCOPY      MULTIPLE TIMES ON BOTH KNEES    SKIN CANCER EXCISION      BACK    TOE SURGERY Right     DEBRIDMENT RIGHT GREAT TOE    TONSILLECTOMY           Physical Assessment:  Wound 01/09/24 1315 Right lower leg Incision (Active)   Dressing Appearance open to air 01/10/24 1605   Closure Glue 01/10/24 1605   Base clean;dry;red;pink 01/10/24 1605   Periwound intact;dry;pink 01/10/24 1605   Periwound Temperature warm 01/10/24 1605   Periwound Skin Turgor soft 01/10/24 1605   Edges rolled/closed 01/10/24 1605   Drainage Amount none 01/10/24 1605   Dressing Care open to air 01/10/24 1605       Wound 01/09/24 2154 Right anterior great toe Diabetic Ulcer (Active)   Wound Image   01/10/24 1605   Dressing Appearance intact;no drainage 01/10/24 1605   Closure None 01/10/24 1605   Base black eschar;dry;necrotic 01/10/24 1605   Periwound intact;dry;pink 01/10/24 1605   Periwound Temperature cool 01/10/24 1605   Periwound Skin Turgor firm 01/10/24 1605   Edges rolled/closed 01/10/24 1605   Drainage Amount none 01/10/24 1605   Care, Wound cleansed with;sterile normal saline 01/10/24 1605   Dressing Care dressing applied;gauze, wet-to-moist 01/10/24 1605   Periwound Care dry periwound area maintained 01/10/24 1605        Wound Check / Follow-up:  Patient seen today for new wound care consult. Patient is postop day #1 status post right femoral AT bypass graft. Patient is able to ambulate independently in his bed and was sitting on the side of his bed when RN arrived to patient's room. Patient states he see's  for podiatry.     Patient with a right great toe diabetic ulcer. Wound is covered with firm, black eschar. Periwound is pink and dry with good capillary refill. Recommending application of betadine wet to moist fluffed gauze to wound  bed, cover with dry gauze and wrap with gauze roll. Wound care to be provided daily.     Patient right lower leg incision site is clean, dry and intact with surgical glue in place. Incision site left open to air.     Impression: Right great toe diabetic ulcer    Short term goals:  Regain skin integrity, skin protection, skin care. Daily wound care.     Belle Salmon RN    1/10/2024    16:38 EST

## 2024-01-10 NOTE — PAYOR COMM NOTE
"UR DEPARTMENT    Milagro Reddy RN  Phone [phone]  Fax [phone]    [address] [place] [address]    NPI [id number]  TAX ID [id number]    PHYSICIAN NAME AND NPI    LUZ MONTAGUE   [id number]    BED TYPE  INPATIENT CRITICAL CARE    TYPE OF ADMISSION: POST OP ADMISSION    ICD 10 CODE  I70.269    DATE OF ADMISSION 01/09/2024      Jd Velazquez (58 y.o. Male)       Date of Birth   1965    Social Security Number       Address   [address] [place] [address]    Home Phone   [phone]    MRN   [id number]       Tenriism   Jew    Marital Status                               Admission Date   1/9/24    Admission Type   Elective    Admitting Provider   Luz Montague MD    Attending Provider   Luz Montague MD    Department, Room/Bed   [facility] CORONARY CARE UNIT, C01/1       Discharge Date       Discharge Disposition       Discharge Destination                                 Attending Provider: Luz Montague MD    Allergies: Lortab [Hydrocodone-acetaminophen]    Isolation: None   Infection: None   Code Status: CPR    Ht: 180.3 cm (71\")   Wt: 109 kg (239 lb 6.7 oz)    Admission Cmt: None   Principal Problem: 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, unspecified laterality [I70.269]                   Active Insurance as of 1/9/2024       Primary Coverage       Payor Plan Insurance Group Employer/Plan Group    [facility] [employer] [place] PPO [id number]       Payor Plan Address Payor Plan Phone Number Payor Plan Fax Number Effective Dates    [address] [phone]  1/1/2020 - None Entered    [facility] [id number]         Subscriber Name Subscriber Birth Date Member ID       ARDEN VELAZQUEZ 6/12/1964 [id number]                     Emergency Contacts        (Rel.) Home Phone Work Phone Mobile Phone    SANDRAARDEN (Spouse) [phone] -- [phone]      "         [place]: NPI [id number] Tax ID [id number]     History & Physical        Robert Puga MD at 24 1054          H&P reviewed.  The patient was examined and there are no changes to the H&P     Mr. Velazquez has severe peripheral vascular disease of the right lower extremity resulting in gangrene.  At this time I am recommending that we proceed to a right femoral-tibial bypass graft.  I have discussed with him in detail the mechanics of the procedure, the indications, benefits, risks, alternatives, as well as potential complications to include but not limited to infection, bleeding, hematoma, transfusion, reoperation.  He appears to understand and desires to proceed.    Electronically signed by Robert Puga MD at 24 1058   Source Note           [facility]   HISTORY AND PHYSICAL    Patient Name: Jd Velazquez  : 1965  MRN: [id number]  Primary Care Physician:  Dacia Newsome, SOHAM  Date of admission: (Not on file)    Subjective  Subjective   Chief Complaint: Right foot ulcer    HPI:    Jd Velazquez is a 58 y.o. male with the right foot wound that has failed to heal probably.  He previously underwent angiography with endovascular intervention with excellent results however on surveillance there is severe restenosis at the origin of the anterior tibialis.  At this time for repeat intervention.    Review of Systems    Non contributory except for the History of Present Illness    Personal History     Past Medical History:   Diagnosis Date   • Allergic rhinitis 2015   • Benign essential hypertension 2016   • CAD (coronary artery disease)    • Diabetes mellitus    • Foot ulcer    • Hyperlipidemia    • Hypertension    • Hypokalemia 2016   • Hypothyroidism 2014   • Insomnia, unspecified 06/15/2016   • Microalbuminuria due to type 2 diabetes mellitus 10/15/2015   • Mixed hyperlipidemia 10/15/2015   • Myocardial infarction    • Obesity      Past  Surgical History:   Procedure Laterality Date   • ANKLE ARTHROSCOPY W/ OPEN REPAIR     • APPENDECTOMY     • CARDIAC CATHETERIZATION  2014    PCI to circumflex   • CARDIAC CATHETERIZATION Right 10/26/2023    Procedure: Aortogram with right leg angiogram, possible angioplasty or stenting;  Surgeon: Robert Puga MD;  Location: [facility] CATH INVASIVE LOCATION;  Service: Vascular;  Laterality: Right;   • CORONARY ARTERY BYPASS GRAFT N/A 10/8/2020    Procedure: STERNOTOMY, CORONARY ARTERY BYPASS GRAFTING TIME 4 WITH LEFT KELSI  AND ENDOSCOPICALLY HARVESTED LEFT GREATER SAPHENOUS VEIN AND PRP.;  Surgeon: Jr Girma Chiu MD;  Location: [facility] MAIN OR;  Service: Cardiothoracic;  Laterality: N/A;   • HEEL SPUR SURGERY     • HERNIA REPAIR     • REPLACEMENT TOTAL KNEE BILATERAL     • TONSILLECTOMY       Family History: family history includes Heart disease in an other family member. Otherwise pertinent FHx was reviewed and not pertinent to current issue.    Social History:  reports that he has been smoking cigarettes. He has been smoking an average of .5 packs per day. He has never used smokeless tobacco. He reports that he does not drink alcohol and does not use drugs.    Home Medications:  No current facility-administered medications on file prior to encounter.     Current Outpatient Medications on File Prior to Encounter   Medication Sig   • amantadine (SYMMETREL) 100 MG tablet    • amLODIPine (NORVASC) 5 MG tablet Take 1 tablet every day by oral route for 30 days.   • aspirin 81 MG chewable tablet Chew 1 tablet Daily.   • cetirizine (zyrTEC) 10 MG tablet Take 1 tablet by mouth Every Night.   • clobetasol (TEMOVATE) 0.05 % cream Apply 1 application  topically to the appropriate area as directed 2 (Two) Times a Day.   • EPINEPHrine (EPIPEN) 0.3 MG/0.3ML solution auto-injector injection 0.3 mL.   • glucose blood test strip Test 4 times per day. (Patient taking differently: Test 4 times per day.)   • hydrOXYzine (ATARAX)  "25 MG tablet Take 1 tablet by mouth 3 (Three) Times a Day As Needed for Itching.   • insulin detemir (Levemir) 100 UNIT/ML injection Inject 10 Units under the skin into the appropriate area as directed Every Night. Increase one unit per night goal fasting  2 hours after meals 140   • Insulin Pen Needle (Pen Needles 3/16\") 31G X 5 MM misc Use 1 each Every Night.   • levothyroxine (SYNTHROID, LEVOTHROID) 25 MCG tablet Take 1 tablet by mouth Daily.   • lidocaine (LIDODERM) 5 % Place 3 patches on the skin as directed by provider Daily. Remove & Discard patch within 12 hours or as directed by MD   • lisinopril-hydrochlorothiazide (PRINZIDE,ZESTORETIC) 20-12.5 MG per tablet TAKE 2 TABLETS BY MOUTH DAILY   • metFORMIN ER (GLUCOPHAGE-XR) 500 MG 24 hr tablet    • metoprolol tartrate (LOPRESSOR) 100 MG tablet Take 1 tablet by mouth Every 12 (Twelve) Hours.   • OneTouch Delica Lancets 33G misc test 4 times per day   • pregabalin (Lyrica) 225 MG capsule Take 1 capsule by mouth 2 (Two) Times a Day.   • Repatha SureClick solution auto-injector SureClick injection INJECT 1ML UNDER THE SKIN INTO THE APPROPRIATE AREA AS DIRECTED EVERY 14 DAYS   • spironolactone (Aldactone) 50 MG tablet Take 1 tablet by mouth Daily.   • temazepam (Restoril) 30 MG capsule Take 1 capsule by mouth At Night As Needed for Sleep.   • Tirzepatide (Mounjaro) 5 MG/0.5ML solution pen-injector Inject 0.5 mL under the skin into the appropriate area as directed 1 (One) Time Per Week.   • traMADol (ULTRAM) 50 MG tablet Take 1 tablet by mouth Every 6 (Six) Hours As Needed for Moderate Pain.          Allergies:  Allergies   Allergen Reactions   • Lortab [Hydrocodone-Acetaminophen] Itching     Objective  Objective   Vitals:        Physical Exam    General: Awake, alert, NAD   Eyes:  IA   Neck: Supple   Lungs: Clear   Heart: RRR   Abdomen: benign   Musculoskeletal:  normal motor tone, symmetric   Skin: warm, normal turgor   Neuro: strength 5/5 all " extremities       Assessment & Plan  Assessment / Plan   Active Hospital Problems:  Active Hospital Problems    Diagnosis    • **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      Diagnoses and all orders for this visit:    1. 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 Velazquez had a good result after angiography with endovascular intervention after recanalization of the right anterior tibialis.  The duplex however demonstrates significant restenosis at the origin.  I am recommending that we proceed to repeat angiography with plans for likely the use of a drug-coated balloon.  I have discussed with the patient in detail the mechanics of the procedure, the indications, benefits, risks, alternatives as well as potential complications to include but not limited to infection, bleeding, inability to cross the occlusion, vascular injury requiring surgical repair.  He appears to understand and desires to proceed.         Electronically signed by Robert Puga MD, 23, 1:47 PM EST.    Electronically signed by Robert Puga MD at 23 1348                 Robert Puga MD at 23 1347           [facility]   HISTORY AND PHYSICAL    Patient Name: Jd Velazquez  : 1965  MRN: [id number]  Primary Care Physician:  Dacia Newsome, APRN  Date of admission: (Not on file)    Subjective  Subjective     Chief Complaint: Right foot ulcer    HPI:    Jd Velazquez is a 58 y.o. male with the right foot wound that has failed to heal probably.  He previously underwent angiography with endovascular intervention with excellent results however on surveillance there is severe restenosis at the origin of the anterior tibialis.  At this time for repeat intervention.    Review of Systems    Non  contributory except for the History of Present Illness    Personal History     Past Medical History:   Diagnosis Date   • Allergic rhinitis 01/12/2015   • Benign essential hypertension 03/08/2016   • CAD (coronary artery disease)    • Diabetes mellitus    • Foot ulcer    • Hyperlipidemia    • Hypertension    • Hypokalemia 02/23/2016   • Hypothyroidism 04/24/2014   • Insomnia, unspecified 06/15/2016   • Microalbuminuria due to type 2 diabetes mellitus 10/15/2015   • Mixed hyperlipidemia 10/15/2015   • Myocardial infarction    • Obesity        Past Surgical History:   Procedure Laterality Date   • ANKLE ARTHROSCOPY W/ OPEN REPAIR     • APPENDECTOMY     • CARDIAC CATHETERIZATION  2014    PCI to circumflex   • CARDIAC CATHETERIZATION Right 10/26/2023    Procedure: Aortogram with right leg angiogram, possible angioplasty or stenting;  Surgeon: Robert Puga MD;  Location: [facility] INVASIVE LOCATION;  Service: Vascular;  Laterality: Right;   • CORONARY ARTERY BYPASS GRAFT N/A 10/8/2020    Procedure: STERNOTOMY, CORONARY ARTERY BYPASS GRAFTING TIME 4 WITH LEFT KELSI  AND ENDOSCOPICALLY HARVESTED LEFT GREATER SAPHENOUS VEIN AND PRP.;  Surgeon: Jr Girma Chiu MD;  Location: [facility] OR;  Service: Cardiothoracic;  Laterality: N/A;   • HEEL SPUR SURGERY     • HERNIA REPAIR     • REPLACEMENT TOTAL KNEE BILATERAL     • TONSILLECTOMY         Family History: family history includes Heart disease in an other family member. Otherwise pertinent FHx was reviewed and not pertinent to current issue.    Social History:  reports that he has been smoking cigarettes. He has been smoking an average of .5 packs per day. He has never used smokeless tobacco. He reports that he does not drink alcohol and does not use drugs.    Home Medications:  No current facility-administered medications on file prior to encounter.     Current Outpatient Medications on File Prior to Encounter   Medication Sig   • amantadine (SYMMETREL) 100 MG  "tablet    • amLODIPine (NORVASC) 5 MG tablet Take 1 tablet every day by oral route for 30 days.   • aspirin 81 MG chewable tablet Chew 1 tablet Daily.   • cetirizine (zyrTEC) 10 MG tablet Take 1 tablet by mouth Every Night.   • clobetasol (TEMOVATE) 0.05 % cream Apply 1 application  topically to the appropriate area as directed 2 (Two) Times a Day.   • EPINEPHrine (EPIPEN) 0.3 MG/0.3ML solution auto-injector injection 0.3 mL.   • glucose blood test strip Test 4 times per day. (Patient taking differently: Test 4 times per day.)   • hydrOXYzine (ATARAX) 25 MG tablet Take 1 tablet by mouth 3 (Three) Times a Day As Needed for Itching.   • insulin detemir (Levemir) 100 UNIT/ML injection Inject 10 Units under the skin into the appropriate area as directed Every Night. Increase one unit per night goal fasting  2 hours after meals 140   • Insulin Pen Needle (Pen Needles 3/16\") 31G X 5 MM misc Use 1 each Every Night.   • levothyroxine (SYNTHROID, LEVOTHROID) 25 MCG tablet Take 1 tablet by mouth Daily.   • lidocaine (LIDODERM) 5 % Place 3 patches on the skin as directed by provider Daily. Remove & Discard patch within 12 hours or as directed by MD   • lisinopril-hydrochlorothiazide (PRINZIDE,ZESTORETIC) 20-12.5 MG per tablet TAKE 2 TABLETS BY MOUTH DAILY   • metFORMIN ER (GLUCOPHAGE-XR) 500 MG 24 hr tablet    • metoprolol tartrate (LOPRESSOR) 100 MG tablet Take 1 tablet by mouth Every 12 (Twelve) Hours.   • OneTouch Delica Lancets 33G misc test 4 times per day   • pregabalin (Lyrica) 225 MG capsule Take 1 capsule by mouth 2 (Two) Times a Day.   • Repatha SureClick solution auto-injector SureClick injection INJECT 1ML UNDER THE SKIN INTO THE APPROPRIATE AREA AS DIRECTED EVERY 14 DAYS   • spironolactone (Aldactone) 50 MG tablet Take 1 tablet by mouth Daily.   • temazepam (Restoril) 30 MG capsule Take 1 capsule by mouth At Night As Needed for Sleep.   • Tirzepatide (Mounjaro) 5 MG/0.5ML solution pen-injector Inject 0.5 mL " under the skin into the appropriate area as directed 1 (One) Time Per Week.   • traMADol (ULTRAM) 50 MG tablet Take 1 tablet by mouth Every 6 (Six) Hours As Needed for Moderate Pain.          Allergies:  Allergies   Allergen Reactions   • Lortab [Hydrocodone-Acetaminophen] Itching       Objective  Objective     Vitals:        Physical Exam    General: Awake, alert, NAD   Eyes:  AI   Neck: Supple   Lungs: Clear   Heart: RRR   Abdomen: benign   Musculoskeletal:  normal motor tone, symmetric   Skin: warm, normal turgor   Neuro: strength 5/5 all extremities       Assessment & Plan  Assessment / Plan     Active Hospital Problems:  Active Hospital Problems    Diagnosis    • **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        Diagnoses and all orders for this visit:    1. 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 Velazquez had a good result after angiography with endovascular intervention after recanalization of the right anterior tibialis.  The duplex however demonstrates significant restenosis at the origin.  I am recommending that we proceed to repeat angiography with plans for likely the use of a drug-coated balloon.  I have discussed with the patient in detail the mechanics of the procedure, the indications, benefits, risks, alternatives as well as potential complications to include but not limited to infection, bleeding, inability to cross the occlusion, vascular injury requiring surgical repair.  He appears to understand and desires to proceed.         Electronically signed by Robert Puga MD, 12/14/23, 1:47 PM EST.    Electronically signed by Robert Puga MD at 12/14/23 6205       Current Facility-Administered Medications   Medication Dose Route Frequency Provider Last Rate Last Admin   • amLODIPine (NORVASC)  tablet 5 mg  5 mg Oral Nightly Robert Puga MD   5 mg at 01/09/24 2054   • aspirin chewable tablet 81 mg  81 mg Oral Daily Robert Puga MD   81 mg at 01/09/24 1814   • clopidogrel (PLAVIX) tablet 75 mg  75 mg Oral Daily Robert Puga MD   75 mg at 01/09/24 1815   • dextrose (D50W) (25 g/50 mL) IV injection 25 g  25 g Intravenous Q15 Min PRN Robert Puga MD       • dextrose (GLUTOSE) oral gel 15 g  15 g Oral Q15 Min PRN Robert Puga MD       • DULoxetine (CYMBALTA) DR capsule 30 mg  30 mg Oral Daily Robert Puga MD   30 mg at 01/09/24 1816   • Enoxaparin Sodium (LOVENOX) syringe 40 mg  40 mg Subcutaneous Daily Robert Puga MD       • glucagon (GLUCAGEN) injection 1 mg  1 mg Intramuscular Q15 Min PRN Robert Puga MD       • hydrALAZINE (APRESOLINE) injection 10 mg  10 mg Intravenous Q6H PRN Robert Puga MD       • lisinopril (PRINIVIL,ZESTRIL) tablet 10 mg  10 mg Oral Q24H Robert Puga MD   10 mg at 01/09/24 1816    And   • hydroCHLOROthiazide (HYDRODIURIL) tablet 12.5 mg  12.5 mg Oral Q24H Robert Puga MD   12.5 mg at 01/09/24 1817   • insulin detemir (LEVEMIR) injection 10 Units  10 Units Subcutaneous Nightly Robert Puga MD       • Insulin Lispro (humaLOG) injection 2-7 Units  2-7 Units Subcutaneous 4x Daily AC & at Bedtime Robert Puga MD   2 Units at 01/10/24 0736   • labetalol (NORMODYNE,TRANDATE) injection 10 mg  10 mg Intravenous Q6H PRN Robert Puga MD        Followed by   • labetalol (NORMODYNE,TRANDATE) injection 10 mg  10 mg Intravenous Q6H PRN Robert Puga MD       • levothyroxine (SYNTHROID, LEVOTHROID) tablet 25 mcg  25 mcg Oral Q AM Robert Puga MD   25 mcg at 01/10/24 0550   • metoprolol tartrate (LOPRESSOR) tablet 100 mg  100 mg Oral Q12H Robert Puga MD   100 mg at 01/10/24 0550   • morphine injection 2 mg  2 mg Intravenous Q2H PRN Robert Puga MD       • morphine injection 4 mg  4 mg Intravenous Q2H PRN Robert Puga MD       • ondansetron  (ZOFRAN) injection 4 mg  4 mg Intravenous Q6H PRN Robert Puga MD       • oxyCODONE-acetaminophen (PERCOCET)  MG per tablet 1 tablet  1 tablet Oral Q6H PRN Robert Puga MD   1 tablet at 01/09/24 2054   • oxyCODONE-acetaminophen (PERCOCET) 5-325 MG per tablet 1 tablet  1 tablet Oral Q6H PRN Robert Puga MD       • pregabalin (LYRICA) capsule 225 mg  225 mg Oral BID Robert Puga MD   225 mg at 01/09/24 2054   • sodium chloride 0.45 % infusion  75 mL/hr Intravenous Continuous Robert Puga MD 75 mL/hr at 01/09/24 2200 75 mL/hr at 01/09/24 2200   • spironolactone (ALDACTONE) tablet 50 mg  50 mg Oral Daily Robert Puga MD   50 mg at 01/09/24 1818        Operative/Procedure Notes (last 24 hours)        Robert Puga MD at 01/09/24 1300          FEMORAL TIBIAL BYPASS  Procedure Report    Patient Name:  Jd Velazquez  YOB: 1965    Date of Surgery:  1/9/2024     Indications: Dry gangrene of the right great toe    Pre-op Diagnosis:   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, unspecified laterality [I70.269]       Post-Op Diagnosis Codes:     * 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, unspecified laterality [I70.269]    Procedure(s):  Right femoral-tibial bypass graft, right common femoral artery endarterectomy    Staff:  Surgeon(s):  Robert Puga MD    Assistant: Nat Gonzalez RN; Elza Travis RN CSA    Anesthesia: General    Estimated Blood Loss: 200 mL    Implants:    Implant Name Type Inv. Item Serial No.  Lot No. LRB No. Used Action   CLIPAPPLR M/ ENDO LIGACLIP 9 3/8IN  - [id number] Implant CLIPAPPLR M/ ENDO LIGACLIP 9 3/8IN   [employer]  DIV OF J AND J [id number] Right 1 Implanted   CLIPAPPLR M/ ENDO LIGACLIP9 3/8IN MD - [id number] Implant CLIPAPPLR M/ ENDO LIGACLIP9 3/8IN MD  [employer]  DIV OF J AND J [id number] Right 1 Implanted   GRFT VASC PROPATEN TW REMV/RNG 5MM 90CM - [id number] -  [id number] Implant [facility] VASC PROPATEN TW REMV/RNG 5MM 90CM [id number] [employer] NA Right 1 Implanted   HEMOST ABS SURGIFOAM  8X12 10MM - [id number] Implant HEMOST ABS SURGIFOAM  8X12 10MM  [employer]  DIV OF J AND J [id number] Right 1 Implanted       Specimen: Right femoral plaque       Findings: Strong biphasic Doppler signal in the native anterior tibialis artery distal to the graft 90% dependent on the graft.    Complications: None    Description of Procedure: The patient was brought into the operating room and was placed in the supine position.  He was then induced into general anesthesia.  He was then prepped and draped in the usual aseptic fashion from the right ankle circumferentially all the way to the mid abdomen to include the bilateral groins.  A stockinette was applied to the right foot was secured in place using Coban.  Ioban was applied to all the exposed areas of the skin.  A timeout was taken to confirm patient, procedure and laterality.  Local anesthesia was administered at the projected site of incision in the right lower leg as well as in the right groin.  An incision was then made in the right lateral lower leg about mid portion of the leg over the expected location of the center of the anterior compartment.  The subcutaneous tissue was traversed using electrocautery.  The crural fascia was traversed using electrocautery.  The muscle structures of the anterior compartment were then  in the anterior bundle dissected.  The anterior tibialis artery was then dissected and circumferential control obtained using a vessel loop.  The right groin was then inspected and the location of the common femoral artery identified.  An incision was then made in the subcutaneous tissue traversed using a combination of blunt and sharp dissection and electrocautery.  The dissection was deepened down to the femoral sheath which was opened longitudinally using electrocautery.  Blunt and sharp  dissection were then used to dissect the common femoral, superficial femoral and profundofemoral arteries.  Circumferential control was obtained of all vessels using Vesseloops.  A tunnel was then created from the right anterior tibial artery in the mid leg to the groin along the lateral aspect of the lower leg then anteriorly in the thigh.  A 5 mm mm Propaten thin-walled PTFE brought through the tunnel.  Extreme care was taken to ensure that the dotted blue line was always on top.  The patient was given systemic anticoagulation.  3 minutes were allowed for the heparin to circulate.  The common femoral artery, superficial femoral and profundofemoral arteries were then clamped.  A longitudinal arteriotomy made using an 11 blade and extended using Kaminski scissors.  The end of the graft was then trimmed and fashioned to me the dimensions of the arteriotomy.  An anastomosis was created using 5-0 Prolene in a running fashion.  Just prior to completion of the anastomosis all vessels were bled.  The anastomosis was completed.  Blood flow through the graft was tested and found to be excellent.  A tourniquet was then applied to the right distal thigh over a soft cloth.  An Esmarch was then applied from the right foot all the way to the tourniquet.  The tourniquet was then inflated to 250 mmHg.  Total tourniquet time was 31 minutes.  The right anterior tibialis artery was then again exposed.  A longitudinal arteriotomy was made using a Howard blade.  The arteriotomy was extended using Kaminski scissors.  The graft was then trimmed in length and fashioned to me the dimensions of the arteriotomy.  A #3 Rizwana was passed distally to ensure that no thrombus was present.  It came back without any associated thrombus on several passes, a total of 3.  The Rizwana was advanced 28 cm from the point of entry of the arteriotomy.  An anastomosis was created using 7-0 Prolene in a running fashion.  Just prior to completion of the anastomosis  a #2 dilator was passed distally and advanced without any difficulty.  The tourniquet was then let down and blood flow reestablished and tested through the graft.  There was excellent blood flow.  The anastomosis was completed.  Blood flow was reestablished.  Doppler examination revealed the above-mentioned findings.  The wounds were inspected for hemostasis and hemostasis assured.  Protamine was administered to assist with hemostasis.  Repeat Doppler examination after the protamine revealed the above-mentioned findings.  All wounds were then approximated in layers with the groin wound approximated using 2-0 Vicryl in a running fashion for approximation of the fascia followed by 2-0 Vicryl in a running fashion for approximation of the deep and superficial subcutaneous tissue followed by 3-0 Vicryl in a running subcuticular stitch for approximation of the skin edges.  The right lower leg area wound was approximated in 3 layers using 2-0 Vicryl in multiple simple interrupted stitches.  An attempt was made at running this layer but it was pulling through the fascia for which reason the decision was made to interrupt.  It was followed by 3-0 Vicryl in a running fashion for approximation of the dermis and 4-0 Monocryl in a running subcuticular stitch for approximation of the skin edges.  Dermabond was applied to both wounds.  Because of missing 7-0 Prolene needle, BV 1, x-rays were obtained to ensure that the needle was not in the wounds.  Multiple clips can be identified in the x-rays but no needle.  The needle was missing when a suture inadvertently was snagged by a moving hand and the suture snapped.  The patient tolerated the procedure well.    Assistant: Nat Gonzalez RN; Elza Travis RN CSA  was responsible for performing the following activities: First assist and their skilled assistance was necessary for the success of this case.    Robetr Puga MD     Date: 1/9/2024  Time: 16:37 EST    Electronically signed by  oRbert Puga MD at 01/09/24 1648       Physician Progress Notes (last 24 hours)  Notes from 01/09/24 0812 through 01/10/24 0812   No notes of this type exist for this encounter.       Consult Notes (last 24 hours)  Notes from 01/09/24 0812 through 01/10/24 0812   No notes of this type exist for this encounter.

## 2024-01-10 NOTE — PROGRESS NOTES
[facility]     Progress Note    Patient Name: Jd Velazquez  : 1965  MRN: [id number]  Primary Care Physician:  Dacia Newsome APRN  Date of admission: 2024    Subjective   Subjective     POD #1 status post right femoral AT bypass graft    Doing well.  Some pain in the right thigh, otherwise no complaints.    Objective   Objective     Vitals:   Temp:  [97.2 °F (36.2 °C)-99.7 °F (37.6 °C)] 97.9 °F (36.6 °C)  Heart Rate:  [57-76] 64  Resp:  [14-20] 18  BP: (118-141)/(50-80) 131/70  Flow (L/min):  [2] 2    Physical Exam   General: Alert, no acute distress  Wounds: Healing well, no signs of infection.  Right foot: Warm, well-perfused.  Moderate edema.    Hgb: 12.3  Creatinine: 0.84        Assessment & Plan   Assessment / Plan     Assessment/Plan:    Satisfactory progress following right leg revascularization.  Transferred from ICU earlier today.  PT/OT.      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

## 2024-01-10 NOTE — SIGNIFICANT NOTE
01/10/24 1145   Coping/Psychosocial   Observed Emotional State calm   Verbalized Emotional State relief;hopefulness   Trust Relationship/Rapport empathic listening provided   Involvement in Care interacting with patient   Additional Documentation Spiritual Care (Group)   Spiritual Care   Use of Spiritual Resources non-[facility] use of spiritual care   Spiritual Care Source  initiative   Spiritual Care Follow-Up follow-up, none required as presently assessed   Response to Spiritual Care receptive of support   Spiritual Care Interventions supportive conversation provided   Spiritual Care Visit Type initial   Receptivity to Spiritual Care visit welcomed

## 2024-01-10 NOTE — PLAN OF CARE
Goal Outcome Evaluation:      Pt back to bed from wheelchair upon transfer with 1x assist. Pt's pain controlled with PRN pain medication. Weight shifting in bed. Pt attempted to go to bedside commode to urinate later in shift, was able to stand up, but only moved feet a few times. Said he would try again later. RN suggested to use urinal until he was able to attempt ambulation again, pt refused and said he could hold it. No concerns at this time.  Lisha Nix RN

## 2024-01-11 ENCOUNTER — READMISSION MANAGEMENT (OUTPATIENT)
Dept: CALL CENTER | Facility: HOSPITAL | Age: 59
End: 2024-01-11
Payer: COMMERCIAL

## 2024-01-11 VITALS
WEIGHT: 239.42 LBS | SYSTOLIC BLOOD PRESSURE: 141 MMHG | HEIGHT: 71 IN | RESPIRATION RATE: 18 BRPM | TEMPERATURE: 98 F | DIASTOLIC BLOOD PRESSURE: 61 MMHG | OXYGEN SATURATION: 93 % | HEART RATE: 70 BPM | BODY MASS INDEX: 33.52 KG/M2

## 2024-01-11 LAB
CYTO UR: NORMAL
GLUCOSE BLDC GLUCOMTR-MCNC: 180 MG/DL (ref 70–99)
GLUCOSE BLDC GLUCOMTR-MCNC: 198 MG/DL (ref 70–99)
LAB AP CASE REPORT: NORMAL
LAB AP CLINICAL INFORMATION: NORMAL
PATH REPORT.FINAL DX SPEC: NORMAL
PATH REPORT.GROSS SPEC: NORMAL

## 2024-01-11 PROCEDURE — 25010000002 ENOXAPARIN PER 10 MG: Performed by: SURGERY

## 2024-01-11 PROCEDURE — 97161 PT EVAL LOW COMPLEX 20 MIN: CPT

## 2024-01-11 PROCEDURE — 63710000001 INSULIN LISPRO (HUMAN) PER 5 UNITS: Performed by: SURGERY

## 2024-01-11 PROCEDURE — 82948 REAGENT STRIP/BLOOD GLUCOSE: CPT

## 2024-01-11 PROCEDURE — 97165 OT EVAL LOW COMPLEX 30 MIN: CPT

## 2024-01-11 PROCEDURE — 99024 POSTOP FOLLOW-UP VISIT: CPT | Performed by: SURGERY

## 2024-01-11 RX ORDER — OXYCODONE HYDROCHLORIDE AND ACETAMINOPHEN 5; 325 MG/1; MG/1
1 TABLET ORAL EVERY 6 HOURS PRN
Qty: 24 TABLET | Refills: 0 | Status: SHIPPED | OUTPATIENT
Start: 2024-01-11 | End: 2024-01-17

## 2024-01-11 RX ADMIN — METOPROLOL TARTRATE 100 MG: 25 TABLET, FILM COATED ORAL at 05:40

## 2024-01-11 RX ADMIN — OXYCODONE AND ACETAMINOPHEN 1 TABLET: 10; 325 TABLET ORAL at 00:51

## 2024-01-11 RX ADMIN — SPIRONOLACTONE 50 MG: 25 TABLET ORAL at 08:30

## 2024-01-11 RX ADMIN — HYDROCHLOROTHIAZIDE 12.5 MG: 12.5 TABLET ORAL at 08:30

## 2024-01-11 RX ADMIN — ENOXAPARIN SODIUM 40 MG: 100 INJECTION SUBCUTANEOUS at 08:29

## 2024-01-11 RX ADMIN — PREGABALIN 225 MG: 75 CAPSULE ORAL at 08:30

## 2024-01-11 RX ADMIN — LISINOPRIL 10 MG: 10 TABLET ORAL at 08:30

## 2024-01-11 RX ADMIN — ASPIRIN 81 MG CHEWABLE TABLET 81 MG: 81 TABLET CHEWABLE at 08:30

## 2024-01-11 RX ADMIN — INSULIN LISPRO 2 UNITS: 100 INJECTION, SOLUTION INTRAVENOUS; SUBCUTANEOUS at 08:29

## 2024-01-11 RX ADMIN — CLOPIDOGREL BISULFATE 75 MG: 75 TABLET ORAL at 08:31

## 2024-01-11 RX ADMIN — LEVOTHYROXINE SODIUM 25 MCG: 0.03 TABLET ORAL at 05:40

## 2024-01-11 RX ADMIN — DULOXETINE HYDROCHLORIDE 30 MG: 30 CAPSULE, DELAYED RELEASE ORAL at 08:30

## 2024-01-11 RX ADMIN — INSULIN LISPRO 2 UNITS: 100 INJECTION, SOLUTION INTRAVENOUS; SUBCUTANEOUS at 12:46

## 2024-01-11 NOTE — PLAN OF CARE
Goal Outcome Evaluation:  Plan of Care Reviewed With: patient        Progress: no change (First session for evaluation)  Outcome Evaluation: Patient presents with limitations of balance and endurance/activity tolerance which impede his ability to perform ADL and transfers as prior.  The skills of a therapist will be required to safely and effectively implement treatment plan to restore maximal level of function.      Anticipated Discharge Disposition (OT): inpatient rehabilitation facility

## 2024-01-11 NOTE — SIGNIFICANT NOTE
01/11/24 1123   Plan   Plan ALEJANDRA, RN spoke with patient at bedside.  Patient reports lives with spouse.  Reports currently unable to work but has no financial needs.  Reports support from family.  Facesheet verified.  Patient is  unsure of discharge needs at this time.  Will continue to follow for possible discharge needs.  [facility]

## 2024-01-11 NOTE — PLAN OF CARE
Goal Outcome Evaluation:      Education complete. Pt discharging.  Lisha Nix RN

## 2024-01-11 NOTE — THERAPY EVALUATION
Patient Name: Jd Velazquez  : 1965    MRN: [id number]                              Today's Date: 2024       Admit Date: 2024    Visit Dx:     ICD-10-CM ICD-9-CM   1. Decreased activities of daily living (ADL)  Z78.9 V49.89   2. 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, unspecified laterality  I70.269 440.24     Patient Active Problem List   Diagnosis    CAD (coronary artery disease)    Coronary artery disease involving native coronary artery of native heart without angina pectoris    Type 2 diabetes mellitus with hyperglycemia, without long-term current use of insulin    Hyperlipidemia LDL goal <70    Hypertension    SHAHLA treated with BiPAP    Tobacco abuse    Cellulitis of chest wall    Sternal wound infection    S/P CABG x 4    Allergic rhinitis    Hypokalemia    Hypothyroidism    Insomnia, unspecified    Microalbuminuric diabetic nephropathy    Edema    Mild episode of recurrent major depressive disorder    Class 1 obesity due to excess calories with serious comorbidity and body mass index (BMI) of 32.0 to 32.9 in adult    Dermatitis    Skin ulcer, limited to breakdown of skin    Itching    Cellulitis of right lower extremity    Hyponatremia    Thoracic spine pain    Neuropathy    Peripheral vascular disease    Atherosclerosis of native arteries of the extremities with ulceration    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, unspecified laterality     Past Medical History:   Diagnosis Date    Allergic rhinitis 2015    Anesthesia     DIFFICULTY WAKING UP POST SURGERY    Benign essential hypertension 2016    CAD (coronary artery disease)     Diabetes mellitus     Foot ulcer     Hyperlipidemia     Hypertension     Hypokalemia 2016    Hypothyroidism 2014    Insomnia, unspecified 06/15/2016    Microalbuminuria due to type 2 diabetes mellitus 10/15/2015     Mixed hyperlipidemia 10/15/2015    Myocardial infarction     Obesity     Skin cancer      Past Surgical History:   Procedure Laterality Date    ANKLE ARTHROSCOPY W/ OPEN REPAIR      APPENDECTOMY      CARDIAC CATHETERIZATION  2014    PCI to circumflex    CARDIAC CATHETERIZATION Right 10/26/2023    Procedure: Aortogram with right leg angiogram, possible angioplasty or stenting;  Surgeon: Robert Puga MD;  Location: [facility] CATH INVASIVE LOCATION;  Service: Vascular;  Laterality: Right;    CARDIAC CATHETERIZATION Right 12/15/2023    Procedure: Right leg angiogram, possible angioplasty or stenting;  Surgeon: Robert Puga MD;  Location: [facility] CATH INVASIVE LOCATION;  Service: Vascular;  Laterality: Right;    CORONARY ARTERY BYPASS GRAFT N/A 10/08/2020    Procedure: STERNOTOMY, CORONARY ARTERY BYPASS GRAFTING TIME 4 WITH LEFT KELSI  AND ENDOSCOPICALLY HARVESTED LEFT GREATER SAPHENOUS VEIN AND PRP.;  Surgeon: Jr Girma Chiu MD;  Location: [facility];  Service: Cardiothoracic;  Laterality: N/A;    FEMORAL TIBIAL BYPASS Right 1/9/2024    Procedure: Right femoral-tibial bypass graft;  Surgeon: Robert Puga MD;  Location: [facility] OR;  Service: Vascular;  Laterality: Right;    HEEL SPUR SURGERY      HERNIA REPAIR      KNEE ARTHROSCOPY      MULTIPLE TIMES ON BOTH KNEES    SKIN CANCER EXCISION      BACK    TOE SURGERY Right     DEBRIDMENT RIGHT GREAT TOE    TONSILLECTOMY        General Information       Row Name 01/11/24 1036          OT Time and Intention    Document Type evaluation  -AV     Mode of Treatment individual therapy;occupational therapy  -AV       Row Name 01/11/24 1036          General Information    Patient Profile Reviewed yes  -AV     Prior Level of Function independent:;ADL's;cooking;transfer;all household mobility  Stands to shower (walk-in with seat available).  Stands at sink to groom.  Standard commode.  Ambulates without assistive device.  No home oxygen.  -AV     Existing  Precautions/Restrictions fall  -AV     Barriers to Rehab none identified  -AV       Row Name 01/11/24 1036          Occupational Profile    Reason for Services/Referral (Occupational Profile) Patient is a 58 year old male admitted to [facility] on January 9, 2024 with right foot ulcer.  He is currently postop day 2: Right femoral-tibial bypass graft.  He is on fifth floor/room air.   OT consulted due to recent decline in ADL/transfer independence.  No previous OT services for current condition.  -AV       Row Name 01/11/24 1036          Living Environment    People in Home spouse  -AV       Row Name 01/11/24 1036          Home Main Entrance    Number of Stairs, Main Entrance two  -AV     Stair Railings, Main Entrance none  -AV       Row Name 01/11/24 1036          Cognition    Orientation Status (Cognition) --  Patient is alert, pleasant and cooperative- able to retain information and follow commands.  -AV       Row Name 01/11/24 1036          Safety Issues, Functional Mobility    Impairments Affecting Function (Mobility) balance;endurance/activity tolerance  -AV               User Key  (r) = Recorded By, (t) = Taken By, (c) = Cosigned By      Initials Name Provider Type    AV Edgar Carpio, OT Occupational Therapist                     Mobility/ADL's       Row Name 01/11/24 1038          Transfers    Transfers sit-stand transfer  -AV       Row Name 01/11/24 1038          Sit-Stand Transfer    Sit-Stand [place] (Transfers) minimum assist (75% patient effort);nonverbal cues (demo/gesture);verbal cues  -AV     Assistive Device (Sit-Stand Transfers) walker, front-wheeled  -AV     Comment, (Sit-Stand Transfer) Required 2 attempts with bed elevated and max cues/encouragement.  -AV       Row Name 01/11/24 1038          Activities of Daily Living    BADL Assessment/Intervention --  Independent feeding and grooming with set up while seated.  CGA/min assist bathing/dressing.  Uses urinal independently while  seated/no bowel movement during hospitalization.  -AV               User Key  (r) = Recorded By, (t) = Taken By, (c) = Cosigned By      Initials Name Provider Type    Edgar Schmidt OT Occupational Therapist                   Obj/Interventions       Row Name 01/11/24 1039          Sensory Assessment (Somatosensory)    Sensory Assessment (Somatosensory) UE sensation intact  -AV       Row Name 01/11/24 1039          Vision Assessment/Intervention    Visual Impairment/Limitations WFL;corrective lenses full-time  -AV       Row Name 01/11/24 1039          Range of Motion Comprehensive    General Range of Motion bilateral upper extremity ROM WFL  -AV     Comment, General Range of Motion AROM  -AV       Row Name 01/11/24 1039          Strength Comprehensive (MMT)    Comment, General Manual Muscle Testing (MMT) Assessment 5/5 bilateral upper extremities  -AV       Row Name 01/11/24 1039          Motor Skills    Motor Skills coordination;functional endurance  -AV     Coordination WFL  Right dominant  -AV     Functional Endurance Fair  -AV       Row Name 01/11/24 1039          Balance    Balance Assessment sitting dynamic balance;standing static balance  -AV     Dynamic Sitting Balance independent  -AV     Static Standing Balance contact guard;verbal cues  -AV     Position/Device Used, Standing Balance walker, rolling  -AV               User Key  (r) = Recorded By, (t) = Taken By, (c) = Cosigned By      Initials Name Provider Type    Edgar Schmidt OT Occupational Therapist                   Goals/Plan    No documentation.                  Clinical Impression       [place] Name 01/11/24 1040          Pain Assessment    Additional Documentation Pain Scale: FACES Pre/Post-Treatment (Group)  -AV       Row Name 01/11/24 1040          Pain Scale: FACES Pre/Post-Treatment    Pain: FACES Scale, Pretreatment 2-->hurts little bit  -AV     Posttreatment Pain Rating 2-->hurts little bit  -AV     Pain Location - Side/Orientation  Right  -AV     Pain Location lower  -AV     Pain Location - extremity  -AV       Row Name 01/11/24 1040          Plan of Care Review    Plan of Care Reviewed With patient  -AV     Progress no change  First session for evaluation  -AV     Outcome Evaluation Patient presents with limitations of balance and endurance/activity tolerance which impede his ability to perform ADL and transfers as prior.  The skills of a therapist will be required to safely and effectively implement treatment plan to restore maximal level of function.  -AV       Row Name 01/11/24 1040          Therapy Assessment/Plan (OT)    Patient/Family Therapy Goal Statement (OT) None stated  -AV     Rehab Potential (OT) good, to achieve stated therapy goals  -AV     Criteria for Skilled Therapeutic Interventions Met (OT) yes;meets criteria;skilled treatment is necessary  -AV     Therapy Frequency (OT) 5 times/wk  -AV       Row Name 01/11/24 1040          Therapy Plan Review/Discharge Plan (OT)    Equipment Needs Upon Discharge (OT) walker, rolling;commode chair  Reports having shower seat available at home  -AV     Anticipated Discharge Disposition (OT) inpatient rehabilitation facility  -AV       Row Name 01/11/24 1040          Vital Signs    O2 Delivery Pre Treatment room air  -AV     O2 Delivery Intra Treatment room air  -AV     O2 Delivery Post Treatment room air  -AV       Row Name 01/11/24 1040          Positioning and Restraints    Pre-Treatment Position --  Seated edge of bed  -AV     Post Treatment Position other  Seated edge of bed.  Call light in reach.  Nurse in room.  -AV               User Key  (r) = Recorded By, (t) = Taken By, (c) = Cosigned By      Initials Name Provider Type    AV Edgar Carpio, OT Occupational Therapist                   Outcome Measures       Row Name 01/11/24 1042          How much help from another is currently needed...    Putting on and taking off regular lower body clothing? 3  -AV     Bathing (including  washing, rinsing, and drying) 3  -AV     Toileting (which includes using toilet bed pan or urinal) 3  -AV     Putting on and taking off regular upper body clothing 4  -AV     Taking care of personal grooming (such as brushing teeth) 4  -AV     Eating meals 4  -AV     [facility] 6 Clicks Score (OT) 21  -AV       Row Name 01/11/24 0720          How much help from another person do you currently need...    Turning from your back to your side while in flat bed without using bedrails? 4  -CC     Moving from lying on back to sitting on the side of a flat bed without bedrails? 4  -CC     Moving to and from a bed to a chair (including a wheelchair)? 4  -CC     Standing up from a chair using your arms (e.g., wheelchair, bedside chair)? 3  -CC     Climbing 3-5 steps with a railing? 2  -CC     To walk in hospital room? 3  -CC     [facility] 6 Clicks Score (PT) 20  -CC     Highest Level of Mobility Goal 6 --> Walk 10 steps or more  -CC       Row Name 01/11/24 1042          Functional Assessment    Outcome Measure Options [facility] 6 Clicks Daily Activity (OT);Optimal Instrument  -AV       Row Name 01/11/24 1042          Optimal Instrument    Optimal Instrument Optimal - 3  -AV     Bending/Stooping 2  -AV     Standing 3  -AV     Reaching 1  -AV     From the list, choose the 3 activities you would most like to be able to do without any difficulty Bending/stooping;Standing;Reaching  -AV     Total Score Optimal - 3 6  -AV               User Key  (r) = Recorded By, (t) = Taken By, (c) = Cosigned By      Initials Name Provider Type    Edgar Schmidt OT Occupational Therapist    CC Lisha Nix, RN Registered Nurse                    Occupational Therapy Education       Title: PT OT SLP Therapies (In Progress)       Topic: Occupational Therapy (In Progress)       Point: ADL training (Done)       Description:   Instruct learner(s) on proper safety adaptation and remediation techniques during self care or transfers.   Instruct in proper use  of assistive devices.                  Learning Progress Summary             Patient Acceptance, E, VU by AV at 1/11/2024 1045                         Point: Home exercise program (Done)       Description:   Instruct learner(s) on appropriate technique for monitoring, assisting and/or progressing therapeutic exercises/activities.                  Learning Progress Summary             Patient Acceptance, E, VU by AV at 1/11/2024 1045                         Point: Precautions (Done)       Description:   Instruct learner(s) on prescribed precautions during self-care and functional transfers.                  Learning Progress Summary             Patient Acceptance, E, VU by AV at 1/11/2024 1045                         Point: Body mechanics (Not Started)       Description:   Instruct learner(s) on proper positioning and spine alignment during self-care, functional mobility activities and/or exercises.                  Learner Progress:  Not documented in this visit.                              User Key       Initials Effective Dates Name Provider Type Discipline     06/16/21 -  Edgar Carpio OT Occupational Therapist OT                  OT Recommendation and Plan  Therapy Frequency (OT): 5 times/wk  Plan of Care Review  Plan of Care Reviewed With: patient  Progress: no change (First session for evaluation)  Outcome Evaluation: Patient presents with limitations of balance and endurance/activity tolerance which impede his ability to perform ADL and transfers as prior.  The skills of a therapist will be required to safely and effectively implement treatment plan to restore maximal level of function.     Time Calculation:   Evaluation Complexity (OT)  Review Occupational Profile/Medical/Therapy History Complexity: expanded/moderate complexity  Assessment, Occupational Performance/Identification of Deficit Complexity: 1-3 performance deficits  Clinical Decision Making Complexity (OT): problem focused assessment/low  complexity  Overall Complexity of Evaluation (OT): low complexity     Time Calculation- OT       Row Name 01/11/24 1046             Time Calculation- OT    OT Received On 01/11/24  -AV      OT Goal Re-Cert Due Date 01/20/24  -AV         Untimed Charges    OT Eval/Re-eval Minutes 35  -AV         Total Minutes    Untimed Charges Total Minutes 35  -AV       Total Minutes 35  -AV                User Key  (r) = Recorded By, (t) = Taken By, (c) = Cosigned By      Initials Name Provider Type    AV Edgar Carpio OT Occupational Therapist                  Therapy Charges for Today       Code Description Service Date Service Provider Modifiers Qty    [id number] HC OT EVAL LOW COMPLEXITY 3 1/11/2024 Edgar Carpio OT GO 1                 Edgar Carpio OT  1/11/2024

## 2024-01-11 NOTE — THERAPY EVALUATION
Acute Care - Physical Therapy Initial Evaluation  [facility]     Patient Name: Jd Velazquez  : 1965  MRN: [id number]  Today's Date: 2024      Visit Dx:     ICD-10-CM ICD-9-CM   1. Decreased activities of daily living (ADL)  Z78.9 V49.89   2. 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, unspecified laterality  I70.269 440.24   3. Difficulty walking  R26.2 719.7     Patient Active Problem List   Diagnosis    CAD (coronary artery disease)    Coronary artery disease involving native coronary artery of native heart without angina pectoris    Type 2 diabetes mellitus with hyperglycemia, without long-term current use of insulin    Hyperlipidemia LDL goal <70    Hypertension    SHAHLA treated with BiPAP    Tobacco abuse    Cellulitis of chest wall    Sternal wound infection    S/P CABG x 4    Allergic rhinitis    Hypokalemia    Hypothyroidism    Insomnia, unspecified    Microalbuminuric diabetic nephropathy    Edema    Mild episode of recurrent major depressive disorder    Class 1 obesity due to excess calories with serious comorbidity and body mass index (BMI) of 32.0 to 32.9 in adult    Dermatitis    Skin ulcer, limited to breakdown of skin    Itching    Cellulitis of right lower extremity    Hyponatremia    Thoracic spine pain    Neuropathy    Peripheral vascular disease    Atherosclerosis of native arteries of the extremities with ulceration    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, unspecified laterality     Past Medical History:   Diagnosis Date    Allergic rhinitis 2015    Anesthesia     DIFFICULTY WAKING UP POST SURGERY    Benign essential hypertension 2016    CAD (coronary artery disease)     Diabetes mellitus     Foot ulcer     Hyperlipidemia     Hypertension     Hypokalemia 2016    Hypothyroidism 2014    Insomnia, unspecified 06/15/2016    Microalbuminuria  due to type 2 diabetes mellitus 10/15/2015    Mixed hyperlipidemia 10/15/2015    Myocardial infarction     Obesity     Skin cancer      Past Surgical History:   Procedure Laterality Date    ANKLE ARTHROSCOPY W/ OPEN REPAIR      APPENDECTOMY      CARDIAC CATHETERIZATION  2014    PCI to circumflex    CARDIAC CATHETERIZATION Right 10/26/2023    Procedure: Aortogram with right leg angiogram, possible angioplasty or stenting;  Surgeon: Robert Puga MD;  Location: [facility] CATH INVASIVE LOCATION;  Service: Vascular;  Laterality: Right;    CARDIAC CATHETERIZATION Right 12/15/2023    Procedure: Right leg angiogram, possible angioplasty or stenting;  Surgeon: Robert Puga MD;  Location: [facility] CATH INVASIVE LOCATION;  Service: Vascular;  Laterality: Right;    CORONARY ARTERY BYPASS GRAFT N/A 10/08/2020    Procedure: STERNOTOMY, CORONARY ARTERY BYPASS GRAFTING TIME 4 WITH LEFT KELSI  AND ENDOSCOPICALLY HARVESTED LEFT GREATER SAPHENOUS VEIN AND PRP.;  Surgeon: Jr Girma Chiu MD;  Location: [facility] OR;  Service: Cardiothoracic;  Laterality: N/A;    FEMORAL TIBIAL BYPASS Right 1/9/2024    Procedure: Right femoral-tibial bypass graft;  Surgeon: Robert Puga MD;  Location: [facility] OR;  Service: Vascular;  Laterality: Right;    HEEL SPUR SURGERY      HERNIA REPAIR      KNEE ARTHROSCOPY      MULTIPLE TIMES ON BOTH KNEES    SKIN CANCER EXCISION      BACK    TOE SURGERY Right     DEBRIDMENT RIGHT GREAT TOE    TONSILLECTOMY       PT Assessment (last 12 hours)       PT Evaluation and Treatment       Row Name 01/11/24 1200          Physical Therapy Time and Intention    Subjective Information complains of;pain  -DP     Document Type evaluation  -DP     Mode of Treatment individual therapy;physical therapy  -DP     Patient Effort adequate  -DP     Symptoms Noted During/After Treatment increased pain  -DP       Row Name 01/11/24 1200          General Information    Patient Profile Reviewed yes  -DP     Patient  Observations alert;agree to therapy  Increased time to cooperate  -DP     Prior Level of Function independent:;gait;transfer;bed mobility;ADL's  -DP     Equipment Currently Used at Home none  -DP     Existing Precautions/Restrictions fall  -DP     Barriers to Rehab none identified  -DP       Row Name 01/11/24 1200          Living Environment    Current Living Arrangements home  -DP     Home Accessibility stairs to enter home  -DP     People in Home spouse  -DP     Primary Care Provided by self  -DP       Row Name 01/11/24 1200          Home Main Entrance    Number of Stairs, Main Entrance two  -DP       Row Name 01/11/24 1200          Home Use of Assistive/Adaptive Equipment    Equipment Currently Used at Home none  -DP       Row Name 01/11/24 1200          Cognition    Follows Commands (Cognition) WFL  -DP     Cognitive Function WFL  -DP       Row Name 01/11/24 1200          Range of Motion (ROM)    Range of Motion ROM is WFL  -DP       Row Name 01/11/24 1200          Strength (Manual Muscle Testing)    Strength (Manual Muscle Testing) right lower extremity  4-/5  -DP       Row Name 01/11/24 1200          Bed Mobility    Bed Mobility supine-sit  -DP     Supine-Sit Taney (Bed Mobility) minimum assist (75% patient effort)  -DP     Assistive Device (Bed Mobility) draw sheet;bed rails  -DP       Row Name 01/11/24 1200          Transfers    Transfers sit-stand transfer  -DP       Row Name 01/11/24 1200          Sit-Stand Transfer    Sit-Stand Taney (Transfers) moderate assist (50% patient effort);2 person assist  -DP     Assistive Device (Sit-Stand Transfers) walker, front-wheeled  -DP       Row Name 01/11/24 1200          Gait/Stairs (Locomotion)    Gait/Stairs Locomotion gait/ambulation independence  -DP     Taney Level (Gait) contact guard  -DP     Assistive Device (Gait) walker, front-wheeled  -DP     Patient was able to Ambulate yes  -DP     Distance in Feet (Gait) 80  -DP      Deviations/Abnormal Patterns (Gait) gait speed decreased;jas decreased  -DP       Row Name 01/11/24 1200          Safety Issues, Functional Mobility    Impairments Affecting Function (Mobility) pain;endurance/activity tolerance;balance;strength  -DP       Row Name 01/11/24 1200          Balance    Balance Assessment standing dynamic balance  -DP       Row Name             Wound 01/09/24 1315 Right lower leg Incision    Wound - Properties Group Placement Date: 01/09/24  -SF Placement Time: 1315  -SF Side: Right  -SF Orientation: lower  -SF Location: leg  -SF Primary Wound Type: Incision  -SF    Retired Wound - Properties Group Placement Date: 01/09/24  -SF Placement Time: 1315  -SF Side: Right  -SF Orientation: lower  -SF Location: leg  -SF Primary Wound Type: Incision  -SF    Retired Wound - Properties Group Date first assessed: 01/09/24  -SF Time first assessed: 1315  -SF Side: Right  -SF Location: leg  -SF Primary Wound Type: Incision  -SF      Row Name             Wound 01/09/24 1409 Right anterior groin Incision    Wound - Properties Group Placement Date: 01/09/24  -SF Placement Time: 1409  -SF Present on Original Admission: N  -SF Side: Right  -SF Orientation: anterior  -SF Location: groin  -SF Primary Wound Type: Incision  -SF    Retired Wound - Properties Group Placement Date: 01/09/24  -SF Placement Time: 1409  -SF Present on Original Admission: N  -SF Side: Right  -SF Orientation: anterior  -SF Location: groin  -SF Primary Wound Type: Incision  -SF    Retired Wound - Properties Group Date first assessed: 01/09/24  -SF Time first assessed: 1409  -SF Present on Original Admission: N  -SF Side: Right  -SF Location: groin  -SF Primary Wound Type: Incision  -SF      Row Name             Wound 01/09/24 2154 Right anterior great toe Diabetic Ulcer    Wound - Properties Group Placement Date: 01/09/24  -EC Placement Time: 2154  -EC Present on Original Admission: Y  -EC Side: Right  -EC Orientation: anterior  -EC  Location: great toe  -EC Primary Wound Type: Diabetic ulc  -EC    Retired Wound - Properties Group Placement Date: 01/09/24  -EC Placement Time: 2154 -EC Present on Original Admission: Y  -EC Side: Right  -EC Orientation: anterior  -EC Location: great toe  -EC Primary Wound Type: Diabetic ulc  -EC    Retired Wound - Properties Group Date first assessed: 01/09/24  -EC Time first assessed: 2154  -EC Present on Original Admission: Y  -EC Side: Right  -EC Location: great toe  -EC Primary Wound Type: Diabetic ulc  -EC      Row Name 01/11/24 1200          Plan of Care Review    Outcome Evaluation Patient presents with deficits in strength, activity tolerance/endurance and functional mobility. Pt would benefit from PT services, however, pt is already being discharged from hospital to home.  -DP       Row Name 01/11/24 1200          Positioning and Restraints    Pre-Treatment Position in bed  -DP     Post Treatment Position bed  -DP     In Bed sitting EOB;call light within reach;encouraged to call for assist  -DP       Row Name 01/11/24 1200          Therapy Assessment/Plan (PT)    Criteria for Skilled Interventions Met (PT) no  -DP     Therapy Frequency (PT) evaluation only  -DP       Row Name 01/11/24 1200          PT Evaluation Complexity    History, PT Evaluation Complexity 1-2 personal factors and/or comorbidities  -DP     Examination of Body Systems (PT Eval Complexity) total of 4 or more elements  -DP     Clinical Presentation (PT Evaluation Complexity) stable  -DP     Clinical Decision Making (PT Evaluation Complexity) low complexity  -DP     Overall Complexity (PT Evaluation Complexity) low complexity  -DP       Row Name 01/11/24 1200          Therapy Plan Review/Discharge Plan (PT)    Therapy Plan Review (PT) patient  -DP               User Key  (r) = Recorded By, (t) = Taken By, (c) = Cosigned By      Initials Name Provider Type    EC Anh Guillermo, RN Registered Nurse    Radha Brito RN Registered Nurse     Mingo Brower PT Physical Therapist                      PT Recommendation and Plan  Anticipated Discharge Disposition (PT): home with home health  Therapy Frequency (PT): evaluation only  Outcome Evaluation: Patient presents with deficits in strength, activity tolerance/endurance and functional mobility. Pt would benefit from PT services, however, pt is already being discharged from hospital to home.   Outcome Measures       Row Name 01/11/24 1200             How much help from another person do you currently need...    Turning from your back to your side while in flat bed without using bedrails? 4  -DP      Moving from lying on back to sitting on the side of a flat bed without bedrails? 3  -DP      Moving to and from a bed to a chair (including a wheelchair)? 3  -DP      Standing up from a chair using your arms (e.g., wheelchair, bedside chair)? 3  -DP      Climbing 3-5 steps with a railing? 3  -DP      To walk in hospital room? 3  -DP      AM-PAC 6 Clicks Score (PT) 19  -DP      Highest Level of Mobility Goal 6 --> Walk 10 steps or more  -DP         Functional Assessment    Outcome Measure Options AM-PAC 6 Clicks Basic Mobility (PT)  -DP                User Key  (r) = Recorded By, (t) = Taken By, (c) = Cosigned By      Initials Name Provider Type    Mingo Brower PT Physical Therapist                     Time Calculation:    PT Charges       Row Name 01/11/24 1241             Time Calculation    PT Received On 01/11/24  -DP         Untimed Charges    PT Eval/Re-eval Minutes 60  -DP         Total Minutes    Untimed Charges Total Minutes 60  -DP       Total Minutes 60  -DP                User Key  (r) = Recorded By, (t) = Taken By, (c) = Cosigned By      Initials Name Provider Type    Mingo Brower PT Physical Therapist                  Therapy Charges for Today       Code Description Service Date Service Provider Modifiers Qty    50868571120 HC PT EVAL LOW COMPLEXITY 4 1/11/2024 Mingo Mitchell  A, PT GP 1            PT G-Codes  Outcome Measure Options: AM-PAC 6 Clicks Basic Mobility (PT)  AM-PAC 6 Clicks Score (PT): 19  AM-PAC 6 Clicks Score (OT): 21    Mingo Mitchell, PT  1/11/2024

## 2024-01-11 NOTE — PLAN OF CARE
Goal Outcome Evaluation:              Outcome Evaluation: Patient presents with deficits in strength, activity tolerance/endurance and functional mobility. Pt would benefit from PT services, however, pt is already being discharged from hospital to home.      Anticipated Discharge Disposition (PT): home with home health

## 2024-01-11 NOTE — PROGRESS NOTES
[facility]     Progress Note    Patient Name: Jd Velazquez  : 1965  MRN: [id number]  Primary Care Physician:  Dacia Newsome APRN  Date of admission: 2024    Subjective   Subjective     POD #2 status post right femoral AT bypass graft    Doing well.  Ambulating well.  Ready to go home.    Objective   Objective     Vitals:   Temp:  [97.7 °F (36.5 °C)-98.8 °F (37.1 °C)] 97.7 °F (36.5 °C)  Heart Rate:  [64-70] 64  Resp:  [16-18] 16  BP: (115-133)/(61-70) 124/64  Flow (L/min):  [1] 1    Physical Exam   General: Alert, no acute distress  Wounds: Healing well, no signs of infection.  Right foot: Warm, well-perfused.  Moderate edema.        Assessment & Plan   Assessment / Plan     Assessment/Plan:    Satisfactory progress following right leg revascularization.  Home.  Follow-up in 2 weeks.    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

## 2024-01-11 NOTE — DISCHARGE SUMMARY
[facility]         DISCHARGE SUMMARY    Patient Name: Jd Velazquez  : 1965  MRN: [id number]    Date of Admission: 2024  Date of Discharge:  2024  Primary Care Physician: Dacia Newsome, SOHAM    Consults       No orders found from 2023 to 1/10/2024.            Presenting Problem:   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, unspecified laterality [I70.269]    Active and Resolved Hospital Problems:  Active Hospital Problems    Diagnosis POA    **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, unspecified laterality [I70.269] Unknown      Resolved Hospital Problems   No resolved problems to display.         Hospital Course     Hospital Course:  Jd Velazquez is a 58 y.o. male electively admitted for right lower extremity revascularization.  Postoperatively went to recovery room then ICU overnight.  The following day he was transferred to a surgical floor where he began physical therapy.  Has done well and at this time and ready to go home.            Day of Discharge     Vital Signs:  Temp:  [97.7 °F (36.5 °C)-98.8 °F (37.1 °C)] 97.7 °F (36.5 °C)  Heart Rate:  [64-70] 64  Resp:  [16-18] 16  BP: (115-133)/(61-70) 124/64  Flow (L/min):  [1] 1    Physical Exam:  General: Alert, no acute distress  Wounds: Healing well, no signs of infection.  Right foot: Warm, well-perfused.  Moderate edema.    Pertinent  and/or Most Recent Results     LAB RESULTS:      Lab 01/10/24  0358   WBC 10.98*   HEMOGLOBIN 12.3*   HEMATOCRIT 37.3*   PLATELETS 370   NEUTROS ABS 7.18*   IMMATURE GRANS (ABS) 0.12*   LYMPHS ABS 2.26   MONOS ABS 1.36*   EOS ABS 0.02   MCV 90.1         Lab 01/10/24  0358   SODIUM 130*   POTASSIUM 4.2   CHLORIDE 94*   CO2 23.4   ANION GAP 12.6   BUN 11   CREATININE 0.84   EGFR 101.7   GLUCOSE 170*   CALCIUM 9.1   MAGNESIUM 1.7   PHOSPHORUS 3.9                     Lab 24  1010   ABO TYPING O   RH TYPING  Negative   ANTIBODY SCREEN Negative         Brief Urine Lab Results  (Last result in the past 365 days)        Color   Clarity   Blood   Leuk Est   Nitrite   Protein   CREAT   Urine HCG        08/28/23 1443             37.1         08/28/23 1443 Yellow   Clear   Negative   Negative   Negative   30 mg/dL (1+)                 Microbiology Results (last 10 days)       ** No results found for the last 240 hours. **            XR Tibia Fibula 2 View Right    Result Date: 1/9/2024  Impression:   1. No visible retained needle within the right knee or proximal right leg.  Multiple surgical clips are present compatible with bypass surgery.  Findings were called to the operating room at 4:27 p.m. On 1/9/2024       HIEN LOYA MD       Electronically Signed and Approved By: HIEN LOYA MD on 1/09/2024 at 16:27             XR Hip 1 View Without Pelvis Right (Surgery Only)    Result Date: 1/9/2024  Impression:   1. No linear or curvilinear densities are identified to suggest a retained needle.      Delbert Jaime MD       Electronically Signed and Approved By: Delbert Jaime MD on 1/09/2024 at 16:21              Results for orders placed during the hospital encounter of 12/04/23    Duplex Lower Extremity Art / Grafts - Right CAR    Interpretation Summary    Moderate decreased right ankle-brachial index.  Normal left ankle-brachial.    Patent right common femoral, profunda femoris, superficial femoral, and popliteal arteries with triphasic waveforms.  Atherosclerotic plaque is present diffusely.    Patent tibial peroneal trunk.    Patent anterior tibial artery with high-grade origin stenosis by velocity elevation.    Color flow noted in posterior tibial and peroneal arteries.  Continuous patency of the peroneal artery with axial imaging is not well demonstrated.  Monophasic waveforms are noted.      Results for orders placed during the hospital encounter of 12/04/23    Duplex Lower Extremity Art / Grafts - Right  CAR    Interpretation Summary    Moderate decreased right ankle-brachial index.  Normal left ankle-brachial.    Patent right common femoral, profunda femoris, superficial femoral, and popliteal arteries with triphasic waveforms.  Atherosclerotic plaque is present diffusely.    Patent tibial peroneal trunk.    Patent anterior tibial artery with high-grade origin stenosis by velocity elevation.    Color flow noted in posterior tibial and peroneal arteries.  Continuous patency of the peroneal artery with axial imaging is not well demonstrated.  Monophasic waveforms are noted.      Results for orders placed in visit on 10/08/20    Emergent/Open-Heart Anesthesia BRET    Narrative  Procedure Performed: Emergent/Open-Heart Anesthesia BRET  Start Time:  10/8/2020 7:35 AM  End Time:   10/8/2020 7:46 AM    Preanesthesia Checklist:  Patient identified, IV assessed, risks and benefits discussed, monitors and equipment assessed, procedure being performed at surgeon's request and anesthesia consent obtained.    General Procedure Information  Diagnostic Indications for Echo:  assessment of ascending aorta, assessment of surgical repair and hemodynamic monitoring  Physician Requesting Echo: Jr Girma Chiu MD  CPT Code:  Atherosclerosis of aorta  Location performed:  OR  Intubated  Bite block placed  Heart visualized  Probe Insertion:  Easy  Probe Type:  Multiplane  Modalities:  Color flow mapping, pulse wave Doppler and continuous wave Doppler    Echocardiographic and Doppler Measurements    Ventricles    Right Ventricle:  Cavity size normal.  Thrombus not present.  Global function normal.  Left Ventricle:  Cavity size normal.  Hypertrophy present.  Thrombus not present.  Global Function mildly impaired.  Ejection Fraction 55%.        Valves    Aortic Valve:  Annulus normal.  Stenosis not present.  Mean Gradient: 9 mmHg.  Regurgitation mild.  Leaflets normal.  Leaflet motions normal.    Mitral Valve:  Annulus normal.  Stenosis  not present.  Regurgitation mild.  Leaflets normal and thickened.  Leaflet motions normal.    Tricuspid Valve:  Annulus normal.  Stenosis not present.  Regurgitation absent.  Leaflets normal.  Leaflet motions normal.  Pulmonic Valve:  Annulus normal.  Regurgitation absent.        Aorta    Ascending Aorta:  Size normal.  Diameter 3.5 cm.  Dissection not present.  Plaque thickness less than 3 mm.  Mobile plaque not present.  Aortic Arch:  Size normal.  Diameter 2.6 cm.  Dissection not present.  Plaque thickness less than 3 mm.  Mobile plaque not present.  Descending Aorta:  Size normal.  Dissection not present.  Plaque thickness less than 3 mm.  Mobile plaque not present.        Atria    Right Atrium:  Spontaneous echo contrast not present.  Thrombus not present.  Tumor not present.  Device not present.    Left Atrium:  Spontaneous echo contrast not present.  Thrombus not present.  Tumor not present.  Device not present.  Left atrial appendage normal.      Septa    Atrial Septum:  Intra-atrial septal morphology aneurysmal.        Diastolic Function Measurements:  Diastolic Dysfunction Grade=  E= ms  A= ms  E/A Ratio= 1  DT= ms  S/D= 1  IVRT=    Other Findings  Pericardium:  normal  Pleural Effusion:  none  Pulmonary Venous Flow:  normal    Anesthesia Information  Performed Personally  Anesthesiologist:  Delbert Cornell MD      Echocardiogram Comments:  Postbypass results:  Post bypass  Normal RHF  LVEF 55-60% via visual estimation  Mild AI mild MR no AS no MS  No TR or TS      Labs Pending at Discharge:  Pending Labs       Order Current Status    Tissue Pathology Exam In process              Discharge Details        Discharge Medications        New Medications        Instructions Start Date   oxyCODONE-acetaminophen 5-325 MG per tablet  Commonly known as: PERCOCET   1 tablet, Oral, Every 6 Hours PRN             Continue These Medications        Instructions Start Date   amLODIPine 5 MG tablet  Commonly known as:  "NORVASC   Take 1 tablet by mouth Every Night.      aspirin 81 MG chewable tablet   81 mg, Oral, Daily      Blood Glucose Test Strips 333 strip   1 each, Other, Daily      clopidogrel 75 MG tablet  Commonly known as: Plavix   75 mg, Oral, Daily      Cymbalta 30 MG capsule  Generic drug: DULoxetine   30 mg, Oral, Daily      Levemir 100 UNIT/ML injection  Generic drug: insulin detemir   10 Units, Subcutaneous, Nightly, Increase one unit per night goal fasting  2 hours after meals 140      levothyroxine 25 MCG tablet  Commonly known as: SYNTHROID, LEVOTHROID   25 mcg, Oral, Daily      lidocaine 5 %  Commonly known as: LIDODERM   3 patches, Transdermal, Every 24 Hours, Remove & Discard patch within 12 hours or as directed by MD      lisinopril-hydrochlorothiazide 20-12.5 MG per tablet  Commonly known as: PRINZIDE,ZESTORETIC   TAKE 2 TABLETS BY MOUTH DAILY      metFORMIN 500 MG tablet  Commonly known as: GLUCOPHAGE   500 mg, Oral, Daily With Breakfast      metoprolol tartrate 100 MG tablet  Commonly known as: LOPRESSOR   100 mg, Oral, Every 12 Hours      OneTouch Delica Lancets 33G misc   test 4 times per day      Pen Needles 3/16\" 31G X 5 MM misc   1 each, Does not apply, Nightly      pregabalin 225 MG capsule  Commonly known as: Lyrica   225 mg, Oral, 2 Times Daily      Repatha SureClick solution auto-injector SureClick injection  Generic drug: Evolocumab   INJECT 1ML UNDER THE SKIN INTO THE APPROPRIATE AREA AS DIRECTED EVERY 14 DAYS      spironolactone 50 MG tablet  Commonly known as: Aldactone   50 mg, Oral, Daily      Tirzepatide 5 MG/0.5ML solution pen-injector pen  Commonly known as: Mounjaro   5 mg, Subcutaneous, Weekly      traMADol HCl 100 MG tablet  Commonly known as: ULTRAM   100 mg, Oral, Every 6 Hours PRN      traMADol 50 MG tablet  Commonly known as: ULTRAM   50 mg, Oral, Every 6 Hours PRN      traZODone 50 MG tablet  Commonly known as: DESYREL   50 mg, Oral, Nightly               Allergies   Allergen " Reactions    Lortab [Hydrocodone-Acetaminophen] Itching         Discharge Disposition:  Home or Self Care    Diet:    Resume home diet.    Condition:  Satisfactory      Discharge Activity:     As tolerated    CODE STATUS:  Code Status and Medical Interventions:   Ordered at: 01/09/24 1656     Level Of Support Discussed With:    Patient     Code Status (Patient has no pulse and is not breathing):    CPR (Attempt to Resuscitate)     Medical Interventions (Patient has pulse or is breathing):    Full         Future Appointments   Date Time Provider Department Center   1/24/2024 11:30 AM Dyan Roblero APRN [facility]   1/29/2024 10:30 AM Tang Conte MD [facility]   2/28/2024  9:15 AM Dacia Newsome APRN [facility]   5/29/2024  1:00 PM Elza Slaughter APRN [facility]             Electronically signed by Robert Puga MD, 01/11/24, 11:52 AM EST.

## 2024-01-11 NOTE — DISCHARGE INSTRUCTIONS
May shower.    Follow-up in the office in 2 weeks, call for appointment.  Resume home diet.  Resume home medications.  No lifting greater than 15 pounds for 2 weeks.

## 2024-01-12 ENCOUNTER — TRANSITIONAL CARE MANAGEMENT TELEPHONE ENCOUNTER (OUTPATIENT)
Dept: CALL CENTER | Facility: HOSPITAL | Age: 59
End: 2024-01-12
Payer: COMMERCIAL

## 2024-01-12 NOTE — OUTREACH NOTE
Call Center TCM Note      Flowsheet Row Responses   [facility] patient discharged from? [place]   Does the patient have one of the following disease processes/diagnoses(primary or secondary)? General Surgery   TCM attempt successful? No   Unsuccessful attempts Attempt 2            Tatyana Steward RN    1/12/2024, 15:57 EST

## 2024-01-12 NOTE — OUTREACH NOTE
Call Center TCM Note      Flowsheet Row Responses   [facility] facility patient discharged from? [facility]   Does the patient have one of the following disease processes/diagnoses(primary or secondary)? General Surgery   TCM attempt successful? No  [verbal release for wife]   Unsuccessful attempts Attempt 1  [attempted pt and wife]            Tatyana Steward RN    1/12/2024, 13:47 EST

## 2024-01-12 NOTE — OUTREACH NOTE
Prep Survey      Flowsheet Row Responses   [facility] patient discharged from? [facility]   Is LACE score < 7 ? No   Eligibility [facility]   Date of Admission 01/09/24   Date of Discharge 01/11/24   Discharge Disposition Home or Self Care   Discharge diagnosis **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, unspecified laterality right lower extremity revascularization.   Does the patient have one of the following disease processes/diagnoses(primary or secondary)? General Surgery   Does the patient have Home health ordered? No   Is there a DME ordered? No   Prep survey completed? Yes            ROSIE CHRISTENSEN - Registered Nurse

## 2024-01-14 ENCOUNTER — TRANSITIONAL CARE MANAGEMENT TELEPHONE ENCOUNTER (OUTPATIENT)
Dept: CALL CENTER | Facility: HOSPITAL | Age: 59
End: 2024-01-14
Payer: COMMERCIAL

## 2024-01-14 NOTE — OUTREACH NOTE
Call Center TCM Note      Flowsheet Row Responses   [facility] patient discharged from? [place]   Does the patient have one of the following disease processes/diagnoses(primary or secondary)? General Surgery   TCM attempt successful? No   Unsuccessful attempts Attempt 3  [No answer.]            Teresita Hernández RN    1/14/2024, 09:16 EST

## 2024-01-24 ENCOUNTER — OFFICE VISIT (OUTPATIENT)
Dept: VASCULAR SURGERY | Facility: HOSPITAL | Age: 59
End: 2024-01-24
Payer: COMMERCIAL

## 2024-01-24 VITALS
SYSTOLIC BLOOD PRESSURE: 110 MMHG | HEART RATE: 74 BPM | OXYGEN SATURATION: 97 % | TEMPERATURE: 97.1 F | DIASTOLIC BLOOD PRESSURE: 68 MMHG | RESPIRATION RATE: 16 BRPM

## 2024-01-24 DIAGNOSIS — Z98.890 S/P FEMORAL-TIBIAL BYPASS: ICD-10-CM

## 2024-01-24 DIAGNOSIS — I70.25 ATHEROSCLEROSIS OF NATIVE ARTERIES OF THE EXTREMITIES WITH ULCERATION: Primary | ICD-10-CM

## 2024-01-24 PROCEDURE — G0463 HOSPITAL OUTPT CLINIC VISIT: HCPCS | Performed by: NURSE PRACTITIONER

## 2024-01-24 PROCEDURE — 99024 POSTOP FOLLOW-UP VISIT: CPT | Performed by: NURSE PRACTITIONER

## 2024-01-24 NOTE — PROGRESS NOTES
[facility]     Progress Note    Patient Name: Jd Velazquez  : 1965  MRN: [id number]  Primary Care Physician:  Dacia Newsome, SOHAM  Date of admission: (Not on file)  Chief Complaint:    Chief Complaint   Patient presents with    Follow-up     Patient is here as a surgical follow up s/p right femoral-tibial bypass graft performed by Dr. Puga on 2024.        Subjective   Subjective     Mr. Velazquez presents for scheduled follow-up, he had a right femoral tibial bypass graft and a right common femoral artery endarterectomy performed Dr. Puga on 2024. He is doing well, he has bilateral lower extremity swelling, pain at 2/10.     Objective   Objective     Vitals:   Temp:  [97.1 °F (36.2 °C)] 97.1 °F (36.2 °C)  Heart Rate:  [74] 74  Resp:  [16] 16  BP: (110)/(68) 110/68    Physical Exam   General: Alert, no acute distress  Extremities: Lower extremity swelling  Skin: Right lower extremity: Healing surgical incision, mild erythema, no odor no drainage.  Right groin: Mild dehiscence proximal, thin layer of slough, no odor or drainage.  Neuro: No gross deficits    Result Review    Result Review:  I have personally reviewed the results from the time of this admission to 2024 11:47 EST and agree with these findings:  []  Laboratory  []  Microbiology  []  Radiology  []  EKG/Telemetry   []  Cardiology/Vascular   []  Pathology  []  Old records  []  Other:    Most notable findings include:     Assessment & Plan   Assessment / Plan     Assessment/Plan:  Diagnoses and all orders for this visit:    1. Atherosclerosis of native arteries of the extremities with ulceration (Primary)  -     Duplex Lower Extremity Art / Grafts - Right CAR; Future    2. S/P femoral-tibial bypass  -     Duplex Lower Extremity Art / Grafts - Right CAR; Future    Mr. Velazquez is doing well, surgical incisions are healing and no signs of infection following after a right femoral tibial bypass graft performed   [facility] on 1/19/2024. He continues to treat the great toe with Betadine.  Follow-up in 3 months with a right lower extremity duplex.    I have answered all of his questions and he is in agreement with the plan at this time.  Thank you for allowing me to participate in your patient's care.      Active Hospital Problems:  There are no active hospital problems to display for this patient.          Electronically signed by SOHAM Kimble, 01/24/24, 11:12 AM EST.

## 2024-02-02 ENCOUNTER — OFFICE VISIT (OUTPATIENT)
Dept: VASCULAR SURGERY | Facility: HOSPITAL | Age: 59
End: 2024-02-02
Payer: COMMERCIAL

## 2024-02-02 ENCOUNTER — OFFICE VISIT (OUTPATIENT)
Dept: CARDIOLOGY | Facility: CLINIC | Age: 59
End: 2024-02-02
Payer: COMMERCIAL

## 2024-02-02 VITALS
DIASTOLIC BLOOD PRESSURE: 80 MMHG | OXYGEN SATURATION: 96 % | SYSTOLIC BLOOD PRESSURE: 142 MMHG | HEART RATE: 80 BPM | RESPIRATION RATE: 16 BRPM | TEMPERATURE: 98.4 F

## 2024-02-02 VITALS
DIASTOLIC BLOOD PRESSURE: 82 MMHG | SYSTOLIC BLOOD PRESSURE: 158 MMHG | BODY MASS INDEX: 34.19 KG/M2 | HEIGHT: 71 IN | WEIGHT: 244.2 LBS | HEART RATE: 76 BPM

## 2024-02-02 DIAGNOSIS — I70.25 ATHEROSCLEROSIS OF NATIVE ARTERIES OF THE EXTREMITIES WITH ULCERATION: ICD-10-CM

## 2024-02-02 DIAGNOSIS — Z95.1 S/P CABG X 4: ICD-10-CM

## 2024-02-02 DIAGNOSIS — E78.2 MIXED HYPERLIPIDEMIA: ICD-10-CM

## 2024-02-02 DIAGNOSIS — I10 ESSENTIAL HYPERTENSION: ICD-10-CM

## 2024-02-02 DIAGNOSIS — I25.10 CORONARY ARTERY DISEASE INVOLVING NATIVE CORONARY ARTERY OF NATIVE HEART WITHOUT ANGINA PECTORIS: Primary | ICD-10-CM

## 2024-02-02 DIAGNOSIS — Z98.890 S/P FEMORAL-TIBIAL BYPASS: Primary | ICD-10-CM

## 2024-02-02 DIAGNOSIS — Z72.0 TOBACCO ABUSE: ICD-10-CM

## 2024-02-02 PROCEDURE — G0463 HOSPITAL OUTPT CLINIC VISIT: HCPCS | Performed by: NURSE PRACTITIONER

## 2024-02-02 RX ORDER — AMLODIPINE BESYLATE 10 MG/1
5 TABLET ORAL NIGHTLY
Qty: 90 TABLET | Refills: 3 | Status: SHIPPED | OUTPATIENT
Start: 2024-02-02

## 2024-02-02 RX ORDER — AMOXICILLIN AND CLAVULANATE POTASSIUM 500; 125 MG/1; MG/1
1 TABLET, FILM COATED ORAL 3 TIMES DAILY
Qty: 21 TABLET | Refills: 0 | Status: SHIPPED | OUTPATIENT
Start: 2024-02-02 | End: 2024-02-09

## 2024-02-02 NOTE — PROGRESS NOTES
"Chief Complaint  Coronary artery disease involving native coronary artery of    Subjective      Patient is here for follow-up on coronary artery disease.  He is doing well cardiac wise.  He has no chest discomfort or dyspnea.  He has no palpitations, presyncope or syncope.  He underwent recent right lower extremity arterial bypass surgery.  He appears to be recovering well.  He continues to smoke but has cut down to less than 5 cigarettes a day.    Past Medical History:   Diagnosis Date    Allergic rhinitis 01/12/2015    Anesthesia     DIFFICULTY WAKING UP POST SURGERY    Benign essential hypertension 03/08/2016    CAD (coronary artery disease)     Diabetes mellitus     Foot ulcer     Hyperlipidemia     Hypertension     Hypokalemia 02/23/2016    Hypothyroidism 04/24/2014    Insomnia, unspecified 06/15/2016    Microalbuminuria due to type 2 diabetes mellitus 10/15/2015    Mixed hyperlipidemia 10/15/2015    Myocardial infarction     Obesity     Skin cancer          Current Outpatient Medications:     amLODIPine (NORVASC) 5 MG tablet, Take 1 tablet by mouth Every Night., Disp: , Rfl:     aspirin 81 MG chewable tablet, Chew 1 tablet Daily., Disp: , Rfl:     clopidogrel (Plavix) 75 MG tablet, Take 1 tablet by mouth Daily for 180 days. (Patient taking differently: Take 1 tablet by mouth Daily. OK TO CONTINUE PER JENNIFER AT DR. MONTAGUE OFFICE), Disp: 30 tablet, Rfl: 5    Cymbalta 30 MG capsule, Take 1 capsule by mouth Daily., Disp: , Rfl:     Glucose Blood (Blood Glucose Test Strips 333) strip, 1 each by Other route Daily., Disp: 100 strip, Rfl: 2    insulin detemir (Levemir) 100 UNIT/ML injection, Inject 10 Units under the skin into the appropriate area as directed Every Night. Increase one unit per night goal fasting  2 hours after meals 140, Disp: 3 mL, Rfl: 12    Insulin Pen Needle (Pen Needles 3/16\") 31G X 5 MM misc, Use 1 each Every Night., Disp: 100 each, Rfl: 5    levothyroxine (SYNTHROID, LEVOTHROID) 25 MCG " tablet, Take 1 tablet by mouth Daily., Disp: 90 tablet, Rfl: 1    lidocaine (LIDODERM) 5 %, Place 3 patches on the skin as directed by provider Daily. Remove & Discard patch within 12 hours or as directed by MD, Disp: 90 each, Rfl: 5    lisinopril-hydrochlorothiazide (PRINZIDE,ZESTORETIC) 20-12.5 MG per tablet, TAKE 2 TABLETS BY MOUTH DAILY (Patient taking differently: Take 2 tablets by mouth Daily.), Disp: 180 tablet, Rfl: 3    metFORMIN (GLUCOPHAGE) 500 MG tablet, Take 1 tablet by mouth Daily With Breakfast., Disp: , Rfl:     metoprolol tartrate (LOPRESSOR) 100 MG tablet, Take 1 tablet by mouth Every 12 (Twelve) Hours., Disp: 180 tablet, Rfl: 3    OneTouch Delica Lancets 33G misc, test 4 times per day, Disp: 100 each, Rfl: 0    pregabalin (Lyrica) 225 MG capsule, Take 1 capsule by mouth 2 (Two) Times a Day., Disp: 180 capsule, Rfl: 1    Repatha SureClick solution auto-injector SureClick injection, INJECT 1ML UNDER THE SKIN INTO THE APPROPRIATE AREA AS DIRECTED EVERY 14 DAYS, Disp: 2 mL, Rfl: 3    spironolactone (Aldactone) 50 MG tablet, Take 1 tablet by mouth Daily., Disp: 90 tablet, Rfl: 1    Tirzepatide (Mounjaro) 5 MG/0.5ML solution pen-injector, Inject 0.5 mL under the skin into the appropriate area as directed 1 (One) Time Per Week. (Patient taking differently: Inject 0.5 mL under the skin into the appropriate area as directed 1 (One) Time Per Week. TAKES ON SUNDAYS LAST DOSE 12/23/24-PT INSTRUCTED TO HOLD), Disp: 2 mL, Rfl: 5    traMADol (ULTRAM) 50 MG tablet, Take 1 tablet by mouth Every 6 (Six) Hours As Needed for Moderate Pain., Disp: 120 tablet, Rfl: 0    traMADol HCl (ULTRAM) 100 MG tablet, Take 1 tablet by mouth Every 6 (Six) Hours As Needed., Disp: , Rfl:     traZODone (DESYREL) 50 MG tablet, Take 1 tablet by mouth Every Night., Disp: , Rfl:     There are no discontinued medications.  Allergies   Allergen Reactions    Lortab [Hydrocodone-Acetaminophen] Itching        Social History     Tobacco Use     "Smoking status: Every Day     Packs/day: .25     Types: Cigarettes    Smokeless tobacco: Never    Tobacco comments:     Smoked last today   Vaping Use    Vaping Use: Never used   Substance Use Topics    Alcohol use: Never    Drug use: Never       Family History   Problem Relation Age of Onset    Heart disease Other         Objective     /82   Pulse 76   Ht 180.3 cm (70.98\")   Wt 111 kg (244 lb 3.2 oz)   BMI 34.07 kg/m²       Physical Exam    General Appearance:   no acute distress  Alert and oriented x3  HENT:   lips not cyanotic  Atraumatic  Neck:  No jvd   supple  Respiratory:  no respiratory distress  normal breath sounds  no rales  Cardiovascular:  no S3, no S4   no murmur  no rub  Extremities  No cyanosis  lower extremity edema: none    Skin:   warm, dry  No rashes      Result Review :     proBNP   Date Value Ref Range Status   10/07/2020 220.6 0.0 - 900.0 pg/mL Final     CMP          12/15/2023    06:57 1/3/2024    14:30 1/10/2024    03:58   CMP   Glucose 167  189  170    BUN 13  8  11    Creatinine 1.11  0.97  0.84    EGFR 77.5  91.1  101.7    Sodium 131  138  130    Potassium 4.4  4.3  4.2    Chloride 95  100  94    Calcium 9.4  9.5  9.1    Total Protein  8.2     Albumin  4.2     Globulin  4.0     Total Bilirubin  0.3     Alkaline Phosphatase  143     AST (SGOT)  6     ALT (SGPT)  6     Albumin/Globulin Ratio  1.1     BUN/Creatinine Ratio 11.7  8.2  13.1    Anion Gap 12.3  12.9  12.6      CBC w/diff          11/30/2023    09:30 12/15/2023    06:57 1/10/2024    03:58   CBC w/Diff   WBC 8.41  9.89  10.98    RBC 4.89  4.73  4.14    Hemoglobin 15.0  14.2  12.3    Hematocrit 45.2  44.2  37.3    MCV 92.4  93.4  90.1    MCH 30.7  30.0  29.7    MCHC 33.2  32.1  33.0    RDW 13.4  13.7  13.7    Platelets 429  428  370    Neutrophil Rel % 53.7  50.7  65.3    Immature Granulocyte Rel % 0.7  0.5  1.1    Lymphocyte Rel % 33.8  34.9  20.6    Monocyte Rel % 10.0  12.5  12.4    Eosinophil Rel % 1.0  0.7  0.2  " "  Basophil Rel % 0.8  0.7  0.4       Lab Results   Component Value Date    TSH 4.780 (H) 11/30/2023      Lab Results   Component Value Date    FREET4 1.0 10/16/2020      No results found for: \"DDIMERQUANT\"  Magnesium   Date Value Ref Range Status   01/10/2024 1.7 1.6 - 2.6 mg/dL Final      No results found for: \"DIGOXIN\"   Lab Results   Component Value Date    TROPONINT 0.68 (H) 10/05/2020           Lipid Panel          8/23/2023    11:47 11/30/2023    09:30   Lipid Panel   Total Cholesterol 267  205    Triglycerides 1,120  439    HDL Cholesterol 26  26    VLDL Cholesterol  75    LDL Cholesterol  77  104    LDL/HDL Ratio  3.51      Lab Results   Component Value Date    POCTROP 1.58 (H) 10/05/2020       Results for orders placed in visit on 10/08/20    Emergent/Open-Heart Anesthesia BRET    Narrative  Procedure Performed: Emergent/Open-Heart Anesthesia BRET  Start Time:  10/8/2020 7:35 AM  End Time:   10/8/2020 7:46 AM    Preanesthesia Checklist:  Patient identified, IV assessed, risks and benefits discussed, monitors and equipment assessed, procedure being performed at surgeon's request and anesthesia consent obtained.    General Procedure Information  Diagnostic Indications for Echo:  assessment of ascending aorta, assessment of surgical repair and hemodynamic monitoring  Physician Requesting Echo: Jr Girma Chiu MD  CPT Code:  Atherosclerosis of aorta  Location performed:  OR  Intubated  Bite block placed  Heart visualized  Probe Insertion:  Easy  Probe Type:  Multiplane  Modalities:  Color flow mapping, pulse wave Doppler and continuous wave Doppler    Echocardiographic and Doppler Measurements    Ventricles    Right Ventricle:  Cavity size normal.  Thrombus not present.  Global function normal.  Left Ventricle:  Cavity size normal.  Hypertrophy present.  Thrombus not present.  Global Function mildly impaired.  Ejection Fraction 55%.        Valves    Aortic Valve:  Annulus normal.  Stenosis not present.  " Mean Gradient: 9 mmHg.  Regurgitation mild.  Leaflets normal.  Leaflet motions normal.    Mitral Valve:  Annulus normal.  Stenosis not present.  Regurgitation mild.  Leaflets normal and thickened.  Leaflet motions normal.    Tricuspid Valve:  Annulus normal.  Stenosis not present.  Regurgitation absent.  Leaflets normal.  Leaflet motions normal.  Pulmonic Valve:  Annulus normal.  Regurgitation absent.        Aorta    Ascending Aorta:  Size normal.  Diameter 3.5 cm.  Dissection not present.  Plaque thickness less than 3 mm.  Mobile plaque not present.  Aortic Arch:  Size normal.  Diameter 2.6 cm.  Dissection not present.  Plaque thickness less than 3 mm.  Mobile plaque not present.  Descending Aorta:  Size normal.  Dissection not present.  Plaque thickness less than 3 mm.  Mobile plaque not present.        Atria    Right Atrium:  Spontaneous echo contrast not present.  Thrombus not present.  Tumor not present.  Device not present.    Left Atrium:  Spontaneous echo contrast not present.  Thrombus not present.  Tumor not present.  Device not present.  Left atrial appendage normal.      Septa    Atrial Septum:  Intra-atrial septal morphology aneurysmal.        Diastolic Function Measurements:  Diastolic Dysfunction Grade=  E= ms  A= ms  E/A Ratio= 1  DT= ms  S/D= 1  IVRT=    Other Findings  Pericardium:  normal  Pleural Effusion:  none  Pulmonary Venous Flow:  normal    Anesthesia Information  Performed Personally  Anesthesiologist:  Delbert Cornell MD      Echocardiogram Comments:  Postbypass results:  Post bypass  Normal RHF  LVEF 55-60% via visual estimation  Mild AI mild MR no AS no MS  No TR or TS                 Diagnoses and all orders for this visit:    1. Coronary artery disease involving native coronary artery of native heart without angina pectoris (Primary)    2. S/P CABG x 4    3. Essential hypertension    4. Mixed hyperlipidemia    5. Tobacco abuse      Assessment:      -CAD s/p 4 vessel CABG:  Stable  without angina or anginal-like symptoms.  Continue current medical therapy.     -Hypertension:  BP is mildly elevated today.  Amlodipine dose will be increased to 10 mg daily for better blood pressure control.  Continue other blood pressure medications and blood pressure monitoring.     -Hyperlipidemia with severe statin intolerance: He is currently on Repatha but has not been very compliant with it.  The importance of medications compliance was stressed out today.  Will repeat lipid profile in March.    -Uncontrolled diabetes: A1c is 10.7%.  Lifestyle changes and diet restrictions were discussed with the patient.  The importance of strict diabetes control was discussed with him.  He might benefit from referral to endocrinology if this has not been done yet.  He will discuss it with his primary care provider.    -Smoking: I had a long discussion with him about the importance of complete smoking cessation.  He expressed understanding and willingness to quit.  He was encouraged.      Follow Up     No follow-ups on file.        Patient was given instructions and counseling regarding his condition or for health maintenance advice. Please see specific information pulled into the AVS if appropriate.

## 2024-02-02 NOTE — PROGRESS NOTES
[facility]     Progress Note    Patient Name: Jd Velazquez  : 1965  MRN: [id number]  Primary Care Physician:  Dacia Newsome APRN  Date of admission: (Not on file)  Chief Complaint:    Chief Complaint   Patient presents with    Follow-up     WOUND CHECK       Subjective   Subjective     Mr. Velazquez presents with concerns of drainage from right groin surgical incision. He states the incision has slightly opened and the drainage has increase in the past two day. No odor or redness, denies fever or chills.     Objective   Objective     Vitals:   Temp:  [98.4 °F (36.9 °C)] 98.4 °F (36.9 °C)  Heart Rate:  [76-80] 80  Resp:  [16] 16  BP: (142-161)/(80-84) 142/80    Physical Exam   General: Alert, no acute distress  Extremities: Right lower: Groin surgical dehiscence proximal, serosanguineous drainage, no odor, 0.4 by 0.4 cm, slough .  Neuro: No gross deficits.    Result Review    Result Review:  I have personally reviewed the results from the time of this admission to 2024 11:44 EST and agree with these findings:  []  Laboratory  []  Microbiology  []  Radiology  []  EKG/Telemetry   []  Cardiology/Vascular   []  Pathology  []  Old records  []  Other:    Most notable findings include:     Assessment & Plan   Assessment / Plan     Assessment/Plan:  Diagnoses and all orders for this visit:    1. S/P femoral-tibial bypass (Primary)  -     amoxicillin-clavulanate (Augmentin) 500-125 MG per tablet; Take 1 tablet by mouth 3 (Three) Times a Day for 7 days.  Dispense: 21 tablet; Refill: 0    2. Atherosclerosis of native arteries of the extremities with ulceration  -     amoxicillin-clavulanate (Augmentin) 500-125 MG per tablet; Take 1 tablet by mouth 3 (Three) Times a Day for 7 days.  Dispense: 21 tablet; Refill: 0    Mr. Velazquez has a surgical wound dehiscence with moderate serosanguineous drainage and slough, no odor, erythema, fever or chills.  I recommend he do a wet to dry dressing twice a day  and prn for saturation. I will start an antibiotic and he should follow up with Dr. Puga next week.     I have advised signs and symptoms of infection and advised if he has any additional concerns then he should follow up sooner.     Active Hospital Problems:  There are no active hospital problems to display for this patient.          Electronically signed by SOHAM Kimble, 02/02/24, 11:15 AM EST.

## 2024-02-07 ENCOUNTER — OFFICE VISIT (OUTPATIENT)
Dept: VASCULAR SURGERY | Facility: HOSPITAL | Age: 59
End: 2024-02-07
Payer: COMMERCIAL

## 2024-02-07 VITALS
RESPIRATION RATE: 16 BRPM | SYSTOLIC BLOOD PRESSURE: 122 MMHG | HEART RATE: 62 BPM | TEMPERATURE: 99.2 F | DIASTOLIC BLOOD PRESSURE: 60 MMHG | OXYGEN SATURATION: 96 %

## 2024-02-07 DIAGNOSIS — Z98.890 S/P FEMORAL-TIBIAL BYPASS: Primary | ICD-10-CM

## 2024-02-07 DIAGNOSIS — I70.25 ATHEROSCLEROSIS OF NATIVE ARTERIES OF THE EXTREMITIES WITH ULCERATION: ICD-10-CM

## 2024-02-07 PROCEDURE — G0463 HOSPITAL OUTPT CLINIC VISIT: HCPCS | Performed by: SURGERY

## 2024-02-07 PROCEDURE — 99024 POSTOP FOLLOW-UP VISIT: CPT | Performed by: SURGERY

## 2024-02-07 RX ORDER — AMOXICILLIN AND CLAVULANATE POTASSIUM 500; 125 MG/1; MG/1
1 TABLET, FILM COATED ORAL 2 TIMES DAILY
Qty: 20 TABLET | Refills: 0 | Status: SHIPPED | OUTPATIENT
Start: 2024-02-07

## 2024-02-07 NOTE — PROGRESS NOTES
[facility]   Follow up Office    Patient Name: Jd Velazquez  : 1965  MRN: [id number]  Primary Care Physician:  Dacia Newsome APRN      Subjective   Subjective     HPI:    Jd Velazquez is a 58 y.o. male here for follow-up after within the last week due to concern for wound changes.  He was started on wound care as well as oral antibiotics.  Doing okay.  No new complaints.      Objective     Vi right leg bypass graft 2024.  Doing okay.  He was seentals:   Temp:  [99.2 °F (37.3 °C)] 99.2 °F (37.3 °C)  Heart Rate:  [62] 62  Resp:  [16] 16  BP: (122)/(60) 122/60    Physical Exam      General: Alert, no acute distress  Right groin: Small area of dehiscence approximately 1 cm in diameter and 1.5 cm deep at the superior apex of the wound.  No surrounding erythema, no significant drainage.  Right calf wound: Some dry scabs towards the distal end with mild surrounding erythema.    Assessment & Plan   Assessment / Plan     Diagnoses and all orders for this visit:    1. S/P femoral-tibial bypass (Primary)    2. Atherosclerosis of native arteries of the extremities with ulceration    Other orders  -     amoxicillin-clavulanate (Augmentin) 500-125 MG per tablet; Take 1 tablet by mouth 2 (Two) Times a Day.  Dispense: 20 tablet; Refill: 0       Assessment/Plan:   Areas of partial wound failure in the right groin and calf following femoral-tibial bypass graft.  Continue wound care with wet-to-dry's to the groin, Betadine to the calf.  Continue antibiotics, will switch to twice daily.  Follow-up in 1 to 2 weeks.        Electronically signed by Robert Puga MD, 24, 2:21 PM EST.

## 2024-02-14 ENCOUNTER — TELEPHONE (OUTPATIENT)
Dept: SURGERY | Facility: CLINIC | Age: 59
End: 2024-02-14
Payer: COMMERCIAL

## 2024-02-19 ENCOUNTER — OFFICE VISIT (OUTPATIENT)
Dept: VASCULAR SURGERY | Facility: HOSPITAL | Age: 59
End: 2024-02-19
Payer: COMMERCIAL

## 2024-02-19 VITALS
SYSTOLIC BLOOD PRESSURE: 112 MMHG | RESPIRATION RATE: 18 BRPM | HEART RATE: 60 BPM | DIASTOLIC BLOOD PRESSURE: 60 MMHG | OXYGEN SATURATION: 98 % | TEMPERATURE: 97.5 F

## 2024-02-19 DIAGNOSIS — I70.25 ATHEROSCLEROSIS OF NATIVE ARTERIES OF THE EXTREMITIES WITH ULCERATION: ICD-10-CM

## 2024-02-19 DIAGNOSIS — Z98.890 S/P FEMORAL-TIBIAL BYPASS: Primary | ICD-10-CM

## 2024-02-19 PROCEDURE — G0463 HOSPITAL OUTPT CLINIC VISIT: HCPCS | Performed by: SURGERY

## 2024-02-19 PROCEDURE — 99024 POSTOP FOLLOW-UP VISIT: CPT | Performed by: SURGERY

## 2024-02-19 RX ORDER — COLLAGENASE SANTYL 250 [ARB'U]/G
1 OINTMENT TOPICAL DAILY
Qty: 90 G | Refills: 1 | Status: SHIPPED | OUTPATIENT
Start: 2024-02-19

## 2024-02-19 NOTE — PROGRESS NOTES
[facility]   Follow up Office    Patient Name: Jd Velazquez  : 1965  MRN: [id number]  Primary Care Physician:  aDcia Newsome APRN      Subjective   Subjective     HPI:    Jd Velazquez is a 58 y.o. male here for follow-up for wound check of the right groin after right femoral-tibial bypass graft 2023.  Doing okay.  No further drainage.      Objective     Vitals:   Temp:  [97.5 °F (36.4 °C)] 97.5 °F (36.4 °C)  Heart Rate:  [60] 60  Resp:  [18] 18  BP: (112)/(60) 112/60    Physical Exam      General: Alert, no acute distress  Right groin: Small defect approximately 8 mm in diameter, 1 cm deep with fibrinous base.  No surrounding erythema, no drainage.  Right leg wound: Minimal dried distal scab, otherwise healing well.    Assessment & Plan   Assessment / Plan     Diagnoses and all orders for this visit:    1. S/P femoral-tibial bypass (Primary)    2. Atherosclerosis of native arteries of the extremities with ulceration    Other orders  -     collagenase (Santyl) 250 UNIT/GM ointment; Apply 1 Application topically to the appropriate area as directed Daily.  Dispense: 90 g; Refill: 1       Assessment/Plan:   Satisfactory progress.  Will switch to Santyl dressings daily.  Follow-up in 1 month.        Electronically signed by Robert Puga MD, 24, 2:00 PM EST.

## 2024-02-28 ENCOUNTER — OFFICE VISIT (OUTPATIENT)
Dept: FAMILY MEDICINE CLINIC | Facility: CLINIC | Age: 59
End: 2024-02-28
Payer: COMMERCIAL

## 2024-02-28 VITALS
HEIGHT: 71 IN | TEMPERATURE: 97.4 F | BODY MASS INDEX: 33.88 KG/M2 | OXYGEN SATURATION: 96 % | SYSTOLIC BLOOD PRESSURE: 124 MMHG | WEIGHT: 242 LBS | HEART RATE: 79 BPM | DIASTOLIC BLOOD PRESSURE: 70 MMHG

## 2024-02-28 DIAGNOSIS — G62.9 NEUROPATHY: ICD-10-CM

## 2024-02-28 DIAGNOSIS — I25.118 CORONARY ARTERY DISEASE OF NATIVE ARTERY OF NATIVE HEART WITH STABLE ANGINA PECTORIS: ICD-10-CM

## 2024-02-28 DIAGNOSIS — I10 PRIMARY HYPERTENSION: ICD-10-CM

## 2024-02-28 DIAGNOSIS — E03.9 ACQUIRED HYPOTHYROIDISM: ICD-10-CM

## 2024-02-28 DIAGNOSIS — E11.65 TYPE 2 DIABETES MELLITUS WITH HYPERGLYCEMIA, WITHOUT LONG-TERM CURRENT USE OF INSULIN: Primary | ICD-10-CM

## 2024-02-28 DIAGNOSIS — F51.01 PRIMARY INSOMNIA: ICD-10-CM

## 2024-02-28 DIAGNOSIS — E66.09 CLASS 1 OBESITY DUE TO EXCESS CALORIES WITH SERIOUS COMORBIDITY AND BODY MASS INDEX (BMI) OF 32.0 TO 32.9 IN ADULT: ICD-10-CM

## 2024-02-28 DIAGNOSIS — Z95.1 S/P CABG X 4: ICD-10-CM

## 2024-02-28 DIAGNOSIS — E78.5 HYPERLIPIDEMIA LDL GOAL <70: ICD-10-CM

## 2024-02-28 LAB
ALBUMIN SERPL-MCNC: 4.4 G/DL (ref 3.5–5.2)
ALBUMIN/GLOB SERPL: 1.3 G/DL
ALP SERPL-CCNC: 121 U/L (ref 39–117)
ALT SERPL W P-5'-P-CCNC: 9 U/L (ref 1–41)
ANION GAP SERPL CALCULATED.3IONS-SCNC: 15.2 MMOL/L (ref 5–15)
AST SERPL-CCNC: 6 U/L (ref 1–40)
BASOPHILS # BLD AUTO: 0.09 10*3/MM3 (ref 0–0.2)
BASOPHILS NFR BLD AUTO: 1.3 % (ref 0–1.5)
BILIRUB SERPL-MCNC: 0.3 MG/DL (ref 0–1.2)
BUN SERPL-MCNC: 13 MG/DL (ref 6–20)
BUN/CREAT SERPL: 13.8 (ref 7–25)
CALCIUM SPEC-SCNC: 9.7 MG/DL (ref 8.6–10.5)
CHLORIDE SERPL-SCNC: 96 MMOL/L (ref 98–107)
CHOLEST SERPL-MCNC: 181 MG/DL (ref 0–200)
CO2 SERPL-SCNC: 21.8 MMOL/L (ref 22–29)
CREAT SERPL-MCNC: 0.94 MG/DL (ref 0.76–1.27)
DEPRECATED RDW RBC AUTO: 46.2 FL (ref 37–54)
EGFRCR SERPLBLD CKD-EPI 2021: 94.6 ML/MIN/1.73
EOSINOPHIL # BLD AUTO: 0.38 10*3/MM3 (ref 0–0.4)
EOSINOPHIL NFR BLD AUTO: 5.4 % (ref 0.3–6.2)
ERYTHROCYTE [DISTWIDTH] IN BLOOD BY AUTOMATED COUNT: 14.1 % (ref 12.3–15.4)
GLOBULIN UR ELPH-MCNC: 3.3 GM/DL
GLUCOSE SERPL-MCNC: 178 MG/DL (ref 65–99)
HBA1C MFR BLD: 9.4 % (ref 4.8–5.6)
HCT VFR BLD AUTO: 41 % (ref 37.5–51)
HDLC SERPL-MCNC: 28 MG/DL (ref 40–60)
HGB BLD-MCNC: 13.9 G/DL (ref 13–17.7)
IMM GRANULOCYTES # BLD AUTO: 0.03 10*3/MM3 (ref 0–0.05)
IMM GRANULOCYTES NFR BLD AUTO: 0.4 % (ref 0–0.5)
LDLC SERPL CALC-MCNC: 82 MG/DL (ref 0–100)
LDLC/HDLC SERPL: 2.35 {RATIO}
LYMPHOCYTES # BLD AUTO: 2.47 10*3/MM3 (ref 0.7–3.1)
LYMPHOCYTES NFR BLD AUTO: 35.4 % (ref 19.6–45.3)
MCH RBC QN AUTO: 30.8 PG (ref 26.6–33)
MCHC RBC AUTO-ENTMCNC: 33.9 G/DL (ref 31.5–35.7)
MCV RBC AUTO: 90.7 FL (ref 79–97)
MONOCYTES # BLD AUTO: 0.83 10*3/MM3 (ref 0.1–0.9)
MONOCYTES NFR BLD AUTO: 11.9 % (ref 5–12)
NEUTROPHILS NFR BLD AUTO: 3.18 10*3/MM3 (ref 1.7–7)
NEUTROPHILS NFR BLD AUTO: 45.6 % (ref 42.7–76)
NRBC BLD AUTO-RTO: 0 /100 WBC (ref 0–0.2)
PLATELET # BLD AUTO: 367 10*3/MM3 (ref 140–450)
PMV BLD AUTO: 9.4 FL (ref 6–12)
POTASSIUM SERPL-SCNC: 4.3 MMOL/L (ref 3.5–5.2)
PROT SERPL-MCNC: 7.7 G/DL (ref 6–8.5)
RBC # BLD AUTO: 4.52 10*6/MM3 (ref 4.14–5.8)
SODIUM SERPL-SCNC: 133 MMOL/L (ref 136–145)
T4 FREE SERPL-MCNC: 1.14 NG/DL (ref 0.93–1.7)
TRIGL SERPL-MCNC: 436 MG/DL (ref 0–150)
TSH SERPL DL<=0.05 MIU/L-ACNC: 4.32 UIU/ML (ref 0.27–4.2)
VLDLC SERPL-MCNC: 71 MG/DL (ref 5–40)
WBC NRBC COR # BLD AUTO: 6.98 10*3/MM3 (ref 3.4–10.8)

## 2024-02-28 PROCEDURE — 84439 ASSAY OF FREE THYROXINE: CPT | Performed by: NURSE PRACTITIONER

## 2024-02-28 PROCEDURE — 83036 HEMOGLOBIN GLYCOSYLATED A1C: CPT | Performed by: NURSE PRACTITIONER

## 2024-02-28 PROCEDURE — 80061 LIPID PANEL: CPT | Performed by: NURSE PRACTITIONER

## 2024-02-28 PROCEDURE — 80050 GENERAL HEALTH PANEL: CPT | Performed by: NURSE PRACTITIONER

## 2024-02-28 RX ORDER — SPIRONOLACTONE 50 MG/1
50 TABLET, FILM COATED ORAL DAILY
Qty: 90 TABLET | Refills: 1 | Status: SHIPPED | OUTPATIENT
Start: 2024-02-28

## 2024-02-28 RX ORDER — LEVOTHYROXINE SODIUM 0.03 MG/1
25 TABLET ORAL DAILY
Qty: 90 TABLET | Refills: 1 | Status: SHIPPED | OUTPATIENT
Start: 2024-02-28 | End: 2024-02-29 | Stop reason: SDUPTHER

## 2024-02-28 RX ORDER — PREGABALIN 300 MG/1
300 CAPSULE ORAL 2 TIMES DAILY
Qty: 180 CAPSULE | Refills: 1 | Status: SHIPPED | OUTPATIENT
Start: 2024-02-28

## 2024-02-28 RX ORDER — ZOLPIDEM TARTRATE 10 MG/1
10 TABLET ORAL NIGHTLY PRN
Qty: 90 TABLET | Refills: 0 | Status: SHIPPED | OUTPATIENT
Start: 2024-02-28

## 2024-02-28 RX ORDER — LISINOPRIL AND HYDROCHLOROTHIAZIDE 20; 12.5 MG/1; MG/1
2 TABLET ORAL DAILY
Qty: 180 TABLET | Refills: 3 | Status: SHIPPED | OUTPATIENT
Start: 2024-02-28

## 2024-02-28 RX ORDER — PREGABALIN 225 MG/1
225 CAPSULE ORAL 2 TIMES DAILY
Qty: 180 CAPSULE | Refills: 1 | Status: CANCELLED | OUTPATIENT
Start: 2024-02-28

## 2024-02-28 RX ORDER — INSULIN DETEMIR 100 [IU]/ML
20 INJECTION, SOLUTION SUBCUTANEOUS NIGHTLY
Qty: 9 ML | Refills: 12 | Status: SHIPPED | OUTPATIENT
Start: 2024-02-28

## 2024-02-28 NOTE — PROGRESS NOTES
Follow Up Office Visit      Patient Name: Jd Velazquez  : 1965   MRN: [id number]     Chief Complaint:    Chief Complaint   Patient presents with    Coronary Artery Disease    Diabetes    Hyperlipidemia    Hypertension    Hypothyroidism    Insomnia       History of Present Illness: Jd Velazquez is a 58 y.o. male who is here today to follow up for  DM2, HTN hyperlipidemia, CAD, insomnia, allergic rhinitis, hypothyroidism, and neuropathy       Pt reports fasting glucose 170 on mounjaro 5 mg   Eye exam- baker and ferrlel     Foot exam-  dr young 2023  psa- 2023  A1C- 2023  Colonoscopy- consult 24    Lyrica helping chronic back pain some but states neuropathy pain worse since surgery currently attending pain management and has a consult scheduled with neurosurgery in [place] to discuss his chronic back pain    C/o insomnia uncontrolled tried,  trazodone, restoril, has tried his wife's ambien  Pt reports compliant with CPAP       Follow up  Dr. Puga 24 right femoral-tibial bypass graft 2023, due to Atherosclerosis of native arteries of the extremities with ulceration   Per progress note 24  1. S/P femoral-tibial bypass (Primary)     2. Atherosclerosis of native arteries of the extremities with ulceration     Other orders  -     collagenase (Santyl) 250 UNIT/GM ointment; Apply 1 Application topically to the appropriate area as directed Daily.  Dispense: 90 g; Refill: 1   Assessment/Plan:   Satisfactory progress.  Will switch to Santyl dressings daily.  Follow-up in 1 month.      Subjective      Review of Systems:   Review of Systems   Constitutional:  Negative for fever.   HENT:  Negative for ear pain and sore throat.    Respiratory:  Negative for cough.    Cardiovascular:  Negative for chest pain.   Gastrointestinal:  Negative for abdominal pain, constipation, diarrhea, nausea and vomiting.   Genitourinary:  Negative for dysuria.   Musculoskeletal:   Positive for back pain. Negative for myalgias.   Neurological:  Negative for headaches.   Psychiatric/Behavioral:  Positive for sleep disturbance.         Past Medical History:   Past Medical History:   Diagnosis Date    Allergic rhinitis 01/12/2015    Anesthesia     DIFFICULTY WAKING UP POST SURGERY    Benign essential hypertension 03/08/2016    CAD (coronary artery disease)     Diabetes mellitus     Foot ulcer     Hyperlipidemia     Hypertension     Hypokalemia 02/23/2016    Hypothyroidism 04/24/2014    Insomnia, unspecified 06/15/2016    Microalbuminuria due to type 2 diabetes mellitus 10/15/2015    Mixed hyperlipidemia 10/15/2015    Myocardial infarction     Obesity     Skin cancer        Past Surgical History:   Past Surgical History:   Procedure Laterality Date    ANKLE ARTHROSCOPY W/ OPEN REPAIR      APPENDECTOMY      CARDIAC CATHETERIZATION  2014    PCI to circumflex    CARDIAC CATHETERIZATION Right 10/26/2023    Procedure: Aortogram with right leg angiogram, possible angioplasty or stenting;  Surgeon: Robert Puga MD;  Location: [facility] CATH INVASIVE LOCATION;  Service: Vascular;  Laterality: Right;    CARDIAC CATHETERIZATION Right 12/15/2023    Procedure: Right leg angiogram, possible angioplasty or stenting;  Surgeon: Robert Puga MD;  Location: [facility] CATH INVASIVE LOCATION;  Service: Vascular;  Laterality: Right;    CORONARY ARTERY BYPASS GRAFT N/A 10/08/2020    Procedure: STERNOTOMY, CORONARY ARTERY BYPASS GRAFTING TIME 4 WITH LEFT KELSI  AND ENDOSCOPICALLY HARVESTED LEFT GREATER SAPHENOUS VEIN AND PRP.;  Surgeon: Jr Girma Chiu MD;  Location: [facility] OR;  Service: Cardiothoracic;  Laterality: N/A;    FEMORAL TIBIAL BYPASS Right 1/9/2024    Procedure: Right femoral-tibial bypass graft;  Surgeon: Robert Puga MD;  Location: [facility] MAIN OR;  Service: Vascular;  Laterality: Right;    HEEL SPUR SURGERY      HERNIA REPAIR      KNEE ARTHROSCOPY      MULTIPLE TIMES ON BOTH KNEES    SKIN  "CANCER EXCISION      BACK    TOE SURGERY Right     DEBRIDMENT RIGHT GREAT TOE    TONSILLECTOMY         Family History:   Family History   Problem Relation Age of Onset    Heart disease Other        Social History:   Social History     Socioeconomic History    Marital status:    Tobacco Use    Smoking status: Every Day     Packs/day: .25     Types: Cigarettes     Passive exposure: Never    Smokeless tobacco: Never    Tobacco comments:     Smoked last today   Vaping Use    Vaping Use: Never used   Substance and Sexual Activity    Alcohol use: Never    Drug use: Never    Sexual activity: Defer       Medications:     Current Outpatient Medications:     amLODIPine (NORVASC) 10 MG tablet, Take 0.5 tablets by mouth Every Night. (Patient taking differently: Take 1 tablet by mouth Every Night.), Disp: 90 tablet, Rfl: 3    aspirin 81 MG chewable tablet, Chew 1 tablet Daily., Disp: , Rfl:     clopidogrel (Plavix) 75 MG tablet, Take 1 tablet by mouth Daily for 180 days. (Patient taking differently: Take 1 tablet by mouth Daily. OK TO CONTINUE PER JENNIFER AT DR. MONTAGUE OFFICE), Disp: 30 tablet, Rfl: 5    Cymbalta 30 MG capsule, Take 1 capsule by mouth Daily., Disp: , Rfl:     Glucose Blood (Blood Glucose Test Strips 333) strip, 1 each by Other route Daily., Disp: 100 strip, Rfl: 2    insulin detemir (Levemir) 100 UNIT/ML injection, Inject 20 Units under the skin into the appropriate area as directed Every Night. Increase one unit per night goal fasting  2 hours after meals 140, Disp: 9 mL, Rfl: 12    Insulin Pen Needle (Pen Needles 3/16\") 31G X 5 MM misc, Use 1 each Every Night., Disp: 100 each, Rfl: 5    levothyroxine (SYNTHROID, LEVOTHROID) 25 MCG tablet, Take 1 tablet by mouth Daily., Disp: 90 tablet, Rfl: 1    lidocaine (LIDODERM) 5 %, Place 3 patches on the skin as directed by provider Daily. Remove & Discard patch within 12 hours or as directed by MD, Disp: 90 each, Rfl: 5    lisinopril-hydrochlorothiazide " "(PRINZIDE,ZESTORETIC) 20-12.5 MG per tablet, Take 2 tablets by mouth Daily., Disp: 180 tablet, Rfl: 3    metFORMIN (GLUCOPHAGE) 500 MG tablet, Take 1 tablet by mouth Daily With Breakfast., Disp: , Rfl:     metoprolol tartrate (LOPRESSOR) 100 MG tablet, Take 1 tablet by mouth Every 12 (Twelve) Hours., Disp: 180 tablet, Rfl: 3    OneTouch Delica Lancets 33G misc, test 4 times per day, Disp: 100 each, Rfl: 0    pregabalin (Lyrica) 300 MG capsule, Take 1 capsule by mouth 2 (Two) Times a Day., Disp: 180 capsule, Rfl: 1    Repatha SureClick solution auto-injector SureClick injection, INJECT 1ML UNDER THE SKIN INTO THE APPROPRIATE AREA AS DIRECTED EVERY 14 DAYS, Disp: 2 mL, Rfl: 3    spironolactone (Aldactone) 50 MG tablet, Take 1 tablet by mouth Daily., Disp: 90 tablet, Rfl: 1    amoxicillin-clavulanate (Augmentin) 500-125 MG per tablet, Take 1 tablet by mouth 2 (Two) Times a Day. (Patient not taking: Reported on 2/28/2024), Disp: 20 tablet, Rfl: 0    collagenase (Santyl) 250 UNIT/GM ointment, Apply 1 Application topically to the appropriate area as directed Daily. (Patient not taking: Reported on 2/28/2024), Disp: 90 g, Rfl: 1    Tirzepatide (MOUNJARO) 7.5 MG/0.5ML solution pen-injector pen, Inject 0.5 mL under the skin into the appropriate area as directed 1 (One) Time Per Week., Disp: 6 mL, Rfl: 1    zolpidem (AMBIEN) 10 MG tablet, Take 1 tablet by mouth At Night As Needed for Sleep., Disp: 90 tablet, Rfl: 0    Allergies:   Allergies   Allergen Reactions    Lortab [Hydrocodone-Acetaminophen] Itching           PHQ-2 Total Score: 0   PHQ-9 Total Score: 0     Objective     Physical Exam:  Vital Signs:   Vitals:    02/28/24 0906 02/28/24 0939   BP: 143/74 124/70   BP Location:  Right arm   Patient Position:  Sitting   Pulse: 79    Temp: 97.4 °F (36.3 °C)    SpO2: 96%    Weight: 110 kg (242 lb)    Height: 180.3 cm (70.98\")      Body mass index is 33.77 kg/m².           Physical Exam  HENT:      Right Ear: Tympanic membrane " normal.      Nose: Nose normal.      Mouth/Throat:      Mouth: Mucous membranes are moist.   Eyes:      Conjunctiva/sclera: Conjunctivae normal.   Neck:      Vascular: No carotid bruit.   Cardiovascular:      Rate and Rhythm: Normal rate and regular rhythm.      Heart sounds: Normal heart sounds. No murmur heard.  Pulmonary:      Effort: Pulmonary effort is normal.      Breath sounds: Normal breath sounds.   Abdominal:      General: Bowel sounds are normal.      Palpations: Abdomen is soft.   Musculoskeletal:      Right lower leg: No edema.      Left lower leg: No edema.   Skin:     General: Skin is warm and dry.   Neurological:      Mental Status: He is alert.   Psychiatric:         Mood and Affect: Mood normal.         Behavior: Behavior normal.             Assessment / Plan      Assessment/Plan:   Diagnoses and all orders for this visit:    1. Type 2 diabetes mellitus with hyperglycemia, without long-term current use of insulin (Primary)  -     Microalbumin / Creatinine Urine Ratio - Urine, Clean Catch  -     Urinalysis With Culture If Indicated -  -     CBC Auto Differential  -     Comprehensive Metabolic Panel  -     Cancel: Microalbumin / Creatinine Urine Ratio - Urine, Clean Catch  -     Hemoglobin A1c  -     Urinalysis With Culture If Indicated -    2. Primary hypertension  -     lisinopril-hydrochlorothiazide (PRINZIDE,ZESTORETIC) 20-12.5 MG per tablet; Take 2 tablets by mouth Daily.  Dispense: 180 tablet; Refill: 3    3. Hyperlipidemia LDL goal <70  -     Comprehensive Metabolic Panel  -     Lipid Panel    4. Coronary artery disease of native artery of native heart with stable angina pectoris    5. S/P CABG x 4    6. Acquired hypothyroidism  -     TSH  -     T4, Free    7. Class 1 obesity due to excess calories with serious comorbidity and body mass index (BMI) of 32.0 to 32.9 in adult    8. Neuropathy  -     pregabalin (Lyrica) 300 MG capsule; Take 1 capsule by mouth 2 (Two) Times a Day.  Dispense: 180  capsule; Refill: 1    9. Primary insomnia  -     zolpidem (AMBIEN) 10 MG tablet; Take 1 tablet by mouth At Night As Needed for Sleep.  Dispense: 90 tablet; Refill: 0    Other orders  -     levothyroxine (SYNTHROID, LEVOTHROID) 25 MCG tablet; Take 1 tablet by mouth Daily.  Dispense: 90 tablet; Refill: 1  -     spironolactone (Aldactone) 50 MG tablet; Take 1 tablet by mouth Daily.  Dispense: 90 tablet; Refill: 1  -     Tirzepatide (MOUNJARO) 7.5 MG/0.5ML solution pen-injector pen; Inject 0.5 mL under the skin into the appropriate area as directed 1 (One) Time Per Week.  Dispense: 6 mL; Refill: 1  -     insulin detemir (Levemir) 100 UNIT/ML injection; Inject 20 Units under the skin into the appropriate area as directed Every Night. Increase one unit per night goal fasting  2 hours after meals 140  Dispense: 9 mL; Refill: 12         Diabetes mellitus type 2 Home readings uncontrolled will increase Mounjaro to 7.5 mg once weekly increase Levemir 20 units nightly follow-up in 4 weeks with blood glucose log we will call with hemoglobin A1c with results and further recommendations will request records for diabetic eye exam  Hypertension currently controlled lisinopril-hydrochlorothiazide amlodipine  Hyperlipidemia LDL goal is 70 with status post CABG x 4 and coronary artery disease currently on Plavix aspirin and statin will obtain lipid panel to monitor patient is currently on Repatha  Hypothyroidism we will obtain labs to monitor current Synthroid dose 25 mcg daily denies palpitations  Class I obesity with serious comorbidity of diabetes hypertension hyperlipidemia will increase Mounjaro to help aid in appetite and portion control do recommend reducing overall caloric intake and exercising 30 minutes daily  Neuropathy pain uncontrolled will increase Lyrica to 300 mg twice daily [place] reviewed urine drug screen obtained  Insomnia patient reports compliance CPAP with obstructive sleep apnea will try Ambien patient  "has tried multiple medications recommend good sleep hygiene discussed no bluelight when going to bed recommend reading with a soft white light      Follow Up:   Return in about 1 month (around 3/28/2024).    Nyasia Newsome, APRN    \"Please note that portions of this note were completed with a voice recognition program.\"    "

## 2024-02-29 DIAGNOSIS — E03.9 ACQUIRED HYPOTHYROIDISM: Primary | ICD-10-CM

## 2024-02-29 DIAGNOSIS — Z79.899 MEDICATION MANAGEMENT: Primary | ICD-10-CM

## 2024-02-29 RX ORDER — LEVOTHYROXINE SODIUM 0.05 MG/1
50 TABLET ORAL
Qty: 90 TABLET | Refills: 1 | Status: SHIPPED | OUTPATIENT
Start: 2024-02-29

## 2024-03-01 ENCOUNTER — TELEPHONE (OUTPATIENT)
Dept: VASCULAR SURGERY | Facility: HOSPITAL | Age: 59
End: 2024-03-01
Payer: COMMERCIAL

## 2024-03-01 NOTE — TELEPHONE ENCOUNTER
Called patient in re: getting Santyl approved with insurance. Need to know if patient has ever used [facility] in [place], [place]. If he has, would it be OK if we sent prescription to [facility] . If he hasn't, would he mind if we did send prescription to [facility].

## 2024-03-04 ENCOUNTER — CLINICAL SUPPORT (OUTPATIENT)
Dept: FAMILY MEDICINE CLINIC | Facility: CLINIC | Age: 59
End: 2024-03-04
Payer: COMMERCIAL

## 2024-03-04 DIAGNOSIS — E11.65 TYPE 2 DIABETES MELLITUS WITH HYPERGLYCEMIA, WITHOUT LONG-TERM CURRENT USE OF INSULIN: Primary | ICD-10-CM

## 2024-03-04 LAB
ALBUMIN UR-MCNC: 5.8 MG/DL
AMPHET+METHAMPHET UR QL: NEGATIVE
AMPHETAMINE INTERNAL CONTROL: NORMAL
AMPHETAMINES UR QL: NEGATIVE
BARBITURATE INTERNAL CONTROL: NORMAL
BARBITURATES UR QL SCN: NEGATIVE
BENZODIAZ UR QL SCN: NEGATIVE
BENZODIAZEPINE INTERNAL CONTROL: NORMAL
BILIRUB UR QL STRIP: NEGATIVE
BUPRENORPHINE INTERNAL CONTROL: NORMAL
BUPRENORPHINE SERPL-MCNC: NEGATIVE NG/ML
CANNABINOIDS SERPL QL: NEGATIVE
CLARITY UR: CLEAR
COCAINE INTERNAL CONTROL: NORMAL
COCAINE UR QL: NEGATIVE
COLOR UR: YELLOW
CREAT UR-MCNC: 22.2 MG/DL
EXPIRATION DATE: NORMAL
GLUCOSE UR STRIP-MCNC: NEGATIVE MG/DL
HGB UR QL STRIP.AUTO: NEGATIVE
HOLD SPECIMEN: NORMAL
KETONES UR QL STRIP: NEGATIVE
LEUKOCYTE ESTERASE UR QL STRIP.AUTO: NEGATIVE
Lab: NORMAL
MDMA (ECSTASY) INTERNAL CONTROL: NORMAL
MDMA UR QL SCN: NEGATIVE
METHADONE INTERNAL CONTROL: NORMAL
METHADONE UR QL SCN: NEGATIVE
METHAMPHETAMINE INTERNAL CONTROL: NORMAL
MICROALBUMIN/CREAT UR: 261.3 MG/G (ref 0–29)
NITRITE UR QL STRIP: NEGATIVE
OPIATES INTERNAL CONTROL: NORMAL
OPIATES UR QL: NEGATIVE
OXYCODONE INTERNAL CONTROL: NORMAL
OXYCODONE UR QL SCN: NEGATIVE
PCP UR QL SCN: NEGATIVE
PH UR STRIP.AUTO: 6 [PH] (ref 5–8)
PHENCYCLIDINE INTERNAL CONTROL: NORMAL
PROT UR QL STRIP: NEGATIVE
SP GR UR STRIP: <=1.005 (ref 1–1.03)
THC INTERNAL CONTROL: NORMAL
UROBILINOGEN UR QL STRIP: NORMAL

## 2024-03-04 PROCEDURE — 81003 URINALYSIS AUTO W/O SCOPE: CPT | Performed by: NURSE PRACTITIONER

## 2024-03-04 PROCEDURE — 82570 ASSAY OF URINE CREATININE: CPT | Performed by: NURSE PRACTITIONER

## 2024-03-04 PROCEDURE — 82043 UR ALBUMIN QUANTITATIVE: CPT | Performed by: NURSE PRACTITIONER

## 2024-03-20 ENCOUNTER — OFFICE VISIT (OUTPATIENT)
Dept: VASCULAR SURGERY | Facility: HOSPITAL | Age: 59
End: 2024-03-20
Payer: COMMERCIAL

## 2024-03-20 VITALS
TEMPERATURE: 99.2 F | SYSTOLIC BLOOD PRESSURE: 130 MMHG | HEART RATE: 64 BPM | RESPIRATION RATE: 18 BRPM | DIASTOLIC BLOOD PRESSURE: 82 MMHG | OXYGEN SATURATION: 99 %

## 2024-03-20 DIAGNOSIS — Z98.890 S/P FEMORAL-TIBIAL BYPASS: Primary | ICD-10-CM

## 2024-03-20 DIAGNOSIS — I70.269 ATHEROSCLEROSIS OF NATIVE ARTERY OF LOWER EXTREMITY WITH GANGRENE, UNSPECIFIED LATERALITY: ICD-10-CM

## 2024-03-20 PROCEDURE — G0463 HOSPITAL OUTPT CLINIC VISIT: HCPCS | Performed by: NURSE PRACTITIONER

## 2024-03-20 PROCEDURE — 99024 POSTOP FOLLOW-UP VISIT: CPT | Performed by: NURSE PRACTITIONER

## 2024-03-20 NOTE — PROGRESS NOTES
[facility]     Progress Note    Patient Name: Jd Velazquez  : 1965  MRN: [id number]  Primary Care Physician:  Dacia Newsome, SOHAM  Date of admission: (Not on file)  Chief Complaint:    Chief Complaint   Patient presents with    Follow-up     Patient is here as a follow up assessment to groin wound. Wound has now completley healed.         Subjective   Subjective     Jd Velazquez is a 58 y.o. male presents for follow-up for right groin wound check.  He had a dehiscence of surgical right groin wound which has completely healed today.  He had a right femoral tibial bypass graft performed on 2024.  He has a necrotic right great toe that is being followed by Dr. Costa. He is cleaning and paining with betadine daily.  No other complaints at this time.    Objective   Objective     Vitals:        Physical Exam   General: Alert, no acute distress   Extremities: Right groin, healed surgical incision, no open areas no drainage no erythema.  Right foot great toe gangrene, minimal drainage.  Neuro: No gross deficits    Result Review    Result Review:  I have personally reviewed the results from the time of this admission to 3/22/2024 16:35 EDT and agree with these findings:  []  Laboratory  []  Microbiology  []  Radiology  []  EKG/Telemetry   []  Cardiology/Vascular   []  Pathology  []  Old records  []  Other:    Most notable findings include:     Assessment & Plan   Assessment / Plan     Assessment/Plan:  Diagnoses and all orders for this visit:    1. S/P femoral-tibial bypass (Primary)  -     clopidogrel (Plavix) 75 MG tablet; Take 1 tablet by mouth Daily for 180 days.  Dispense: 30 tablet; Refill: 5    2. 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, unspecified laterality  -     clopidogrel (Plavix) 75 MG tablet; Take 1 tablet by mouth Daily for 180 days.  Dispense: 30 tablet; Refill: 5      Mr. Velazquez right groin wound has completely healed, he had a right  femoral tibial bypass graft performed Dr. Puga on 1/9/2024 for peripheral vascular disease and gangrenous changes to his right foot. Dr. Costa with podiatry is following him for his right foot toe dry gangrene. He has an appointment with testing scheduled with us next week.     I have consulted with Dr. Puga and he recommends that Mr. Velazquez should be taking Plavix as prescribed.      I have answered all of his questions and he is in agreement with the plan at this time.  Thank you for allowing me to participate in your patient's care.    Active Hospital Problems:  There are no active hospital problems to display for this patient.          Electronically signed by SOHAM Kimble, 03/20/24, 3:02 PM EDT.

## 2024-03-22 RX ORDER — CLOPIDOGREL BISULFATE 75 MG/1
75 TABLET ORAL DAILY
Qty: 30 TABLET | Refills: 5 | Status: SHIPPED | OUTPATIENT
Start: 2024-03-22 | End: 2024-09-18

## 2024-03-27 ENCOUNTER — OFFICE VISIT (OUTPATIENT)
Dept: VASCULAR SURGERY | Facility: HOSPITAL | Age: 59
End: 2024-03-27
Payer: COMMERCIAL

## 2024-03-27 ENCOUNTER — HOSPITAL ENCOUNTER (OUTPATIENT)
Dept: CARDIOLOGY | Facility: HOSPITAL | Age: 59
Discharge: HOME | End: 2024-03-27
Payer: COMMERCIAL

## 2024-03-27 VITALS
OXYGEN SATURATION: 96 % | DIASTOLIC BLOOD PRESSURE: 80 MMHG | TEMPERATURE: 97.5 F | HEART RATE: 68 BPM | RESPIRATION RATE: 18 BRPM | SYSTOLIC BLOOD PRESSURE: 156 MMHG

## 2024-03-27 DIAGNOSIS — I70.25 ATHEROSCLEROSIS OF NATIVE ARTERIES OF THE EXTREMITIES WITH ULCERATION: ICD-10-CM

## 2024-03-27 DIAGNOSIS — Z98.890 S/P FEMORAL-TIBIAL BYPASS: Primary | ICD-10-CM

## 2024-03-27 DIAGNOSIS — Z98.890 S/P FEMORAL-TIBIAL BYPASS: ICD-10-CM

## 2024-03-27 DIAGNOSIS — I70.269 ATHEROSCLEROSIS OF NATIVE ARTERY OF LOWER EXTREMITY WITH GANGRENE, UNSPECIFIED LATERALITY: ICD-10-CM

## 2024-03-27 LAB
BH CV GRAFT 1 - DISTAL ANASTAMOSIS PSV-RIGHT: 286 CM/S
BH CV GRAFT 1 - DISTAL GRAFT PSV-RIGHT: 91 CM/S
BH CV GRAFT 1 - INFLOW ARTERY PSV-RIGHT: 169 CM/S
BH CV GRAFT 1 - MID DISTAL GRAFT PSV-RIGHT: 90 CM/S
BH CV GRAFT 1 - MID GRAFT PSV-RIGHT: 61 CM/S
BH CV GRAFT 1 - OUTFLOW ARTERY PSV-RIGHT: 178 CM/S
BH CV GRAFT 1 - PROX GRAFT PSV-RIGHT: 83 CM/S
BH CV GRAFT 1 - PROX MID GRAFT PSV-RIGHT: 75 CM/S
BH CV GRAFT 1 - PROXIMAL ANASTAMOSIS PSV-RIGHT: 128 CM/S
BH CV GRAFT 1- DISTAL ANASTAMOSIS EDV-RIGHT: 54 CM/S
BH CV GRAFT 1- DISTAL GRAFT EDV-RIGHT: 21 CM/S
BH CV GRAFT 1- INFLOW ARTERY EDV-RIGHT: 15 CM/S
BH CV GRAFT 1- MID DISTAL GRAFT EDV-RIGHT: 16 CM/S
BH CV GRAFT 1- MID GRAFT EDV-RIGHT: 14 CM/S
BH CV GRAFT 1- OUTFLOW ARTERY EDV-RIGHT: 38 CM/S
BH CV GRAFT 1- PROX GRAFT EDV-RIGHT: 18 CM/S
BH CV GRAFT 1- PROX MID GRAFT EDV-RIGHT: 22 CM/S
BH CV GRAFT 1- PROXIMAL ANASTAMOSIS EDV-RIGHT: 16 CM/S
BH CV GRAFT BRACHIAL PRESSURE LEFT: 156 MMHG
BH CV GRAFT BRACHIAL PRESSURE RIGHT: 162 MMHG
BH CV LEA LEFT DPA PRESSURE: 155 MMHG
BH CV LEA LEFT PTA PRESSURE: 140 MMHG
BH CV LEA RIGHT ANT TIBIAL A DISTAL EDV: 21 CM/S
BH CV LEA RIGHT ANT TIBIAL A DISTAL PSV: 70 CM/S
BH CV LEA RIGHT ANT TIBIAL A MID EDV: 18 CM/S
BH CV LEA RIGHT ANT TIBIAL A MID PSV: 74 CM/S
BH CV LEA RIGHT ANT TIBIAL A PROX EDV: 0 CM/S
BH CV LEA RIGHT ANT TIBIAL A PROX PSV: 0 CM/S
BH CV LEA RIGHT CFA DISTAL EDV: 12 CM/S
BH CV LEA RIGHT CFA DISTAL PSV: 56 CM/S
BH CV LEA RIGHT DFA PROX EDV: 0 CM/S
BH CV LEA RIGHT DFA PROX PSV: 82 CM/S
BH CV LEA RIGHT DPA PRESSURE: 147 MMHG
BH CV LEA RIGHT PERONEAL  DISTAL EDV: 0 CM/S
BH CV LEA RIGHT PERONEAL  DISTAL PSV: 0 CM/S
BH CV LEA RIGHT PERONEAL  MID EDV: 0 CM/S
BH CV LEA RIGHT PERONEAL  MID PSV: 0 CM/S
BH CV LEA RIGHT PERONEAL  PROX EDV: 0 CM/S
BH CV LEA RIGHT PERONEAL  PROX PSV: 0 CM/S
BH CV LEA RIGHT POPITEAL A  DISTAL EDV: 0 CM/S
BH CV LEA RIGHT POPITEAL A  DISTAL PSV: 0 CM/S
BH CV LEA RIGHT POPITEAL A  MID EDV: 0 CM/S
BH CV LEA RIGHT POPITEAL A  MID PSV: 31 CM/S
BH CV LEA RIGHT POPITEAL A  PROX EDV: 0 CM/S
BH CV LEA RIGHT POPITEAL A  PROX PSV: 25 CM/S
BH CV LEA RIGHT PTA DISTAL EDV: 0 CM/S
BH CV LEA RIGHT PTA DISTAL PSV: 0 CM/S
BH CV LEA RIGHT PTA MID EDV: 0 CM/S
BH CV LEA RIGHT PTA MID PSV: 0 CM/S
BH CV LEA RIGHT PTA PRESSURE: 142 MMHG
BH CV LEA RIGHT PTA PROX EDV: 0 CM/S
BH CV LEA RIGHT PTA PROX PSV: 0 CM/S
BH CV LEA RIGHT SFA DISTAL EDV: 0 CM/S
BH CV LEA RIGHT SFA DISTAL PSV: 32 CM/S
BH CV LEA RIGHT SFA MID EDV: 0 CM/S
BH CV LEA RIGHT SFA MID PSV: 106 CM/S
BH CV LEA RIGHT SFA PROX EDV: 17 CM/S
BH CV LEA RIGHT SFA PROX PSV: 351 CM/S
BH CV LEA RIGHT TIBEOPERONEAL EDV: 0 CM/S
BH CV LEA RIGHT TIBEOPERONEAL PSV: 0 CM/S
BH CV LOWER ARTERIAL LEFT ABI RATIO: 0.95
BH CV LOWER ARTERIAL RIGHT ABI RATIO: 0.9

## 2024-03-27 PROCEDURE — 99024 POSTOP FOLLOW-UP VISIT: CPT | Performed by: NURSE PRACTITIONER

## 2024-03-27 PROCEDURE — 93926 LOWER EXTREMITY STUDY: CPT

## 2024-03-27 RX ORDER — AMOXICILLIN AND CLAVULANATE POTASSIUM 875; 125 MG/1; MG/1
1 TABLET, FILM COATED ORAL 2 TIMES DAILY
Qty: 20 TABLET | Refills: 0 | Status: SHIPPED | OUTPATIENT
Start: 2024-03-27 | End: 2024-04-06

## 2024-03-27 NOTE — PROGRESS NOTES
[facility]     Progress Note    Patient Name: Jd Velazquez  : 1965  MRN: [id number]  Primary Care Physician:  Dacia Newsome APRN  Date of admission: (Not on file)  Chief Complaint:    Chief Complaint   Patient presents with    Follow-up     Patient is here as a follow up with a lower extremity duplex.        Subjective   Subjective     Jd Velazquez is a 58 y.o. male presents for follow-up with a right femoral tibial bypass graft performed on 2023 for gangrenous great toe.  He has edema in the right lower extremity but denies any claudication. He continues to treat the great toe with betadine, denies any fever or chills. He is going to follow up with Dr. Costa to discuss amputation.  No other complaints at this time.     Objective   Objective     Vitals:   Temp:  [97.5 °F (36.4 °C)] 97.5 °F (36.4 °C)  Heart Rate:  [68] 68  Resp:  [18] 18  BP: (156-162)/(80-84) 156/80    Physical Exam   General:Alert, no acute distress  Extremities:right foot great toe: gangrene, odorous. Minimal erythema.  Neuro:no gross deficits.    Result Review    Result Review:  I have personally reviewed the results from the time of this admission to 3/27/2024 15:03 EDT and agree with these findings:  []  Laboratory  []  Microbiology  []  Radiology  []  EKG/Telemetry   []  Cardiology/Vascular   []  Pathology  []  Old records  []  Other:    Most notable findings include: The graft appears patent, ABIs are 0.9 bilaterally    Assessment & Plan   Assessment / Plan     Assessment/Plan:  Diagnoses and all orders for this visit:    1. S/P femoral-tibial bypass (Primary)  -     amoxicillin-clavulanate (AUGMENTIN) 875-125 MG per tablet; Take 1 tablet by mouth 2 (Two) Times a Day for 10 days.  Dispense: 20 tablet; Refill: 0    2. 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, unspecified laterality  -     amoxicillin-clavulanate (AUGMENTIN) 875-125 MG per tablet; Take 1 tablet by mouth 2  (Two) Times a Day for 10 days.  Dispense: 20 tablet; Refill: 0    Mr. Velazquez is making satisfactory progress following a right leg revascularization performed by Dr. Puga on 01/09/24. The right great toe is gangrenous and has an odor. I will start an antibiotic and recommend he follow up with Dr. Costa sooner rather than later to discuss amputation. Follow up with in three months with a right lower extremity duplex although I have explained should he have any additional concerns or worsening signs or symptoms he may follow-up sooner.    I have answered all of his questions and he is in agreement with the plan at this time.  Thank you for allowing me to participate in your patient's care.    Patient education: Smoking cessation, paint right great toe with Betadine daily.  Active Hospital Problems:  There are no active hospital problems to display for this patient.          Electronically signed by SOHAM Kimble, 03/27/24, 2:21 PM EDT.

## 2024-04-01 RX ORDER — TRAZODONE HYDROCHLORIDE 100 MG/1
100 TABLET ORAL NIGHTLY
Qty: 30 TABLET | Refills: 1 | Status: SHIPPED | OUTPATIENT
Start: 2024-04-01

## 2024-04-04 ENCOUNTER — OFFICE VISIT (OUTPATIENT)
Dept: PODIATRY | Facility: CLINIC | Age: 59
End: 2024-04-04
Payer: COMMERCIAL

## 2024-04-04 VITALS
HEIGHT: 71 IN | OXYGEN SATURATION: 95 % | SYSTOLIC BLOOD PRESSURE: 148 MMHG | DIASTOLIC BLOOD PRESSURE: 83 MMHG | WEIGHT: 230 LBS | HEART RATE: 60 BPM | TEMPERATURE: 96.8 F | BODY MASS INDEX: 32.2 KG/M2

## 2024-04-04 DIAGNOSIS — I73.9 PERIPHERAL VASCULAR DISEASE: ICD-10-CM

## 2024-04-04 DIAGNOSIS — I96 DRY GANGRENE: Primary | ICD-10-CM

## 2024-04-04 RX ORDER — SODIUM CHLORIDE 0.9 % (FLUSH) 0.9 %
10 SYRINGE (ML) INJECTION EVERY 12 HOURS SCHEDULED
OUTPATIENT
Start: 2024-04-04

## 2024-04-04 RX ORDER — SODIUM CHLORIDE, SODIUM LACTATE, POTASSIUM CHLORIDE, CALCIUM CHLORIDE 600; 310; 30; 20 MG/100ML; MG/100ML; MG/100ML; MG/100ML
100 INJECTION, SOLUTION INTRAVENOUS CONTINUOUS
OUTPATIENT
Start: 2024-04-04

## 2024-04-04 RX ORDER — SODIUM CHLORIDE 9 MG/ML
40 INJECTION, SOLUTION INTRAVENOUS AS NEEDED
OUTPATIENT
Start: 2024-04-04

## 2024-04-04 RX ORDER — SODIUM CHLORIDE 0.9 % (FLUSH) 0.9 %
10 SYRINGE (ML) INJECTION AS NEEDED
OUTPATIENT
Start: 2024-04-04

## 2024-04-04 NOTE — PROGRESS NOTES
[facility] - PODIATRY    Today's Date: 04/04/24    Patient Name: Jd Velazquez  MRN: [id number]  CSN: [id number]  PCP: Dacia Newsome APRN,   Referring Provider: No ref. provider found    SUBJECTIVE     Chief Complaint   Patient presents with    Right Foot - Toe Pain     Patient states his great toe is black      HPI: Jd Velazquez, a 58 y.o.male, presents to clinic.    Patient is a 57-year-old male presenting with an ulcer to the right great toe.  Patient states he is diabetic.  States his sugars are uncontrolled and his last A1c was over 10.  He says that approximately 2 months ago he had a cut on his big toe.  It has not healed.  He states his feet are numb and hurt him constantly.  Patient has been using mupirocin cream and it has not helped.    11/22/2023-patient recently had angiogram done with Dr. Puga for his right lower extremity.  Patient says over the course the last 3 weeks the tip of his toes turned black.    12/6/2023-patient is scheduled for revascularization in 9 days with Dr. Puga.  States the wound has not changed on his big toe.      4/4/2024-patient is here for follow-up of his gangrene on his right great toe.  Patient recently had bypass surgery.  He is here for a toe amputation on his right great toe.    Past Medical History:   Diagnosis Date    Allergic rhinitis 01/12/2015    Anesthesia     DIFFICULTY WAKING UP POST SURGERY    Benign essential hypertension 03/08/2016    CAD (coronary artery disease)     Diabetes mellitus     Foot ulcer     Hyperlipidemia     Hypertension     Hypokalemia 02/23/2016    Hypothyroidism 04/24/2014    Insomnia, unspecified 06/15/2016    Microalbuminuria due to type 2 diabetes mellitus 10/15/2015    Mixed hyperlipidemia 10/15/2015    Myocardial infarction     Obesity     Skin cancer      Past Surgical History:   Procedure Laterality Date    ANKLE ARTHROSCOPY W/ OPEN REPAIR      APPENDECTOMY      CARDIAC CATHETERIZATION  2014     PCI to circumflex    CARDIAC CATHETERIZATION Right 10/26/2023    Procedure: Aortogram with right leg angiogram, possible angioplasty or stenting;  Surgeon: Robert Puga MD;  Location: [facility] CATH INVASIVE LOCATION;  Service: Vascular;  Laterality: Right;    CARDIAC CATHETERIZATION Right 12/15/2023    Procedure: Right leg angiogram, possible angioplasty or stenting;  Surgeon: Robert Puga MD;  Location: [facility] CATH INVASIVE LOCATION;  Service: Vascular;  Laterality: Right;    CORONARY ARTERY BYPASS GRAFT N/A 10/08/2020    Procedure: STERNOTOMY, CORONARY ARTERY BYPASS GRAFTING TIME 4 WITH LEFT KELSI  AND ENDOSCOPICALLY HARVESTED LEFT GREATER SAPHENOUS VEIN AND PRP.;  Surgeon: Jr Girma Chiu MD;  Location: [facility] MAIN OR;  Service: Cardiothoracic;  Laterality: N/A;    FEMORAL TIBIAL BYPASS Right 01/09/2024    Procedure: Right femoral-tibial bypass graft;  Surgeon: Robert Puga MD;  Location: [facility] MAIN OR;  Service: Vascular;  Laterality: Right;    HEEL SPUR SURGERY      HERNIA REPAIR      KNEE ARTHROSCOPY      MULTIPLE TIMES ON BOTH KNEES    SKIN CANCER EXCISION      BACK    TOE SURGERY Right     DEBRIDMENT RIGHT GREAT TOE    TONSILLECTOMY       Family History   Problem Relation Age of Onset    Heart disease Other      Social History     Socioeconomic History    Marital status:    Tobacco Use    Smoking status: Every Day     Current packs/day: 0.25     Average packs/day: 0.3 packs/day for 15.0 years (3.8 ttl pk-yrs)     Types: Cigarettes     Passive exposure: Never    Smokeless tobacco: Never    Tobacco comments:     Smoked last today   Vaping Use    Vaping status: Never Used   Substance and Sexual Activity    Alcohol use: Not Currently    Drug use: Never    Sexual activity: Not Currently     Partners: Female     Birth control/protection: Abstinence     Allergies   Allergen Reactions    Lortab [Hydrocodone-Acetaminophen] Itching     Current Outpatient Medications   Medication Sig Dispense  "Refill    amLODIPine (NORVASC) 10 MG tablet Take 0.5 tablets by mouth Every Night. (Patient taking differently: Take 1 tablet by mouth Every Night.) 90 tablet 3    amoxicillin-clavulanate (AUGMENTIN) 875-125 MG per tablet Take 1 tablet by mouth 2 (Two) Times a Day for 10 days. 20 tablet 0    aspirin 81 MG chewable tablet Chew 1 tablet Daily.      clopidogrel (Plavix) 75 MG tablet Take 1 tablet by mouth Daily for 180 days. 30 tablet 5    Cymbalta 30 MG capsule Take 1 capsule by mouth Daily.      Glucose Blood (Blood Glucose Test Strips 333) strip 1 each by Other route Daily. 100 strip 2    insulin detemir (Levemir) 100 UNIT/ML injection Inject 20 Units under the skin into the appropriate area as directed Every Night. Increase one unit per night goal fasting  2 hours after meals 140 9 mL 12    Insulin Pen Needle (Pen Needles 3/16\") 31G X 5 MM misc Use 1 each Every Night. 100 each 5    lisinopril-hydrochlorothiazide (PRINZIDE,ZESTORETIC) 20-12.5 MG per tablet Take 2 tablets by mouth Daily. 180 tablet 3    metFORMIN (GLUCOPHAGE) 500 MG tablet Take 1 tablet by mouth Daily With Breakfast.      metoprolol tartrate (LOPRESSOR) 100 MG tablet Take 1 tablet by mouth Every 12 (Twelve) Hours. 180 tablet 3    OneTouch Delica Lancets 33G misc test 4 times per day 100 each 0    pregabalin (Lyrica) 300 MG capsule Take 1 capsule by mouth 2 (Two) Times a Day. 180 capsule 1    Repatha SureClick solution auto-injector SureClick injection INJECT 1ML UNDER THE SKIN INTO THE APPROPRIATE AREA AS DIRECTED EVERY 14 DAYS 2 mL 3    Tirzepatide (MOUNJARO) 7.5 MG/0.5ML solution pen-injector pen Inject 0.5 mL under the skin into the appropriate area as directed 1 (One) Time Per Week. 6 mL 1    traZODone (DESYREL) 100 MG tablet TAKE ONE TABLET BY MOUTH ONCE NIGHTLY 30 tablet 1    zolpidem (AMBIEN) 10 MG tablet Take 1 tablet by mouth At Night As Needed for Sleep. 90 tablet 0    levothyroxine (SYNTHROID, LEVOTHROID) 50 MCG tablet Take 1 tablet " by mouth Every Morning. (Patient not taking: Reported on 4/4/2024) 90 tablet 1    lidocaine (LIDODERM) 5 % Place 3 patches on the skin as directed by provider Daily. Remove & Discard patch within 12 hours or as directed by MD (Patient not taking: Reported on 4/4/2024) 90 each 5     No current facility-administered medications for this visit.     Review of Systems   Skin:         Ulcer toe   All other systems reviewed and are negative.      OBJECTIVE     Vitals:    04/04/24 0836   BP: 148/83   Pulse: 60   Temp: 96.8 °F (36 °C)   SpO2: 95%       WBC   Date Value Ref Range Status   02/28/2024 6.98 3.40 - 10.80 10*3/mm3 Final     RBC   Date Value Ref Range Status   02/28/2024 4.52 4.14 - 5.80 10*6/mm3 Final     Hemoglobin   Date Value Ref Range Status   02/28/2024 13.9 13.0 - 17.7 g/dL Final     Hematocrit   Date Value Ref Range Status   02/28/2024 41.0 37.5 - 51.0 % Final     MCV   Date Value Ref Range Status   02/28/2024 90.7 79.0 - 97.0 fL Final     MCH   Date Value Ref Range Status   02/28/2024 30.8 26.6 - 33.0 pg Final     MCHC   Date Value Ref Range Status   02/28/2024 33.9 31.5 - 35.7 g/dL Final     RDW   Date Value Ref Range Status   02/28/2024 14.1 12.3 - 15.4 % Final     RDW-SD   Date Value Ref Range Status   02/28/2024 46.2 37.0 - 54.0 fl Final     MPV   Date Value Ref Range Status   02/28/2024 9.4 6.0 - 12.0 fL Final     Platelets   Date Value Ref Range Status   02/28/2024 367 140 - 450 10*3/mm3 Final     Neutrophil %   Date Value Ref Range Status   02/28/2024 45.6 42.7 - 76.0 % Final     Lymphocyte %   Date Value Ref Range Status   02/28/2024 35.4 19.6 - 45.3 % Final     Monocyte %   Date Value Ref Range Status   02/28/2024 11.9 5.0 - 12.0 % Final     Eosinophil %   Date Value Ref Range Status   02/28/2024 5.4 0.3 - 6.2 % Final     Basophil %   Date Value Ref Range Status   02/28/2024 1.3 0.0 - 1.5 % Final     Immature Grans %   Date Value Ref Range Status   02/28/2024 0.4 0.0 - 0.5 % Final     Neutrophils,  Absolute   Date Value Ref Range Status   2024 3.18 1.70 - 7.00 10*3/mm3 Final     Lymphocytes, Absolute   Date Value Ref Range Status   2024 2.47 0.70 - 3.10 10*3/mm3 Final     Monocytes, Absolute   Date Value Ref Range Status   2024 0.83 0.10 - 0.90 10*3/mm3 Final     Eosinophils, Absolute   Date Value Ref Range Status   2024 0.38 0.00 - 0.40 10*3/mm3 Final     Basophils, Absolute   Date Value Ref Range Status   2024 0.09 0.00 - 0.20 10*3/mm3 Final     Immature Grans, Absolute   Date Value Ref Range Status   2024 0.03 0.00 - 0.05 10*3/mm3 Final     nRBC   Date Value Ref Range Status   2024 0.0 0.0 - 0.2 /100 WBC Final         Lab Results   Component Value Date    GLUCOSE 178 (H) 2024    BUN 13 2024    CREATININE 0.94 2024    EGFRIFNONA 77 2021    BCR 13.8 2024    K 4.3 2024    CO2 21.8 (L) 2024    CALCIUM 9.7 2024    ALBUMIN 4.4 2024    LABIL2 0.9 (L) 10/16/2020    AST 6 2024    ALT 9 2024       Patient seen in no apparent distress.      PHYSICAL EXAM:     Foot/Ankle Exam    GENERAL  Appearance:  appears stated age  Orientation:  AAOx3  Affect:  appropriate  Gait:  unimpaired  Assistance:  independent  Right shoe gear: casual shoe  Left shoe gear: casual shoe    VASCULAR     Right Foot Vascularity   Dorsalis pedis:  1+  Posterior tibial:  1+  Skin temperature:  cool  Edema gradin+ and non-pitting  CFT:  < 3 seconds  Pedal hair growth:  Absent  Varicosities:  none     Left Foot Vascularity   Dorsalis pedis:  1+  Posterior tibial:  1+  Skin temperature:  cool  Edema gradin+ and non-pitting  CFT:  < 3 seconds  Pedal hair growth:  Absent  Varicosities:  none     NEUROLOGIC     Right Foot Neurologic   Light touch sensation: absent  Vibratory sensation: absent  Hot/Cold sensation: absent     Left Foot Neurologic   Light touch sensation: absent  Vibratory sensation: absent  Hot/Cold sensation:   absent    MUSCLE STRENGTH     Right Foot Muscle Strength   Foot dorsiflexion:  4  Foot plantar flexion:  4  Foot inversion:  4  Foot eversion:  4     Left Foot Muscle Strength   Foot dorsiflexion:  4  Foot plantar flexion:  4  Foot inversion:  4  Foot eversion:  4    RANGE OF MOTION     Right Foot Range of Motion   Foot and ankle ROM within normal limits       Left Foot Range of Motion   Foot and ankle ROM within normal limits      DERMATOLOGIC      Right Foot Dermatologic   Skin  Right foot skin is intact.      Left Foot Dermatologic   Skin  Left foot skin is intact.      Right foot additional comments: Dry gangrene to distal aspect of right great toe.  No erythema no malodor no drainage.      RADIOLOGY:        Duplex Lower Extremity Art / Grafts - Right CAR    Result Date: 3/27/2024  Narrative:   Right fem-tib bypass graft is patent without elevated velocities or gray scale/color flow evidence of stenosis.  Proximal and distal anastamosis are widely patent.   Proximal right SFA stenosis with eventual occlusion of the distal right popliteal artery.      ASSESSMENT/PLAN     Diagnoses and all orders for this visit:    1. Dry gangrene (Primary)  -     Case Request; Standing  -     sodium chloride 0.9 % flush 10 mL  -     sodium chloride 0.9 % flush 10 mL  -     sodium chloride 0.9 % infusion 40 mL  -     lactated ringers infusion  -     Case Request    2. Peripheral vascular disease    Other orders  -     Follow Anesthesia Guidelines / Protocol; Future  -     Follow Anesthesia Guidelines / Protocol; Standing  -     Verify / Perform Chlorhexidine Skin Prep; Standing  -     Verify / Perform Chlorhexidine Skin Prep if Indicated (If Not Already Completed); Standing  -     Obtain Informed Consent; Future  -     Provide NPO Instructions to Patient; Future  -     Chlorhexidine Skin Prep; Future  -     Insert Peripheral IV; Standing  -     Saline Lock & Maintain IV Access; Standing  -     Place Sequential Compression Device;  Standing  -     Maintain Sequential Compression Device; Standing        Patient with recent bypass, for toe amputation next week.    The risks and benefits of the surgery were discussed with the patient.  This discussion included possible complications of requiring further surgery, possible delayed wound healing, further surgery requiring amputation of the foot or leg, and also included the complication of death.  All the patient's questions were answered to their satisfaction.  Patient states they would like to proceed with the procedure.    Comprehensive lower extremity examination and evaluation was performed.    Discussed findings and treatment plan including risks, benefits, and treatment options with patient in detail. Patient agreed with treatment plan.    Medications and allergies reviewed.  Reviewed available lab values along with other pertinent labs.  These were discussed with the patient.    An After Visit Summary was printed and given to the patient at discharge, including (if requested) any available informative/educational handouts regarding diagnosis, treatment, or medications. All questions were answered to patient/family satisfaction. Should symptoms fail to improve or worsen they agree to call or return to clinic or to go to the Emergency Department. Discussed the importance of following up with any needed screening tests/labs/specialist appointments and any requested follow-up recommended by me today. Importance of maintaining follow-up discussed and patient accepts that missed appointments can delay diagnosis and potentially lead to worsening of conditions.    Return for Post op., or sooner if acute issues arise.    This document has been electronically signed by Balwinder Costa DPM on April 4, 2024 09:17 EDT        Answers submitted by the patient for this visit:  Primary Reason for Visit (Submitted on 4/4/2024)  What is the primary reason for your visit?: Other  Other (Submitted on  4/4/2024)  Please describe your symptoms.: Check on dead toe.  Have you had these symptoms before?: Yes  How long have you been having these symptoms?: Greater than 2 weeks

## 2024-04-04 NOTE — H&P (VIEW-ONLY)
[facility] - PODIATRY    Today's Date: 04/04/24    Patient Name: Jd Velazquez  MRN: [id number]  CSN: [id number]  PCP: Dacia Newsome APRN,   Referring Provider: No ref. provider found    SUBJECTIVE     Chief Complaint   Patient presents with    Right Foot - Toe Pain     Patient states his great toe is black      HPI: Jd Velazquez, a 58 y.o.male, presents to clinic.    Patient is a 57-year-old male presenting with an ulcer to the right great toe.  Patient states he is diabetic.  States his sugars are uncontrolled and his last A1c was over 10.  He says that approximately 2 months ago he had a cut on his big toe.  It has not healed.  He states his feet are numb and hurt him constantly.  Patient has been using mupirocin cream and it has not helped.    11/22/2023-patient recently had angiogram done with Dr. Puga for his right lower extremity.  Patient says over the course the last 3 weeks the tip of his toes turned black.    12/6/2023-patient is scheduled for revascularization in 9 days with Dr. Puga.  States the wound has not changed on his big toe.      4/4/2024-patient is here for follow-up of his gangrene on his right great toe.  Patient recently had bypass surgery.  He is here for a toe amputation on his right great toe.    Past Medical History:   Diagnosis Date    Allergic rhinitis 01/12/2015    Anesthesia     DIFFICULTY WAKING UP POST SURGERY    Benign essential hypertension 03/08/2016    CAD (coronary artery disease)     Diabetes mellitus     Foot ulcer     Hyperlipidemia     Hypertension     Hypokalemia 02/23/2016    Hypothyroidism 04/24/2014    Insomnia, unspecified 06/15/2016    Microalbuminuria due to type 2 diabetes mellitus 10/15/2015    Mixed hyperlipidemia 10/15/2015    Myocardial infarction     Obesity     Skin cancer      Past Surgical History:   Procedure Laterality Date    ANKLE ARTHROSCOPY W/ OPEN REPAIR      APPENDECTOMY      CARDIAC CATHETERIZATION  2014     PCI to circumflex    CARDIAC CATHETERIZATION Right 10/26/2023    Procedure: Aortogram with right leg angiogram, possible angioplasty or stenting;  Surgeon: Robert Puga MD;  Location: [facility] CATH INVASIVE LOCATION;  Service: Vascular;  Laterality: Right;    CARDIAC CATHETERIZATION Right 12/15/2023    Procedure: Right leg angiogram, possible angioplasty or stenting;  Surgeon: Robert Puga MD;  Location: [facility] CATH INVASIVE LOCATION;  Service: Vascular;  Laterality: Right;    CORONARY ARTERY BYPASS GRAFT N/A 10/08/2020    Procedure: STERNOTOMY, CORONARY ARTERY BYPASS GRAFTING TIME 4 WITH LEFT KELSI  AND ENDOSCOPICALLY HARVESTED LEFT GREATER SAPHENOUS VEIN AND PRP.;  Surgeon: Jr Girma Chiu MD;  Location: [facility] MAIN OR;  Service: Cardiothoracic;  Laterality: N/A;    FEMORAL TIBIAL BYPASS Right 01/09/2024    Procedure: Right femoral-tibial bypass graft;  Surgeon: Robert Puga MD;  Location: [facility] MAIN OR;  Service: Vascular;  Laterality: Right;    HEEL SPUR SURGERY      HERNIA REPAIR      KNEE ARTHROSCOPY      MULTIPLE TIMES ON BOTH KNEES    SKIN CANCER EXCISION      BACK    TOE SURGERY Right     DEBRIDMENT RIGHT GREAT TOE    TONSILLECTOMY       Family History   Problem Relation Age of Onset    Heart disease Other      Social History     Socioeconomic History    Marital status:    Tobacco Use    Smoking status: Every Day     Current packs/day: 0.25     Average packs/day: 0.3 packs/day for 15.0 years (3.8 ttl pk-yrs)     Types: Cigarettes     Passive exposure: Never    Smokeless tobacco: Never    Tobacco comments:     Smoked last today   Vaping Use    Vaping status: Never Used   Substance and Sexual Activity    Alcohol use: Not Currently    Drug use: Never    Sexual activity: Not Currently     Partners: Female     Birth control/protection: Abstinence     Allergies   Allergen Reactions    Lortab [Hydrocodone-Acetaminophen] Itching     Current Outpatient Medications   Medication Sig Dispense  "Refill    amLODIPine (NORVASC) 10 MG tablet Take 0.5 tablets by mouth Every Night. (Patient taking differently: Take 1 tablet by mouth Every Night.) 90 tablet 3    amoxicillin-clavulanate (AUGMENTIN) 875-125 MG per tablet Take 1 tablet by mouth 2 (Two) Times a Day for 10 days. 20 tablet 0    aspirin 81 MG chewable tablet Chew 1 tablet Daily.      clopidogrel (Plavix) 75 MG tablet Take 1 tablet by mouth Daily for 180 days. 30 tablet 5    Cymbalta 30 MG capsule Take 1 capsule by mouth Daily.      Glucose Blood (Blood Glucose Test Strips 333) strip 1 each by Other route Daily. 100 strip 2    insulin detemir (Levemir) 100 UNIT/ML injection Inject 20 Units under the skin into the appropriate area as directed Every Night. Increase one unit per night goal fasting  2 hours after meals 140 9 mL 12    Insulin Pen Needle (Pen Needles 3/16\") 31G X 5 MM misc Use 1 each Every Night. 100 each 5    lisinopril-hydrochlorothiazide (PRINZIDE,ZESTORETIC) 20-12.5 MG per tablet Take 2 tablets by mouth Daily. 180 tablet 3    metFORMIN (GLUCOPHAGE) 500 MG tablet Take 1 tablet by mouth Daily With Breakfast.      metoprolol tartrate (LOPRESSOR) 100 MG tablet Take 1 tablet by mouth Every 12 (Twelve) Hours. 180 tablet 3    OneTouch Delica Lancets 33G misc test 4 times per day 100 each 0    pregabalin (Lyrica) 300 MG capsule Take 1 capsule by mouth 2 (Two) Times a Day. 180 capsule 1    Repatha SureClick solution auto-injector SureClick injection INJECT 1ML UNDER THE SKIN INTO THE APPROPRIATE AREA AS DIRECTED EVERY 14 DAYS 2 mL 3    Tirzepatide (MOUNJARO) 7.5 MG/0.5ML solution pen-injector pen Inject 0.5 mL under the skin into the appropriate area as directed 1 (One) Time Per Week. 6 mL 1    traZODone (DESYREL) 100 MG tablet TAKE ONE TABLET BY MOUTH ONCE NIGHTLY 30 tablet 1    zolpidem (AMBIEN) 10 MG tablet Take 1 tablet by mouth At Night As Needed for Sleep. 90 tablet 0    levothyroxine (SYNTHROID, LEVOTHROID) 50 MCG tablet Take 1 tablet " by mouth Every Morning. (Patient not taking: Reported on 4/4/2024) 90 tablet 1    lidocaine (LIDODERM) 5 % Place 3 patches on the skin as directed by provider Daily. Remove & Discard patch within 12 hours or as directed by MD (Patient not taking: Reported on 4/4/2024) 90 each 5     No current facility-administered medications for this visit.     Review of Systems   Skin:         Ulcer toe   All other systems reviewed and are negative.      OBJECTIVE     Vitals:    04/04/24 0836   BP: 148/83   Pulse: 60   Temp: 96.8 °F (36 °C)   SpO2: 95%       WBC   Date Value Ref Range Status   02/28/2024 6.98 3.40 - 10.80 10*3/mm3 Final     RBC   Date Value Ref Range Status   02/28/2024 4.52 4.14 - 5.80 10*6/mm3 Final     Hemoglobin   Date Value Ref Range Status   02/28/2024 13.9 13.0 - 17.7 g/dL Final     Hematocrit   Date Value Ref Range Status   02/28/2024 41.0 37.5 - 51.0 % Final     MCV   Date Value Ref Range Status   02/28/2024 90.7 79.0 - 97.0 fL Final     MCH   Date Value Ref Range Status   02/28/2024 30.8 26.6 - 33.0 pg Final     MCHC   Date Value Ref Range Status   02/28/2024 33.9 31.5 - 35.7 g/dL Final     RDW   Date Value Ref Range Status   02/28/2024 14.1 12.3 - 15.4 % Final     RDW-SD   Date Value Ref Range Status   02/28/2024 46.2 37.0 - 54.0 fl Final     MPV   Date Value Ref Range Status   02/28/2024 9.4 6.0 - 12.0 fL Final     Platelets   Date Value Ref Range Status   02/28/2024 367 140 - 450 10*3/mm3 Final     Neutrophil %   Date Value Ref Range Status   02/28/2024 45.6 42.7 - 76.0 % Final     Lymphocyte %   Date Value Ref Range Status   02/28/2024 35.4 19.6 - 45.3 % Final     Monocyte %   Date Value Ref Range Status   02/28/2024 11.9 5.0 - 12.0 % Final     Eosinophil %   Date Value Ref Range Status   02/28/2024 5.4 0.3 - 6.2 % Final     Basophil %   Date Value Ref Range Status   02/28/2024 1.3 0.0 - 1.5 % Final     Immature Grans %   Date Value Ref Range Status   02/28/2024 0.4 0.0 - 0.5 % Final     Neutrophils,  Absolute   Date Value Ref Range Status   2024 3.18 1.70 - 7.00 10*3/mm3 Final     Lymphocytes, Absolute   Date Value Ref Range Status   2024 2.47 0.70 - 3.10 10*3/mm3 Final     Monocytes, Absolute   Date Value Ref Range Status   2024 0.83 0.10 - 0.90 10*3/mm3 Final     Eosinophils, Absolute   Date Value Ref Range Status   2024 0.38 0.00 - 0.40 10*3/mm3 Final     Basophils, Absolute   Date Value Ref Range Status   2024 0.09 0.00 - 0.20 10*3/mm3 Final     Immature Grans, Absolute   Date Value Ref Range Status   2024 0.03 0.00 - 0.05 10*3/mm3 Final     nRBC   Date Value Ref Range Status   2024 0.0 0.0 - 0.2 /100 WBC Final         Lab Results   Component Value Date    GLUCOSE 178 (H) 2024    BUN 13 2024    CREATININE 0.94 2024    EGFRIFNONA 77 2021    BCR 13.8 2024    K 4.3 2024    CO2 21.8 (L) 2024    CALCIUM 9.7 2024    ALBUMIN 4.4 2024    LABIL2 0.9 (L) 10/16/2020    AST 6 2024    ALT 9 2024       Patient seen in no apparent distress.      PHYSICAL EXAM:     Foot/Ankle Exam    GENERAL  Appearance:  appears stated age  Orientation:  AAOx3  Affect:  appropriate  Gait:  unimpaired  Assistance:  independent  Right shoe gear: casual shoe  Left shoe gear: casual shoe    VASCULAR     Right Foot Vascularity   Dorsalis pedis:  1+  Posterior tibial:  1+  Skin temperature:  cool  Edema gradin+ and non-pitting  CFT:  < 3 seconds  Pedal hair growth:  Absent  Varicosities:  none     Left Foot Vascularity   Dorsalis pedis:  1+  Posterior tibial:  1+  Skin temperature:  cool  Edema gradin+ and non-pitting  CFT:  < 3 seconds  Pedal hair growth:  Absent  Varicosities:  none     NEUROLOGIC     Right Foot Neurologic   Light touch sensation: absent  Vibratory sensation: absent  Hot/Cold sensation: absent     Left Foot Neurologic   Light touch sensation: absent  Vibratory sensation: absent  Hot/Cold sensation:   absent    MUSCLE STRENGTH     Right Foot Muscle Strength   Foot dorsiflexion:  4  Foot plantar flexion:  4  Foot inversion:  4  Foot eversion:  4     Left Foot Muscle Strength   Foot dorsiflexion:  4  Foot plantar flexion:  4  Foot inversion:  4  Foot eversion:  4    RANGE OF MOTION     Right Foot Range of Motion   Foot and ankle ROM within normal limits       Left Foot Range of Motion   Foot and ankle ROM within normal limits      DERMATOLOGIC      Right Foot Dermatologic   Skin  Right foot skin is intact.      Left Foot Dermatologic   Skin  Left foot skin is intact.      Right foot additional comments: Dry gangrene to distal aspect of right great toe.  No erythema no malodor no drainage.      RADIOLOGY:        Duplex Lower Extremity Art / Grafts - Right CAR    Result Date: 3/27/2024  Narrative:   Right fem-tib bypass graft is patent without elevated velocities or gray scale/color flow evidence of stenosis.  Proximal and distal anastamosis are widely patent.   Proximal right SFA stenosis with eventual occlusion of the distal right popliteal artery.      ASSESSMENT/PLAN     Diagnoses and all orders for this visit:    1. Dry gangrene (Primary)  -     Case Request; Standing  -     sodium chloride 0.9 % flush 10 mL  -     sodium chloride 0.9 % flush 10 mL  -     sodium chloride 0.9 % infusion 40 mL  -     lactated ringers infusion  -     Case Request    2. Peripheral vascular disease    Other orders  -     Follow Anesthesia Guidelines / Protocol; Future  -     Follow Anesthesia Guidelines / Protocol; Standing  -     Verify / Perform Chlorhexidine Skin Prep; Standing  -     Verify / Perform Chlorhexidine Skin Prep if Indicated (If Not Already Completed); Standing  -     Obtain Informed Consent; Future  -     Provide NPO Instructions to Patient; Future  -     Chlorhexidine Skin Prep; Future  -     Insert Peripheral IV; Standing  -     Saline Lock & Maintain IV Access; Standing  -     Place Sequential Compression Device;  Standing  -     Maintain Sequential Compression Device; Standing        Patient with recent bypass, for toe amputation next week.    The risks and benefits of the surgery were discussed with the patient.  This discussion included possible complications of requiring further surgery, possible delayed wound healing, further surgery requiring amputation of the foot or leg, and also included the complication of death.  All the patient's questions were answered to their satisfaction.  Patient states they would like to proceed with the procedure.    Comprehensive lower extremity examination and evaluation was performed.    Discussed findings and treatment plan including risks, benefits, and treatment options with patient in detail. Patient agreed with treatment plan.    Medications and allergies reviewed.  Reviewed available lab values along with other pertinent labs.  These were discussed with the patient.    An After Visit Summary was printed and given to the patient at discharge, including (if requested) any available informative/educational handouts regarding diagnosis, treatment, or medications. All questions were answered to patient/family satisfaction. Should symptoms fail to improve or worsen they agree to call or return to clinic or to go to the Emergency Department. Discussed the importance of following up with any needed screening tests/labs/specialist appointments and any requested follow-up recommended by me today. Importance of maintaining follow-up discussed and patient accepts that missed appointments can delay diagnosis and potentially lead to worsening of conditions.    Return for Post op., or sooner if acute issues arise.    This document has been electronically signed by Balwinder Costa DPM on April 4, 2024 09:17 EDT        Answers submitted by the patient for this visit:  Primary Reason for Visit (Submitted on 4/4/2024)  What is the primary reason for your visit?: Other  Other (Submitted on  4/4/2024)  Please describe your symptoms.: Check on dead toe.  Have you had these symptoms before?: Yes  How long have you been having these symptoms?: Greater than 2 weeks

## 2024-04-08 NOTE — PAT
SPOKE WITH RASHAAD AT DR KEE OFFICE IN REGARDS TO 81 MG ASA AND PLAVIX IF AND WHEN DR KEE WOULD WANT THEM STOPPED. PER RASHAAD/DR KEE OK TO CONTINUE BOTH ASA AND PLAVIX UP TO AND INCLUDING DAY OF SURGERY

## 2024-04-08 NOTE — PRE-PROCEDURE INSTRUCTIONS
IMPORTANT INSTRUCTIONS - PRE-ADMISSION TESTING  DO NOT EAT OR CHEW anything after midnight the night before your procedure.    You may have CLEAR liquids up to ______ hours prior to ARRIVAL time. HX OF MOUNJARO USE LAST DOSE 3/31/24 ONLY SIPS OF WATER TO TAKE MEDICATIONS LISTED BELOW  Take the following medications the morning of your procedure with JUST A SIP OF WATER:  _______________________________________________________________________________________________________ASA, PLAVIX, CYMBALTA, LEVOTHYROXINE, METOPROLOL________________________________________________________________________________    DO NOT BRING your medications to the hospital with you, UNLESS something has changed since your PRE-Admission Testing appointment.  Hold all vitamins, supplements, and NSAIDS (Non- steroidal anti-inflammatory meds) for one week prior to surgery (you MAY take Tylenol or Acetaminophen).  If you are diabetic, check your blood sugar the morning of your procedure. If it is less than 70 or if you are feeling symptomatic, call the following number for further instructions: [phone]  Use your inhalers/nebulizers as usual, the morning of your procedure. BRING YOUR INHALERS with you.   Bring your CPAP or BIPAP to hospital, ONLY IF YOU WILL BE SPENDING THE NIGHT.   Make sure you have a ride home and have someone who will stay with you the day of your procedure after you go home.  If you have any questions, please call your Pre-Admission Testing Nurse, __ROSIE______________ at [phone] _[phone]___________.   Per anesthesia request, do not smoke for 24 hours before your procedure or as instructed by your surgeon.    BATHING INSTRUCTIONS GIVEN. NO JEWELRY DAY OF PROCEDURE.   ENTRANCE A, ELEVATOR A, 3RD FLOOR DAY OF SURGERY  CASH, CHECK, OR CARD FOR MEDS TO BED IF INDICATED AT DISCHARGE  NO FURTHER DOSE OF MOUNJARO PRIOR TO PROCEDURE ON 4/10/24  NO METFORMIN AFTER 6 PM ON 4/9/24  NO SMOKING 24 HOURS PRIOR TO PROCEDURE  IS OK  TO CONTINUE ASPIRIN AND PLAVIX UP TO AND INCLUDING DAY OF PROCEDURE PER DR KEE

## 2024-04-09 ENCOUNTER — ANESTHESIA EVENT (OUTPATIENT)
Dept: PERIOP | Facility: HOSPITAL | Age: 59
End: 2024-04-09
Payer: COMMERCIAL

## 2024-04-09 ENCOUNTER — OFFICE VISIT (OUTPATIENT)
Dept: FAMILY MEDICINE CLINIC | Facility: CLINIC | Age: 59
End: 2024-04-09
Payer: COMMERCIAL

## 2024-04-09 VITALS
DIASTOLIC BLOOD PRESSURE: 80 MMHG | HEART RATE: 57 BPM | HEIGHT: 71 IN | TEMPERATURE: 96.7 F | OXYGEN SATURATION: 97 % | SYSTOLIC BLOOD PRESSURE: 130 MMHG | WEIGHT: 238 LBS | BODY MASS INDEX: 33.32 KG/M2

## 2024-04-09 DIAGNOSIS — E03.9 ACQUIRED HYPOTHYROIDISM: ICD-10-CM

## 2024-04-09 DIAGNOSIS — Z79.899 MEDICATION MANAGEMENT: ICD-10-CM

## 2024-04-09 DIAGNOSIS — E11.65 TYPE 2 DIABETES MELLITUS WITH HYPERGLYCEMIA, WITHOUT LONG-TERM CURRENT USE OF INSULIN: Primary | ICD-10-CM

## 2024-04-09 DIAGNOSIS — I25.118 CORONARY ARTERY DISEASE OF NATIVE ARTERY OF NATIVE HEART WITH STABLE ANGINA PECTORIS: ICD-10-CM

## 2024-04-09 DIAGNOSIS — E78.5 HYPERLIPIDEMIA LDL GOAL <70: ICD-10-CM

## 2024-04-09 DIAGNOSIS — F51.01 PRIMARY INSOMNIA: ICD-10-CM

## 2024-04-09 DIAGNOSIS — I10 PRIMARY HYPERTENSION: ICD-10-CM

## 2024-04-09 PROCEDURE — 99214 OFFICE O/P EST MOD 30 MIN: CPT | Performed by: NURSE PRACTITIONER

## 2024-04-09 RX ORDER — INSULIN DETEMIR 100 [IU]/ML
30 INJECTION, SOLUTION SUBCUTANEOUS NIGHTLY
Qty: 9 ML | Refills: 12 | Status: SHIPPED | OUTPATIENT
Start: 2024-04-09

## 2024-04-09 RX ORDER — ZOLPIDEM TARTRATE 10 MG/1
10 TABLET ORAL NIGHTLY PRN
Qty: 90 TABLET | Refills: 0 | Status: CANCELLED | OUTPATIENT
Start: 2024-04-09

## 2024-04-09 NOTE — PROGRESS NOTES
Follow Up Office Visit      Patient Name: Jd Velazquez  : 1965   MRN: [id number]     Chief Complaint:    Chief Complaint   Patient presents with    Diabetes    Hyperlipidemia    Hypertension    Hypothyroidism       History of Present Illness: Jd Velazquez is a 58 y.o. male who is here today to follow up for  DM2, htn, hyperlipidemia, and hypothyroidism  Review lab results     Follow up increase  Mounjaro 7.5 mg once weekly and Levemir 20 units nightly.  Fasting 160-170    Also follow up increase synthroid to 50 mcg daily     Pt report  He is having toe amputation tomorrow with dr young      Eye exam- [facility]    Foot exam-  dr young 24  psa- 2023  A1C- 2024  Colonoscopy- consult 24        Subjective      Review of Systems:   Review of Systems   Constitutional:  Negative for fever.   Eyes:  Negative for visual disturbance.   Respiratory:  Negative for cough.    Cardiovascular:  Negative for chest pain.   Gastrointestinal:  Negative for abdominal pain, constipation, diarrhea, nausea and vomiting.   Endocrine: Negative for polydipsia and polyuria.   Genitourinary:  Negative for dysuria.        Past Medical History:   Past Medical History:   Diagnosis Date    Allergic rhinitis 2015    Anesthesia     DIFFICULTY WAKING UP POST SURGERY    CAD (coronary artery disease)     DENIES CP/SOA.    Diabetes mellitus     Foot ulcer     Hyperlipidemia     Hypertension     Hypokalemia 2016    DENIES ANY CURRENT ISSUES    Hypothyroidism 2014    Insomnia, unspecified 06/15/2016    Microalbuminuria due to type 2 diabetes mellitus 10/15/2015    Mixed hyperlipidemia 10/15/2015    Myocardial infarction     Obesity     Skin cancer     Sleep apnea        Past Surgical History:   Past Surgical History:   Procedure Laterality Date    ANKLE ARTHROSCOPY W/ OPEN REPAIR      APPENDECTOMY      CARDIAC CATHETERIZATION      PCI to circumflex    CARDIAC CATHETERIZATION  Right 10/26/2023    Procedure: Aortogram with right leg angiogram, possible angioplasty or stenting;  Surgeon: Robert Puga MD;  Location: [facility] CATH INVASIVE LOCATION;  Service: Vascular;  Laterality: Right;    CARDIAC CATHETERIZATION Right 12/15/2023    Procedure: Right leg angiogram, possible angioplasty or stenting;  Surgeon: Robert Puga MD;  Location: [facility] CATH INVASIVE LOCATION;  Service: Vascular;  Laterality: Right;    CORONARY ARTERY BYPASS GRAFT N/A 10/08/2020    Procedure: STERNOTOMY, CORONARY ARTERY BYPASS GRAFTING TIME 4 WITH LEFT KELSI  AND ENDOSCOPICALLY HARVESTED LEFT GREATER SAPHENOUS VEIN AND PRP.;  Surgeon: Jr Girma Chiu MD;  Location: [facility] MAIN OR;  Service: Cardiothoracic;  Laterality: N/A;    FEMORAL TIBIAL BYPASS Right 01/09/2024    Procedure: Right femoral-tibial bypass graft;  Surgeon: Robert Puga MD;  Location: [facility] MAIN OR;  Service: Vascular;  Laterality: Right;    HEEL SPUR SURGERY      HERNIA REPAIR      KNEE ARTHROSCOPY      MULTIPLE TIMES ON BOTH KNEES    SKIN CANCER EXCISION      BACK    TOE SURGERY Right     DEBRIDMENT RIGHT GREAT TOE    TONSILLECTOMY         Family History:   Family History   Adopted: Yes   Problem Relation Age of Onset    Heart disease Other        Social History:   Social History     Socioeconomic History    Marital status:    Tobacco Use    Smoking status: Every Day     Current packs/day: 0.25     Average packs/day: 0.3 packs/day for 15.0 years (3.8 ttl pk-yrs)     Types: Cigarettes     Passive exposure: Never    Smokeless tobacco: Never   Vaping Use    Vaping status: Never Used   Substance and Sexual Activity    Alcohol use: Not Currently    Drug use: Never    Sexual activity: Defer       Medications:     Current Outpatient Medications:     amLODIPine (NORVASC) 10 MG tablet, Take 0.5 tablets by mouth Every Night. (Patient taking differently: Take 1 tablet by mouth Every Night.), Disp: 90 tablet, Rfl: 3    aspirin 81 MG chewable  "tablet, Chew 1 tablet Daily. PER DR CHILANGO LOGAN TO CONTINUE ASA UP TO AND INCLUDING DAY OF SURGERY, Disp: , Rfl:     clopidogrel (Plavix) 75 MG tablet, Take 1 tablet by mouth Daily for 180 days. (Patient taking differently: Take 1 tablet by mouth Daily. PER DR CHILANGO LOGAN TO CONTINUE PLAVIX UP TO AND INCLUDING DAY OF SUGERY), Disp: 30 tablet, Rfl: 5    Cymbalta 30 MG capsule, Take 1 capsule by mouth Daily., Disp: , Rfl:     Glucose Blood (Blood Glucose Test Strips 333) strip, 1 each by Other route Daily., Disp: 100 strip, Rfl: 2    insulin detemir (Levemir) 100 UNIT/ML injection, Inject 30 Units under the skin into the appropriate area as directed Every Night. Increase one unit per night goal fasting  2 hours after meals 140, Disp: 9 mL, Rfl: 12    Insulin Pen Needle (Pen Needles 3/16\") 31G X 5 MM misc, Use 1 each Every Night., Disp: 100 each, Rfl: 5    levothyroxine (SYNTHROID, LEVOTHROID) 50 MCG tablet, Take 1 tablet by mouth Every Morning. (Patient taking differently: Take 1 tablet by mouth Daily As Needed. PT REPORTS JUST TAKES OCCASIONALLY), Disp: 90 tablet, Rfl: 1    lidocaine (LIDODERM) 5 %, Place 3 patches on the skin as directed by provider Daily. Remove & Discard patch within 12 hours or as directed by MD (Patient taking differently: Place 3 patches on the skin as directed by provider Daily As Needed. Remove & Discard patch within 12 hours or as directed by MD), Disp: 90 each, Rfl: 5    lisinopril-hydrochlorothiazide (PRINZIDE,ZESTORETIC) 20-12.5 MG per tablet, Take 2 tablets by mouth Daily., Disp: 180 tablet, Rfl: 3    metFORMIN (GLUCOPHAGE) 500 MG tablet, Take 1 tablet by mouth Daily With Breakfast. NONE AFTER 6 PM ON 4/9/24, Disp: , Rfl:     metoprolol tartrate (LOPRESSOR) 100 MG tablet, Take 1 tablet by mouth Every 12 (Twelve) Hours., Disp: 180 tablet, Rfl: 3    OneTouch Delica Lancets 33G misc, test 4 times per day, Disp: 100 each, Rfl: 0    pregabalin (Lyrica) 300 MG capsule, Take 1 capsule " "by mouth 2 (Two) Times a Day. (Patient taking differently: Take 1 capsule by mouth Every Night.), Disp: 180 capsule, Rfl: 1    Repatha SureClick solution auto-injector SureClick injection, INJECT 1ML UNDER THE SKIN INTO THE APPROPRIATE AREA AS DIRECTED EVERY 14 DAYS, Disp: 2 mL, Rfl: 3    traZODone (DESYREL) 100 MG tablet, TAKE ONE TABLET BY MOUTH ONCE NIGHTLY (Patient taking differently: Take 1 tablet by mouth At Night As Needed.), Disp: 30 tablet, Rfl: 1    zolpidem (AMBIEN) 10 MG tablet, Take 1 tablet by mouth At Night As Needed for Sleep., Disp: 90 tablet, Rfl: 0    Tirzepatide (MOUNJARO) 10 MG/0.5ML solution pen-injector pen, Inject 0.5 mL under the skin into the appropriate area as directed 1 (One) Time Per Week., Disp: 2 mL, Rfl: 6    Allergies:   Allergies   Allergen Reactions    Lortab [Hydrocodone-Acetaminophen] Itching               Objective     Physical Exam:  Vital Signs:   Vitals:    04/09/24 0722 04/09/24 0744   BP: 150/80 130/80   Pulse: 57    Temp: 96.7 °F (35.9 °C)    SpO2: 97%    Weight: 108 kg (238 lb)    Height: 180.3 cm (70.98\")      Body mass index is 33.21 kg/m².           Physical Exam  Neck:      Vascular: No carotid bruit.   Cardiovascular:      Rate and Rhythm: Normal rate and regular rhythm.      Heart sounds: Normal heart sounds. No murmur heard.  Pulmonary:      Effort: Pulmonary effort is normal.      Breath sounds: Normal breath sounds.   Abdominal:      General: Bowel sounds are normal.      Palpations: Abdomen is soft.   Musculoskeletal:      Right lower leg: No edema.      Left lower leg: No edema.   Skin:     General: Skin is warm and dry.   Neurological:      Mental Status: He is alert.         Assessment / Plan      Assessment/Plan:   Diagnoses and all orders for this visit:    1. Type 2 diabetes mellitus with hyperglycemia, without long-term current use of insulin (Primary)    2. Primary hypertension    3. Hyperlipidemia LDL goal <70    4. Acquired hypothyroidism    5. " "Coronary artery disease of native artery of native heart with stable angina pectoris    6. Medication management  -     POC 12 Panel Urine Drug Screen    7. Primary insomnia    Other orders  -     Tirzepatide (MOUNJARO) 10 MG/0.5ML solution pen-injector pen; Inject 0.5 mL under the skin into the appropriate area as directed 1 (One) Time Per Week.  Dispense: 2 mL; Refill: 6  -     insulin detemir (Levemir) 100 UNIT/ML injection; Inject 30 Units under the skin into the appropriate area as directed Every Night. Increase one unit per night goal fasting  2 hours after meals 140  Dispense: 9 mL; Refill: 12         Diabetes mellitus type 2 with hyperglycemia Numbers appear to be slightly improving still uncontrolled will increase Mounjaro to 10 mg once weekly increase Levemir to 30 units nightly decrease carb intake exercise 30 minutes daily weight loss we will follow-up in several weeks obtain hemoglobin A1c prior to follow-up  Hypertension currently controlled  Hyperlipidemia LDL below goal of 70 on Repatha per cardiology with coronary artery disease  Hypothyroidism obtain labs to monitor current Synthroid dose 50 mcg daily denies palpitations      Follow Up:   Return in about 7 weeks (around 5/28/2024).    Nyasia Newsome, APRJOSE    \"Please note that portions of this note were completed with a voice recognition program.\"    "

## 2024-04-10 ENCOUNTER — HOSPITAL ENCOUNTER (OUTPATIENT)
Facility: HOSPITAL | Age: 59
Setting detail: HOSPITAL OUTPATIENT SURGERY
Discharge: HOME | End: 2024-04-10
Attending: PODIATRIST | Admitting: PODIATRIST
Payer: COMMERCIAL

## 2024-04-10 ENCOUNTER — ANESTHESIA (OUTPATIENT)
Dept: PERIOP | Facility: HOSPITAL | Age: 59
End: 2024-04-10
Payer: COMMERCIAL

## 2024-04-10 VITALS
BODY MASS INDEX: 33.21 KG/M2 | HEIGHT: 71 IN | WEIGHT: 237.22 LBS | TEMPERATURE: 97.5 F | SYSTOLIC BLOOD PRESSURE: 125 MMHG | OXYGEN SATURATION: 91 % | HEART RATE: 60 BPM | RESPIRATION RATE: 18 BRPM | DIASTOLIC BLOOD PRESSURE: 57 MMHG

## 2024-04-10 DIAGNOSIS — I96 DRY GANGRENE: Primary | ICD-10-CM

## 2024-04-10 LAB
GLUCOSE BLDC GLUCOMTR-MCNC: 187 MG/DL (ref 70–99)
GLUCOSE BLDC GLUCOMTR-MCNC: 191 MG/DL (ref 70–99)

## 2024-04-10 PROCEDURE — 82948 REAGENT STRIP/BLOOD GLUCOSE: CPT | Performed by: NURSE ANESTHETIST, CERTIFIED REGISTERED

## 2024-04-10 PROCEDURE — 82948 REAGENT STRIP/BLOOD GLUCOSE: CPT | Performed by: ANESTHESIOLOGY

## 2024-04-10 PROCEDURE — 28810 AMPUTATION TOE & METATARSAL: CPT | Performed by: PODIATRIST

## 2024-04-10 PROCEDURE — 88305 TISSUE EXAM BY PATHOLOGIST: CPT | Performed by: PODIATRIST

## 2024-04-10 PROCEDURE — 87077 CULTURE AEROBIC IDENTIFY: CPT | Performed by: PODIATRIST

## 2024-04-10 PROCEDURE — 87075 CULTR BACTERIA EXCEPT BLOOD: CPT | Performed by: PODIATRIST

## 2024-04-10 PROCEDURE — 25010000002 DEXAMETHASONE PER 1 MG: Performed by: NURSE ANESTHETIST, CERTIFIED REGISTERED

## 2024-04-10 PROCEDURE — 25810000003 LACTATED RINGERS PER 1000 ML: Performed by: ANESTHESIOLOGY

## 2024-04-10 PROCEDURE — 25010000002 PROPOFOL 10 MG/ML EMULSION: Performed by: NURSE ANESTHETIST, CERTIFIED REGISTERED

## 2024-04-10 PROCEDURE — 25010000002 CEFAZOLIN PER 500 MG: Performed by: NURSE ANESTHETIST, CERTIFIED REGISTERED

## 2024-04-10 PROCEDURE — 28810 AMPUTATION TOE & METATARSAL: CPT

## 2024-04-10 PROCEDURE — 87205 SMEAR GRAM STAIN: CPT | Performed by: PODIATRIST

## 2024-04-10 PROCEDURE — 87186 SC STD MICRODIL/AGAR DIL: CPT | Performed by: PODIATRIST

## 2024-04-10 PROCEDURE — 87070 CULTURE OTHR SPECIMN AEROBIC: CPT | Performed by: PODIATRIST

## 2024-04-10 PROCEDURE — 88311 DECALCIFY TISSUE: CPT | Performed by: PODIATRIST

## 2024-04-10 PROCEDURE — 25010000002 BUPIVACAINE (PF) 0.5 % SOLUTION: Performed by: PODIATRIST

## 2024-04-10 PROCEDURE — 25010000002 MIDAZOLAM PER 1MG: Performed by: ANESTHESIOLOGY

## 2024-04-10 PROCEDURE — 25010000002 ONDANSETRON PER 1 MG: Performed by: NURSE ANESTHETIST, CERTIFIED REGISTERED

## 2024-04-10 RX ORDER — MIDAZOLAM HYDROCHLORIDE 2 MG/2ML
2 INJECTION, SOLUTION INTRAMUSCULAR; INTRAVENOUS ONCE
Status: COMPLETED | OUTPATIENT
Start: 2024-04-10 | End: 2024-04-10

## 2024-04-10 RX ORDER — PROPOFOL 10 MG/ML
VIAL (ML) INTRAVENOUS AS NEEDED
Status: DISCONTINUED | OUTPATIENT
Start: 2024-04-10 | End: 2024-04-10 | Stop reason: SURG

## 2024-04-10 RX ORDER — CEFAZOLIN SODIUM 1 G/3ML
INJECTION, POWDER, FOR SOLUTION INTRAMUSCULAR; INTRAVENOUS AS NEEDED
Status: DISCONTINUED | OUTPATIENT
Start: 2024-04-10 | End: 2024-04-10 | Stop reason: SURG

## 2024-04-10 RX ORDER — BUPIVACAINE HYDROCHLORIDE 5 MG/ML
INJECTION, SOLUTION EPIDURAL; INTRACAUDAL AS NEEDED
Status: DISCONTINUED | OUTPATIENT
Start: 2024-04-10 | End: 2024-04-10 | Stop reason: HOSPADM

## 2024-04-10 RX ORDER — PROMETHAZINE HYDROCHLORIDE 25 MG/1
25 SUPPOSITORY RECTAL ONCE AS NEEDED
Status: DISCONTINUED | OUTPATIENT
Start: 2024-04-10 | End: 2024-04-10 | Stop reason: HOSPADM

## 2024-04-10 RX ORDER — SODIUM CHLORIDE, SODIUM LACTATE, POTASSIUM CHLORIDE, CALCIUM CHLORIDE 600; 310; 30; 20 MG/100ML; MG/100ML; MG/100ML; MG/100ML
9 INJECTION, SOLUTION INTRAVENOUS CONTINUOUS PRN
Status: DISCONTINUED | OUTPATIENT
Start: 2024-04-10 | End: 2024-04-10 | Stop reason: HOSPADM

## 2024-04-10 RX ORDER — LIDOCAINE HYDROCHLORIDE 20 MG/ML
INJECTION, SOLUTION EPIDURAL; INFILTRATION; INTRACAUDAL; PERINEURAL AS NEEDED
Status: DISCONTINUED | OUTPATIENT
Start: 2024-04-10 | End: 2024-04-10 | Stop reason: SURG

## 2024-04-10 RX ORDER — EPHEDRINE SULFATE 50 MG/ML
INJECTION INTRAVENOUS AS NEEDED
Status: DISCONTINUED | OUTPATIENT
Start: 2024-04-10 | End: 2024-04-10 | Stop reason: SURG

## 2024-04-10 RX ORDER — SODIUM CHLORIDE 0.9 % (FLUSH) 0.9 %
10 SYRINGE (ML) INJECTION AS NEEDED
Status: DISCONTINUED | OUTPATIENT
Start: 2024-04-10 | End: 2024-04-10 | Stop reason: HOSPADM

## 2024-04-10 RX ORDER — ONDANSETRON 2 MG/ML
INJECTION INTRAMUSCULAR; INTRAVENOUS AS NEEDED
Status: DISCONTINUED | OUTPATIENT
Start: 2024-04-10 | End: 2024-04-10 | Stop reason: SURG

## 2024-04-10 RX ORDER — SODIUM CHLORIDE 9 MG/ML
40 INJECTION, SOLUTION INTRAVENOUS AS NEEDED
Status: DISCONTINUED | OUTPATIENT
Start: 2024-04-10 | End: 2024-04-10 | Stop reason: HOSPADM

## 2024-04-10 RX ORDER — PROMETHAZINE HYDROCHLORIDE 12.5 MG/1
25 TABLET ORAL ONCE AS NEEDED
Status: DISCONTINUED | OUTPATIENT
Start: 2024-04-10 | End: 2024-04-10 | Stop reason: HOSPADM

## 2024-04-10 RX ORDER — DEXAMETHASONE SODIUM PHOSPHATE 4 MG/ML
INJECTION, SOLUTION INTRA-ARTICULAR; INTRALESIONAL; INTRAMUSCULAR; INTRAVENOUS; SOFT TISSUE AS NEEDED
Status: DISCONTINUED | OUTPATIENT
Start: 2024-04-10 | End: 2024-04-10 | Stop reason: SURG

## 2024-04-10 RX ORDER — ONDANSETRON 2 MG/ML
4 INJECTION INTRAMUSCULAR; INTRAVENOUS ONCE AS NEEDED
Status: DISCONTINUED | OUTPATIENT
Start: 2024-04-10 | End: 2024-04-10 | Stop reason: HOSPADM

## 2024-04-10 RX ORDER — OXYCODONE HYDROCHLORIDE 5 MG/1
5 TABLET ORAL
Status: DISCONTINUED | OUTPATIENT
Start: 2024-04-10 | End: 2024-04-10 | Stop reason: HOSPADM

## 2024-04-10 RX ORDER — SODIUM CHLORIDE 0.9 % (FLUSH) 0.9 %
10 SYRINGE (ML) INJECTION EVERY 12 HOURS SCHEDULED
Status: DISCONTINUED | OUTPATIENT
Start: 2024-04-10 | End: 2024-04-10 | Stop reason: HOSPADM

## 2024-04-10 RX ORDER — MEPERIDINE HYDROCHLORIDE 25 MG/ML
12.5 INJECTION INTRAMUSCULAR; INTRAVENOUS; SUBCUTANEOUS
Status: DISCONTINUED | OUTPATIENT
Start: 2024-04-10 | End: 2024-04-10 | Stop reason: HOSPADM

## 2024-04-10 RX ORDER — METOPROLOL TARTRATE 50 MG/1
100 TABLET, FILM COATED ORAL ONCE
Status: COMPLETED | OUTPATIENT
Start: 2024-04-10 | End: 2024-04-10

## 2024-04-10 RX ORDER — SODIUM CHLORIDE, SODIUM LACTATE, POTASSIUM CHLORIDE, CALCIUM CHLORIDE 600; 310; 30; 20 MG/100ML; MG/100ML; MG/100ML; MG/100ML
100 INJECTION, SOLUTION INTRAVENOUS CONTINUOUS
Status: DISCONTINUED | OUTPATIENT
Start: 2024-04-10 | End: 2024-04-10 | Stop reason: HOSPADM

## 2024-04-10 RX ORDER — OXYCODONE HYDROCHLORIDE AND ACETAMINOPHEN 5; 325 MG/1; MG/1
1-2 TABLET ORAL
Qty: 30 TABLET | Refills: 0 | Status: SHIPPED | OUTPATIENT
Start: 2024-04-10

## 2024-04-10 RX ORDER — ACETAMINOPHEN 500 MG
1000 TABLET ORAL ONCE
Status: COMPLETED | OUTPATIENT
Start: 2024-04-10 | End: 2024-04-10

## 2024-04-10 RX ADMIN — PROPOFOL 200 MG: 10 INJECTION, EMULSION INTRAVENOUS at 11:59

## 2024-04-10 RX ADMIN — ACETAMINOPHEN 1000 MG: 500 TABLET ORAL at 11:21

## 2024-04-10 RX ADMIN — MIDAZOLAM HYDROCHLORIDE 2 MG: 1 INJECTION, SOLUTION INTRAMUSCULAR; INTRAVENOUS at 11:40

## 2024-04-10 RX ADMIN — EPHEDRINE SULFATE 10 MG: 50 INJECTION INTRAVENOUS at 12:10

## 2024-04-10 RX ADMIN — METOPROLOL TARTRATE 100 MG: 50 TABLET, FILM COATED ORAL at 11:39

## 2024-04-10 RX ADMIN — LIDOCAINE HYDROCHLORIDE 100 MG: 20 INJECTION, SOLUTION INTRAVENOUS at 11:59

## 2024-04-10 RX ADMIN — CEFAZOLIN 2 G: 1 INJECTION, POWDER, FOR SOLUTION INTRAMUSCULAR; INTRAVENOUS; PARENTERAL at 12:09

## 2024-04-10 RX ADMIN — EPHEDRINE SULFATE 10 MG: 50 INJECTION INTRAVENOUS at 12:20

## 2024-04-10 RX ADMIN — EPHEDRINE SULFATE 10 MG: 50 INJECTION INTRAVENOUS at 12:05

## 2024-04-10 RX ADMIN — DEXAMETHASONE SODIUM PHOSPHATE 4 MG: 4 INJECTION, SOLUTION INTRAMUSCULAR; INTRAVENOUS at 12:10

## 2024-04-10 RX ADMIN — SODIUM CHLORIDE, POTASSIUM CHLORIDE, SODIUM LACTATE AND CALCIUM CHLORIDE 9 ML/HR: 600; 310; 30; 20 INJECTION, SOLUTION INTRAVENOUS at 11:22

## 2024-04-10 RX ADMIN — ONDANSETRON HYDROCHLORIDE 4 MG: 2 SOLUTION INTRAMUSCULAR; INTRAVENOUS at 12:10

## 2024-04-10 RX ADMIN — EPHEDRINE SULFATE 10 MG: 50 INJECTION INTRAVENOUS at 12:15

## 2024-04-10 NOTE — ANESTHESIA PREPROCEDURE EVALUATION
Anesthesia Evaluation     Patient summary reviewed and Nursing notes reviewed   no history of anesthetic complications:   NPO Solid Status: > 8 hours  NPO Liquid Status: > 2 hours           Airway   Mallampati: II  TM distance: >3 FB  Neck ROM: full  No difficulty expected  Dental      Pulmonary - normal exam    breath sounds clear to auscultation  (+) a smoker Current,sleep apnea  Cardiovascular - normal exam  Exercise tolerance: good (4-7 METS)    PT is on anticoagulation therapy  Rhythm: regular  Rate: normal    (+) hypertension, past MI , CAD, CABG, PVD, hyperlipidemia    ROS comment: Last plavix 4/9    Neuro/Psych- negative ROS  GI/Hepatic/Renal/Endo    (+) obesity, renal disease- CRI, diabetes mellitus, thyroid problem     Musculoskeletal     Abdominal    Substance History - negative use     OB/GYN          Other - negative ROS       ROS/Med Hx Other: >4METS R/T TOE NEUROPATHY.H/O CAD,MI/STENT,4V CABG 2020.cardiac clearance W/[facility] 12/23 FOR VASCULAR SURG/NO ISSUES.     .ECHO 2020 EF 60%,NO VALVE ABNS.H/O SHAHLA W/BIPAP,DM,THYROID.    CONT ASA/PLAVIX PREOP.    Last MOUNJARO 3/31/24 LA                      Anesthesia Plan    ASA 3     general     (Patient understands anesthesia not responsible for dental damage.)  intravenous induction     Anesthetic plan, risks, benefits, and alternatives have been provided, discussed and informed consent has been obtained with: patient.    Plan discussed with CRNA.        CODE STATUS:

## 2024-04-10 NOTE — DISCHARGE INSTRUCTIONS
DISCHARGE INSTRUCTIONS  PODIATRY SURGERY       For your surgery you had:  General anesthesia (you may have a sore throat for the first 24 hours)  Local anesthesia  You may experience dizziness, drowsiness, or light-headedness for several hours following surgery  Do not stay alone today or tonight.  Limit your activity for 24 hours.  Resume your diet slowly.  Follow whatever special dietary instructions you may have been given by the doctor.  You should not drive or operate machinery or drink alcohol for 24 hours or while you are taking pain medication.You should not sign any legally binding documents.  DO NOT TOUCH DRESSING.  You may shower: KEEP DRESSING DRY.  Sleep with the injured part elevated on a pillow.  Follow verbal instructions of your doctor.  Sit with the lower leg propped up on a footstool or chair with pillows.    SPECIAL INSTRUCTIONS:  May continue your blood thinners as prescribed.  Partial weight bearing in post op shoe  Change Dressing  Dressing clean dry and intact. Can change the dressing with betadine dressing in 2-3 days if it becomes soiled or dirty       Last dose of pain medication was given at:   Tylenol (1000mg) last at 11:21am. Do not exceed 4000mg of tylenol in a 24 hour period. Ice bag to injured area for 72 hours.  Apply 20 minutes on - 20 minutes off.  Never place ice directly on skin or cast.     Bend knee and rotate ankle for 5 minutes every hour to support circulation.  DO NOT REMOVE DRESSING. KEEP DRESSING DRY. You may try to bathe in a tub, while keeping foot out of tub.  Small amount of bleeding through bandage may occur and is normal, unless it becomes heavy or persists, call office in this case.  Eat well balanced diet high in protein and vitamin c, may take supplemental vitamins and calcium. Drink plenty of fluids and get plenty of rest with foot elevated.  Use crutches, walker or cane for ambulation depending on weight bearing status. No driving until released by  MD.      NOTIFY THE PHYSICIAN IF YOU EXPERIENCE:  Numbness of toes.  Inability to move toes.  Extreme coldness, paleness or blue dis-coloration of toes.  Excessive swelling of affected surgical site or swelling that causes the cast to rub or cut into skin.  Pain unrelieved by pain medication  Nausea/vomiting not relieved by prescribed medication  Unable to urinate in 6 hours after surgery  Temperature greater than 101 degree Fahrenheit or chills  If unable to reach your doctor, please go to the closest emergency room

## 2024-04-10 NOTE — OP NOTE
Operative Note   Foot and Ankle Surgery   Provider: Dr. Balwinder Costa DPM  Location: [facility]    Patient Name:  Jd Velazquez  YOB: 1965    Date of Surgery:  4/10/2024    Pre-op Diagnosis:   Dry gangrene [I96]       Post-Op Diagnosis Codes:     * Dry gangrene [I96]     * Abscess [L02.91]    Procedure/CPT® Codes:  FL AMPUTATION METATARSAL W/TOE SINGLE [95716]  FL INCISION BONE CORTEX FOOT [01729]    Procedure(s):  AMPUTATION DIGIT RIGHT GREAT TOE,  Incision and drainage right foot         Staff:  Surgeon(s):  Balwinder Costa DPM    Assistant: Sherly Harvey RN CSA  was responsible for performing the following activities: Retraction, Suction, and Irrigation and their skilled assistance was necessary for the success of this case.    Anesthesia: General    Estimated Blood Loss: less than 5 ml    Implants:    Nothing was implanted during the procedure    Specimen:          Specimens       ID Source Type Tests Collected By Collected At Frozen?    1 Toe, Right Tissue ANAEROBIC CULTURE  ANAEROBIC CULTURE 10 DAY INCUBATION (Canceled)   Blawinder Costa DPM 4/10/24 1218     Description: RIGHT GREATER TOE CULTURE    A Toe, Right Bone TISSUE PATHOLOGY EXAM   Balwinder Costa DPM 4/10/24 1222 No    Description: RIGHT GREATER TOE            Findings: Abscess     Complications: none    Description of Procedure:     Procedure, risks, complications, and goals were discussed with the patient at bedside.  Risks include but are not limited to infection, complications from anesthesia (including death), chronic pain or numbness, hematoma/seroma, deep vein thrombosis, wound complications, and potential for additional surgical procedures.  Patient understands and elects to proceed with surgery at this time. Informed consent was obtained before proceeding to the operating suite.  All questions were answered to the patient's satisfaction. No guarantees or assurances were given or  implied.    Right foot was prepped and draped in usual sterile manner. A tear drop incision was made over 1st MPJ. Deepened down to bone area was drained of all purulent drainage. The incision was extended proximally to the 1st MPJ. It was disarticulated at the MPJ. Deep tissue was flushed with copious amounts of saline. Cultures were taken. Deep tissue was closed using 3-0 vicryl. Skin was closed using 3-0 nylon. Patient was placed in a betadine and dry sterile dressing. Tolerated anesthesia and procedure well.     Balwinder Costa DPM  [facility]   [phone]    Balwinder Costa DPM     Date: 4/10/2024  Time: 13:01 EDT

## 2024-04-10 NOTE — ANESTHESIA POSTPROCEDURE EVALUATION
Patient: Jd Velazquez    Procedure Summary       Date: 04/10/24 Room / Location: [facility] OR 04 / [facility] MAIN OR    Anesthesia Start: 1154 Anesthesia Stop: 1244    Procedure: AMPUTATION DIGIT RIGHT GREAT TOE (Right: Toe First) Diagnosis:       Dry gangrene      Abscess      (Dry gangrene [I96])    Surgeons: Balwinder Costa DPM Provider: Balwinder Topete MD    Anesthesia Type: general ASA Status: 3            Anesthesia Type: general    Vitals  Vitals Value Taken Time   /54 04/10/24 1318   Temp     Pulse 59 04/10/24 1318   Resp 16 04/10/24 1315   SpO2 90 % 04/10/24 1318   Vitals shown include unfiled device data.        Post Anesthesia Care and Evaluation    Patient location during evaluation: bedside  Patient participation: complete - patient participated  Level of consciousness: awake  Pain management: adequate    Airway patency: patent  PONV Status: none  Cardiovascular status: acceptable and stable  Respiratory status: acceptable  Hydration status: acceptable    Comments: An Anesthesiologist personally participated in the most demanding procedures (including induction and emergence if applicable) in the anesthesia plan, monitored the course of anesthesia administration at frequent intervals and remained physically present and available for immediate diagnosis and treatment of emergencies.

## 2024-04-11 ENCOUNTER — OFFICE VISIT (OUTPATIENT)
Dept: PODIATRY | Facility: CLINIC | Age: 59
End: 2024-04-11
Payer: COMMERCIAL

## 2024-04-11 VITALS
TEMPERATURE: 97.1 F | SYSTOLIC BLOOD PRESSURE: 128 MMHG | HEART RATE: 65 BPM | BODY MASS INDEX: 33.18 KG/M2 | WEIGHT: 237 LBS | HEIGHT: 71 IN | DIASTOLIC BLOOD PRESSURE: 65 MMHG | OXYGEN SATURATION: 95 %

## 2024-04-11 DIAGNOSIS — I96 DRY GANGRENE: Primary | ICD-10-CM

## 2024-04-11 DIAGNOSIS — I73.9 PERIPHERAL VASCULAR DISEASE: ICD-10-CM

## 2024-04-11 PROCEDURE — 99024 POSTOP FOLLOW-UP VISIT: CPT | Performed by: PODIATRIST

## 2024-04-12 LAB
CYTO UR: NORMAL
LAB AP CASE REPORT: NORMAL
LAB AP CLINICAL INFORMATION: NORMAL
PATH REPORT.FINAL DX SPEC: NORMAL
PATH REPORT.GROSS SPEC: NORMAL

## 2024-04-12 NOTE — PROGRESS NOTES
[facility] - PODIATRY    Today's Date: 04/12/24    Patient Name: Jd Velazquez  MRN: [id number]  CSN: [id number]  PCP: Dacia Newsome APRN,   Referring Provider: No ref. provider found    SUBJECTIVE     Chief Complaint   Patient presents with    Right Foot - Post-op Follow-up, Amputation     Dressing change      HPI: Jd Velazquez, a 58 y.o.male, presents to clinic.    Patient a 58-year-old male presenting for follow-up of his toe amputation.  Patient had some bleeding through his dressing and is here for dressing change.    Past Medical History:   Diagnosis Date    Allergic rhinitis 01/12/2015    Anesthesia     DIFFICULTY WAKING UP POST SURGERY    CAD (coronary artery disease)     DENIES CP/SOA.    Diabetes mellitus     Foot ulcer     Hyperlipidemia     Hypertension     Hypokalemia 02/23/2016    DENIES ANY CURRENT ISSUES    Hypothyroidism 04/24/2014    Insomnia, unspecified 06/15/2016    Microalbuminuria due to type 2 diabetes mellitus 10/15/2015    Mixed hyperlipidemia 10/15/2015    Myocardial infarction     Obesity     Skin cancer     Sleep apnea      Past Surgical History:   Procedure Laterality Date    AMPUTATION DIGIT Right 4/10/2024    Procedure: AMPUTATION DIGIT RIGHT GREAT TOE;  Surgeon: Balwinder Costa DPM;  Location: [facility] OR;  Service: Podiatry;  Laterality: Right;    ANKLE ARTHROSCOPY W/ OPEN REPAIR      APPENDECTOMY      CARDIAC CATHETERIZATION  2014    PCI to circumflex    CARDIAC CATHETERIZATION Right 10/26/2023    Procedure: Aortogram with right leg angiogram, possible angioplasty or stenting;  Surgeon: Roebrt Puga MD;  Location: [facility] CATH INVASIVE LOCATION;  Service: Vascular;  Laterality: Right;    CARDIAC CATHETERIZATION Right 12/15/2023    Procedure: Right leg angiogram, possible angioplasty or stenting;  Surgeon: Robert Puga MD;  Location: [facility] CATH INVASIVE LOCATION;  Service: Vascular;  Laterality: Right;    CORONARY ARTERY BYPASS  GRAFT N/A 10/08/2020    Procedure: STERNOTOMY, CORONARY ARTERY BYPASS GRAFTING TIME 4 WITH LEFT KELSI  AND ENDOSCOPICALLY HARVESTED LEFT GREATER SAPHENOUS VEIN AND PRP.;  Surgeon: Jr Girma Chiu MD;  Location: [facility] MAIN OR;  Service: Cardiothoracic;  Laterality: N/A;    FEMORAL TIBIAL BYPASS Right 01/09/2024    Procedure: Right femoral-tibial bypass graft;  Surgeon: Robert Puga MD;  Location: [facility] MAIN OR;  Service: Vascular;  Laterality: Right;    HEEL SPUR SURGERY      HERNIA REPAIR      KNEE ARTHROSCOPY      MULTIPLE TIMES ON BOTH KNEES    SKIN CANCER EXCISION      BACK    TOE SURGERY Right     DEBRIDMENT RIGHT GREAT TOE    TONSILLECTOMY       Family History   Adopted: Yes   Problem Relation Age of Onset    Heart disease Other      Social History     Socioeconomic History    Marital status:    Tobacco Use    Smoking status: Every Day     Current packs/day: 0.25     Average packs/day: 0.3 packs/day for 15.0 years (3.8 ttl pk-yrs)     Types: Cigarettes     Passive exposure: Never    Smokeless tobacco: Never   Vaping Use    Vaping status: Never Used   Substance and Sexual Activity    Alcohol use: Not Currently    Drug use: Never    Sexual activity: Defer     Allergies   Allergen Reactions    Lortab [Hydrocodone-Acetaminophen] Itching     Current Outpatient Medications   Medication Sig Dispense Refill    amLODIPine (NORVASC) 10 MG tablet Take 0.5 tablets by mouth Every Night. (Patient taking differently: Take 1 tablet by mouth Every Night.) 90 tablet 3    aspirin 81 MG chewable tablet Chew 1 tablet Daily. PER DR CHILANGO LOGAN TO CONTINUE ASA UP TO AND INCLUDING DAY OF SURGERY      clopidogrel (Plavix) 75 MG tablet Take 1 tablet by mouth Daily for 180 days. (Patient taking differently: Take 1 tablet by mouth Daily. PER DR CHILANGO LOGAN TO CONTINUE PLAVIX UP TO AND INCLUDING DAY OF SUGERY) 30 tablet 5    Cymbalta 30 MG capsule Take 1 capsule by mouth Daily.      Glucose Blood (Blood Glucose Test  "Strips 333) strip 1 each by Other route Daily. 100 strip 2    insulin detemir (Levemir) 100 UNIT/ML injection Inject 30 Units under the skin into the appropriate area as directed Every Night. Increase one unit per night goal fasting  2 hours after meals 140 9 mL 12    Insulin Pen Needle (Pen Needles 3/16\") 31G X 5 MM misc Use 1 each Every Night. 100 each 5    levothyroxine (SYNTHROID, LEVOTHROID) 50 MCG tablet Take 1 tablet by mouth Every Morning. (Patient taking differently: Take 1 tablet by mouth Daily As Needed. PT REPORTS JUST TAKES OCCASIONALLY) 90 tablet 1    lidocaine (LIDODERM) 5 % Place 3 patches on the skin as directed by provider Daily. Remove & Discard patch within 12 hours or as directed by MD (Patient taking differently: Place 3 patches on the skin as directed by provider Daily As Needed. Remove & Discard patch within 12 hours or as directed by MD) 90 each 5    lisinopril-hydrochlorothiazide (PRINZIDE,ZESTORETIC) 20-12.5 MG per tablet Take 2 tablets by mouth Daily. 180 tablet 3    metFORMIN (GLUCOPHAGE) 500 MG tablet Take 1 tablet by mouth Daily With Breakfast. NONE AFTER 6 PM ON 4/9/24      metoprolol tartrate (LOPRESSOR) 100 MG tablet Take 1 tablet by mouth Every 12 (Twelve) Hours. 180 tablet 3    OneTouch Delica Lancets 33G misc test 4 times per day 100 each 0    oxyCODONE-acetaminophen (Percocet) 5-325 MG per tablet Take 1-2 tablets by mouth Every 4 (Four) to 6 (Six) Hours As Needed for Pain 30 tablet 0    pregabalin (Lyrica) 300 MG capsule Take 1 capsule by mouth 2 (Two) Times a Day. (Patient taking differently: Take 1 capsule by mouth Every Night.) 180 capsule 1    Repatha SureClick solution auto-injector SureClick injection INJECT 1ML UNDER THE SKIN INTO THE APPROPRIATE AREA AS DIRECTED EVERY 14 DAYS 2 mL 3    Tirzepatide (MOUNJARO) 10 MG/0.5ML solution pen-injector pen Inject 0.5 mL under the skin into the appropriate area as directed 1 (One) Time Per Week. (Patient taking differently: " Inject 0.5 mL under the skin into the appropriate area as directed 1 (One) Time Per Week. LAST DOSE 3/31/24) 2 mL 6    traZODone (DESYREL) 100 MG tablet TAKE ONE TABLET BY MOUTH ONCE NIGHTLY (Patient taking differently: Take 1 tablet by mouth At Night As Needed.) 30 tablet 1    zolpidem (AMBIEN) 10 MG tablet Take 1 tablet by mouth At Night As Needed for Sleep. 90 tablet 0     No current facility-administered medications for this visit.     Review of Systems   Skin:         Ulcer toe   All other systems reviewed and are negative.      OBJECTIVE     Vitals:    04/11/24 1000   BP: 128/65   Pulse: 65   Temp: 97.1 °F (36.2 °C)   SpO2: 95%       WBC   Date Value Ref Range Status   02/28/2024 6.98 3.40 - 10.80 10*3/mm3 Final     RBC   Date Value Ref Range Status   02/28/2024 4.52 4.14 - 5.80 10*6/mm3 Final     Hemoglobin   Date Value Ref Range Status   02/28/2024 13.9 13.0 - 17.7 g/dL Final     Hematocrit   Date Value Ref Range Status   02/28/2024 41.0 37.5 - 51.0 % Final     MCV   Date Value Ref Range Status   02/28/2024 90.7 79.0 - 97.0 fL Final     MCH   Date Value Ref Range Status   02/28/2024 30.8 26.6 - 33.0 pg Final     MCHC   Date Value Ref Range Status   02/28/2024 33.9 31.5 - 35.7 g/dL Final     RDW   Date Value Ref Range Status   02/28/2024 14.1 12.3 - 15.4 % Final     RDW-SD   Date Value Ref Range Status   02/28/2024 46.2 37.0 - 54.0 fl Final     MPV   Date Value Ref Range Status   02/28/2024 9.4 6.0 - 12.0 fL Final     Platelets   Date Value Ref Range Status   02/28/2024 367 140 - 450 10*3/mm3 Final     Neutrophil %   Date Value Ref Range Status   02/28/2024 45.6 42.7 - 76.0 % Final     Lymphocyte %   Date Value Ref Range Status   02/28/2024 35.4 19.6 - 45.3 % Final     Monocyte %   Date Value Ref Range Status   02/28/2024 11.9 5.0 - 12.0 % Final     Eosinophil %   Date Value Ref Range Status   02/28/2024 5.4 0.3 - 6.2 % Final     Basophil %   Date Value Ref Range Status   02/28/2024 1.3 0.0 - 1.5 % Final      Immature Grans %   Date Value Ref Range Status   2024 0.4 0.0 - 0.5 % Final     Neutrophils, Absolute   Date Value Ref Range Status   2024 3.18 1.70 - 7.00 10*3/mm3 Final     Lymphocytes, Absolute   Date Value Ref Range Status   2024 2.47 0.70 - 3.10 10*3/mm3 Final     Monocytes, Absolute   Date Value Ref Range Status   2024 0.83 0.10 - 0.90 10*3/mm3 Final     Eosinophils, Absolute   Date Value Ref Range Status   2024 0.38 0.00 - 0.40 10*3/mm3 Final     Basophils, Absolute   Date Value Ref Range Status   2024 0.09 0.00 - 0.20 10*3/mm3 Final     Immature Grans, Absolute   Date Value Ref Range Status   2024 0.03 0.00 - 0.05 10*3/mm3 Final     nRBC   Date Value Ref Range Status   2024 0.0 0.0 - 0.2 /100 WBC Final         Lab Results   Component Value Date    GLUCOSE 178 (H) 2024    BUN 13 2024    CREATININE 0.94 2024    EGFRIFNONA 77 2021    BCR 13.8 2024    K 4.3 2024    CO2 21.8 (L) 2024    CALCIUM 9.7 2024    ALBUMIN 4.4 2024    LABIL2 0.9 (L) 10/16/2020    AST 6 2024    ALT 9 2024       Patient seen in no apparent distress.      PHYSICAL EXAM:     Foot/Ankle Exam    GENERAL  Appearance:  appears stated age  Orientation:  AAOx3  Affect:  appropriate  Gait:  unimpaired  Assistance:  independent  Right shoe gear: casual shoe  Left shoe gear: casual shoe    VASCULAR     Right Foot Vascularity   Dorsalis pedis:  1+  Posterior tibial:  1+  Skin temperature:  cool  Edema gradin+ and non-pitting  CFT:  < 3 seconds  Pedal hair growth:  Absent  Varicosities:  none     Left Foot Vascularity   Dorsalis pedis:  1+  Posterior tibial:  1+  Skin temperature:  cool  Edema gradin+ and non-pitting  CFT:  < 3 seconds  Pedal hair growth:  Absent  Varicosities:  none     NEUROLOGIC     Right Foot Neurologic   Light touch sensation: absent  Vibratory sensation: absent  Hot/Cold sensation: absent     Left Foot  Neurologic   Light touch sensation: absent  Vibratory sensation: absent  Hot/Cold sensation:  absent    MUSCLE STRENGTH     Right Foot Muscle Strength   Foot dorsiflexion:  4  Foot plantar flexion:  4  Foot inversion:  4  Foot eversion:  4     Left Foot Muscle Strength   Foot dorsiflexion:  4  Foot plantar flexion:  4  Foot inversion:  4  Foot eversion:  4    RANGE OF MOTION     Right Foot Range of Motion   Foot and ankle ROM within normal limits       Left Foot Range of Motion   Foot and ankle ROM within normal limits      DERMATOLOGIC      Right Foot Dermatologic   Skin  Right foot skin is intact.      Left Foot Dermatologic   Skin  Left foot skin is intact.      Right foot additional comments: Amputation site with well coapted incision and sutures intact.  No erythema no malodor no drainage      RADIOLOGY:        Duplex Lower Extremity Art / Grafts - Right CAR    Result Date: 3/27/2024  Narrative:   Right fem-tib bypass graft is patent without elevated velocities or gray scale/color flow evidence of stenosis.  Proximal and distal anastamosis are widely patent.   Proximal right SFA stenosis with eventual occlusion of the distal right popliteal artery.      ASSESSMENT/PLAN     Diagnoses and all orders for this visit:    1. Dry gangrene (Primary)    2. Peripheral vascular disease            Comprehensive lower extremity examination and evaluation was performed.    Discussed findings and treatment plan including risks, benefits, and treatment options with patient in detail. Patient agreed with treatment plan.    Medications and allergies reviewed.  Reviewed available lab values along with other pertinent labs.  These were discussed with the patient.    An After Visit Summary was printed and given to the patient at discharge, including (if requested) any available informative/educational handouts regarding diagnosis, treatment, or medications. All questions were answered to patient/family satisfaction. Should symptoms  fail to improve or worsen they agree to call or return to clinic or to go to the Emergency Department. Discussed the importance of following up with any needed screening tests/labs/specialist appointments and any requested follow-up recommended by me today. Importance of maintaining follow-up discussed and patient accepts that missed appointments can delay diagnosis and potentially lead to worsening of conditions.    Return in about 2 weeks (around 4/25/2024)., or sooner if acute issues arise.    This document has been electronically signed by Balwinder Costa DPM on April 12, 2024 14:44 EDT        Answers submitted by the patient for this visit:  Primary Reason for Visit (Submitted on 4/4/2024)  What is the primary reason for your visit?: Other  Other (Submitted on 4/4/2024)  Please describe your symptoms.: Check on dead toe.  Have you had these symptoms before?: Yes  How long have you been having these symptoms?: Greater than 2 weeks

## 2024-04-13 LAB
BACTERIA SPEC AEROBE CULT: ABNORMAL
BACTERIA SPEC AEROBE CULT: ABNORMAL
GRAM STN SPEC: ABNORMAL
GRAM STN SPEC: ABNORMAL

## 2024-04-15 LAB — BACTERIA SPEC ANAEROBE CULT: ABNORMAL

## 2024-04-15 RX ORDER — LEVOFLOXACIN 500 MG/1
750 TABLET, FILM COATED ORAL DAILY
Qty: 11 TABLET | Refills: 0 | Status: SHIPPED | OUTPATIENT
Start: 2024-04-15 | End: 2024-04-22

## 2024-04-17 ENCOUNTER — OFFICE VISIT (OUTPATIENT)
Dept: PODIATRY | Facility: CLINIC | Age: 59
End: 2024-04-17
Payer: COMMERCIAL

## 2024-04-17 VITALS
SYSTOLIC BLOOD PRESSURE: 131 MMHG | DIASTOLIC BLOOD PRESSURE: 65 MMHG | OXYGEN SATURATION: 94 % | WEIGHT: 232 LBS | BODY MASS INDEX: 32.36 KG/M2 | HEART RATE: 58 BPM | TEMPERATURE: 98.3 F

## 2024-04-17 DIAGNOSIS — I73.9 PERIPHERAL VASCULAR DISEASE: Primary | ICD-10-CM

## 2024-04-17 DIAGNOSIS — E11.65 UNCONTROLLED TYPE 2 DIABETES MELLITUS WITH HYPERGLYCEMIA: ICD-10-CM

## 2024-04-17 DIAGNOSIS — I96 DRY GANGRENE: ICD-10-CM

## 2024-04-17 PROCEDURE — 99024 POSTOP FOLLOW-UP VISIT: CPT | Performed by: PODIATRIST

## 2024-04-17 NOTE — PROGRESS NOTES
[facility] - PODIATRY    Today's Date: 04/17/24    Patient Name: Jd Velazquez  MRN: [id number]  CSN: [id number]  PCP: Dacia Newsome APRN,   Referring Provider: No ref. provider found    SUBJECTIVE     Chief Complaint   Patient presents with    Right Foot - Post-op, Follow-up     Surgery 4/10/24, right great toe amputation     HPI: Jd Velazquez, a 58 y.o.male, presents to clinic.    Patient a 58-year-old male presenting for follow-up of his toe amputation.  He has not picked up his antibiotics yet.     Past Medical History:   Diagnosis Date    Allergic rhinitis 01/12/2015    Anesthesia     DIFFICULTY WAKING UP POST SURGERY    CAD (coronary artery disease)     DENIES CP/SOA.    Diabetes mellitus     Foot ulcer     Hyperlipidemia     Hypertension     Hypokalemia 02/23/2016    DENIES ANY CURRENT ISSUES    Hypothyroidism 04/24/2014    Insomnia, unspecified 06/15/2016    Microalbuminuria due to type 2 diabetes mellitus 10/15/2015    Mixed hyperlipidemia 10/15/2015    Myocardial infarction     Obesity     Skin cancer     Sleep apnea      Past Surgical History:   Procedure Laterality Date    AMPUTATION DIGIT Right 4/10/2024    Procedure: AMPUTATION DIGIT RIGHT GREAT TOE;  Surgeon: Balwinder Costa DPM;  Location: [facility] MAIN OR;  Service: Podiatry;  Laterality: Right;    ANKLE ARTHROSCOPY W/ OPEN REPAIR      APPENDECTOMY      CARDIAC CATHETERIZATION  2014    PCI to circumflex    CARDIAC CATHETERIZATION Right 10/26/2023    Procedure: Aortogram with right leg angiogram, possible angioplasty or stenting;  Surgeon: Robert Puga MD;  Location: [facility] CATH INVASIVE LOCATION;  Service: Vascular;  Laterality: Right;    CARDIAC CATHETERIZATION Right 12/15/2023    Procedure: Right leg angiogram, possible angioplasty or stenting;  Surgeon: Robert Puga MD;  Location: [facility] CATH INVASIVE LOCATION;  Service: Vascular;  Laterality: Right;    CORONARY ARTERY BYPASS GRAFT N/A 10/08/2020     Procedure: STERNOTOMY, CORONARY ARTERY BYPASS GRAFTING TIME 4 WITH LEFT KELSI  AND ENDOSCOPICALLY HARVESTED LEFT GREATER SAPHENOUS VEIN AND PRP.;  Surgeon: Jr Giram Chiu MD;  Location: [facility] MAIN OR;  Service: Cardiothoracic;  Laterality: N/A;    FEMORAL TIBIAL BYPASS Right 01/09/2024    Procedure: Right femoral-tibial bypass graft;  Surgeon: Robert Puga MD;  Location: [facility] MAIN OR;  Service: Vascular;  Laterality: Right;    HEEL SPUR SURGERY      HERNIA REPAIR      KNEE ARTHROSCOPY      MULTIPLE TIMES ON BOTH KNEES    SKIN CANCER EXCISION      BACK    TOE SURGERY Right     DEBRIDMENT RIGHT GREAT TOE    TONSILLECTOMY       Family History   Adopted: Yes   Problem Relation Age of Onset    Heart disease Other      Social History     Socioeconomic History    Marital status:    Tobacco Use    Smoking status: Every Day     Current packs/day: 0.25     Average packs/day: 0.3 packs/day for 15.0 years (3.8 ttl pk-yrs)     Types: Cigarettes     Passive exposure: Never    Smokeless tobacco: Never   Vaping Use    Vaping status: Never Used   Substance and Sexual Activity    Alcohol use: Not Currently    Drug use: Never    Sexual activity: Defer     Allergies   Allergen Reactions    Lortab [Hydrocodone-Acetaminophen] Itching     Current Outpatient Medications   Medication Sig Dispense Refill    amLODIPine (NORVASC) 10 MG tablet Take 0.5 tablets by mouth Every Night. (Patient taking differently: Take 1 tablet by mouth Every Night.) 90 tablet 3    aspirin 81 MG chewable tablet Chew 1 tablet Daily. PER DR CHILANGO LOGAN TO CONTINUE ASA UP TO AND INCLUDING DAY OF SURGERY      clopidogrel (Plavix) 75 MG tablet Take 1 tablet by mouth Daily for 180 days. (Patient taking differently: Take 1 tablet by mouth Daily. PER DR CHILANGO LOGAN TO CONTINUE PLAVIX UP TO AND INCLUDING DAY OF SUGERY) 30 tablet 5    Cymbalta 30 MG capsule Take 1 capsule by mouth Daily.      Glucose Blood (Blood Glucose Test Strips 333) strip 1 each  "by Other route Daily. 100 strip 2    insulin detemir (Levemir) 100 UNIT/ML injection Inject 30 Units under the skin into the appropriate area as directed Every Night. Increase one unit per night goal fasting  2 hours after meals 140 9 mL 12    Insulin Pen Needle (Pen Needles 3/16\") 31G X 5 MM misc Use 1 each Every Night. 100 each 5    levoFLOXacin (LEVAQUIN) 500 MG tablet Take 1.5 tablets by mouth Daily for 7 days. 11 tablet 0    levothyroxine (SYNTHROID, LEVOTHROID) 50 MCG tablet Take 1 tablet by mouth Every Morning. (Patient taking differently: Take 1 tablet by mouth Daily As Needed. PT REPORTS JUST TAKES OCCASIONALLY) 90 tablet 1    lidocaine (LIDODERM) 5 % Place 3 patches on the skin as directed by provider Daily. Remove & Discard patch within 12 hours or as directed by MD (Patient taking differently: Place 3 patches on the skin as directed by provider Daily As Needed. Remove & Discard patch within 12 hours or as directed by MD) 90 each 5    lisinopril-hydrochlorothiazide (PRINZIDE,ZESTORETIC) 20-12.5 MG per tablet Take 2 tablets by mouth Daily. 180 tablet 3    metFORMIN (GLUCOPHAGE) 500 MG tablet Take 1 tablet by mouth Daily With Breakfast. NONE AFTER 6 PM ON 4/9/24      metoprolol tartrate (LOPRESSOR) 100 MG tablet Take 1 tablet by mouth Every 12 (Twelve) Hours. 180 tablet 3    OneTouch Delica Lancets 33G misc test 4 times per day 100 each 0    oxyCODONE-acetaminophen (Percocet) 5-325 MG per tablet Take 1-2 tablets by mouth Every 4 (Four) to 6 (Six) Hours As Needed for Pain 30 tablet 0    pregabalin (Lyrica) 300 MG capsule Take 1 capsule by mouth 2 (Two) Times a Day. (Patient taking differently: Take 1 capsule by mouth Every Night.) 180 capsule 1    Repatha SureClick solution auto-injector SureClick injection INJECT 1ML UNDER THE SKIN INTO THE APPROPRIATE AREA AS DIRECTED EVERY 14 DAYS 2 mL 3    Tirzepatide (MOUNJARO) 10 MG/0.5ML solution pen-injector pen Inject 0.5 mL under the skin into the appropriate " area as directed 1 (One) Time Per Week. (Patient taking differently: Inject 0.5 mL under the skin into the appropriate area as directed 1 (One) Time Per Week. LAST DOSE 3/31/24) 2 mL 6    traZODone (DESYREL) 100 MG tablet TAKE ONE TABLET BY MOUTH ONCE NIGHTLY (Patient taking differently: Take 1 tablet by mouth At Night As Needed.) 30 tablet 1    zolpidem (AMBIEN) 10 MG tablet Take 1 tablet by mouth At Night As Needed for Sleep. 90 tablet 0     No current facility-administered medications for this visit.     Review of Systems   Skin:         Ulcer toe   All other systems reviewed and are negative.      OBJECTIVE     Vitals:    04/17/24 1305   BP: 131/65   Pulse: 58   Temp: 98.3 °F (36.8 °C)   SpO2: 94%       WBC   Date Value Ref Range Status   02/28/2024 6.98 3.40 - 10.80 10*3/mm3 Final     RBC   Date Value Ref Range Status   02/28/2024 4.52 4.14 - 5.80 10*6/mm3 Final     Hemoglobin   Date Value Ref Range Status   02/28/2024 13.9 13.0 - 17.7 g/dL Final     Hematocrit   Date Value Ref Range Status   02/28/2024 41.0 37.5 - 51.0 % Final     MCV   Date Value Ref Range Status   02/28/2024 90.7 79.0 - 97.0 fL Final     MCH   Date Value Ref Range Status   02/28/2024 30.8 26.6 - 33.0 pg Final     MCHC   Date Value Ref Range Status   02/28/2024 33.9 31.5 - 35.7 g/dL Final     RDW   Date Value Ref Range Status   02/28/2024 14.1 12.3 - 15.4 % Final     RDW-SD   Date Value Ref Range Status   02/28/2024 46.2 37.0 - 54.0 fl Final     MPV   Date Value Ref Range Status   02/28/2024 9.4 6.0 - 12.0 fL Final     Platelets   Date Value Ref Range Status   02/28/2024 367 140 - 450 10*3/mm3 Final     Neutrophil %   Date Value Ref Range Status   02/28/2024 45.6 42.7 - 76.0 % Final     Lymphocyte %   Date Value Ref Range Status   02/28/2024 35.4 19.6 - 45.3 % Final     Monocyte %   Date Value Ref Range Status   02/28/2024 11.9 5.0 - 12.0 % Final     Eosinophil %   Date Value Ref Range Status   02/28/2024 5.4 0.3 - 6.2 % Final     Basophil %    Date Value Ref Range Status   2024 1.3 0.0 - 1.5 % Final     Immature Grans %   Date Value Ref Range Status   2024 0.4 0.0 - 0.5 % Final     Neutrophils, Absolute   Date Value Ref Range Status   2024 3.18 1.70 - 7.00 10*3/mm3 Final     Lymphocytes, Absolute   Date Value Ref Range Status   2024 2.47 0.70 - 3.10 10*3/mm3 Final     Monocytes, Absolute   Date Value Ref Range Status   2024 0.83 0.10 - 0.90 10*3/mm3 Final     Eosinophils, Absolute   Date Value Ref Range Status   2024 0.38 0.00 - 0.40 10*3/mm3 Final     Basophils, Absolute   Date Value Ref Range Status   2024 0.09 0.00 - 0.20 10*3/mm3 Final     Immature Grans, Absolute   Date Value Ref Range Status   2024 0.03 0.00 - 0.05 10*3/mm3 Final     nRBC   Date Value Ref Range Status   2024 0.0 0.0 - 0.2 /100 WBC Final         Lab Results   Component Value Date    GLUCOSE 178 (H) 2024    BUN 13 2024    CREATININE 0.94 2024    EGFRIFNONA 77 2021    BCR 13.8 2024    K 4.3 2024    CO2 21.8 (L) 2024    CALCIUM 9.7 2024    ALBUMIN 4.4 2024    LABIL2 0.9 (L) 10/16/2020    AST 6 2024    ALT 9 2024       Patient seen in no apparent distress.      PHYSICAL EXAM:     Foot/Ankle Exam    GENERAL  Appearance:  appears stated age  Orientation:  AAOx3  Affect:  appropriate  Gait:  unimpaired  Assistance:  independent  Right shoe gear: casual shoe  Left shoe gear: casual shoe    VASCULAR     Right Foot Vascularity   Dorsalis pedis:  1+  Posterior tibial:  1+  Skin temperature:  cool  Edema gradin+ and non-pitting  CFT:  < 3 seconds  Pedal hair growth:  Absent  Varicosities:  none     Left Foot Vascularity   Dorsalis pedis:  1+  Posterior tibial:  1+  Skin temperature:  cool  Edema gradin+ and non-pitting  CFT:  < 3 seconds  Pedal hair growth:  Absent  Varicosities:  none     NEUROLOGIC     Right Foot Neurologic   Light touch sensation:  absent  Vibratory sensation: absent  Hot/Cold sensation: absent     Left Foot Neurologic   Light touch sensation: absent  Vibratory sensation: absent  Hot/Cold sensation:  absent    MUSCLE STRENGTH     Right Foot Muscle Strength   Foot dorsiflexion:  4  Foot plantar flexion:  4  Foot inversion:  4  Foot eversion:  4     Left Foot Muscle Strength   Foot dorsiflexion:  4  Foot plantar flexion:  4  Foot inversion:  4  Foot eversion:  4    RANGE OF MOTION     Right Foot Range of Motion   Foot and ankle ROM within normal limits       Left Foot Range of Motion   Foot and ankle ROM within normal limits      DERMATOLOGIC      Right Foot Dermatologic   Skin  Right foot skin is intact.      Left Foot Dermatologic   Skin  Left foot skin is intact.      Right foot additional comments: Amputation site with well coapted incision and sutures intact.  No erythema no malodor no drainage      RADIOLOGY:        Duplex Lower Extremity Art / Grafts - Right CAR    Result Date: 3/27/2024  Narrative:   Right fem-tib bypass graft is patent without elevated velocities or gray scale/color flow evidence of stenosis.  Proximal and distal anastamosis are widely patent.   Proximal right SFA stenosis with eventual occlusion of the distal right popliteal artery.      ASSESSMENT/PLAN     Diagnoses and all orders for this visit:    1. Peripheral vascular disease (Primary)    2. Uncontrolled type 2 diabetes mellitus with hyperglycemia    3. Dry gangrene      Finish antibiotics  Daily dressing changes  RTC in 1 week for suture removal      Comprehensive lower extremity examination and evaluation was performed.    Discussed findings and treatment plan including risks, benefits, and treatment options with patient in detail. Patient agreed with treatment plan.    Medications and allergies reviewed.  Reviewed available lab values along with other pertinent labs.  These were discussed with the patient.    An After Visit Summary was printed and given to the  patient at discharge, including (if requested) any available informative/educational handouts regarding diagnosis, treatment, or medications. All questions were answered to patient/family satisfaction. Should symptoms fail to improve or worsen they agree to call or return to clinic or to go to the Emergency Department. Discussed the importance of following up with any needed screening tests/labs/specialist appointments and any requested follow-up recommended by me today. Importance of maintaining follow-up discussed and patient accepts that missed appointments can delay diagnosis and potentially lead to worsening of conditions.    Return in about 1 week (around 4/24/2024)., or sooner if acute issues arise.    This document has been electronically signed by Balwinder Costa DPM on April 17, 2024 14:30 EDT        Answers submitted by the patient for this visit:  Primary Reason for Visit (Submitted on 4/4/2024)  What is the primary reason for your visit?: Other  Other (Submitted on 4/4/2024)  Please describe your symptoms.: Check on dead toe.  Have you had these symptoms before?: Yes  How long have you been having these symptoms?: Greater than 2 weeks

## 2024-04-24 ENCOUNTER — OFFICE VISIT (OUTPATIENT)
Dept: PODIATRY | Facility: CLINIC | Age: 59
End: 2024-04-24
Payer: COMMERCIAL

## 2024-04-24 VITALS
SYSTOLIC BLOOD PRESSURE: 117 MMHG | HEIGHT: 71 IN | OXYGEN SATURATION: 95 % | BODY MASS INDEX: 32.48 KG/M2 | HEART RATE: 72 BPM | TEMPERATURE: 97.8 F | WEIGHT: 232 LBS | DIASTOLIC BLOOD PRESSURE: 70 MMHG

## 2024-04-24 DIAGNOSIS — I73.9 PERIPHERAL VASCULAR DISEASE: ICD-10-CM

## 2024-04-24 DIAGNOSIS — L02.611 ABSCESS OF RIGHT FOOT: Primary | ICD-10-CM

## 2024-04-24 DIAGNOSIS — E11.65 UNCONTROLLED TYPE 2 DIABETES MELLITUS WITH HYPERGLYCEMIA: ICD-10-CM

## 2024-04-24 RX ORDER — SODIUM CHLORIDE 0.9 % (FLUSH) 0.9 %
10 SYRINGE (ML) INJECTION AS NEEDED
OUTPATIENT
Start: 2024-04-24

## 2024-04-24 RX ORDER — SODIUM CHLORIDE 0.9 % (FLUSH) 0.9 %
10 SYRINGE (ML) INJECTION EVERY 12 HOURS SCHEDULED
OUTPATIENT
Start: 2024-04-24

## 2024-04-24 RX ORDER — SODIUM CHLORIDE 9 MG/ML
40 INJECTION, SOLUTION INTRAVENOUS AS NEEDED
OUTPATIENT
Start: 2024-04-24

## 2024-04-24 RX ORDER — SODIUM CHLORIDE, SODIUM LACTATE, POTASSIUM CHLORIDE, CALCIUM CHLORIDE 600; 310; 30; 20 MG/100ML; MG/100ML; MG/100ML; MG/100ML
100 INJECTION, SOLUTION INTRAVENOUS CONTINUOUS
OUTPATIENT
Start: 2024-04-24

## 2024-04-24 NOTE — PROGRESS NOTES
[facility] - PODIATRY    Today's Date: 04/24/24    Patient Name: Jd Velazquez  MRN: [id number]  CSN: [id number]  PCP: Dacia Newsome APRN,   Referring Provider: No ref. provider found    SUBJECTIVE     Chief Complaint   Patient presents with    Right Foot - Post-op Follow-up, Wound Check     Possible suture removal     HPI: Jd Velazquez, a 58 y.o.male, presents to clinic.    Patient is a 58-year-old male presenting for follow-up of his right great toe amputation.  Patient says that he did not pick his antibiotics until 2 days ago, and has not started taking them.  Past Medical History:   Diagnosis Date    Allergic rhinitis 01/12/2015    Anesthesia     DIFFICULTY WAKING UP POST SURGERY    CAD (coronary artery disease)     DENIES CP/SOA.    Diabetes mellitus     Foot ulcer     Hyperlipidemia     Hypertension     Hypokalemia 02/23/2016    DENIES ANY CURRENT ISSUES    Hypothyroidism 04/24/2014    Insomnia, unspecified 06/15/2016    Microalbuminuria due to type 2 diabetes mellitus 10/15/2015    Mixed hyperlipidemia 10/15/2015    Myocardial infarction     Obesity     Skin cancer     Sleep apnea      Past Surgical History:   Procedure Laterality Date    AMPUTATION DIGIT Right 4/10/2024    Procedure: AMPUTATION DIGIT RIGHT GREAT TOE;  Surgeon: Balwinder Costa DPM;  Location: [facility] OR;  Service: Podiatry;  Laterality: Right;    ANKLE ARTHROSCOPY W/ OPEN REPAIR      APPENDECTOMY      CARDIAC CATHETERIZATION  2014    PCI to circumflex    CARDIAC CATHETERIZATION Right 10/26/2023    Procedure: Aortogram with right leg angiogram, possible angioplasty or stenting;  Surgeon: Robert Puga MD;  Location: [facility] CATH INVASIVE LOCATION;  Service: Vascular;  Laterality: Right;    CARDIAC CATHETERIZATION Right 12/15/2023    Procedure: Right leg angiogram, possible angioplasty or stenting;  Surgeon: Robert Puga MD;  Location: [facility] CATH INVASIVE LOCATION;  Service: Vascular;   Laterality: Right;    CORONARY ARTERY BYPASS GRAFT N/A 10/08/2020    Procedure: STERNOTOMY, CORONARY ARTERY BYPASS GRAFTING TIME 4 WITH LEFT KELSI  AND ENDOSCOPICALLY HARVESTED LEFT GREATER SAPHENOUS VEIN AND PRP.;  Surgeon: Jr Girma Chiu MD;  Location: [facility] MAIN OR;  Service: Cardiothoracic;  Laterality: N/A;    FEMORAL TIBIAL BYPASS Right 01/09/2024    Procedure: Right femoral-tibial bypass graft;  Surgeon: Robert Puga MD;  Location: [facility] MAIN OR;  Service: Vascular;  Laterality: Right;    HEEL SPUR SURGERY      HERNIA REPAIR      KNEE ARTHROSCOPY      MULTIPLE TIMES ON BOTH KNEES    SKIN CANCER EXCISION      BACK    TOE SURGERY Right     DEBRIDMENT RIGHT GREAT TOE    TONSILLECTOMY       Family History   Adopted: Yes   Problem Relation Age of Onset    Heart disease Other      Social History     Socioeconomic History    Marital status:    Tobacco Use    Smoking status: Every Day     Current packs/day: 0.25     Average packs/day: 0.3 packs/day for 15.0 years (3.8 ttl pk-yrs)     Types: Cigarettes     Passive exposure: Never    Smokeless tobacco: Never   Vaping Use    Vaping status: Never Used   Substance and Sexual Activity    Alcohol use: Not Currently    Drug use: Never    Sexual activity: Defer     Allergies   Allergen Reactions    Lortab [Hydrocodone-Acetaminophen] Itching     Current Outpatient Medications   Medication Sig Dispense Refill    amLODIPine (NORVASC) 10 MG tablet Take 0.5 tablets by mouth Every Night. (Patient taking differently: Take 1 tablet by mouth Every Night.) 90 tablet 3    aspirin 81 MG chewable tablet Chew 1 tablet Daily. PER DR CHILANGO LOGAN TO CONTINUE ASA UP TO AND INCLUDING DAY OF SURGERY      clopidogrel (Plavix) 75 MG tablet Take 1 tablet by mouth Daily for 180 days. (Patient taking differently: Take 1 tablet by mouth Daily. PER DR CHILANGO LOGAN TO CONTINUE PLAVIX UP TO AND INCLUDING DAY OF SUGERY) 30 tablet 5    Cymbalta 30 MG capsule Take 1 capsule by mouth Daily.  "     Glucose Blood (Blood Glucose Test Strips 333) strip 1 each by Other route Daily. 100 strip 2    insulin detemir (Levemir) 100 UNIT/ML injection Inject 30 Units under the skin into the appropriate area as directed Every Night. Increase one unit per night goal fasting  2 hours after meals 140 9 mL 12    Insulin Pen Needle (Pen Needles 3/16\") 31G X 5 MM misc Use 1 each Every Night. 100 each 5    levothyroxine (SYNTHROID, LEVOTHROID) 50 MCG tablet Take 1 tablet by mouth Every Morning. (Patient taking differently: Take 1 tablet by mouth Daily As Needed. PT REPORTS JUST TAKES OCCASIONALLY) 90 tablet 1    lidocaine (LIDODERM) 5 % Place 3 patches on the skin as directed by provider Daily. Remove & Discard patch within 12 hours or as directed by MD (Patient taking differently: Place 3 patches on the skin as directed by provider Daily As Needed. Remove & Discard patch within 12 hours or as directed by MD) 90 each 5    lisinopril-hydrochlorothiazide (PRINZIDE,ZESTORETIC) 20-12.5 MG per tablet Take 2 tablets by mouth Daily. 180 tablet 3    metFORMIN (GLUCOPHAGE) 500 MG tablet Take 1 tablet by mouth Daily With Breakfast. NONE AFTER 6 PM ON 4/9/24      metoprolol tartrate (LOPRESSOR) 100 MG tablet Take 1 tablet by mouth Every 12 (Twelve) Hours. 180 tablet 3    OneTouch Delica Lancets 33G misc test 4 times per day 100 each 0    pregabalin (Lyrica) 300 MG capsule Take 1 capsule by mouth 2 (Two) Times a Day. (Patient taking differently: Take 1 capsule by mouth Every Night.) 180 capsule 1    Repatha SureClick solution auto-injector SureClick injection INJECT 1ML UNDER THE SKIN INTO THE APPROPRIATE AREA AS DIRECTED EVERY 14 DAYS 2 mL 3    Tirzepatide (MOUNJARO) 10 MG/0.5ML solution pen-injector pen Inject 0.5 mL under the skin into the appropriate area as directed 1 (One) Time Per Week. (Patient taking differently: Inject 0.5 mL under the skin into the appropriate area as directed 1 (One) Time Per Week. LAST DOSE 3/31/24) 2 " mL 6    traZODone (DESYREL) 100 MG tablet TAKE ONE TABLET BY MOUTH ONCE NIGHTLY (Patient taking differently: Take 1 tablet by mouth At Night As Needed.) 30 tablet 1    zolpidem (AMBIEN) 10 MG tablet Take 1 tablet by mouth At Night As Needed for Sleep. 90 tablet 0    oxyCODONE-acetaminophen (Percocet) 5-325 MG per tablet Take 1-2 tablets by mouth Every 4 (Four) to 6 (Six) Hours As Needed for Pain (Patient not taking: Reported on 4/24/2024) 30 tablet 0     No current facility-administered medications for this visit.     Review of Systems   Skin:         Ulcer toe   All other systems reviewed and are negative.      OBJECTIVE     Vitals:    04/24/24 1428   BP: 117/70   Pulse: 72   Temp: 97.8 °F (36.6 °C)   SpO2: 95%       WBC   Date Value Ref Range Status   02/28/2024 6.98 3.40 - 10.80 10*3/mm3 Final     RBC   Date Value Ref Range Status   02/28/2024 4.52 4.14 - 5.80 10*6/mm3 Final     Hemoglobin   Date Value Ref Range Status   02/28/2024 13.9 13.0 - 17.7 g/dL Final     Hematocrit   Date Value Ref Range Status   02/28/2024 41.0 37.5 - 51.0 % Final     MCV   Date Value Ref Range Status   02/28/2024 90.7 79.0 - 97.0 fL Final     MCH   Date Value Ref Range Status   02/28/2024 30.8 26.6 - 33.0 pg Final     MCHC   Date Value Ref Range Status   02/28/2024 33.9 31.5 - 35.7 g/dL Final     RDW   Date Value Ref Range Status   02/28/2024 14.1 12.3 - 15.4 % Final     RDW-SD   Date Value Ref Range Status   02/28/2024 46.2 37.0 - 54.0 fl Final     MPV   Date Value Ref Range Status   02/28/2024 9.4 6.0 - 12.0 fL Final     Platelets   Date Value Ref Range Status   02/28/2024 367 140 - 450 10*3/mm3 Final     Neutrophil %   Date Value Ref Range Status   02/28/2024 45.6 42.7 - 76.0 % Final     Lymphocyte %   Date Value Ref Range Status   02/28/2024 35.4 19.6 - 45.3 % Final     Monocyte %   Date Value Ref Range Status   02/28/2024 11.9 5.0 - 12.0 % Final     Eosinophil %   Date Value Ref Range Status   02/28/2024 5.4 0.3 - 6.2 % Final      Basophil %   Date Value Ref Range Status   2024 1.3 0.0 - 1.5 % Final     Immature Grans %   Date Value Ref Range Status   2024 0.4 0.0 - 0.5 % Final     Neutrophils, Absolute   Date Value Ref Range Status   2024 3.18 1.70 - 7.00 10*3/mm3 Final     Lymphocytes, Absolute   Date Value Ref Range Status   2024 2.47 0.70 - 3.10 10*3/mm3 Final     Monocytes, Absolute   Date Value Ref Range Status   2024 0.83 0.10 - 0.90 10*3/mm3 Final     Eosinophils, Absolute   Date Value Ref Range Status   2024 0.38 0.00 - 0.40 10*3/mm3 Final     Basophils, Absolute   Date Value Ref Range Status   2024 0.09 0.00 - 0.20 10*3/mm3 Final     Immature Grans, Absolute   Date Value Ref Range Status   2024 0.03 0.00 - 0.05 10*3/mm3 Final     nRBC   Date Value Ref Range Status   2024 0.0 0.0 - 0.2 /100 WBC Final         Lab Results   Component Value Date    GLUCOSE 178 (H) 2024    BUN 13 2024    CREATININE 0.94 2024    EGFRIFNONA 77 2021    BCR 13.8 2024    K 4.3 2024    CO2 21.8 (L) 2024    CALCIUM 9.7 2024    ALBUMIN 4.4 2024    LABIL2 0.9 (L) 10/16/2020    AST 6 2024    ALT 9 2024       Patient seen in no apparent distress.      PHYSICAL EXAM:     Foot/Ankle Exam    GENERAL  Appearance:  appears stated age  Orientation:  AAOx3  Affect:  appropriate  Gait:  unimpaired  Assistance:  independent  Right shoe gear: casual shoe  Left shoe gear: casual shoe    VASCULAR     Right Foot Vascularity   Dorsalis pedis:  1+  Posterior tibial:  1+  Skin temperature:  cool  Edema gradin+ and non-pitting  CFT:  < 3 seconds  Pedal hair growth:  Absent  Varicosities:  none     Left Foot Vascularity   Dorsalis pedis:  1+  Posterior tibial:  1+  Skin temperature:  cool  Edema gradin+ and non-pitting  CFT:  < 3 seconds  Pedal hair growth:  Absent  Varicosities:  none     NEUROLOGIC     Right Foot Neurologic   Light touch sensation:  absent  Vibratory sensation: absent  Hot/Cold sensation: absent     Left Foot Neurologic   Light touch sensation: absent  Vibratory sensation: absent  Hot/Cold sensation:  absent    MUSCLE STRENGTH     Right Foot Muscle Strength   Foot dorsiflexion:  4  Foot plantar flexion:  4  Foot inversion:  4  Foot eversion:  4     Left Foot Muscle Strength   Foot dorsiflexion:  4  Foot plantar flexion:  4  Foot inversion:  4  Foot eversion:  4    RANGE OF MOTION     Right Foot Range of Motion   Foot and ankle ROM within normal limits       Left Foot Range of Motion   Foot and ankle ROM within normal limits      DERMATOLOGIC      Right Foot Dermatologic   Skin  Right foot skin is intact.      Left Foot Dermatologic   Skin  Left foot skin is intact.      Right foot additional comments: Amputation site with purulent drainage and malodor.  No erythema no proximal streaking.  50% of the sutures intact.      RADIOLOGY:        Duplex Lower Extremity Art / Grafts - Right CAR    Result Date: 3/27/2024  Narrative:   Right fem-tib bypass graft is patent without elevated velocities or gray scale/color flow evidence of stenosis.  Proximal and distal anastamosis are widely patent.   Proximal right SFA stenosis with eventual occlusion of the distal right popliteal artery.      ASSESSMENT/PLAN     Diagnoses and all orders for this visit:    1. Uncontrolled type 2 diabetes mellitus with hyperglycemia (Primary)    2. Peripheral vascular disease    3. Abscess of right foot      Patient started taking his antibiotics only 2 days ago.    Patient will need washout and debridement.    Amputation site packed with Betadine 4 x 4's    Due to patient only being on antibiotics for few days we will have him return to clinic on Monday, if symptoms do not improved will schedule for washout and debridement.  Discussed in detail with patient any worsening of symptoms and signs of infection to go to the emergency department.    Comprehensive lower extremity  examination and evaluation was performed.    Discussed findings and treatment plan including risks, benefits, and treatment options with patient in detail. Patient agreed with treatment plan.    Medications and allergies reviewed.  Reviewed available lab values along with other pertinent labs.  These were discussed with the patient.    An After Visit Summary was printed and given to the patient at discharge, including (if requested) any available informative/educational handouts regarding diagnosis, treatment, or medications. All questions were answered to patient/family satisfaction. Should symptoms fail to improve or worsen they agree to call or return to clinic or to go to the Emergency Department. Discussed the importance of following up with any needed screening tests/labs/specialist appointments and any requested follow-up recommended by me today. Importance of maintaining follow-up discussed and patient accepts that missed appointments can delay diagnosis and potentially lead to worsening of conditions.    No follow-ups on file., or sooner if acute issues arise.    This document has been electronically signed by Balwinder Costa DPM on April 24, 2024 15:01 EDT        Answers submitted by the patient for this visit:  Primary Reason for Visit (Submitted on 4/4/2024)  What is the primary reason for your visit?: Other  Other (Submitted on 4/4/2024)  Please describe your symptoms.: Check on dead toe.  Have you had these symptoms before?: Yes  How long have you been having these symptoms?: Greater than 2 weeks

## 2024-04-28 ENCOUNTER — HOSPITAL ENCOUNTER (EMERGENCY)
Facility: HOSPITAL | Age: 59
Discharge: HOME | End: 2024-04-28
Attending: EMERGENCY MEDICINE | Admitting: EMERGENCY MEDICINE
Payer: COMMERCIAL

## 2024-04-28 VITALS
BODY MASS INDEX: 32.5 KG/M2 | OXYGEN SATURATION: 94 % | SYSTOLIC BLOOD PRESSURE: 144 MMHG | HEART RATE: 104 BPM | DIASTOLIC BLOOD PRESSURE: 67 MMHG | TEMPERATURE: 97.7 F | WEIGHT: 232.14 LBS | HEIGHT: 71 IN | RESPIRATION RATE: 18 BRPM

## 2024-04-28 DIAGNOSIS — M96.89 POSTOPERATIVE SURGICAL COMPLICATION INVOLVING MUSCULOSKELETAL SYSTEM ASSOCIATED WITH MUSCULOSKELETAL PROCEDURE, UNSPECIFIED COMPLICATION: Primary | ICD-10-CM

## 2024-04-28 LAB
ALBUMIN SERPL-MCNC: 4.1 G/DL (ref 3.5–5.2)
ALBUMIN/GLOB SERPL: 1.1 G/DL
ALP SERPL-CCNC: 129 U/L (ref 39–117)
ALT SERPL W P-5'-P-CCNC: 8 U/L (ref 1–41)
ANION GAP SERPL CALCULATED.3IONS-SCNC: 16.5 MMOL/L (ref 5–15)
AST SERPL-CCNC: 6 U/L (ref 1–40)
BASOPHILS # BLD AUTO: 0.07 10*3/MM3 (ref 0–0.2)
BASOPHILS NFR BLD AUTO: 0.7 % (ref 0–1.5)
BILIRUB SERPL-MCNC: 0.3 MG/DL (ref 0–1.2)
BUN SERPL-MCNC: 12 MG/DL (ref 6–20)
BUN/CREAT SERPL: 14.5 (ref 7–25)
CALCIUM SPEC-SCNC: 9.2 MG/DL (ref 8.6–10.5)
CHLORIDE SERPL-SCNC: 96 MMOL/L (ref 98–107)
CO2 SERPL-SCNC: 21.5 MMOL/L (ref 22–29)
CREAT SERPL-MCNC: 0.83 MG/DL (ref 0.76–1.27)
DEPRECATED RDW RBC AUTO: 46.4 FL (ref 37–54)
EGFRCR SERPLBLD CKD-EPI 2021: 102.1 ML/MIN/1.73
EOSINOPHIL # BLD AUTO: 0.1 10*3/MM3 (ref 0–0.4)
EOSINOPHIL NFR BLD AUTO: 1.1 % (ref 0.3–6.2)
ERYTHROCYTE [DISTWIDTH] IN BLOOD BY AUTOMATED COUNT: 14.5 % (ref 12.3–15.4)
GLOBULIN UR ELPH-MCNC: 3.7 GM/DL
GLUCOSE SERPL-MCNC: 216 MG/DL (ref 65–99)
HCT VFR BLD AUTO: 40.9 % (ref 37.5–51)
HGB BLD-MCNC: 13.6 G/DL (ref 13–17.7)
HOLD SPECIMEN: NORMAL
HOLD SPECIMEN: NORMAL
IMM GRANULOCYTES # BLD AUTO: 0.12 10*3/MM3 (ref 0–0.05)
IMM GRANULOCYTES NFR BLD AUTO: 1.3 % (ref 0–0.5)
LYMPHOCYTES # BLD AUTO: 3.39 10*3/MM3 (ref 0.7–3.1)
LYMPHOCYTES NFR BLD AUTO: 36 % (ref 19.6–45.3)
MCH RBC QN AUTO: 29.3 PG (ref 26.6–33)
MCHC RBC AUTO-ENTMCNC: 33.3 G/DL (ref 31.5–35.7)
MCV RBC AUTO: 88.1 FL (ref 79–97)
MONOCYTES # BLD AUTO: 1.13 10*3/MM3 (ref 0.1–0.9)
MONOCYTES NFR BLD AUTO: 12 % (ref 5–12)
NEUTROPHILS NFR BLD AUTO: 4.61 10*3/MM3 (ref 1.7–7)
NEUTROPHILS NFR BLD AUTO: 48.9 % (ref 42.7–76)
NRBC BLD AUTO-RTO: 0 /100 WBC (ref 0–0.2)
PLATELET # BLD AUTO: 498 10*3/MM3 (ref 140–450)
PMV BLD AUTO: 8.9 FL (ref 6–12)
POTASSIUM SERPL-SCNC: 4 MMOL/L (ref 3.5–5.2)
PROT SERPL-MCNC: 7.8 G/DL (ref 6–8.5)
RBC # BLD AUTO: 4.64 10*6/MM3 (ref 4.14–5.8)
SODIUM SERPL-SCNC: 134 MMOL/L (ref 136–145)
WBC NRBC COR # BLD AUTO: 9.42 10*3/MM3 (ref 3.4–10.8)
WHOLE BLOOD HOLD COAG: NORMAL
WHOLE BLOOD HOLD SPECIMEN: NORMAL

## 2024-04-28 PROCEDURE — 80053 COMPREHEN METABOLIC PANEL: CPT | Performed by: EMERGENCY MEDICINE

## 2024-04-28 PROCEDURE — 99283 EMERGENCY DEPT VISIT LOW MDM: CPT

## 2024-04-28 PROCEDURE — 85025 COMPLETE CBC W/AUTO DIFF WBC: CPT | Performed by: EMERGENCY MEDICINE

## 2024-04-28 NOTE — ED PROVIDER NOTES
Time: 5:41 PM EDT  Date of encounter:  4/28/2024  Independent Historian/Clinical History and Information was obtained by:   Patient    History is limited by: N/A    Chief Complaint: Post-op problems       History of Present Illness:  Patient is a 58 y.o. year old male who presents to the emergency department for evaluation of Post-op problems. Patient had an amputation of his right big toe done a week and a half ago. He got his sutures removed on Monday due to infection.  Patient was put on antibiotics and finished them today. He noticed after having a cigarette that his incision site was bleeding. States he went through many towels of blood and he is on a blood thinner.    HPI    Patient Care Team  Primary Care Provider: Dacia Newsome APRN    Past Medical History:     Allergies   Allergen Reactions    Lortab [Hydrocodone-Acetaminophen] Itching     Past Medical History:   Diagnosis Date    Allergic rhinitis 01/12/2015    Anesthesia     DIFFICULTY WAKING UP POST SURGERY    CAD (coronary artery disease)     DENIES CP/SOA.    Diabetes mellitus     Foot ulcer     Hyperlipidemia     Hypertension     Hypokalemia 02/23/2016    DENIES ANY CURRENT ISSUES    Hypothyroidism 04/24/2014    Insomnia, unspecified 06/15/2016    Microalbuminuria due to type 2 diabetes mellitus 10/15/2015    Mixed hyperlipidemia 10/15/2015    Myocardial infarction     Obesity     Skin cancer     Sleep apnea      Past Surgical History:   Procedure Laterality Date    AMPUTATION DIGIT Right 4/10/2024    Procedure: AMPUTATION DIGIT RIGHT GREAT TOE;  Surgeon: Balwinder Costa DPM;  Location: [facility] OR;  Service: Podiatry;  Laterality: Right;    ANKLE ARTHROSCOPY W/ OPEN REPAIR      APPENDECTOMY      CARDIAC CATHETERIZATION  2014    PCI to circumflex    CARDIAC CATHETERIZATION Right 10/26/2023    Procedure: Aortogram with right leg angiogram, possible angioplasty or stenting;  Surgeon: Robert Puga MD;  Location: [facility] CATH INVASIVE  LOCATION;  Service: Vascular;  Laterality: Right;    CARDIAC CATHETERIZATION Right 12/15/2023    Procedure: Right leg angiogram, possible angioplasty or stenting;  Surgeon: Robert Puga MD;  Location: [facility] CATH INVASIVE LOCATION;  Service: Vascular;  Laterality: Right;    CORONARY ARTERY BYPASS GRAFT N/A 10/08/2020    Procedure: STERNOTOMY, CORONARY ARTERY BYPASS GRAFTING TIME 4 WITH LEFT KELSI  AND ENDOSCOPICALLY HARVESTED LEFT GREATER SAPHENOUS VEIN AND PRP.;  Surgeon: Jr Girma Chiu MD;  Location: [facility] MAIN OR;  Service: Cardiothoracic;  Laterality: N/A;    FEMORAL TIBIAL BYPASS Right 01/09/2024    Procedure: Right femoral-tibial bypass graft;  Surgeon: Robert Puga MD;  Location: [facility] MAIN OR;  Service: Vascular;  Laterality: Right;    HEEL SPUR SURGERY      HERNIA REPAIR      KNEE ARTHROSCOPY      MULTIPLE TIMES ON BOTH KNEES    SKIN CANCER EXCISION      BACK    TOE SURGERY Right     DEBRIDMENT RIGHT GREAT TOE    TONSILLECTOMY       Family History   Adopted: Yes   Problem Relation Age of Onset    Heart disease Other        Home Medications:  Prior to Admission medications    Medication Sig Start Date End Date Taking? Authorizing Provider   amLODIPine (NORVASC) 10 MG tablet Take 0.5 tablets by mouth Every Night.  Patient taking differently: Take 1 tablet by mouth Every Night. 2/2/24   Tang Conte MD   aspirin 81 MG chewable tablet Chew 1 tablet Daily. PER DR CHILANGO LOGAN TO CONTINUE ASA UP TO AND INCLUDING DAY OF SURGERY    ProviderGagan MD   clopidogrel (Plavix) 75 MG tablet Take 1 tablet by mouth Daily for 180 days.  Patient taking differently: Take 1 tablet by mouth Daily. PER DR CHILANGO LOGAN TO CONTINUE PLAVIX UP TO AND INCLUDING DAY OF SUGERY 3/22/24 9/18/24  Dyan Roblero APRN   Cymbalta 30 MG capsule Take 1 capsule by mouth Daily. 12/21/23   ProviderGagan MD   Glucose Blood (Blood Glucose Test Strips 333) strip 1 each by Other route Daily. 1/4/24   Jerod  "SOHAM Maria   insulin detemir (Levemir) 100 UNIT/ML injection Inject 30 Units under the skin into the appropriate area as directed Every Night. Increase one unit per night goal fasting  2 hours after meals 140 4/9/24   Dacia Newsome APRN   Insulin Pen Needle (Pen Needles 3/16\") 31G X 5 MM misc Use 1 each Every Night. 8/29/23   Dacia Newsome APRN   levothyroxine (SYNTHROID, LEVOTHROID) 50 MCG tablet Take 1 tablet by mouth Every Morning.  Patient taking differently: Take 1 tablet by mouth Daily As Needed. PT REPORTS JUST TAKES OCCASIONALLY 2/29/24   Dacia Newsome APRN   lidocaine (LIDODERM) 5 % Place 3 patches on the skin as directed by provider Daily. Remove & Discard patch within 12 hours or as directed by MD  Patient taking differently: Place 3 patches on the skin as directed by provider Daily As Needed. Remove & Discard patch within 12 hours or as directed by MD 9/12/23   Dacia Newsome APRN   lisinopril-hydrochlorothiazide (PRINZIDE,ZESTORETIC) 20-12.5 MG per tablet Take 2 tablets by mouth Daily. 2/28/24   Dacia Newsome APRN   metFORMIN (GLUCOPHAGE) 500 MG tablet Take 1 tablet by mouth Daily With Breakfast. NONE AFTER 6 PM ON 4/9/24    ProviderGagan MD   metoprolol tartrate (LOPRESSOR) 100 MG tablet Take 1 tablet by mouth Every 12 (Twelve) Hours. 7/27/23   Tang Conte MD   OneTouch Delica Lancets 33G misc test 4 times per day 11/30/23   Dacia Newsome APRN   oxyCODONE-acetaminophen (Percocet) 5-325 MG per tablet Take 1-2 tablets by mouth Every 4 (Four) to 6 (Six) Hours As Needed for Pain  Patient not taking: Reported on 4/24/2024 4/10/24   Balwinder Costa, [place]   pregabalin (Lyrica) 300 MG capsule Take 1 capsule by mouth 2 (Two) Times a Day.  Patient taking differently: Take 1 capsule by mouth Every Night. 2/28/24   Dacia Newsome APRN   Repatha SureClick solution auto-injector SureClick injection " INJECT 1ML UNDER THE SKIN INTO THE APPROPRIATE AREA AS DIRECTED EVERY 14 DAYS 11/27/23   Cristel Montalvo APRN   Tirzepatide (MOUNJARO) 10 MG/0.5ML solution pen-injector pen Inject 0.5 mL under the skin into the appropriate area as directed 1 (One) Time Per Week.  Patient taking differently: Inject 0.5 mL under the skin into the appropriate area as directed 1 (One) Time Per Week. LAST DOSE 3/31/24 4/9/24   Dacia Newsome APRN   traZODone (DESYREL) 100 MG tablet TAKE ONE TABLET BY MOUTH ONCE NIGHTLY  Patient taking differently: Take 1 tablet by mouth At Night As Needed. 4/1/24   Dacia Newsome APRN   zolpidem (AMBIEN) 10 MG tablet Take 1 tablet by mouth At Night As Needed for Sleep. 2/28/24   Dacia Newsome APRN        Social History:   Social History     Tobacco Use    Smoking status: Every Day     Current packs/day: 0.25     Average packs/day: 0.3 packs/day for 15.0 years (3.8 ttl pk-yrs)     Types: Cigarettes     Passive exposure: Never    Smokeless tobacco: Never   Vaping Use    Vaping status: Never Used   Substance Use Topics    Alcohol use: Not Currently    Drug use: Never         Review of Systems:  Review of Systems   Constitutional:  Negative for chills and fever.   HENT:  Negative for congestion and sore throat.    Eyes:  Negative for photophobia and visual disturbance.   Respiratory:  Negative for cough, shortness of breath and wheezing.    Cardiovascular:  Negative for chest pain and palpitations.   Gastrointestinal:  Negative for abdominal pain, diarrhea, nausea and vomiting.   Genitourinary:  Negative for dysuria and urgency.   Musculoskeletal:  Negative for gait problem.   Skin:  Positive for wound. Negative for color change.   Neurological:  Negative for dizziness, syncope, weakness, light-headedness and headaches.   Psychiatric/Behavioral:  Negative for behavioral problems and confusion.         Physical Exam:  /67   Pulse 104   Temp 97.7 °F (36.5 °C)  "(Oral)   Resp 18   Ht 180.3 cm (71\")   Wt 105 kg (232 lb 2.3 oz)   SpO2 94%   BMI 32.38 kg/m²     Physical Exam  Vitals and nursing note reviewed.   Constitutional:       General: He is not in acute distress.     Appearance: Normal appearance. He is not ill-appearing or toxic-appearing.   HENT:      Head: Normocephalic and atraumatic.      Jaw: There is normal jaw occlusion.      Nose: Nose normal.      Mouth/Throat:      Mouth: Mucous membranes are moist.      Pharynx: Oropharynx is clear.   Eyes:      General: Lids are normal.      Extraocular Movements: Extraocular movements intact.      Conjunctiva/sclera: Conjunctivae normal.      Pupils: Pupils are equal, round, and reactive to light.   Cardiovascular:      Rate and Rhythm: Normal rate and regular rhythm.      Pulses: Normal pulses.      Heart sounds: Normal heart sounds.   Pulmonary:      Effort: Pulmonary effort is normal. No respiratory distress.      Breath sounds: Normal breath sounds. No wheezing or rhonchi.   Abdominal:      General: Abdomen is flat.      Palpations: Abdomen is soft.      Tenderness: There is no abdominal tenderness. There is no guarding or rebound.   Musculoskeletal:         General: Normal range of motion.      Cervical back: Normal range of motion and neck supple.      Right lower leg: No edema.      Left lower leg: No edema.   Skin:     General: Skin is warm and dry.   Neurological:      Mental Status: He is alert and oriented to person, place, and time. Mental status is at baseline.   Psychiatric:         Mood and Affect: Mood normal.                  Procedures:  Procedures      Medical Decision Making:      Comorbidities that affect care:    CAD, diabetes    External Notes reviewed:    Previous Clinic Note: Podiatry office visit for PVD and dry gangrene management      The following orders were placed and all results were independently analyzed by me:  Orders Placed This Encounter   Procedures    Comprehensive Metabolic " Panel    [place] Draw    CBC Auto Differential    CBC & Differential    Green Top (Gel)    Lavender Top    Gold Top - SST    Light Blue Top       Medications Given in the Emergency Department:  Medications - No data to display     ED Course:         Labs:    Lab Results (last 24 hours)       Procedure Component Value Units Date/Time    Comprehensive Metabolic Panel [[id number]]  (Abnormal) Collected: 04/28/24 1728    Specimen: Blood Updated: 04/28/24 1809     Glucose 216 mg/dL      BUN 12 mg/dL      Creatinine 0.83 mg/dL      Sodium 134 mmol/L      Potassium 4.0 mmol/L      Chloride 96 mmol/L      CO2 21.5 mmol/L      Calcium 9.2 mg/dL      Total Protein 7.8 g/dL      Albumin 4.1 g/dL      ALT (SGPT) 8 U/L      AST (SGOT) 6 U/L      Alkaline Phosphatase 129 U/L      Total Bilirubin 0.3 mg/dL      Globulin 3.7 gm/dL      A/G Ratio 1.1 g/dL      BUN/Creatinine Ratio 14.5     Anion Gap 16.5 mmol/L      eGFR 102.1 mL/min/1.73     Narrative:      GFR Normal >60  Chronic Kidney Disease <60  Kidney Failure <15      CBC & Differential [[id number]]  (Abnormal) Collected: 04/28/24 1728    Specimen: Blood Updated: 04/28/24 1736    Narrative:      The following orders were created for panel order CBC & Differential.  Procedure                               Abnormality         Status                     ---------                               -----------         ------                     CBC Auto Differential[[id number]]        Abnormal            Final result                 Please view results for these tests on the individual orders.    CBC Auto Differential [[id number]]  (Abnormal) Collected: 04/28/24 1728    Specimen: Blood Updated: 04/28/24 1736     WBC 9.42 10*3/mm3      RBC 4.64 10*6/mm3      Hemoglobin 13.6 g/dL      Hematocrit 40.9 %      MCV 88.1 fL      MCH 29.3 pg      MCHC 33.3 g/dL      RDW 14.5 %      RDW-SD 46.4 fl      MPV 8.9 fL      Platelets 498 10*3/mm3      Neutrophil % 48.9 %      Lymphocyte % 36.0 %       Monocyte % 12.0 %      Eosinophil % 1.1 %      Basophil % 0.7 %      Immature Grans % 1.3 %      Neutrophils, Absolute 4.61 10*3/mm3      Lymphocytes, Absolute 3.39 10*3/mm3      Monocytes, Absolute 1.13 10*3/mm3      Eosinophils, Absolute 0.10 10*3/mm3      Basophils, Absolute 0.07 10*3/mm3      Immature Grans, Absolute 0.12 10*3/mm3      nRBC 0.0 /100 WBC              Imaging:    No Radiology Exams Resulted Within Past 24 Hours      Differential Diagnosis and Discussion:    Laceration: Laceration was evaluated for arterial injury, ligamentous damage, and other neurovascular injury.    All labs were reviewed and interpreted by me.    MDM  Number of Diagnoses or Management Options  Postoperative surgical complication involving musculoskeletal system associated with musculoskeletal procedure, unspecified complication  Diagnosis management comments: In summary this is a 58-year-old male patient who presents to the emergency department for evaluation of bleeding from a postop site.  He had surgery approximately 10 days ago on the right foot with podiatry and reports significant bleeding today.  Hemostasis has been achieved using Surgicel and Kerlix.  CBC independently reviewed by me and shows no critical abnormalities.  CMP independently reviewed by me and shows no critical abnormalities.  Very strict return to ER and follow-up instructions have been provided to the patient.                   Patient Care Considerations:    ANTIBIOTICS: I considered prescribing antibiotics as an outpatient however no bacterial focus of infection was found.      Consultants/Shared Management Plan:    None    Social Determinants of Health:    Patient is independent, reliable, and has access to care.       Disposition and Care Coordination:    Discharged: The patient is suitable and stable for discharge with no need for consideration of admission.    I have explained the patient´s condition, diagnoses and treatment plan based on the  information available to me at this time. I have answered questions and addressed any concerns. The patient has a good  understanding of the patient´s diagnosis, condition, and treatment plan as can be expected at this point. The vital signs have been stable. The patient´s condition is stable and appropriate for discharge from the emergency department.      The patient will pursue further outpatient evaluation with the primary care physician or other designated or consulting physician as outlined in the discharge instructions. They are agreeable to this plan of care and follow-up instructions have been explained in detail. The patient has received these instructions in written format and has expressed an understanding of the discharge instructions. The patient is aware that any significant change in condition or worsening of symptoms should prompt an immediate return to this or the closest emergency department or call to 911.  I have explained discharge medications and the need for follow up with the patient/caretakers. This was also printed in the discharge instructions. Patient was discharged with the following medications and follow up:      Medication List        Changed      amLODIPine 10 MG tablet  Commonly known as: NORVASC  Take 0.5 tablets by mouth Every Night.  What changed: how much to take     clopidogrel 75 MG tablet  Commonly known as: Plavix  Take 1 tablet by mouth Daily for 180 days.  What changed: additional instructions     levothyroxine 50 MCG tablet  Commonly known as: SYNTHROID, LEVOTHROID  Take 1 tablet by mouth Every Morning.  What changed:   when to take this  reasons to take this  additional instructions     lidocaine 5 %  Commonly known as: LIDODERM  Place 3 patches on the skin as directed by provider Daily. Remove & Discard patch within 12 hours or as directed by MD  What changed:   when to take this  reasons to take this     pregabalin 300 MG capsule  Commonly known as: Lyrica  Take 1  capsule by mouth 2 (Two) Times a Day.  What changed: when to take this     Tirzepatide 10 MG/0.5ML solution pen-injector pen  Commonly known as: MOUNJARO  Inject 0.5 mL under the skin into the appropriate area as directed 1 (One) Time Per Week.  What changed: additional instructions     traZODone 100 MG tablet  Commonly known as: DESYREL  TAKE ONE TABLET BY MOUTH ONCE NIGHTLY  What changed:   when to take this  reasons to take this           Balwinder Costa, [place]  [address] [place] [address]  [phone]    In 1 day         Final diagnoses:   Postoperative surgical complication involving musculoskeletal system associated with musculoskeletal procedure, unspecified complication        ED Disposition       ED Disposition   Discharge    Condition   Stable    Comment   --               This medical record created using voice recognition software.               Delfin Barrera MD  04/28/24 [id number]

## 2024-04-29 ENCOUNTER — OFFICE VISIT (OUTPATIENT)
Dept: PODIATRY | Facility: CLINIC | Age: 59
End: 2024-04-29
Payer: COMMERCIAL

## 2024-04-29 ENCOUNTER — ANESTHESIA EVENT (OUTPATIENT)
Dept: PERIOP | Facility: HOSPITAL | Age: 59
End: 2024-04-29
Payer: COMMERCIAL

## 2024-04-29 VITALS
BODY MASS INDEX: 32.48 KG/M2 | WEIGHT: 232 LBS | HEART RATE: 83 BPM | OXYGEN SATURATION: 94 % | DIASTOLIC BLOOD PRESSURE: 80 MMHG | SYSTOLIC BLOOD PRESSURE: 143 MMHG | HEIGHT: 71 IN | TEMPERATURE: 96.8 F

## 2024-04-29 DIAGNOSIS — E11.65 UNCONTROLLED TYPE 2 DIABETES MELLITUS WITH HYPERGLYCEMIA: ICD-10-CM

## 2024-04-29 DIAGNOSIS — L02.611 ABSCESS OF RIGHT FOOT: ICD-10-CM

## 2024-04-29 DIAGNOSIS — I73.9 PERIPHERAL VASCULAR DISEASE: Primary | ICD-10-CM

## 2024-04-29 PROCEDURE — 99024 POSTOP FOLLOW-UP VISIT: CPT | Performed by: PODIATRIST

## 2024-04-30 ENCOUNTER — HOSPITAL ENCOUNTER (OUTPATIENT)
Facility: HOSPITAL | Age: 59
Setting detail: HOSPITAL OUTPATIENT SURGERY
Discharge: HOME | End: 2024-04-30
Attending: PODIATRIST | Admitting: PODIATRIST
Payer: COMMERCIAL

## 2024-04-30 ENCOUNTER — ANESTHESIA (OUTPATIENT)
Dept: PERIOP | Facility: HOSPITAL | Age: 59
End: 2024-04-30
Payer: COMMERCIAL

## 2024-04-30 VITALS
TEMPERATURE: 97.9 F | SYSTOLIC BLOOD PRESSURE: 137 MMHG | BODY MASS INDEX: 32.53 KG/M2 | HEIGHT: 71 IN | HEART RATE: 64 BPM | WEIGHT: 232.37 LBS | RESPIRATION RATE: 20 BRPM | DIASTOLIC BLOOD PRESSURE: 61 MMHG | OXYGEN SATURATION: 96 %

## 2024-04-30 DIAGNOSIS — I73.9 PERIPHERAL VASCULAR DISEASE: ICD-10-CM

## 2024-04-30 DIAGNOSIS — L02.611 ABSCESS OF RIGHT FOOT: Primary | ICD-10-CM

## 2024-04-30 LAB
GLUCOSE BLDC GLUCOMTR-MCNC: 200 MG/DL (ref 70–99)
GLUCOSE BLDC GLUCOMTR-MCNC: 315 MG/DL (ref 70–99)

## 2024-04-30 PROCEDURE — 25010000002 CEFAZOLIN PER 500 MG: Performed by: PODIATRIST

## 2024-04-30 PROCEDURE — 25010000002 DEXAMETHASONE PER 1 MG

## 2024-04-30 PROCEDURE — 87077 CULTURE AEROBIC IDENTIFY: CPT | Performed by: PODIATRIST

## 2024-04-30 PROCEDURE — 63710000001 INSULIN REGULAR HUMAN PER 5 UNITS: Performed by: ANESTHESIOLOGY

## 2024-04-30 PROCEDURE — 87075 CULTR BACTERIA EXCEPT BLOOD: CPT | Performed by: PODIATRIST

## 2024-04-30 PROCEDURE — 25010000002 MIDAZOLAM PER 1MG: Performed by: ANESTHESIOLOGY

## 2024-04-30 PROCEDURE — 87186 SC STD MICRODIL/AGAR DIL: CPT | Performed by: PODIATRIST

## 2024-04-30 PROCEDURE — 25810000003 LACTATED RINGERS PER 1000 ML: Performed by: ANESTHESIOLOGY

## 2024-04-30 PROCEDURE — 25010000002 PROPOFOL 10 MG/ML EMULSION

## 2024-04-30 PROCEDURE — 87070 CULTURE OTHR SPECIMN AEROBIC: CPT | Performed by: PODIATRIST

## 2024-04-30 PROCEDURE — 28005 TREAT FOOT BONE LESION: CPT | Performed by: SPECIALIST/TECHNOLOGIST, OTHER

## 2024-04-30 PROCEDURE — 25010000002 LIDOCAINE 1 % SOLUTION 10 ML VIAL: Performed by: PODIATRIST

## 2024-04-30 PROCEDURE — 25010000002 FENTANYL CITRATE (PF) 50 MCG/ML SOLUTION

## 2024-04-30 PROCEDURE — 87205 SMEAR GRAM STAIN: CPT | Performed by: PODIATRIST

## 2024-04-30 PROCEDURE — 82948 REAGENT STRIP/BLOOD GLUCOSE: CPT

## 2024-04-30 PROCEDURE — 25010000002 BUPIVACAINE (PF) 0.5 % SOLUTION 10 ML VIAL: Performed by: PODIATRIST

## 2024-04-30 PROCEDURE — 25010000002 ONDANSETRON PER 1 MG

## 2024-04-30 PROCEDURE — S0260 H&P FOR SURGERY: HCPCS | Performed by: PODIATRIST

## 2024-04-30 PROCEDURE — 28288 PARTIAL REMOVAL OF FOOT BONE: CPT | Performed by: PODIATRIST

## 2024-04-30 PROCEDURE — 28005 TREAT FOOT BONE LESION: CPT | Performed by: PODIATRIST

## 2024-04-30 RX ORDER — ACETAMINOPHEN 650 MG
TABLET, EXTENDED RELEASE ORAL AS NEEDED
Status: DISCONTINUED | OUTPATIENT
Start: 2024-04-30 | End: 2024-04-30 | Stop reason: HOSPADM

## 2024-04-30 RX ORDER — SODIUM CHLORIDE, SODIUM LACTATE, POTASSIUM CHLORIDE, CALCIUM CHLORIDE 600; 310; 30; 20 MG/100ML; MG/100ML; MG/100ML; MG/100ML
9 INJECTION, SOLUTION INTRAVENOUS CONTINUOUS PRN
Status: DISCONTINUED | OUTPATIENT
Start: 2024-04-30 | End: 2024-04-30 | Stop reason: HOSPADM

## 2024-04-30 RX ORDER — BUPIVACAINE HCL/0.9 % NACL/PF 0.1 %
2000 PLASTIC BAG, INJECTION (ML) EPIDURAL ONCE
Status: COMPLETED | OUTPATIENT
Start: 2024-04-30 | End: 2024-04-30

## 2024-04-30 RX ORDER — SODIUM CHLORIDE 0.9 % (FLUSH) 0.9 %
10 SYRINGE (ML) INJECTION AS NEEDED
Status: DISCONTINUED | OUTPATIENT
Start: 2024-04-30 | End: 2024-04-30 | Stop reason: HOSPADM

## 2024-04-30 RX ORDER — DEXAMETHASONE SODIUM PHOSPHATE 4 MG/ML
INJECTION, SOLUTION INTRA-ARTICULAR; INTRALESIONAL; INTRAMUSCULAR; INTRAVENOUS; SOFT TISSUE AS NEEDED
Status: DISCONTINUED | OUTPATIENT
Start: 2024-04-30 | End: 2024-04-30 | Stop reason: SURG

## 2024-04-30 RX ORDER — PROMETHAZINE HYDROCHLORIDE 25 MG/1
25 TABLET ORAL EVERY 6 HOURS PRN
Qty: 20 TABLET | Refills: 0 | Status: SHIPPED | OUTPATIENT
Start: 2024-04-30

## 2024-04-30 RX ORDER — SODIUM CHLORIDE, SODIUM LACTATE, POTASSIUM CHLORIDE, CALCIUM CHLORIDE 600; 310; 30; 20 MG/100ML; MG/100ML; MG/100ML; MG/100ML
100 INJECTION, SOLUTION INTRAVENOUS CONTINUOUS
Status: DISCONTINUED | OUTPATIENT
Start: 2024-04-30 | End: 2024-04-30 | Stop reason: HOSPADM

## 2024-04-30 RX ORDER — LIDOCAINE HYDROCHLORIDE 20 MG/ML
INJECTION, SOLUTION EPIDURAL; INFILTRATION; INTRACAUDAL; PERINEURAL AS NEEDED
Status: DISCONTINUED | OUTPATIENT
Start: 2024-04-30 | End: 2024-04-30 | Stop reason: SURG

## 2024-04-30 RX ORDER — SODIUM CHLORIDE 0.9 % (FLUSH) 0.9 %
10 SYRINGE (ML) INJECTION EVERY 12 HOURS SCHEDULED
Status: DISCONTINUED | OUTPATIENT
Start: 2024-04-30 | End: 2024-04-30 | Stop reason: HOSPADM

## 2024-04-30 RX ORDER — LEVOFLOXACIN 500 MG/1
750 TABLET, FILM COATED ORAL DAILY
Qty: 45 TABLET | Refills: 0 | Status: SHIPPED | OUTPATIENT
Start: 2024-04-30 | End: 2024-05-30

## 2024-04-30 RX ORDER — HYDROCODONE BITARTRATE AND ACETAMINOPHEN 7.5; 325 MG/1; MG/1
1 TABLET ORAL EVERY 6 HOURS PRN
Qty: 30 TABLET | Refills: 0 | Status: SHIPPED | OUTPATIENT
Start: 2024-04-30

## 2024-04-30 RX ORDER — MEPERIDINE HYDROCHLORIDE 25 MG/ML
12.5 INJECTION INTRAMUSCULAR; INTRAVENOUS; SUBCUTANEOUS
Status: DISCONTINUED | OUTPATIENT
Start: 2024-04-30 | End: 2024-04-30 | Stop reason: HOSPADM

## 2024-04-30 RX ORDER — FENTANYL CITRATE 50 UG/ML
INJECTION, SOLUTION INTRAMUSCULAR; INTRAVENOUS AS NEEDED
Status: DISCONTINUED | OUTPATIENT
Start: 2024-04-30 | End: 2024-04-30 | Stop reason: SURG

## 2024-04-30 RX ORDER — ONDANSETRON 2 MG/ML
INJECTION INTRAMUSCULAR; INTRAVENOUS AS NEEDED
Status: DISCONTINUED | OUTPATIENT
Start: 2024-04-30 | End: 2024-04-30 | Stop reason: SURG

## 2024-04-30 RX ORDER — ONDANSETRON 2 MG/ML
4 INJECTION INTRAMUSCULAR; INTRAVENOUS ONCE AS NEEDED
Status: DISCONTINUED | OUTPATIENT
Start: 2024-04-30 | End: 2024-04-30 | Stop reason: HOSPADM

## 2024-04-30 RX ORDER — MAGNESIUM HYDROXIDE 1200 MG/15ML
LIQUID ORAL AS NEEDED
Status: DISCONTINUED | OUTPATIENT
Start: 2024-04-30 | End: 2024-04-30 | Stop reason: HOSPADM

## 2024-04-30 RX ORDER — PHENYLEPHRINE HCL IN 0.9% NACL 1 MG/10 ML
SYRINGE (ML) INTRAVENOUS AS NEEDED
Status: DISCONTINUED | OUTPATIENT
Start: 2024-04-30 | End: 2024-04-30 | Stop reason: SURG

## 2024-04-30 RX ORDER — PROMETHAZINE HYDROCHLORIDE 12.5 MG/1
25 TABLET ORAL ONCE AS NEEDED
Status: DISCONTINUED | OUTPATIENT
Start: 2024-04-30 | End: 2024-04-30 | Stop reason: HOSPADM

## 2024-04-30 RX ORDER — MIDAZOLAM HYDROCHLORIDE 2 MG/2ML
2 INJECTION, SOLUTION INTRAMUSCULAR; INTRAVENOUS ONCE
Status: COMPLETED | OUTPATIENT
Start: 2024-04-30 | End: 2024-04-30

## 2024-04-30 RX ORDER — PROPOFOL 10 MG/ML
VIAL (ML) INTRAVENOUS AS NEEDED
Status: DISCONTINUED | OUTPATIENT
Start: 2024-04-30 | End: 2024-04-30 | Stop reason: SURG

## 2024-04-30 RX ORDER — OXYCODONE HYDROCHLORIDE 5 MG/1
5 TABLET ORAL
Status: DISCONTINUED | OUTPATIENT
Start: 2024-04-30 | End: 2024-04-30 | Stop reason: HOSPADM

## 2024-04-30 RX ORDER — SODIUM CHLORIDE 9 MG/ML
40 INJECTION, SOLUTION INTRAVENOUS AS NEEDED
Status: DISCONTINUED | OUTPATIENT
Start: 2024-04-30 | End: 2024-04-30 | Stop reason: HOSPADM

## 2024-04-30 RX ORDER — PROMETHAZINE HYDROCHLORIDE 25 MG/1
25 SUPPOSITORY RECTAL ONCE AS NEEDED
Status: DISCONTINUED | OUTPATIENT
Start: 2024-04-30 | End: 2024-04-30 | Stop reason: HOSPADM

## 2024-04-30 RX ADMIN — Medication 100 MCG: at 14:02

## 2024-04-30 RX ADMIN — FENTANYL CITRATE 50 MCG: 50 INJECTION, SOLUTION INTRAMUSCULAR; INTRAVENOUS at 13:35

## 2024-04-30 RX ADMIN — FENTANYL CITRATE 50 MCG: 50 INJECTION, SOLUTION INTRAMUSCULAR; INTRAVENOUS at 13:39

## 2024-04-30 RX ADMIN — DEXAMETHASONE SODIUM PHOSPHATE 4 MG: 4 INJECTION, SOLUTION INTRAMUSCULAR; INTRAVENOUS at 13:40

## 2024-04-30 RX ADMIN — PROPOFOL 150 MG: 10 INJECTION, EMULSION INTRAVENOUS at 13:35

## 2024-04-30 RX ADMIN — INSULIN HUMAN 10 UNITS: 100 INJECTION, SOLUTION PARENTERAL at 13:10

## 2024-04-30 RX ADMIN — MIDAZOLAM HYDROCHLORIDE 2 MG: 1 INJECTION, SOLUTION INTRAMUSCULAR; INTRAVENOUS at 13:16

## 2024-04-30 RX ADMIN — LIDOCAINE HYDROCHLORIDE 50 MG: 20 INJECTION, SOLUTION INTRAVENOUS at 13:35

## 2024-04-30 RX ADMIN — Medication 2000 MG: at 13:40

## 2024-04-30 RX ADMIN — ONDANSETRON HYDROCHLORIDE 4 MG: 2 SOLUTION INTRAMUSCULAR; INTRAVENOUS at 13:40

## 2024-04-30 RX ADMIN — SODIUM CHLORIDE, POTASSIUM CHLORIDE, SODIUM LACTATE AND CALCIUM CHLORIDE 9 ML/HR: 600; 310; 30; 20 INJECTION, SOLUTION INTRAVENOUS at 12:51

## 2024-04-30 NOTE — DISCHARGE INSTRUCTIONS
DISCHARGE INSTRUCTIONS  PODIATRY SURGERY       For your surgery you had:  General anesthesia (you may have a sore throat for the first 24 hours)  Local anesthesia    You may experience dizziness, drowsiness, or light-headedness for several hours following surgery  Do not stay alone today or tonight.  Limit your activity for 24 hours.  Resume your diet slowly.  Follow whatever special dietary instructions you may have been given by the doctor.  You should not drive or operate machinery or drink alcohol for 24 hours or while you are taking pain medication.You should not sign any legally binding documents.  If you had an axillary or regional block, you will not have control of the involved limb for up to 12 hours.  Protect the arm with a sling or follow your physician's specific instructions.    Last dose of pain medication was given at:        NOTIFY THE PHYSICIAN IF YOU EXPERIENCE:  Numbness of fingers or toes.  Inability to move fingers or toes.  Extreme coldness, paleness or blue dis-coloration of fingers or toes.  Excessive swelling of affected surgical site or swelling that causes the cast to rub or cut into skin.  Pain unrelieved by pain medication  Nausea/vomiting not relieved by prescribed medication  Unable to urinate in 6 hours after surgery  Temperature greater than 101 degree Fahrenheit or chills  If unable to reach your doctor, please go to the closest emergency room Ice bag to injured area for 72 hours.  Apply 20 minutes on - 20 minutes off.  Never place ice directly on skin or cast.     Bend knee and rotate ankle for 5 minutes every hour to support circulation.  DO NOT REMOVE DRESSING. KEEP DRESSING DRY. You may try to bathe in a tub, while keeping foot out of tub.  Small amount of bleeding through bandage may occur and is normal, unless it becomes heavy or persists, call office in this case.  Eat well balanced diet high in protein and vitamin c, may take supplemental vitamins and calcium. Drink plenty  of fluids and get plenty of rest with foot elevated.  Use crutches, walker or cane for ambulation depending on weight bearing status. No driving until released by MD. Follow verbal instructions of your doctor.  Sit with the lower leg propped up on a footstool or chair with pillows. Sleep with the injured part elevated on a pillow.    In addition to these instructions, follow the discharge instructions on After Visit Summary.      -SPECIAL INSTRUCTIONS:

## 2024-04-30 NOTE — H&P
H&P reviewed. The patient was examined and there are no changes to the H&P.         [facility] PODIATRY    Today's Date: 04/30/24    Patient Name: Jd Velazquez  MRN: [id number]  CSN: [id number]  PCP: Dacia Newsome APRN,   Referring Provider: Balwinder Costa DPM    SUBJECTIVE     No chief complaint on file.    HPI: Jd Velazquez, a 58 y.o.male, presents to clinic.    Patient is here for follow-up of his right great toe amputation patient states that he finished his antibiotics.  States the foot started bleeding yesterday out of the blue.  He went to the emergency department where he was discharged.  He is scheduled for a I&D tomorrow and to amputation revision.  Past Medical History:   Diagnosis Date    Allergic rhinitis 01/12/2015    Anesthesia     DIFFICULTY WAKING UP POST SURGERY    CAD (coronary artery disease)     DENIES CP/SOA.    Diabetes mellitus     Foot ulcer     Hyperlipidemia     Hypertension     Hypokalemia 02/23/2016    DENIES ANY CURRENT ISSUES    Hypothyroidism 04/24/2014    Insomnia, unspecified 06/15/2016    Microalbuminuria due to type 2 diabetes mellitus 10/15/2015    Mixed hyperlipidemia 10/15/2015    Myocardial infarction     Obesity     Skin cancer     Sleep apnea      Past Surgical History:   Procedure Laterality Date    AMPUTATION DIGIT Right 4/10/2024    Procedure: AMPUTATION DIGIT RIGHT GREAT TOE;  Surgeon: Balwinder Costa DPM;  Location: [facility] OR;  Service: Podiatry;  Laterality: Right;    ANKLE ARTHROSCOPY W/ OPEN REPAIR      APPENDECTOMY      CARDIAC CATHETERIZATION  2014    PCI to circumflex    CARDIAC CATHETERIZATION Right 10/26/2023    Procedure: Aortogram with right leg angiogram, possible angioplasty or stenting;  Surgeon: Robert Puga MD;  Location: [facility] CATH INVASIVE LOCATION;  Service: Vascular;  Laterality: Right;    CARDIAC CATHETERIZATION Right 12/15/2023    Procedure: Right leg angiogram, possible angioplasty or  stenting;  Surgeon: Robert Puga MD;  Location: [facility] CATH INVASIVE LOCATION;  Service: Vascular;  Laterality: Right;    CORONARY ARTERY BYPASS GRAFT N/A 10/08/2020    Procedure: STERNOTOMY, CORONARY ARTERY BYPASS GRAFTING TIME 4 WITH LEFT KELSI  AND ENDOSCOPICALLY HARVESTED LEFT GREATER SAPHENOUS VEIN AND PRP.;  Surgeon: Jr Girma Chiu MD;  Location: [facility] MAIN OR;  Service: Cardiothoracic;  Laterality: N/A;    FEMORAL TIBIAL BYPASS Right 01/09/2024    Procedure: Right femoral-tibial bypass graft;  Surgeon: Robert Puga MD;  Location: [facility] MAIN OR;  Service: Vascular;  Laterality: Right;    HEEL SPUR SURGERY      HERNIA REPAIR      KNEE ARTHROSCOPY      MULTIPLE TIMES ON BOTH KNEES    SKIN CANCER EXCISION      BACK    TOE SURGERY Right     DEBRIDMENT RIGHT GREAT TOE    TONSILLECTOMY       Family History   Adopted: Yes   Problem Relation Age of Onset    Heart disease Other      Social History     Socioeconomic History    Marital status:    Tobacco Use    Smoking status: Every Day     Current packs/day: 0.25     Average packs/day: 0.3 packs/day for 15.0 years (3.8 ttl pk-yrs)     Types: Cigarettes     Passive exposure: Never    Smokeless tobacco: Never   Vaping Use    Vaping status: Never Used   Substance and Sexual Activity    Alcohol use: Not Currently    Drug use: Never    Sexual activity: Defer     Allergies   Allergen Reactions    Lortab [Hydrocodone-Acetaminophen] Itching     Current Facility-Administered Medications   Medication Dose Route Frequency Provider Last Rate Last Admin    ceFAZolin in 0.9% normal saline (ANCEF) IVPB solution 2,000 mg  2,000 mg Intravenous Once Balwinder Costa DPSHANE        lactated ringers infusion  100 mL/hr Intravenous Continuous Balwinder Costa DPM        lactated ringers infusion  9 mL/hr Intravenous Continuous PRN Kayleen Francois MD 9 mL/hr at 04/30/24 1251 9 mL/hr at 04/30/24 1251    sodium chloride 0.9 % flush 10 mL  10 mL Intravenous Q12H  Balwinder Costa, DAPHNIEM        sodium chloride 0.9 % flush 10 mL  10 mL Intravenous PRN Balwinder Costa, DPM        sodium chloride 0.9 % infusion 40 mL  40 mL Intravenous PRN Balwinder Costa, DAPHNIEM         Review of Systems   Skin:         Ulcer toe   All other systems reviewed and are negative.      OBJECTIVE     Vitals:    04/30/24 1153   BP: 140/70   Pulse: 68   Resp: 20   Temp: 97.3 °F (36.3 °C)   SpO2: 95%       WBC   Date Value Ref Range Status   04/28/2024 9.42 3.40 - 10.80 10*3/mm3 Final     RBC   Date Value Ref Range Status   04/28/2024 4.64 4.14 - 5.80 10*6/mm3 Final     Hemoglobin   Date Value Ref Range Status   04/28/2024 13.6 13.0 - 17.7 g/dL Final     Hematocrit   Date Value Ref Range Status   04/28/2024 40.9 37.5 - 51.0 % Final     MCV   Date Value Ref Range Status   04/28/2024 88.1 79.0 - 97.0 fL Final     MCH   Date Value Ref Range Status   04/28/2024 29.3 26.6 - 33.0 pg Final     MCHC   Date Value Ref Range Status   04/28/2024 33.3 31.5 - 35.7 g/dL Final     RDW   Date Value Ref Range Status   04/28/2024 14.5 12.3 - 15.4 % Final     RDW-SD   Date Value Ref Range Status   04/28/2024 46.4 37.0 - 54.0 fl Final     MPV   Date Value Ref Range Status   04/28/2024 8.9 6.0 - 12.0 fL Final     Platelets   Date Value Ref Range Status   04/28/2024 498 (H) 140 - 450 10*3/mm3 Final     Neutrophil %   Date Value Ref Range Status   04/28/2024 48.9 42.7 - 76.0 % Final     Lymphocyte %   Date Value Ref Range Status   04/28/2024 36.0 19.6 - 45.3 % Final     Monocyte %   Date Value Ref Range Status   04/28/2024 12.0 5.0 - 12.0 % Final     Eosinophil %   Date Value Ref Range Status   04/28/2024 1.1 0.3 - 6.2 % Final     Basophil %   Date Value Ref Range Status   04/28/2024 0.7 0.0 - 1.5 % Final     Immature Grans %   Date Value Ref Range Status   04/28/2024 1.3 (H) 0.0 - 0.5 % Final     Neutrophils, Absolute   Date Value Ref Range Status   04/28/2024 4.61 1.70 - 7.00 10*3/mm3 Final     Lymphocytes, Absolute    Date Value Ref Range Status   2024 3.39 (H) 0.70 - 3.10 10*3/mm3 Final     Monocytes, Absolute   Date Value Ref Range Status   2024 1.13 (H) 0.10 - 0.90 10*3/mm3 Final     Eosinophils, Absolute   Date Value Ref Range Status   2024 0.10 0.00 - 0.40 10*3/mm3 Final     Basophils, Absolute   Date Value Ref Range Status   2024 0.07 0.00 - 0.20 10*3/mm3 Final     Immature Grans, Absolute   Date Value Ref Range Status   2024 0.12 (H) 0.00 - 0.05 10*3/mm3 Final     nRBC   Date Value Ref Range Status   2024 0.0 0.0 - 0.2 /100 WBC Final         Lab Results   Component Value Date    GLUCOSE 216 (H) 2024    BUN 12 2024    CREATININE 0.83 2024    EGFRIFNONA 77 2021    BCR 14.5 2024    K 4.0 2024    CO2 21.5 (L) 2024    CALCIUM 9.2 2024    ALBUMIN 4.1 2024    LABIL2 0.9 (L) 10/16/2020    AST 6 2024    ALT 8 2024       Patient seen in no apparent distress.      PHYSICAL EXAM:     Foot/Ankle Exam    GENERAL  Appearance:  appears stated age  Orientation:  AAOx3  Affect:  appropriate  Gait:  unimpaired  Assistance:  independent  Right shoe gear: casual shoe  Left shoe gear: casual shoe    VASCULAR     Right Foot Vascularity   Dorsalis pedis:  1+  Posterior tibial:  1+  Skin temperature:  cool  Edema gradin+ and non-pitting  CFT:  < 3 seconds  Pedal hair growth:  Absent  Varicosities:  none     Left Foot Vascularity   Dorsalis pedis:  1+  Posterior tibial:  1+  Skin temperature:  cool  Edema gradin+ and non-pitting  CFT:  < 3 seconds  Pedal hair growth:  Absent  Varicosities:  none     NEUROLOGIC     Right Foot Neurologic   Light touch sensation: absent  Vibratory sensation: absent  Hot/Cold sensation: absent     Left Foot Neurologic   Light touch sensation: absent  Vibratory sensation: absent  Hot/Cold sensation:  absent    MUSCLE STRENGTH     Right Foot Muscle Strength   Foot dorsiflexion:  4  Foot plantar flexion:   4  Foot inversion:  4  Foot eversion:  4     Left Foot Muscle Strength   Foot dorsiflexion:  4  Foot plantar flexion:  4  Foot inversion:  4  Foot eversion:  4    RANGE OF MOTION     Right Foot Range of Motion   Foot and ankle ROM within normal limits       Left Foot Range of Motion   Foot and ankle ROM within normal limits      DERMATOLOGIC      Right Foot Dermatologic   Skin  Right foot skin is intact.      Left Foot Dermatologic   Skin  Left foot skin is intact.      Right foot additional comments: Amputation site with bloody drainage and malodor.  No erythema or proximal streaking.  Dehiscence to medial and lateral amputation site.      RADIOLOGY:        No results found.    ASSESSMENT/PLAN     Diagnoses and all orders for this visit:    1. Peripheral vascular disease  -     sodium chloride 0.9 % flush 10 mL  -     sodium chloride 0.9 % flush 10 mL  -     sodium chloride 0.9 % infusion 40 mL  -     lactated ringers infusion    2. Abscess of right foot  -     sodium chloride 0.9 % flush 10 mL  -     sodium chloride 0.9 % flush 10 mL  -     sodium chloride 0.9 % infusion 40 mL  -     lactated ringers infusion    Other orders  -     ceFAZolin in 0.9% normal saline (ANCEF) IVPB solution 2,000 mg  -     Follow Anesthesia Guidelines / Protocol; Standing  -     Verify / Perform Chlorhexidine Skin Prep; Standing  -     Verify / Perform Chlorhexidine Skin Prep if Indicated (If Not Already Completed); Standing  -     Insert Peripheral IV; Standing  -     Saline Lock & Maintain IV Access; Standing  -     Place Sequential Compression Device; Standing  -     Maintain Sequential Compression Device; Standing  -     Follow Anesthesia Guidelines / Protocol  -     Verify / Perform Chlorhexidine Skin Prep  -     Verify / Perform Chlorhexidine Skin Prep if Indicated (If Not Already Completed)  -     Insert Peripheral IV  -     Saline Lock & Maintain IV Access  -     Place Sequential Compression Device  -     Maintain Sequential  Compression Device  -     Vital Signs - Per Anesthesia Protocol; Standing  -     Remove Scopolamine Patch 24 Hours After Application; Standing  -     Continuous Pulse Oximetry; Standing  -     Insert Peripheral IV; Standing  -     lactated ringers infusion  -     Midazolam HCl (PF) (VERSED) injection 2 mg  -     Remove Scopolamine Patch 24 Hours After Application  -     Continuous Pulse Oximetry  -     Insert Peripheral IV  -     POC Glucose Once; Standing  -     POC Glucose Once  -     insulin regular (humuLIN R,novoLIN R) injection 10 Units  -     Obtain Informed Consent; Standing  -     Obtain Informed Consent      Patient for I&D    The risks and benefits of the surgery were discussed with the patient.  This discussion included possible complications of requiring further surgery, possible delayed wound healing, further surgery requiring amputation of the foot or leg, and also included the complication of death.  All the patient's questions were answered to their satisfaction.  Patient states they would like to proceed with the procedure.    Comprehensive lower extremity examination and evaluation was performed.    Discussed findings and treatment plan including risks, benefits, and treatment options with patient in detail. Patient agreed with treatment plan.    Medications and allergies reviewed.  Reviewed available lab values along with other pertinent labs.  These were discussed with the patient.    An After Visit Summary was printed and given to the patient at discharge, including (if requested) any available informative/educational handouts regarding diagnosis, treatment, or medications. All questions were answered to patient/family satisfaction. Should symptoms fail to improve or worsen they agree to call or return to clinic or to go to the Emergency Department. Discussed the importance of following up with any needed screening tests/labs/specialist appointments and any requested follow-up recommended by me  today. Importance of maintaining follow-up discussed and patient accepts that missed appointments can delay diagnosis and potentially lead to worsening of conditions.    No follow-ups on file., or sooner if acute issues arise.    This document has been electronically signed by Balwinder Costa DPM on April 30, 2024 13:24 EDT        Answers submitted by the patient for this visit:  Primary Reason for Visit (Submitted on 4/4/2024)  What is the primary reason for your visit?: Other  Other (Submitted on 4/4/2024)  Please describe your symptoms.: Check on dead toe.  Have you had these symptoms before?: Yes  How long have you been having these symptoms?: Greater than 2 weeks

## 2024-04-30 NOTE — OP NOTE
Operative Note   Foot and Ankle Surgery   Provider: Dr. Balwinder Costa DPM  Location: [facility]    Patient Name:  Jd Velazquez  YOB: 1965    Date of Surgery:  4/30/2024    Pre-op Diagnosis:   Peripheral vascular disease [I73.9]  Abscess of right foot [L02.611]       Post-Op Diagnosis Codes:     * Peripheral vascular disease [I73.9]     * Abscess of right foot [L02.611]    Procedure/CPT® Codes:  ME INCISION BONE CORTEX FOOT [97957]  ME PART REMV BONE METATARSAL HEAD,EA [37691]        Staff:  Surgeon(s):  Balwinder Costa DPM    Assistant: Shante Silva RN CSA  was responsible for performing the following activities: Retraction, Suction, and Irrigation and their skilled assistance was necessary for the success of this case.    Anesthesia: General    Estimated Blood Loss: less than 5 ml    Implants:    Nothing was implanted during the procedure    Specimen:          Specimens       ID Source Type Tests Collected By Collected At Frozen?    1 Foot, Right Wound ANAEROBIC CULTURE  WOUND CULTURE   Balwinder Costa DPM 4/30/24 1400     Description: RIGHT FOOT WOUND            Findings: Abscess, osteomyelitis     Complications: none    Description of Procedure:     Procedure, risks, complications, and goals were discussed with the patient at bedside.  Risks include but are not limited to infection, complications from anesthesia (including death), chronic pain or numbness, hematoma/seroma, deep vein thrombosis, wound complications, and potential for additional surgical procedures.  Patient understands and elects to proceed with surgery at this time. Informed consent was obtained before proceeding to the operating suite.  All questions were answered to the patient's satisfaction. No guarantees or assurances were given or implied.    Right foot was prepped and draped in usual sterile manner.  The previous incision was incised deepened through subcutaneous tissue down to bone of  the first metatarsal phalangeal joint.  The bone was noted to be necrotic in nature and significant necrotic soft tissue was noted around the area.  All this was excised and from the area.  The skin was revised to help adequate closure.  Site was flushed with Irrisept and pulse lavage.  Cultures were taken.  The infection was noted to tracked lateral and dorsal.  A separate incision was made over the midfoot where the tracking tunneling was noted.  All areas were flushed once again with copious amounts saline only healthy bleeding tissue remained deep tissue was closed with 3-0 Vicryl and skin was closed using 3-0 nylon.  The lateral incision was packed with iodoform packing.  Patient was bladder dressed with Adaptic Betadine 4 x 4's Kerlix Ace wrap.  Patient tolerated anesthesia and procedure well.    Balwinder Costa DPM  [facility]   [phone]    Balwinder Costa DPM     Date: 4/30/2024  Time: 14:24 EDT

## 2024-04-30 NOTE — PROGRESS NOTES
[facility] - PODIATRY    Today's Date: 04/30/24    Patient Name: Jd Velazquez  MRN: [id number]  CSN: [id number]  PCP: Dacia Newsome APRN,   Referring Provider: No ref. provider found    SUBJECTIVE     Chief Complaint   Patient presents with    Right Foot - Follow-up, Wound Check, Abscess     Went to ED yesterday      HPI: Jd Velazquez, a 58 y.o.male, presents to clinic.    Patient is here for follow-up of his right great toe amputation patient states that he finished his antibiotics.  States the foot started bleeding yesterday out of the blue.  He went to the emergency department where he was discharged.  He is scheduled for a I&D tomorrow and to amputation revision.  Past Medical History:   Diagnosis Date    Allergic rhinitis 01/12/2015    Anesthesia     DIFFICULTY WAKING UP POST SURGERY    CAD (coronary artery disease)     DENIES CP/SOA.    Diabetes mellitus     Foot ulcer     Hyperlipidemia     Hypertension     Hypokalemia 02/23/2016    DENIES ANY CURRENT ISSUES    Hypothyroidism 04/24/2014    Insomnia, unspecified 06/15/2016    Microalbuminuria due to type 2 diabetes mellitus 10/15/2015    Mixed hyperlipidemia 10/15/2015    Myocardial infarction     Obesity     Skin cancer     Sleep apnea      Past Surgical History:   Procedure Laterality Date    AMPUTATION DIGIT Right 4/10/2024    Procedure: AMPUTATION DIGIT RIGHT GREAT TOE;  Surgeon: Balwinder Costa DPM;  Location: [facility] OR;  Service: Podiatry;  Laterality: Right;    ANKLE ARTHROSCOPY W/ OPEN REPAIR      APPENDECTOMY      CARDIAC CATHETERIZATION  2014    PCI to circumflex    CARDIAC CATHETERIZATION Right 10/26/2023    Procedure: Aortogram with right leg angiogram, possible angioplasty or stenting;  Surgeon: Robert Puga MD;  Location: [facility] CATH INVASIVE LOCATION;  Service: Vascular;  Laterality: Right;    CARDIAC CATHETERIZATION Right 12/15/2023    Procedure: Right leg angiogram, possible angioplasty  or stenting;  Surgeon: Robert Puga MD;  Location: [facility] CATH INVASIVE LOCATION;  Service: Vascular;  Laterality: Right;    CORONARY ARTERY BYPASS GRAFT N/A 10/08/2020    Procedure: STERNOTOMY, CORONARY ARTERY BYPASS GRAFTING TIME 4 WITH LEFT KELSI  AND ENDOSCOPICALLY HARVESTED LEFT GREATER SAPHENOUS VEIN AND PRP.;  Surgeon: Jr Girma Chiu MD;  Location: [facility] MAIN OR;  Service: Cardiothoracic;  Laterality: N/A;    FEMORAL TIBIAL BYPASS Right 01/09/2024    Procedure: Right femoral-tibial bypass graft;  Surgeon: Robert Puga MD;  Location: [facility] MAIN OR;  Service: Vascular;  Laterality: Right;    HEEL SPUR SURGERY      HERNIA REPAIR      KNEE ARTHROSCOPY      MULTIPLE TIMES ON BOTH KNEES    SKIN CANCER EXCISION      BACK    TOE SURGERY Right     DEBRIDMENT RIGHT GREAT TOE    TONSILLECTOMY       Family History   Adopted: Yes   Problem Relation Age of Onset    Heart disease Other      Social History     Socioeconomic History    Marital status:    Tobacco Use    Smoking status: Every Day     Current packs/day: 0.25     Average packs/day: 0.3 packs/day for 15.0 years (3.8 ttl pk-yrs)     Types: Cigarettes     Passive exposure: Never    Smokeless tobacco: Never   Vaping Use    Vaping status: Never Used   Substance and Sexual Activity    Alcohol use: Not Currently    Drug use: Never    Sexual activity: Defer     Allergies   Allergen Reactions    Lortab [Hydrocodone-Acetaminophen] Itching     Current Outpatient Medications   Medication Sig Dispense Refill    amLODIPine (NORVASC) 10 MG tablet Take 0.5 tablets by mouth Every Night. (Patient taking differently: Take 1 tablet by mouth Every Night.) 90 tablet 3    aspirin 81 MG chewable tablet Chew 1 tablet Daily. PER DR CHILANGO LOGAN TO CONTINUE ASA UP TO AND INCLUDING DAY OF SURGERY      clopidogrel (Plavix) 75 MG tablet Take 1 tablet by mouth Daily for 180 days. (Patient taking differently: Take 1 tablet by mouth Daily. PER DR CHILANGO LOGAN TO CONTINUE  "PLAVIX UP TO AND INCLUDING DAY OF SUGERY) 30 tablet 5    Cymbalta 30 MG capsule Take 1 capsule by mouth Daily.      Glucose Blood (Blood Glucose Test Strips 333) strip 1 each by Other route Daily. 100 strip 2    insulin detemir (Levemir) 100 UNIT/ML injection Inject 30 Units under the skin into the appropriate area as directed Every Night. Increase one unit per night goal fasting  2 hours after meals 140 9 mL 12    Insulin Pen Needle (Pen Needles 3/16\") 31G X 5 MM misc Use 1 each Every Night. 100 each 5    levothyroxine (SYNTHROID, LEVOTHROID) 50 MCG tablet Take 1 tablet by mouth Every Morning. (Patient taking differently: Take 1 tablet by mouth Daily As Needed. PT REPORTS JUST TAKES OCCASIONALLY) 90 tablet 1    lidocaine (LIDODERM) 5 % Place 3 patches on the skin as directed by provider Daily. Remove & Discard patch within 12 hours or as directed by MD (Patient taking differently: Place 3 patches on the skin as directed by provider Daily As Needed. Remove & Discard patch within 12 hours or as directed by MD) 90 each 5    lisinopril-hydrochlorothiazide (PRINZIDE,ZESTORETIC) 20-12.5 MG per tablet Take 2 tablets by mouth Daily. 180 tablet 3    metFORMIN (GLUCOPHAGE) 500 MG tablet Take 1 tablet by mouth Daily With Breakfast. NONE AFTER 6 PM ON 4/9/24      metoprolol tartrate (LOPRESSOR) 100 MG tablet Take 1 tablet by mouth Every 12 (Twelve) Hours. 180 tablet 3    OneTouch Delica Lancets 33G misc test 4 times per day 100 each 0    pregabalin (Lyrica) 300 MG capsule Take 1 capsule by mouth 2 (Two) Times a Day. (Patient taking differently: Take 1 capsule by mouth Every Night.) 180 capsule 1    Repatha SureClick solution auto-injector SureClick injection INJECT 1ML UNDER THE SKIN INTO THE APPROPRIATE AREA AS DIRECTED EVERY 14 DAYS 2 mL 3    Tirzepatide (MOUNJARO) 10 MG/0.5ML solution pen-injector pen Inject 0.5 mL under the skin into the appropriate area as directed 1 (One) Time Per Week. (Patient taking differently: " Inject 0.5 mL under the skin into the appropriate area as directed 1 (One) Time Per Week. LAST DOSE 3/31/24) 2 mL 6    traZODone (DESYREL) 100 MG tablet TAKE ONE TABLET BY MOUTH ONCE NIGHTLY (Patient taking differently: Take 1 tablet by mouth At Night As Needed.) 30 tablet 1    zolpidem (AMBIEN) 10 MG tablet Take 1 tablet by mouth At Night As Needed for Sleep. 90 tablet 0    oxyCODONE-acetaminophen (Percocet) 5-325 MG per tablet Take 1-2 tablets by mouth Every 4 (Four) to 6 (Six) Hours As Needed for Pain (Patient not taking: Reported on 4/29/2024) 30 tablet 0     No current facility-administered medications for this visit.     Review of Systems   Skin:         Ulcer toe   All other systems reviewed and are negative.      OBJECTIVE     Vitals:    04/29/24 1308   BP: 143/80   Pulse: 83   Temp: 96.8 °F (36 °C)   SpO2: 94%       WBC   Date Value Ref Range Status   04/28/2024 9.42 3.40 - 10.80 10*3/mm3 Final     RBC   Date Value Ref Range Status   04/28/2024 4.64 4.14 - 5.80 10*6/mm3 Final     Hemoglobin   Date Value Ref Range Status   04/28/2024 13.6 13.0 - 17.7 g/dL Final     Hematocrit   Date Value Ref Range Status   04/28/2024 40.9 37.5 - 51.0 % Final     MCV   Date Value Ref Range Status   04/28/2024 88.1 79.0 - 97.0 fL Final     MCH   Date Value Ref Range Status   04/28/2024 29.3 26.6 - 33.0 pg Final     MCHC   Date Value Ref Range Status   04/28/2024 33.3 31.5 - 35.7 g/dL Final     RDW   Date Value Ref Range Status   04/28/2024 14.5 12.3 - 15.4 % Final     RDW-SD   Date Value Ref Range Status   04/28/2024 46.4 37.0 - 54.0 fl Final     MPV   Date Value Ref Range Status   04/28/2024 8.9 6.0 - 12.0 fL Final     Platelets   Date Value Ref Range Status   04/28/2024 498 (H) 140 - 450 10*3/mm3 Final     Neutrophil %   Date Value Ref Range Status   04/28/2024 48.9 42.7 - 76.0 % Final     Lymphocyte %   Date Value Ref Range Status   04/28/2024 36.0 19.6 - 45.3 % Final     Monocyte %   Date Value Ref Range Status    2024 12.0 5.0 - 12.0 % Final     Eosinophil %   Date Value Ref Range Status   2024 1.1 0.3 - 6.2 % Final     Basophil %   Date Value Ref Range Status   2024 0.7 0.0 - 1.5 % Final     Immature Grans %   Date Value Ref Range Status   2024 1.3 (H) 0.0 - 0.5 % Final     Neutrophils, Absolute   Date Value Ref Range Status   2024 4.61 1.70 - 7.00 10*3/mm3 Final     Lymphocytes, Absolute   Date Value Ref Range Status   2024 3.39 (H) 0.70 - 3.10 10*3/mm3 Final     Monocytes, Absolute   Date Value Ref Range Status   2024 1.13 (H) 0.10 - 0.90 10*3/mm3 Final     Eosinophils, Absolute   Date Value Ref Range Status   2024 0.10 0.00 - 0.40 10*3/mm3 Final     Basophils, Absolute   Date Value Ref Range Status   2024 0.07 0.00 - 0.20 10*3/mm3 Final     Immature Grans, Absolute   Date Value Ref Range Status   2024 0.12 (H) 0.00 - 0.05 10*3/mm3 Final     nRBC   Date Value Ref Range Status   2024 0.0 0.0 - 0.2 /100 WBC Final         Lab Results   Component Value Date    GLUCOSE 216 (H) 2024    BUN 12 2024    CREATININE 0.83 2024    EGFRIFNONA 77 2021    BCR 14.5 2024    K 4.0 2024    CO2 21.5 (L) 2024    CALCIUM 9.2 2024    ALBUMIN 4.1 2024    LABIL2 0.9 (L) 10/16/2020    AST 6 2024    ALT 8 2024       Patient seen in no apparent distress.      PHYSICAL EXAM:     Foot/Ankle Exam    GENERAL  Appearance:  appears stated age  Orientation:  AAOx3  Affect:  appropriate  Gait:  unimpaired  Assistance:  independent  Right shoe gear: casual shoe  Left shoe gear: casual shoe    VASCULAR     Right Foot Vascularity   Dorsalis pedis:  1+  Posterior tibial:  1+  Skin temperature:  cool  Edema gradin+ and non-pitting  CFT:  < 3 seconds  Pedal hair growth:  Absent  Varicosities:  none     Left Foot Vascularity   Dorsalis pedis:  1+  Posterior tibial:  1+  Skin temperature:  cool  Edema gradin+ and  non-pitting  CFT:  < 3 seconds  Pedal hair growth:  Absent  Varicosities:  none     NEUROLOGIC     Right Foot Neurologic   Light touch sensation: absent  Vibratory sensation: absent  Hot/Cold sensation: absent     Left Foot Neurologic   Light touch sensation: absent  Vibratory sensation: absent  Hot/Cold sensation:  absent    MUSCLE STRENGTH     Right Foot Muscle Strength   Foot dorsiflexion:  4  Foot plantar flexion:  4  Foot inversion:  4  Foot eversion:  4     Left Foot Muscle Strength   Foot dorsiflexion:  4  Foot plantar flexion:  4  Foot inversion:  4  Foot eversion:  4    RANGE OF MOTION     Right Foot Range of Motion   Foot and ankle ROM within normal limits       Left Foot Range of Motion   Foot and ankle ROM within normal limits      DERMATOLOGIC      Right Foot Dermatologic   Skin  Right foot skin is intact.      Left Foot Dermatologic   Skin  Left foot skin is intact.      Right foot additional comments: Amputation site with bloody drainage and malodor.  No erythema or proximal streaking.  Dehiscence to medial and lateral amputation site.      RADIOLOGY:        No results found.    ASSESSMENT/PLAN     Diagnoses and all orders for this visit:    1. Peripheral vascular disease (Primary)    2. Uncontrolled type 2 diabetes mellitus with hyperglycemia    3. Abscess of right foot      Patient for I&D    The risks and benefits of the surgery were discussed with the patient.  This discussion included possible complications of requiring further surgery, possible delayed wound healing, further surgery requiring amputation of the foot or leg, and also included the complication of death.  All the patient's questions were answered to their satisfaction.  Patient states they would like to proceed with the procedure.    Comprehensive lower extremity examination and evaluation was performed.    Discussed findings and treatment plan including risks, benefits, and treatment options with patient in detail. Patient agreed  with treatment plan.    Medications and allergies reviewed.  Reviewed available lab values along with other pertinent labs.  These were discussed with the patient.    An After Visit Summary was printed and given to the patient at discharge, including (if requested) any available informative/educational handouts regarding diagnosis, treatment, or medications. All questions were answered to patient/family satisfaction. Should symptoms fail to improve or worsen they agree to call or return to clinic or to go to the Emergency Department. Discussed the importance of following up with any needed screening tests/labs/specialist appointments and any requested follow-up recommended by me today. Importance of maintaining follow-up discussed and patient accepts that missed appointments can delay diagnosis and potentially lead to worsening of conditions.    Return in about 1 week (around 5/6/2024)., or sooner if acute issues arise.    This document has been electronically signed by Balwinder Costa DPM on April 30, 2024 10:14 EDT        Answers submitted by the patient for this visit:  Primary Reason for Visit (Submitted on 4/4/2024)  What is the primary reason for your visit?: Other  Other (Submitted on 4/4/2024)  Please describe your symptoms.: Check on dead toe.  Have you had these symptoms before?: Yes  How long have you been having these symptoms?: Greater than 2 weeks

## 2024-04-30 NOTE — H&P (VIEW-ONLY)
[facility] - PODIATRY    Today's Date: 04/30/24    Patient Name: Jd Velazquez  MRN: [id number]  CSN: [id number]  PCP: Dacia Newsome APRN,   Referring Provider: No ref. provider found    SUBJECTIVE     Chief Complaint   Patient presents with    Right Foot - Follow-up, Wound Check, Abscess     Went to ED yesterday      HPI: Jd Velazquez, a 58 y.o.male, presents to clinic.    Patient is here for follow-up of his right great toe amputation patient states that he finished his antibiotics.  States the foot started bleeding yesterday out of the blue.  He went to the emergency department where he was discharged.  He is scheduled for a I&D tomorrow and to amputation revision.  Past Medical History:   Diagnosis Date    Allergic rhinitis 01/12/2015    Anesthesia     DIFFICULTY WAKING UP POST SURGERY    CAD (coronary artery disease)     DENIES CP/SOA.    Diabetes mellitus     Foot ulcer     Hyperlipidemia     Hypertension     Hypokalemia 02/23/2016    DENIES ANY CURRENT ISSUES    Hypothyroidism 04/24/2014    Insomnia, unspecified 06/15/2016    Microalbuminuria due to type 2 diabetes mellitus 10/15/2015    Mixed hyperlipidemia 10/15/2015    Myocardial infarction     Obesity     Skin cancer     Sleep apnea      Past Surgical History:   Procedure Laterality Date    AMPUTATION DIGIT Right 4/10/2024    Procedure: AMPUTATION DIGIT RIGHT GREAT TOE;  Surgeon: Balwinder Costa DPM;  Location: [facility] OR;  Service: Podiatry;  Laterality: Right;    ANKLE ARTHROSCOPY W/ OPEN REPAIR      APPENDECTOMY      CARDIAC CATHETERIZATION  2014    PCI to circumflex    CARDIAC CATHETERIZATION Right 10/26/2023    Procedure: Aortogram with right leg angiogram, possible angioplasty or stenting;  Surgeon: Robert Puga MD;  Location: [facility] CATH INVASIVE LOCATION;  Service: Vascular;  Laterality: Right;    CARDIAC CATHETERIZATION Right 12/15/2023    Procedure: Right leg angiogram, possible angioplasty  or stenting;  Surgeon: Robert Puga MD;  Location: [facility] CATH INVASIVE LOCATION;  Service: Vascular;  Laterality: Right;    CORONARY ARTERY BYPASS GRAFT N/A 10/08/2020    Procedure: STERNOTOMY, CORONARY ARTERY BYPASS GRAFTING TIME 4 WITH LEFT KELSI  AND ENDOSCOPICALLY HARVESTED LEFT GREATER SAPHENOUS VEIN AND PRP.;  Surgeon: Jr Girma Chiu MD;  Location: [facility] MAIN OR;  Service: Cardiothoracic;  Laterality: N/A;    FEMORAL TIBIAL BYPASS Right 01/09/2024    Procedure: Right femoral-tibial bypass graft;  Surgeon: Robert Puga MD;  Location: [facility] MAIN OR;  Service: Vascular;  Laterality: Right;    HEEL SPUR SURGERY      HERNIA REPAIR      KNEE ARTHROSCOPY      MULTIPLE TIMES ON BOTH KNEES    SKIN CANCER EXCISION      BACK    TOE SURGERY Right     DEBRIDMENT RIGHT GREAT TOE    TONSILLECTOMY       Family History   Adopted: Yes   Problem Relation Age of Onset    Heart disease Other      Social History     Socioeconomic History    Marital status:    Tobacco Use    Smoking status: Every Day     Current packs/day: 0.25     Average packs/day: 0.3 packs/day for 15.0 years (3.8 ttl pk-yrs)     Types: Cigarettes     Passive exposure: Never    Smokeless tobacco: Never   Vaping Use    Vaping status: Never Used   Substance and Sexual Activity    Alcohol use: Not Currently    Drug use: Never    Sexual activity: Defer     Allergies   Allergen Reactions    Lortab [Hydrocodone-Acetaminophen] Itching     Current Outpatient Medications   Medication Sig Dispense Refill    amLODIPine (NORVASC) 10 MG tablet Take 0.5 tablets by mouth Every Night. (Patient taking differently: Take 1 tablet by mouth Every Night.) 90 tablet 3    aspirin 81 MG chewable tablet Chew 1 tablet Daily. PER DR CHILANGO LOGAN TO CONTINUE ASA UP TO AND INCLUDING DAY OF SURGERY      clopidogrel (Plavix) 75 MG tablet Take 1 tablet by mouth Daily for 180 days. (Patient taking differently: Take 1 tablet by mouth Daily. PER DR CHILANGO LOGAN TO CONTINUE  "PLAVIX UP TO AND INCLUDING DAY OF SUGERY) 30 tablet 5    Cymbalta 30 MG capsule Take 1 capsule by mouth Daily.      Glucose Blood (Blood Glucose Test Strips 333) strip 1 each by Other route Daily. 100 strip 2    insulin detemir (Levemir) 100 UNIT/ML injection Inject 30 Units under the skin into the appropriate area as directed Every Night. Increase one unit per night goal fasting  2 hours after meals 140 9 mL 12    Insulin Pen Needle (Pen Needles 3/16\") 31G X 5 MM misc Use 1 each Every Night. 100 each 5    levothyroxine (SYNTHROID, LEVOTHROID) 50 MCG tablet Take 1 tablet by mouth Every Morning. (Patient taking differently: Take 1 tablet by mouth Daily As Needed. PT REPORTS JUST TAKES OCCASIONALLY) 90 tablet 1    lidocaine (LIDODERM) 5 % Place 3 patches on the skin as directed by provider Daily. Remove & Discard patch within 12 hours or as directed by MD (Patient taking differently: Place 3 patches on the skin as directed by provider Daily As Needed. Remove & Discard patch within 12 hours or as directed by MD) 90 each 5    lisinopril-hydrochlorothiazide (PRINZIDE,ZESTORETIC) 20-12.5 MG per tablet Take 2 tablets by mouth Daily. 180 tablet 3    metFORMIN (GLUCOPHAGE) 500 MG tablet Take 1 tablet by mouth Daily With Breakfast. NONE AFTER 6 PM ON 4/9/24      metoprolol tartrate (LOPRESSOR) 100 MG tablet Take 1 tablet by mouth Every 12 (Twelve) Hours. 180 tablet 3    OneTouch Delica Lancets 33G misc test 4 times per day 100 each 0    pregabalin (Lyrica) 300 MG capsule Take 1 capsule by mouth 2 (Two) Times a Day. (Patient taking differently: Take 1 capsule by mouth Every Night.) 180 capsule 1    Repatha SureClick solution auto-injector SureClick injection INJECT 1ML UNDER THE SKIN INTO THE APPROPRIATE AREA AS DIRECTED EVERY 14 DAYS 2 mL 3    Tirzepatide (MOUNJARO) 10 MG/0.5ML solution pen-injector pen Inject 0.5 mL under the skin into the appropriate area as directed 1 (One) Time Per Week. (Patient taking differently: " Inject 0.5 mL under the skin into the appropriate area as directed 1 (One) Time Per Week. LAST DOSE 3/31/24) 2 mL 6    traZODone (DESYREL) 100 MG tablet TAKE ONE TABLET BY MOUTH ONCE NIGHTLY (Patient taking differently: Take 1 tablet by mouth At Night As Needed.) 30 tablet 1    zolpidem (AMBIEN) 10 MG tablet Take 1 tablet by mouth At Night As Needed for Sleep. 90 tablet 0    oxyCODONE-acetaminophen (Percocet) 5-325 MG per tablet Take 1-2 tablets by mouth Every 4 (Four) to 6 (Six) Hours As Needed for Pain (Patient not taking: Reported on 4/29/2024) 30 tablet 0     No current facility-administered medications for this visit.     Review of Systems   Skin:         Ulcer toe   All other systems reviewed and are negative.      OBJECTIVE     Vitals:    04/29/24 1308   BP: 143/80   Pulse: 83   Temp: 96.8 °F (36 °C)   SpO2: 94%       WBC   Date Value Ref Range Status   04/28/2024 9.42 3.40 - 10.80 10*3/mm3 Final     RBC   Date Value Ref Range Status   04/28/2024 4.64 4.14 - 5.80 10*6/mm3 Final     Hemoglobin   Date Value Ref Range Status   04/28/2024 13.6 13.0 - 17.7 g/dL Final     Hematocrit   Date Value Ref Range Status   04/28/2024 40.9 37.5 - 51.0 % Final     MCV   Date Value Ref Range Status   04/28/2024 88.1 79.0 - 97.0 fL Final     MCH   Date Value Ref Range Status   04/28/2024 29.3 26.6 - 33.0 pg Final     MCHC   Date Value Ref Range Status   04/28/2024 33.3 31.5 - 35.7 g/dL Final     RDW   Date Value Ref Range Status   04/28/2024 14.5 12.3 - 15.4 % Final     RDW-SD   Date Value Ref Range Status   04/28/2024 46.4 37.0 - 54.0 fl Final     MPV   Date Value Ref Range Status   04/28/2024 8.9 6.0 - 12.0 fL Final     Platelets   Date Value Ref Range Status   04/28/2024 498 (H) 140 - 450 10*3/mm3 Final     Neutrophil %   Date Value Ref Range Status   04/28/2024 48.9 42.7 - 76.0 % Final     Lymphocyte %   Date Value Ref Range Status   04/28/2024 36.0 19.6 - 45.3 % Final     Monocyte %   Date Value Ref Range Status    2024 12.0 5.0 - 12.0 % Final     Eosinophil %   Date Value Ref Range Status   2024 1.1 0.3 - 6.2 % Final     Basophil %   Date Value Ref Range Status   2024 0.7 0.0 - 1.5 % Final     Immature Grans %   Date Value Ref Range Status   2024 1.3 (H) 0.0 - 0.5 % Final     Neutrophils, Absolute   Date Value Ref Range Status   2024 4.61 1.70 - 7.00 10*3/mm3 Final     Lymphocytes, Absolute   Date Value Ref Range Status   2024 3.39 (H) 0.70 - 3.10 10*3/mm3 Final     Monocytes, Absolute   Date Value Ref Range Status   2024 1.13 (H) 0.10 - 0.90 10*3/mm3 Final     Eosinophils, Absolute   Date Value Ref Range Status   2024 0.10 0.00 - 0.40 10*3/mm3 Final     Basophils, Absolute   Date Value Ref Range Status   2024 0.07 0.00 - 0.20 10*3/mm3 Final     Immature Grans, Absolute   Date Value Ref Range Status   2024 0.12 (H) 0.00 - 0.05 10*3/mm3 Final     nRBC   Date Value Ref Range Status   2024 0.0 0.0 - 0.2 /100 WBC Final         Lab Results   Component Value Date    GLUCOSE 216 (H) 2024    BUN 12 2024    CREATININE 0.83 2024    EGFRIFNONA 77 2021    BCR 14.5 2024    K 4.0 2024    CO2 21.5 (L) 2024    CALCIUM 9.2 2024    ALBUMIN 4.1 2024    LABIL2 0.9 (L) 10/16/2020    AST 6 2024    ALT 8 2024       Patient seen in no apparent distress.      PHYSICAL EXAM:     Foot/Ankle Exam    GENERAL  Appearance:  appears stated age  Orientation:  AAOx3  Affect:  appropriate  Gait:  unimpaired  Assistance:  independent  Right shoe gear: casual shoe  Left shoe gear: casual shoe    VASCULAR     Right Foot Vascularity   Dorsalis pedis:  1+  Posterior tibial:  1+  Skin temperature:  cool  Edema gradin+ and non-pitting  CFT:  < 3 seconds  Pedal hair growth:  Absent  Varicosities:  none     Left Foot Vascularity   Dorsalis pedis:  1+  Posterior tibial:  1+  Skin temperature:  cool  Edema gradin+ and  non-pitting  CFT:  < 3 seconds  Pedal hair growth:  Absent  Varicosities:  none     NEUROLOGIC     Right Foot Neurologic   Light touch sensation: absent  Vibratory sensation: absent  Hot/Cold sensation: absent     Left Foot Neurologic   Light touch sensation: absent  Vibratory sensation: absent  Hot/Cold sensation:  absent    MUSCLE STRENGTH     Right Foot Muscle Strength   Foot dorsiflexion:  4  Foot plantar flexion:  4  Foot inversion:  4  Foot eversion:  4     Left Foot Muscle Strength   Foot dorsiflexion:  4  Foot plantar flexion:  4  Foot inversion:  4  Foot eversion:  4    RANGE OF MOTION     Right Foot Range of Motion   Foot and ankle ROM within normal limits       Left Foot Range of Motion   Foot and ankle ROM within normal limits      DERMATOLOGIC      Right Foot Dermatologic   Skin  Right foot skin is intact.      Left Foot Dermatologic   Skin  Left foot skin is intact.      Right foot additional comments: Amputation site with bloody drainage and malodor.  No erythema or proximal streaking.  Dehiscence to medial and lateral amputation site.      RADIOLOGY:        No results found.    ASSESSMENT/PLAN     Diagnoses and all orders for this visit:    1. Peripheral vascular disease (Primary)    2. Uncontrolled type 2 diabetes mellitus with hyperglycemia    3. Abscess of right foot      Patient for I&D    The risks and benefits of the surgery were discussed with the patient.  This discussion included possible complications of requiring further surgery, possible delayed wound healing, further surgery requiring amputation of the foot or leg, and also included the complication of death.  All the patient's questions were answered to their satisfaction.  Patient states they would like to proceed with the procedure.    Comprehensive lower extremity examination and evaluation was performed.    Discussed findings and treatment plan including risks, benefits, and treatment options with patient in detail. Patient agreed  with treatment plan.    Medications and allergies reviewed.  Reviewed available lab values along with other pertinent labs.  These were discussed with the patient.    An After Visit Summary was printed and given to the patient at discharge, including (if requested) any available informative/educational handouts regarding diagnosis, treatment, or medications. All questions were answered to patient/family satisfaction. Should symptoms fail to improve or worsen they agree to call or return to clinic or to go to the Emergency Department. Discussed the importance of following up with any needed screening tests/labs/specialist appointments and any requested follow-up recommended by me today. Importance of maintaining follow-up discussed and patient accepts that missed appointments can delay diagnosis and potentially lead to worsening of conditions.    Return in about 1 week (around 5/6/2024)., or sooner if acute issues arise.    This document has been electronically signed by Balwinder Costa DPM on April 30, 2024 10:14 EDT        Answers submitted by the patient for this visit:  Primary Reason for Visit (Submitted on 4/4/2024)  What is the primary reason for your visit?: Other  Other (Submitted on 4/4/2024)  Please describe your symptoms.: Check on dead toe.  Have you had these symptoms before?: Yes  How long have you been having these symptoms?: Greater than 2 weeks

## 2024-04-30 NOTE — ANESTHESIA PREPROCEDURE EVALUATION
Anesthesia Evaluation     Patient summary reviewed and Nursing notes reviewed   no history of anesthetic complications:   NPO Solid Status: > 8 hours  NPO Liquid Status: > 2 hours           Airway   Mallampati: II  TM distance: >3 FB  Neck ROM: full  No difficulty expected  Dental - normal exam     Pulmonary - normal exam    breath sounds clear to auscultation  (+) a smoker Current, cigarettes,sleep apnea on CPAP  Cardiovascular - normal exam  Exercise tolerance: good (4-7 METS)    PT is on anticoagulation therapy  Rhythm: regular  Rate: normal    (+) hypertension, past MI (2020)  >12 months, CAD, CABG (2020) >6 Months, PVD (Fem/Tib bypass; Plavix, last dose yesterday), hyperlipidemia    ROS comment: Last plavix     Neuro/Psych  (+) psychiatric history Depression  GI/Hepatic/Renal/Endo    (+) obesity, renal disease- CRI, diabetes mellitus (Munmelquiadesro last dose 3/31/24) type 2 using insulin, thyroid problem (synthroid) hypothyroidism    Musculoskeletal (-) negative ROS    Abdominal   (+) obese   Substance History - negative use     OB/GYN negative ob/gyn ROS         Other      history of cancer (skin cancer)    ROS/Med Hx Other: PAT Nursing Notes unavailable.                     Anesthesia Plan    ASA 3     general     (Patient understands anesthesia not responsible for dental damage.)  intravenous induction     Anesthetic plan, risks, benefits, and alternatives have been provided, discussed and informed consent has been obtained with: patient and spouse/significant other.  Pre-procedure education provided  Plan discussed with CRNA.        CODE STATUS:

## 2024-04-30 NOTE — ANESTHESIA POSTPROCEDURE EVALUATION
Patient: Jd Velazquez    Procedure Summary       Date: 04/30/24 Room / Location: [facility] OR 04 / [facility] MAIN OR    Anesthesia Start: 1330 Anesthesia Stop: 1421    Procedure: INCISION AND DRAINAGE WOUND RIGHT FOOT, DEBRIDEMENT OF WOUND RIGHT FOOT, AMPUTATION REVISION RIGHT FOOT (Right) Diagnosis:       Peripheral vascular disease      Abscess of right foot      (Peripheral vascular disease [I73.9])      (Abscess of right foot [L02.611])    Surgeons: Balwinder Costa DPM Provider: Kayleen Francois MD    Anesthesia Type: general ASA Status: 3            Anesthesia Type: general    Vitals  Vitals Value Taken Time   /62 04/30/24 1445   Temp     Pulse 65 04/30/24 1447   Resp     SpO2 93 % 04/30/24 1446   Vitals shown include unfiled device data.        Post Anesthesia Care and Evaluation    Patient location during evaluation: bedside  Patient participation: complete - patient participated  Level of consciousness: awake  Pain management: adequate    Airway patency: patent  PONV Status: none  Cardiovascular status: acceptable and stable  Respiratory status: acceptable  Hydration status: acceptable    Comments: An Anesthesiologist personally participated in the most demanding procedures (including induction and emergence if applicable) in the anesthesia plan, monitored the course of anesthesia administration at frequent intervals and remained physically present and available for immediate diagnosis and treatment of emergencies.

## 2024-05-01 ENCOUNTER — TELEPHONE (OUTPATIENT)
Dept: PODIATRY | Facility: CLINIC | Age: 59
End: 2024-05-01

## 2024-05-01 NOTE — TELEPHONE ENCOUNTER
Caller: KEN   Relationship to Patient: SELF  Phone Number: [phone] (home)     Reason for Call: PATIENT CALLING STATING THAT HIS DAUGHTER IS NOT GOING TO BE ABLE TO CHANGE HIS BANDAGES DAILY ASKING FOR A REFERRAL TO WOUND CARE AT THE HOSPITAL TO CHANGE HIS BANDAGES

## 2024-05-02 ENCOUNTER — TELEPHONE (OUTPATIENT)
Dept: PODIATRY | Facility: CLINIC | Age: 59
End: 2024-05-02
Payer: COMMERCIAL

## 2024-05-02 DIAGNOSIS — L97.513 ULCER OF RIGHT FOOT WITH NECROSIS OF MUSCLE: Primary | ICD-10-CM

## 2024-05-02 DIAGNOSIS — I73.9 PERIPHERAL VASCULAR DISEASE: ICD-10-CM

## 2024-05-02 NOTE — TELEPHONE ENCOUNTER
Per Dr Costa  can order home health, Spoke to patient's daughter who states patient will not let anyone in his home and has 2 elderly dogs who would not like that. Per Dr Costa, will place orders for outpatient nursing services. Patient's daughter is aware.

## 2024-05-03 LAB
BACTERIA SPEC AEROBE CULT: ABNORMAL
BACTERIA SPEC ANAEROBE CULT: ABNORMAL
GRAM STN SPEC: ABNORMAL
GRAM STN SPEC: ABNORMAL

## 2024-05-06 ENCOUNTER — OFFICE VISIT (OUTPATIENT)
Dept: PODIATRY | Facility: CLINIC | Age: 59
End: 2024-05-06
Payer: COMMERCIAL

## 2024-05-06 ENCOUNTER — TRANSCRIBE ORDERS (OUTPATIENT)
Dept: ADMINISTRATIVE | Facility: HOSPITAL | Age: 59
End: 2024-05-06
Payer: COMMERCIAL

## 2024-05-06 VITALS
WEIGHT: 232 LBS | BODY MASS INDEX: 32.36 KG/M2 | HEART RATE: 75 BPM | DIASTOLIC BLOOD PRESSURE: 75 MMHG | SYSTOLIC BLOOD PRESSURE: 133 MMHG | OXYGEN SATURATION: 95 % | TEMPERATURE: 98.4 F

## 2024-05-06 DIAGNOSIS — L97.513 ULCER OF RIGHT FOOT WITH NECROSIS OF MUSCLE: ICD-10-CM

## 2024-05-06 DIAGNOSIS — I73.9 PERIPHERAL VASCULAR DISEASE: Primary | ICD-10-CM

## 2024-05-06 DIAGNOSIS — E11.65 UNCONTROLLED TYPE 2 DIABETES MELLITUS WITH HYPERGLYCEMIA: ICD-10-CM

## 2024-05-06 PROCEDURE — 99024 POSTOP FOLLOW-UP VISIT: CPT | Performed by: PODIATRIST

## 2024-05-06 RX ORDER — AMPICILLIN 500 MG/1
500 CAPSULE ORAL 4 TIMES DAILY
Qty: 56 CAPSULE | Refills: 0 | Status: SHIPPED | OUTPATIENT
Start: 2024-05-06 | End: 2024-05-20

## 2024-05-06 RX ORDER — AMOXICILLIN AND CLAVULANATE POTASSIUM 875; 125 MG/1; MG/1
1 TABLET, FILM COATED ORAL 2 TIMES DAILY
Qty: 20 TABLET | Refills: 0 | Status: SHIPPED | OUTPATIENT
Start: 2024-05-06 | End: 2024-05-06

## 2024-05-08 ENCOUNTER — TELEPHONE (OUTPATIENT)
Dept: FAMILY MEDICINE CLINIC | Facility: CLINIC | Age: 59
End: 2024-05-08
Payer: COMMERCIAL

## 2024-05-09 ENCOUNTER — TELEPHONE (OUTPATIENT)
Dept: PODIATRY | Facility: CLINIC | Age: 59
End: 2024-05-09
Payer: COMMERCIAL

## 2024-05-10 DIAGNOSIS — E11.65 TYPE 2 DIABETES MELLITUS WITH HYPERGLYCEMIA, WITHOUT LONG-TERM CURRENT USE OF INSULIN: Primary | ICD-10-CM

## 2024-05-10 RX ORDER — INSULIN GLARGINE 100 [IU]/ML
30 INJECTION, SOLUTION SUBCUTANEOUS NIGHTLY
Qty: 9 ML | Refills: 5 | Status: SHIPPED | OUTPATIENT
Start: 2024-05-10

## 2024-05-10 RX ORDER — INSULIN GLARGINE 100 [IU]/ML
INJECTION, SOLUTION SUBCUTANEOUS
COMMUNITY
End: 2024-05-10 | Stop reason: SDUPTHER

## 2024-05-13 ENCOUNTER — OFFICE VISIT (OUTPATIENT)
Dept: PODIATRY | Facility: CLINIC | Age: 59
End: 2024-05-13
Payer: COMMERCIAL

## 2024-05-13 VITALS
WEIGHT: 233 LBS | TEMPERATURE: 97.3 F | HEART RATE: 65 BPM | DIASTOLIC BLOOD PRESSURE: 62 MMHG | SYSTOLIC BLOOD PRESSURE: 128 MMHG | OXYGEN SATURATION: 96 % | BODY MASS INDEX: 32.62 KG/M2 | HEIGHT: 71 IN

## 2024-05-13 DIAGNOSIS — E11.65 UNCONTROLLED TYPE 2 DIABETES MELLITUS WITH HYPERGLYCEMIA: ICD-10-CM

## 2024-05-13 DIAGNOSIS — L97.513 ULCER OF RIGHT FOOT WITH NECROSIS OF MUSCLE: Primary | ICD-10-CM

## 2024-05-13 PROCEDURE — 87070 CULTURE OTHR SPECIMN AEROBIC: CPT | Performed by: PODIATRIST

## 2024-05-13 PROCEDURE — 99024 POSTOP FOLLOW-UP VISIT: CPT | Performed by: PODIATRIST

## 2024-05-13 PROCEDURE — 87205 SMEAR GRAM STAIN: CPT | Performed by: PODIATRIST

## 2024-05-13 PROCEDURE — 87186 SC STD MICRODIL/AGAR DIL: CPT | Performed by: PODIATRIST

## 2024-05-15 LAB
BACTERIA SPEC AEROBE CULT: ABNORMAL
GRAM STN SPEC: ABNORMAL
GRAM STN SPEC: ABNORMAL

## 2024-05-17 ENCOUNTER — HOSPITAL ENCOUNTER (INPATIENT)
Facility: HOSPITAL | Age: 59
LOS: 7 days | Discharge: HOME HEALTH | DRG: 617 | End: 2024-05-24
Attending: INTERNAL MEDICINE | Admitting: INTERNAL MEDICINE
Payer: COMMERCIAL

## 2024-05-17 ENCOUNTER — OFFICE VISIT (OUTPATIENT)
Dept: PODIATRY | Facility: CLINIC | Age: 59
End: 2024-05-17
Payer: COMMERCIAL

## 2024-05-17 VITALS
SYSTOLIC BLOOD PRESSURE: 155 MMHG | OXYGEN SATURATION: 97 % | HEART RATE: 62 BPM | HEIGHT: 71 IN | WEIGHT: 232 LBS | TEMPERATURE: 98.2 F | DIASTOLIC BLOOD PRESSURE: 76 MMHG | BODY MASS INDEX: 32.48 KG/M2

## 2024-05-17 DIAGNOSIS — E11.65 UNCONTROLLED TYPE 2 DIABETES MELLITUS WITH HYPERGLYCEMIA: Primary | ICD-10-CM

## 2024-05-17 DIAGNOSIS — L97.513 ULCER OF RIGHT FOOT WITH NECROSIS OF MUSCLE: ICD-10-CM

## 2024-05-17 DIAGNOSIS — L02.611 ABSCESS OF RIGHT FOOT: ICD-10-CM

## 2024-05-17 DIAGNOSIS — I73.9 PERIPHERAL VASCULAR DISEASE: ICD-10-CM

## 2024-05-17 DIAGNOSIS — R26.2 DIFFICULTY WALKING: ICD-10-CM

## 2024-05-17 DIAGNOSIS — M86.471 CHRONIC OSTEOMYELITIS OF RIGHT FOOT WITH DRAINING SINUS: Primary | ICD-10-CM

## 2024-05-17 LAB
ALBUMIN SERPL-MCNC: 3.7 G/DL (ref 3.5–5.2)
ALBUMIN/GLOB SERPL: 1 G/DL
ALP SERPL-CCNC: 107 U/L (ref 39–117)
ALT SERPL W P-5'-P-CCNC: 5 U/L (ref 1–41)
ANION GAP SERPL CALCULATED.3IONS-SCNC: 11.2 MMOL/L (ref 5–15)
AST SERPL-CCNC: 5 U/L (ref 1–40)
BILIRUB SERPL-MCNC: 0.3 MG/DL (ref 0–1.2)
BUN SERPL-MCNC: 11 MG/DL (ref 6–20)
BUN/CREAT SERPL: 13.1 (ref 7–25)
CALCIUM SPEC-SCNC: 9.4 MG/DL (ref 8.6–10.5)
CHLORIDE SERPL-SCNC: 99 MMOL/L (ref 98–107)
CO2 SERPL-SCNC: 24.8 MMOL/L (ref 22–29)
CREAT SERPL-MCNC: 0.84 MG/DL (ref 0.76–1.27)
CRP SERPL-MCNC: 2.99 MG/DL (ref 0–0.5)
D-LACTATE SERPL-SCNC: 1.8 MMOL/L (ref 0.5–2)
DEPRECATED RDW RBC AUTO: 50.2 FL (ref 37–54)
EGFRCR SERPLBLD CKD-EPI 2021: 101.7 ML/MIN/1.73
ERYTHROCYTE [DISTWIDTH] IN BLOOD BY AUTOMATED COUNT: 14.8 % (ref 12.3–15.4)
ERYTHROCYTE [SEDIMENTATION RATE] IN BLOOD: 26 MM/HR (ref 0–20)
GLOBULIN UR ELPH-MCNC: 3.6 GM/DL
GLUCOSE BLDC GLUCOMTR-MCNC: 239 MG/DL (ref 70–99)
GLUCOSE BLDC GLUCOMTR-MCNC: 248 MG/DL (ref 70–99)
GLUCOSE SERPL-MCNC: 229 MG/DL (ref 65–99)
HCT VFR BLD AUTO: 36.2 % (ref 37.5–51)
HGB BLD-MCNC: 11.5 G/DL (ref 13–17.7)
MAGNESIUM SERPL-MCNC: 1.7 MG/DL (ref 1.6–2.6)
MCH RBC QN AUTO: 29.2 PG (ref 26.6–33)
MCHC RBC AUTO-ENTMCNC: 31.8 G/DL (ref 31.5–35.7)
MCV RBC AUTO: 91.9 FL (ref 79–97)
PLATELET # BLD AUTO: 413 10*3/MM3 (ref 140–450)
PMV BLD AUTO: 9.1 FL (ref 6–12)
POTASSIUM SERPL-SCNC: 4.4 MMOL/L (ref 3.5–5.2)
PROCALCITONIN SERPL-MCNC: 0.06 NG/ML (ref 0–0.25)
PROT SERPL-MCNC: 7.3 G/DL (ref 6–8.5)
RBC # BLD AUTO: 3.94 10*6/MM3 (ref 4.14–5.8)
SODIUM SERPL-SCNC: 135 MMOL/L (ref 136–145)
WBC NRBC COR # BLD AUTO: 8.76 10*3/MM3 (ref 3.4–10.8)

## 2024-05-17 PROCEDURE — 85652 RBC SED RATE AUTOMATED: CPT | Performed by: INTERNAL MEDICINE

## 2024-05-17 PROCEDURE — 83605 ASSAY OF LACTIC ACID: CPT | Performed by: INTERNAL MEDICINE

## 2024-05-17 PROCEDURE — 82948 REAGENT STRIP/BLOOD GLUCOSE: CPT

## 2024-05-17 PROCEDURE — 99024 POSTOP FOLLOW-UP VISIT: CPT | Performed by: PODIATRIST

## 2024-05-17 PROCEDURE — 25010000002 PIPERACILLIN SOD-TAZOBACTAM PER 1 G: Performed by: INTERNAL MEDICINE

## 2024-05-17 PROCEDURE — 25010000002 ENOXAPARIN PER 10 MG: Performed by: INTERNAL MEDICINE

## 2024-05-17 PROCEDURE — 85027 COMPLETE CBC AUTOMATED: CPT | Performed by: INTERNAL MEDICINE

## 2024-05-17 PROCEDURE — 80053 COMPREHEN METABOLIC PANEL: CPT | Performed by: INTERNAL MEDICINE

## 2024-05-17 PROCEDURE — 87040 BLOOD CULTURE FOR BACTERIA: CPT | Performed by: INTERNAL MEDICINE

## 2024-05-17 PROCEDURE — 99223 1ST HOSP IP/OBS HIGH 75: CPT | Performed by: INTERNAL MEDICINE

## 2024-05-17 PROCEDURE — 83735 ASSAY OF MAGNESIUM: CPT | Performed by: INTERNAL MEDICINE

## 2024-05-17 PROCEDURE — 25810000003 SODIUM CHLORIDE 0.9 % SOLUTION: Performed by: INTERNAL MEDICINE

## 2024-05-17 PROCEDURE — 63710000001 INSULIN DETEMIR PER 5 UNITS: Performed by: INTERNAL MEDICINE

## 2024-05-17 PROCEDURE — 84145 PROCALCITONIN (PCT): CPT | Performed by: INTERNAL MEDICINE

## 2024-05-17 PROCEDURE — 86140 C-REACTIVE PROTEIN: CPT | Performed by: INTERNAL MEDICINE

## 2024-05-17 PROCEDURE — 25010000002 VANCOMYCIN 5 G RECONSTITUTED SOLUTION: Performed by: INTERNAL MEDICINE

## 2024-05-17 PROCEDURE — 63710000001 INSULIN LISPRO (HUMAN) PER 5 UNITS: Performed by: INTERNAL MEDICINE

## 2024-05-17 RX ORDER — ACETAMINOPHEN 325 MG/1
650 TABLET ORAL EVERY 4 HOURS PRN
Status: DISCONTINUED | OUTPATIENT
Start: 2024-05-17 | End: 2024-05-21

## 2024-05-17 RX ORDER — DULOXETIN HYDROCHLORIDE 30 MG/1
30 CAPSULE, DELAYED RELEASE ORAL DAILY
Status: DISCONTINUED | OUTPATIENT
Start: 2024-05-17 | End: 2024-05-24 | Stop reason: HOSPADM

## 2024-05-17 RX ORDER — SODIUM CHLORIDE 0.9 % (FLUSH) 0.9 %
10 SYRINGE (ML) INJECTION AS NEEDED
Status: DISCONTINUED | OUTPATIENT
Start: 2024-05-17 | End: 2024-05-24 | Stop reason: HOSPADM

## 2024-05-17 RX ORDER — VANCOMYCIN 2 GRAM/500 ML IN 0.9 % SODIUM CHLORIDE INTRAVENOUS
20 ONCE
Status: COMPLETED | OUTPATIENT
Start: 2024-05-17 | End: 2024-05-17

## 2024-05-17 RX ORDER — CLOPIDOGREL BISULFATE 75 MG/1
75 TABLET ORAL DAILY
Status: DISCONTINUED | OUTPATIENT
Start: 2024-05-17 | End: 2024-05-24 | Stop reason: HOSPADM

## 2024-05-17 RX ORDER — SODIUM CHLORIDE 0.9 % (FLUSH) 0.9 %
10 SYRINGE (ML) INJECTION EVERY 12 HOURS SCHEDULED
Status: DISCONTINUED | OUTPATIENT
Start: 2024-05-17 | End: 2024-05-24 | Stop reason: HOSPADM

## 2024-05-17 RX ORDER — IBUPROFEN 600 MG/1
1 TABLET ORAL
Status: DISCONTINUED | OUTPATIENT
Start: 2024-05-17 | End: 2024-05-24 | Stop reason: HOSPADM

## 2024-05-17 RX ORDER — ENOXAPARIN SODIUM 100 MG/ML
40 INJECTION SUBCUTANEOUS DAILY
Status: DISCONTINUED | OUTPATIENT
Start: 2024-05-17 | End: 2024-05-24 | Stop reason: HOSPADM

## 2024-05-17 RX ORDER — METOPROLOL TARTRATE 50 MG/1
100 TABLET, FILM COATED ORAL EVERY 12 HOURS
Status: DISCONTINUED | OUTPATIENT
Start: 2024-05-17 | End: 2024-05-22

## 2024-05-17 RX ORDER — VANCOMYCIN/0.9 % SOD CHLORIDE 1.5G/250ML
1500 PLASTIC BAG, INJECTION (ML) INTRAVENOUS EVERY 12 HOURS
Status: DISCONTINUED | OUTPATIENT
Start: 2024-05-18 | End: 2024-05-18

## 2024-05-17 RX ORDER — SPIRONOLACTONE 50 MG/1
50 TABLET, FILM COATED ORAL DAILY
COMMUNITY
Start: 2024-04-30

## 2024-05-17 RX ORDER — ASPIRIN 81 MG/1
81 TABLET, CHEWABLE ORAL DAILY
Status: DISCONTINUED | OUTPATIENT
Start: 2024-05-17 | End: 2024-05-24 | Stop reason: HOSPADM

## 2024-05-17 RX ORDER — INSULIN LISPRO 100 [IU]/ML
2-7 INJECTION, SOLUTION INTRAVENOUS; SUBCUTANEOUS
Status: DISCONTINUED | OUTPATIENT
Start: 2024-05-17 | End: 2024-05-22

## 2024-05-17 RX ORDER — PREGABALIN 225 MG/1
225 CAPSULE ORAL 2 TIMES DAILY
COMMUNITY
Start: 2024-05-07

## 2024-05-17 RX ORDER — METFORMIN HYDROCHLORIDE 500 MG/1
500 TABLET, EXTENDED RELEASE ORAL
COMMUNITY

## 2024-05-17 RX ORDER — PREGABALIN 75 MG/1
225 CAPSULE ORAL 2 TIMES DAILY
Status: DISCONTINUED | OUTPATIENT
Start: 2024-05-17 | End: 2024-05-24 | Stop reason: HOSPADM

## 2024-05-17 RX ORDER — NICOTINE POLACRILEX 4 MG
15 LOZENGE BUCCAL
Status: DISCONTINUED | OUTPATIENT
Start: 2024-05-17 | End: 2024-05-24 | Stop reason: HOSPADM

## 2024-05-17 RX ORDER — SODIUM CHLORIDE 9 MG/ML
40 INJECTION, SOLUTION INTRAVENOUS AS NEEDED
Status: DISCONTINUED | OUTPATIENT
Start: 2024-05-17 | End: 2024-05-24 | Stop reason: HOSPADM

## 2024-05-17 RX ORDER — DEXTROSE MONOHYDRATE 25 G/50ML
25 INJECTION, SOLUTION INTRAVENOUS
Status: DISCONTINUED | OUTPATIENT
Start: 2024-05-17 | End: 2024-05-24 | Stop reason: HOSPADM

## 2024-05-17 RX ADMIN — CLOPIDOGREL BISULFATE 75 MG: 75 TABLET ORAL at 20:23

## 2024-05-17 RX ADMIN — INSULIN LISPRO 3 UNITS: 100 INJECTION, SOLUTION INTRAVENOUS; SUBCUTANEOUS at 16:26

## 2024-05-17 RX ADMIN — Medication 10 ML: at 15:40

## 2024-05-17 RX ADMIN — Medication 10 ML: at 20:23

## 2024-05-17 RX ADMIN — INSULIN DETEMIR 15 UNITS: 100 INJECTION, SOLUTION SUBCUTANEOUS at 23:09

## 2024-05-17 RX ADMIN — PIPERACILLIN SODIUM AND TAZOBACTAM SODIUM 3.38 G: 3; .375 INJECTION, POWDER, LYOPHILIZED, FOR SOLUTION INTRAVENOUS at 15:39

## 2024-05-17 RX ADMIN — INSULIN LISPRO 3 UNITS: 100 INJECTION, SOLUTION INTRAVENOUS; SUBCUTANEOUS at 20:22

## 2024-05-17 RX ADMIN — PREGABALIN 225 MG: 75 CAPSULE ORAL at 20:22

## 2024-05-17 RX ADMIN — ENOXAPARIN SODIUM 40 MG: 100 INJECTION SUBCUTANEOUS at 15:41

## 2024-05-17 RX ADMIN — DULOXETINE HYDROCHLORIDE 30 MG: 30 CAPSULE, DELAYED RELEASE ORAL at 20:22

## 2024-05-17 RX ADMIN — ASPIRIN 81 MG: 81 TABLET, CHEWABLE ORAL at 20:23

## 2024-05-17 RX ADMIN — METOPROLOL TARTRATE 100 MG: 50 TABLET, FILM COATED ORAL at 20:23

## 2024-05-17 RX ADMIN — VANCOMYCIN HYDROCHLORIDE 2000 MG: 5 INJECTION, POWDER, LYOPHILIZED, FOR SOLUTION INTRAVENOUS at 15:40

## 2024-05-17 RX ADMIN — PIPERACILLIN SODIUM AND TAZOBACTAM SODIUM 3.38 G: 3; .375 INJECTION, POWDER, LYOPHILIZED, FOR SOLUTION INTRAVENOUS at 20:23

## 2024-05-17 NOTE — PAYOR COMM NOTE
"Jd Velazquez (58 y.o. Male)       Date of Birth   1965    Social Security Number       Address   [address] [place] [address]    Home Phone   [phone]    MRN   [id number]       Islam   Scientologist    Marital Status                               Admission Date   5/17/24    Admission Type   Urgent    Admitting Provider   Joe Wing MD    Attending Provider   Joe Wing MD    Department, Room/Bed   [address], 3019/1       Discharge Date       Discharge Disposition       Discharge Destination                                 Attending Provider: Joe Wing MD    Allergies: Strawberry, Lortab [Hydrocodone-acetaminophen]    Isolation: None   Infection: None   Code Status: CPR    Ht: 180.3 cm (71\")   Wt: 105 kg (232 lb)    Admission Cmt: None   Principal Problem: 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 [L97.513]                   Active Insurance as of 5/17/2024       Primary Coverage       Payor Plan Insurance Group Employer/Plan Group    [email or web address] PPO [id number]       Payor Plan Address Payor Plan Phone Number Payor Plan Fax Number Effective Dates    [address] [phone]  1/1/2020 - None Entered    [facility] [id number]         Subscriber Name Subscriber Birth Date Member ID       ARDEN VELAZQUEZ 6/12/1964 [id number]                     Emergency Contacts        (Rel.) Home Phone Work Phone Mobile Phone    ARDEN VELAZQUEZ (Spouse) [phone] -- [phone]    PARENTJOSELIN (Daughter) [phone] -- --             [facility] Clinical Indications for Procedure       Indications Met   Last updated by Gwendolyn Nguyen on 5/17/2024 1506     Review Status Created By   Primary Completed Gwendolyn Nguyen      Criteria Review   Wound [facility] Skin [facility] Clinical Indications for Procedure     Overall Determination: Indications Met     Criteria:  [×] Surgery or other care " covered by this guideline is indicated for  1 or more  of the following :      [×] Pressure injury closure or other procedure needed (eg, stage 3 or 4 ulcer not healing with conservative management) [A] (7) (8) (9) (10)          5/17/2024  3:03 PM              -- 5/17/2024  3:03 PM by Gwendolyn Nguyen --                  Pt. with hx. of peripheral vascular disease and abscess of right foot. Pt. went for incision and drainage and debridement of right foot on 4/30/2024. Pt. has been failing PO antibiotics.     Notes:  -- 5/17/2024  3:06 PM by Gwendolyn Nguyen --      Assessment/Plan:       Right foot diabetic ulcer with concern for osteomyelitis      Type 2 diabetes hyperglycemia      Peripheral vascular disease      Coronary disease with history of MI            Plan:      --Admit to hospital service      -- Consult podiatry      -- Vancomycin and Zosyn      -- Blood cultures      -- CRP and ESR      -- MRI of the right foot to rule out osteomyelitis versus abscess      -- Sliding scale for type 2 diabetes                        DVT prophylaxis:      Lovenox                         [place]: MANDO [id number] Tax ID [id number]  Problem List             Codes Noted - Resolved       Hospital    * (Principal) 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 ICD-10-CM: L97.513  ICD-9-CM: 707.15, 728.89 5/17/2024 - Present       Non-Hospital    Abscess of right foot ICD-10-CM: L02.611  ICD-9-CM: 682.7 4/24/2024 - Present    Dry gangrene ICD-10-CM: I96  ICD-9-CM: 785.4 4/4/2024 - Present    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, unspecified laterality ICD-10-CM: I70.269  ICD-9-CM: 440.24 12/18/2023 - Present    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 ICD-10-CM: I70.269  ICD-9-CM: 440.24 12/6/2023 - Present    Atherosclerosis of native arteries of the extremities with ulceration ICD-10-CM: I70.25  ICD-9-CM: 440.23, 707.9 10/18/2023 - Present    Peripheral vascular disease  ICD-10-CM: I73.9  ICD-9-CM: 443.9 10/5/2023 - Present    Thoracic spine pain ICD-10-CM: M54.6  ICD-9-CM: 724.1 9/12/2023 - Present    Neuropathy ICD-10-CM: G62.9  ICD-9-CM: 355.9 9/12/2023 - Present    Itching ICD-10-CM: L29.9  ICD-9-CM: 698.9 8/29/2023 - Present    Cellulitis of right lower extremity ICD-10-CM: L03.115  ICD-9-CM: 682.6 8/29/2023 - Present    Hyponatremia ICD-10-CM: E87.1  ICD-9-CM: 276.1 8/29/2023 - Present    Dermatitis ICD-10-CM: L30.9  ICD-9-CM: 692.9 8/23/2023 - Present    Skin ulcer, limited to breakdown of skin ICD-10-CM: L98.491  ICD-9-CM: 707.9 8/23/2023 - Present    Class 1 obesity due to excess calories with serious comorbidity and body mass index (BMI) of 32.0 to 32.9 in adult ICD-10-CM: E66.09, Z68.32  ICD-9-CM: 278.00, V85.32 10/11/2022 - Present    Mild episode of recurrent major depressive disorder ICD-10-CM: F33.0  ICD-9-CM: 296.31 7/13/2022 - Present    Edema ICD-10-CM: R60.9  ICD-9-CM: 782.3 6/7/2022 - Present    S/P CABG x 4 ICD-10-CM: Z95.1  ICD-9-CM: V45.81 12/9/2020 - Present    Sternal wound infection ICD-10-CM: S21.101A, L08.9  ICD-9-CM: 875.1 12/6/2020 - Present    Cellulitis of chest wall ICD-10-CM: L03.313  ICD-9-CM: 682.2 12/5/2020 - Present    CAD (coronary artery disease) ICD-10-CM: I25.10  ICD-9-CM: 414.00 10/7/2020 - Present    Type 2 diabetes mellitus with hyperglycemia, without long-term current use of insulin ICD-10-CM: E11.65  ICD-9-CM: 250.00, 790.29 Unknown - Present    Hyperlipidemia LDL goal <70 ICD-10-CM: E78.5  ICD-9-CM: 272.4 Unknown - Present    Hypertension ICD-10-CM: I10  ICD-9-CM: 401.9 Unknown - Present    SHAHLA treated with BiPAP ICD-10-CM: G47.33  ICD-9-CM: 327.23 Unknown - Present    Tobacco abuse ICD-10-CM: Z72.0  ICD-9-CM: 305.1 Unknown - Present    Coronary artery disease involving native coronary artery of native heart without angina pectoris ICD-10-CM: I25.10  ICD-9-CM: 414.01 10/6/2020 - Present    Insomnia, unspecified ICD-10-CM:  G47.00  ICD-9-CM: 780.52 6/15/2016 - Present    Hypokalemia ICD-10-CM: E87.6  ICD-9-CM: 276.8 2016 - Present    Microalbuminuric diabetic nephropathy ICD-10-CM: E11.21  ICD-9-CM: 250.40, 583.81 10/15/2015 - Present    Allergic rhinitis ICD-10-CM: J30.9  ICD-9-CM: 477.9 2015 - Present    Hypothyroidism ICD-10-CM: E03.9  ICD-9-CM: 244.9 2014 - Present        History & Physical        Joe Wing MD at 24 1332           [facility] HISTORY AND PHYSICAL  Date: 2024   Patient Name: Jd Velazquez  : 1965  MRN: [id number]  Primary Care Physician:  Dacia Newsome APRN  Date of admission: 2024    Subjective  Subjective     Chief Complaint: Right foot osteo versus abscess leading to direct admission    HPI:    Jd Velazquez is a 58 y.o. male past medical history of coronary artery disease, type 2 diabetes, dyslipidemia, hyperkalemia, peripheral vascular disease, and obesity who presents with worsening foot wound status post amputation    Patient was in the hospital back in April for amputation of his great toe on his right foot.  He had to come back to the OR for incision and drainage.  The foot is continue not to heal.  He is seen in clinic today.  The incision has not healed completely and there is an open area.  There is concern for tracking to the bone.  It still packed and there is purulence exuding from the wound.  Also has a foul smell.  Result podiatry when the patient in the hospital for continued management and workup for osteomyelitis.    Also reviewed and resident above      Personal History     Past Med  Allergic rhinitis  Coronary artery disease  Type 2 diabetes  Dyslipidemia  Hypertension  Hypokalemia  Obesity with BMI of 32    Past Surgical History:  Amputation of digit  Ankle arthroscopy  And appendectomy  Cardiac catheterization with stent to circumflex  CABG  Femoral-tibial bypass      Family History:   Adopted    Social  "History:   Every day smoker  No alcohol    Home Medications:  BASAGLAR KWIKPEN, Blood Glucose Test Strips 333, DULoxetine, Evolocumab, HYDROcodone-acetaminophen, OneTouch Delica Lancets 33G, [place] 3/16\", Tirzepatide, amLODIPine, ampicillin, aspirin, clopidogrel, levoFLOXacin, levothyroxine, lidocaine, lisinopril-hydrochlorothiazide, metFORMIN, metoprolol tartrate, oxyCODONE-acetaminophen, pregabalin, promethazine, traZODone, and zolpidem    Allergies:  Allergies   Allergen Reactions    Lortab [Hydrocodone-Acetaminophen] Itching       Objective  Objective     Vitals:   Temp:  [98.2 °F (36.8 °C)-98.6 °F (37 °C)] 98.6 °F (37 °C)  Heart Rate:  [62] 62  Resp:  [16] 16  BP: (133-155)/(67-76) 133/67    Physical Exam    Constitutional: Awake, alert, no acute distress   Eyes: Pupils equal, sclerae anicteric, no conjunctival injection   HENT: NCAT, mucous membranes moist   Neck: Supple, no thyromegaly, no lymphadenopathy, trachea midline   Respiratory: Clear to auscultation bilaterally, nonlabored respirations    Cardiovascular: RRR, no murmurs, rubs, or gallops, palpable pedal pulses bilaterally   Gastrointestinal: Positive bowel sounds, soft, nontender, nondistended   Musculoskeletal: Right foot shows an area of about 2 to 3 inches.  There is an open wound on the incision site.  Foul-smelling discharge   Psychiatric: Appropriate affect, cooperative   Neurologic: Oriented x 3, strength symmetric in all extremities, Cranial Nerves grossly intact to confrontation, speech clear   Skin: No rashes     Result Review   Result Review:  I have personally reviewed the results from the time of this admission to 5/17/2024 13:32 EDT and agree with these findings:  Reviewed imaging and labs      Assessment & Plan  Assessment / Plan     Assessment/Plan:   Right foot diabetic ulcer with concern for osteomyelitis  Type 2 diabetes hyperglycemia  Peripheral vascular disease  Coronary disease with history of MI    Plan:  --Admit to " hospital service  -- Consult podiatry  -- Vancomycin and Zosyn  -- Blood cultures  -- CRP and ESR  -- MRI of the right foot to rule out osteomyelitis versus abscess  -- Sliding scale for type 2 diabetes        DVT prophylaxis:  Lovenox        CODE STATUS:     Full code    Admission Status:  I believe this patient meets admission status.    Electronically signed by Joe Wing MD, 05/17/24, 1:32 PM EDT.             Electronically signed by Joe Wing MD at 05/17/24 1505       Emergency Department Notes    No notes of this type exist for this encounter.       Vital Signs (last day)       Date/Time Temp Temp src Pulse Resp BP Patient Position SpO2    05/17/24 1202 98.6 (37) Oral 62 16 133/67 Sitting 98          Facility-Administered Medications as of 5/17/2024   Medication Dose Route Frequency Provider Last Rate Last Admin    acetaminophen (TYLENOL) tablet 650 mg  650 mg Oral Q4H PRN Joe Wing MD        dextrose (D50W) (25 g/50 mL) IV injection 25 g  25 g Intravenous Q15 Min PRN Joe Wing MD        dextrose (GLUTOSE) oral gel 15 g  15 g Oral Q15 Min PRN Joe Wing MD        Enoxaparin Sodium (LOVENOX) syringe 40 mg  40 mg Subcutaneous Daily Joe Wing MD   40 mg at 05/17/24 1541    glucagon (GLUCAGEN) injection 1 mg  1 mg Intramuscular Q15 Min PRN Joe Wing MD        Insulin Lispro (humaLOG) injection 2-7 Units  2-7 Units Subcutaneous 4x Daily AC & at Bedtime Joe Wing MD        Pharmacy to Dose enoxaparin (LOVENOX)   Does not apply Continuous PRN Joe Wing MD        Pharmacy to dose vancomycin   Does not apply Continuous PRN Joe Wing MD        Pharmacy to Dose Zosyn   Does not apply Continuous PRN Joe Wing MD        piperacillin-tazobactam (ZOSYN) IVPB 3.375 g IVPB in 100 mL NS (VTB)  3.375 g Intravenous Once Joe Wing MD   3.375 g at 05/17/24 1539    piperacillin-tazobactam (ZOSYN) IVPB 3.375 g IVPB in 100 mL NS (VTB)  3.375 g Intravenous Q8H Joe Wing MD        sodium chloride 0.9 % flush 10 mL   10 mL Intravenous Q12H Joe Wing MD   10 mL at 05/17/24 1540    sodium chloride 0.9 % flush 10 mL  10 mL Intravenous PRN Joe Wing MD        sodium chloride 0.9 % infusion 40 mL  40 mL Intravenous PRN Joe Wing MD        [START ON 5/18/2024] vancomycin IVPB 1500 mg in 0.9% NaCl (Premix) 500 mL  1,500 mg Intravenous Q12H Joe Wing MD        vancomycin IVPB 2000 mg in 0.9% Sodium Chloride 500 mL  20 mg/kg Intravenous Once Joe Wing  mL/hr at 05/17/24 1540 2,000 mg at 05/17/24 1540     Orders (last 24 hrs)        Start     Ordered    05/18/24 1300  Vancomycin, Trough This is a TIMED level.  Please obtain 5/18@1300.  Ensure vancomycin is NOT infusing when level is drawn.  Timed        Comments: This is a TIMED level.  Please obtain 5/18@1300.  Ensure vancomycin is NOT infusing when level is drawn.      05/17/24 1509    05/18/24 0600  CBC Auto Differential  Morning Draw         05/17/24 1339    05/18/24 0600  Comprehensive Metabolic Panel  Morning Draw         05/17/24 1339    05/18/24 0600  Magnesium  Morning Draw         05/17/24 1339    05/18/24 0300  vancomycin IVPB 1500 mg in 0.9% NaCl (Premix) 500 mL  Every 12 Hours         05/17/24 1507    05/17/24 2030  piperacillin-tazobactam (ZOSYN) IVPB 3.375 g IVPB in 100 mL NS (VTB)  Every 8 Hours         05/17/24 1348    05/17/24 1800  Oral Care  2 Times Daily       05/17/24 1339    05/17/24 1730  Insulin Lispro (humaLOG) injection 2-7 Units  4 Times Daily Before Meals & Nightly         05/17/24 1428    05/17/24 1700  POC Glucose 4x Daily Before Meals & at Bedtime  4 Times Daily Before Meals & at Bedtime      Comments: Complete no more than 45 minutes prior to patient eating      05/17/24 1428    05/17/24 1629  Discontinue Insulin Infusion at Specified Time After Basal Dose Administered  Once        Comments: Discontinue Insulin Infusion at Specified Time After Basal Dose Administered    05/17/24 1428    05/17/24 1629  Discontinue Glucommander IV Insulin  "Order Set After Transition Complete  Once        Comments: Discontinue Glucommander IV Insulin Order Set After Transition Complete    05/17/24 1428    05/17/24 1600  Vital Signs  Every 4 Hours       05/17/24 1339    05/17/24 1500  Enoxaparin Sodium (LOVENOX) syringe 40 mg  Daily         05/17/24 1400    05/17/24 1445  vancomycin IVPB 2000 mg in 0.9% Sodium Chloride 500 mL  Once         05/17/24 1349    05/17/24 1430  sodium chloride 0.9 % flush 10 mL  Every 12 Hours Scheduled         05/17/24 1339    05/17/24 1430  piperacillin-tazobactam (ZOSYN) IVPB 3.375 g IVPB in 100 mL NS (VTB)  Once         05/17/24 1348    05/17/24 1429  Discontinue Glucommander After Transition Complete  Once        Comments: Discontinue Glucommander After Transition Complete  - Click \"Discontinue IV\"  - Click \"Confirm\"    05/17/24 1428    05/17/24 1428  dextrose (GLUTOSE) oral gel 15 g  Every 15 Minutes PRN         05/17/24 1428    05/17/24 1428  dextrose (D50W) (25 g/50 mL) IV injection 25 g  Every 15 Minutes PRN         05/17/24 1428    05/17/24 1428  glucagon (GLUCAGEN) injection 1 mg  Every 15 Minutes PRN         05/17/24 1428    05/17/24 1408  Wound Ostomy Eval & Treat  Once         05/17/24 1408    05/17/24 1400  Strict Intake & Output  Every Hour       05/17/24 1339    05/17/24 1356  MRI Foot Right With & Without Contrast  1 Time Imaging         05/17/24 1355    05/17/24 1355  Blood Culture - Blood, Arm, Right  Once        Placed in \"And\" Linked Group    05/17/24 1354    05/17/24 1355  Blood Culture - Blood, Arm, Left  Once        Comments: From a different site than #1.     Placed in \"And\" Linked Group    05/17/24 1354    05/17/24 1340  Daily Weights  Daily       05/17/24 1339    05/17/24 1340  Bowel Regimen Not Indicated  Once         05/17/24 1339    05/17/24 1339  Pharmacy to Dose Zosyn  Continuous PRN         05/17/24 1339    05/17/24 1339  Pharmacy to dose vancomycin  Continuous PRN         05/17/24 1339    05/17/24 1339  " acetaminophen (TYLENOL) tablet 650 mg  Every 4 Hours PRN         05/17/24 1339    05/17/24 1339  Diet: Cardiac, Diabetic; Healthy Heart (2-3 Na+); Consistent Carbohydrate; Fluid Consistency: Thin (IDDSI 0)  Diet Effective Now         05/17/24 1338    05/17/24 1339  Intake & Output  Every Shift       05/17/24 1339    05/17/24 1339  Weigh Patient  Once         05/17/24 1339    05/17/24 1339  Insert Peripheral IV  Once         05/17/24 1339    05/17/24 1339  Saline Lock & Maintain IV Access  Continuous         05/17/24 1339    05/17/24 1339  Code Status and Medical Interventions:  Continuous         05/17/24 1339    05/17/24 1338  Pharmacy to Dose enoxaparin (LOVENOX)  Continuous PRN         05/17/24 1339    05/17/24 1338  sodium chloride 0.9 % flush 10 mL  As Needed         05/17/24 1339    05/17/24 1338  sodium chloride 0.9 % infusion 40 mL  As Needed         05/17/24 1339    05/17/24 1332  Inpatient Podiatry Consult  Once        Specialty:  Podiatry  Provider:  Balwinder Costa DPM    05/17/24 1331    05/17/24 1209  Inpatient Admission  Once         05/17/24 1208    05/17/24 1209  CBC (No Diff)  Timed         05/17/24 1208    05/17/24 1209  Comprehensive Metabolic Panel  Once         05/17/24 1208    05/17/24 1209  Magnesium  Once         05/17/24 1208    05/17/24 1209  Cardiac Monitoring  Continuous        Comments: Follow Standing Orders As Outlined in Process Instructions (Open Order Report to View Full Instructions)    05/17/24 1208    05/17/24 1209  Lactic Acid, Plasma  Once         05/17/24 1208    05/17/24 1209  Procalcitonin  Once         05/17/24 1208    05/17/24 1209  C-reactive Protein  Once         05/17/24 1208    05/17/24 1209  Sedimentation Rate  Once         05/17/24 1208    05/17/24 1209  Please call the on-call physician as soon as patient arrives in the hospital  Nursing Communication  Once        Comments: Please call the on-call physician as soon as patient arrives in the hospital    05/17/24  1208    05/07/24 0000  pregabalin (LYRICA) 225 MG capsule  2 Times Daily         05/17/24 1409    04/30/24 0000  spironolactone (ALDACTONE) 50 MG tablet  Daily         05/17/24 1410    Unscheduled  Follow Hypoglycemia Standing Orders For Blood Glucose <70 & Notify Provider of Treatment  As Needed      Comments: Follow Hypoglycemia Orders As Outlined in Process Instructions (Open Order Report to View Full Instructions)  Notify Provider Any Time Hypoglycemia Treatment is Administered    05/17/24 1428    --  metFORMIN ER (GLUCOPHAGE-XR) 500 MG 24 hr tablet  Daily With Breakfast         05/17/24 1407                  Physician Progress Notes (last 24 hours)  Notes from 05/16/24 1543 through 05/17/24 1543   No notes of this type exist for this encounter.       Consult Notes (last 24 hours)  Notes from 05/16/24 1543 through 05/17/24 1543   No notes of this type exist for this encounter.

## 2024-05-17 NOTE — PROGRESS NOTES
[facility] Clinical Pharmacy Services: Vancomycin Pharmacokinetic Initial Consult Note    Jd Velazquez is a 58 y.o. male who is on day 1 of pharmacy to dose vancomycin for Bone and/or Joint Infection.    Consult Information  Consulting Provider: Joe Wing MD  Planned Duration of Therapy: 7 days (through ) or TBD  Was Patient Receiving Prior to Admission/Consult?: No  Loading Dose Ordered or Given: ordered for 2000 mg on  at 1445  PK/PD Target: -600 mg/L.hr   Relevant ID History: hx of peripheral vascular disease and abscess of right foot  Other Antimicrobials: Piperacillin/Tazobactam    Imaging Reviewed?: Yes    Microbiology Data  MRSA PCR performed: No; Result: Not ordered due to excluded indication or presence of suspected abscess  Culture/Source:    Wound cx, right foot: Enterococcus faecalis   Blood cx: in process    Vitals/Labs  Ht:  ; Wt:    Temp (24hrs), Av.4 °F (36.9 °C), Min:98.2 °F (36.8 °C), Max:98.6 °F (37 °C)   Estimated Creatinine Clearance: 119.7 mL/min (by C-G formula based on SCr of 0.84 mg/dL).     Results from last 7 days   Lab Units 24  1236 24  1233   CREATININE mg/dL 0.84  --    WBC 10*3/mm3  --  8.76     Assessment/Plan:    Vancomycin Dose:  1500 mg IV every 12 hours; which provides the following predicted parameters:  AUC24,ss: 572 mg/L.hr  PAUC*: 83 %  Ctrough,ss: 17.9 mg/L  Pconc*: 41 %  Tox.: 14 %  Vanc Trough ordered for  at 1300  Patient has order for Complete Metabolic Panel    Pharmacy will follow patient's kidney function and will adjust doses and obtain levels as necessary. Thank you for involving pharmacy in this patient's care. Please contact pharmacy with any questions or concerns.                           Yulissa Charles [facility]  Clinical Pharmacist

## 2024-05-17 NOTE — PLAN OF CARE
Received call from Dr. Costa, podiatry.  Patient with a history of peripheral vascular disease and abscess of right foot.  Patient went for incision and drainage and debridement of right foot on 4/30/2024.  As an outpatient he has been failing PO antibiotics.  There is concern for possible osteomyelitis.  Patient currently in clinic with podiatry, vitals are stable.  Requested for direct admission for MRI to further evaluate for osteomyelitis and IV antibiotics.  Podiatry will follow along, further interventions pending workup.    Electronically signed by Mario Bowman MD, 05/17/24, 10:17 AM EDT.

## 2024-05-17 NOTE — H&P
" [facility]  Date: 2024   Patient Name: Jd Velazquez  : 1965  MRN: [id number]  Primary Care Physician:  Dacia Newsome, SOHAM  Date of admission: 2024    Subjective   Subjective     Chief Complaint: Right foot osteo versus abscess leading to direct admission    HPI:    Jd Velazquez is a 58 y.o. male past medical history of coronary artery disease, type 2 diabetes, dyslipidemia, hyperkalemia, peripheral vascular disease, and obesity who presents with worsening foot wound status post amputation    Patient was in the hospital back in April for amputation of his great toe on his right foot.  He had to come back to the OR for incision and drainage.  The foot is continue not to heal.  He is seen in clinic today.  The incision has not healed completely and there is an open area.  There is concern for tracking to the bone.  It still packed and there is purulence exuding from the wound.  Also has a foul smell.  Result podiatry when the patient in the hospital for continued management and workup for osteomyelitis.    Also reviewed and resident above      Personal History     Past Med  Allergic rhinitis  Coronary artery disease  Type 2 diabetes  Dyslipidemia  Hypertension  Hypokalemia  Obesity with BMI of 32    Past Surgical History:  Amputation of digit  Ankle arthroscopy  And appendectomy  Cardiac catheterization with stent to circumflex  CABG  Femoral-tibial bypass      Family History:   Adopted    Social History:   Every day smoker  No alcohol    Home Medications:  PARVEEN REARDON, Blood Glucose Test Strips 333, DULoxetine, Evolocumab, HYDROcodone-acetaminophen, OneTouch Delica Lancets 33G, Pen [place] 3/16\", Tirzepatide, amLODIPine, ampicillin, aspirin, clopidogrel, levoFLOXacin, levothyroxine, lidocaine, lisinopril-hydrochlorothiazide, metFORMIN, metoprolol tartrate, oxyCODONE-acetaminophen, pregabalin, promethazine, traZODone, and " zolpidem    Allergies:  Allergies   Allergen Reactions    Lortab [Hydrocodone-Acetaminophen] Itching       Objective   Objective     Vitals:   Temp:  [98.2 °F (36.8 °C)-98.6 °F (37 °C)] 98.6 °F (37 °C)  Heart Rate:  [62] 62  Resp:  [16] 16  BP: (133-155)/(67-76) 133/67    Physical Exam    Constitutional: Awake, alert, no acute distress   Eyes: Pupils equal, sclerae anicteric, no conjunctival injection   HENT: NCAT, mucous membranes moist   Neck: Supple, no thyromegaly, no lymphadenopathy, trachea midline   Respiratory: Clear to auscultation bilaterally, nonlabored respirations    Cardiovascular: RRR, no murmurs, rubs, or gallops, palpable pedal pulses bilaterally   Gastrointestinal: Positive bowel sounds, soft, nontender, nondistended   Musculoskeletal: Right foot shows an area of about 2 to 3 inches.  There is an open wound on the incision site.  Foul-smelling discharge   Psychiatric: Appropriate affect, cooperative   Neurologic: Oriented x 3, strength symmetric in all extremities, Cranial Nerves grossly intact to confrontation, speech clear   Skin: No rashes     Result Review    Result Review:  I have personally reviewed the results from the time of this admission to 5/17/2024 13:32 EDT and agree with these findings:  Reviewed imaging and labs      Assessment & Plan   Assessment / Plan     Assessment/Plan:   Right foot diabetic ulcer with concern for osteomyelitis  Type 2 diabetes hyperglycemia  Peripheral vascular disease  Coronary disease with history of MI    Plan:  --Admit to hospital service  -- Consult podiatry  -- Vancomycin and Zosyn  -- Blood cultures  -- CRP and ESR  -- MRI of the right foot to rule out osteomyelitis versus abscess  -- Sliding scale for type 2 diabetes        DVT prophylaxis:  Lovenox        CODE STATUS:     Full code    Admission Status:  I believe this patient meets admission status.    Electronically signed by Joe Wing MD, 05/17/24, 1:32 PM EDT.

## 2024-05-17 NOTE — PLAN OF CARE
Goal Outcome Evaluation:    AAO X4. TOLERATING DIET, ROOM AIR, & ACTIVITY AD SPENCER.     RIGHT FOOT WOUND DSG REMOVED, PICTURE TAKEN & W/D DSG APPLIED PER PROTOCOL.    CM REFUSED.    HOME BIPAP HERE FOR PRN USE.

## 2024-05-18 ENCOUNTER — APPOINTMENT (OUTPATIENT)
Dept: MRI IMAGING | Facility: HOSPITAL | Age: 59
DRG: 617 | End: 2024-05-18
Payer: COMMERCIAL

## 2024-05-18 LAB
ALBUMIN SERPL-MCNC: 3.5 G/DL (ref 3.5–5.2)
ALBUMIN/GLOB SERPL: 1 G/DL
ALP SERPL-CCNC: 107 U/L (ref 39–117)
ALT SERPL W P-5'-P-CCNC: 5 U/L (ref 1–41)
ANION GAP SERPL CALCULATED.3IONS-SCNC: 11.1 MMOL/L (ref 5–15)
AST SERPL-CCNC: 7 U/L (ref 1–40)
BASOPHILS # BLD AUTO: 0.06 10*3/MM3 (ref 0–0.2)
BASOPHILS NFR BLD AUTO: 1 % (ref 0–1.5)
BILIRUB SERPL-MCNC: 0.3 MG/DL (ref 0–1.2)
BUN SERPL-MCNC: 9 MG/DL (ref 6–20)
BUN/CREAT SERPL: 12 (ref 7–25)
CALCIUM SPEC-SCNC: 8.7 MG/DL (ref 8.6–10.5)
CHLORIDE SERPL-SCNC: 101 MMOL/L (ref 98–107)
CO2 SERPL-SCNC: 23.9 MMOL/L (ref 22–29)
CREAT SERPL-MCNC: 0.75 MG/DL (ref 0.76–1.27)
DEPRECATED RDW RBC AUTO: 48.7 FL (ref 37–54)
EGFRCR SERPLBLD CKD-EPI 2021: 105.3 ML/MIN/1.73
EOSINOPHIL # BLD AUTO: 0.12 10*3/MM3 (ref 0–0.4)
EOSINOPHIL NFR BLD AUTO: 2 % (ref 0.3–6.2)
ERYTHROCYTE [DISTWIDTH] IN BLOOD BY AUTOMATED COUNT: 14.6 % (ref 12.3–15.4)
GLOBULIN UR ELPH-MCNC: 3.6 GM/DL
GLUCOSE BLDC GLUCOMTR-MCNC: 171 MG/DL (ref 70–99)
GLUCOSE BLDC GLUCOMTR-MCNC: 180 MG/DL (ref 70–99)
GLUCOSE BLDC GLUCOMTR-MCNC: 235 MG/DL (ref 70–99)
GLUCOSE BLDC GLUCOMTR-MCNC: 239 MG/DL (ref 70–99)
GLUCOSE SERPL-MCNC: 175 MG/DL (ref 65–99)
HCT VFR BLD AUTO: 35.5 % (ref 37.5–51)
HGB BLD-MCNC: 11.3 G/DL (ref 13–17.7)
IMM GRANULOCYTES # BLD AUTO: 0.06 10*3/MM3 (ref 0–0.05)
IMM GRANULOCYTES NFR BLD AUTO: 1 % (ref 0–0.5)
LYMPHOCYTES # BLD AUTO: 2.49 10*3/MM3 (ref 0.7–3.1)
LYMPHOCYTES NFR BLD AUTO: 40.6 % (ref 19.6–45.3)
MAGNESIUM SERPL-MCNC: 1.6 MG/DL (ref 1.6–2.6)
MCH RBC QN AUTO: 29 PG (ref 26.6–33)
MCHC RBC AUTO-ENTMCNC: 31.8 G/DL (ref 31.5–35.7)
MCV RBC AUTO: 91.3 FL (ref 79–97)
MONOCYTES # BLD AUTO: 0.88 10*3/MM3 (ref 0.1–0.9)
MONOCYTES NFR BLD AUTO: 14.4 % (ref 5–12)
NEUTROPHILS NFR BLD AUTO: 2.52 10*3/MM3 (ref 1.7–7)
NEUTROPHILS NFR BLD AUTO: 41 % (ref 42.7–76)
NRBC BLD AUTO-RTO: 0 /100 WBC (ref 0–0.2)
PLAT MORPH BLD: NORMAL
PLATELET # BLD AUTO: 393 10*3/MM3 (ref 140–450)
PMV BLD AUTO: 9.1 FL (ref 6–12)
POTASSIUM SERPL-SCNC: 4.1 MMOL/L (ref 3.5–5.2)
PROT SERPL-MCNC: 7.1 G/DL (ref 6–8.5)
RBC # BLD AUTO: 3.89 10*6/MM3 (ref 4.14–5.8)
RBC MORPH BLD: NORMAL
SODIUM SERPL-SCNC: 136 MMOL/L (ref 136–145)
VANCOMYCIN TROUGH SERPL-MCNC: 10.4 MCG/ML (ref 5–20)
WBC MORPH BLD: NORMAL
WBC NRBC COR # BLD AUTO: 6.13 10*3/MM3 (ref 3.4–10.8)

## 2024-05-18 PROCEDURE — 85007 BL SMEAR W/DIFF WBC COUNT: CPT | Performed by: INTERNAL MEDICINE

## 2024-05-18 PROCEDURE — 80053 COMPREHEN METABOLIC PANEL: CPT | Performed by: INTERNAL MEDICINE

## 2024-05-18 PROCEDURE — 82948 REAGENT STRIP/BLOOD GLUCOSE: CPT

## 2024-05-18 PROCEDURE — 0 GADOBENATE DIMEGLUMINE 529 MG/ML SOLUTION: Performed by: INTERNAL MEDICINE

## 2024-05-18 PROCEDURE — 25010000002 MAGNESIUM SULFATE 4 GM/100ML SOLUTION: Performed by: INTERNAL MEDICINE

## 2024-05-18 PROCEDURE — 99233 SBSQ HOSP IP/OBS HIGH 50: CPT | Performed by: INTERNAL MEDICINE

## 2024-05-18 PROCEDURE — 63710000001 INSULIN DETEMIR PER 5 UNITS: Performed by: INTERNAL MEDICINE

## 2024-05-18 PROCEDURE — 85025 COMPLETE CBC W/AUTO DIFF WBC: CPT | Performed by: INTERNAL MEDICINE

## 2024-05-18 PROCEDURE — A9577 INJ MULTIHANCE: HCPCS | Performed by: INTERNAL MEDICINE

## 2024-05-18 PROCEDURE — 25010000002 PIPERACILLIN SOD-TAZOBACTAM PER 1 G: Performed by: INTERNAL MEDICINE

## 2024-05-18 PROCEDURE — 80202 ASSAY OF VANCOMYCIN: CPT | Performed by: INTERNAL MEDICINE

## 2024-05-18 PROCEDURE — 25010000002 ENOXAPARIN PER 10 MG: Performed by: INTERNAL MEDICINE

## 2024-05-18 PROCEDURE — 73720 MRI LWR EXTREMITY W/O&W/DYE: CPT

## 2024-05-18 PROCEDURE — 25010000002 VANCOMYCIN 5 G RECONSTITUTED SOLUTION: Performed by: INTERNAL MEDICINE

## 2024-05-18 PROCEDURE — 63710000001 INSULIN LISPRO (HUMAN) PER 5 UNITS: Performed by: INTERNAL MEDICINE

## 2024-05-18 PROCEDURE — 25810000003 SODIUM CHLORIDE 0.9 % SOLUTION: Performed by: INTERNAL MEDICINE

## 2024-05-18 PROCEDURE — 83735 ASSAY OF MAGNESIUM: CPT | Performed by: INTERNAL MEDICINE

## 2024-05-18 RX ORDER — HYDROCHLOROTHIAZIDE 12.5 MG/1
12.5 TABLET ORAL
Status: DISCONTINUED | OUTPATIENT
Start: 2024-05-18 | End: 2024-05-24 | Stop reason: HOSPADM

## 2024-05-18 RX ORDER — LISINOPRIL 20 MG/1
20 TABLET ORAL
Status: DISCONTINUED | OUTPATIENT
Start: 2024-05-18 | End: 2024-05-24 | Stop reason: HOSPADM

## 2024-05-18 RX ORDER — VANCOMYCIN/0.9 % SOD CHLORIDE 1.5G/250ML
1500 PLASTIC BAG, INJECTION (ML) INTRAVENOUS EVERY 8 HOURS
Status: DISCONTINUED | OUTPATIENT
Start: 2024-05-18 | End: 2024-05-19

## 2024-05-18 RX ORDER — MAGNESIUM SULFATE HEPTAHYDRATE 40 MG/ML
4 INJECTION, SOLUTION INTRAVENOUS ONCE
Status: COMPLETED | OUTPATIENT
Start: 2024-05-18 | End: 2024-05-18

## 2024-05-18 RX ADMIN — PREGABALIN 225 MG: 75 CAPSULE ORAL at 20:47

## 2024-05-18 RX ADMIN — ENOXAPARIN SODIUM 40 MG: 100 INJECTION SUBCUTANEOUS at 09:27

## 2024-05-18 RX ADMIN — CLOPIDOGREL BISULFATE 75 MG: 75 TABLET ORAL at 09:27

## 2024-05-18 RX ADMIN — MAGNESIUM SULFATE HEPTAHYDRATE 4 G: 4 INJECTION, SOLUTION INTRAVENOUS at 17:25

## 2024-05-18 RX ADMIN — INSULIN LISPRO 2 UNITS: 100 INJECTION, SOLUTION INTRAVENOUS; SUBCUTANEOUS at 17:51

## 2024-05-18 RX ADMIN — INSULIN LISPRO 3 UNITS: 100 INJECTION, SOLUTION INTRAVENOUS; SUBCUTANEOUS at 12:50

## 2024-05-18 RX ADMIN — GADOBENATE DIMEGLUMINE 20 ML: 529 INJECTION, SOLUTION INTRAVENOUS at 07:39

## 2024-05-18 RX ADMIN — Medication 10 ML: at 09:28

## 2024-05-18 RX ADMIN — Medication 10 ML: at 20:47

## 2024-05-18 RX ADMIN — VANCOMYCIN HYDROCHLORIDE 1500 MG: 5 INJECTION, POWDER, LYOPHILIZED, FOR SOLUTION INTRAVENOUS at 22:50

## 2024-05-18 RX ADMIN — INSULIN DETEMIR 15 UNITS: 100 INJECTION, SOLUTION SUBCUTANEOUS at 20:47

## 2024-05-18 RX ADMIN — VANCOMYCIN HYDROCHLORIDE 1500 MG: 5 INJECTION, POWDER, LYOPHILIZED, FOR SOLUTION INTRAVENOUS at 02:12

## 2024-05-18 RX ADMIN — PIPERACILLIN SODIUM AND TAZOBACTAM SODIUM 3.38 G: 3; .375 INJECTION, POWDER, LYOPHILIZED, FOR SOLUTION INTRAVENOUS at 03:45

## 2024-05-18 RX ADMIN — DULOXETINE HYDROCHLORIDE 30 MG: 30 CAPSULE, DELAYED RELEASE ORAL at 09:27

## 2024-05-18 RX ADMIN — PIPERACILLIN SODIUM AND TAZOBACTAM SODIUM 3.38 G: 3; .375 INJECTION, POWDER, LYOPHILIZED, FOR SOLUTION INTRAVENOUS at 12:00

## 2024-05-18 RX ADMIN — INSULIN LISPRO 3 UNITS: 100 INJECTION, SOLUTION INTRAVENOUS; SUBCUTANEOUS at 20:47

## 2024-05-18 RX ADMIN — ASPIRIN 81 MG: 81 TABLET, CHEWABLE ORAL at 09:27

## 2024-05-18 RX ADMIN — PIPERACILLIN SODIUM AND TAZOBACTAM SODIUM 3.38 G: 3; .375 INJECTION, POWDER, LYOPHILIZED, FOR SOLUTION INTRAVENOUS at 20:48

## 2024-05-18 RX ADMIN — INSULIN LISPRO 2 UNITS: 100 INJECTION, SOLUTION INTRAVENOUS; SUBCUTANEOUS at 09:27

## 2024-05-18 RX ADMIN — LISINOPRIL 20 MG: 20 TABLET ORAL at 17:51

## 2024-05-18 RX ADMIN — PREGABALIN 225 MG: 75 CAPSULE ORAL at 09:27

## 2024-05-18 RX ADMIN — HYDROCHLOROTHIAZIDE 12.5 MG: 12.5 TABLET ORAL at 17:51

## 2024-05-18 RX ADMIN — VANCOMYCIN HYDROCHLORIDE 1500 MG: 5 INJECTION, POWDER, LYOPHILIZED, FOR SOLUTION INTRAVENOUS at 17:25

## 2024-05-18 RX ADMIN — METOPROLOL TARTRATE 100 MG: 50 TABLET, FILM COATED ORAL at 20:47

## 2024-05-18 NOTE — PROGRESS NOTES
[facility] Clinical Pharmacy Services: Vancomycin Pharmacokinetic Consult Note    Jd Velazquez is a 58 y.o. male who is on day 2 of pharmacy to dose vancomycin for Bone and/or Joint Infection.    Consult Information  Consulting Provider: Joe Wing MD  Planned Duration of Therapy: 7 days (through ) or TBD  Was Patient Receiving Prior to Admission/Consult?: No  Loading Dose Ordered or Given: ordered for 2000 mg on  at 1445  PK/PD Target: -600 mg/L.hr   Relevant ID History: hx of peripheral vascular disease and abscess of right foot  Other Antimicrobials: Piperacillin/Tazobactam    Imaging Reviewed?: Yes    Microbiology Data  MRSA PCR performed: No; Result: Not ordered due to excluded indication or presence of suspected abscess  Culture/Source:    Wound cx, right foot: Enterococcus faecalis   Blood cx: in process    Vitals/Labs  Ht:  ; Wt:    Temp (24hrs), Av.3 °F (36.8 °C), Min:98.1 °F (36.7 °C), Max:98.6 °F (37 °C)   Estimated Creatinine Clearance: 134 mL/min (A) (by C-G formula based on SCr of 0.75 mg/dL (L)).     Results from last 7 days   Lab Units 24  1304 24  0555 24  1236 24  1233   VANCOMYCIN TR mcg/mL 10.40  --   --   --    CREATININE mg/dL  --  0.75* 0.84  --    WBC 10*3/mm3  --  6.13  --  8.76     Assessment/Plan:    Increasing dose to 1500 mg every 8 hours for better AUC.     Vancomycin Dose:  1500 mg IV every 8 hours; which provides the following predicted parameters:  Exposure target: AUC24 (range)400-600 mg/L.hr   AUC24,ss: 557 mg/L.hr  PAUC*: 98 %  Ctrough,ss: 15.1 mg/L  Pconc*: 7 %  Tox.: 10 %  Vancomycin random level ordered for  with morning labs   Patient has order for Basic Metabolic Panel     Pharmacy will follow patient's kidney function and will adjust doses and obtain levels as necessary. Thank you for involving pharmacy in this patient's care. Please contact pharmacy with any questions or concerns.                            Terence Ornelas, [facility]  Clinical Pharmacist

## 2024-05-18 NOTE — PROGRESS NOTES
[facility]   Hospitalist Progress Note  Date: 2024  Patient Name: Jd Velazquez  : 1965  MRN: [id number]  Date of admission: 2024  Consultants:   -Podiatry: Dr. Mushtaq Mathew    Subjective   Subjective     Chief Complaint: Right foot osteo versus abscess leading to direct admission    Summary:   Jd Velazquez is a 58 y.o. male with CAD, type 2 diabetes mellitus, dyslipidemia, hyperkalemia, peripheral vascular disease and obesity (BMI: 32.36) that presented with worsening right foot wound s/p amputation of great toe in 2024.  Podiatry consulted to assist in care.  Broad-spectrum antibiotics started.    Interval Followup:   No acute events overnight.  Patient's pain well-controlled.  Denies any chest pain or shortness of breath.  Nursing with no additional acute issues to report.    Antibiotics:   -Vancomycin  -Zosyn    Objective   Objective     Vitals:   Temp:  [98.1 °F (36.7 °C)-98.6 °F (37 °C)] 98.6 °F (37 °C)  Heart Rate:  [57-71] 71  Resp:  [14-16] 16  BP: (125-162)/(62-81) 147/74  Physical Exam   Gen: No acute distress, Conversant, Pleasant, lying in bed  Resp: CTAB, No w/r/r, No respiratory distress appreciated  Card: RRR, No m/r/g  Abd: Soft, Nontender, Nondistended, + bowel sounds  Ext: Right foot with dressing in place    Result Review    Result Review:  I have personally reviewed the results as below and agree with these findings:  []  Laboratory:   CMP          2024    17:28 2024    12:36 2024    05:55   CMP   Glucose 216  229  175    BUN 12  11  9    Creatinine 0.83  0.84  0.75    EGFR 102.1  101.7  105.3    Sodium 134  135  136    Potassium 4.0  4.4  4.1    Chloride 96  99  101    Calcium 9.2  9.4  8.7    Total Protein 7.8  7.3  7.1    Albumin 4.1  3.7  3.5    Globulin 3.7  3.6  3.6    Total Bilirubin 0.3  0.3  0.3    Alkaline Phosphatase 129  107  107    AST (SGOT) 6  5  7    ALT (SGPT) 8  5  5    Albumin/Globulin Ratio 1.1  1.0  1.0     BUN/Creatinine Ratio 14.5  13.1  12.0    Anion Gap 16.5  11.2  11.1      CBC          4/28/2024    17:28 5/17/2024    12:33 5/18/2024    05:55   CBC   WBC 9.42  8.76  6.13    RBC 4.64  3.94  3.89    Hemoglobin 13.6  11.5  11.3    Hematocrit 40.9  36.2  35.5    MCV 88.1  91.9  91.3    MCH 29.3  29.2  29.0    MCHC 33.3  31.8  31.8    RDW 14.5  14.8  14.6    Platelets 498  413  393    Magnesium low.  [x]  Microbiology: Wound culture (05/13/2024): Enterococcus faecalis.  Blood culture (05/17/2024): No growth to date.  []  Radiology:   []  EKG/Telemetry:    []  Cardiology/Vascular:    []  Pathology:  []  Old records:  []  Other:    Assessment & Plan   Assessment / Plan     Assessment:  Right foot diabetic ulcer with concern for osteomyelitis  Type 2 diabetes mellitus with hyperglycemia  Peripheral vascular disease  Hypomagnesemia  CAD  Obesity (BMI: 33.36)    Plan:  -Podiatry consulted and following, appreciate assistance and recommendations in the care of this patient.  -MRI right foot being obtained.  Follow-up results  -Continue broad-spectrum antibiotics for Zosyn.  Tailor based on results of infectious workup.  Monitor vancomycin level.  -Continue Levemir and SSI.  Monitor blood glucose level and SSI requirement and titrate insulin regimen accordingly  -Replace magnesium IV  -Continue aspirin and Plavix  -Continue appropriate home medications  -Care discussed with podiatry.  Pending MRI results will determine if surgical intervention is necessary.  -Will monitor electrolytes and renal function with BMP and magnesium level in the AM  -Will monitor WBC and Hgb with CBC in the AM  -Clinical course will dictate further management     DVT Prophylaxis: Lovenox  Diet:   Diet Order   Procedures    Diet: Cardiac, Diabetic; Healthy Heart (2-3 Na+); Consistent Carbohydrate; Fluid Consistency: Thin (IDDSI 0)     Dispo: Pending clinical course     Personally reviewed patients labs and imaging, discussed with patient and nurse  at bedside. Discussed management with the following consultants: Podiatry.     Part of this note may be an electronic transcription/translation of spoken language to printed text using the Dragon dictation system.    DVT prophylaxis:  Medical DVT prophylaxis orders are present.        CODE STATUS:   Level Of Support Discussed With: Patient  Code Status (Patient has no pulse and is not breathing): CPR (Attempt to Resuscitate)  Medical Interventions (Patient has pulse or is breathing): Full Support        Electronically signed by Art Stevenson MD, 05/18/24, 3:33 PM EDT.

## 2024-05-18 NOTE — PLAN OF CARE
Goal Outcome Evaluation:  Plan of Care Reviewed With: patient        Progress: no change  Outcome Evaluation: Vss. No complaints of pain. Difficulty sleeping. No acute changes. Will continue plan of care.

## 2024-05-18 NOTE — PLAN OF CARE
Problem: Adult Inpatient Plan of Care  Goal: Plan of Care Review  Outcome: Ongoing, Progressing  Flowsheets (Taken 5/18/2024 1609)  Progress: no change  Plan of Care Reviewed With: patient  Outcome Evaluation: Patient is alert and oriented x4. Patient has had no complaints this shift.  Goal: Patient-Specific Goal (Individualized)  Outcome: Ongoing, Progressing  Goal: Absence of Hospital-Acquired Illness or Injury  Outcome: Ongoing, Progressing  Intervention: Identify and Manage Fall Risk  Recent Flowsheet Documentation  Taken 5/18/2024 1500 by Brianna Holder RN  Safety Promotion/Fall Prevention:   nonskid shoes/slippers when out of bed   safety round/check completed  Taken 5/18/2024 1250 by Brianna Holder RN  Safety Promotion/Fall Prevention:   nonskid shoes/slippers when out of bed   safety round/check completed  Taken 5/18/2024 1202 by Brianna Holder RN  Safety Promotion/Fall Prevention:   nonskid shoes/slippers when out of bed   safety round/check completed  Taken 5/18/2024 0928 by Brianna Holder RN  Safety Promotion/Fall Prevention:   nonskid shoes/slippers when out of bed   safety round/check completed  Taken 5/18/2024 0811 by Brianna Holder RN  Safety Promotion/Fall Prevention:   assistive device/personal items within reach   clutter free environment maintained   lighting adjusted   nonskid shoes/slippers when out of bed   room organization consistent   safety round/check completed  Taken 5/18/2024 0700 by Brianna Holder RN  Safety Promotion/Fall Prevention:   nonskid shoes/slippers when out of bed   safety round/check completed  Intervention: Prevent Infection  Recent Flowsheet Documentation  Taken 5/18/2024 0811 by Brianna Holder RN  Infection Prevention:   cohorting utilized   environmental surveillance performed   equipment surfaces disinfected   hand hygiene promoted   personal protective equipment utilized   rest/sleep promoted   single patient room provided  Goal: Optimal Comfort and  Wellbeing  Outcome: Ongoing, Progressing  Intervention: Provide Person-Centered Care  Recent Flowsheet Documentation  Taken 5/18/2024 0811 by Brianna Holder, RN  Trust Relationship/Rapport:   care explained   choices provided   emotional support provided   empathic listening provided   questions answered   questions encouraged   reassurance provided   thoughts/feelings acknowledged  Goal: Readiness for Transition of Care  Outcome: Ongoing, Progressing     Problem: Impaired Wound Healing  Goal: Optimal Wound Healing  Outcome: Ongoing, Progressing   Goal Outcome Evaluation:  Plan of Care Reviewed With: patient        Progress: no change  Outcome Evaluation: Patient is alert and oriented x4. Patient has had no complaints this shift.

## 2024-05-19 LAB
ANION GAP SERPL CALCULATED.3IONS-SCNC: 11.4 MMOL/L (ref 5–15)
BUN SERPL-MCNC: 12 MG/DL (ref 6–20)
BUN/CREAT SERPL: 16 (ref 7–25)
CALCIUM SPEC-SCNC: 9.2 MG/DL (ref 8.6–10.5)
CHLORIDE SERPL-SCNC: 97 MMOL/L (ref 98–107)
CO2 SERPL-SCNC: 24.6 MMOL/L (ref 22–29)
CREAT SERPL-MCNC: 0.75 MG/DL (ref 0.76–1.27)
DEPRECATED RDW RBC AUTO: 47.9 FL (ref 37–54)
EGFRCR SERPLBLD CKD-EPI 2021: 105.3 ML/MIN/1.73
ERYTHROCYTE [DISTWIDTH] IN BLOOD BY AUTOMATED COUNT: 14.5 % (ref 12.3–15.4)
GLUCOSE BLDC GLUCOMTR-MCNC: 117 MG/DL (ref 70–99)
GLUCOSE BLDC GLUCOMTR-MCNC: 186 MG/DL (ref 70–99)
GLUCOSE BLDC GLUCOMTR-MCNC: 192 MG/DL (ref 70–99)
GLUCOSE BLDC GLUCOMTR-MCNC: 218 MG/DL (ref 70–99)
GLUCOSE SERPL-MCNC: 153 MG/DL (ref 65–99)
HCT VFR BLD AUTO: 36 % (ref 37.5–51)
HGB BLD-MCNC: 11.6 G/DL (ref 13–17.7)
MAGNESIUM SERPL-MCNC: 2 MG/DL (ref 1.6–2.6)
MCH RBC QN AUTO: 29.1 PG (ref 26.6–33)
MCHC RBC AUTO-ENTMCNC: 32.2 G/DL (ref 31.5–35.7)
MCV RBC AUTO: 90.5 FL (ref 79–97)
PHOSPHATE SERPL-MCNC: 3 MG/DL (ref 2.5–4.5)
PLATELET # BLD AUTO: 423 10*3/MM3 (ref 140–450)
PMV BLD AUTO: 9.2 FL (ref 6–12)
POTASSIUM SERPL-SCNC: 4.1 MMOL/L (ref 3.5–5.2)
RBC # BLD AUTO: 3.98 10*6/MM3 (ref 4.14–5.8)
SODIUM SERPL-SCNC: 133 MMOL/L (ref 136–145)
WBC NRBC COR # BLD AUTO: 6.78 10*3/MM3 (ref 3.4–10.8)

## 2024-05-19 PROCEDURE — 80048 BASIC METABOLIC PNL TOTAL CA: CPT | Performed by: INTERNAL MEDICINE

## 2024-05-19 PROCEDURE — 25810000003 SODIUM CHLORIDE 0.9 % SOLUTION: Performed by: INTERNAL MEDICINE

## 2024-05-19 PROCEDURE — 25010000002 VANCOMYCIN 5 G RECONSTITUTED SOLUTION: Performed by: INTERNAL MEDICINE

## 2024-05-19 PROCEDURE — 82948 REAGENT STRIP/BLOOD GLUCOSE: CPT

## 2024-05-19 PROCEDURE — 63710000001 INSULIN DETEMIR PER 5 UNITS: Performed by: INTERNAL MEDICINE

## 2024-05-19 PROCEDURE — 63710000001 INSULIN LISPRO (HUMAN) PER 5 UNITS: Performed by: INTERNAL MEDICINE

## 2024-05-19 PROCEDURE — 99233 SBSQ HOSP IP/OBS HIGH 50: CPT | Performed by: INTERNAL MEDICINE

## 2024-05-19 PROCEDURE — 83735 ASSAY OF MAGNESIUM: CPT | Performed by: INTERNAL MEDICINE

## 2024-05-19 PROCEDURE — 25010000002 ENOXAPARIN PER 10 MG: Performed by: INTERNAL MEDICINE

## 2024-05-19 PROCEDURE — 25010000002 PIPERACILLIN SOD-TAZOBACTAM PER 1 G: Performed by: INTERNAL MEDICINE

## 2024-05-19 PROCEDURE — 85027 COMPLETE CBC AUTOMATED: CPT | Performed by: INTERNAL MEDICINE

## 2024-05-19 PROCEDURE — 99232 SBSQ HOSP IP/OBS MODERATE 35: CPT | Performed by: PODIATRIST

## 2024-05-19 PROCEDURE — 84100 ASSAY OF PHOSPHORUS: CPT | Performed by: INTERNAL MEDICINE

## 2024-05-19 PROCEDURE — 82948 REAGENT STRIP/BLOOD GLUCOSE: CPT | Performed by: INTERNAL MEDICINE

## 2024-05-19 RX ORDER — ZOLPIDEM TARTRATE 5 MG/1
10 TABLET ORAL NIGHTLY PRN
Status: DISCONTINUED | OUTPATIENT
Start: 2024-05-19 | End: 2024-05-24 | Stop reason: HOSPADM

## 2024-05-19 RX ORDER — VANCOMYCIN/0.9 % SOD CHLORIDE 1.5G/250ML
1500 PLASTIC BAG, INJECTION (ML) INTRAVENOUS EVERY 8 HOURS
Status: DISCONTINUED | OUTPATIENT
Start: 2024-05-19 | End: 2024-05-20

## 2024-05-19 RX ADMIN — PREGABALIN 225 MG: 75 CAPSULE ORAL at 08:35

## 2024-05-19 RX ADMIN — ASPIRIN 81 MG: 81 TABLET, CHEWABLE ORAL at 08:34

## 2024-05-19 RX ADMIN — DULOXETINE HYDROCHLORIDE 30 MG: 30 CAPSULE, DELAYED RELEASE ORAL at 08:35

## 2024-05-19 RX ADMIN — INSULIN DETEMIR 15 UNITS: 100 INJECTION, SOLUTION SUBCUTANEOUS at 08:35

## 2024-05-19 RX ADMIN — Medication 10 ML: at 21:07

## 2024-05-19 RX ADMIN — Medication 10 ML: at 08:37

## 2024-05-19 RX ADMIN — INSULIN DETEMIR 20 UNITS: 100 INJECTION, SOLUTION SUBCUTANEOUS at 21:06

## 2024-05-19 RX ADMIN — INSULIN LISPRO 3 UNITS: 100 INJECTION, SOLUTION INTRAVENOUS; SUBCUTANEOUS at 13:07

## 2024-05-19 RX ADMIN — METOPROLOL TARTRATE 100 MG: 50 TABLET, FILM COATED ORAL at 21:05

## 2024-05-19 RX ADMIN — VANCOMYCIN HYDROCHLORIDE 1500 MG: 5 INJECTION, POWDER, LYOPHILIZED, FOR SOLUTION INTRAVENOUS at 06:34

## 2024-05-19 RX ADMIN — CLOPIDOGREL BISULFATE 75 MG: 75 TABLET ORAL at 08:35

## 2024-05-19 RX ADMIN — PIPERACILLIN SODIUM AND TAZOBACTAM SODIUM 3.38 G: 3; .375 INJECTION, POWDER, LYOPHILIZED, FOR SOLUTION INTRAVENOUS at 21:07

## 2024-05-19 RX ADMIN — PIPERACILLIN SODIUM AND TAZOBACTAM SODIUM 3.38 G: 3; .375 INJECTION, POWDER, LYOPHILIZED, FOR SOLUTION INTRAVENOUS at 04:27

## 2024-05-19 RX ADMIN — PIPERACILLIN SODIUM AND TAZOBACTAM SODIUM 3.38 G: 3; .375 INJECTION, POWDER, LYOPHILIZED, FOR SOLUTION INTRAVENOUS at 12:21

## 2024-05-19 RX ADMIN — VANCOMYCIN HYDROCHLORIDE 1500 MG: 5 INJECTION, POWDER, LYOPHILIZED, FOR SOLUTION INTRAVENOUS at 16:29

## 2024-05-19 RX ADMIN — ZOLPIDEM TARTRATE 10 MG: 5 TABLET ORAL at 21:04

## 2024-05-19 RX ADMIN — ENOXAPARIN SODIUM 40 MG: 100 INJECTION SUBCUTANEOUS at 08:35

## 2024-05-19 RX ADMIN — INSULIN LISPRO 2 UNITS: 100 INJECTION, SOLUTION INTRAVENOUS; SUBCUTANEOUS at 21:06

## 2024-05-19 RX ADMIN — METOPROLOL TARTRATE 100 MG: 50 TABLET, FILM COATED ORAL at 08:34

## 2024-05-19 RX ADMIN — VANCOMYCIN HYDROCHLORIDE 1500 MG: 5 INJECTION, POWDER, LYOPHILIZED, FOR SOLUTION INTRAVENOUS at 23:36

## 2024-05-19 RX ADMIN — LISINOPRIL 20 MG: 20 TABLET ORAL at 08:35

## 2024-05-19 RX ADMIN — HYDROCHLOROTHIAZIDE 12.5 MG: 12.5 TABLET ORAL at 08:35

## 2024-05-19 RX ADMIN — INSULIN LISPRO 2 UNITS: 100 INJECTION, SOLUTION INTRAVENOUS; SUBCUTANEOUS at 08:35

## 2024-05-19 RX ADMIN — PREGABALIN 225 MG: 75 CAPSULE ORAL at 21:05

## 2024-05-19 NOTE — CONSULTS
[facility]   HISTORY AND PHYSICAL    Patient Name: Jd Velazquez  : 1965  MRN: [id number]  Primary Care Physician:  Dacia Newsome APRN  Date of admission: 2024    Subjective   Subjective     Chief Complaint: Right 1st ray ulceration from nonhealing wound.    HPI:    Jd Velazquez is a 58 y.o. male     Previous Right hallux amputation by Dr. Costa April 10, 2024.    Nonhealing wound along the surgical site.  Patient was admitted on 17 May 2024 due to nonhealing wound.  On admission there is purulence exuding from the wound.  Patient was admitted by the hospitalist for further workup for osteomyelitis.    Patient denies any fevers, chills, nausea, vomiting, shortness of breathe, nor any other constitutional signs nor symptoms.    Review of Systems   All systems were reviewed and negative except for: Right first ray unhealing wound    Personal History     Past Medical History:   Diagnosis Date    Allergic rhinitis 2015    Anesthesia     DIFFICULTY WAKING UP POST SURGERY    CAD (coronary artery disease)     DENIES CP/SOA.    Diabetes mellitus     Hyperlipidemia     Hypertension     Hypothyroidism 2014    Insomnia, unspecified 06/15/2016    WITHOUT BIPAP    Microalbuminuria due to type 2 diabetes mellitus 10/15/2015    Mixed hyperlipidemia 10/15/2015    Myocardial infarction     Obesity     BMI 32    SHAHLA (obstructive sleep apnea)     WEARS BIPAP    Right great toe amputee     2024; 2024 ADMITTED WITH INFECTION    Skin cancer     REMOVED    Sleep apnea        Past Surgical History:   Procedure Laterality Date    AMPUTATION DIGIT Right 4/10/2024    Procedure: AMPUTATION DIGIT RIGHT GREAT TOE;  Surgeon: Balwinder Costa DPM;  Location: [facility];  Service: Podiatry;  Laterality: Right;    ANKLE ARTHROSCOPY W/ OPEN REPAIR      APPENDECTOMY      CARDIAC CATHETERIZATION      PCI to circumflex    CARDIAC CATHETERIZATION Right 10/26/2023    Procedure:  Aortogram with right leg angiogram, possible angioplasty or stenting;  Surgeon: Robert Puga MD;  Location: [facility] CATH INVASIVE LOCATION;  Service: Vascular;  Laterality: Right;    CARDIAC CATHETERIZATION Right 12/15/2023    Procedure: Right leg angiogram, possible angioplasty or stenting;  Surgeon: Robert Puga MD;  Location: [facility] CATH INVASIVE LOCATION;  Service: Vascular;  Laterality: Right;    CORONARY ARTERY BYPASS GRAFT N/A 10/08/2020    Procedure: STERNOTOMY, CORONARY ARTERY BYPASS GRAFTING TIME 4 WITH LEFT KELSI  AND ENDOSCOPICALLY HARVESTED LEFT GREATER SAPHENOUS VEIN AND PRP.;  Surgeon: Jr Girma Chiu MD;  Location: [facility] OR;  Service: Cardiothoracic;  Laterality: N/A;    FEMORAL TIBIAL BYPASS Right 01/09/2024    Procedure: Right femoral-tibial bypass graft;  Surgeon: Robert Puga MD;  Location: [facility] OR;  Service: Vascular;  Laterality: Right;    HEEL SPUR SURGERY      HERNIA REPAIR      INCISION AND DRAINAGE OF WOUND Right 4/30/2024    Procedure: INCISION AND DRAINAGE WOUND RIGHT FOOT, DEBRIDEMENT OF WOUND RIGHT FOOT, AMPUTATION REVISION RIGHT FOOT;  Surgeon: Balwinder Costa DPM;  Location: [facility] OR;  Service: Podiatry;  Laterality: Right;    KNEE ARTHROSCOPY      MULTIPLE TIMES ON BOTH KNEES    SKIN CANCER EXCISION      BACK    TOE SURGERY Right     DEBRIDMENT RIGHT GREAT TOE    TONSILLECTOMY         Family History: family history includes Heart disease in an other family member. He was adopted. Otherwise pertinent FHx was reviewed and not pertinent to current issue.    Social History:  reports that he has been smoking cigarettes. He has a 3.8 pack-year smoking history. He has never been exposed to tobacco smoke. He has never used smokeless tobacco. He reports that he does not currently use alcohol. He reports that he does not use drugs.    Home Medications:  BASAGLAR KWIKPEN, DULoxetine, ampicillin, aspirin, clopidogrel, lisinopril-hydrochlorothiazide, metFORMIN  ER, metoprolol tartrate, pregabalin, spironolactone, traZODone, and zolpidem      Allergies:  Allergies   Allergen Reactions    Strawberry Itching    Lortab [Hydrocodone-Acetaminophen] Itching       Objective   Objective     Vitals:   Temp:  [97.5 °F (36.4 °C)-98.6 °F (37 °C)] 98.1 °F (36.7 °C)  Heart Rate:  [56-67] 56  Resp:  [16-18] 18  BP: (126-157)/(67-78) 126/67  Physical Exam      GEN:   A&Ox3, Patient seen at bedside in no apparent distress.    PHYSICAL EXAM:     Foot/Ankle Exam     GENERAL  Appearance:  appears stated age  Orientation:  AAOx3  Affect:  appropriate  Gait:  unimpaired  Assistance:  independent  Right shoe gear: casual shoe  Left shoe gear: casual shoe     VASCULAR      Right Foot Vascularity   Dorsalis pedis:  1+  Posterior tibial:  1+  Skin temperature:  cool  Edema gradin+ and non-pitting  CFT:  < 3 seconds  Pedal hair growth:  Absent  Varicosities:  none      Left Foot Vascularity   Dorsalis pedis:  1+  Posterior tibial:  1+  Skin temperature:  cool  Edema gradin+ and non-pitting  CFT:  < 3 seconds  Pedal hair growth:  Absent  Varicosities:  none     NEUROLOGIC      Right Foot Neurologic   Light touch sensation: absent  Vibratory sensation: absent  Hot/Cold sensation: absent      Left Foot Neurologic   Light touch sensation: absent  Vibratory sensation: absent  Hot/Cold sensation:  absent     MUSCLE STRENGTH      Right Foot Muscle Strength   Foot dorsiflexion:  4  Foot plantar flexion:  4  Foot inversion:  4  Foot eversion:  4      Left Foot Muscle Strength   Foot dorsiflexion:  4  Foot plantar flexion:  4  Foot inversion:  4  Foot eversion:  4     RANGE OF MOTION      Right Foot Range of Motion   Foot and ankle ROM within normal limits        Left Foot Range of Motion   Foot and ankle ROM within normal limits       DERMATOLOGIC       Right Foot Dermatologic   Skin  Right foot skin is intact.       Left Foot Dermatologic   Skin  Left foot skin is intact.      Right foot additional  comments: Amputation site with bloody drainage and malodor.  +2 pitting edema.  No lymphangitis.  No erythema or proximal streaking.  Dehiscence to medial and lateral amputation site.  Area grossly probes to bone with halie-serous drainage present.           Right for MRI performed in radiology results are pending.  Upon review by Dr. Mathew, possible osteomyelitis seen in first metatarsal along with distal second metatarsal area.    Result Review    Result Review:  I have personally reviewed the results from the time of this admission to 5/19/2024 13:35 EDT and agree with these findings:  [x]  Laboratory  []  Microbiology  [x]  Radiology  []  EKG/Telemetry   []  Cardiology/Vascular   []  Pathology  []  Old records  []  Other:      WBC   Date Value Ref Range Status   05/19/2024 6.78 3.40 - 10.80 10*3/mm3 Final     RBC   Date Value Ref Range Status   05/19/2024 3.98 (L) 4.14 - 5.80 10*6/mm3 Final     Hemoglobin   Date Value Ref Range Status   05/19/2024 11.6 (L) 13.0 - 17.7 g/dL Final     Hematocrit   Date Value Ref Range Status   05/19/2024 36.0 (L) 37.5 - 51.0 % Final     MCV   Date Value Ref Range Status   05/19/2024 90.5 79.0 - 97.0 fL Final     MCH   Date Value Ref Range Status   05/19/2024 29.1 26.6 - 33.0 pg Final     MCHC   Date Value Ref Range Status   05/19/2024 32.2 31.5 - 35.7 g/dL Final     RDW   Date Value Ref Range Status   05/19/2024 14.5 12.3 - 15.4 % Final     RDW-SD   Date Value Ref Range Status   05/19/2024 47.9 37.0 - 54.0 fl Final     MPV   Date Value Ref Range Status   05/19/2024 9.2 6.0 - 12.0 fL Final     Platelets   Date Value Ref Range Status   05/19/2024 423 140 - 450 10*3/mm3 Final     Neutrophil %   Date Value Ref Range Status   05/18/2024 41.0 (L) 42.7 - 76.0 % Final     Lymphocyte %   Date Value Ref Range Status   05/18/2024 40.6 19.6 - 45.3 % Final     Monocyte %   Date Value Ref Range Status   05/18/2024 14.4 (H) 5.0 - 12.0 % Final     Eosinophil %   Date Value Ref Range Status    05/18/2024 2.0 0.3 - 6.2 % Final     Basophil %   Date Value Ref Range Status   05/18/2024 1.0 0.0 - 1.5 % Final     Immature Grans %   Date Value Ref Range Status   05/18/2024 1.0 (H) 0.0 - 0.5 % Final     Neutrophils, Absolute   Date Value Ref Range Status   05/18/2024 2.52 1.70 - 7.00 10*3/mm3 Final     Lymphocytes, Absolute   Date Value Ref Range Status   05/18/2024 2.49 0.70 - 3.10 10*3/mm3 Final     Monocytes, Absolute   Date Value Ref Range Status   05/18/2024 0.88 0.10 - 0.90 10*3/mm3 Final     Eosinophils, Absolute   Date Value Ref Range Status   05/18/2024 0.12 0.00 - 0.40 10*3/mm3 Final     Basophils, Absolute   Date Value Ref Range Status   05/18/2024 0.06 0.00 - 0.20 10*3/mm3 Final     Immature Grans, Absolute   Date Value Ref Range Status   05/18/2024 0.06 (H) 0.00 - 0.05 10*3/mm3 Final     nRBC   Date Value Ref Range Status   05/18/2024 0.0 0.0 - 0.2 /100 WBC Final        Lab Results   Component Value Date    GLUCOSE 153 (H) 05/19/2024    BUN 12 05/19/2024    CREATININE 0.75 (L) 05/19/2024    EGFRIFNONA 77 06/17/2021    BCR 16.0 05/19/2024    K 4.1 05/19/2024    CO2 24.6 05/19/2024    CALCIUM 9.2 05/19/2024    ALBUMIN 3.5 05/18/2024    LABIL2 0.9 (L) 10/16/2020    AST 7 05/18/2024    ALT 5 05/18/2024         Most notable findings include: Right first ray nonhealing wound with probable osteomyelitis    Assessment & Plan   Assessment / Plan       Active Hospital Problems:  Active Hospital Problems    Diagnosis     **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        Plan:     Comprehensive lower extremity examination and evaluation was performed.    Discussed findings and treatment plan including risks, benefits, and treatment options with patient in detail. Patient agreed with treatment plan.    Pt will be evaluated by Dr. Costa tomorrow.    Continue current wound care regimen.    Discussed with Dr. Balwinder Costa.    Discussed with patient's hospitalist, Dr. Art Stevenson.    Thank you for  including [facility] in this patient's care.    DVT prophylaxis:  Medical DVT prophylaxis orders are present.        CODE STATUS:    Level Of Support Discussed With: Patient  Code Status (Patient has no pulse and is not breathing): CPR (Attempt to Resuscitate)  Medical Interventions (Patient has pulse or is breathing): Full Support      Electronically signed by Mushtaq Mathew DPM, 05/19/24, 1:35 PM EDT.

## 2024-05-19 NOTE — PROGRESS NOTES
[facility]   Hospitalist Progress Note  Date: 2024  Patient Name: Jd Velazquez  : 1965  MRN: [id number]  Date of admission: 2024  Consultants:   -Podiatry: Dr. Mushtaq Mathew     Subjective   Subjective     Chief Complaint: Right foot osteo versus abscess leading to direct admission     Summary:   Jd Velazquez is a 58 y.o. male with CAD, type 2 diabetes mellitus, dyslipidemia, hyperkalemia, peripheral vascular disease and obesity (BMI: 32.36) that presented with worsening right foot wound s/p amputation of great toe in 2024. Podiatry consulted to assist in care. Broad-spectrum antibiotics started.     Interval Followup:   No acute is overnight.  MRI was completed, results still pending.  Patient denies any chest pain or shortness of breath.  Patient's pain is being well-controlled.  Nursing with no additional acute issues to report.    Antibiotics:   -Vancomycin  -Zosyn    Objective   Objective     Vitals:   Temp:  [97.5 °F (36.4 °C)-98.6 °F (37 °C)] 98.1 °F (36.7 °C)  Heart Rate:  [56-67] 56  Resp:  [16-18] 18  BP: (126-157)/(67-78) 126/67  Physical Exam   Gen: No acute distress, lying bed, pleasant, conversant  Resp: CTAB, No w/r/r, No respiratory distress appreciated  Card: RRR, No m/r/g  Abd: Soft, Nontender, Nondistended, + bowel sounds  Ext: Right foot with dressing in place    Result Review    Result Review:  I have personally reviewed the results as below and agree with these findings:  []  Laboratory:   CMP          2024    12:36 2024    05:55 2024    05:32   CMP   Glucose 229  175  153    BUN 11  9  12    Creatinine 0.84  0.75  0.75    EGFR 101.7  105.3  105.3    Sodium 135  136  133    Potassium 4.4  4.1  4.1    Chloride 99  101  97    Calcium 9.4  8.7  9.2    Total Protein 7.3  7.1     Albumin 3.7  3.5     Globulin 3.6  3.6     Total Bilirubin 0.3  0.3     Alkaline Phosphatase 107  107     AST (SGOT) 5  7     ALT (SGPT) 5  5      Albumin/Globulin Ratio 1.0  1.0     BUN/Creatinine Ratio 13.1  12.0  16.0    Anion Gap 11.2  11.1  11.4      CBC          5/17/2024    12:33 5/18/2024    05:55 5/19/2024    05:32   CBC   WBC 8.76  6.13  6.78    RBC 3.94  3.89  3.98    Hemoglobin 11.5  11.3  11.6    Hematocrit 36.2  35.5  36.0    MCV 91.9  91.3  90.5    MCH 29.2  29.0  29.1    MCHC 31.8  31.8  32.2    RDW 14.8  14.6  14.5    Platelets 413  393  423    Phosphorus and magnesium within normal limits  [x]  Microbiology: Blood culture (05/17/2024): No growth to date  []  Radiology:   []  EKG/Telemetry:    []  Cardiology/Vascular:    []  Pathology:  []  Old records:  []  Other:    Assessment & Plan   Assessment / Plan     Assessment:  Right foot diabetic ulcer with concern for osteomyelitis  Type 2 diabetes mellitus with hyperglycemia  Peripheral vascular disease  Hypomagnesemia  CAD  Obesity (BMI: 33.36)    Plan:  -Podiatry consulted and following, appreciate assistance and recommendations in the care of this patient.  -Continue Zosyn and vancomycin.  Monitor vancomycin level.  Tailor antibiotics according to infectious workup.  -Increase Levemir and continue SSI.  Monitor blood glucose level and SSI requirement and titrate insulin regimen accordingly  -Continue aspirin and Plavix  -MRI has been completed.  Official results still pending at this time.  -Care discussed with podiatry.  Further recommendations pending MRI results.  If osteo is present patient may require surgical intervention.  -Continue appropriate home medications  -Will monitor electrolytes and renal function with BMP and magnesium level in the AM  -Will monitor WBC and Hgb with CBC in the AM  -Clinical course will dictate further management     DVT Prophylaxis: Lovenox  Diet:   Diet Order   Procedures    Diet: Cardiac, Diabetic; Healthy Heart (2-3 Na+); Consistent Carbohydrate; Fluid Consistency: Thin (IDDSI 0)     Dispo: Pending clinical course     Personally reviewed patients labs  and imaging, discussed with patient and nurse at bedside. Discussed management with the following consultants: Podiatry.     Part of this note may be an electronic transcription/translation of spoken language to printed text using the Dragon dictation system.    DVT prophylaxis:  Medical DVT prophylaxis orders are present.        CODE STATUS:   Level Of Support Discussed With: Patient  Code Status (Patient has no pulse and is not breathing): CPR (Attempt to Resuscitate)  Medical Interventions (Patient has pulse or is breathing): Full Support        Electronically signed by Art Stevenson MD, 5/19/2024, 13:47 EDT.

## 2024-05-19 NOTE — PLAN OF CARE
Goal Outcome Evaluation:  Plan of Care Reviewed With: patient        Progress: no change  Outcome Evaluation: Vss. No complaints of pain or discomfort voiced this shift. No acute changes noted at this time. Will continue current plan of care.

## 2024-05-19 NOTE — NURSING NOTE
Dressing on right foot changed. Cleansed with NS, packed with 1/4 in packing strips, covered with foam, wrapped in gauze and kerlix.

## 2024-05-19 NOTE — PLAN OF CARE
Goal Outcome Evaluation:  Plan of Care Reviewed With: patient        Progress: no change  Outcome Evaluation: Vss. Difficulty sleeping. No acute changes. Will continue plan of care.

## 2024-05-20 ENCOUNTER — ANESTHESIA EVENT (OUTPATIENT)
Dept: PERIOP | Facility: HOSPITAL | Age: 59
End: 2024-05-20
Payer: COMMERCIAL

## 2024-05-20 PROBLEM — M86.471 CHRONIC OSTEOMYELITIS OF RIGHT FOOT WITH DRAINING SINUS: Status: ACTIVE | Noted: 2024-05-17

## 2024-05-20 LAB
ANION GAP SERPL CALCULATED.3IONS-SCNC: 11.2 MMOL/L (ref 5–15)
BUN SERPL-MCNC: 13 MG/DL (ref 6–20)
BUN/CREAT SERPL: 14.9 (ref 7–25)
CALCIUM SPEC-SCNC: 9.3 MG/DL (ref 8.6–10.5)
CHLORIDE SERPL-SCNC: 99 MMOL/L (ref 98–107)
CO2 SERPL-SCNC: 25.8 MMOL/L (ref 22–29)
CREAT SERPL-MCNC: 0.87 MG/DL (ref 0.76–1.27)
DEPRECATED RDW RBC AUTO: 49.6 FL (ref 37–54)
EGFRCR SERPLBLD CKD-EPI 2021: 100.6 ML/MIN/1.73
ERYTHROCYTE [DISTWIDTH] IN BLOOD BY AUTOMATED COUNT: 14.6 % (ref 12.3–15.4)
GLUCOSE BLDC GLUCOMTR-MCNC: 138 MG/DL (ref 70–99)
GLUCOSE BLDC GLUCOMTR-MCNC: 147 MG/DL (ref 70–99)
GLUCOSE BLDC GLUCOMTR-MCNC: 173 MG/DL (ref 70–99)
GLUCOSE BLDC GLUCOMTR-MCNC: 185 MG/DL (ref 70–99)
GLUCOSE SERPL-MCNC: 133 MG/DL (ref 65–99)
HCT VFR BLD AUTO: 36.5 % (ref 37.5–51)
HGB BLD-MCNC: 11.5 G/DL (ref 13–17.7)
MAGNESIUM SERPL-MCNC: 1.7 MG/DL (ref 1.6–2.6)
MCH RBC QN AUTO: 29 PG (ref 26.6–33)
MCHC RBC AUTO-ENTMCNC: 31.5 G/DL (ref 31.5–35.7)
MCV RBC AUTO: 91.9 FL (ref 79–97)
PHOSPHATE SERPL-MCNC: 4.1 MG/DL (ref 2.5–4.5)
PLATELET # BLD AUTO: 406 10*3/MM3 (ref 140–450)
PMV BLD AUTO: 9.1 FL (ref 6–12)
POTASSIUM SERPL-SCNC: 4.4 MMOL/L (ref 3.5–5.2)
RBC # BLD AUTO: 3.97 10*6/MM3 (ref 4.14–5.8)
SODIUM SERPL-SCNC: 136 MMOL/L (ref 136–145)
VANCOMYCIN SERPL-MCNC: 35.7 MCG/ML (ref 5–40)
WBC NRBC COR # BLD AUTO: 8.5 10*3/MM3 (ref 3.4–10.8)

## 2024-05-20 PROCEDURE — 63710000001 INSULIN DETEMIR PER 5 UNITS: Performed by: INTERNAL MEDICINE

## 2024-05-20 PROCEDURE — 25010000002 ENOXAPARIN PER 10 MG: Performed by: INTERNAL MEDICINE

## 2024-05-20 PROCEDURE — 25810000003 SODIUM CHLORIDE 0.9 % SOLUTION: Performed by: INTERNAL MEDICINE

## 2024-05-20 PROCEDURE — 80048 BASIC METABOLIC PNL TOTAL CA: CPT | Performed by: INTERNAL MEDICINE

## 2024-05-20 PROCEDURE — 25010000002 PIPERACILLIN SOD-TAZOBACTAM PER 1 G: Performed by: INTERNAL MEDICINE

## 2024-05-20 PROCEDURE — 99233 SBSQ HOSP IP/OBS HIGH 50: CPT | Performed by: INTERNAL MEDICINE

## 2024-05-20 PROCEDURE — 85027 COMPLETE CBC AUTOMATED: CPT | Performed by: INTERNAL MEDICINE

## 2024-05-20 PROCEDURE — 25010000002 MAGNESIUM SULFATE 2 GM/50ML SOLUTION: Performed by: INTERNAL MEDICINE

## 2024-05-20 PROCEDURE — 82948 REAGENT STRIP/BLOOD GLUCOSE: CPT

## 2024-05-20 PROCEDURE — 63710000001 INSULIN LISPRO (HUMAN) PER 5 UNITS: Performed by: INTERNAL MEDICINE

## 2024-05-20 PROCEDURE — 84100 ASSAY OF PHOSPHORUS: CPT | Performed by: INTERNAL MEDICINE

## 2024-05-20 PROCEDURE — 82948 REAGENT STRIP/BLOOD GLUCOSE: CPT | Performed by: INTERNAL MEDICINE

## 2024-05-20 PROCEDURE — 25010000002 VANCOMYCIN 5 G RECONSTITUTED SOLUTION: Performed by: INTERNAL MEDICINE

## 2024-05-20 PROCEDURE — 99232 SBSQ HOSP IP/OBS MODERATE 35: CPT | Performed by: PODIATRIST

## 2024-05-20 PROCEDURE — 80202 ASSAY OF VANCOMYCIN: CPT | Performed by: INTERNAL MEDICINE

## 2024-05-20 PROCEDURE — 83735 ASSAY OF MAGNESIUM: CPT | Performed by: INTERNAL MEDICINE

## 2024-05-20 RX ORDER — MAGNESIUM SULFATE HEPTAHYDRATE 40 MG/ML
2 INJECTION, SOLUTION INTRAVENOUS ONCE
Status: COMPLETED | OUTPATIENT
Start: 2024-05-20 | End: 2024-05-20

## 2024-05-20 RX ADMIN — PIPERACILLIN SODIUM AND TAZOBACTAM SODIUM 3.38 G: 3; .375 INJECTION, POWDER, LYOPHILIZED, FOR SOLUTION INTRAVENOUS at 21:31

## 2024-05-20 RX ADMIN — MAGNESIUM SULFATE HEPTAHYDRATE 2 G: 40 INJECTION, SOLUTION INTRAVENOUS at 08:46

## 2024-05-20 RX ADMIN — PIPERACILLIN SODIUM AND TAZOBACTAM SODIUM 3.38 G: 3; .375 INJECTION, POWDER, LYOPHILIZED, FOR SOLUTION INTRAVENOUS at 04:06

## 2024-05-20 RX ADMIN — ASPIRIN 81 MG: 81 TABLET, CHEWABLE ORAL at 08:45

## 2024-05-20 RX ADMIN — INSULIN LISPRO 2 UNITS: 100 INJECTION, SOLUTION INTRAVENOUS; SUBCUTANEOUS at 21:33

## 2024-05-20 RX ADMIN — PREGABALIN 225 MG: 75 CAPSULE ORAL at 21:32

## 2024-05-20 RX ADMIN — VANCOMYCIN HYDROCHLORIDE 1250 MG: 5 INJECTION, POWDER, LYOPHILIZED, FOR SOLUTION INTRAVENOUS at 21:26

## 2024-05-20 RX ADMIN — INSULIN DETEMIR 20 UNITS: 100 INJECTION, SOLUTION SUBCUTANEOUS at 08:44

## 2024-05-20 RX ADMIN — PREGABALIN 225 MG: 75 CAPSULE ORAL at 08:45

## 2024-05-20 RX ADMIN — Medication 10 ML: at 08:46

## 2024-05-20 RX ADMIN — CLOPIDOGREL BISULFATE 75 MG: 75 TABLET ORAL at 08:45

## 2024-05-20 RX ADMIN — LISINOPRIL 20 MG: 20 TABLET ORAL at 08:45

## 2024-05-20 RX ADMIN — METOPROLOL TARTRATE 100 MG: 50 TABLET, FILM COATED ORAL at 21:32

## 2024-05-20 RX ADMIN — METOPROLOL TARTRATE 100 MG: 50 TABLET, FILM COATED ORAL at 08:45

## 2024-05-20 RX ADMIN — PIPERACILLIN SODIUM AND TAZOBACTAM SODIUM 3.38 G: 3; .375 INJECTION, POWDER, LYOPHILIZED, FOR SOLUTION INTRAVENOUS at 12:21

## 2024-05-20 RX ADMIN — DULOXETINE HYDROCHLORIDE 30 MG: 30 CAPSULE, DELAYED RELEASE ORAL at 08:45

## 2024-05-20 RX ADMIN — ENOXAPARIN SODIUM 40 MG: 100 INJECTION SUBCUTANEOUS at 08:45

## 2024-05-20 RX ADMIN — INSULIN DETEMIR 20 UNITS: 100 INJECTION, SOLUTION SUBCUTANEOUS at 21:32

## 2024-05-20 RX ADMIN — HYDROCHLOROTHIAZIDE 12.5 MG: 12.5 TABLET ORAL at 08:45

## 2024-05-20 RX ADMIN — Medication 10 ML: at 21:27

## 2024-05-20 RX ADMIN — INSULIN LISPRO 2 UNITS: 100 INJECTION, SOLUTION INTRAVENOUS; SUBCUTANEOUS at 17:39

## 2024-05-20 NOTE — PROGRESS NOTES
[facility] Clinical Pharmacy Services: Vancomycin Monitoring Note    Jd Velazquez is a 58 y.o. male who is on day  (TBD) of pharmacy to dose vancomycin for Bone and/or Joint Infection.    Previous Vancomycin Dose:   1500 mg IV every  8  hours  Imaging Reviewed?: Yes   MRI right foot: pending    Updated Cultures and Sensitivities:    Blood cx : NGTD  Previous admissions:    Wound cx, right foot: scant growth E. Faecalis   Anaerobic cx, right foot: B. Fragilis   Wound cx, right foot: rare E. Faecalis  4/10 Anaerobic cx, right toe tissue: mixed anaerobes with B. Frag and Prevotela spp  4/10 Wound cx, right toe: light growth Citrobacter freundii, Providencia rettgeri    Vitals/Labs  Ht:  ; Wt: 108 kg (238 lb 1.6 oz)   Temp (24hrs), Av.9 °F (36.6 °C), Min:97.5 °F (36.4 °C), Max:98.2 °F (36.8 °C)   Estimated Creatinine Clearance: 117.1 mL/min (by C-G formula based on SCr of 0.87 mg/dL).     Results from last 7 days   Lab Units 24  0518 24  0532 24  1304 24  0555   VANCOMYCIN RM mcg/mL 35.70  --   --   --    VANCOMYCIN TR mcg/mL  --   --  10.40  --    CREATININE mg/dL 0.87 0.75*  --  0.75*   WBC 10*3/mm3 8.50 6.78  --  6.13     Assessment/Plan    Vancomycin level of 35.70 mcg/mL this morning, much higher than expected (~24% higher). Will decrease dose and frequency and start new regimen at 2100 tonight when level is expected to be less than 20 mcg/mL.   Current Vancomycin Dose:  1250 mg IV every 12 hours; which provides the following predicted parameters:  AUC24,ss: 488 mg/L.hr  PAUC*: 93 %  Ctrough,ss: 13.9 mg/L  Pconc*: 4 %  Tox.: 9 %  Next Vanc Random planned for  at 0600 with AM labs  We will continue to monitor patient changes and renal function     Thank you for involving pharmacy in this patient's care. Please contact pharmacy with any questions or concerns.    Tiara Smith RPH  Clinical Pharmacist

## 2024-05-20 NOTE — PLAN OF CARE
Goal Outcome Evaluation:  Plan of Care Reviewed With: patient, daughter           Outcome Evaluation: PT is AAOx4, VSS, RA with sats above 90%, home CPAP at bedside, no c/o of SOA, N/V/D or uncontrolled pain, Dr. Costa undressed/dressed (R) foot wound this shift, consent has been signed and in chart for surgery tomorrow, NPO at midnight, tolerating diet, remains on SSI with insulin given as ordered, diabetes educator consulted, no acute events this shift, ad-wilfredo, up to chair this shift, continue with current POC at this time.

## 2024-05-20 NOTE — PROGRESS NOTES
[facility] - PODIATRY    Today's Date: 05/20/24    Patient Name: Jd Velazquez  MRN: [id number]  CSN: [id number]  PCP: Dacia Newsome APRN,   Referring Provider: No ref. provider found    SUBJECTIVE     Chief Complaint   Patient presents with    Right Foot - Follow-up, Foot Ulcer     Had wound culture   Has strong odor      HPI: Jd Velazquez, a 58 y.o.male, presents to clinic.    Here for follow-up and says that he thinks that the antibiotics are helping.  Past Medical History:   Diagnosis Date    Allergic rhinitis 01/12/2015    Anesthesia     DIFFICULTY WAKING UP POST SURGERY    CAD (coronary artery disease)     DENIES CP/SOA.    Diabetes mellitus     Hyperlipidemia     Hypertension     Hypothyroidism 04/24/2014    Insomnia, unspecified 06/15/2016    WITHOUT BIPAP    Microalbuminuria due to type 2 diabetes mellitus 10/15/2015    Mixed hyperlipidemia 10/15/2015    Myocardial infarction     Obesity     BMI 32    SHAHLA (obstructive sleep apnea)     WEARS BIPAP    Right great toe amputee     4/2024; 5/2024 ADMITTED WITH INFECTION    Skin cancer     REMOVED    Sleep apnea      Past Surgical History:   Procedure Laterality Date    AMPUTATION DIGIT Right 4/10/2024    Procedure: AMPUTATION DIGIT RIGHT GREAT TOE;  Surgeon: Balwinder Costa DPM;  Location: [facility] MAIN OR;  Service: Podiatry;  Laterality: Right;    ANKLE ARTHROSCOPY W/ OPEN REPAIR      APPENDECTOMY      CARDIAC CATHETERIZATION  2014    PCI to circumflex    CARDIAC CATHETERIZATION Right 10/26/2023    Procedure: Aortogram with right leg angiogram, possible angioplasty or stenting;  Surgeon: Robert Puga MD;  Location: [facility] CATH INVASIVE LOCATION;  Service: Vascular;  Laterality: Right;    CARDIAC CATHETERIZATION Right 12/15/2023    Procedure: Right leg angiogram, possible angioplasty or stenting;  Surgeon: Robert Puga MD;  Location: [facility] CATH INVASIVE LOCATION;  Service: Vascular;  Laterality: Right;     CORONARY ARTERY BYPASS GRAFT N/A 10/08/2020    Procedure: STERNOTOMY, CORONARY ARTERY BYPASS GRAFTING TIME 4 WITH LEFT KELSI  AND ENDOSCOPICALLY HARVESTED LEFT GREATER SAPHENOUS VEIN AND PRP.;  Surgeon: Jr Girma Chiu MD;  Location: [facility] OR;  Service: Cardiothoracic;  Laterality: N/A;    FEMORAL TIBIAL BYPASS Right 01/09/2024    Procedure: Right femoral-tibial bypass graft;  Surgeon: Robert Puga MD;  Location: [facility] MAIN OR;  Service: Vascular;  Laterality: Right;    HEEL SPUR SURGERY      HERNIA REPAIR      INCISION AND DRAINAGE OF WOUND Right 4/30/2024    Procedure: INCISION AND DRAINAGE WOUND RIGHT FOOT, DEBRIDEMENT OF WOUND RIGHT FOOT, AMPUTATION REVISION RIGHT FOOT;  Surgeon: Balwinder Costa DPM;  Location: [facility] OR;  Service: Podiatry;  Laterality: Right;    KNEE ARTHROSCOPY      MULTIPLE TIMES ON BOTH KNEES    SKIN CANCER EXCISION      BACK    TOE SURGERY Right     DEBRIDMENT RIGHT GREAT TOE    TONSILLECTOMY       Family History   Adopted: Yes   Problem Relation Age of Onset    Heart disease Other      Social History     Socioeconomic History    Marital status:    Tobacco Use    Smoking status: Every Day     Current packs/day: 0.25     Average packs/day: 0.3 packs/day for 15.0 years (3.8 ttl pk-yrs)     Types: Cigarettes     Passive exposure: Never    Smokeless tobacco: Never   Vaping Use    Vaping status: Never Used   Substance and Sexual Activity    Alcohol use: Not Currently    Drug use: Never    Sexual activity: Defer     Allergies   Allergen Reactions    Strawberry Itching    Lortab [Hydrocodone-Acetaminophen] Itching     No current facility-administered medications for this visit.     No current outpatient medications on file.     Facility-Administered Medications Ordered in Other Visits   Medication Dose Route Frequency Provider Last Rate Last Admin    acetaminophen (TYLENOL) tablet 650 mg  650 mg Oral Q4H PRN Joe Wing MD        aspirin chewable tablet 81 mg  81 mg  Oral Daily Joe Wing MD   81 mg at 05/20/24 0845    clopidogrel (PLAVIX) tablet 75 mg  75 mg Oral Daily Joe Wing MD   75 mg at 05/20/24 0845    dextrose (D50W) (25 g/50 mL) IV injection 25 g  25 g Intravenous Q15 Min PRN Joe Wing MD        dextrose (GLUTOSE) oral gel 15 g  15 g Oral Q15 Min PRN Joe Wing MD        DULoxetine (CYMBALTA) DR capsule 30 mg  30 mg Oral Daily Joe Wing MD   30 mg at 05/20/24 0845    Enoxaparin Sodium (LOVENOX) syringe 40 mg  40 mg Subcutaneous Daily Joe Wing MD   40 mg at 05/20/24 0845    glucagon (GLUCAGEN) injection 1 mg  1 mg Intramuscular Q15 Min PRN Joe Wing MD        lisinopril (PRINIVIL,ZESTRIL) tablet 20 mg  20 mg Oral Q24H Art Stevenson MD   20 mg at 05/20/24 0845    And    hydroCHLOROthiazide tablet 12.5 mg  12.5 mg Oral Q24H Art Stevenson MD   12.5 mg at 05/20/24 0845    insulin detemir (LEVEMIR) injection 20 Units  20 Units Subcutaneous BID Art Stevenson MD   20 Units at 05/20/24 0844    Insulin Lispro (humaLOG) injection 2-7 Units  2-7 Units Subcutaneous 4x Daily AC & at Bedtime Joe Wing MD   2 Units at 05/19/24 2106    metoprolol tartrate (LOPRESSOR) tablet 100 mg  100 mg Oral Q12H Joe Wing MD   100 mg at 05/20/24 0845    Pharmacy to dose vancomycin   Does not apply Continuous PRN Joe Wing MD        Pharmacy to Dose Zosyn   Does not apply Continuous PRN Joe Wing MD        piperacillin-tazobactam (ZOSYN) IVPB 3.375 g IVPB in 100 mL NS (VTB)  3.375 g Intravenous Q8H Joe Wing MD   3.375 g at 05/20/24 1221    pregabalin (LYRICA) capsule 225 mg  225 mg Oral BID Joe Wing MD   225 mg at 05/20/24 0845    sodium chloride 0.9 % flush 10 mL  10 mL Intravenous Q12H Joe Wing MD   10 mL at 05/20/24 0846    sodium chloride 0.9 % flush 10 mL  10 mL Intravenous PRN Joe Wing MD        sodium chloride 0.9 % infusion 40 mL  40 mL Intravenous PRN Joe Wing MD        vancomycin 1250 mg/250 mL 0.9% NS IVPB ([facility])  1,250 mg Intravenous Q12H Maciej  MD Joe        zolpidem (AMBIEN) tablet 10 mg  10 mg Oral Nightly PRN Art Stevenson MD   10 mg at 05/19/24 2104     Review of Systems   Skin:         Ulcer toe   All other systems reviewed and are negative.      OBJECTIVE     Vitals:    05/17/24 0937   BP: 155/76   Pulse: 62   Temp: 98.2 °F (36.8 °C)   SpO2: 97%       WBC   Date Value Ref Range Status   05/20/2024 8.50 3.40 - 10.80 10*3/mm3 Final     RBC   Date Value Ref Range Status   05/20/2024 3.97 (L) 4.14 - 5.80 10*6/mm3 Final     Hemoglobin   Date Value Ref Range Status   05/20/2024 11.5 (L) 13.0 - 17.7 g/dL Final     Hematocrit   Date Value Ref Range Status   05/20/2024 36.5 (L) 37.5 - 51.0 % Final     MCV   Date Value Ref Range Status   05/20/2024 91.9 79.0 - 97.0 fL Final     MCH   Date Value Ref Range Status   05/20/2024 29.0 26.6 - 33.0 pg Final     MCHC   Date Value Ref Range Status   05/20/2024 31.5 31.5 - 35.7 g/dL Final     RDW   Date Value Ref Range Status   05/20/2024 14.6 12.3 - 15.4 % Final     RDW-SD   Date Value Ref Range Status   05/20/2024 49.6 37.0 - 54.0 fl Final     MPV   Date Value Ref Range Status   05/20/2024 9.1 6.0 - 12.0 fL Final     Platelets   Date Value Ref Range Status   05/20/2024 406 140 - 450 10*3/mm3 Final     Neutrophil %   Date Value Ref Range Status   05/18/2024 41.0 (L) 42.7 - 76.0 % Final     Lymphocyte %   Date Value Ref Range Status   05/18/2024 40.6 19.6 - 45.3 % Final     Monocyte %   Date Value Ref Range Status   05/18/2024 14.4 (H) 5.0 - 12.0 % Final     Eosinophil %   Date Value Ref Range Status   05/18/2024 2.0 0.3 - 6.2 % Final     Basophil %   Date Value Ref Range Status   05/18/2024 1.0 0.0 - 1.5 % Final     Immature Grans %   Date Value Ref Range Status   05/18/2024 1.0 (H) 0.0 - 0.5 % Final     Neutrophils, Absolute   Date Value Ref Range Status   05/18/2024 2.52 1.70 - 7.00 10*3/mm3 Final     Lymphocytes, Absolute   Date Value Ref Range Status   05/18/2024 2.49 0.70 - 3.10 10*3/mm3 Final     Monocytes,  Absolute   Date Value Ref Range Status   05/18/2024 0.88 0.10 - 0.90 10*3/mm3 Final     Eosinophils, Absolute   Date Value Ref Range Status   05/18/2024 0.12 0.00 - 0.40 10*3/mm3 Final     Basophils, Absolute   Date Value Ref Range Status   05/18/2024 0.06 0.00 - 0.20 10*3/mm3 Final     Immature Grans, Absolute   Date Value Ref Range Status   05/18/2024 0.06 (H) 0.00 - 0.05 10*3/mm3 Final     nRBC   Date Value Ref Range Status   05/18/2024 0.0 0.0 - 0.2 /100 WBC Final         Lab Results   Component Value Date    GLUCOSE 133 (H) 05/20/2024    BUN 13 05/20/2024    CREATININE 0.87 05/20/2024    EGFRIFNONA 77 06/17/2021    BCR 14.9 05/20/2024    K 4.4 05/20/2024    CO2 25.8 05/20/2024    CALCIUM 9.3 05/20/2024    ALBUMIN 3.5 05/18/2024    LABIL2 0.9 (L) 10/16/2020    AST 7 05/18/2024    ALT 5 05/18/2024       Patient seen in no apparent distress.      PHYSICAL EXAM:  Sutures are intact with skin edges well-coapted with no signs of dehiscence.  Wound to medial first ray, no erythema or malodor, serous/bloody drainage to amputation site.    RADIOLOGY:        MRI Foot Right With & Without Contrast    Result Date: 5/20/2024  Narrative:  MRI FOOT RIGHT W WO CONTRAST-  Date of Exam: 5/18/2024 7:10 AM  Indication: osteo and foot abscess.  Comparison: None available.  Technique:  Routine multiplanar/multisequence sequence images of the right foot were obtained before and after the uneventful administration of 20 mL MultiHance.    Findings: There has been resection of the first digit at the distal first metatarsal. There is an overlying wound at the medial forefoot. There appears to be surrounding heterogeneous edema signal and enhancement. There is a nonenhancing sinus tract extending from the wound to the distal first metatarsal and to the second MTP joint. This can be seen on series 11 images 21-27. There are some foci of T1 hypointense signal in the soft tissues surrounding the tract which could represent postsurgical  changes versus small foci of soft tissue gas.  In the superficial subcutaneous tissues, just posterior to the wound on series 11 images 19 and 20, there is a small rim-enhancing collection measuring approximately 12 x 14 x 7 mm. This is suspicious for a small abscess and may communicate to the sinus tract. There is also an irregular T2 hyperintense signal focus with peripheral enhancement extending anteriorly from the tract in the soft tissues distal to the remaining first metatarsal as seen on series 6 image 12 and series 8 image 27 measuring approximately 9 x 14 x 15 mm which may also represent a small abscess, possibly communicating to the sinus tract.  There is T2 hyperintense and T1 hypointense signal at the remaining distal first metatarsal adjacent to the wound consistent with osteomyelitis. There is also T2 hyperintense edema signal and corresponding T1 hypointense signal at the second metatarsal head and second proximal phalanx surrounding the second MTP joint consistent with osteomyelitis. The wound extends to the second MTP joint where there is para-articular enhancement consistent with joint infection.  No other definite findings of osteomyelitis or joint infection.  There appear to be findings of advanced arthropathy in the midfoot with prominent osteophytosis. Lisfranc alignment appears grossly intact. Lisfranc ligament appears grossly intact.  There is advanced muscle atrophy with diffuse edema signal and enhancement. This could be associated with denervation or myositis. There is a small amount of fluid signal and enhancement along the extensor digitorum tendons which may indicate infectious tenosynovitis.       Impression: Impression: 1.  Status post amputation of the first digit at the distal first metatarsal. 2.  Wound at the medial forefoot with edema and enhancement surrounding a sinus tract extending to the distal first metatarsal and second MTP joint. 3.  Findings of osteomyelitis at the  remaining distal first metatarsal and surrounding the second MTP joint where there are findings of joint infection. 4.  Findings of skin and soft tissue infection surrounding wound. Findings of muscle denervation and/or myositis. 5.  Two small abscesses in the soft tissues which may drain to the sinus tract.   Electronically Signed By-Hunter Steele MD On:5/20/2024 9:37 AM       ASSESSMENT/PLAN     Diagnoses and all orders for this visit:    1. Uncontrolled type 2 diabetes mellitus with hyperglycemia (Primary)    2. Peripheral vascular disease    3. Abscess of right foot    4. 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        Patient to be admitted for further treatment.  Will get MRI for further evaluation.  Patient at risk for TMA and proximal amputation.  Discussed in detail with patient.    Patient has failed outpatient antibiotics and I&D    Comprehensive lower extremity examination and evaluation was performed.    Discussed findings and treatment plan including risks, benefits, and treatment options with patient in detail. Patient agreed with treatment plan.    Medications and allergies reviewed.  Reviewed available lab values along with other pertinent labs.  These were discussed with the patient.    An After Visit Summary was printed and given to the patient at discharge, including (if requested) any available informative/educational handouts regarding diagnosis, treatment, or medications. All questions were answered to patient/family satisfaction. Should symptoms fail to improve or worsen they agree to call or return to clinic or to go to the Emergency Department. Discussed the importance of following up with any needed screening tests/labs/specialist appointments and any requested follow-up recommended by me today. Importance of maintaining follow-up discussed and patient accepts that missed appointments can delay diagnosis and potentially lead to worsening of conditions.    Return in about 1 month (around  6/17/2024)., or sooner if acute issues arise.    This document has been electronically signed by Balwinder Costa DPM on May 20, 2024 14:21 EDT        Answers submitted by the patient for this visit:  Primary Reason for Visit (Submitted on 4/4/2024)  What is the primary reason for your visit?: Other  Other (Submitted on 4/4/2024)  Please describe your symptoms.: Check on dead toe.  Have you had these symptoms before?: Yes  How long have you been having these symptoms?: Greater than 2 weeks

## 2024-05-20 NOTE — PROGRESS NOTES
[facility] - PODIATRY    Today's Date: 05/20/24    Patient Name: Jd Velazquez  MRN: [id number]  CSN: [id number]  PCP: Dacia Newsome APRN  Referring Provider: Mario Bowman MD  Attending Provider: Art Stevenson MD  Length of Stay: 3    SUBJECTIVE   Chief Complaint:     HPI: Jd Velazquez, a 58 y.o.male, .  Denies any constitutional symptoms. No other pedal complaints at this time.    Past Medical History:   Diagnosis Date    Allergic rhinitis 01/12/2015    Anesthesia     DIFFICULTY WAKING UP POST SURGERY    CAD (coronary artery disease)     DENIES CP/SOA.    Diabetes mellitus     Hyperlipidemia     Hypertension     Hypothyroidism 04/24/2014    Insomnia, unspecified 06/15/2016    WITHOUT BIPAP    Microalbuminuria due to type 2 diabetes mellitus 10/15/2015    Mixed hyperlipidemia 10/15/2015    Myocardial infarction     Obesity     BMI 32    SHAHLA (obstructive sleep apnea)     WEARS BIPAP    Right great toe amputee     4/2024; 5/2024 ADMITTED WITH INFECTION    Skin cancer     REMOVED    Sleep apnea      Past Surgical History:   Procedure Laterality Date    AMPUTATION DIGIT Right 4/10/2024    Procedure: AMPUTATION DIGIT RIGHT GREAT TOE;  Surgeon: Balwinder Costa DPM;  Location: [facility] MAIN OR;  Service: Podiatry;  Laterality: Right;    ANKLE ARTHROSCOPY W/ OPEN REPAIR      APPENDECTOMY      CARDIAC CATHETERIZATION  2014    PCI to circumflex    CARDIAC CATHETERIZATION Right 10/26/2023    Procedure: Aortogram with right leg angiogram, possible angioplasty or stenting;  Surgeon: Robert Puga MD;  Location: [facility] CATH INVASIVE LOCATION;  Service: Vascular;  Laterality: Right;    CARDIAC CATHETERIZATION Right 12/15/2023    Procedure: Right leg angiogram, possible angioplasty or stenting;  Surgeon: Robert Puga MD;  Location: [facility] CATH INVASIVE LOCATION;  Service: Vascular;  Laterality: Right;    CORONARY ARTERY BYPASS GRAFT N/A 10/08/2020    Procedure: STERNOTOMY,  CORONARY ARTERY BYPASS GRAFTING TIME 4 WITH LEFT KELSI  AND ENDOSCOPICALLY HARVESTED LEFT GREATER SAPHENOUS VEIN AND PRP.;  Surgeon: Jr Girma Chiu MD;  Location: [facility] MAIN OR;  Service: Cardiothoracic;  Laterality: N/A;    FEMORAL TIBIAL BYPASS Right 01/09/2024    Procedure: Right femoral-tibial bypass graft;  Surgeon: Robert Puga MD;  Location: [facility] MAIN OR;  Service: Vascular;  Laterality: Right;    HEEL SPUR SURGERY      HERNIA REPAIR      INCISION AND DRAINAGE OF WOUND Right 4/30/2024    Procedure: INCISION AND DRAINAGE WOUND RIGHT FOOT, DEBRIDEMENT OF WOUND RIGHT FOOT, AMPUTATION REVISION RIGHT FOOT;  Surgeon: Balwinder Costa DPM;  Location: [facility] MAIN OR;  Service: Podiatry;  Laterality: Right;    KNEE ARTHROSCOPY      MULTIPLE TIMES ON BOTH KNEES    SKIN CANCER EXCISION      BACK    TOE SURGERY Right     DEBRIDMENT RIGHT GREAT TOE    TONSILLECTOMY       Family History   Adopted: Yes   Problem Relation Age of Onset    Heart disease Other      Social History     Socioeconomic History    Marital status:    Tobacco Use    Smoking status: Every Day     Current packs/day: 0.25     Average packs/day: 0.3 packs/day for 15.0 years (3.8 ttl pk-yrs)     Types: Cigarettes     Passive exposure: Never    Smokeless tobacco: Never   Vaping Use    Vaping status: Never Used   Substance and Sexual Activity    Alcohol use: Not Currently    Drug use: Never    Sexual activity: Defer     Allergies   Allergen Reactions    Strawberry Itching    Lortab [Hydrocodone-Acetaminophen] Itching     Current Facility-Administered Medications   Medication Dose Route Frequency Provider Last Rate Last Admin    acetaminophen (TYLENOL) tablet 650 mg  650 mg Oral Q4H PRN Joe Wing MD        aspirin chewable tablet 81 mg  81 mg Oral Daily Joe Wing MD   81 mg at 05/20/24 0845    clopidogrel (PLAVIX) tablet 75 mg  75 mg Oral Daily Joe Wing MD   75 mg at 05/20/24 0845    dextrose (D50W) (25 g/50 mL) IV injection 25 g   25 g Intravenous Q15 Min PRN Joe Wing MD        dextrose (GLUTOSE) oral gel 15 g  15 g Oral Q15 Min PRN Joe Wing MD        DULoxetine (CYMBALTA) DR capsule 30 mg  30 mg Oral Daily Joe Wing MD   30 mg at 05/20/24 0845    Enoxaparin Sodium (LOVENOX) syringe 40 mg  40 mg Subcutaneous Daily Joe Wing MD   40 mg at 05/20/24 0845    glucagon (GLUCAGEN) injection 1 mg  1 mg Intramuscular Q15 Min PRN Joe Wing MD        lisinopril (PRINIVIL,ZESTRIL) tablet 20 mg  20 mg Oral Q24H Art Stevenson MD   20 mg at 05/20/24 0845    And    hydroCHLOROthiazide tablet 12.5 mg  12.5 mg Oral Q24H Art Stevenson MD   12.5 mg at 05/20/24 0845    insulin detemir (LEVEMIR) injection 20 Units  20 Units Subcutaneous BID Art Stevenson MD   20 Units at 05/20/24 0844    Insulin Lispro (humaLOG) injection 2-7 Units  2-7 Units Subcutaneous 4x Daily AC & at Bedtime Joe Wing MD   2 Units at 05/19/24 2106    metoprolol tartrate (LOPRESSOR) tablet 100 mg  100 mg Oral Q12H Joe Wing MD   100 mg at 05/20/24 0845    Pharmacy to dose vancomycin   Does not apply Continuous PRN Joe Wing MD        Pharmacy to Dose Zosyn   Does not apply Continuous PRN Joe Wing MD        piperacillin-tazobactam (ZOSYN) IVPB 3.375 g IVPB in 100 mL NS (VTB)  3.375 g Intravenous Q8H Joe Wing MD   3.375 g at 05/20/24 1221    pregabalin (LYRICA) capsule 225 mg  225 mg Oral BID Joe Wing MD   225 mg at 05/20/24 0845    sodium chloride 0.9 % flush 10 mL  10 mL Intravenous Q12H Joe Wing MD   10 mL at 05/20/24 0846    sodium chloride 0.9 % flush 10 mL  10 mL Intravenous PRN Joe Wing MD        sodium chloride 0.9 % infusion 40 mL  40 mL Intravenous PRN Joe Wing MD        vancomycin 1250 mg/250 mL 0.9% NS IVPB ([facility])  1,250 mg Intravenous Q12H Joe Wing MD        zolpidem (AMBIEN) tablet 10 mg  10 mg Oral Nightly PRN Art Stevenson MD   10 mg at 05/19/24 2104     Review of Systems   All other systems reviewed and are negative.      OBJECTIVE      Vitals:    05/20/24 1159   BP: 109/61   Pulse: 54   Resp: 16   Temp: 98.1 °F (36.7 °C)   SpO2: 95%       PHYSICAL EXAM    GEN:   A&Ox3, NAD.     Patient with ulceration to medial first ray with purulent drainage and malodor.  No significant erythema.  Mild edema to the foot.  Pulses palpable, light sensation absent.    RADIOLOGY/NUCLEAR:  MRI Foot Right With & Without Contrast    Result Date: 5/20/2024  Narrative:  MRI FOOT RIGHT W WO CONTRAST-  Date of Exam: 5/18/2024 7:10 AM  Indication: osteo and foot abscess.  Comparison: None available.  Technique:  Routine multiplanar/multisequence sequence images of the right foot were obtained before and after the uneventful administration of 20 mL MultiHance.    Findings: There has been resection of the first digit at the distal first metatarsal. There is an overlying wound at the medial forefoot. There appears to be surrounding heterogeneous edema signal and enhancement. There is a nonenhancing sinus tract extending from the wound to the distal first metatarsal and to the second MTP joint. This can be seen on series 11 images 21-27. There are some foci of T1 hypointense signal in the soft tissues surrounding the tract which could represent postsurgical changes versus small foci of soft tissue gas.  In the superficial subcutaneous tissues, just posterior to the wound on series 11 images 19 and 20, there is a small rim-enhancing collection measuring approximately 12 x 14 x 7 mm. This is suspicious for a small abscess and may communicate to the sinus tract. There is also an irregular T2 hyperintense signal focus with peripheral enhancement extending anteriorly from the tract in the soft tissues distal to the remaining first metatarsal as seen on series 6 image 12 and series 8 image 27 measuring approximately 9 x 14 x 15 mm which may also represent a small abscess, possibly communicating to the sinus tract.  There is T2 hyperintense and T1 hypointense signal at the  remaining distal first metatarsal adjacent to the wound consistent with osteomyelitis. There is also T2 hyperintense edema signal and corresponding T1 hypointense signal at the second metatarsal head and second proximal phalanx surrounding the second MTP joint consistent with osteomyelitis. The wound extends to the second MTP joint where there is para-articular enhancement consistent with joint infection.  No other definite findings of osteomyelitis or joint infection.  There appear to be findings of advanced arthropathy in the midfoot with prominent osteophytosis. Lisfranc alignment appears grossly intact. Lisfranc ligament appears grossly intact.  There is advanced muscle atrophy with diffuse edema signal and enhancement. This could be associated with denervation or myositis. There is a small amount of fluid signal and enhancement along the extensor digitorum tendons which may indicate infectious tenosynovitis.       Impression: Impression: 1.  Status post amputation of the first digit at the distal first metatarsal. 2.  Wound at the medial forefoot with edema and enhancement surrounding a sinus tract extending to the distal first metatarsal and second MTP joint. 3.  Findings of osteomyelitis at the remaining distal first metatarsal and surrounding the second MTP joint where there are findings of joint infection. 4.  Findings of skin and soft tissue infection surrounding wound. Findings of muscle denervation and/or myositis. 5.  Two small abscesses in the soft tissues which may drain to the sinus tract.   Electronically Signed By-Hunter Steele MD On:5/20/2024 9:37 AM       LABORATORY/CULTURE RESULTS:  Results from last 7 days   Lab Units 05/20/24  0518 05/19/24  0532 05/18/24  0555   WBC 10*3/mm3 8.50 6.78 6.13   HEMOGLOBIN g/dL 11.5* 11.6* 11.3*   HEMATOCRIT % 36.5* 36.0* 35.5*   PLATELETS 10*3/mm3 406 423 393     Results from last 7 days   Lab Units 05/20/24  0518 05/19/24  0532 05/18/24  0555 05/17/24  4198    SODIUM mmol/L 136 133* 136 135*   POTASSIUM mmol/L 4.4 4.1 4.1 4.4   CHLORIDE mmol/L 99 97* 101 99   CO2 mmol/L 25.8 24.6 23.9 24.8   BUN mg/dL 13 12 9 11   CREATININE mg/dL 0.87 0.75* 0.75* 0.84   CALCIUM mg/dL 9.3 9.2 8.7 9.4   BILIRUBIN mg/dL  --   --  0.3 0.3   ALK PHOS U/L  --   --  107 107   ALT (SGPT) U/L  --   --  5 5   AST (SGOT) U/L  --   --  7 5   GLUCOSE mg/dL 133* 153* 175* 229*         Microbiology Results (last 10 days)       Procedure Component Value - Date/Time    Blood Culture - Blood, Arm, Right [[id number]]  (Normal) Collected: 05/17/24 1415    Lab Status: Preliminary result Specimen: Blood from Arm, Right Updated: 05/19/24 1430     Blood Culture No growth at 2 days    Blood Culture - Blood, Arm, Left [[id number]]  (Normal) Collected: 05/17/24 1415    Lab Status: Preliminary result Specimen: Blood from Arm, Left Updated: 05/19/24 1430     Blood Culture No growth at 2 days    Wound Culture - Wound, Foot, Right [[id number]]  (Abnormal)  (Susceptibility) Collected: 05/13/24 1000    Lab Status: Final result Specimen: Wound from Foot, Right Updated: 05/15/24 0743     Wound Culture Scant growth (1+) Enterococcus faecalis     Gram Stain Few (2+) WBCs seen      Few (2+) Gram positive cocci in pairs    Susceptibility        Enterococcus faecalis      BETH      Ampicillin Susceptible      Vancomycin Susceptible                                    ASSESSMENT/PLAN     Active Hospital Problems:  Active Hospital Problems    Diagnosis     **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     Chronic osteomyelitis of right foot with draining sinus          Comprehensive lower extremity examination and evaluation was performed.  MRI reviewed, osteomyelitis second metatarsal  Patient for second ray amputation versus transmetatarsal amputation  Discussed plan with patient and daughter, agreeable  N.p.o. after midnight  Patient high risk for amputation    The risks and benefits of the surgery were discussed with the  patient.  This discussion included possible complications of requiring further surgery, possible delayed wound healing, further surgery requiring amputation of the foot or leg, and also included the complication of death.  All the patient's questions were answered to their satisfaction.  Patient states they would like to proceed with the procedure.    Discussed findings and treatment plan including risks, benefits, and treatment options with patient in detail. Patient agreed with treatment plan.          This document has been electronically signed by Balwinder Costa DPM on May 20, 2024 12:38 EDT

## 2024-05-20 NOTE — NURSING NOTE
Spoke with podiatry concerning patient this a.m.  Patient to be seen by podiatry today requested by podiatry to defer assessment of patient at this time.  Will assisting care as needed, can be reconsulted at any given time.  Spoke with patient concerning conversation with podiatrist this a.m.  Explained role of wound nurse and also explained that we will assist as needed but I am currently deferring to podiatry at this time.  Patient denies any further wounds or issues at present time.  Patient is aware that he can request wound nurse to assess his wound at any time as well.  Anabelle Valdez, RN

## 2024-05-20 NOTE — PROGRESS NOTES
[facility] - PODIATRY    Today's Date: 05/20/24    Patient Name: Jd Velazquez  MRN: [id number]  CSN: [id number]  PCP: Dacia Newsome APRN,   Referring Provider: No ref. provider found    SUBJECTIVE     Chief Complaint   Patient presents with    Right Foot - Foot Ulcer, Post-op Follow-up     HPI: Jd Velazquez, a 58 y.o.male, presents to clinic.    Here for follow-up and says that he thinks that the antibiotics are helping.  Past Medical History:   Diagnosis Date    Allergic rhinitis 01/12/2015    Anesthesia     DIFFICULTY WAKING UP POST SURGERY    CAD (coronary artery disease)     DENIES CP/SOA.    Diabetes mellitus     Hyperlipidemia     Hypertension     Hypothyroidism 04/24/2014    Insomnia, unspecified 06/15/2016    WITHOUT BIPAP    Microalbuminuria due to type 2 diabetes mellitus 10/15/2015    Mixed hyperlipidemia 10/15/2015    Myocardial infarction     Obesity     BMI 32    SHAHLA (obstructive sleep apnea)     WEARS BIPAP    Right great toe amputee     4/2024; 5/2024 ADMITTED WITH INFECTION    Skin cancer     REMOVED    Sleep apnea      Past Surgical History:   Procedure Laterality Date    AMPUTATION DIGIT Right 4/10/2024    Procedure: AMPUTATION DIGIT RIGHT GREAT TOE;  Surgeon: Balwinder Costa DPM;  Location: [facility] MAIN OR;  Service: Podiatry;  Laterality: Right;    ANKLE ARTHROSCOPY W/ OPEN REPAIR      APPENDECTOMY      CARDIAC CATHETERIZATION  2014    PCI to circumflex    CARDIAC CATHETERIZATION Right 10/26/2023    Procedure: Aortogram with right leg angiogram, possible angioplasty or stenting;  Surgeon: Robert Puga MD;  Location: [facility] CATH INVASIVE LOCATION;  Service: Vascular;  Laterality: Right;    CARDIAC CATHETERIZATION Right 12/15/2023    Procedure: Right leg angiogram, possible angioplasty or stenting;  Surgeon: Robert Puga MD;  Location: [facility] CATH INVASIVE LOCATION;  Service: Vascular;  Laterality: Right;    CORONARY ARTERY BYPASS GRAFT N/A  10/08/2020    Procedure: STERNOTOMY, CORONARY ARTERY BYPASS GRAFTING TIME 4 WITH LEFT KELSI  AND ENDOSCOPICALLY HARVESTED LEFT GREATER SAPHENOUS VEIN AND PRP.;  Surgeon: Jr Girma Chiu MD;  Location: [facility] MAIN OR;  Service: Cardiothoracic;  Laterality: N/A;    FEMORAL TIBIAL BYPASS Right 01/09/2024    Procedure: Right femoral-tibial bypass graft;  Surgeon: Robert Puga MD;  Location: [facility] MAIN OR;  Service: Vascular;  Laterality: Right;    HEEL SPUR SURGERY      HERNIA REPAIR      INCISION AND DRAINAGE OF WOUND Right 4/30/2024    Procedure: INCISION AND DRAINAGE WOUND RIGHT FOOT, DEBRIDEMENT OF WOUND RIGHT FOOT, AMPUTATION REVISION RIGHT FOOT;  Surgeon: Balwinder Costa DPM;  Location: [facility] MAIN OR;  Service: Podiatry;  Laterality: Right;    KNEE ARTHROSCOPY      MULTIPLE TIMES ON BOTH KNEES    SKIN CANCER EXCISION      BACK    TOE SURGERY Right     DEBRIDMENT RIGHT GREAT TOE    TONSILLECTOMY       Family History   Adopted: Yes   Problem Relation Age of Onset    Heart disease Other      Social History     Socioeconomic History    Marital status:    Tobacco Use    Smoking status: Every Day     Current packs/day: 0.25     Average packs/day: 0.3 packs/day for 15.0 years (3.8 ttl pk-yrs)     Types: Cigarettes     Passive exposure: Never    Smokeless tobacco: Never   Vaping Use    Vaping status: Never Used   Substance and Sexual Activity    Alcohol use: Not Currently    Drug use: Never    Sexual activity: Defer     Allergies   Allergen Reactions    Strawberry Itching    Lortab [Hydrocodone-Acetaminophen] Itching     No current facility-administered medications for this visit.     No current outpatient medications on file.     Facility-Administered Medications Ordered in Other Visits   Medication Dose Route Frequency Provider Last Rate Last Admin    acetaminophen (TYLENOL) tablet 650 mg  650 mg Oral Q4H PRN Joe Wing MD        aspirin chewable tablet 81 mg  81 mg Oral Daily Joe Wing MD   81 mg  at 05/20/24 0845    clopidogrel (PLAVIX) tablet 75 mg  75 mg Oral Daily Joe Wing MD   75 mg at 05/20/24 0845    dextrose (D50W) (25 g/50 mL) IV injection 25 g  25 g Intravenous Q15 Min PRN Joe Wing MD        dextrose (GLUTOSE) oral gel 15 g  15 g Oral Q15 Min PRN Joe Wing MD        DULoxetine (CYMBALTA) DR capsule 30 mg  30 mg Oral Daily Joe Wing MD   30 mg at 05/20/24 0845    Enoxaparin Sodium (LOVENOX) syringe 40 mg  40 mg Subcutaneous Daily Joe Wing MD   40 mg at 05/20/24 0845    glucagon (GLUCAGEN) injection 1 mg  1 mg Intramuscular Q15 Min PRN Joe Wing MD        lisinopril (PRINIVIL,ZESTRIL) tablet 20 mg  20 mg Oral Q24H Art Stevenson MD   20 mg at 05/20/24 0845    And    hydroCHLOROthiazide tablet 12.5 mg  12.5 mg Oral Q24H Art Stevenson MD   12.5 mg at 05/20/24 0845    insulin detemir (LEVEMIR) injection 20 Units  20 Units Subcutaneous BID Art Stevenson MD   20 Units at 05/20/24 0844    Insulin Lispro (humaLOG) injection 2-7 Units  2-7 Units Subcutaneous 4x Daily AC & at Bedtime Joe Wing MD   2 Units at 05/19/24 2106    metoprolol tartrate (LOPRESSOR) tablet 100 mg  100 mg Oral Q12H Joe Wing MD   100 mg at 05/20/24 0845    Pharmacy to dose vancomycin   Does not apply Continuous PRN Joe Wing MD        Pharmacy to Dose Zosyn   Does not apply Continuous PRN Joe Wing MD        piperacillin-tazobactam (ZOSYN) IVPB 3.375 g IVPB in 100 mL NS (VTB)  3.375 g Intravenous Q8H Joe Wing MD   3.375 g at 05/20/24 1221    pregabalin (LYRICA) capsule 225 mg  225 mg Oral BID Joe Wing MD   225 mg at 05/20/24 0845    sodium chloride 0.9 % flush 10 mL  10 mL Intravenous Q12H Joe Wing MD   10 mL at 05/20/24 0846    sodium chloride 0.9 % flush 10 mL  10 mL Intravenous PRN Joe Wing MD        sodium chloride 0.9 % infusion 40 mL  40 mL Intravenous PRN Joe Wing MD        vancomycin 1250 mg/250 mL 0.9% NS IVPB ([facility])  1,250 mg Intravenous Q12H Joe Wing MD        zolpidem (AMBIEN)  tablet 10 mg  10 mg Oral Nightly PRN Art Stevenson MD   10 mg at 05/19/24 2104     Review of Systems   Skin:         Ulcer toe   All other systems reviewed and are negative.      OBJECTIVE     Vitals:    05/13/24 0937   BP: 128/62   Pulse: 65   Temp: 97.3 °F (36.3 °C)   SpO2: 96%       WBC   Date Value Ref Range Status   05/20/2024 8.50 3.40 - 10.80 10*3/mm3 Final     RBC   Date Value Ref Range Status   05/20/2024 3.97 (L) 4.14 - 5.80 10*6/mm3 Final     Hemoglobin   Date Value Ref Range Status   05/20/2024 11.5 (L) 13.0 - 17.7 g/dL Final     Hematocrit   Date Value Ref Range Status   05/20/2024 36.5 (L) 37.5 - 51.0 % Final     MCV   Date Value Ref Range Status   05/20/2024 91.9 79.0 - 97.0 fL Final     MCH   Date Value Ref Range Status   05/20/2024 29.0 26.6 - 33.0 pg Final     MCHC   Date Value Ref Range Status   05/20/2024 31.5 31.5 - 35.7 g/dL Final     RDW   Date Value Ref Range Status   05/20/2024 14.6 12.3 - 15.4 % Final     RDW-SD   Date Value Ref Range Status   05/20/2024 49.6 37.0 - 54.0 fl Final     MPV   Date Value Ref Range Status   05/20/2024 9.1 6.0 - 12.0 fL Final     Platelets   Date Value Ref Range Status   05/20/2024 406 140 - 450 10*3/mm3 Final     Neutrophil %   Date Value Ref Range Status   05/18/2024 41.0 (L) 42.7 - 76.0 % Final     Lymphocyte %   Date Value Ref Range Status   05/18/2024 40.6 19.6 - 45.3 % Final     Monocyte %   Date Value Ref Range Status   05/18/2024 14.4 (H) 5.0 - 12.0 % Final     Eosinophil %   Date Value Ref Range Status   05/18/2024 2.0 0.3 - 6.2 % Final     Basophil %   Date Value Ref Range Status   05/18/2024 1.0 0.0 - 1.5 % Final     Immature Grans %   Date Value Ref Range Status   05/18/2024 1.0 (H) 0.0 - 0.5 % Final     Neutrophils, Absolute   Date Value Ref Range Status   05/18/2024 2.52 1.70 - 7.00 10*3/mm3 Final     Lymphocytes, Absolute   Date Value Ref Range Status   05/18/2024 2.49 0.70 - 3.10 10*3/mm3 Final     Monocytes, Absolute   Date Value Ref Range  Status   05/18/2024 0.88 0.10 - 0.90 10*3/mm3 Final     Eosinophils, Absolute   Date Value Ref Range Status   05/18/2024 0.12 0.00 - 0.40 10*3/mm3 Final     Basophils, Absolute   Date Value Ref Range Status   05/18/2024 0.06 0.00 - 0.20 10*3/mm3 Final     Immature Grans, Absolute   Date Value Ref Range Status   05/18/2024 0.06 (H) 0.00 - 0.05 10*3/mm3 Final     nRBC   Date Value Ref Range Status   05/18/2024 0.0 0.0 - 0.2 /100 WBC Final         Lab Results   Component Value Date    GLUCOSE 133 (H) 05/20/2024    BUN 13 05/20/2024    CREATININE 0.87 05/20/2024    EGFRIFNONA 77 06/17/2021    BCR 14.9 05/20/2024    K 4.4 05/20/2024    CO2 25.8 05/20/2024    CALCIUM 9.3 05/20/2024    ALBUMIN 3.5 05/18/2024    LABIL2 0.9 (L) 10/16/2020    AST 7 05/18/2024    ALT 5 05/18/2024       Patient seen in no apparent distress.      PHYSICAL EXAM:  Sutures are intact with skin edges well-coapted with no signs of dehiscence.  Wound to medial first ray, no erythema or malodor, serous/bloody drainage to amputation site.    RADIOLOGY:        MRI Foot Right With & Without Contrast    Result Date: 5/20/2024  Narrative:  MRI FOOT RIGHT W WO CONTRAST-  Date of Exam: 5/18/2024 7:10 AM  Indication: osteo and foot abscess.  Comparison: None available.  Technique:  Routine multiplanar/multisequence sequence images of the right foot were obtained before and after the uneventful administration of 20 mL MultiHance.    Findings: There has been resection of the first digit at the distal first metatarsal. There is an overlying wound at the medial forefoot. There appears to be surrounding heterogeneous edema signal and enhancement. There is a nonenhancing sinus tract extending from the wound to the distal first metatarsal and to the second MTP joint. This can be seen on series 11 images 21-27. There are some foci of T1 hypointense signal in the soft tissues surrounding the tract which could represent postsurgical changes versus small foci of soft  tissue gas.  In the superficial subcutaneous tissues, just posterior to the wound on series 11 images 19 and 20, there is a small rim-enhancing collection measuring approximately 12 x 14 x 7 mm. This is suspicious for a small abscess and may communicate to the sinus tract. There is also an irregular T2 hyperintense signal focus with peripheral enhancement extending anteriorly from the tract in the soft tissues distal to the remaining first metatarsal as seen on series 6 image 12 and series 8 image 27 measuring approximately 9 x 14 x 15 mm which may also represent a small abscess, possibly communicating to the sinus tract.  There is T2 hyperintense and T1 hypointense signal at the remaining distal first metatarsal adjacent to the wound consistent with osteomyelitis. There is also T2 hyperintense edema signal and corresponding T1 hypointense signal at the second metatarsal head and second proximal phalanx surrounding the second MTP joint consistent with osteomyelitis. The wound extends to the second MTP joint where there is para-articular enhancement consistent with joint infection.  No other definite findings of osteomyelitis or joint infection.  There appear to be findings of advanced arthropathy in the midfoot with prominent osteophytosis. Lisfranc alignment appears grossly intact. Lisfranc ligament appears grossly intact.  There is advanced muscle atrophy with diffuse edema signal and enhancement. This could be associated with denervation or myositis. There is a small amount of fluid signal and enhancement along the extensor digitorum tendons which may indicate infectious tenosynovitis.       Impression: Impression: 1.  Status post amputation of the first digit at the distal first metatarsal. 2.  Wound at the medial forefoot with edema and enhancement surrounding a sinus tract extending to the distal first metatarsal and second MTP joint. 3.  Findings of osteomyelitis at the remaining distal first metatarsal and  surrounding the second MTP joint where there are findings of joint infection. 4.  Findings of skin and soft tissue infection surrounding wound. Findings of muscle denervation and/or myositis. 5.  Two small abscesses in the soft tissues which may drain to the sinus tract.   Electronically Signed By-Hunter Steele MD On:5/20/2024 9:37 AM       ASSESSMENT/PLAN     Diagnoses and all orders for this visit:    1. 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 (Primary)  -     Wound Culture - Wound, Foot, Right    2. Uncontrolled type 2 diabetes mellitus with hyperglycemia      Patient with continued drainage to the foot.  No erythema malodor to amputation site.      Follow-up on Friday if no improvement we will admit him to the hospital for further evaluation/management    Comprehensive lower extremity examination and evaluation was performed.    Discussed findings and treatment plan including risks, benefits, and treatment options with patient in detail. Patient agreed with treatment plan.    Medications and allergies reviewed.  Reviewed available lab values along with other pertinent labs.  These were discussed with the patient.    An After Visit Summary was printed and given to the patient at discharge, including (if requested) any available informative/educational handouts regarding diagnosis, treatment, or medications. All questions were answered to patient/family satisfaction. Should symptoms fail to improve or worsen they agree to call or return to clinic or to go to the Emergency Department. Discussed the importance of following up with any needed screening tests/labs/specialist appointments and any requested follow-up recommended by me today. Importance of maintaining follow-up discussed and patient accepts that missed appointments can delay diagnosis and potentially lead to worsening of conditions.    No follow-ups on file., or sooner if acute issues arise.    This document has been electronically signed by Balwinder CHRISTENSEN  DALLAS Costa on May 20, 2024 14:07 EDT        Answers submitted by the patient for this visit:  Primary Reason for Visit (Submitted on 4/4/2024)  What is the primary reason for your visit?: Other  Other (Submitted on 4/4/2024)  Please describe your symptoms.: Check on dead toe.  Have you had these symptoms before?: Yes  How long have you been having these symptoms?: Greater than 2 weeks

## 2024-05-20 NOTE — ANESTHESIA PREPROCEDURE EVALUATION
Anesthesia Evaluation     Patient summary reviewed and Nursing notes reviewed   no history of anesthetic complications:   NPO Solid Status: > 8 hours  NPO Liquid Status: > 2 hours           Airway   Mallampati: II  TM distance: >3 FB  Neck ROM: full  No difficulty expected  Dental - normal exam     Pulmonary - normal exam    breath sounds clear to auscultation  (+) a smoker Current, cigarettes,sleep apnea on CPAP  Cardiovascular - normal exam    PT is on anticoagulation therapy  Patient on routine beta blocker  Rhythm: regular  Rate: normal    (+) hypertension, past MI (2020)  >12 months, CAD, CABG >6 Months, PVD (Fem/Tib bypass; Plavix, last dose yesterday), hyperlipidemia    ROS comment: Last plavix     Neuro/Psych  (+) psychiatric history Depression  GI/Hepatic/Renal/Endo    (+) obesity, renal disease- CRI, diabetes mellitus ([place] last dose 3/31/24) type 2 using insulin, thyroid problem (synthroid) hypothyroidism    Musculoskeletal (-) negative ROS    Abdominal   (+) obese   Substance History - negative use     OB/GYN negative ob/gyn ROS         Other      history of cancer (skin cancer)    ROS/Med Hx Other: S/P CABG x 4   Hypokalemia   Edema   Mild episode of recurrent major depressive disorder   Class 1 obesity due to excess calories with serious comorbidity and body mass index (BMI) of 32.0 to 32.9 in adult   Dermatitis   Skin ulcer, limited to breakdown of skin   Itching   Cellulitis of right lower extremity   Hyponatremia   Thoracic spine pain   Neuropathy   Dry gangrene   Abscess of right foot   Chronic osteomyelitis of right foot with draining sinus                     Anesthesia Plan    ASA 4     general and MAC     (Patient understands anesthesia not responsible for dental damage.)  intravenous induction     Anesthetic plan, risks, benefits, and alternatives have been provided, discussed and informed consent has been obtained with: patient and spouse/significant other.  Pre-procedure education  provided      CODE STATUS:    Level Of Support Discussed With: Patient  Code Status (Patient has no pulse and is not breathing): CPR (Attempt to Resuscitate)  Medical Interventions (Patient has pulse or is breathing): Full Support

## 2024-05-20 NOTE — PROGRESS NOTES
[facility]   Hospitalist Progress Note  Date: 2024  Patient Name: Jd Velazquez  : 1965  MRN: [id number]  Date of admission: 2024  Consultants:   -Podiatry: Dr. Mushtaq Mathew, Dr. Balwinder Costa    Subjective   Subjective     Chief Complaint: Right foot osteo versus abscess leading to direct admission     Summary:   Jd Velazquez is a 58 y.o. male with CAD, type 2 diabetes mellitus, dyslipidemia, hyperkalemia, peripheral vascular disease and obesity (BMI: 32.36) that presented with worsening right foot wound s/p amputation of great toe in 2024. Podiatry consulted to assist in care. Broad-spectrum antibiotics started.     Interval Followup:   No acute issues overnight.  MRI imaging personally reviewed and discussed with podiatry service as well.  It appears as though osteomyelitis is evident.  Official radiologist report still pending at this time.  Patient denies any chest pain or shortness of breath.  Nursing with no additional acute issues to report.    Antibiotics:   -Vancomycin  -Zosyn    Objective   Objective     Vitals:   Temp:  [97.7 °F (36.5 °C)-98.6 °F (37 °C)] 98.6 °F (37 °C)  Heart Rate:  [55-58] 58  Resp:  [16-18] 16  BP: (111-147)/(62-72) 147/72  Physical Exam   Gen: No acute distress, conversant, pleasant, sitting up in chair at bedside  Resp: CTAB, No w/r/r, good aeration, equal chest rise bilaterally  Card: RRR, No m/r/g  Abd: Soft, Nontender, Nondistended, + bowel sounds  Ext: Right foot with dressing in place    Result Review    Result Review:  I have personally reviewed the results as below and agree with these findings:  []  Laboratory:   CMP          2024    05:55 2024    05:32 2024    05:18   CMP   Glucose 175  153  133    BUN 9  12  13    Creatinine 0.75  0.75  0.87    EGFR 105.3  105.3  100.6    Sodium 136  133  136    Potassium 4.1  4.1  4.4    Chloride 101  97  99    Calcium 8.7  9.2  9.3    Total Protein 7.1      Albumin 3.5       Globulin 3.6      Total Bilirubin 0.3      Alkaline Phosphatase 107      AST (SGOT) 7      ALT (SGPT) 5      Albumin/Globulin Ratio 1.0      BUN/Creatinine Ratio 12.0  16.0  14.9    Anion Gap 11.1  11.4  11.2      CBC          5/18/2024    05:55 5/19/2024    05:32 5/20/2024    05:18   CBC   WBC 6.13  6.78  8.50    RBC 3.89  3.98  3.97    Hemoglobin 11.3  11.6  11.5    Hematocrit 35.5  36.0  36.5    MCV 91.3  90.5  91.9    MCH 29.0  29.1  29.0    MCHC 31.8  32.2  31.5    RDW 14.6  14.5  14.6    Platelets 393  423  406    Phosphorus within normal limits.  Magnesium low.  [x]  Microbiology: Blood culture (05/17/2024): No growth to date  []  Radiology:   []  EKG/Telemetry:    []  Cardiology/Vascular:    []  Pathology:  []  Old records:  []  Other:    Assessment & Plan   Assessment / Plan     Assessment:  Right foot diabetic ulcer with concern for osteomyelitis  Type 2 diabetes mellitus with hyperglycemia  Peripheral vascular disease  Hypomagnesemia  CAD  Obesity (BMI: 33.36)    Plan:  -Podiatry consulted and following, appreciate assistance and recommendations in the care of this patient.  -Continue vancomycin and Zosyn.  Monitor vancomycin level.  Tailor antibiotics based on results of infectious workup.  -Replace magnesium IV  -Improved blood glucose control.  No changes in regimen at this time.  -Continue aspirin and Plavix  -Management discussed with podiatry.  Likely osteomyelitis findings on MRI imaging.  Patient may require surgical intervention.  -Continue appropriate home medications  -Monitor electrolytes and renal function with BMP and magnesium level in the AM  -Monitor WBC and Hgb with CBC in the AM  -Clinical course will dictate further management     DVT Prophylaxis: Lovenox  Diet:   Diet Order   Procedures    Diet: Cardiac, Diabetic; Healthy Heart (2-3 Na+); Consistent Carbohydrate; Fluid Consistency: Thin (IDDSI 0)     Dispo: Pending clinical course     Personally reviewed patients labs and imaging,  discussed with patient and nurse at bedside. Discussed management with the following consultants: Podiatry.     Part of this note may be an electronic transcription/translation of spoken language to printed text using the Dragon dictation system.    DVT prophylaxis:  Medical DVT prophylaxis orders are present.        CODE STATUS:   Level Of Support Discussed With: Patient  Code Status (Patient has no pulse and is not breathing): CPR (Attempt to Resuscitate)  Medical Interventions (Patient has pulse or is breathing): Full Support        Electronically signed by Art Stevenson MD, 5/20/2024, 09:03 EDT.

## 2024-05-20 NOTE — H&P (VIEW-ONLY)
[facility] - PODIATRY    Today's Date: 05/20/24    Patient Name: Jd Velazquez  MRN: [id number]  CSN: [id number]  PCP: Dacia Newsome APRN  Referring Provider: Mario Bowman MD  Attending Provider: Art Stevenson MD  Length of Stay: 3    SUBJECTIVE   Chief Complaint:     HPI: Jd Velazquez, a 58 y.o.male, .  Denies any constitutional symptoms. No other pedal complaints at this time.    Past Medical History:   Diagnosis Date    Allergic rhinitis 01/12/2015    Anesthesia     DIFFICULTY WAKING UP POST SURGERY    CAD (coronary artery disease)     DENIES CP/SOA.    Diabetes mellitus     Hyperlipidemia     Hypertension     Hypothyroidism 04/24/2014    Insomnia, unspecified 06/15/2016    WITHOUT BIPAP    Microalbuminuria due to type 2 diabetes mellitus 10/15/2015    Mixed hyperlipidemia 10/15/2015    Myocardial infarction     Obesity     BMI 32    SHAHLA (obstructive sleep apnea)     WEARS BIPAP    Right great toe amputee     4/2024; 5/2024 ADMITTED WITH INFECTION    Skin cancer     REMOVED    Sleep apnea      Past Surgical History:   Procedure Laterality Date    AMPUTATION DIGIT Right 4/10/2024    Procedure: AMPUTATION DIGIT RIGHT GREAT TOE;  Surgeon: Balwinder Costa DPM;  Location: [facility] MAIN OR;  Service: Podiatry;  Laterality: Right;    ANKLE ARTHROSCOPY W/ OPEN REPAIR      APPENDECTOMY      CARDIAC CATHETERIZATION  2014    PCI to circumflex    CARDIAC CATHETERIZATION Right 10/26/2023    Procedure: Aortogram with right leg angiogram, possible angioplasty or stenting;  Surgeon: Robert Puga MD;  Location: [facility] CATH INVASIVE LOCATION;  Service: Vascular;  Laterality: Right;    CARDIAC CATHETERIZATION Right 12/15/2023    Procedure: Right leg angiogram, possible angioplasty or stenting;  Surgeon: Robert Puga MD;  Location: [facility] CATH INVASIVE LOCATION;  Service: Vascular;  Laterality: Right;    CORONARY ARTERY BYPASS GRAFT N/A 10/08/2020    Procedure: STERNOTOMY,  CORONARY ARTERY BYPASS GRAFTING TIME 4 WITH LEFT KELSI  AND ENDOSCOPICALLY HARVESTED LEFT GREATER SAPHENOUS VEIN AND PRP.;  Surgeon: Jr Girma Chiu MD;  Location: [facility] MAIN OR;  Service: Cardiothoracic;  Laterality: N/A;    FEMORAL TIBIAL BYPASS Right 01/09/2024    Procedure: Right femoral-tibial bypass graft;  Surgeon: Robert Puga MD;  Location: [facility] MAIN OR;  Service: Vascular;  Laterality: Right;    HEEL SPUR SURGERY      HERNIA REPAIR      INCISION AND DRAINAGE OF WOUND Right 4/30/2024    Procedure: INCISION AND DRAINAGE WOUND RIGHT FOOT, DEBRIDEMENT OF WOUND RIGHT FOOT, AMPUTATION REVISION RIGHT FOOT;  Surgeon: Balwinder Costa DPM;  Location: [facility] MAIN OR;  Service: Podiatry;  Laterality: Right;    KNEE ARTHROSCOPY      MULTIPLE TIMES ON BOTH KNEES    SKIN CANCER EXCISION      BACK    TOE SURGERY Right     DEBRIDMENT RIGHT GREAT TOE    TONSILLECTOMY       Family History   Adopted: Yes   Problem Relation Age of Onset    Heart disease Other      Social History     Socioeconomic History    Marital status:    Tobacco Use    Smoking status: Every Day     Current packs/day: 0.25     Average packs/day: 0.3 packs/day for 15.0 years (3.8 ttl pk-yrs)     Types: Cigarettes     Passive exposure: Never    Smokeless tobacco: Never   Vaping Use    Vaping status: Never Used   Substance and Sexual Activity    Alcohol use: Not Currently    Drug use: Never    Sexual activity: Defer     Allergies   Allergen Reactions    Strawberry Itching    Lortab [Hydrocodone-Acetaminophen] Itching     Current Facility-Administered Medications   Medication Dose Route Frequency Provider Last Rate Last Admin    acetaminophen (TYLENOL) tablet 650 mg  650 mg Oral Q4H PRN Joe Wing MD        aspirin chewable tablet 81 mg  81 mg Oral Daily Joe Wing MD   81 mg at 05/20/24 0845    clopidogrel (PLAVIX) tablet 75 mg  75 mg Oral Daily Joe Wing MD   75 mg at 05/20/24 0845    dextrose (D50W) (25 g/50 mL) IV injection 25 g   25 g Intravenous Q15 Min PRN Joe Wing MD        dextrose (GLUTOSE) oral gel 15 g  15 g Oral Q15 Min PRN Joe Wing MD        DULoxetine (CYMBALTA) DR capsule 30 mg  30 mg Oral Daily Joe Wing MD   30 mg at 05/20/24 0845    Enoxaparin Sodium (LOVENOX) syringe 40 mg  40 mg Subcutaneous Daily Joe Wing MD   40 mg at 05/20/24 0845    glucagon (GLUCAGEN) injection 1 mg  1 mg Intramuscular Q15 Min PRN Joe Wing MD        lisinopril (PRINIVIL,ZESTRIL) tablet 20 mg  20 mg Oral Q24H Art Stevenson MD   20 mg at 05/20/24 0845    And    hydroCHLOROthiazide tablet 12.5 mg  12.5 mg Oral Q24H Art Stevenson MD   12.5 mg at 05/20/24 0845    insulin detemir (LEVEMIR) injection 20 Units  20 Units Subcutaneous BID Art Stevenson MD   20 Units at 05/20/24 0844    Insulin Lispro (humaLOG) injection 2-7 Units  2-7 Units Subcutaneous 4x Daily AC & at Bedtime Joe Wing MD   2 Units at 05/19/24 2106    metoprolol tartrate (LOPRESSOR) tablet 100 mg  100 mg Oral Q12H Joe Wing MD   100 mg at 05/20/24 0845    Pharmacy to dose vancomycin   Does not apply Continuous PRN Joe Wing MD        Pharmacy to Dose Zosyn   Does not apply Continuous PRN Joe Wing MD        piperacillin-tazobactam (ZOSYN) IVPB 3.375 g IVPB in 100 mL NS (VTB)  3.375 g Intravenous Q8H Joe Wing MD   3.375 g at 05/20/24 1221    pregabalin (LYRICA) capsule 225 mg  225 mg Oral BID Joe Wing MD   225 mg at 05/20/24 0845    sodium chloride 0.9 % flush 10 mL  10 mL Intravenous Q12H Joe Wing MD   10 mL at 05/20/24 0846    sodium chloride 0.9 % flush 10 mL  10 mL Intravenous PRN Joe Wing MD        sodium chloride 0.9 % infusion 40 mL  40 mL Intravenous PRN Joe Wing MD        vancomycin 1250 mg/250 mL 0.9% NS IVPB ([facility])  1,250 mg Intravenous Q12H Joe Wing MD        zolpidem (AMBIEN) tablet 10 mg  10 mg Oral Nightly PRN Art Stevenson MD   10 mg at 05/19/24 2104     Review of Systems   All other systems reviewed and are negative.      OBJECTIVE      Vitals:    05/20/24 1159   BP: 109/61   Pulse: 54   Resp: 16   Temp: 98.1 °F (36.7 °C)   SpO2: 95%       PHYSICAL EXAM    GEN:   A&Ox3, NAD.     Patient with ulceration to medial first ray with purulent drainage and malodor.  No significant erythema.  Mild edema to the foot.  Pulses palpable, light sensation absent.    RADIOLOGY/NUCLEAR:  MRI Foot Right With & Without Contrast    Result Date: 5/20/2024  Narrative:  MRI FOOT RIGHT W WO CONTRAST-  Date of Exam: 5/18/2024 7:10 AM  Indication: osteo and foot abscess.  Comparison: None available.  Technique:  Routine multiplanar/multisequence sequence images of the right foot were obtained before and after the uneventful administration of 20 mL MultiHance.    Findings: There has been resection of the first digit at the distal first metatarsal. There is an overlying wound at the medial forefoot. There appears to be surrounding heterogeneous edema signal and enhancement. There is a nonenhancing sinus tract extending from the wound to the distal first metatarsal and to the second MTP joint. This can be seen on series 11 images 21-27. There are some foci of T1 hypointense signal in the soft tissues surrounding the tract which could represent postsurgical changes versus small foci of soft tissue gas.  In the superficial subcutaneous tissues, just posterior to the wound on series 11 images 19 and 20, there is a small rim-enhancing collection measuring approximately 12 x 14 x 7 mm. This is suspicious for a small abscess and may communicate to the sinus tract. There is also an irregular T2 hyperintense signal focus with peripheral enhancement extending anteriorly from the tract in the soft tissues distal to the remaining first metatarsal as seen on series 6 image 12 and series 8 image 27 measuring approximately 9 x 14 x 15 mm which may also represent a small abscess, possibly communicating to the sinus tract.  There is T2 hyperintense and T1 hypointense signal at the  remaining distal first metatarsal adjacent to the wound consistent with osteomyelitis. There is also T2 hyperintense edema signal and corresponding T1 hypointense signal at the second metatarsal head and second proximal phalanx surrounding the second MTP joint consistent with osteomyelitis. The wound extends to the second MTP joint where there is para-articular enhancement consistent with joint infection.  No other definite findings of osteomyelitis or joint infection.  There appear to be findings of advanced arthropathy in the midfoot with prominent osteophytosis. Lisfranc alignment appears grossly intact. Lisfranc ligament appears grossly intact.  There is advanced muscle atrophy with diffuse edema signal and enhancement. This could be associated with denervation or myositis. There is a small amount of fluid signal and enhancement along the extensor digitorum tendons which may indicate infectious tenosynovitis.       Impression: Impression: 1.  Status post amputation of the first digit at the distal first metatarsal. 2.  Wound at the medial forefoot with edema and enhancement surrounding a sinus tract extending to the distal first metatarsal and second MTP joint. 3.  Findings of osteomyelitis at the remaining distal first metatarsal and surrounding the second MTP joint where there are findings of joint infection. 4.  Findings of skin and soft tissue infection surrounding wound. Findings of muscle denervation and/or myositis. 5.  Two small abscesses in the soft tissues which may drain to the sinus tract.   Electronically Signed By-Hunter Steele MD On:5/20/2024 9:37 AM       LABORATORY/CULTURE RESULTS:  Results from last 7 days   Lab Units 05/20/24  0518 05/19/24  0532 05/18/24  0555   WBC 10*3/mm3 8.50 6.78 6.13   HEMOGLOBIN g/dL 11.5* 11.6* 11.3*   HEMATOCRIT % 36.5* 36.0* 35.5*   PLATELETS 10*3/mm3 406 423 393     Results from last 7 days   Lab Units 05/20/24  0518 05/19/24  0532 05/18/24  0555 05/17/24  6574    SODIUM mmol/L 136 133* 136 135*   POTASSIUM mmol/L 4.4 4.1 4.1 4.4   CHLORIDE mmol/L 99 97* 101 99   CO2 mmol/L 25.8 24.6 23.9 24.8   BUN mg/dL 13 12 9 11   CREATININE mg/dL 0.87 0.75* 0.75* 0.84   CALCIUM mg/dL 9.3 9.2 8.7 9.4   BILIRUBIN mg/dL  --   --  0.3 0.3   ALK PHOS U/L  --   --  107 107   ALT (SGPT) U/L  --   --  5 5   AST (SGOT) U/L  --   --  7 5   GLUCOSE mg/dL 133* 153* 175* 229*         Microbiology Results (last 10 days)       Procedure Component Value - Date/Time    Blood Culture - Blood, Arm, Right [[id number]]  (Normal) Collected: 05/17/24 1415    Lab Status: Preliminary result Specimen: Blood from Arm, Right Updated: 05/19/24 1430     Blood Culture No growth at 2 days    Blood Culture - Blood, Arm, Left [[id number]]  (Normal) Collected: 05/17/24 1415    Lab Status: Preliminary result Specimen: Blood from Arm, Left Updated: 05/19/24 1430     Blood Culture No growth at 2 days    Wound Culture - Wound, Foot, Right [[id number]]  (Abnormal)  (Susceptibility) Collected: 05/13/24 1000    Lab Status: Final result Specimen: Wound from Foot, Right Updated: 05/15/24 0743     Wound Culture Scant growth (1+) Enterococcus faecalis     Gram Stain Few (2+) WBCs seen      Few (2+) Gram positive cocci in pairs    Susceptibility        Enterococcus faecalis      BETH      Ampicillin Susceptible      Vancomycin Susceptible                                    ASSESSMENT/PLAN     Active Hospital Problems:  Active Hospital Problems    Diagnosis     **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     Chronic osteomyelitis of right foot with draining sinus          Comprehensive lower extremity examination and evaluation was performed.  MRI reviewed, osteomyelitis second metatarsal  Patient for second ray amputation versus transmetatarsal amputation  Discussed plan with patient and daughter, agreeable  N.p.o. after midnight  Patient high risk for amputation    The risks and benefits of the surgery were discussed with the  patient.  This discussion included possible complications of requiring further surgery, possible delayed wound healing, further surgery requiring amputation of the foot or leg, and also included the complication of death.  All the patient's questions were answered to their satisfaction.  Patient states they would like to proceed with the procedure.    Discussed findings and treatment plan including risks, benefits, and treatment options with patient in detail. Patient agreed with treatment plan.          This document has been electronically signed by Balwinder Costa DPM on May 20, 2024 12:38 EDT

## 2024-05-21 ENCOUNTER — ANESTHESIA (OUTPATIENT)
Dept: PERIOP | Facility: HOSPITAL | Age: 59
End: 2024-05-21
Payer: COMMERCIAL

## 2024-05-21 LAB
ANION GAP SERPL CALCULATED.3IONS-SCNC: 10.7 MMOL/L (ref 5–15)
BUN SERPL-MCNC: 24 MG/DL (ref 6–20)
BUN/CREAT SERPL: 17.3 (ref 7–25)
CALCIUM SPEC-SCNC: 9.1 MG/DL (ref 8.6–10.5)
CHLORIDE SERPL-SCNC: 101 MMOL/L (ref 98–107)
CO2 SERPL-SCNC: 24.3 MMOL/L (ref 22–29)
CREAT SERPL-MCNC: 1.39 MG/DL (ref 0.76–1.27)
DEPRECATED RDW RBC AUTO: 50.9 FL (ref 37–54)
EGFRCR SERPLBLD CKD-EPI 2021: 59.1 ML/MIN/1.73
ERYTHROCYTE [DISTWIDTH] IN BLOOD BY AUTOMATED COUNT: 14.9 % (ref 12.3–15.4)
GLUCOSE BLDC GLUCOMTR-MCNC: 121 MG/DL (ref 70–99)
GLUCOSE BLDC GLUCOMTR-MCNC: 138 MG/DL (ref 70–99)
GLUCOSE BLDC GLUCOMTR-MCNC: 265 MG/DL (ref 70–99)
GLUCOSE BLDC GLUCOMTR-MCNC: 265 MG/DL (ref 70–99)
GLUCOSE SERPL-MCNC: 161 MG/DL (ref 65–99)
HCT VFR BLD AUTO: 38.1 % (ref 37.5–51)
HGB BLD-MCNC: 11.9 G/DL (ref 13–17.7)
MAGNESIUM SERPL-MCNC: 2.1 MG/DL (ref 1.6–2.6)
MCH RBC QN AUTO: 29.1 PG (ref 26.6–33)
MCHC RBC AUTO-ENTMCNC: 31.2 G/DL (ref 31.5–35.7)
MCV RBC AUTO: 93.2 FL (ref 79–97)
PHOSPHATE SERPL-MCNC: 4.2 MG/DL (ref 2.5–4.5)
PLATELET # BLD AUTO: 412 10*3/MM3 (ref 140–450)
PMV BLD AUTO: 9.2 FL (ref 6–12)
POTASSIUM SERPL-SCNC: 4.2 MMOL/L (ref 3.5–5.2)
RBC # BLD AUTO: 4.09 10*6/MM3 (ref 4.14–5.8)
SODIUM SERPL-SCNC: 136 MMOL/L (ref 136–145)
VANCOMYCIN SERPL-MCNC: 21.15 MCG/ML (ref 5–40)
WBC NRBC COR # BLD AUTO: 7.55 10*3/MM3 (ref 3.4–10.8)

## 2024-05-21 PROCEDURE — 0Y6M0ZB DETACHMENT AT RIGHT FOOT, PARTIAL 2ND RAY, OPEN APPROACH: ICD-10-PCS | Performed by: PODIATRIST

## 2024-05-21 PROCEDURE — 63710000001 INSULIN DETEMIR PER 5 UNITS: Performed by: INTERNAL MEDICINE

## 2024-05-21 PROCEDURE — 82948 REAGENT STRIP/BLOOD GLUCOSE: CPT | Performed by: NURSE ANESTHETIST, CERTIFIED REGISTERED

## 2024-05-21 PROCEDURE — 25010000002 FENTANYL CITRATE (PF) 50 MCG/ML SOLUTION: Performed by: NURSE ANESTHETIST, CERTIFIED REGISTERED

## 2024-05-21 PROCEDURE — 80048 BASIC METABOLIC PNL TOTAL CA: CPT | Performed by: INTERNAL MEDICINE

## 2024-05-21 PROCEDURE — 25010000002 LIDOCAINE 1 % SOLUTION 10 ML VIAL: Performed by: PODIATRIST

## 2024-05-21 PROCEDURE — 63710000001 INSULIN GLARGINE PER 5 UNITS: Performed by: INTERNAL MEDICINE

## 2024-05-21 PROCEDURE — 83735 ASSAY OF MAGNESIUM: CPT | Performed by: INTERNAL MEDICINE

## 2024-05-21 PROCEDURE — 87176 TISSUE HOMOGENIZATION CULTR: CPT | Performed by: PODIATRIST

## 2024-05-21 PROCEDURE — 28810 AMPUTATION TOE & METATARSAL: CPT | Performed by: SPECIALIST/TECHNOLOGIST, OTHER

## 2024-05-21 PROCEDURE — 88305 TISSUE EXAM BY PATHOLOGIST: CPT | Performed by: PODIATRIST

## 2024-05-21 PROCEDURE — 25010000002 PIPERACILLIN SOD-TAZOBACTAM PER 1 G: Performed by: PODIATRIST

## 2024-05-21 PROCEDURE — 82948 REAGENT STRIP/BLOOD GLUCOSE: CPT

## 2024-05-21 PROCEDURE — 63710000001 INSULIN LISPRO (HUMAN) PER 5 UNITS: Performed by: PODIATRIST

## 2024-05-21 PROCEDURE — 25810000003 SODIUM CHLORIDE 0.9 % SOLUTION: Performed by: PODIATRIST

## 2024-05-21 PROCEDURE — 25010000002 PROPOFOL 10 MG/ML EMULSION: Performed by: NURSE ANESTHETIST, CERTIFIED REGISTERED

## 2024-05-21 PROCEDURE — 85027 COMPLETE CBC AUTOMATED: CPT | Performed by: INTERNAL MEDICINE

## 2024-05-21 PROCEDURE — 25810000003 SODIUM CHLORIDE 0.9 % SOLUTION: Performed by: INTERNAL MEDICINE

## 2024-05-21 PROCEDURE — 28810 AMPUTATION TOE & METATARSAL: CPT | Performed by: PODIATRIST

## 2024-05-21 PROCEDURE — 25010000002 VANCOMYCIN 5 G RECONSTITUTED SOLUTION: Performed by: INTERNAL MEDICINE

## 2024-05-21 PROCEDURE — 99233 SBSQ HOSP IP/OBS HIGH 50: CPT | Performed by: INTERNAL MEDICINE

## 2024-05-21 PROCEDURE — 25810000003 LACTATED RINGERS PER 1000 ML: Performed by: NURSE ANESTHETIST, CERTIFIED REGISTERED

## 2024-05-21 PROCEDURE — 87186 SC STD MICRODIL/AGAR DIL: CPT | Performed by: PODIATRIST

## 2024-05-21 PROCEDURE — 25010000002 ONDANSETRON PER 1 MG: Performed by: NURSE ANESTHETIST, CERTIFIED REGISTERED

## 2024-05-21 PROCEDURE — 87070 CULTURE OTHR SPECIMN AEROBIC: CPT | Performed by: PODIATRIST

## 2024-05-21 PROCEDURE — 0Q9Q0ZZ DRAINAGE OF RIGHT TOE PHALANX, OPEN APPROACH: ICD-10-PCS | Performed by: PODIATRIST

## 2024-05-21 PROCEDURE — 25010000002 DEXAMETHASONE PER 1 MG: Performed by: NURSE ANESTHETIST, CERTIFIED REGISTERED

## 2024-05-21 PROCEDURE — 80202 ASSAY OF VANCOMYCIN: CPT | Performed by: INTERNAL MEDICINE

## 2024-05-21 PROCEDURE — 25010000002 SUGAMMADEX 200 MG/2ML SOLUTION: Performed by: NURSE ANESTHETIST, CERTIFIED REGISTERED

## 2024-05-21 PROCEDURE — 87205 SMEAR GRAM STAIN: CPT | Performed by: PODIATRIST

## 2024-05-21 PROCEDURE — 87077 CULTURE AEROBIC IDENTIFY: CPT | Performed by: PODIATRIST

## 2024-05-21 PROCEDURE — 25010000002 VANCOMYCIN 5 G RECONSTITUTED SOLUTION: Performed by: PODIATRIST

## 2024-05-21 PROCEDURE — 84100 ASSAY OF PHOSPHORUS: CPT | Performed by: INTERNAL MEDICINE

## 2024-05-21 PROCEDURE — 88311 DECALCIFY TISSUE: CPT | Performed by: PODIATRIST

## 2024-05-21 PROCEDURE — 25010000002 PIPERACILLIN SOD-TAZOBACTAM PER 1 G: Performed by: INTERNAL MEDICINE

## 2024-05-21 PROCEDURE — 25010000002 BUPIVACAINE (PF) 0.5 % SOLUTION 10 ML VIAL: Performed by: PODIATRIST

## 2024-05-21 RX ORDER — ONDANSETRON 2 MG/ML
4 INJECTION INTRAMUSCULAR; INTRAVENOUS ONCE AS NEEDED
Status: DISCONTINUED | OUTPATIENT
Start: 2024-05-21 | End: 2024-05-21 | Stop reason: HOSPADM

## 2024-05-21 RX ORDER — DEXAMETHASONE SODIUM PHOSPHATE 4 MG/ML
INJECTION, SOLUTION INTRA-ARTICULAR; INTRALESIONAL; INTRAMUSCULAR; INTRAVENOUS; SOFT TISSUE AS NEEDED
Status: DISCONTINUED | OUTPATIENT
Start: 2024-05-21 | End: 2024-05-21 | Stop reason: SURG

## 2024-05-21 RX ORDER — PROPOFOL 10 MG/ML
VIAL (ML) INTRAVENOUS AS NEEDED
Status: DISCONTINUED | OUTPATIENT
Start: 2024-05-21 | End: 2024-05-21 | Stop reason: SURG

## 2024-05-21 RX ORDER — MAGNESIUM HYDROXIDE 1200 MG/15ML
LIQUID ORAL AS NEEDED
Status: DISCONTINUED | OUTPATIENT
Start: 2024-05-21 | End: 2024-05-21 | Stop reason: HOSPADM

## 2024-05-21 RX ORDER — SODIUM CHLORIDE, SODIUM LACTATE, POTASSIUM CHLORIDE, CALCIUM CHLORIDE 600; 310; 30; 20 MG/100ML; MG/100ML; MG/100ML; MG/100ML
INJECTION, SOLUTION INTRAVENOUS CONTINUOUS PRN
Status: DISCONTINUED | OUTPATIENT
Start: 2024-05-21 | End: 2024-05-21 | Stop reason: SURG

## 2024-05-21 RX ORDER — PROMETHAZINE HYDROCHLORIDE 25 MG/1
25 SUPPOSITORY RECTAL ONCE AS NEEDED
Status: DISCONTINUED | OUTPATIENT
Start: 2024-05-21 | End: 2024-05-21 | Stop reason: HOSPADM

## 2024-05-21 RX ORDER — FENTANYL CITRATE 50 UG/ML
INJECTION, SOLUTION INTRAMUSCULAR; INTRAVENOUS AS NEEDED
Status: DISCONTINUED | OUTPATIENT
Start: 2024-05-21 | End: 2024-05-21 | Stop reason: SURG

## 2024-05-21 RX ORDER — ONDANSETRON 2 MG/ML
INJECTION INTRAMUSCULAR; INTRAVENOUS AS NEEDED
Status: DISCONTINUED | OUTPATIENT
Start: 2024-05-21 | End: 2024-05-21 | Stop reason: SURG

## 2024-05-21 RX ORDER — MORPHINE SULFATE 2 MG/ML
2 INJECTION, SOLUTION INTRAMUSCULAR; INTRAVENOUS
Status: DISCONTINUED | OUTPATIENT
Start: 2024-05-21 | End: 2024-05-24 | Stop reason: HOSPADM

## 2024-05-21 RX ORDER — ACETAMINOPHEN 500 MG
1000 TABLET ORAL 3 TIMES DAILY
Status: DISCONTINUED | OUTPATIENT
Start: 2024-05-21 | End: 2024-05-24 | Stop reason: HOSPADM

## 2024-05-21 RX ORDER — ROCURONIUM BROMIDE 10 MG/ML
INJECTION, SOLUTION INTRAVENOUS AS NEEDED
Status: DISCONTINUED | OUTPATIENT
Start: 2024-05-21 | End: 2024-05-21 | Stop reason: SURG

## 2024-05-21 RX ORDER — OXYCODONE HYDROCHLORIDE 5 MG/1
5 TABLET ORAL EVERY 4 HOURS PRN
Status: DISCONTINUED | OUTPATIENT
Start: 2024-05-21 | End: 2024-05-24 | Stop reason: HOSPADM

## 2024-05-21 RX ORDER — LIDOCAINE HYDROCHLORIDE 20 MG/ML
INJECTION, SOLUTION EPIDURAL; INFILTRATION; INTRACAUDAL; PERINEURAL AS NEEDED
Status: DISCONTINUED | OUTPATIENT
Start: 2024-05-21 | End: 2024-05-21 | Stop reason: SURG

## 2024-05-21 RX ORDER — OXYCODONE HYDROCHLORIDE 5 MG/1
5 TABLET ORAL
Status: DISCONTINUED | OUTPATIENT
Start: 2024-05-21 | End: 2024-05-21 | Stop reason: HOSPADM

## 2024-05-21 RX ORDER — OXYCODONE HYDROCHLORIDE 5 MG/1
10 TABLET ORAL EVERY 4 HOURS PRN
Status: DISCONTINUED | OUTPATIENT
Start: 2024-05-21 | End: 2024-05-24 | Stop reason: HOSPADM

## 2024-05-21 RX ORDER — PROMETHAZINE HYDROCHLORIDE 12.5 MG/1
25 TABLET ORAL ONCE AS NEEDED
Status: DISCONTINUED | OUTPATIENT
Start: 2024-05-21 | End: 2024-05-21 | Stop reason: HOSPADM

## 2024-05-21 RX ORDER — EPHEDRINE SULFATE 50 MG/ML
INJECTION INTRAVENOUS AS NEEDED
Status: DISCONTINUED | OUTPATIENT
Start: 2024-05-21 | End: 2024-05-21 | Stop reason: SURG

## 2024-05-21 RX ADMIN — VANCOMYCIN HYDROCHLORIDE 750 MG: 5 INJECTION, POWDER, LYOPHILIZED, FOR SOLUTION INTRAVENOUS at 10:30

## 2024-05-21 RX ADMIN — INSULIN GLARGINE 20 UNITS: 100 INJECTION, SOLUTION SUBCUTANEOUS at 20:38

## 2024-05-21 RX ADMIN — LIDOCAINE HYDROCHLORIDE 60 MG: 20 INJECTION, SOLUTION INTRAVENOUS at 11:51

## 2024-05-21 RX ADMIN — INSULIN DETEMIR 20 UNITS: 100 INJECTION, SOLUTION SUBCUTANEOUS at 08:46

## 2024-05-21 RX ADMIN — EPHEDRINE SULFATE 10 MG: 50 INJECTION INTRAVENOUS at 12:35

## 2024-05-21 RX ADMIN — PIPERACILLIN SODIUM AND TAZOBACTAM SODIUM 3.38 G: 3; .375 INJECTION, POWDER, LYOPHILIZED, FOR SOLUTION INTRAVENOUS at 11:56

## 2024-05-21 RX ADMIN — ONDANSETRON HYDROCHLORIDE 4 MG: 2 SOLUTION INTRAMUSCULAR; INTRAVENOUS at 12:36

## 2024-05-21 RX ADMIN — ACETAMINOPHEN 650 MG: 325 TABLET ORAL at 14:33

## 2024-05-21 RX ADMIN — INSULIN LISPRO 4 UNITS: 100 INJECTION, SOLUTION INTRAVENOUS; SUBCUTANEOUS at 17:29

## 2024-05-21 RX ADMIN — SUGAMMADEX 200 MG: 100 INJECTION, SOLUTION INTRAVENOUS at 12:40

## 2024-05-21 RX ADMIN — PREGABALIN 225 MG: 75 CAPSULE ORAL at 20:35

## 2024-05-21 RX ADMIN — DEXAMETHASONE SODIUM PHOSPHATE 4 MG: 4 INJECTION, SOLUTION INTRAMUSCULAR; INTRAVENOUS at 11:46

## 2024-05-21 RX ADMIN — FENTANYL CITRATE 50 MCG: 50 INJECTION, SOLUTION INTRAMUSCULAR; INTRAVENOUS at 12:16

## 2024-05-21 RX ADMIN — PROPOFOL 180 MG: 10 INJECTION, EMULSION INTRAVENOUS at 11:51

## 2024-05-21 RX ADMIN — PIPERACILLIN SODIUM AND TAZOBACTAM SODIUM 3.38 G: 3; .375 INJECTION, POWDER, LYOPHILIZED, FOR SOLUTION INTRAVENOUS at 20:37

## 2024-05-21 RX ADMIN — ROCURONIUM BROMIDE 50 MG: 10 INJECTION, SOLUTION INTRAVENOUS at 11:51

## 2024-05-21 RX ADMIN — Medication 10 ML: at 20:38

## 2024-05-21 RX ADMIN — METOPROLOL TARTRATE 100 MG: 50 TABLET, FILM COATED ORAL at 08:45

## 2024-05-21 RX ADMIN — VANCOMYCIN HYDROCHLORIDE 750 MG: 5 INJECTION, POWDER, LYOPHILIZED, FOR SOLUTION INTRAVENOUS at 11:44

## 2024-05-21 RX ADMIN — PIPERACILLIN SODIUM AND TAZOBACTAM SODIUM 3.38 G: 3; .375 INJECTION, POWDER, LYOPHILIZED, FOR SOLUTION INTRAVENOUS at 05:17

## 2024-05-21 RX ADMIN — ACETAMINOPHEN 1000 MG: 500 TABLET ORAL at 20:35

## 2024-05-21 RX ADMIN — EPHEDRINE SULFATE 10 MG: 50 INJECTION INTRAVENOUS at 12:40

## 2024-05-21 RX ADMIN — FENTANYL CITRATE 50 MCG: 50 INJECTION, SOLUTION INTRAMUSCULAR; INTRAVENOUS at 11:51

## 2024-05-21 RX ADMIN — OXYCODONE 10 MG: 5 TABLET ORAL at 14:41

## 2024-05-21 RX ADMIN — SODIUM CHLORIDE, POTASSIUM CHLORIDE, SODIUM LACTATE AND CALCIUM CHLORIDE: 600; 310; 30; 20 INJECTION, SOLUTION INTRAVENOUS at 11:44

## 2024-05-21 RX ADMIN — INSULIN LISPRO 4 UNITS: 100 INJECTION, SOLUTION INTRAVENOUS; SUBCUTANEOUS at 20:38

## 2024-05-21 NOTE — PROGRESS NOTES
[facility]   Hospitalist Progress Note  Date: 2024  Patient Name: Jd Velazquez  : 1965  MRN: [id number]  Date of admission: 2024  Consultants:   -Podiatry: Dr. Mushtaq Mathew, Dr. Balwinder Costa    Subjective   Subjective     Chief Complaint: Right foot osteo versus abscess leading to direct admission     Summary:   Jd Velazquez is a 58 y.o. male with CAD, type 2 diabetes mellitus, dyslipidemia, hyperkalemia, peripheral vascular disease and obesity (BMI: 32.36) that presented with worsening right foot wound s/p amputation of great toe in 2024. Podiatry consulted to assist in care. Broad-spectrum antibiotics started.  MRI imaging obtained that demonstrated evidence of osteomyelitis involving distal first metatarsal and surrounding second MTP joint as well as 2 small abscesses in the soft tissue.  Patient taken the OR for secondary amputation of the right foot and incision and drainage of right foot on 2024.    Interval Followup:   No acute events overnight.  Patient seen after surgery.  Patient tolerated well.  Denies any chest pain or shortness of breath.  Nursing with no additional acute issues to report.    Procedures:  -Secondary amputation right foot, incision and drainage right foot (2024)    Antibiotics:   -Vancomycin  -Zosyn    Objective   Objective     Vitals:   Temp:  [97.8 °F (36.6 °C)-98.4 °F (36.9 °C)] 98.4 °F (36.9 °C)  Heart Rate:  [47-63] 47  Resp:  [16-18] 18  BP: (112-141)/(45-72) 112/47  Flow (L/min):  [2-4] 2  Physical Exam   Gen: No acute distress, sitting up in bed, pleasant, conversant  Resp: CTAB, No w/r/r, normal respiratory effort  Card: RRR, No m/r/g  Abd: Soft, Nontender, Nondistended, + bowel sounds    Result Review    Result Review:  I have personally reviewed the results as below and agree with these findings:  []  Laboratory:   CMP          2024    05:32 2024    05:18 2024    05:25   CMP   Glucose 153  133  161     BUN 12  13  24    Creatinine 0.75  0.87  1.39    EGFR 105.3  100.6  59.1    Sodium 133  136  136    Potassium 4.1  4.4  4.2    Chloride 97  99  101    Calcium 9.2  9.3  9.1    BUN/Creatinine Ratio 16.0  14.9  17.3    Anion Gap 11.4  11.2  10.7      CBC          5/19/2024    05:32 5/20/2024    05:18 5/21/2024    05:25   CBC   WBC 6.78  8.50  7.55    RBC 3.98  3.97  4.09    Hemoglobin 11.6  11.5  11.9    Hematocrit 36.0  36.5  38.1    MCV 90.5  91.9  93.2    MCH 29.1  29.0  29.1    MCHC 32.2  31.5  31.2    RDW 14.5  14.6  14.9    Platelets 423  406  412    Magnesium and phosphorus within normal limits.  [x]  Microbiology: Blood culture (05/17/2024): No growth to date  []  Radiology:   []  EKG/Telemetry:    []  Cardiology/Vascular:    []  Pathology:  []  Old records:  []  Other:    Assessment & Plan   Assessment / Plan     Assessment:  Right foot diabetic ulcer with concern for osteomyelitis  Type 2 diabetes mellitus with hyperglycemia  Peripheral vascular disease  Hypomagnesemia  Acute renal failure (creatinine elevated to 1.39 from 0.87 in 24 hours)  CAD  Obesity (BMI: 33.36)    Plan:  -Podiatry consulted and following, appreciate assistance and recommendations in the care of this patient.  -Continue vancomycin and Zosyn.  Monitor vancomycin level.  Tailor antibiotics based on results of infectious workup.  -Continue current insulin regimen  -Continue aspirin and Plavix  -Care plan discussed with podiatry.  Patient had surgery and tolerated well without any acute issues.  Follow-up culture results.  -Continue appropriate home medications  -Will monitor electrolytes and renal function with BMP and magnesium level in the AM  -Will monitor WBC and Hgb with CBC in the AM  -Clinical course will dictate further management     DVT Prophylaxis: Lovenox  Diet:   Diet Order   Procedures    NPO Diet NPO Type: Strict NPO     Dispo: Pending clinical course     Personally reviewed patients labs and imaging, discussed with  patient and nurse at bedside. Discussed management with the following consultants: Podiatry.     Part of this note may be an electronic transcription/translation of spoken language to printed text using the Dragon dictation system.    DVT prophylaxis:  Medical DVT prophylaxis orders are present.        CODE STATUS:   Level Of Support Discussed With: Patient  Code Status (Patient has no pulse and is not breathing): CPR (Attempt to Resuscitate)  Medical Interventions (Patient has pulse or is breathing): Full Support        Electronically signed by Art Stevenson MD, 5/21/2024, 13:06 EDT.

## 2024-05-21 NOTE — OP NOTE
Operative Note   Foot and Ankle Surgery   Provider: Dr. Balwinder Costa DPM  Location: [facility]    Patient Name:  Jd Velazquez  YOB: 1965    Date of Surgery:  5/21/2024    Pre-op Diagnosis:   Chronic osteomyelitis of right foot with draining sinus [M86.471]       Post-Op Diagnosis Codes:     * Chronic osteomyelitis of right foot with draining sinus [M86.471]    Procedure/CPT® Codes:      Procedure(s):  SECOND RAY AMPUTATION RIGHT FOOT, INCISION AND DRAINAGE RIGHT FOOT        Staff:  Surgeon(s):  Balwinder Costa DPM    Assistant: Shante Silva RN CSA  was responsible for performing the following activities: Retraction, Suction, and Irrigation and their skilled assistance was necessary for the success of this case.    Anesthesia: General    Estimated Blood Loss: less than 5 ml    Implants:    Nothing was implanted during the procedure    Specimen:          Specimens       ID Source Type Tests Collected By Collected At Frozen?    1 Toe, Right Bone TISSUE / BONE CULTURE   Balwinder Costa DPM 5/21/24 1228     Description: 2ND METATARSAL    A Toe, Right Tissue TISSUE PATHOLOGY EXAM   Balwinder Costa DPM 5/21/24 1226 No    Description: RIGHT SECOND TOE    B Toe, Right Bone TISSUE PATHOLOGY EXAM   Balwinder Costa DPM 5/21/24 1227 No    Description: CLEAN MARGIN                Complications: none    Description of Procedure:   Procedure, risks, complications, and goals were discussed with the patient at bedside.  Risks include but are not limited to infection, complications from anesthesia (including death), chronic pain or numbness, hematoma/seroma, deep vein thrombosis, wound complications, and potential for additional surgical procedures.  Patient understands and elects to proceed with surgery at this time. Informed consent was obtained before proceeding to the operating suite.  All questions were answered to the patient's satisfaction. No guarantees or  assurances were given or implied.    Right foot was prepped and draped in usual sterile manner.  Right pneumatic ankle tourniquet was inflated to 250 mmHg.  A linear incision was extended over the second metatarsal phalangeal joint.    Significant purulent drainage was expressed from the second metatarsal phalangeal joint.  The toe was disarticulated at the metatarsophalangeal joint. The medial incision was then extended back to the first metatarsal.  Once healthy bleeding bone was identified the first metatarsal was resected back.  Clean margins were taken for evaluation.  Attention was then directed to the second metatarsal where using a saw it was resected back until healthy bleeding bone was noted.  Bone cultures were sent.  Site was flushed with copious amounts saline.  All bleeders were ligated. The medial flap was then undermined and advanced forward to allow closure. Deep she was closing 3-0 and 4-0 Vicryl and skin was closed using 3-0 nylon.  Patient was placed in a dry sterile dressing.  Patient tolerated anesthesia procedure well.  Proper hyperemic response was noted on deflation of the tourniquet.    Balwinder Costa DPM  [facility]   [phone]    Balwinder Costa DPM     Date: 5/21/2024  Time: 12:43 EDT

## 2024-05-21 NOTE — ANESTHESIA POSTPROCEDURE EVALUATION
Patient: Jd Velazquez    Procedure Summary       Date: 05/21/24 Room / Location: [facility] OR 04 / [facility] MAIN OR    Anesthesia Start: 1145 Anesthesia Stop: 1251    Procedure: SECOND RAY AMPUTATION RIGHT FOOT, INCISION AND DRAINAGE RIGHT FOOT (Right) Diagnosis:       Chronic osteomyelitis of right foot with draining sinus      Diabetic ulcer of other part of right foot associated with diabetes mellitus due to underlying condition, limited to breakdown of skin      Abscess      (Chronic osteomyelitis of right foot with draining sinus [M86.471])    Surgeons: Balwinder Costa DPM Provider: Balwinder Topete MD    Anesthesia Type: general, MAC ASA Status: 4            Anesthesia Type: general, MAC    Vitals  Vitals Value Taken Time   /53 05/21/24 1320   Temp 36.3 °C (97.4 °F) 05/21/24 1319   Pulse 52 05/21/24 1324   Resp 16 05/21/24 1319   SpO2 89 % 05/21/24 1323   Vitals shown include unfiled device data.        Post Anesthesia Care and Evaluation    Patient location during evaluation: bedside  Patient participation: complete - patient participated  Level of consciousness: awake  Pain management: adequate    Airway patency: patent  PONV Status: none  Cardiovascular status: acceptable and stable  Respiratory status: acceptable  Hydration status: acceptable

## 2024-05-21 NOTE — PLAN OF CARE
Problem: Adult Inpatient Plan of Care  Goal: Plan of Care Review  Outcome: Ongoing, Progressing     Problem: Adult Inpatient Plan of Care  Goal: Absence of Hospital-Acquired Illness or Injury  Intervention: Prevent Skin Injury  Recent Flowsheet Documentation  Taken 5/20/2024 2000 by Haley Serrano RN  Body Position: position changed independently  Skin Protection: adhesive use limited     Problem: Adult Inpatient Plan of Care  Goal: Absence of Hospital-Acquired Illness or Injury  Intervention: Prevent Infection  Recent Flowsheet Documentation  Taken 5/20/2024 2000 by Haley Serrano RN  Infection Prevention:   hand hygiene promoted   rest/sleep promoted     Problem: Impaired Wound Healing  Goal: Optimal Wound Healing  Outcome: Ongoing, Progressing  Intervention: Promote Wound Healing  Recent Flowsheet Documentation  Taken 5/20/2024 2000 by Haley Serrano RN  Activity Management:   ambulated outside room   ambulated to bathroom   back to bed   up ad wilfredo  Pain Management Interventions:   care clustered   quiet environment facilitated  Sleep/Rest Enhancement:   awakenings minimized   room darkened   Goal Outcome Evaluation:

## 2024-05-21 NOTE — PROGRESS NOTES
[facility] Clinical Pharmacy Services: Vancomycin Monitoring Note    Jd Velazquez is a 58 y.o. male who is on day  (TBD) of pharmacy to dose vancomycin for Bone and/or Joint Infection.    Previous Vancomycin Dose:   1250 mg IV every  12  hours  Imaging Reviewed?: Yes   MRI right foot: s/p amputation of the first digit at the distal first metatarsal; findings of OM at the remaining distal first metatarsal and surrounding the second MTP joint where there are findings of joint infection; findings of SSTI surrounding wound, findings of muscle denervation and/or myositis; two small abscesses in the soft tissues which may drain to the sinus tract     Updated Cultures and Sensitivities:    Blood cx : NGTD  Previous admissions:    Wound cx, right foot: scant growth E. Faecalis   Anaerobic cx, right foot: B. Fragilis   Wound cx, right foot: rare E. Faecalis  4/10 Anaerobic cx, right toe tissue: mixed anaerobes with B. Frag and Prevotela spp  4/10 Wound cx, right toe: light growth Citrobacter freundii, Providencia rettgeri    Vitals/Labs  Ht:  ; Wt: 107 kg (235 lb 0.2 oz)   Temp (24hrs), Av.1 °F (36.7 °C), Min:97.8 °F (36.6 °C), Max:98.2 °F (36.8 °C)   Estimated Creatinine Clearance: 73 mL/min (A) (by C-G formula based on SCr of 1.39 mg/dL (H)).     Results from last 7 days   Lab Units 24  0525 24  0518 24  0532 24  1304   VANCOMYCIN RM mcg/mL 21.15 35.70  --   --    VANCOMYCIN TR mcg/mL  --   --   --  10.40   CREATININE mg/dL 1.39* 0.87 0.75*  --    WBC 10*3/mm3 7.55 8.50 6.78  --      Assessment/Plan    Patient with MAHAMED, increase in serum creatinine of 0.52 since yesterday. Vancomycin level 21.15 mcg/mL this morning, will conservatively decrease regimen to 750 mg q12h to maintain AUC above 400 but allowing room for worsening renal function.   Current Vancomycin Dose:  750 mg IV every 12 hours; which provides the following predicted parameters:  AUC24,ss: 414  mg/L.hr  PAUC*: 65 %  Ctrough,ss: 13.6 mg/L  Pconc*: 0 %  Tox.: 9 %  New level to be ordered if renal function continues to worsen and vancomycin continues  We will continue to monitor patient changes and renal function     Thank you for involving pharmacy in this patient's care. Please contact pharmacy with any questions or concerns.    Tiara Smith RPH  Clinical Pharmacist

## 2024-05-21 NOTE — CONSULTS
Met with patient and family at bedside. Discussed most recent A1c of 9.4% from February 2024 with an estimated average glucose of 223 mg/dl. Patient states he is supposed to take Basaglar 30 units nightly; however, he admits to not taking it every night. Family offered to put a daily reminder on his phone but he declined. Patient interested in continuous glucose monitor. Will check insurance for coverage. No questions or needs at this time.

## 2024-05-21 NOTE — PLAN OF CARE
Goal Outcome Evaluation:      Bradycardic throughout shift. MD notified. Orders given to hold Lopressor. Amuptation of second digit on right foot performed today. Tolerated well. Given oxycodone once this shift for c/o right foot pain.     Progress: improving

## 2024-05-22 ENCOUNTER — APPOINTMENT (OUTPATIENT)
Dept: GENERAL RADIOLOGY | Facility: HOSPITAL | Age: 59
DRG: 617 | End: 2024-05-22
Payer: COMMERCIAL

## 2024-05-22 LAB
ANION GAP SERPL CALCULATED.3IONS-SCNC: 10.2 MMOL/L (ref 5–15)
BACTERIA SPEC AEROBE CULT: NORMAL
BACTERIA SPEC AEROBE CULT: NORMAL
BUN SERPL-MCNC: 22 MG/DL (ref 6–20)
BUN/CREAT SERPL: 17.2 (ref 7–25)
CALCIUM SPEC-SCNC: 9.1 MG/DL (ref 8.6–10.5)
CHLORIDE SERPL-SCNC: 99 MMOL/L (ref 98–107)
CO2 SERPL-SCNC: 24.8 MMOL/L (ref 22–29)
CREAT SERPL-MCNC: 1.28 MG/DL (ref 0.76–1.27)
DEPRECATED RDW RBC AUTO: 51.6 FL (ref 37–54)
EGFRCR SERPLBLD CKD-EPI 2021: 65.3 ML/MIN/1.73
ERYTHROCYTE [DISTWIDTH] IN BLOOD BY AUTOMATED COUNT: 15.1 % (ref 12.3–15.4)
GLUCOSE BLDC GLUCOMTR-MCNC: 183 MG/DL (ref 70–99)
GLUCOSE BLDC GLUCOMTR-MCNC: 196 MG/DL (ref 70–99)
GLUCOSE BLDC GLUCOMTR-MCNC: 227 MG/DL (ref 70–99)
GLUCOSE BLDC GLUCOMTR-MCNC: 288 MG/DL (ref 70–99)
GLUCOSE SERPL-MCNC: 249 MG/DL (ref 65–99)
HCT VFR BLD AUTO: 36.1 % (ref 37.5–51)
HGB BLD-MCNC: 11.2 G/DL (ref 13–17.7)
MAGNESIUM SERPL-MCNC: 1.9 MG/DL (ref 1.6–2.6)
MCH RBC QN AUTO: 29.1 PG (ref 26.6–33)
MCHC RBC AUTO-ENTMCNC: 31 G/DL (ref 31.5–35.7)
MCV RBC AUTO: 93.8 FL (ref 79–97)
PLATELET # BLD AUTO: 383 10*3/MM3 (ref 140–450)
PMV BLD AUTO: 9.3 FL (ref 6–12)
POTASSIUM SERPL-SCNC: 4.3 MMOL/L (ref 3.5–5.2)
RBC # BLD AUTO: 3.85 10*6/MM3 (ref 4.14–5.8)
SODIUM SERPL-SCNC: 134 MMOL/L (ref 136–145)
WBC NRBC COR # BLD AUTO: 10.92 10*3/MM3 (ref 3.4–10.8)

## 2024-05-22 PROCEDURE — 25010000002 VANCOMYCIN 5 G RECONSTITUTED SOLUTION: Performed by: PODIATRIST

## 2024-05-22 PROCEDURE — 73630 X-RAY EXAM OF FOOT: CPT

## 2024-05-22 PROCEDURE — 63710000001 INSULIN LISPRO (HUMAN) PER 5 UNITS: Performed by: PODIATRIST

## 2024-05-22 PROCEDURE — 97165 OT EVAL LOW COMPLEX 30 MIN: CPT

## 2024-05-22 PROCEDURE — 99233 SBSQ HOSP IP/OBS HIGH 50: CPT | Performed by: INTERNAL MEDICINE

## 2024-05-22 PROCEDURE — 97161 PT EVAL LOW COMPLEX 20 MIN: CPT

## 2024-05-22 PROCEDURE — 82948 REAGENT STRIP/BLOOD GLUCOSE: CPT

## 2024-05-22 PROCEDURE — 63710000001 INSULIN GLARGINE PER 5 UNITS: Performed by: INTERNAL MEDICINE

## 2024-05-22 PROCEDURE — 25010000002 PIPERACILLIN SOD-TAZOBACTAM PER 1 G: Performed by: PODIATRIST

## 2024-05-22 PROCEDURE — 83735 ASSAY OF MAGNESIUM: CPT | Performed by: INTERNAL MEDICINE

## 2024-05-22 PROCEDURE — 25010000002 ENOXAPARIN PER 10 MG: Performed by: PODIATRIST

## 2024-05-22 PROCEDURE — 25810000003 SODIUM CHLORIDE 0.9 % SOLUTION: Performed by: PODIATRIST

## 2024-05-22 PROCEDURE — 85027 COMPLETE CBC AUTOMATED: CPT | Performed by: INTERNAL MEDICINE

## 2024-05-22 PROCEDURE — 80048 BASIC METABOLIC PNL TOTAL CA: CPT | Performed by: INTERNAL MEDICINE

## 2024-05-22 PROCEDURE — 63710000001 INSULIN LISPRO (HUMAN) PER 5 UNITS: Performed by: INTERNAL MEDICINE

## 2024-05-22 PROCEDURE — 99024 POSTOP FOLLOW-UP VISIT: CPT | Performed by: PODIATRIST

## 2024-05-22 RX ORDER — METOPROLOL TARTRATE 50 MG/1
50 TABLET, FILM COATED ORAL EVERY 12 HOURS
Status: DISCONTINUED | OUTPATIENT
Start: 2024-05-22 | End: 2024-05-24 | Stop reason: HOSPADM

## 2024-05-22 RX ORDER — INSULIN LISPRO 100 [IU]/ML
2-7 INJECTION, SOLUTION INTRAVENOUS; SUBCUTANEOUS
Status: DISCONTINUED | OUTPATIENT
Start: 2024-05-22 | End: 2024-05-24 | Stop reason: HOSPADM

## 2024-05-22 RX ADMIN — PIPERACILLIN SODIUM AND TAZOBACTAM SODIUM 3.38 G: 3; .375 INJECTION, POWDER, LYOPHILIZED, FOR SOLUTION INTRAVENOUS at 05:03

## 2024-05-22 RX ADMIN — VANCOMYCIN HYDROCHLORIDE 750 MG: 5 INJECTION, POWDER, LYOPHILIZED, FOR SOLUTION INTRAVENOUS at 01:19

## 2024-05-22 RX ADMIN — LISINOPRIL 20 MG: 20 TABLET ORAL at 08:06

## 2024-05-22 RX ADMIN — HYDROCHLOROTHIAZIDE 12.5 MG: 12.5 TABLET ORAL at 08:06

## 2024-05-22 RX ADMIN — ENOXAPARIN SODIUM 40 MG: 100 INJECTION SUBCUTANEOUS at 08:06

## 2024-05-22 RX ADMIN — METOPROLOL TARTRATE 50 MG: 50 TABLET, FILM COATED ORAL at 20:54

## 2024-05-22 RX ADMIN — VANCOMYCIN HYDROCHLORIDE 1000 MG: 5 INJECTION, POWDER, LYOPHILIZED, FOR SOLUTION INTRAVENOUS at 09:23

## 2024-05-22 RX ADMIN — INSULIN LISPRO 3 UNITS: 100 INJECTION, SOLUTION INTRAVENOUS; SUBCUTANEOUS at 17:51

## 2024-05-22 RX ADMIN — PREGABALIN 225 MG: 75 CAPSULE ORAL at 20:54

## 2024-05-22 RX ADMIN — Medication 10 ML: at 08:07

## 2024-05-22 RX ADMIN — ZOLPIDEM TARTRATE 10 MG: 5 TABLET ORAL at 23:11

## 2024-05-22 RX ADMIN — INSULIN GLARGINE 20 UNITS: 100 INJECTION, SOLUTION SUBCUTANEOUS at 20:54

## 2024-05-22 RX ADMIN — INSULIN LISPRO 2 UNITS: 100 INJECTION, SOLUTION INTRAVENOUS; SUBCUTANEOUS at 12:45

## 2024-05-22 RX ADMIN — PIPERACILLIN SODIUM AND TAZOBACTAM SODIUM 3.38 G: 3; .375 INJECTION, POWDER, LYOPHILIZED, FOR SOLUTION INTRAVENOUS at 20:53

## 2024-05-22 RX ADMIN — ACETAMINOPHEN 1000 MG: 500 TABLET ORAL at 08:06

## 2024-05-22 RX ADMIN — DULOXETINE HYDROCHLORIDE 30 MG: 30 CAPSULE, DELAYED RELEASE ORAL at 08:06

## 2024-05-22 RX ADMIN — INSULIN GLARGINE 20 UNITS: 100 INJECTION, SOLUTION SUBCUTANEOUS at 08:06

## 2024-05-22 RX ADMIN — INSULIN LISPRO 2 UNITS: 100 INJECTION, SOLUTION INTRAVENOUS; SUBCUTANEOUS at 08:06

## 2024-05-22 RX ADMIN — PIPERACILLIN SODIUM AND TAZOBACTAM SODIUM 3.38 G: 3; .375 INJECTION, POWDER, LYOPHILIZED, FOR SOLUTION INTRAVENOUS at 12:18

## 2024-05-22 RX ADMIN — CLOPIDOGREL BISULFATE 75 MG: 75 TABLET ORAL at 08:06

## 2024-05-22 RX ADMIN — ASPIRIN 81 MG: 81 TABLET, CHEWABLE ORAL at 08:06

## 2024-05-22 RX ADMIN — ACETAMINOPHEN 1000 MG: 500 TABLET ORAL at 20:54

## 2024-05-22 RX ADMIN — Medication 10 ML: at 20:55

## 2024-05-22 RX ADMIN — PREGABALIN 225 MG: 75 CAPSULE ORAL at 08:06

## 2024-05-22 NOTE — CONSULTS
Patient did not have prescription benefits card at this time. He states that his wife has it and she may or may not visit him today. If he obtains the card, I can look up continuous glucose monitor benefits. No questions at this time.

## 2024-05-22 NOTE — THERAPY EVALUATION
Patient Name: Jd Velazquez  : 1965    MRN: [id number]                              Today's Date: 2024       Admit Date: 2024    Visit Dx:     ICD-10-CM ICD-9-CM   1. Chronic osteomyelitis of right foot with draining sinus  M86.471 730.17   2. 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  L97.513 707.15     728.89   3. Abscess of right foot  L02.611 682.7     Patient Active Problem List   Diagnosis    CAD (coronary artery disease)    Coronary artery disease involving native coronary artery of native heart without angina pectoris    Type 2 diabetes mellitus with hyperglycemia, without long-term current use of insulin    Hyperlipidemia LDL goal <70    Hypertension    SHAHLA treated with BiPAP    Tobacco abuse    Cellulitis of chest wall    Sternal wound infection    S/P CABG x 4    Allergic rhinitis    Hypokalemia    Hypothyroidism    Insomnia, unspecified    Microalbuminuric diabetic nephropathy    Edema    Mild episode of recurrent major depressive disorder    Class 1 obesity due to excess calories with serious comorbidity and body mass index (BMI) of 32.0 to 32.9 in adult    Dermatitis    Skin ulcer, limited to breakdown of skin    Itching    Cellulitis of right lower extremity    Hyponatremia    Thoracic spine pain    Neuropathy    Peripheral vascular disease    Atherosclerosis of native arteries of the extremities with ulceration    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, unspecified laterality    Dry gangrene    Abscess of right foot    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    Chronic osteomyelitis of right foot with draining sinus     Past Medical History:   Diagnosis Date    Allergic rhinitis 2015    Anesthesia     DIFFICULTY WAKING UP POST SURGERY    CAD (coronary artery disease)     DENIES CP/SOA.    Diabetes mellitus     Hyperlipidemia     Hypertension     Hypothyroidism  04/24/2014    Insomnia, unspecified 06/15/2016    WITHOUT BIPAP    Microalbuminuria due to type 2 diabetes mellitus 10/15/2015    Mixed hyperlipidemia 10/15/2015    Myocardial infarction     Obesity     BMI 32    SHAHLA (obstructive sleep apnea)     WEARS BIPAP    Right great toe amputee     4/2024; 5/2024 ADMITTED WITH INFECTION    Skin cancer     REMOVED    Sleep apnea      Past Surgical History:   Procedure Laterality Date    AMPUTATION DIGIT Right 4/10/2024    Procedure: AMPUTATION DIGIT RIGHT GREAT TOE;  Surgeon: Balwinder Costa DPM;  Location: [facility] MAIN OR;  Service: Podiatry;  Laterality: Right;    AMPUTATION DIGIT Right 5/21/2024    Procedure: SECOND RAY AMPUTATION RIGHT FOOT, INCISION AND DRAINAGE RIGHT FOOT;  Surgeon: Balwinder Costa DPM;  Location: [facility] MAIN OR;  Service: Podiatry;  Laterality: Right;    ANKLE ARTHROSCOPY W/ OPEN REPAIR      APPENDECTOMY      CARDIAC CATHETERIZATION  2014    PCI to circumflex    CARDIAC CATHETERIZATION Right 10/26/2023    Procedure: Aortogram with right leg angiogram, possible angioplasty or stenting;  Surgeon: Robert Puga MD;  Location: [facility] CATH INVASIVE LOCATION;  Service: Vascular;  Laterality: Right;    CARDIAC CATHETERIZATION Right 12/15/2023    Procedure: Right leg angiogram, possible angioplasty or stenting;  Surgeon: Robert Puga MD;  Location: [facility] CATH INVASIVE LOCATION;  Service: Vascular;  Laterality: Right;    CORONARY ARTERY BYPASS GRAFT N/A 10/08/2020    Procedure: STERNOTOMY, CORONARY ARTERY BYPASS GRAFTING TIME 4 WITH LEFT KELSI  AND ENDOSCOPICALLY HARVESTED LEFT GREATER SAPHENOUS VEIN AND PRP.;  Surgeon: Jr Girma Chiu MD;  Location: [facility] OR;  Service: Cardiothoracic;  Laterality: N/A;    FEMORAL TIBIAL BYPASS Right 01/09/2024    Procedure: Right femoral-tibial bypass graft;  Surgeon: Robert Puga MD;  Location: [facility] MAIN OR;  Service: Vascular;  Laterality: Right;    HEEL SPUR SURGERY      HERNIA REPAIR       INCISION AND DRAINAGE OF WOUND Right 4/30/2024    Procedure: INCISION AND DRAINAGE WOUND RIGHT FOOT, DEBRIDEMENT OF WOUND RIGHT FOOT, AMPUTATION REVISION RIGHT FOOT;  Surgeon: Balwinder Costa DPM;  Location: [facility] MAIN OR;  Service: Podiatry;  Laterality: Right;    KNEE ARTHROSCOPY      MULTIPLE TIMES ON BOTH KNEES    SKIN CANCER EXCISION      BACK    TOE SURGERY Right     DEBRIDMENT RIGHT GREAT TOE    TONSILLECTOMY        General Information       Row Name 05/22/24 0940          OT Time and Intention    Document Type evaluation  -PG     Mode of Treatment individual therapy;occupational therapy  -PG       Row Name 05/22/24 0940          General Information    Patient Profile Reviewed yes  Lives with wife but going to stay with daughter after discharge.  Patient reports independence with all self-care and transfers using no assistive device at home  -PG     Prior Level of Function independent:;transfer;ADL's  -PG     Existing Precautions/Restrictions no known precautions/restrictions  -PG     Barriers to Rehab none identified  -PG       Row Name 05/22/24 09          Occupational Profile    Reason for Services/Referral (Occupational Profile) Patient is a 58-year-old male admitted for diabetic foot infection with second toe of right foot amputated.  Patient being evaluated by Occupational Therapy due to recent decline in ADL function.  No previous OT services identified  -PG       Row Name 05/22/24 0982          Living Environment    People in Home spouse  -PG       Row Name 05/22/24 0954          Cognition    Orientation Status (Cognition) oriented x 4  -PG               User Key  (r) = Recorded By, (t) = Taken By, (c) = Cosigned By      Initials Name Provider Type    PG Chauncey Campbell, OT Occupational Therapist                     Mobility/ADL's       Row Name 05/22/24 0941          Transfers    Transfers bed-chair transfer  -PG       Row Name 05/22/24 0941          Bed-Chair Transfer    Bed-Chair [place]  (Transfers) independent  -PG       Row Name 05/22/24 0941          Activities of Daily Living    BADL Assessment/Intervention bathing;upper body dressing;lower body dressing;grooming;toileting  -PG       Row Name 05/22/24 0941          Bathing Assessment/Intervention    [place] Level (Bathing) bathing skills;independent  -PG       Row Name 05/22/24 0941          Upper Body Dressing Assessment/Training    [place] Level (Upper Body Dressing) upper body dressing skills;independent  -PG       Row Name 05/22/24 0941          Lower Body Dressing Assessment/Training    [place] Level (Lower Body Dressing) lower body dressing skills;independent  -PG       Row Name 05/22/24 0941          Grooming Assessment/Training    [place] Level (Grooming) grooming skills;independent  -PG       Row Name 05/22/24 0941          Toileting Assessment/Training    [place] Level (Toileting) toileting skills;independent  -PG               User Key  (r) = Recorded By, (t) = Taken By, (c) = Cosigned By      Initials Name Provider Type    PG Chauncey Campbell OT Occupational Therapist                   Obj/Interventions       Row Name 05/22/24 0942          Sensory Assessment (Somatosensory)    Sensory Assessment (Somatosensory) sensation intact  -PG       Row Name 05/22/24 0942          Vision Assessment/Intervention    Visual Impairment/Limitations WFL  -PG       Row Name 05/22/24 0942          Range of Motion Comprehensive    General Range of Motion no range of motion deficits identified  -PG       Row Name 05/22/24 0942          Strength Comprehensive (MMT)    General Manual Muscle Testing (MMT) Assessment no strength deficits identified  -PG       Row Name 05/22/24 0942          Motor Skills    Motor Skills coordination;functional endurance  -PG     Coordination WFL  -PG     Functional Endurance Good  -PG               User Key  (r) = Recorded By, (t) = Taken By, (c) = Cosigned By      Initials Name Provider Type    PG  Chauncey Campbell, OT Occupational Therapist                   Goals/Plan    No documentation.                  Clinical Impression       [facility] Name 05/22/24 0942          Pain Assessment    Pretreatment Pain Rating 0/10 - no pain  -PG     Posttreatment Pain Rating 0/10 - no pain  -PG       Row Name 05/22/24 0942          Plan of Care Review    Plan of Care Reviewed With patient  -PG     Progress improving  -PG     Outcome Evaluation OT evaluation completed with no deficits identified.  Patient is currently independent with transfers and walking to and from the bathroom as well as with all self-care activities.  Patient agreeable no additional OT services are necessary at this time  -PG       Row Name 05/22/24 0942          Therapy Assessment/Plan (OT)    Criteria for Skilled Therapeutic Interventions Met (OT) no;no problems identified which require skilled intervention  -PG     Therapy Frequency (OT) evaluation only  -PG       Row Name 05/22/24 0942          Therapy Plan Review/Discharge Plan (OT)    Anticipated Discharge Disposition (OT) home  -PG               User Key  (r) = Recorded By, (t) = Taken By, (c) = Cosigned By      Initials Name Provider Type    PG Chauncey Campbell, MEGHAN Occupational Therapist                   Outcome Measures       [facility] Name 05/22/24 0945          How much help from another is currently needed...    Putting on and taking off regular lower body clothing? 4  -PG     Bathing (including washing, rinsing, and drying) 4  -PG     Toileting (which includes using toilet bed pan or urinal) 4  -PG     Putting on and taking off regular upper body clothing 4  -PG     Taking care of personal grooming (such as brushing teeth) 4  -PG     Eating meals 4  -PG     AM-PAC 6 Clicks Score (OT) 24  -PG       [facility] Name 05/22/24 0945          Functional Assessment    Outcome Measure Options AM-PAC 6 Clicks Daily Activity (OT);Optimal Instrument  -PG       Row Name 05/22/24 0945          Optimal Instrument    Optimal  Instrument Optimal - 3  -PG     Bending/Stooping 1  -PG     Standing 1  -PG     Reaching 1  -PG     From the list, choose the 3 activities you would most like to be able to do without any difficulty Bending/stooping;Standing;Reaching  -PG     Total Score Optimal - 3 3  -PG               User Key  (r) = Recorded By, (t) = Taken By, (c) = Cosigned By      Initials Name Provider Type    PG Chauncey Campbell OT Occupational Therapist                      OT Recommendation and Plan  Therapy Frequency (OT): evaluation only  Plan of Care Review  Plan of Care Reviewed With: patient  Progress: improving  Outcome Evaluation: OT evaluation completed with no deficits identified.  Patient is currently independent with transfers and walking to and from the bathroom as well as with all self-care activities.  Patient agreeable no additional OT services are necessary at this time     Time Calculation:   Evaluation Complexity (OT)  Review Occupational Profile/Medical/Therapy History Complexity: brief/low complexity  Assessment, Occupational Performance/Identification of Deficit Complexity: 1-3 performance deficits  Clinical Decision Making Complexity (OT): problem focused assessment/low complexity  Overall Complexity of Evaluation (OT): low complexity     Time Calculation- OT       Row Name 05/22/24 0945             Time Calculation- OT    OT Received On 05/22/24  -PG         Untimed Charges    OT Eval/Re-eval Minutes 30  -PG         Total Minutes    Untimed Charges Total Minutes 30  -PG       Total Minutes 30  -PG                User Key  (r) = Recorded By, (t) = Taken By, (c) = Cosigned By      Initials Name Provider Type    PG Chauncey Campbell OT Occupational Therapist                  Therapy Charges for Today       Code Description Service Date Service Provider Modifiers Qty    [id number]  OT EVAL LOW COMPLEXITY 2 5/22/2024 Chauncey Campbell OT GO 1                 Chauncey Campbell OT  5/22/2024

## 2024-05-22 NOTE — CONSULTS
Met with patient and daughter at bedside. Patient downloaded Dexcom G7 ap to phone. Would like to try a CGM for closer glucose monitoring.     Recommend on discharge:  Rx for Dexcom G7 Sensors (pended)

## 2024-05-22 NOTE — THERAPY EVALUATION
Acute Care - Physical Therapy Initial Evaluation   [place]     Patient Name: Jd Velazquez  : 1965  MRN: [id number]  Today's Date: 2024      Visit Dx:     ICD-10-CM ICD-9-CM   1. Chronic osteomyelitis of right foot with draining sinus  M86.471 730.17   2. 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  L97.513 707.15     728.89   3. Abscess of right foot  L02.611 682.7   4. Difficulty walking  R26.2 719.7     Patient Active Problem List   Diagnosis    CAD (coronary artery disease)    Coronary artery disease involving native coronary artery of native heart without angina pectoris    Type 2 diabetes mellitus with hyperglycemia, without long-term current use of insulin    Hyperlipidemia LDL goal <70    Hypertension    SHAHLA treated with BiPAP    Tobacco abuse    Cellulitis of chest wall    Sternal wound infection    S/P CABG x 4    Allergic rhinitis    Hypokalemia    Hypothyroidism    Insomnia, unspecified    Microalbuminuric diabetic nephropathy    Edema    Mild episode of recurrent major depressive disorder    Class 1 obesity due to excess calories with serious comorbidity and body mass index (BMI) of 32.0 to 32.9 in adult    Dermatitis    Skin ulcer, limited to breakdown of skin    Itching    Cellulitis of right lower extremity    Hyponatremia    Thoracic spine pain    Neuropathy    Peripheral vascular disease    Atherosclerosis of native arteries of the extremities with ulceration    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, unspecified laterality    Dry gangrene    Abscess of right foot    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    Chronic osteomyelitis of right foot with draining sinus     Past Medical History:   Diagnosis Date    Allergic rhinitis 2015    Anesthesia     DIFFICULTY WAKING UP POST SURGERY    CAD (coronary artery disease)     DENIES CP/SOA.    Diabetes mellitus     Hyperlipidemia      Hypertension     Hypothyroidism 04/24/2014    Insomnia, unspecified 06/15/2016    WITHOUT BIPAP    Microalbuminuria due to type 2 diabetes mellitus 10/15/2015    Mixed hyperlipidemia 10/15/2015    Myocardial infarction     Obesity     BMI 32    SHAHLA (obstructive sleep apnea)     WEARS BIPAP    Right great toe amputee     4/2024; 5/2024 ADMITTED WITH INFECTION    Skin cancer     REMOVED    Sleep apnea      Past Surgical History:   Procedure Laterality Date    AMPUTATION DIGIT Right 4/10/2024    Procedure: AMPUTATION DIGIT RIGHT GREAT TOE;  Surgeon: Balwinder Costa DPM;  Location: [facility] MAIN OR;  Service: Podiatry;  Laterality: Right;    AMPUTATION DIGIT Right 5/21/2024    Procedure: SECOND RAY AMPUTATION RIGHT FOOT, INCISION AND DRAINAGE RIGHT FOOT;  Surgeon: Balwinder Costa DPM;  Location: [facility] MAIN OR;  Service: Podiatry;  Laterality: Right;    ANKLE ARTHROSCOPY W/ OPEN REPAIR      APPENDECTOMY      CARDIAC CATHETERIZATION  2014    PCI to circumflex    CARDIAC CATHETERIZATION Right 10/26/2023    Procedure: Aortogram with right leg angiogram, possible angioplasty or stenting;  Surgeon: Robert Puga MD;  Location: [facility] CATH INVASIVE LOCATION;  Service: Vascular;  Laterality: Right;    CARDIAC CATHETERIZATION Right 12/15/2023    Procedure: Right leg angiogram, possible angioplasty or stenting;  Surgeon: Robert Puga MD;  Location: [facility] CATH INVASIVE LOCATION;  Service: Vascular;  Laterality: Right;    CORONARY ARTERY BYPASS GRAFT N/A 10/08/2020    Procedure: STERNOTOMY, CORONARY ARTERY BYPASS GRAFTING TIME 4 WITH LEFT KELSI  AND ENDOSCOPICALLY HARVESTED LEFT GREATER SAPHENOUS VEIN AND PRP.;  Surgeon: Jr Girma Chiu MD;  Location: [facility] MAIN OR;  Service: Cardiothoracic;  Laterality: N/A;    FEMORAL TIBIAL BYPASS Right 01/09/2024    Procedure: Right femoral-tibial bypass graft;  Surgeon: Robert Puga MD;  Location: [facility] MAIN OR;  Service: Vascular;  Laterality: Right;    HEEL SPUR  SURGERY      HERNIA REPAIR      INCISION AND DRAINAGE OF WOUND Right 4/30/2024    Procedure: INCISION AND DRAINAGE WOUND RIGHT FOOT, DEBRIDEMENT OF WOUND RIGHT FOOT, AMPUTATION REVISION RIGHT FOOT;  Surgeon: Balwinder Costa DPM;  Location: [facility] MAIN OR;  Service: Podiatry;  Laterality: Right;    KNEE ARTHROSCOPY      MULTIPLE TIMES ON BOTH KNEES    SKIN CANCER EXCISION      BACK    TOE SURGERY Right     DEBRIDMENT RIGHT GREAT TOE    TONSILLECTOMY       PT Assessment (Last 12 Hours)       PT Evaluation and Treatment       Row Name 05/22/24 1300          Physical Therapy Time and Intention    Subjective Information no complaints  -CS     Document Type evaluation  -CS     Mode of Treatment individual therapy;physical therapy  -CS     Patient Effort good  -CS     Symptoms Noted During/After Treatment none  -CS       Row Name 05/22/24 1300          General Information    Patient Profile Reviewed yes  -CS     Patient Observations alert;cooperative;agree to therapy  -CS     Prior Level of Function independent:;all household mobility;transfer;gait;community mobility;bed mobility;ADL's  -CS     Equipment Currently Used at Home none  -CS     Existing Precautions/Restrictions weight bearing;right  Pt is to be non weight-bearing on right foot  -CS     Barriers to Rehab none identified  -CS       Row Name 05/22/24 1300          Living Environment    Current Living Arrangements home  -CS     Home Accessibility stairs to enter home;stairs within home  -CS     People in Home spouse  -CS     Primary Care Provided by self  -CS       Row Name 05/22/24 1300          Home Main Entrance    Number of Stairs, Main Entrance two  -CS     Stair Railings, Main Entrance none  -CS       Row Name 05/22/24 1300          Stairs Within Home, Primary    Number of Stairs, Within Home, Primary none  -CS       Row Name 05/22/24 1300          Pain    Pretreatment Pain Rating 0/10 - no pain  -CS     Posttreatment Pain Rating 0/10 - no pain  -CS        Row Name 05/22/24 1300          Cognition    Orientation Status (Cognition) oriented x 4  -       Row Name 05/22/24 1300          Range of Motion Comprehensive    General Range of Motion no range of motion deficits identified  -       Row Name 05/22/24 1300          Strength Comprehensive (MMT)    General Manual Muscle Testing (MMT) Assessment no strength deficits identified  -       Row Name 05/22/24 1300          Mobility    Extremity Weight-bearing Status right lower extremity  -     Right Lower Extremity (Weight-bearing Status) non weight-bearing (NWB)  -       Row Name 05/22/24 1300          Bed Mobility    Bed Mobility bed mobility (all) activities  -CS     All Activities, [place] (Bed Mobility) independent  -       Row Name 05/22/24 1300          Transfers    Maintains Weight-bearing Status (Transfers) other (see comments)  Pt non-compliant with weight bearing status even after education.  -CS     Comment, (Transfers) Patient completed all functional transfers independently without an assistive device. Pt reported he was just going to keep putting his weight through the right heel only.  -       Row Name 05/22/24 1300          Gait/Stairs (Locomotion)    Gait/Stairs Locomotion gait/ambulation independence  -CS     [place] Level (Gait) independent  -CS     Assistive Device (Gait) --  no AD  -CS     Patient was able to Ambulate yes  -CS     Distance in Feet (Gait) 20  -CS     Pattern (Gait) step-through  -CS     Right Sided Gait Deviations weight shift ability decreased  -CS     Comment, (Gait/Stairs) Pt educated on trying to keep most of his weight on the heel of his right foot if he was not going to follow weight bearing restrictions. Pt reported he was not going to use assistive device while here to ambulate the short distance to the bathroom.  -       Row Name 05/22/24 1300          Balance    Balance Assessment standing dynamic balance  -     Dynamic Standing Balance  independent  -CS     Position/Device Used, Standing Balance unsupported  -CS       Row Name             Wound 05/21/24 1225 Right anterior second toe Incision    Wound - Properties Group Placement Date: 05/21/24  -HK Placement Time: 1225  -HK Side: Right  -HK Orientation: anterior  -HK Location: second toe  -HK Primary Wound Type: Incision  -HK    Retired Wound - Properties Group Placement Date: 05/21/24  -HK Placement Time: 1225  -HK Side: Right  -HK Orientation: anterior  -HK Location: second toe  -HK Primary Wound Type: Incision  -HK    Retired Wound - Properties Group Date first assessed: 05/21/24  -HK Time first assessed: 1225  -HK Side: Right  -HK Location: second toe  -HK Primary Wound Type: Incision  -HK      Row Name 05/22/24 1300          Plan of Care Review    Plan of Care Reviewed With patient  -CS     Progress no change  -CS     Outcome Evaluation Patient presents with no physical limitations that impede his ability to return home independently.  He was encouraged to continue ambulating as tolerated while here at the hospital and educated on importance of following doctor's orders on weight bearing restrictions to the right foot. Patient agreeable no further PT services needed. He will be discharged from physical therapy caseload.  -CS       Row Name 05/22/24 1300          Positioning and Restraints    Pre-Treatment Position sitting in chair/recliner  -CS     Post Treatment Position chair  -CS     In Chair sitting;call light within reach  -CS       Row Name 05/22/24 1300          Therapy Assessment/Plan (PT)    Criteria for Skilled Interventions Met (PT) no problems identified which require skilled intervention  -CS     Therapy Frequency (PT) evaluation only  -CS       Row Name 05/22/24 1300          PT Evaluation Complexity    History, PT Evaluation Complexity no personal factors and/or comorbidities  -CS     Examination of Body Systems (PT Eval Complexity) total of 4 or more elements  -CS     Clinical  Presentation (PT Evaluation Complexity) stable  -CS     Clinical Decision Making (PT Evaluation Complexity) low complexity  -CS     Overall Complexity (PT Evaluation Complexity) low complexity  -CS       Row Name 05/22/24 1300          Therapy Plan Review/Discharge Plan (PT)    Therapy Plan Review (PT) evaluation/treatment results reviewed;patient  -CS       Row Name 05/22/24 1300          Physical Therapy Goals    Problem Specific Goal Selection (PT) problem specific goal 1, PT  -CS       Row Name 05/22/24 1300          Problem Specific Goal 1 (PT)    Problem Specific Goal 1 (PT) Complete physical therapy evaluation  -CS     Time Frame (Problem Specific Goal 1, PT) by discharge  -CS     Progress/Outcome (Problem Specific Goal 1, PT) goal met  -CS               User Key  (r) = Recorded By, (t) = Taken By, (c) = Cosigned By      Initials Name Provider Type    HK Audelia Strange, RN Registered Nurse    Lucina Mittal, PT Physical Therapist                      PT Recommendation and Plan  Anticipated Discharge Disposition (PT): home, other (see comments) (Pt is going to stay with his daughter in order to receive wound care and home health services.)  Therapy Frequency (PT): evaluation only  Plan of Care Reviewed With: patient  Progress: no change  Outcome Evaluation: Patient presents with no physical limitations that impede his ability to return home independently.  He was encouraged to continue ambulating as tolerated while here at the hospital and educated on importance of following doctor's orders on weight bearing restrictions to the right foot. Patient agreeable no further PT services needed. He will be discharged from physical therapy caseload.   Outcome Measures       Row Name 05/22/24 1300             How much help from another person do you currently need...    Turning from your back to your side while in flat bed without using bedrails? 4  -CS      Moving from lying on back to sitting on the side of a flat  bed without bedrails? 4  -CS      Moving to and from a bed to a chair (including a wheelchair)? 4  -CS      Standing up from a chair using your arms (e.g., wheelchair, bedside chair)? 4  -CS      Climbing 3-5 steps with a railing? 4  -CS      To walk in hospital room? 4  -CS      AM-PAC 6 Clicks Score (PT) 24  -CS      Highest Level of Mobility Goal 8 --> Walked 250 feet or more  -CS         Functional Assessment    Outcome Measure Options AM-PAC 6 Clicks Basic Mobility (PT)  -CS                User Key  (r) = Recorded By, (t) = Taken By, (c) = Cosigned By      Initials Name Provider Type    Lucina Mittal PT Physical Therapist                     Time Calculation:    PT Charges       Row Name 05/22/24 1334             Time Calculation    PT Received On 05/22/24  -CS         Untimed Charges    PT Eval/Re-eval Minutes 32  -CS         Total Minutes    Untimed Charges Total Minutes 32  -CS       Total Minutes 32  -CS                User Key  (r) = Recorded By, (t) = Taken By, (c) = Cosigned By      Initials Name Provider Type    Lucina Mittal PT Physical Therapist                  Therapy Charges for Today       Code Description Service Date Service Provider Modifiers Qty    [id number] HC PT EVAL LOW COMPLEXITY 3 5/22/2024 Lucina Mallory, PT GP 1            PT G-Codes  Outcome Measure Options: AM-PAC 6 Clicks Basic Mobility (PT)  AM-PAC 6 Clicks Score (PT): 24  AM-PAC 6 Clicks Score (OT): 24    Lucina Mallory PT  5/22/2024

## 2024-05-22 NOTE — PROGRESS NOTES
[facility] - PODIATRY    Today's Date: 05/22/24    Patient Name: Jd Velazquez  MRN: [id number]  CSN: [id number]  PCP: Dacia Newsome APRN  Referring Provider: Mario Bowman MD  Attending Provider: Art Stevenson MD  Length of Stay: 5    SUBJECTIVE   Chief Complaint:     HPI: Jd Velazquez, a 58 y.o.male, .  Denies any constitutional symptoms. No other pedal complaints at this time.    Past Medical History:   Diagnosis Date    Allergic rhinitis 01/12/2015    Anesthesia     DIFFICULTY WAKING UP POST SURGERY    CAD (coronary artery disease)     DENIES CP/SOA.    Diabetes mellitus     Hyperlipidemia     Hypertension     Hypothyroidism 04/24/2014    Insomnia, unspecified 06/15/2016    WITHOUT BIPAP    Microalbuminuria due to type 2 diabetes mellitus 10/15/2015    Mixed hyperlipidemia 10/15/2015    Myocardial infarction     Obesity     BMI 32    SHAHLA (obstructive sleep apnea)     WEARS BIPAP    Right great toe amputee     4/2024; 5/2024 ADMITTED WITH INFECTION    Skin cancer     REMOVED    Sleep apnea      Past Surgical History:   Procedure Laterality Date    AMPUTATION DIGIT Right 4/10/2024    Procedure: AMPUTATION DIGIT RIGHT GREAT TOE;  Surgeon: Balwinder Costa DPM;  Location: [facility] MAIN OR;  Service: Podiatry;  Laterality: Right;    AMPUTATION DIGIT Right 5/21/2024    Procedure: SECOND RAY AMPUTATION RIGHT FOOT, INCISION AND DRAINAGE RIGHT FOOT;  Surgeon: Balwinder Costa DPM;  Location: [facility] MAIN OR;  Service: Podiatry;  Laterality: Right;    ANKLE ARTHROSCOPY W/ OPEN REPAIR      APPENDECTOMY      CARDIAC CATHETERIZATION  2014    PCI to circumflex    CARDIAC CATHETERIZATION Right 10/26/2023    Procedure: Aortogram with right leg angiogram, possible angioplasty or stenting;  Surgeon: Robert Puga MD;  Location: [facility] CATH INVASIVE LOCATION;  Service: Vascular;  Laterality: Right;    CARDIAC CATHETERIZATION Right 12/15/2023    Procedure: Right leg angiogram,  possible angioplasty or stenting;  Surgeon: Robert Puga MD;  Location: [facility] CATH INVASIVE LOCATION;  Service: Vascular;  Laterality: Right;    CORONARY ARTERY BYPASS GRAFT N/A 10/08/2020    Procedure: STERNOTOMY, CORONARY ARTERY BYPASS GRAFTING TIME 4 WITH LEFT KELSI  AND ENDOSCOPICALLY HARVESTED LEFT GREATER SAPHENOUS VEIN AND PRP.;  Surgeon: Jr Girma Chiu MD;  Location: [facility] MAIN OR;  Service: Cardiothoracic;  Laterality: N/A;    FEMORAL TIBIAL BYPASS Right 01/09/2024    Procedure: Right femoral-tibial bypass graft;  Surgeon: Robert Puga MD;  Location: [facility] OR;  Service: Vascular;  Laterality: Right;    HEEL SPUR SURGERY      HERNIA REPAIR      INCISION AND DRAINAGE OF WOUND Right 4/30/2024    Procedure: INCISION AND DRAINAGE WOUND RIGHT FOOT, DEBRIDEMENT OF WOUND RIGHT FOOT, AMPUTATION REVISION RIGHT FOOT;  Surgeon: Balwinder Costa DPM;  Location: [facility] OR;  Service: Podiatry;  Laterality: Right;    KNEE ARTHROSCOPY      MULTIPLE TIMES ON BOTH KNEES    SKIN CANCER EXCISION      BACK    TOE SURGERY Right     DEBRIDMENT RIGHT GREAT TOE    TONSILLECTOMY       Family History   Adopted: Yes   Problem Relation Age of Onset    Heart disease Other      Social History     Socioeconomic History    Marital status:    Tobacco Use    Smoking status: Every Day     Current packs/day: 0.25     Average packs/day: 0.3 packs/day for 15.0 years (3.8 ttl pk-yrs)     Types: Cigarettes     Passive exposure: Never    Smokeless tobacco: Never   Vaping Use    Vaping status: Never Used   Substance and Sexual Activity    Alcohol use: Not Currently    Drug use: Never    Sexual activity: Defer     Allergies   Allergen Reactions    Strawberry Itching    Lortab [Hydrocodone-Acetaminophen] Itching     Current Facility-Administered Medications   Medication Dose Route Frequency Provider Last Rate Last Admin    acetaminophen (TYLENOL) tablet 1,000 mg  1,000 mg Oral TID Art Stevenson MD   1,000 mg at  05/22/24 0806    aspirin chewable tablet 81 mg  81 mg Oral Daily Balwinder Costa, DPM   81 mg at 05/22/24 0806    clopidogrel (PLAVIX) tablet 75 mg  75 mg Oral Daily Balwinder Costa, DPM   75 mg at 05/22/24 0806    dextrose (D50W) (25 g/50 mL) IV injection 25 g  25 g Intravenous Q15 Min PRN Balwinder Costa, DPM        dextrose (GLUTOSE) oral gel 15 g  15 g Oral Q15 Min PRN Balwinder Costa, DPM        DULoxetine (CYMBALTA) DR capsule 30 mg  30 mg Oral Daily Balwinder Costa, DPM   30 mg at 05/22/24 0806    Enoxaparin Sodium (LOVENOX) syringe 40 mg  40 mg Subcutaneous Daily Balwinder Costa, DPM   40 mg at 05/22/24 0806    glucagon (GLUCAGEN) injection 1 mg  1 mg Intramuscular Q15 Min PRN Balwinder Costa DPM        lisinopril (PRINIVIL,ZESTRIL) tablet 20 mg  20 mg Oral Q24H Balwinder Costa, DPM   20 mg at 05/22/24 0806    And    hydroCHLOROthiazide tablet 12.5 mg  12.5 mg Oral Q24H Balwinder Costa DPM   12.5 mg at 05/22/24 0806    insulin glargine (LANTUS, SEMGLEE) injection 20 Units  20 Units Subcutaneous BID Art Stevenson MD   20 Units at 05/22/24 0806    Insulin Lispro (humaLOG) injection 2-7 Units  2-7 Units Subcutaneous TID With Meals Art Stevenson MD   2 Units at 05/22/24 1245    metoprolol tartrate (LOPRESSOR) tablet 50 mg  50 mg Oral Q12H Art Stevenson MD        morphine injection 2 mg  2 mg Intravenous Q3H PRN Art Stevenson MD        oxyCODONE (ROXICODONE) immediate release tablet 10 mg  10 mg Oral Q4H PRN Art Stevenson MD   10 mg at 05/21/24 1441    oxyCODONE (ROXICODONE) immediate release tablet 5 mg  5 mg Oral Q4H PRN Art Stevenson MD        Pharmacy to dose vancomycin   Does not apply Continuous PRN Balwinder Costa DPM        Pharmacy to Dose Zosyn   Does not apply Continuous PRN Balwinder Costa DPM        piperacillin-tazobactam (ZOSYN) IVPB 3.375 g IVPB in 100 mL NS (VTB)  3.375 g Intravenous Q8H Balwinder Costa DPM   3.375 g at 05/22/24 4882     pregabalin (LYRICA) capsule 225 mg  225 mg Oral BID Rotterman, Balwinder T, DPM   225 mg at 05/22/24 0806    sodium chloride 0.9 % flush 10 mL  10 mL Intravenous Q12H Rotterman, Balwinder T, DPM   10 mL at 05/22/24 0807    sodium chloride 0.9 % flush 10 mL  10 mL Intravenous PRN Rotterman, Balwinder T, DPM        sodium chloride 0.9 % infusion 40 mL  40 mL Intravenous PRN Rotterman, Balwinder T, DPM        vancomycin 1000 mg/250 mL 0.9% NS IVPB ([facility])  1,000 mg Intravenous Q12H Rotterman, Balwinder T, DPM   1,000 mg at 05/22/24 0923    zolpidem (AMBIEN) tablet 10 mg  10 mg Oral Nightly PRN Rotterman, Balwinder T, DPM   10 mg at 05/19/24 2104     Review of Systems   All other systems reviewed and are negative.      OBJECTIVE     Vitals:    05/22/24 1148   BP: 130/60   Pulse: 59   Resp: 18   Temp: 98.2 °F (36.8 °C)   SpO2: 98%       PHYSICAL EXAM    GEN:   A&Ox3, NAD.     Well coapted incision to right foot with sutures intact.  No erythema no malodor no drainage.    RADIOLOGY/NUCLEAR:  XR Foot 3+ View Right    Result Date: 5/22/2024  Narrative: XR FOOT 3+ VW RIGHT-  Date of Exam: 5/22/2024 12:50 PM  Indication: amputation right foot; M86.471-Chronic osteomyelitis with draining sinus, right ankle and foot; L97.513-Non-pressure chronic ulcer of other part of right foot with necrosis of muscle; L02.611-Cutaneous abscess of right foot.  Comparison: None available  Technique: Three views of the right foot were obtained.  FINDINGS: There are postsurgical changes of transmetatarsal amputation of the first and second digits. There is gas within the soft tissues compatible with recent surgery. There is multifocal degenerative midfoot arthropathy and hindfoot arthropathy at the talonavicular, navicular cuneiform and cuneiform metatarsal joints. There is calcification/ossification of the plantar fascia.      Impression: 1. Postsurgical changes of recent transmetatarsal amputation of the first and second digits. 2. Extensive degenerative hindfoot and  midfoot arthropathy.  Electronically Signed By-David Petty MD On:5/22/2024 1:03 PM      MRI Foot Right With & Without Contrast    Result Date: 5/20/2024  Narrative:  MRI FOOT RIGHT W WO CONTRAST-  Date of Exam: 5/18/2024 7:10 AM  Indication: osteo and foot abscess.  Comparison: None available.  Technique:  Routine multiplanar/multisequence sequence images of the right foot were obtained before and after the uneventful administration of 20 mL MultiHance.    Findings: There has been resection of the first digit at the distal first metatarsal. There is an overlying wound at the medial forefoot. There appears to be surrounding heterogeneous edema signal and enhancement. There is a nonenhancing sinus tract extending from the wound to the distal first metatarsal and to the second MTP joint. This can be seen on series 11 images 21-27. There are some foci of T1 hypointense signal in the soft tissues surrounding the tract which could represent postsurgical changes versus small foci of soft tissue gas.  In the superficial subcutaneous tissues, just posterior to the wound on series 11 images 19 and 20, there is a small rim-enhancing collection measuring approximately 12 x 14 x 7 mm. This is suspicious for a small abscess and may communicate to the sinus tract. There is also an irregular T2 hyperintense signal focus with peripheral enhancement extending anteriorly from the tract in the soft tissues distal to the remaining first metatarsal as seen on series 6 image 12 and series 8 image 27 measuring approximately 9 x 14 x 15 mm which may also represent a small abscess, possibly communicating to the sinus tract.  There is T2 hyperintense and T1 hypointense signal at the remaining distal first metatarsal adjacent to the wound consistent with osteomyelitis. There is also T2 hyperintense edema signal and corresponding T1 hypointense signal at the second metatarsal head and second proximal phalanx surrounding the second MTP  joint consistent with osteomyelitis. The wound extends to the second MTP joint where there is para-articular enhancement consistent with joint infection.  No other definite findings of osteomyelitis or joint infection.  There appear to be findings of advanced arthropathy in the midfoot with prominent osteophytosis. Lisfranc alignment appears grossly intact. Lisfranc ligament appears grossly intact.  There is advanced muscle atrophy with diffuse edema signal and enhancement. This could be associated with denervation or myositis. There is a small amount of fluid signal and enhancement along the extensor digitorum tendons which may indicate infectious tenosynovitis.       Impression: Impression: 1.  Status post amputation of the first digit at the distal first metatarsal. 2.  Wound at the medial forefoot with edema and enhancement surrounding a sinus tract extending to the distal first metatarsal and second MTP joint. 3.  Findings of osteomyelitis at the remaining distal first metatarsal and surrounding the second MTP joint where there are findings of joint infection. 4.  Findings of skin and soft tissue infection surrounding wound. Findings of muscle denervation and/or myositis. 5.  Two small abscesses in the soft tissues which may drain to the sinus tract.   Electronically Signed By-Hunter Steele MD On:5/20/2024 9:37 AM       LABORATORY/CULTURE RESULTS:  Results from last 7 days   Lab Units 05/22/24  0502 05/21/24  0525 05/20/24  0518   WBC 10*3/mm3 10.92* 7.55 8.50   HEMOGLOBIN g/dL 11.2* 11.9* 11.5*   HEMATOCRIT % 36.1* 38.1 36.5*   PLATELETS 10*3/mm3 383 412 406     Results from last 7 days   Lab Units 05/22/24  0502 05/21/24  0525 05/20/24  0518 05/19/24  0532 05/18/24  0555 05/17/24  1236   SODIUM mmol/L 134* 136 136   < > 136 135*   POTASSIUM mmol/L 4.3 4.2 4.4   < > 4.1 4.4   CHLORIDE mmol/L 99 101 99   < > 101 99   CO2 mmol/L 24.8 24.3 25.8   < > 23.9 24.8   BUN mg/dL 22* 24* 13   < > 9 11   CREATININE mg/dL  1.28* 1.39* 0.87   < > 0.75* 0.84   CALCIUM mg/dL 9.1 9.1 9.3   < > 8.7 9.4   BILIRUBIN mg/dL  --   --   --   --  0.3 0.3   ALK PHOS U/L  --   --   --   --  107 107   ALT (SGPT) U/L  --   --   --   --  5 5   AST (SGOT) U/L  --   --   --   --  7 5   GLUCOSE mg/dL 249* 161* 133*   < > 175* 229*    < > = values in this interval not displayed.         Microbiology Results (last 10 days)       Procedure Component Value - Date/Time    Tissue / Bone Culture - Bone, Toe, Right [[id number]] Collected: 05/21/24 1228    Lab Status: Preliminary result Specimen: Bone from Toe, Right Updated: 05/22/24 0819     Tissue Culture Culture in progress     Gram Stain Few (2+) WBCs seen      Few (2+) Gram positive cocci in pairs and chains    Blood Culture - Blood, Arm, Right [[id number]]  (Normal) Collected: 05/17/24 1415    Lab Status: Preliminary result Specimen: Blood from Arm, Right Updated: 05/21/24 1431     Blood Culture No growth at 4 days    Blood Culture - Blood, Arm, Left [[id number]]  (Normal) Collected: 05/17/24 1415    Lab Status: Preliminary result Specimen: Blood from Arm, Left Updated: 05/21/24 1431     Blood Culture No growth at 4 days    Wound Culture - Wound, Foot, Right [[id number]]  (Abnormal)  (Susceptibility) Collected: 05/13/24 1000    Lab Status: Final result Specimen: Wound from Foot, Right Updated: 05/15/24 0743     Wound Culture Scant growth (1+) Enterococcus faecalis     Gram Stain Few (2+) WBCs seen      Few (2+) Gram positive cocci in pairs    Susceptibility        Enterococcus faecalis      BETH      Ampicillin Susceptible      Vancomycin Susceptible                                    ASSESSMENT/PLAN     Active Hospital Problems:  Active Hospital Problems    Diagnosis     **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     Chronic osteomyelitis of right foot with draining sinus          Patient examined and evaluated  Follow cultures, continue IV antibiotics  No further surgical intervention planned at this  time  Daily dressing changes to right foot  Nonweightbearing to right lower extremity      Discussed findings and treatment plan including risks, benefits, and treatment options with patient in detail. Patient agreed with treatment plan.      This document has been electronically signed by Balwinder Costa DPM on May 22, 2024 13:18 EDT

## 2024-05-22 NOTE — PLAN OF CARE
Goal Outcome Evaluation:  Plan of Care Reviewed With: patient        Progress: no change  Outcome Evaluation: Patient presents with no physical limitations that impede his ability to return home independently.  He was encouraged to continue ambulating as tolerated while here at the hospital and educated on importance of following doctor's orders on weight bearing restrictions to the right foot. Patient agreeable no further PT services needed. He will be discharged from physical therapy caseload.      Anticipated Discharge Disposition (PT): home, other (see comments) (Pt is going to stay with his daughter in order to receive wound care and home health services.)

## 2024-05-22 NOTE — CONSULTS
Pt requests to complete Living Will. SW met with pt and daughter at bedside. Pt has appointed his Daughter-Dimple as a Health Care Surrogate. Document has been completed and daughter provided 1 copy per requests. Original document placed in Medical records basket to be sent to be scanned into system.

## 2024-05-22 NOTE — PLAN OF CARE
Goal Outcome Evaluation:  Plan of Care Reviewed With: patient      Pt vss, bradycardic at times. Pt has no complaints of moderate/severe pain throughout shift. Right foot dressing changed this shift, pt tolerated well. Continuing with plan of care.

## 2024-05-22 NOTE — PLAN OF CARE
Goal Outcome Evaluation:  Plan of Care Reviewed With: patient              Pt is alert and orient x4.  VS Wnl for pt.  Pt denies any pain/discomfort this shift.  Pt stayed up in chair.  Unable to sleep.  Pt states it was related to his bed, not his foot.

## 2024-05-22 NOTE — PROGRESS NOTES
[facility]   Hospitalist Progress Note  Date: 2024  Patient Name: Jd Velazquez  : 1965  MRN: [id number]  Date of admission: 2024  Consultants:   -Podiatry: Dr. Mushtaq Mathew, Dr. Balwinder Costa    Subjective   Subjective     Chief Complaint: Right foot osteo versus abscess leading to direct admission     Summary:   Jd Velazquez is a 58 y.o. male with CAD, type 2 diabetes mellitus, dyslipidemia, hyperkalemia, peripheral vascular disease and obesity (BMI: 32.36) that presented with worsening right foot wound s/p amputation of great toe in 2024. Podiatry consulted to assist in care. Broad-spectrum antibiotics started.  MRI imaging obtained that demonstrated evidence of osteomyelitis involving distal first metatarsal and surrounding second MTP joint as well as 2 small abscesses in the soft tissue.  Patient taken the OR for secondary amputation of the right foot and incision and drainage of right foot on 2024.    Interval Followup:   No acute issues overnight.  Patient's pain being well-controlled with as needed oxycodone.  Patient denies any chest pain or shortness of breath.    Procedures:  -Secondary amputation right foot, incision and drainage right foot (2024)    Antibiotics:   -Vancomycin  -Zosyn    Objective   Objective     Vitals:   Temp:  [97.3 °F (36.3 °C)-98.4 °F (36.9 °C)] 98 °F (36.7 °C)  Heart Rate:  [46-75] 56  Resp:  [14-18] 18  BP: (111-144)/(45-71) 135/67  Flow (L/min):  [0-4] 0  Physical Exam   Gen: No acute distress, conversant, pleasant, sitting up in chair bedside  Resp: CTAB, No w/r/r, good aeration, equal chest rise bilaterally  Card: RRR, No m/r/g  Abd: Soft, Nontender, Nondistended, + bowel sounds    Result Review    Result Review:  I have personally reviewed the results as below and agree with these findings:  []  Laboratory:   CMP          2024    05:18 2024    05:25 2024    05:02   CMP   Glucose 133  161  249    BUN 13   24  22    Creatinine 0.87  1.39  1.28    EGFR 100.6  59.1  65.3    Sodium 136  136  134    Potassium 4.4  4.2  4.3    Chloride 99  101  99    Calcium 9.3  9.1  9.1    BUN/Creatinine Ratio 14.9  17.3  17.2    Anion Gap 11.2  10.7  10.2      CBC          5/20/2024    05:18 5/21/2024    05:25 5/22/2024    05:02   CBC   WBC 8.50  7.55  10.92    RBC 3.97  4.09  3.85    Hemoglobin 11.5  11.9  11.2    Hematocrit 36.5  38.1  36.1    MCV 91.9  93.2  93.8    MCH 29.0  29.1  29.1    MCHC 31.5  31.2  31.0    RDW 14.6  14.9  15.1    Platelets 406  412  383    Magnesium within normal limits.  Mild hypoglycemia this a.m.  [x]  Microbiology: Blood culture (05/17/2024): No growth to date  []  Radiology:   []  EKG/Telemetry:    []  Cardiology/Vascular:    []  Pathology:  []  Old records:  []  Other:    Assessment & Plan   Assessment / Plan     Assessment:  Right foot diabetic ulcer with concern for osteomyelitis  Type 2 diabetes mellitus with hyperglycemia  Peripheral vascular disease  Hypomagnesemia  Acute renal failure (creatinine elevated to 1.39 from 0.87 in 24 hours)  CAD  Obesity (BMI: 33.36)    Plan:  -Podiatry consulted and following, appreciate assistance and recommendations in the care of this patient.  -Continue vancomycin and Zosyn.  Tailor antibiotics based on results of infectious workup.  Surgical pathology pending.  Monitor vancomycin level.  -No change to insulin regimen.  -Continue aspirin and Plavix  -Management discussed with podiatry.  Patient to work with PT/OT services.  -Continue appropriate home medications  -Monitor electrolytes and renal function with BMP and magnesium level in the AM  -Monitor WBC and Hgb with CBC in the AM  -Clinical course will dictate further management     DVT Prophylaxis: Lovenox  Diet:   Diet Order   Procedures    Diet: Regular/House, Diabetic; Consistent Carbohydrate; Fluid Consistency: Thin (IDDSI 0)     Dispo: Pending clinical course     Personally reviewed patients labs and  imaging, discussed with patient and nurse at bedside. Discussed management with the following consultants: Podiatry.     Part of this note may be an electronic transcription/translation of spoken language to printed text using the Dragon dictation system.    DVT prophylaxis:  Medical DVT prophylaxis orders are present.        CODE STATUS:   Level Of Support Discussed With: Patient  Code Status (Patient has no pulse and is not breathing): CPR (Attempt to Resuscitate)  Medical Interventions (Patient has pulse or is breathing): Full Support        Electronically signed by Art Stevenson MD, 5/22/2024, 10:58 EDT.

## 2024-05-22 NOTE — PROGRESS NOTES
[facility] Clinical Pharmacy Services: Vancomycin Monitoring Note    Jd Velazquez is a 58 y.o. male who is on day  of pharmacy to dose vancomycin for Bone and/or Joint Infection s/p second ray amputation and I&D of the right foot.    Previous Vancomycin Dose:   750 mg IV every  12  hours  Imaging Reviewed?: Yes   MRI right foot: s/p amputation of the first digit at the distal first metatarsal; findings of OM at the remaining distal first metatarsal and surrounding the second MTP joint where there are findings of joint infection; findings of SSTI surrounding wound, findings of muscle denervation and/or myositis; two small abscesses in the soft tissues which may drain to the sinus tract     Updated Cultures and Sensitivities:    Tissue/Bone Culture, right toe: few GPC in pairs and chains on gram stain   Blood cx : NGTD  Previous admissions:    Wound cx, right foot: scant growth E. Faecalis   Anaerobic cx, right foot: B. Fragilis   Wound cx, right foot: rare E. Faecalis  4/10 Anaerobic cx, right toe tissue: mixed anaerobes with B. Frag and Prevotela spp  4/10 Wound cx, right toe: light growth Citrobacter freundii, Providencia rettgeri    Vitals/Labs  Ht:  ; Wt: 108 kg (238 lb 8.6 oz)   Temp (24hrs), Av.7 °F (36.5 °C), Min:97.3 °F (36.3 °C), Max:98.4 °F (36.9 °C)   Estimated Creatinine Clearance: 79.6 mL/min (A) (by C-G formula based on SCr of 1.28 mg/dL (H)).     Results from last 7 days   Lab Units 24  0502 24  0525 24  0518 24  0532 24  1304   VANCOMYCIN RM mcg/mL  --  21.15 35.70  --   --    VANCOMYCIN TR mcg/mL  --   --   --   --  10.40   CREATININE mg/dL 1.28* 1.39* 0.87   < >  --    WBC 10*3/mm3 10.92* 7.55 8.50   < >  --     < > = values in this interval not displayed.     Assessment/Plan    Current Vancomycin Dose:  1000 mg IV every 12 hours; which provides the following predicted parameters:  AUC24,ss: 515 mg/L.hr  PAUC*: 100  %  Ctrough,ss: 16.6 mg/L  Pconc*: 9 %  Tox.: 12 %  New level to be ordered if renal function continues to worsen and vancomycin continues  We will continue to monitor patient changes and renal function     Thank you for involving pharmacy in this patient's care. Please contact pharmacy with any questions or concerns.    Tiara Smith RPH  Clinical Pharmacist

## 2024-05-22 NOTE — PLAN OF CARE
Goal Outcome Evaluation:  Plan of Care Reviewed With: patient        Progress: improving  Outcome Evaluation: OT evaluation completed with no deficits identified.  Patient is currently independent with transfers and walking to and from the bathroom as well as with all self-care activities.  Patient agreeable no additional OT services are necessary at this time      Anticipated Discharge Disposition (OT): home

## 2024-05-23 ENCOUNTER — TELEPHONE (OUTPATIENT)
Dept: FAMILY MEDICINE CLINIC | Facility: CLINIC | Age: 59
End: 2024-05-23
Payer: COMMERCIAL

## 2024-05-23 LAB
ANION GAP SERPL CALCULATED.3IONS-SCNC: 9.9 MMOL/L (ref 5–15)
BUN SERPL-MCNC: 19 MG/DL (ref 6–20)
BUN/CREAT SERPL: 15.2 (ref 7–25)
CALCIUM SPEC-SCNC: 9.2 MG/DL (ref 8.6–10.5)
CHLORIDE SERPL-SCNC: 104 MMOL/L (ref 98–107)
CO2 SERPL-SCNC: 25.1 MMOL/L (ref 22–29)
CREAT SERPL-MCNC: 1.25 MG/DL (ref 0.76–1.27)
CYTO UR: NORMAL
DEPRECATED RDW RBC AUTO: 51.8 FL (ref 37–54)
EGFRCR SERPLBLD CKD-EPI 2021: 67.2 ML/MIN/1.73
ERYTHROCYTE [DISTWIDTH] IN BLOOD BY AUTOMATED COUNT: 15 % (ref 12.3–15.4)
GLUCOSE BLDC GLUCOMTR-MCNC: 119 MG/DL (ref 70–99)
GLUCOSE BLDC GLUCOMTR-MCNC: 150 MG/DL (ref 70–99)
GLUCOSE BLDC GLUCOMTR-MCNC: 163 MG/DL (ref 70–99)
GLUCOSE BLDC GLUCOMTR-MCNC: 183 MG/DL (ref 70–99)
GLUCOSE SERPL-MCNC: 141 MG/DL (ref 65–99)
HCT VFR BLD AUTO: 36.8 % (ref 37.5–51)
HGB BLD-MCNC: 11.7 G/DL (ref 13–17.7)
LAB AP CASE REPORT: NORMAL
LAB AP CLINICAL INFORMATION: NORMAL
MAGNESIUM SERPL-MCNC: 1.8 MG/DL (ref 1.6–2.6)
MCH RBC QN AUTO: 29.8 PG (ref 26.6–33)
MCHC RBC AUTO-ENTMCNC: 31.8 G/DL (ref 31.5–35.7)
MCV RBC AUTO: 93.6 FL (ref 79–97)
PATH REPORT.FINAL DX SPEC: NORMAL
PATH REPORT.GROSS SPEC: NORMAL
PHOSPHATE SERPL-MCNC: 3.4 MG/DL (ref 2.5–4.5)
PLATELET # BLD AUTO: 368 10*3/MM3 (ref 140–450)
PMV BLD AUTO: 9.3 FL (ref 6–12)
POTASSIUM SERPL-SCNC: 3.8 MMOL/L (ref 3.5–5.2)
RBC # BLD AUTO: 3.93 10*6/MM3 (ref 4.14–5.8)
SODIUM SERPL-SCNC: 139 MMOL/L (ref 136–145)
WBC NRBC COR # BLD AUTO: 8.56 10*3/MM3 (ref 3.4–10.8)

## 2024-05-23 PROCEDURE — 84100 ASSAY OF PHOSPHORUS: CPT | Performed by: INTERNAL MEDICINE

## 2024-05-23 PROCEDURE — 25010000002 VANCOMYCIN 5 G RECONSTITUTED SOLUTION: Performed by: PODIATRIST

## 2024-05-23 PROCEDURE — 25010000002 PIPERACILLIN SOD-TAZOBACTAM PER 1 G: Performed by: PODIATRIST

## 2024-05-23 PROCEDURE — 63710000001 INSULIN GLARGINE PER 5 UNITS: Performed by: INTERNAL MEDICINE

## 2024-05-23 PROCEDURE — 83735 ASSAY OF MAGNESIUM: CPT | Performed by: INTERNAL MEDICINE

## 2024-05-23 PROCEDURE — 99232 SBSQ HOSP IP/OBS MODERATE 35: CPT | Performed by: STUDENT IN AN ORGANIZED HEALTH CARE EDUCATION/TRAINING PROGRAM

## 2024-05-23 PROCEDURE — 82948 REAGENT STRIP/BLOOD GLUCOSE: CPT

## 2024-05-23 PROCEDURE — 63710000001 INSULIN LISPRO (HUMAN) PER 5 UNITS: Performed by: INTERNAL MEDICINE

## 2024-05-23 PROCEDURE — 25010000002 ENOXAPARIN PER 10 MG: Performed by: PODIATRIST

## 2024-05-23 PROCEDURE — 85027 COMPLETE CBC AUTOMATED: CPT | Performed by: INTERNAL MEDICINE

## 2024-05-23 PROCEDURE — 82948 REAGENT STRIP/BLOOD GLUCOSE: CPT | Performed by: INTERNAL MEDICINE

## 2024-05-23 PROCEDURE — 25810000003 SODIUM CHLORIDE 0.9 % SOLUTION: Performed by: PODIATRIST

## 2024-05-23 PROCEDURE — 80048 BASIC METABOLIC PNL TOTAL CA: CPT | Performed by: INTERNAL MEDICINE

## 2024-05-23 PROCEDURE — 99024 POSTOP FOLLOW-UP VISIT: CPT | Performed by: PODIATRIST

## 2024-05-23 RX ADMIN — ACETAMINOPHEN 1000 MG: 500 TABLET ORAL at 08:07

## 2024-05-23 RX ADMIN — Medication 10 ML: at 08:07

## 2024-05-23 RX ADMIN — DULOXETINE HYDROCHLORIDE 30 MG: 30 CAPSULE, DELAYED RELEASE ORAL at 08:07

## 2024-05-23 RX ADMIN — VANCOMYCIN HYDROCHLORIDE 1000 MG: 5 INJECTION, POWDER, LYOPHILIZED, FOR SOLUTION INTRAVENOUS at 01:03

## 2024-05-23 RX ADMIN — INSULIN LISPRO 2 UNITS: 100 INJECTION, SOLUTION INTRAVENOUS; SUBCUTANEOUS at 08:06

## 2024-05-23 RX ADMIN — METOPROLOL TARTRATE 50 MG: 50 TABLET, FILM COATED ORAL at 08:07

## 2024-05-23 RX ADMIN — INSULIN LISPRO 2 UNITS: 100 INJECTION, SOLUTION INTRAVENOUS; SUBCUTANEOUS at 12:41

## 2024-05-23 RX ADMIN — PIPERACILLIN SODIUM AND TAZOBACTAM SODIUM 3.38 G: 3; .375 INJECTION, POWDER, LYOPHILIZED, FOR SOLUTION INTRAVENOUS at 20:33

## 2024-05-23 RX ADMIN — INSULIN GLARGINE 20 UNITS: 100 INJECTION, SOLUTION SUBCUTANEOUS at 08:06

## 2024-05-23 RX ADMIN — ASPIRIN 81 MG: 81 TABLET, CHEWABLE ORAL at 08:07

## 2024-05-23 RX ADMIN — LISINOPRIL 20 MG: 20 TABLET ORAL at 08:07

## 2024-05-23 RX ADMIN — VANCOMYCIN HYDROCHLORIDE 1000 MG: 5 INJECTION, POWDER, LYOPHILIZED, FOR SOLUTION INTRAVENOUS at 22:30

## 2024-05-23 RX ADMIN — PIPERACILLIN SODIUM AND TAZOBACTAM SODIUM 3.38 G: 3; .375 INJECTION, POWDER, LYOPHILIZED, FOR SOLUTION INTRAVENOUS at 12:41

## 2024-05-23 RX ADMIN — PREGABALIN 225 MG: 75 CAPSULE ORAL at 20:53

## 2024-05-23 RX ADMIN — ENOXAPARIN SODIUM 40 MG: 100 INJECTION SUBCUTANEOUS at 08:06

## 2024-05-23 RX ADMIN — ACETAMINOPHEN 1000 MG: 500 TABLET ORAL at 15:28

## 2024-05-23 RX ADMIN — ACETAMINOPHEN 1000 MG: 500 TABLET ORAL at 20:52

## 2024-05-23 RX ADMIN — PIPERACILLIN SODIUM AND TAZOBACTAM SODIUM 3.38 G: 3; .375 INJECTION, POWDER, LYOPHILIZED, FOR SOLUTION INTRAVENOUS at 05:05

## 2024-05-23 RX ADMIN — HYDROCHLOROTHIAZIDE 12.5 MG: 12.5 TABLET ORAL at 08:07

## 2024-05-23 RX ADMIN — Medication 10 ML: at 20:34

## 2024-05-23 RX ADMIN — PREGABALIN 225 MG: 75 CAPSULE ORAL at 08:07

## 2024-05-23 RX ADMIN — INSULIN GLARGINE 20 UNITS: 100 INJECTION, SOLUTION SUBCUTANEOUS at 20:52

## 2024-05-23 RX ADMIN — VANCOMYCIN HYDROCHLORIDE 1000 MG: 5 INJECTION, POWDER, LYOPHILIZED, FOR SOLUTION INTRAVENOUS at 10:05

## 2024-05-23 RX ADMIN — CLOPIDOGREL BISULFATE 75 MG: 75 TABLET ORAL at 08:07

## 2024-05-23 NOTE — PLAN OF CARE
Goal Outcome Evaluation:   VSS. Patient had no complaints or acute events during shift. Dressing changed during assessment by podiatry per pt. Blood sugars 119-183 during shift

## 2024-05-23 NOTE — TELEPHONE ENCOUNTER
General surgery called and stated patient missed his consultation for colonoscopy due to being in hospital.

## 2024-05-23 NOTE — PROGRESS NOTES
[facility] Clinical Pharmacy Services: Vancomycin Monitoring Note    Jd Velazquez is a 58 y.o. male who is on day  of pharmacy to dose vancomycin for Bone and/or Joint Infection s/p second ray amputation and I&D of the right foot.    Previous Vancomycin Dose:   1000 mg IV every  12  hours  Imaging Reviewed?: Yes   MRI right foot: s/p amputation of the first digit at the distal first metatarsal; findings of OM at the remaining distal first metatarsal and surrounding the second MTP joint where there are findings of joint infection; findings of SSTI surrounding wound, findings of muscle denervation and/or myositis; two small abscesses in the soft tissues which may drain to the sinus tract     Updated Cultures and Sensitivities:    Tissue path exam: right toe, clean margin: negative for OM   Tissue/Bone Culture, right toe: few GPC in pairs and chains on gram stain, culture in progress   Blood cx : NGTD  Previous admissions:    Wound cx, right foot: scant growth E. Faecalis   Anaerobic cx, right foot: B. Fragilis   Wound cx, right foot: rare E. Faecalis  4/10 Anaerobic cx, right toe tissue: mixed anaerobes with B. Frag and Prevotela spp  4/10 Wound cx, right toe: light growth Citrobacter freundii, Providencia rettgeri    Vitals/Labs  Ht:  ; Wt: 108 kg (238 lb 8.6 oz)   Temp (24hrs), Av.7 °F (36.5 °C), Min:97.3 °F (36.3 °C), Max:98.4 °F (36.9 °C)   Estimated Creatinine Clearance: 79.6 mL/min (A) (by C-G formula based on SCr of 1.28 mg/dL (H)).     Results from last 7 days   Lab Units 24  0502 24  0525 24  0518 24  0532 24  1304   VANCOMYCIN RM mcg/mL  --  21.15 35.70  --   --    VANCOMYCIN TR mcg/mL  --   --   --   --  10.40   CREATININE mg/dL 1.28* 1.39* 0.87   < >  --    WBC 10*3/mm3 10.92* 7.55 8.50   < >  --     < > = values in this interval not displayed.     Assessment/Plan    Current Vancomycin Dose:  1000 mg IV every 12 hours; which  provides the following predicted parameters:  AUC24,ss: 511 mg/L.hr  PAUC*: 100 %  Ctrough,ss: 16.4 mg/L  Pconc*: 8 %  Tox.: 12 %  We will continue to monitor patient changes and renal function     Thank you for involving pharmacy in this patient's care. Please contact pharmacy with any questions or concerns.    Tiara Smith RPH  Clinical Pharmacist

## 2024-05-23 NOTE — CONSULTS
Met with patient at bedside. Patient hoping to be discharged tomorrow. Up to chair. No needs or questions at this time.     Recommend on discharge:  Rx for Dexcom G7 Sensors (pended)

## 2024-05-23 NOTE — PLAN OF CARE
Goal Outcome Evaluation:  Plan of Care Reviewed With: patient              Pt is alert and orient x4.  Pt denies any pain/discomfort.  Insulin adm as ordered.  VS Wnl for pt.

## 2024-05-23 NOTE — PROGRESS NOTES
[facility]   Hospitalist Progress Note  Date: 2024  Patient Name: Jd Velazquez  : 1965  MRN: [id number]  Date of admission: 2024  Room/Bed: [facility]      Subjective   Subjective     Chief Complaint: Right foot osteo versus abscess leading to direct admission     Summary:Jd Velazquez is a 58 y.o. male with CAD, type 2 diabetes mellitus, dyslipidemia, hyperkalemia, peripheral vascular disease and obesity (BMI: 32.36) that presented with worsening right foot wound s/p amputation of great toe in 2024. Podiatry consulted to assist in care. Broad-spectrum antibiotics started. MRI imaging obtained that demonstrated evidence of osteomyelitis involving distal first metatarsal and surrounding second MTP joint as well as 2 small abscesses in the soft tissue. Patient taken the OR for second ray amputation of the right foot; incision and drainage of right foot on 2024.  Podiatry following the patient.    Interval Followup: No acute events overnight.  Patient is sitting in the chair, not in acute distress.  Denies any fever, chills, rigors, chest pain or difficulty breathing.  Pain well-controlled with as needed pain medications.    Review of Systems    All systems reviewed and negative except for what is outlined above.      Objective   Objective     Vitals:   Temp:  [97.9 °F (36.6 °C)-98.2 °F (36.8 °C)] 97.9 °F (36.6 °C)  Heart Rate:  [51-66] 58  Resp:  [18] 18  BP: (124-146)/(60-68) 131/64  Flow (L/min):  [0] 0    Physical Exam   General: Awake, alert, NAD  Skin: dressing present over surgical site.  Clean.  Neuro: Alert, awake, oriented x 3; speech clear; no tremor      Result Review    Result Review:  I have personally reviewed these results:  [x]  Laboratory      Lab 24  0703 24  0502 24  0525 24  0532 24  0555 24  1236 24  1233 24  1233 24  1232   WBC 8.56 10.92* 7.55   < > 6.13  --    < > 8.76  --    HEMOGLOBIN 11.7*  11.2* 11.9*   < > 11.3*  --    < > 11.5*  --    HEMATOCRIT 36.8* 36.1* 38.1   < > 35.5*  --    < > 36.2*  --    PLATELETS 368 383 412   < > 393  --    < > 413  --    NEUTROS ABS  --   --   --   --  2.52  --   --   --   --    IMMATURE GRANS (ABS)  --   --   --   --  0.06*  --   --   --   --    LYMPHS ABS  --   --   --   --  2.49  --   --   --   --    MONOS ABS  --   --   --   --  0.88  --   --   --   --    EOS ABS  --   --   --   --  0.12  --   --   --   --    MCV 93.6 93.8 93.2   < > 91.3  --    < > 91.9  --    SED RATE  --   --   --   --   --   --   --  26*  --    CRP  --   --   --   --   --  2.99*  --   --   --    PROCALCITONIN  --   --   --   --   --  0.06  --   --   --    LACTATE  --   --   --   --   --   --   --   --  1.8    < > = values in this interval not displayed.         Lab 05/23/24  0703 05/22/24  0502 05/21/24  0525 05/20/24  0518   SODIUM 139 134* 136 136   POTASSIUM 3.8 4.3 4.2 4.4   CHLORIDE 104 99 101 99   CO2 25.1 24.8 24.3 25.8   ANION GAP 9.9 10.2 10.7 11.2   BUN 19 22* 24* 13   CREATININE 1.25 1.28* 1.39* 0.87   EGFR 67.2 65.3 59.1* 100.6   GLUCOSE 141* 249* 161* 133*   CALCIUM 9.2 9.1 9.1 9.3   MAGNESIUM 1.8 1.9 2.1 1.7   PHOSPHORUS 3.4  --  4.2 4.1         Lab 05/18/24  0555 05/17/24  1236   TOTAL PROTEIN 7.1 7.3   ALBUMIN 3.5 3.7   GLOBULIN 3.6 3.6   ALT (SGPT) 5 5   AST (SGOT) 7 5   BILIRUBIN 0.3 0.3   ALK PHOS 107 107                     Brief Urine Lab Results  (Last result in the past 365 days)        Color   Clarity   Blood   Leuk Est   Nitrite   Protein   CREAT   Urine HCG        03/04/24 0801             22.2         03/04/24 0801 Yellow   Clear   Negative   Negative   Negative   Negative                 [x]  Microbiology   Microbiology Results (last 10 days)       Procedure Component Value - Date/Time    Tissue / Bone Culture - Bone, Toe, Right [[id number]] Collected: 05/21/24 1228    Lab Status: Preliminary result Specimen: Bone from Toe, Right Updated: 05/22/24 0819     Tissue  Culture Culture in progress     Gram Stain Few (2+) WBCs seen      Few (2+) Gram positive cocci in pairs and chains    Blood Culture - Blood, Arm, Right [[id number]]  (Normal) Collected: 05/17/24 1415    Lab Status: Final result Specimen: Blood from Arm, Right Updated: 05/22/24 1430     Blood Culture No growth at 5 days    Blood Culture - Blood, Arm, Left [[id number]]  (Normal) Collected: 05/17/24 1415    Lab Status: Final result Specimen: Blood from Arm, Left Updated: 05/22/24 1430     Blood Culture No growth at 5 days    Wound Culture - Wound, Foot, Right [[id number]]  (Abnormal)  (Susceptibility) Collected: 05/13/24 1000    Lab Status: Final result Specimen: Wound from Foot, Right Updated: 05/15/24 0743     Wound Culture Scant growth (1+) Enterococcus faecalis     Gram Stain Few (2+) WBCs seen      Few (2+) Gram positive cocci in pairs    Susceptibility        Enterococcus faecalis      BETH      Ampicillin Susceptible      Vancomycin Susceptible                                 [x]  Radiology  XR Foot 3+ View Right    Result Date: 5/22/2024  1. Postsurgical changes of recent transmetatarsal amputation of the first and second digits. 2. Extensive degenerative hindfoot and midfoot arthropathy.  Electronically Signed By-David Petty MD On:5/22/2024 1:03 PM      MRI Foot Right With & Without Contrast    Result Date: 5/20/2024  Impression: 1.  Status post amputation of the first digit at the distal first metatarsal. 2.  Wound at the medial forefoot with edema and enhancement surrounding a sinus tract extending to the distal first metatarsal and second MTP joint. 3.  Findings of osteomyelitis at the remaining distal first metatarsal and surrounding the second MTP joint where there are findings of joint infection. 4.  Findings of skin and soft tissue infection surrounding wound. Findings of muscle denervation and/or myositis. 5.  Two small abscesses in the soft tissues which may drain to the sinus tract.    Electronically Signed By-Hunter Steele MD On:5/20/2024 9:37 AM     []  EKG/Telemetry   []  Cardiology/Vascular   []  Pathology  []  Old records  []  Other:    Assessment & Plan   Assessment / Plan     Assessment:  Right foot diabetic ulcer with concern for osteomyelitis  Type 2 diabetes mellitus with hyperglycemia  Peripheral vascular disease  Hypomagnesemia  Acute renal failure (creatinine elevated to 1.39 from 0.87 in 24 hours)  CAD  Obesity (BMI: 33.36)      Plan:  Patient currently being managed in medicine service.  Underwent second ray amputation of right foot along with incision and drainage on 5/21.  Tolerated the procedure well.  Currently on IV Zosyn and vancomycin.  Plan to de-escalate depending upon the tissue/bone culture.  Wound culture prior to surgery showed scant Enterococcus faecalis susceptible to ampicillin/vancomycin.  Podiatry following the patient, appreciate input.  Suggested no further surgical intervention planned at this time.  Recommended nonweightbearing to right lower extremity.  PT/OT evaluated the patient, patient and his daughter would like to go home with home health.   aware.  Continue aspirin and Plavix.  On insulin regimen for diabetes, blood glucose level within appropriate range.  Continue current regimen.  Continue rest of the current ongoing medications.  Will obtain labs in AM.  If stable, likely discharge in next 24 hours.    DVT prophylaxis:  Medical DVT prophylaxis orders are present.        CODE STATUS:   Level Of Support Discussed With: Patient  Code Status (Patient has no pulse and is not breathing): CPR (Attempt to Resuscitate)  Medical Interventions (Patient has pulse or is breathing): Full Support      Electronically signed by Chaitanya Sapp MD, 05/23/24, 8:20 AM EDT.

## 2024-05-23 NOTE — PROGRESS NOTES
[facility] - PODIATRY    Today's Date: 05/23/24    Patient Name: Jd Velazquez  MRN: [id number]  CSN: [id number]  PCP: Dacia Newsome APRN  Referring Provider: Mario Bowman MD  Attending Provider: Chaitanya Sapp MD  Length of Stay: 6    SUBJECTIVE   Chief Complaint:     HPI: Jd Velazquez, a 58 y.o.male, .  Denies any constitutional symptoms. No other pedal complaints at this time.    Past Medical History:   Diagnosis Date    Allergic rhinitis 01/12/2015    Anesthesia     DIFFICULTY WAKING UP POST SURGERY    CAD (coronary artery disease)     DENIES CP/SOA.    Diabetes mellitus     Hyperlipidemia     Hypertension     Hypothyroidism 04/24/2014    Insomnia, unspecified 06/15/2016    WITHOUT BIPAP    Microalbuminuria due to type 2 diabetes mellitus 10/15/2015    Mixed hyperlipidemia 10/15/2015    Myocardial infarction     Obesity     BMI 32    SHAHLA (obstructive sleep apnea)     WEARS BIPAP    Right great toe amputee     4/2024; 5/2024 ADMITTED WITH INFECTION    Skin cancer     REMOVED    Sleep apnea      Past Surgical History:   Procedure Laterality Date    AMPUTATION DIGIT Right 4/10/2024    Procedure: AMPUTATION DIGIT RIGHT GREAT TOE;  Surgeon: Balwinder Costa DPM;  Location: [facility] MAIN OR;  Service: Podiatry;  Laterality: Right;    AMPUTATION DIGIT Right 5/21/2024    Procedure: SECOND RAY AMPUTATION RIGHT FOOT, INCISION AND DRAINAGE RIGHT FOOT;  Surgeon: Balwinder Costa DPM;  Location: [facility] MAIN OR;  Service: Podiatry;  Laterality: Right;    ANKLE ARTHROSCOPY W/ OPEN REPAIR      APPENDECTOMY      CARDIAC CATHETERIZATION  2014    PCI to circumflex    CARDIAC CATHETERIZATION Right 10/26/2023    Procedure: Aortogram with right leg angiogram, possible angioplasty or stenting;  Surgeon: Robert Puga MD;  Location: [facility] CATH INVASIVE LOCATION;  Service: Vascular;  Laterality: Right;    CARDIAC CATHETERIZATION Right 12/15/2023    Procedure: Right leg angiogram,  possible angioplasty or stenting;  Surgeon: Robert Puga MD;  Location: [facility] CATH INVASIVE LOCATION;  Service: Vascular;  Laterality: Right;    CORONARY ARTERY BYPASS GRAFT N/A 10/08/2020    Procedure: STERNOTOMY, CORONARY ARTERY BYPASS GRAFTING TIME 4 WITH LEFT KELSI  AND ENDOSCOPICALLY HARVESTED LEFT GREATER SAPHENOUS VEIN AND PRP.;  Surgeon: Jr Girma Chiu MD;  Location: [facility] MAIN OR;  Service: Cardiothoracic;  Laterality: N/A;    FEMORAL TIBIAL BYPASS Right 01/09/2024    Procedure: Right femoral-tibial bypass graft;  Surgeon: Robert Puga MD;  Location: [facility] OR;  Service: Vascular;  Laterality: Right;    HEEL SPUR SURGERY      HERNIA REPAIR      INCISION AND DRAINAGE OF WOUND Right 4/30/2024    Procedure: INCISION AND DRAINAGE WOUND RIGHT FOOT, DEBRIDEMENT OF WOUND RIGHT FOOT, AMPUTATION REVISION RIGHT FOOT;  Surgeon: Balwinder Costa DPM;  Location: [facility] OR;  Service: Podiatry;  Laterality: Right;    KNEE ARTHROSCOPY      MULTIPLE TIMES ON BOTH KNEES    SKIN CANCER EXCISION      BACK    TOE SURGERY Right     DEBRIDMENT RIGHT GREAT TOE    TONSILLECTOMY       Family History   Adopted: Yes   Problem Relation Age of Onset    Heart disease Other      Social History     Socioeconomic History    Marital status:    Tobacco Use    Smoking status: Every Day     Current packs/day: 0.25     Average packs/day: 0.3 packs/day for 15.0 years (3.8 ttl pk-yrs)     Types: Cigarettes     Passive exposure: Never    Smokeless tobacco: Never   Vaping Use    Vaping status: Never Used   Substance and Sexual Activity    Alcohol use: Not Currently    Drug use: Never    Sexual activity: Defer     Allergies   Allergen Reactions    Strawberry Itching    Lortab [Hydrocodone-Acetaminophen] Itching     Current Facility-Administered Medications   Medication Dose Route Frequency Provider Last Rate Last Admin    acetaminophen (TYLENOL) tablet 1,000 mg  1,000 mg Oral TID Art Stevenson MD   1,000 mg at  05/23/24 0807    aspirin chewable tablet 81 mg  81 mg Oral Daily Balwinder Costa, DPM   81 mg at 05/23/24 0807    clopidogrel (PLAVIX) tablet 75 mg  75 mg Oral Daily Balwinder Costa, DPM   75 mg at 05/23/24 0807    dextrose (D50W) (25 g/50 mL) IV injection 25 g  25 g Intravenous Q15 Min PRN Balwinder Costa, DPM        dextrose (GLUTOSE) oral gel 15 g  15 g Oral Q15 Min PRN Balwinder Costa, DPM        DULoxetine (CYMBALTA) DR capsule 30 mg  30 mg Oral Daily Balwinder Costa, DPM   30 mg at 05/23/24 0807    Enoxaparin Sodium (LOVENOX) syringe 40 mg  40 mg Subcutaneous Daily Balwinder Costa, DPM   40 mg at 05/23/24 0806    glucagon (GLUCAGEN) injection 1 mg  1 mg Intramuscular Q15 Min PRN Balwinder Costa DPM        lisinopril (PRINIVIL,ZESTRIL) tablet 20 mg  20 mg Oral Q24H Balwinder Costa, DPM   20 mg at 05/23/24 0807    And    hydroCHLOROthiazide tablet 12.5 mg  12.5 mg Oral Q24H Balwinder Costa DPM   12.5 mg at 05/23/24 0807    insulin glargine (LANTUS, SEMGLEE) injection 20 Units  20 Units Subcutaneous BID Art Stevenson MD   20 Units at 05/23/24 0806    Insulin Lispro (humaLOG) injection 2-7 Units  2-7 Units Subcutaneous TID With Meals Art Stevenson MD   2 Units at 05/23/24 1241    metoprolol tartrate (LOPRESSOR) tablet 50 mg  50 mg Oral Q12H Art Stevenson MD   50 mg at 05/23/24 0807    morphine injection 2 mg  2 mg Intravenous Q3H PRN Art Stevenson MD        oxyCODONE (ROXICODONE) immediate release tablet 10 mg  10 mg Oral Q4H PRN Art Stevenson MD   10 mg at 05/21/24 1441    oxyCODONE (ROXICODONE) immediate release tablet 5 mg  5 mg Oral Q4H PRN Art Stevenson MD        Pharmacy to dose vancomycin   Does not apply Continuous PRN Balwinder Costa DPM        Pharmacy to Dose Zosyn   Does not apply Continuous PRN Balwinder Costa DPM        piperacillin-tazobactam (ZOSYN) IVPB 3.375 g IVPB in 100 mL NS (VTB)  3.375 g Intravenous Q8H Balwinder Costa DPM   3.375 g at  05/23/24 1241    pregabalin (LYRICA) capsule 225 mg  225 mg Oral BID Rotterman, Balwinder T, DPM   225 mg at 05/23/24 0807    sodium chloride 0.9 % flush 10 mL  10 mL Intravenous Q12H Rotterman, Balwinder T, DPM   10 mL at 05/23/24 0807    sodium chloride 0.9 % flush 10 mL  10 mL Intravenous PRN Rotterman, Balwinder T, DPM        sodium chloride 0.9 % infusion 40 mL  40 mL Intravenous PRN Rotterman, Balwinder T, DPM        vancomycin 1000 mg/250 mL 0.9% NS IVPB ([facility])  1,000 mg Intravenous Q12H Rotterman, Balwinder T, DPM   1,000 mg at 05/23/24 1005    zolpidem (AMBIEN) tablet 10 mg  10 mg Oral Nightly PRN Rotterman, Balwinder T, DPM   10 mg at 05/22/24 2311     Review of Systems   All other systems reviewed and are negative.      OBJECTIVE     Vitals:    05/23/24 1140   BP: 141/69   Pulse: 54   Resp: 18   Temp: 98.1 °F (36.7 °C)   SpO2: 97%       PHYSICAL EXAM    GEN:   A&Ox3, NAD.     Well coapted incision to right foot with sutures intact.  No erythema no malodor no drainage.    RADIOLOGY/NUCLEAR:  XR Foot 3+ View Right    Result Date: 5/22/2024  Narrative: XR FOOT 3+ VW RIGHT-  Date of Exam: 5/22/2024 12:50 PM  Indication: amputation right foot; M86.471-Chronic osteomyelitis with draining sinus, right ankle and foot; L97.513-Non-pressure chronic ulcer of other part of right foot with necrosis of muscle; L02.611-Cutaneous abscess of right foot.  Comparison: None available  Technique: Three views of the right foot were obtained.  FINDINGS: There are postsurgical changes of transmetatarsal amputation of the first and second digits. There is gas within the soft tissues compatible with recent surgery. There is multifocal degenerative midfoot arthropathy and hindfoot arthropathy at the talonavicular, navicular cuneiform and cuneiform metatarsal joints. There is calcification/ossification of the plantar fascia.      Impression: 1. Postsurgical changes of recent transmetatarsal amputation of the first and second digits. 2. Extensive  degenerative hindfoot and midfoot arthropathy.  Electronically Signed By-David Petty MD On:5/22/2024 1:03 PM      MRI Foot Right With & Without Contrast    Result Date: 5/20/2024  Narrative:  MRI FOOT RIGHT W WO CONTRAST-  Date of Exam: 5/18/2024 7:10 AM  Indication: osteo and foot abscess.  Comparison: None available.  Technique:  Routine multiplanar/multisequence sequence images of the right foot were obtained before and after the uneventful administration of 20 mL MultiHance.    Findings: There has been resection of the first digit at the distal first metatarsal. There is an overlying wound at the medial forefoot. There appears to be surrounding heterogeneous edema signal and enhancement. There is a nonenhancing sinus tract extending from the wound to the distal first metatarsal and to the second MTP joint. This can be seen on series 11 images 21-27. There are some foci of T1 hypointense signal in the soft tissues surrounding the tract which could represent postsurgical changes versus small foci of soft tissue gas.  In the superficial subcutaneous tissues, just posterior to the wound on series 11 images 19 and 20, there is a small rim-enhancing collection measuring approximately 12 x 14 x 7 mm. This is suspicious for a small abscess and may communicate to the sinus tract. There is also an irregular T2 hyperintense signal focus with peripheral enhancement extending anteriorly from the tract in the soft tissues distal to the remaining first metatarsal as seen on series 6 image 12 and series 8 image 27 measuring approximately 9 x 14 x 15 mm which may also represent a small abscess, possibly communicating to the sinus tract.  There is T2 hyperintense and T1 hypointense signal at the remaining distal first metatarsal adjacent to the wound consistent with osteomyelitis. There is also T2 hyperintense edema signal and corresponding T1 hypointense signal at the second metatarsal head and second proximal phalanx  surrounding the second MTP joint consistent with osteomyelitis. The wound extends to the second MTP joint where there is para-articular enhancement consistent with joint infection.  No other definite findings of osteomyelitis or joint infection.  There appear to be findings of advanced arthropathy in the midfoot with prominent osteophytosis. Lisfranc alignment appears grossly intact. Lisfranc ligament appears grossly intact.  There is advanced muscle atrophy with diffuse edema signal and enhancement. This could be associated with denervation or myositis. There is a small amount of fluid signal and enhancement along the extensor digitorum tendons which may indicate infectious tenosynovitis.       Impression: Impression: 1.  Status post amputation of the first digit at the distal first metatarsal. 2.  Wound at the medial forefoot with edema and enhancement surrounding a sinus tract extending to the distal first metatarsal and second MTP joint. 3.  Findings of osteomyelitis at the remaining distal first metatarsal and surrounding the second MTP joint where there are findings of joint infection. 4.  Findings of skin and soft tissue infection surrounding wound. Findings of muscle denervation and/or myositis. 5.  Two small abscesses in the soft tissues which may drain to the sinus tract.   Electronically Signed By-Hunter Steele MD On:5/20/2024 9:37 AM       LABORATORY/CULTURE RESULTS:  Results from last 7 days   Lab Units 05/23/24  0703 05/22/24  0502 05/21/24  0525   WBC 10*3/mm3 8.56 10.92* 7.55   HEMOGLOBIN g/dL 11.7* 11.2* 11.9*   HEMATOCRIT % 36.8* 36.1* 38.1   PLATELETS 10*3/mm3 368 383 412     Results from last 7 days   Lab Units 05/23/24  0703 05/22/24  0502 05/21/24  0525 05/19/24  0532 05/18/24  0555 05/17/24  1236   SODIUM mmol/L 139 134* 136   < > 136 135*   POTASSIUM mmol/L 3.8 4.3 4.2   < > 4.1 4.4   CHLORIDE mmol/L 104 99 101   < > 101 99   CO2 mmol/L 25.1 24.8 24.3   < > 23.9 24.8   BUN mg/dL 19 22* 24*    < > 9 11   CREATININE mg/dL 1.25 1.28* 1.39*   < > 0.75* 0.84   CALCIUM mg/dL 9.2 9.1 9.1   < > 8.7 9.4   BILIRUBIN mg/dL  --   --   --   --  0.3 0.3   ALK PHOS U/L  --   --   --   --  107 107   ALT (SGPT) U/L  --   --   --   --  5 5   AST (SGOT) U/L  --   --   --   --  7 5   GLUCOSE mg/dL 141* 249* 161*   < > 175* 229*    < > = values in this interval not displayed.         Microbiology Results (last 10 days)       Procedure Component Value - Date/Time    Tissue / Bone Culture - Bone, Toe, Right [[id number]] Collected: 05/21/24 1228    Lab Status: Preliminary result Specimen: Bone from Toe, Right Updated: 05/22/24 0819     Tissue Culture Culture in progress     Gram Stain Few (2+) WBCs seen      Few (2+) Gram positive cocci in pairs and chains    Blood Culture - Blood, Arm, Right [[id number]]  (Normal) Collected: 05/17/24 1415    Lab Status: Final result Specimen: Blood from Arm, Right Updated: 05/22/24 1430     Blood Culture No growth at 5 days    Blood Culture - Blood, Arm, Left [[id number]]  (Normal) Collected: 05/17/24 1415    Lab Status: Final result Specimen: Blood from Arm, Left Updated: 05/22/24 1430     Blood Culture No growth at 5 days             ASSESSMENT/PLAN     Active Hospital Problems:  Active Hospital Problems    Diagnosis     **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     Chronic osteomyelitis of right foot with draining sinus          Patient examined and evaluated  Follow cultures, continue IV antibiotics  No further surgical intervention planned at this time  Daily dressing changes to right foot  Nonweightbearing to right lower extremity      Discussed findings and treatment plan including risks, benefits, and treatment options with patient in detail. Patient agreed with treatment plan.      This document has been electronically signed by Balwinder Costa DPM on May 23, 2024 12:52 EDT

## 2024-05-24 ENCOUNTER — READMISSION MANAGEMENT (OUTPATIENT)
Dept: CALL CENTER | Facility: HOSPITAL | Age: 59
End: 2024-05-24
Payer: COMMERCIAL

## 2024-05-24 VITALS
TEMPERATURE: 98.1 F | BODY MASS INDEX: 33.18 KG/M2 | SYSTOLIC BLOOD PRESSURE: 130 MMHG | OXYGEN SATURATION: 97 % | WEIGHT: 237.88 LBS | DIASTOLIC BLOOD PRESSURE: 62 MMHG | RESPIRATION RATE: 18 BRPM | HEART RATE: 51 BPM

## 2024-05-24 LAB
ANION GAP SERPL CALCULATED.3IONS-SCNC: 10.2 MMOL/L (ref 5–15)
BACTERIA SPEC AEROBE CULT: ABNORMAL
BASOPHILS # BLD AUTO: 0.09 10*3/MM3 (ref 0–0.2)
BASOPHILS NFR BLD AUTO: 1.1 % (ref 0–1.5)
BUN SERPL-MCNC: 16 MG/DL (ref 6–20)
BUN/CREAT SERPL: 12.6 (ref 7–25)
CALCIUM SPEC-SCNC: 9.4 MG/DL (ref 8.6–10.5)
CHLORIDE SERPL-SCNC: 101 MMOL/L (ref 98–107)
CO2 SERPL-SCNC: 26.8 MMOL/L (ref 22–29)
CREAT SERPL-MCNC: 1.27 MG/DL (ref 0.76–1.27)
DEPRECATED RDW RBC AUTO: 51.9 FL (ref 37–54)
EGFRCR SERPLBLD CKD-EPI 2021: 65.9 ML/MIN/1.73
EOSINOPHIL # BLD AUTO: 0.3 10*3/MM3 (ref 0–0.4)
EOSINOPHIL NFR BLD AUTO: 3.7 % (ref 0.3–6.2)
ERYTHROCYTE [DISTWIDTH] IN BLOOD BY AUTOMATED COUNT: 15.1 % (ref 12.3–15.4)
GLUCOSE BLDC GLUCOMTR-MCNC: 133 MG/DL (ref 70–99)
GLUCOSE BLDC GLUCOMTR-MCNC: 136 MG/DL (ref 70–99)
GLUCOSE SERPL-MCNC: 131 MG/DL (ref 65–99)
GRAM STN SPEC: ABNORMAL
GRAM STN SPEC: ABNORMAL
HCT VFR BLD AUTO: 36.5 % (ref 37.5–51)
HGB BLD-MCNC: 11.5 G/DL (ref 13–17.7)
IMM GRANULOCYTES # BLD AUTO: 0.09 10*3/MM3 (ref 0–0.05)
IMM GRANULOCYTES NFR BLD AUTO: 1.1 % (ref 0–0.5)
LYMPHOCYTES # BLD AUTO: 2.61 10*3/MM3 (ref 0.7–3.1)
LYMPHOCYTES NFR BLD AUTO: 32 % (ref 19.6–45.3)
MAGNESIUM SERPL-MCNC: 1.8 MG/DL (ref 1.6–2.6)
MCH RBC QN AUTO: 29.4 PG (ref 26.6–33)
MCHC RBC AUTO-ENTMCNC: 31.5 G/DL (ref 31.5–35.7)
MCV RBC AUTO: 93.4 FL (ref 79–97)
MONOCYTES # BLD AUTO: 1.2 10*3/MM3 (ref 0.1–0.9)
MONOCYTES NFR BLD AUTO: 14.7 % (ref 5–12)
NEUTROPHILS NFR BLD AUTO: 3.87 10*3/MM3 (ref 1.7–7)
NEUTROPHILS NFR BLD AUTO: 47.4 % (ref 42.7–76)
NRBC BLD AUTO-RTO: 0 /100 WBC (ref 0–0.2)
PHOSPHATE SERPL-MCNC: 3.6 MG/DL (ref 2.5–4.5)
PLATELET # BLD AUTO: 353 10*3/MM3 (ref 140–450)
PMV BLD AUTO: 9.4 FL (ref 6–12)
POTASSIUM SERPL-SCNC: 3.8 MMOL/L (ref 3.5–5.2)
RBC # BLD AUTO: 3.91 10*6/MM3 (ref 4.14–5.8)
SODIUM SERPL-SCNC: 138 MMOL/L (ref 136–145)
WBC NRBC COR # BLD AUTO: 8.16 10*3/MM3 (ref 3.4–10.8)

## 2024-05-24 PROCEDURE — 84100 ASSAY OF PHOSPHORUS: CPT | Performed by: STUDENT IN AN ORGANIZED HEALTH CARE EDUCATION/TRAINING PROGRAM

## 2024-05-24 PROCEDURE — 83735 ASSAY OF MAGNESIUM: CPT | Performed by: STUDENT IN AN ORGANIZED HEALTH CARE EDUCATION/TRAINING PROGRAM

## 2024-05-24 PROCEDURE — 85025 COMPLETE CBC W/AUTO DIFF WBC: CPT | Performed by: STUDENT IN AN ORGANIZED HEALTH CARE EDUCATION/TRAINING PROGRAM

## 2024-05-24 PROCEDURE — 25010000002 PIPERACILLIN SOD-TAZOBACTAM PER 1 G: Performed by: PODIATRIST

## 2024-05-24 PROCEDURE — 25010000002 ENOXAPARIN PER 10 MG: Performed by: PODIATRIST

## 2024-05-24 PROCEDURE — 82948 REAGENT STRIP/BLOOD GLUCOSE: CPT

## 2024-05-24 PROCEDURE — 99239 HOSP IP/OBS DSCHRG MGMT >30: CPT | Performed by: STUDENT IN AN ORGANIZED HEALTH CARE EDUCATION/TRAINING PROGRAM

## 2024-05-24 PROCEDURE — 63710000001 INSULIN GLARGINE PER 5 UNITS: Performed by: INTERNAL MEDICINE

## 2024-05-24 PROCEDURE — 80048 BASIC METABOLIC PNL TOTAL CA: CPT | Performed by: STUDENT IN AN ORGANIZED HEALTH CARE EDUCATION/TRAINING PROGRAM

## 2024-05-24 RX ORDER — AMOXICILLIN AND CLAVULANATE POTASSIUM 875; 125 MG/1; MG/1
1 TABLET, FILM COATED ORAL 2 TIMES DAILY
Qty: 22 TABLET | Refills: 0 | Status: SHIPPED | OUTPATIENT
Start: 2024-05-24 | End: 2024-06-04

## 2024-05-24 RX ORDER — ACYCLOVIR 400 MG/1
1 TABLET ORAL
Qty: 3 EACH | Refills: 0 | Status: SHIPPED | OUTPATIENT
Start: 2024-05-24 | End: 2024-06-23

## 2024-05-24 RX ORDER — METOPROLOL TARTRATE 50 MG/1
50 TABLET, FILM COATED ORAL EVERY 12 HOURS
Qty: 60 TABLET | Refills: 0 | Status: SHIPPED | OUTPATIENT
Start: 2024-05-24 | End: 2024-06-23

## 2024-05-24 RX ORDER — OXYCODONE HYDROCHLORIDE 5 MG/1
5 TABLET ORAL EVERY 8 HOURS PRN
Qty: 9 TABLET | Refills: 0 | Status: SHIPPED | OUTPATIENT
Start: 2024-05-24 | End: 2024-05-27

## 2024-05-24 RX ADMIN — ACETAMINOPHEN 1000 MG: 500 TABLET ORAL at 09:33

## 2024-05-24 RX ADMIN — HYDROCHLOROTHIAZIDE 12.5 MG: 12.5 TABLET ORAL at 09:33

## 2024-05-24 RX ADMIN — INSULIN GLARGINE 20 UNITS: 100 INJECTION, SOLUTION SUBCUTANEOUS at 09:33

## 2024-05-24 RX ADMIN — PIPERACILLIN SODIUM AND TAZOBACTAM SODIUM 3.38 G: 3; .375 INJECTION, POWDER, LYOPHILIZED, FOR SOLUTION INTRAVENOUS at 04:28

## 2024-05-24 RX ADMIN — ASPIRIN 81 MG: 81 TABLET, CHEWABLE ORAL at 09:33

## 2024-05-24 RX ADMIN — CLOPIDOGREL BISULFATE 75 MG: 75 TABLET ORAL at 09:33

## 2024-05-24 RX ADMIN — METOPROLOL TARTRATE 50 MG: 50 TABLET, FILM COATED ORAL at 09:33

## 2024-05-24 RX ADMIN — LISINOPRIL 20 MG: 20 TABLET ORAL at 09:33

## 2024-05-24 RX ADMIN — ENOXAPARIN SODIUM 40 MG: 100 INJECTION SUBCUTANEOUS at 09:33

## 2024-05-24 RX ADMIN — PREGABALIN 225 MG: 75 CAPSULE ORAL at 09:32

## 2024-05-24 RX ADMIN — Medication 10 ML: at 09:33

## 2024-05-24 RX ADMIN — DULOXETINE HYDROCHLORIDE 30 MG: 30 CAPSULE, DELAYED RELEASE ORAL at 09:32

## 2024-05-24 NOTE — PAYOR COMM NOTE
"Jd Velazquez (58 y.o. Male)       Date of Birth   1965    Social Security Number       Address   [address] [place] [address]    Home Phone   [phone]    MRN   [id number]       Latter-day   Rastafarian    Marital Status                               Admission Date   5/17/24    Admission Type   Urgent    Admitting Provider   Art Stevenson MD    Attending Provider   Chaitanya Sapp MD    Department, Room/Bed   [address], 3019/1       Discharge Date       Discharge Disposition   Home-Health Care [facility] [place]    Discharge Destination                                 Attending Provider: Chaitanya Sapp MD    Allergies: Strawberry, Lortab [Hydrocodone-acetaminophen]    Isolation: None   Infection: None   Code Status: CPR    Ht: 180.3 cm (71\")   Wt: 108 kg (237 lb 14 oz)    Admission Cmt: None   Principal Problem: 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 [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]                   Active Insurance as of 5/17/2024       Primary Coverage       Payor Plan Insurance Group Employer/Plan Group    [email or web address] PPO [id number]       Payor Plan Address Payor Plan Phone Number Payor Plan Fax Number Effective Dates    [address] [phone]  1/1/2020 - None Entered    [place] [id number]         Subscriber Name Subscriber Birth Date Member ID       ARDEN VELAZQUEZ [birth year]1964 [id number]                     Emergency Contacts        (Rel.) Home Phone Work Phone Mobile Phone    ARDEN VELAZQUEZ (Spouse) [phone] -- [phone]    PARENTJOSELIN (Daughter) [phone] -- --              Vital Signs (last 3 days)       Date/Time Temp Temp src Pulse Resp BP Patient Position SpO2    05/24/24 1112 98.1 (36.7) Oral 51 18 130/62 Sitting 97    05/24/24 0738 98.1 (36.7) Oral 58 18 143/64 Sitting 96    05/24/24 0336 98.1 (36.7) Oral 55 18 142/63 Sitting 96    05/23/24 2250 98.2 (36.8) Oral 56 18 118/64 Sitting 97    05/23/24 1939 " 98.1 (36.7) Oral 52 18 131/66 Sitting 97    05/23/24 1521 98.2 (36.8) Oral 51 18 131/69 Lying 98    05/23/24 1140 98.1 (36.7) Oral 54 18 141/69 Lying 97    05/23/24 0748 97.9 (36.6) Oral 58 18 131/64 Lying 98    05/22/24 2315 98.1 (36.7) Oral 51 18 131/68 Lying 96    05/22/24 1936 98.1 (36.7) Oral 66 18 146/62 Sitting 96    05/22/24 1540 98 (36.7) Oral 58 18 124/61 Sitting 98    05/22/24 1148 98.2 (36.8) Oral 59 18 130/60 Sitting 98    05/22/24 0727 98 (36.7) Oral 56 18 135/67 Sitting 100    05/22/24 0318 97.3 (36.3) Oral 61 16 128/65 Sitting 94    05/21/24 2330 98.1 (36.7) Oral 75 16 120/69 Sitting 92    05/21/24 1927 97.3 (36.3) Oral 61 16 144/71 Sitting 96    05/21/24 1659 98.1 (36.7) Oral 56 16 115/64 Sitting 92    05/21/24 1555 97.7 (36.5) Oral 52 16 116/64 Sitting 93    05/21/24 1450 97.3 (36.3) Oral 48 16 138/66 Sitting --    05/21/24 1350 97.3 (36.3) Oral 51 16 122/65 Sitting 92    05/21/24 1324 -- -- 52 -- -- -- --    05/21/24 1319 97.4 (36.3) Temporal 50 16 116/53 -- 93    05/21/24 1314 -- -- 53 14 130/52 -- 92    05/21/24 1309 -- -- 51 16 130/53 -- 92    05/21/24 1304 -- -- 52 16 125/49 -- 91    05/21/24 1259 -- -- 46 16 111/48 -- 97    05/21/24 1254 -- -- 47 18 112/47 -- 97    05/21/24 1249 98.4 (36.9) Temporal 51 18 131/45 -- 96    05/21/24 1110 -- -- -- -- -- -- 96    05/21/24 1032 97.9 (36.6) Oral 48  16 119/60 Sitting 95    Pulse: RN notified and aware at 05/21/24 1032    05/21/24 0748 98.1 (36.7) Oral 57 16 121/64 Sitting 96    05/21/24 0403 98.1 (36.7) Oral 55 16 126/66 Sitting 97          Oxygen Therapy (last 3 days)       Date/Time SpO2 Device (Oxygen Therapy) Flow (L/min) Oxygen Concentration (%) ETCO2 (mmHg)    05/24/24 1112 97 -- -- -- --    05/24/24 0745 -- room air -- -- --    05/24/24 0738 96 -- -- -- --    05/24/24 0336 96 room air -- -- --    05/23/24 2250 97 room air -- -- --    05/23/24 1939 97 room air -- -- --    05/23/24 1905 -- room air -- -- --    05/23/24 1521 98 room air -- -- --     05/23/24 1140 97 room air -- -- --    05/23/24 0807 -- room air -- -- --    05/23/24 0748 98 room air -- -- --    05/22/24 2315 96 room air -- -- --    05/22/24 1942 -- room air 0 -- --    05/22/24 1936 96 room air -- -- --    05/22/24 1540 98 room air -- -- --    05/22/24 1148 98 room air -- -- --    05/22/24 0806 -- room air -- -- --    05/22/24 0727 100 room air -- -- --    05/22/24 0318 94 room air -- -- --    05/21/24 2330 92 room air -- -- --    05/21/24 1948 -- room air 0 -- --    05/21/24 1927 96 room air -- -- --    05/21/24 1659 92 room air -- -- --    05/21/24 1555 93 room air -- -- --    05/21/24 1350 92 room air -- -- --    05/21/24 1319 93 -- -- -- --    05/21/24 1314 92 -- -- -- --    05/21/24 1309 92 -- -- -- --    05/21/24 1304 91 room air -- -- --    05/21/24 1259 97 -- -- -- --    05/21/24 1254 97 -- 2 -- --    05/21/24 1249 96 simple face mask 4 -- --    05/21/24 1110 96 room air -- -- --    05/21/24 1032 95 room air -- -- --    05/21/24 0748 96 room air -- -- --    05/21/24 0403 97 room air -- -- --          Facility-Administered Medications as of 5/24/2024   Medication Dose Route Frequency Provider Last Rate Last Admin    acetaminophen (TYLENOL) tablet 1,000 mg  1,000 mg Oral TID Art Stevenson MD   1,000 mg at 05/24/24 0933    aspirin chewable tablet 81 mg  81 mg Oral Daily Balwinder Costa, DPM   81 mg at 05/24/24 0933    clopidogrel (PLAVIX) tablet 75 mg  75 mg Oral Daily Balwinder Costa, DPM   75 mg at 05/24/24 0933    dextrose (D50W) (25 g/50 mL) IV injection 25 g  25 g Intravenous Q15 Min PRN Balwinder Costa T, DPM        dextrose (GLUTOSE) oral gel 15 g  15 g Oral Q15 Min PRN Balwinder oCsta, DPM        DULoxetine (CYMBALTA) DR capsule 30 mg  30 mg Oral Daily Balwinder Costa, DPM   30 mg at 05/24/24 0932    Enoxaparin Sodium (LOVENOX) syringe 40 mg  40 mg Subcutaneous Daily Balwinder Costa, DPM   40 mg at 05/24/24 0933    [COMPLETED] gadobenate dimeglumine (MULTIHANCE)  injection 20 mL  20 mL Intravenous Once in imaging Art Stevenson MD   20 mL at 05/18/24 0739    glucagon (GLUCAGEN) injection 1 mg  1 mg Intramuscular Q15 Min PRN Balwinder Costa DPM        lisinopril (PRINIVIL,ZESTRIL) tablet 20 mg  20 mg Oral Q24H Balwinder Costa DPM   20 mg at 05/24/24 0933    And    hydroCHLOROthiazide tablet 12.5 mg  12.5 mg Oral Q24H Balwinder Costa DPM   12.5 mg at 05/24/24 0933    insulin glargine (LANTUS, SEMGLEE) injection 20 Units  20 Units Subcutaneous BID Art Stevenson MD   20 Units at 05/24/24 0933    Insulin Lispro (humaLOG) injection 2-7 Units  2-7 Units Subcutaneous TID With Meals Art Stevenson MD   2 Units at 05/23/24 1241    [COMPLETED] magnesium sulfate 2g/50 mL (PREMIX) infusion  2 g Intravenous Once Art Stevenson MD   2 g at 05/20/24 0846    [COMPLETED] magnesium sulfate 4g/100mL (PREMIX) infusion  4 g Intravenous Once Art Stevenson MD   4 g at 05/18/24 1725    metoprolol tartrate (LOPRESSOR) tablet 50 mg  50 mg Oral Q12H Art Stevenson MD   50 mg at 05/24/24 0933    morphine injection 2 mg  2 mg Intravenous Q3H PRN Art Stevenson MD        oxyCODONE (ROXICODONE) immediate release tablet 10 mg  10 mg Oral Q4H PRN Art Stevenson MD   10 mg at 05/21/24 1441    oxyCODONE (ROXICODONE) immediate release tablet 5 mg  5 mg Oral Q4H PRN Art Stevenson MD        Pharmacy to dose vancomycin   Does not apply Continuous PRN Balwinder Costa DPM        Pharmacy to Dose Zosyn   Does not apply Continuous PRN aBlwinder Costa DPM        [COMPLETED] piperacillin-tazobactam (ZOSYN) IVPB 3.375 g IVPB in 100 mL NS (VTB)  3.375 g Intravenous Once Joe Wing MD   3.375 g at 05/17/24 1539    piperacillin-tazobactam (ZOSYN) IVPB 3.375 g IVPB in 100 mL NS (VTB)  3.375 g Intravenous Q8H RotBalwinder mcduffie DPM   3.375 g at 05/24/24 0428    pregabalin (LYRICA) capsule 225 mg  225 mg Oral BID Balwinder Costa DPM   225 mg at 05/24/24 0932    sodium chloride 0.9 % flush  10 mL  10 mL Intravenous Q12H Rotterman, Balwinder T, DPM   10 mL at 05/24/24 0933    sodium chloride 0.9 % flush 10 mL  10 mL Intravenous PRN Rotterman, Balwinder T, DPM        sodium chloride 0.9 % infusion 40 mL  40 mL Intravenous PRN Rotterman, Balwinder T, DPM        [COMPLETED] vancomycin 1000 mg/250 mL 0.9% NS IVPB ([facility])  1,000 mg Intravenous Q12H Rotterman, Balwinder T, DPM   1,000 mg at 05/23/24 2230    [COMPLETED] vancomycin IVPB 2000 mg in 0.9% Sodium Chloride 500 mL  20 mg/kg Intravenous Once Joe Wing  mL/hr at 05/17/24 1540 2,000 mg at 05/17/24 1540    zolpidem (AMBIEN) tablet 10 mg  10 mg Oral Nightly PRN Rotterman, Balwinder T, DPM   10 mg at 05/22/24 2311     Lab Results (last 72 hours)       Procedure Component Value Units Date/Time    POC Glucose Once [[id number]]  (Abnormal) Collected: 05/24/24 1240    Specimen: Blood Updated: 05/24/24 1245     Glucose 136 mg/dL      Comment: Serial Number: [id number]:  [id number]       POC Glucose Once [[id number]]  (Abnormal) Collected: 05/24/24 0750    Specimen: Blood Updated: 05/24/24 0757     Glucose 133 mg/dL      Comment: Serial Number: [id number]:  [id number]       Tissue / Bone Culture - Bone, Toe, Right [[id number]]  (Abnormal)  (Susceptibility) Collected: 05/21/24 1228    Specimen: Bone from Toe, Right Updated: 05/24/24 0636     Tissue Culture Light growth (2+) Enterococcus faecalis     Gram Stain Few (2+) WBCs seen      Few (2+) Gram positive cocci in pairs and chains    Susceptibility        Enterococcus faecalis      BETH      Ampicillin Susceptible      Vancomycin Susceptible                           Magnesium [[id number]]  (Normal) Collected: 05/24/24 0541    Specimen: Blood from Arm, Right Updated: 05/24/24 0629     Magnesium 1.8 mg/dL     Basic Metabolic Panel [[id number]]  (Abnormal) Collected: 05/24/24 0541    Specimen: Blood from Arm, Right Updated: 05/24/24 0629     Glucose 131 mg/dL      BUN 16 mg/dL      Creatinine 1.27 mg/dL      Sodium 138  mmol/L      Potassium 3.8 mmol/L      Chloride 101 mmol/L      CO2 26.8 mmol/L      Calcium 9.4 mg/dL      BUN/Creatinine Ratio 12.6     Anion Gap 10.2 mmol/L      eGFR 65.9 mL/min/1.73     Narrative:      GFR Normal >60  Chronic Kidney Disease <60  Kidney Failure <15      Phosphorus [[id number]]  (Normal) Collected: 05/24/24 0541    Specimen: Blood from Arm, Right Updated: 05/24/24 0629     Phosphorus 3.6 mg/dL     CBC & Differential [[id number]]  (Abnormal) Collected: 05/24/24 0541    Specimen: Blood from Arm, Right Updated: 05/24/24 0607    Narrative:      The following orders were created for panel order CBC & Differential.  Procedure                               Abnormality         Status                     ---------                               -----------         ------                     CBC Auto Differential[[id number]]        Abnormal            Final result                 Please view results for these tests on the individual orders.    CBC Auto Differential [[id number]]  (Abnormal) Collected: 05/24/24 0541    Specimen: Blood from Arm, Right Updated: 05/24/24 0607     WBC 8.16 10*3/mm3      RBC 3.91 10*6/mm3      Hemoglobin 11.5 g/dL      Hematocrit 36.5 %      MCV 93.4 fL      MCH 29.4 pg      MCHC 31.5 g/dL      RDW 15.1 %      RDW-SD 51.9 fl      MPV 9.4 fL      Platelets 353 10*3/mm3      Neutrophil % 47.4 %      Lymphocyte % 32.0 %      Monocyte % 14.7 %      Eosinophil % 3.7 %      Basophil % 1.1 %      Immature Grans % 1.1 %      Neutrophils, Absolute 3.87 10*3/mm3      Lymphocytes, Absolute 2.61 10*3/mm3      Monocytes, Absolute 1.20 10*3/mm3      Eosinophils, Absolute 0.30 10*3/mm3      Basophils, Absolute 0.09 10*3/mm3      Immature Grans, Absolute 0.09 10*3/mm3      nRBC 0.0 /100 WBC     POC Glucose Once [[id number]]  (Abnormal) Collected: 05/23/24 2037    Specimen: Blood Updated: 05/23/24 2038     Glucose 163 mg/dL      Comment: Serial Number: [id number]:  [id number]       POC Glucose  "Once [[id number]]  (Abnormal) Collected: 05/23/24 1640    Specimen: Blood Updated: 05/23/24 1642     Glucose 119 mg/dL      Comment: Serial Number: [id number]:  [id number]       POC Glucose TID AC [[id number]]  (Abnormal) Collected: 05/23/24 1227    Specimen: Blood Updated: 05/23/24 1228     Glucose 183 mg/dL      Comment: Serial Number: [id number]:  [id number]       Tissue Pathology Exam [[id number]] Collected: 05/21/24 1226    Specimen: Tissue from Toe, Right; Bone from Toe, Right Updated: 05/23/24 1031     Case Report --     Surgical Pathology Report                         Case: [id number]                                  Authorizing Provider:  Balwinder Costa DPM    Collected:           05/21/2024 12:26 PM          Ordering Location:     [facility] Received:            05/22/2024 05:55 AM                                 OR                                                                           Pathologist:           Joanna Brandon MD                                                     Specimens:   1) - Toe, Right, RIGHT SECOND TOE                                                                   2) - Toe, Right, CLEAN MARGIN                                                               Clinical Information --     Chronic osteomyelitis of right foot with draining sinus         Final Diagnosis --     1.  Right second toe, amputation:   -Subcutaneous tissue with marked acute inflammation, present at soft tissue margin   -Bone margin negative for osteomyelitis      2.  Right toe, clean margin:   -Negative for osteomyelitis             Gross Description --     1. Toe, Right.  Received in formalin labeled \"right second toe\" consists of a distal toe received with attached articulating of bone that measures 7.8 x 3.7 x 3.0 cm.  The specimen is received with a disfigured discolored tan-brown toenail that measures 1.2 x 0.7 cm.  Also received is a portion of ovoid tan-brown " "articulating bone that measures 3.6 x 2.7 x 1.8 cm.  The skin surfaces displaying a small pink-tan lesion located on the lateral portion of the base of attachment of the toe that measures 0.5 cm in greatest dimension.  The margin of resection is inked black.  Representative sections of the specimen are submitted as follows:  1A: Representative section of skin and described nodule  1B: Bone margin of resection after fixation and decalcification.    2. Toe, Right.  Received in formalin labeled \"clean margin\" consists of a portion of ovoid pink-tan bone 0.7 x 0.4 x 0.3 cm.  The specimen is submitted entirely in cassette 2A after fixation and decalcification.         Microscopic Description --     Microscopic examination performed.        Basic Metabolic Panel [[id number]]  (Abnormal) Collected: 05/23/24 0703    Specimen: Blood from Arm, Left Updated: 05/23/24 0755     Glucose 141 mg/dL      BUN 19 mg/dL      Creatinine 1.25 mg/dL      Sodium 139 mmol/L      Potassium 3.8 mmol/L      Chloride 104 mmol/L      CO2 25.1 mmol/L      Calcium 9.2 mg/dL      BUN/Creatinine Ratio 15.2     Anion Gap 9.9 mmol/L      eGFR 67.2 mL/min/1.73     Narrative:      GFR Normal >60  Chronic Kidney Disease <60  Kidney Failure <15      Magnesium [[id number]]  (Normal) Collected: 05/23/24 0703    Specimen: Blood from Arm, Left Updated: 05/23/24 0755     Magnesium 1.8 mg/dL     Phosphorus [[id number]]  (Normal) Collected: 05/23/24 0703    Specimen: Blood from Arm, Left Updated: 05/23/24 0755     Phosphorus 3.4 mg/dL     POC Glucose TID AC [[id number]]  (Abnormal) Collected: 05/23/24 0751    Specimen: Blood Updated: 05/23/24 0752     Glucose 150 mg/dL      Comment: Serial Number: [id number]:  [id number]       CBC (No Diff) [[id number]]  (Abnormal) Collected: 05/23/24 0703    Specimen: Blood from Arm, Left Updated: 05/23/24 0733     WBC 8.56 10*3/mm3      RBC 3.93 10*6/mm3      Hemoglobin 11.7 g/dL      Hematocrit 36.8 %      MCV 93.6 fL      " MCH 29.8 pg      MCHC 31.8 g/dL      RDW 15.0 %      RDW-SD 51.8 fl      MPV 9.3 fL      Platelets 368 10*3/mm3     POC Glucose Once [[id number]]  (Abnormal) Collected: 05/22/24 2017    Specimen: Blood Updated: 05/22/24 2019     Glucose 288 mg/dL      Comment: Serial Number: [id number]:  [id number]       POC Glucose Once [[id number]]  (Abnormal) Collected: 05/22/24 1745    Specimen: Blood Updated: 05/22/24 1746     Glucose 227 mg/dL      Comment: Serial Number: [id number]:  [id number]       Blood Culture - Blood, Arm, Right [[id number]]  (Normal) Collected: 05/17/24 1415    Specimen: Blood from Arm, Right Updated: 05/22/24 1430     Blood Culture No growth at 5 days    Blood Culture - Blood, Arm, Left [[id number]]  (Normal) Collected: 05/17/24 1415    Specimen: Blood from Arm, Left Updated: 05/22/24 1430     Blood Culture No growth at 5 days    POC Glucose Once [[id number]]  (Abnormal) Collected: 05/22/24 1220    Specimen: Blood Updated: 05/22/24 1221     Glucose 196 mg/dL      Comment: Serial Number: [id number]:  [id number]       POC Glucose Once [[id number]]  (Abnormal) Collected: 05/22/24 0751    Specimen: Blood Updated: 05/22/24 0753     Glucose 183 mg/dL      Comment: Serial Number: [id number]:  [id number]       Basic Metabolic Panel [[id number]]  (Abnormal) Collected: 05/22/24 0502    Specimen: Blood from Arm, Left Updated: 05/22/24 0558     Glucose 249 mg/dL      BUN 22 mg/dL      Creatinine 1.28 mg/dL      Sodium 134 mmol/L      Potassium 4.3 mmol/L      Chloride 99 mmol/L      CO2 24.8 mmol/L      Calcium 9.1 mg/dL      BUN/Creatinine Ratio 17.2     Anion Gap 10.2 mmol/L      eGFR 65.3 mL/min/1.73     Narrative:      GFR Normal >60  Chronic Kidney Disease <60  Kidney Failure <15      Magnesium [[id number]]  (Normal) Collected: 05/22/24 0502    Specimen: Blood from Arm, Left Updated: 05/22/24 0558     Magnesium 1.9 mg/dL     CBC (No Diff) [[id number]]  (Abnormal) Collected: 05/22/24 0508     Specimen: Blood from Arm, Left Updated: 05/22/24 0536     WBC 10.92 10*3/mm3      RBC 3.85 10*6/mm3      Hemoglobin 11.2 g/dL      Hematocrit 36.1 %      MCV 93.8 fL      MCH 29.1 pg      MCHC 31.0 g/dL      RDW 15.1 %      RDW-SD 51.6 fl      MPV 9.3 fL      Platelets 383 10*3/mm3     POC Glucose Once [[id number]]  (Abnormal) Collected: 05/21/24 2016    Specimen: Blood Updated: 05/21/24 2018     Glucose 265 mg/dL      Comment: Serial Number: [id number]:  [id number]       POC Glucose Once [[id number]]  (Abnormal) Collected: 05/21/24 1715    Specimen: Blood Updated: 05/21/24 1716     Glucose 265 mg/dL      Comment: Serial Number: [id number]:  [id number]       POC Glucose STAT [[id number]]  (Abnormal) Collected: 05/21/24 1252    Specimen: Blood Updated: 05/21/24 1329     Glucose 121 mg/dL      Comment: Serial Number: [id number]:  [id number]             Imaging Results (Last 72 Hours)       Procedure Component Value Units Date/Time    XR Foot 3+ View Right [[id number]] Collected: 05/22/24 1301     Updated: 05/22/24 1305    Narrative:      XR FOOT 3+ VW RIGHT-     Date of Exam: 5/22/2024 12:50 PM     Indication: amputation right foot; M86.471-Chronic osteomyelitis with  draining sinus, right ankle and foot; L97.513-Non-pressure chronic ulcer  of other part of right foot with necrosis of muscle; L02.611-Cutaneous  abscess of right foot.     Comparison: None available     Technique: Three views of the right foot were obtained.     FINDINGS: There are postsurgical changes of transmetatarsal amputation  of the first and second digits. There is gas within the soft tissues  compatible with recent surgery. There is multifocal degenerative midfoot  arthropathy and hindfoot arthropathy at the talonavicular, navicular  cuneiform and cuneiform metatarsal joints. There is  calcification/ossification of the plantar fascia.       Impression:      1. Postsurgical changes of recent transmetatarsal amputation of the  first  and second digits.  2. Extensive degenerative hindfoot and midfoot arthropathy.     Electronically Signed By-Davdi Petty MD On:5/22/2024 1:03 PM             Orders (active)        Start     Ordered    05/24/24 1150  Discontinue IV  Once         05/24/24 1154    05/24/24 1147  Discharge patient  Once         05/24/24 1154    05/24/24 0600  Basic Metabolic Panel  Daily       05/23/24 1816    05/24/24 0600  CBC & Differential  Daily       05/23/24 1816    05/24/24 0600  Magnesium  Daily       05/23/24 1816    05/24/24 0600  Phosphorus  Daily       05/23/24 1816 05/24/24 0600  Calcium  Daily       05/23/24 1816 05/24/24 0000  Continuous Glucose Sensor (Dexcom G7 Sensor) misc  Every 10 Days         05/24/24 1154    05/24/24 0000  metoprolol tartrate (LOPRESSOR) 50 MG tablet  Every 12 Hours         05/24/24 1154    05/24/24 0000  oxyCODONE (ROXICODONE) 5 MG immediate release tablet  Every 8 Hours PRN         05/24/24 1154    05/24/24 0000  amoxicillin-clavulanate (AUGMENTIN) 875-125 MG per tablet  2 Times Daily         05/24/24 1154    05/24/24 0000  Discharge Follow-up with PCP         05/24/24 1154    05/24/24 0000  Ambulatory Referral to Podiatry         05/24/24 1154    05/24/24 0000  Other Activity Instructions        Comments: Activity Instructions: No weight bearing on right foot until cleared by physician as outpatient.    05/24/24 1154    05/22/24 1945  metoprolol tartrate (LOPRESSOR) tablet 50 mg  Every 12 Hours         05/22/24 0837    05/22/24 1700  POC Glucose TID AC  3 Times Daily Before Meals      Comments: Complete no more than 45 minutes prior to patient eating      05/22/24 1100    05/22/24 1200  Insulin Lispro (humaLOG) injection 2-7 Units  3 Times Daily With Meals         05/22/24 1100    05/22/24 0727  Diet: Regular/House, Diabetic; Consistent Carbohydrate; Fluid Consistency: Thin (IDDSI 0)  Diet Effective Now         05/22/24 0726    05/21/24 2100  insulin glargine (LANTUS, SEMGLEE)  "injection 20 Units  2 Times Daily         05/21/24 1359    05/21/24 2100  acetaminophen (TYLENOL) tablet 1,000 mg  3 times daily         05/21/24 1434    05/21/24 1434  morphine injection 2 mg  Every 3 Hours PRN         05/21/24 1434    05/21/24 1434  oxyCODONE (ROXICODONE) immediate release tablet 5 mg  Every 4 Hours PRN         05/21/24 1434    05/21/24 1434  oxyCODONE (ROXICODONE) immediate release tablet 10 mg  Every 4 Hours PRN         05/21/24 1434    05/21/24 1247  Continuous Pulse Oximetry  Continuous         05/21/24 1246    05/20/24 1405  Inpatient Case Management  Consult  Once        Provider:  (Not yet assigned)    05/20/24 1405    05/19/24 1716  zolpidem (AMBIEN) tablet 10 mg  Nightly PRN         05/19/24 1716    05/18/24 1715  lisinopril (PRINIVIL,ZESTRIL) tablet 20 mg  Every 24 Hours Scheduled        Placed in \"And\" Linked Group    05/18/24 1621    05/18/24 1715  hydroCHLOROthiazide tablet 12.5 mg  Every 24 Hours Scheduled        Placed in \"And\" Linked Group    05/18/24 1621    05/17/24 2100  pregabalin (LYRICA) capsule 225 mg  2 Times Daily         05/17/24 1854 05/17/24 2030  piperacillin-tazobactam (ZOSYN) IVPB 3.375 g IVPB in 100 mL NS (VTB)  Every 8 Hours         05/17/24 1348    05/17/24 1945  aspirin chewable tablet 81 mg  Daily         05/17/24 1854 05/17/24 1945  clopidogrel (PLAVIX) tablet 75 mg  Daily         05/17/24 1854 05/17/24 1945  DULoxetine (CYMBALTA) DR capsule 30 mg  Daily         05/17/24 1854 05/17/24 1800  Oral Care  2 Times Daily       05/17/24 1339    05/17/24 1754  Wound Care  Daily       05/17/24 1753    05/17/24 1600  Vital Signs  Every 4 Hours       05/17/24 1339    05/17/24 1500  Enoxaparin Sodium (LOVENOX) syringe 40 mg  Daily         05/17/24 1400    05/17/24 1430  sodium chloride 0.9 % flush 10 mL  Every 12 Hours Scheduled         05/17/24 1339    05/17/24 1428  dextrose (GLUTOSE) oral gel 15 g  Every 15 Minutes PRN         05/17/24 1428    " 05/17/24 1428  dextrose (D50W) (25 g/50 mL) IV injection 25 g  Every 15 Minutes PRN         05/17/24 1428    05/17/24 1428  glucagon (GLUCAGEN) injection 1 mg  Every 15 Minutes PRN         05/17/24 1428    05/17/24 1400  Strict Intake & Output  Every Hour       05/17/24 1339    05/17/24 1340  Daily Weights  Daily       05/17/24 1339    05/17/24 1339  Pharmacy to Dose Zosyn  Continuous PRN         05/17/24 1339    05/17/24 1339  Pharmacy to dose vancomycin  Continuous PRN         05/17/24 1339    05/17/24 1339  Intake & Output  Every Shift       05/17/24 1339    05/17/24 1339  Weigh Patient  Once         05/17/24 1339    05/17/24 1339  Insert Peripheral IV  Once         05/17/24 1339    05/17/24 1339  Saline Lock & Maintain IV Access  Continuous         05/17/24 1339    05/17/24 1339  Code Status and Medical Interventions:  Continuous         05/17/24 1339    05/17/24 1338  sodium chloride 0.9 % flush 10 mL  As Needed         05/17/24 1339    05/17/24 1338  sodium chloride 0.9 % infusion 40 mL  As Needed         05/17/24 1339    Unscheduled  Follow Hypoglycemia Standing Orders For Blood Glucose <70 & Notify Provider of Treatment  As Needed      Comments: Follow Hypoglycemia Orders As Outlined in Process Instructions (Open Order Report to View Full Instructions)  Notify Provider Any Time Hypoglycemia Treatment is Administered    05/17/24 1428    Unscheduled  Patient May Use Home CPAP / BIPAP For Sleep or As Needed  As Needed       05/17/24 1832                     Physician Progress Notes (last 72 hours)        Balwinder Costa DPM at 05/23/24 1153              [facility] - P[facility]RY    Today's Date: 05/23/24    Patient Name: Jd Velazquez  MRN: [id number]  CSN: [id number]  PCP: Dacia Newsome APRN  Referring Provider: Mario Bowman MD  Attending Provider: Chaitanya Sapp MD  Length of Stay: 6    SUBJECTIVE   Chief Complaint:     HPI: Jd Velazquez, a 58 y.o.male, .  Denies any  constitutional symptoms. No other pedal complaints at this time.    Past Medical History:   Diagnosis Date    Allergic rhinitis 01/12/2015    Anesthesia     DIFFICULTY WAKING UP POST SURGERY    CAD (coronary artery disease)     DENIES CP/SOA.    Diabetes mellitus     Hyperlipidemia     Hypertension     Hypothyroidism 04/24/2014    Insomnia, unspecified 06/15/2016    WITHOUT BIPAP    Microalbuminuria due to type 2 diabetes mellitus 10/15/2015    Mixed hyperlipidemia 10/15/2015    Myocardial infarction     Obesity     BMI 32    SHAHLA (obstructive sleep apnea)     WEARS BIPAP    Right great toe amputee     4/2024; 5/2024 ADMITTED WITH INFECTION    Skin cancer     REMOVED    Sleep apnea      Past Surgical History:   Procedure Laterality Date    AMPUTATION DIGIT Right 4/10/2024    Procedure: AMPUTATION DIGIT RIGHT GREAT TOE;  Surgeon: Balwinder Costa DPM;  Location: [facility] MAIN OR;  Service: Podiatry;  Laterality: Right;    AMPUTATION DIGIT Right 5/21/2024    Procedure: SECOND RAY AMPUTATION RIGHT FOOT, INCISION AND DRAINAGE RIGHT FOOT;  Surgeon: Balwinder Costa DPM;  Location: [facility] MAIN OR;  Service: Podiatry;  Laterality: Right;    ANKLE ARTHROSCOPY W/ OPEN REPAIR      APPENDECTOMY      CARDIAC CATHETERIZATION  2014    PCI to circumflex    CARDIAC CATHETERIZATION Right 10/26/2023    Procedure: Aortogram with right leg angiogram, possible angioplasty or stenting;  Surgeon: Robert Puga MD;  Location: [facility] CATH INVASIVE LOCATION;  Service: Vascular;  Laterality: Right;    CARDIAC CATHETERIZATION Right 12/15/2023    Procedure: Right leg angiogram, possible angioplasty or stenting;  Surgeon: Robert Puga MD;  Location: [facility] CATH INVASIVE LOCATION;  Service: Vascular;  Laterality: Right;    CORONARY ARTERY BYPASS GRAFT N/A 10/08/2020    Procedure: STERNOTOMY, CORONARY ARTERY BYPASS GRAFTING TIME 4 WITH LEFT KELSI  AND ENDOSCOPICALLY HARVESTED LEFT GREATER SAPHENOUS VEIN AND PRP.;  Surgeon: Jr Girma Chiu  MD LUCIUS;  Location: [facility] MAIN OR;  Service: Cardiothoracic;  Laterality: N/A;    FEMORAL TIBIAL BYPASS Right 01/09/2024    Procedure: Right femoral-tibial bypass graft;  Surgeon: Robert Puga MD;  Location: [facility] MAIN OR;  Service: Vascular;  Laterality: Right;    HEEL SPUR SURGERY      HERNIA REPAIR      INCISION AND DRAINAGE OF WOUND Right 4/30/2024    Procedure: INCISION AND DRAINAGE WOUND RIGHT FOOT, DEBRIDEMENT OF WOUND RIGHT FOOT, AMPUTATION REVISION RIGHT FOOT;  Surgeon: Balwinder Costa DPM;  Location: [facility] MAIN OR;  Service: Podiatry;  Laterality: Right;    KNEE ARTHROSCOPY      MULTIPLE TIMES ON BOTH KNEES    SKIN CANCER EXCISION      BACK    TOE SURGERY Right     DEBRIDMENT RIGHT GREAT TOE    TONSILLECTOMY       Family History   Adopted: Yes   Problem Relation Age of Onset    Heart disease Other      Social History     Socioeconomic History    Marital status:    Tobacco Use    Smoking status: Every Day     Current packs/day: 0.25     Average packs/day: 0.3 packs/day for 15.0 years (3.8 ttl pk-yrs)     Types: Cigarettes     Passive exposure: Never    Smokeless tobacco: Never   Vaping Use    Vaping status: Never Used   Substance and Sexual Activity    Alcohol use: Not Currently    Drug use: Never    Sexual activity: Defer     Allergies   Allergen Reactions    Strawberry Itching    Lortab [Hydrocodone-Acetaminophen] Itching     Current Facility-Administered Medications   Medication Dose Route Frequency Provider Last Rate Last Admin    acetaminophen (TYLENOL) tablet 1,000 mg  1,000 mg Oral TID Art Stevenson MD   1,000 mg at 05/23/24 0807    aspirin chewable tablet 81 mg  81 mg Oral Daily Balwinder Costa DPM   81 mg at 05/23/24 0807    clopidogrel (PLAVIX) tablet 75 mg  75 mg Oral Daily Balwinder Costa DPM   75 mg at 05/23/24 0807    dextrose (D50W) (25 g/50 mL) IV injection 25 g  25 g Intravenous Q15 Min PRN Balwinder Costa DPM        dextrose (GLUTOSE) oral gel 15 g  15 g Oral  Q15 Min PRN Balwinder Costa DPM        DULoxetine (CYMBALTA) DR capsule 30 mg  30 mg Oral Daily Balwinder Costa DPM   30 mg at 05/23/24 0807    Enoxaparin Sodium (LOVENOX) syringe 40 mg  40 mg Subcutaneous Daily Balwinder Costa DPM   40 mg at 05/23/24 0806    glucagon (GLUCAGEN) injection 1 mg  1 mg Intramuscular Q15 Min PRN Balwinder Costa DPM        lisinopril (PRINIVIL,ZESTRIL) tablet 20 mg  20 mg Oral Q24H Balwinder Costa DPM   20 mg at 05/23/24 0807    And    hydroCHLOROthiazide tablet 12.5 mg  12.5 mg Oral Q24H Balwinder Costa DPM   12.5 mg at 05/23/24 0807    insulin glargine (LANTUS, SEMGLEE) injection 20 Units  20 Units Subcutaneous BID Art Stevenson MD   20 Units at 05/23/24 0806    Insulin Lispro (humaLOG) injection 2-7 Units  2-7 Units Subcutaneous TID With Meals Art Stevenson MD   2 Units at 05/23/24 1241    metoprolol tartrate (LOPRESSOR) tablet 50 mg  50 mg Oral Q12H Art Stevenson MD   50 mg at 05/23/24 0807    morphine injection 2 mg  2 mg Intravenous Q3H PRN Art Stevenson MD        oxyCODONE (ROXICODONE) immediate release tablet 10 mg  10 mg Oral Q4H PRN Art Stevenson MD   10 mg at 05/21/24 1441    oxyCODONE (ROXICODONE) immediate release tablet 5 mg  5 mg Oral Q4H PRN Art Stevenson MD        Pharmacy to dose vancomycin   Does not apply Continuous PRN Balwinder Costa DPM        Pharmacy to Dose Zosyn   Does not apply Continuous PRN Balwinder Costa DPM        piperacillin-tazobactam (ZOSYN) IVPB 3.375 g IVPB in 100 mL NS (VTB)  3.375 g Intravenous Q8H Balwinder Costa DPM   3.375 g at 05/23/24 1241    pregabalin (LYRICA) capsule 225 mg  225 mg Oral BID Rotterman, Balwinder T, DPM   225 mg at 05/23/24 0807    sodium chloride 0.9 % flush 10 mL  10 mL Intravenous Q12H Rotterman, Balwinder T, DPM   10 mL at 05/23/24 0807    sodium chloride 0.9 % flush 10 mL  10 mL Intravenous PRN Rotterman Balwinder T, DPM        sodium chloride 0.9 % infusion 40 mL  40 mL Intravenous  PRN Rotterman, Balwinder T, DPM        vancomycin 1000 mg/250 mL 0.9% NS IVPB ([facility])  1,000 mg Intravenous Q12H Rotterman, Balwinder T, DPM   1,000 mg at 05/23/24 1005    zolpidem (AMBIEN) tablet 10 mg  10 mg Oral Nightly PRN Rotterman, Balwinder T, DPM   10 mg at 05/22/24 2311     Review of Systems   All other systems reviewed and are negative.      OBJECTIVE     Vitals:    05/23/24 1140   BP: 141/69   Pulse: 54   Resp: 18   Temp: 98.1 °F (36.7 °C)   SpO2: 97%       PHYSICAL EXAM    GEN:   A&Ox3, NAD.     Well coapted incision to right foot with sutures intact.  No erythema no malodor no drainage.    RADIOLOGY/NUCLEAR:  XR Foot 3+ View Right    Result Date: 5/22/2024  Narrative: XR FOOT 3+ VW RIGHT-  Date of Exam: 5/22/2024 12:50 PM  Indication: amputation right foot; M86.471-Chronic osteomyelitis with draining sinus, right ankle and foot; L97.513-Non-pressure chronic ulcer of other part of right foot with necrosis of muscle; L02.611-Cutaneous abscess of right foot.  Comparison: None available  Technique: Three views of the right foot were obtained.  FINDINGS: There are postsurgical changes of transmetatarsal amputation of the first and second digits. There is gas within the soft tissues compatible with recent surgery. There is multifocal degenerative midfoot arthropathy and hindfoot arthropathy at the talonavicular, navicular cuneiform and cuneiform metatarsal joints. There is calcification/ossification of the plantar fascia.      Impression: 1. Postsurgical changes of recent transmetatarsal amputation of the first and second digits. 2. Extensive degenerative hindfoot and midfoot arthropathy.  Electronically Signed By-David Petty MD On:5/22/2024 1:03 PM      MRI Foot Right With & Without Contrast    Result Date: 5/20/2024  Narrative:  MRI FOOT RIGHT W WO CONTRAST-  Date of Exam: 5/18/2024 7:10 AM  Indication: osteo and foot abscess.  Comparison: None available.  Technique:  Routine multiplanar/multisequence sequence  images of the right foot were obtained before and after the uneventful administration of 20 mL MultiHance.    Findings: There has been resection of the first digit at the distal first metatarsal. There is an overlying wound at the medial forefoot. There appears to be surrounding heterogeneous edema signal and enhancement. There is a nonenhancing sinus tract extending from the wound to the distal first metatarsal and to the second MTP joint. This can be seen on series 11 images 21-27. There are some foci of T1 hypointense signal in the soft tissues surrounding the tract which could represent postsurgical changes versus small foci of soft tissue gas.  In the superficial subcutaneous tissues, just posterior to the wound on series 11 images 19 and 20, there is a small rim-enhancing collection measuring approximately 12 x 14 x 7 mm. This is suspicious for a small abscess and may communicate to the sinus tract. There is also an irregular T2 hyperintense signal focus with peripheral enhancement extending anteriorly from the tract in the soft tissues distal to the remaining first metatarsal as seen on series 6 image 12 and series 8 image 27 measuring approximately 9 x 14 x 15 mm which may also represent a small abscess, possibly communicating to the sinus tract.  There is T2 hyperintense and T1 hypointense signal at the remaining distal first metatarsal adjacent to the wound consistent with osteomyelitis. There is also T2 hyperintense edema signal and corresponding T1 hypointense signal at the second metatarsal head and second proximal phalanx surrounding the second MTP joint consistent with osteomyelitis. The wound extends to the second MTP joint where there is para-articular enhancement consistent with joint infection.  No other definite findings of osteomyelitis or joint infection.  There appear to be findings of advanced arthropathy in the midfoot with prominent osteophytosis. Lisfranc alignment appears grossly intact.  Lisfranc ligament appears grossly intact.  There is advanced muscle atrophy with diffuse edema signal and enhancement. This could be associated with denervation or myositis. There is a small amount of fluid signal and enhancement along the extensor digitorum tendons which may indicate infectious tenosynovitis.       Impression: Impression: 1.  Status post amputation of the first digit at the distal first metatarsal. 2.  Wound at the medial forefoot with edema and enhancement surrounding a sinus tract extending to the distal first metatarsal and second MTP joint. 3.  Findings of osteomyelitis at the remaining distal first metatarsal and surrounding the second MTP joint where there are findings of joint infection. 4.  Findings of skin and soft tissue infection surrounding wound. Findings of muscle denervation and/or myositis. 5.  Two small abscesses in the soft tissues which may drain to the sinus tract.   Electronically Signed By-Hunter Steele MD On:5/20/2024 9:37 AM       LABORATORY/CULTURE RESULTS:  Results from last 7 days   Lab Units 05/23/24  0703 05/22/24  0502 05/21/24  0525   WBC 10*3/mm3 8.56 10.92* 7.55   HEMOGLOBIN g/dL 11.7* 11.2* 11.9*   HEMATOCRIT % 36.8* 36.1* 38.1   PLATELETS 10*3/mm3 368 383 412     Results from last 7 days   Lab Units 05/23/24  0703 05/22/24  0502 05/21/24  0525 05/19/24  0532 05/18/24  0555 05/17/24  1236   SODIUM mmol/L 139 134* 136   < > 136 135*   POTASSIUM mmol/L 3.8 4.3 4.2   < > 4.1 4.4   CHLORIDE mmol/L 104 99 101   < > 101 99   CO2 mmol/L 25.1 24.8 24.3   < > 23.9 24.8   BUN mg/dL 19 22* 24*   < > 9 11   CREATININE mg/dL 1.25 1.28* 1.39*   < > 0.75* 0.84   CALCIUM mg/dL 9.2 9.1 9.1   < > 8.7 9.4   BILIRUBIN mg/dL  --   --   --   --  0.3 0.3   ALK PHOS U/L  --   --   --   --  107 107   ALT (SGPT) U/L  --   --   --   --  5 5   AST (SGOT) U/L  --   --   --   --  7 5   GLUCOSE mg/dL 141* 249* 161*   < > 175* 229*    < > = values in this interval not displayed.          Microbiology Results (last 10 days)       Procedure Component Value - Date/Time    Tissue / Bone Culture - Bone, Toe, Right [[id number]] Collected: 24 1228    Lab Status: Preliminary result Specimen: Bone from Toe, Right Updated: 24 0819     Tissue Culture Culture in progress     Gram Stain Few (2+) WBCs seen      Few (2+) Gram positive cocci in pairs and chains    Blood Culture - Blood, Arm, Right [[id number]]  (Normal) Collected: 24 1415    Lab Status: Final result Specimen: Blood from Arm, Right Updated: 24 1430     Blood Culture No growth at 5 days    Blood Culture - Blood, Arm, Left [[id number]]  (Normal) Collected: 24 1415    Lab Status: Final result Specimen: Blood from Arm, Left Updated: 24 1430     Blood Culture No growth at 5 days             ASSESSMENT/PLAN     Active Hospital Problems:  Active Hospital Problems    Diagnosis     **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     Chronic osteomyelitis of right foot with draining sinus          Patient examined and evaluated  Follow cultures, continue IV antibiotics  No further surgical intervention planned at this time  Daily dressing changes to right foot  Nonweightbearing to right lower extremity      Discussed findings and treatment plan including risks, benefits, and treatment options with patient in detail. Patient agreed with treatment plan.      This document has been electronically signed by Balwinder Costa DPM on May 23, 2024 12:52 EDT             Electronically signed by Balwinder Costa DPM at 24 1252       Chaitanya Sapp MD at 24 0820           [facility]  Date: 2024  Patient Name: Jd Velazquez  : 1965  MRN: [id number]  Date of admission: 2024  Room/Bed: [facility]      Subjective   Subjective     Chief Complaint: Right foot osteo versus abscess leading to direct admission     Summary:Jd Velazquez is a 58 y.o. male with CAD, type  2 diabetes mellitus, dyslipidemia, hyperkalemia, peripheral vascular disease and obesity (BMI: 32.36) that presented with worsening right foot wound s/p amputation of great toe in April 2024. Podiatry consulted to assist in care. Broad-spectrum antibiotics started. MRI imaging obtained that demonstrated evidence of osteomyelitis involving distal first metatarsal and surrounding second MTP joint as well as 2 small abscesses in the soft tissue. Patient taken the OR for second ray amputation of the right foot; incision and drainage of right foot on 5/21/2024.  Podiatry following the patient.    Interval Followup: No acute events overnight.  Patient is sitting in the chair, not in acute distress.  Denies any fever, chills, rigors, chest pain or difficulty breathing.  Pain well-controlled with as needed pain medications.    Review of Systems    All systems reviewed and negative except for what is outlined above.      Objective   Objective     Vitals:   Temp:  [97.9 °F (36.6 °C)-98.2 °F (36.8 °C)] 97.9 °F (36.6 °C)  Heart Rate:  [51-66] 58  Resp:  [18] 18  BP: (124-146)/(60-68) 131/64  Flow (L/min):  [0] 0    Physical Exam   General: Awake, alert, NAD  Skin: dressing present over surgical site.  Clean.  Neuro: Alert, awake, oriented x 3; speech clear; no tremor      Result Review    Result Review:  I have personally reviewed these results:  [x]  Laboratory      Lab 05/23/24  0703 05/22/24  0502 05/21/24  0525 05/19/24  0532 05/18/24  0555 05/17/24  1236 05/17/24  1233 05/17/24  1233 05/17/24  1232   WBC 8.56 10.92* 7.55   < > 6.13  --    < > 8.76  --    HEMOGLOBIN 11.7* 11.2* 11.9*   < > 11.3*  --    < > 11.5*  --    HEMATOCRIT 36.8* 36.1* 38.1   < > 35.5*  --    < > 36.2*  --    PLATELETS 368 383 412   < > 393  --    < > 413  --    NEUTROS ABS  --   --   --   --  2.52  --   --   --   --    IMMATURE GRANS (ABS)  --   --   --   --  0.06*  --   --   --   --    LYMPHS ABS  --   --   --   --  2.49  --   --   --   --    MONOS  ABS  --   --   --   --  0.88  --   --   --   --    EOS ABS  --   --   --   --  0.12  --   --   --   --    MCV 93.6 93.8 93.2   < > 91.3  --    < > 91.9  --    SED RATE  --   --   --   --   --   --   --  26*  --    CRP  --   --   --   --   --  2.99*  --   --   --    PROCALCITONIN  --   --   --   --   --  0.06  --   --   --    LACTATE  --   --   --   --   --   --   --   --  1.8    < > = values in this interval not displayed.         Lab 05/23/24  0703 05/22/24  0502 05/21/24  0525 05/20/24  0518   SODIUM 139 134* 136 136   POTASSIUM 3.8 4.3 4.2 4.4   CHLORIDE 104 99 101 99   CO2 25.1 24.8 24.3 25.8   ANION GAP 9.9 10.2 10.7 11.2   BUN 19 22* 24* 13   CREATININE 1.25 1.28* 1.39* 0.87   EGFR 67.2 65.3 59.1* 100.6   GLUCOSE 141* 249* 161* 133*   CALCIUM 9.2 9.1 9.1 9.3   MAGNESIUM 1.8 1.9 2.1 1.7   PHOSPHORUS 3.4  --  4.2 4.1         Lab 05/18/24  0555 05/17/24  1236   TOTAL PROTEIN 7.1 7.3   ALBUMIN 3.5 3.7   GLOBULIN 3.6 3.6   ALT (SGPT) 5 5   AST (SGOT) 7 5   BILIRUBIN 0.3 0.3   ALK PHOS 107 107                     Brief Urine Lab Results  (Last result in the past 365 days)        Color   Clarity   Blood   Leuk Est   Nitrite   Protein   CREAT   Urine HCG        03/04/24 0801             22.2         03/04/24 0801 Yellow   Clear   Negative   Negative   Negative   Negative                 [x]  Microbiology   Microbiology Results (last 10 days)       Procedure Component Value - Date/Time    Tissue / Bone Culture - Bone, Toe, Right [[id number]] Collected: 05/21/24 1228    Lab Status: Preliminary result Specimen: Bone from Toe, Right Updated: 05/22/24 0819     Tissue Culture Culture in progress     Gram Stain Few (2+) WBCs seen      Few (2+) Gram positive cocci in pairs and chains    Blood Culture - Blood, Arm, Right [[id number]]  (Normal) Collected: 05/17/24 1415    Lab Status: Final result Specimen: Blood from Arm, Right Updated: 05/22/24 1430     Blood Culture No growth at 5 days    Blood Culture - Blood, Arm, Left  [[id number]]  (Normal) Collected: 05/17/24 1415    Lab Status: Final result Specimen: Blood from Arm, Left Updated: 05/22/24 1430     Blood Culture No growth at 5 days    Wound Culture - Wound, Foot, Right [[id number]]  (Abnormal)  (Susceptibility) Collected: 05/13/24 1000    Lab Status: Final result Specimen: Wound from Foot, Right Updated: 05/15/24 0743     Wound Culture Scant growth (1+) Enterococcus faecalis     Gram Stain Few (2+) WBCs seen      Few (2+) Gram positive cocci in pairs    Susceptibility        Enterococcus faecalis      BETH      Ampicillin Susceptible      Vancomycin Susceptible                                 [x]  Radiology  XR Foot 3+ View Right    Result Date: 5/22/2024  1. Postsurgical changes of recent transmetatarsal amputation of the first and second digits. 2. Extensive degenerative hindfoot and midfoot arthropathy.  Electronically Signed By-David Petty MD On:5/22/2024 1:03 PM      MRI Foot Right With & Without Contrast    Result Date: 5/20/2024  Impression: 1.  Status post amputation of the first digit at the distal first metatarsal. 2.  Wound at the medial forefoot with edema and enhancement surrounding a sinus tract extending to the distal first metatarsal and second MTP joint. 3.  Findings of osteomyelitis at the remaining distal first metatarsal and surrounding the second MTP joint where there are findings of joint infection. 4.  Findings of skin and soft tissue infection surrounding wound. Findings of muscle denervation and/or myositis. 5.  Two small abscesses in the soft tissues which may drain to the sinus tract.   Electronically Signed By-Hunter Steele MD On:5/20/2024 9:37 AM     []  EKG/Telemetry   []  Cardiology/Vascular   []  Pathology  []  Old records  []  Other:    Assessment & Plan   Assessment / Plan     Assessment:  Right foot diabetic ulcer with concern for osteomyelitis  Type 2 diabetes mellitus with hyperglycemia  Peripheral vascular disease  Hypomagnesemia  Acute  renal failure (creatinine elevated to 1.39 from 0.87 in 24 hours)  CAD  Obesity (BMI: 33.36)      Plan:  Patient currently being managed in medicine service.  Underwent second ray amputation of right foot along with incision and drainage on 5/21.  Tolerated the procedure well.  Currently on IV Zosyn and vancomycin.  Plan to de-escalate depending upon the tissue/bone culture.  Wound culture prior to surgery showed scant Enterococcus faecalis susceptible to ampicillin/vancomycin.  Podiatry following the patient, appreciate input.  Suggested no further surgical intervention planned at this time.  Recommended nonweightbearing to right lower extremity.  PT/OT evaluated the patient, patient and his daughter would like to go home with home health.   aware.  Continue aspirin and Plavix.  On insulin regimen for diabetes, blood glucose level within appropriate range.  Continue current regimen.  Continue rest of the current ongoing medications.  Will obtain labs in AM.  If stable, likely discharge in next 24 hours.    DVT prophylaxis:  Medical DVT prophylaxis orders are present.        CODE STATUS:   Level Of Support Discussed With: Patient  Code Status (Patient has no pulse and is not breathing): CPR (Attempt to Resuscitate)  Medical Interventions (Patient has pulse or is breathing): Full Support      Electronically signed by Chaitanya Sapp MD, 05/23/24, 8:20 AM EDT.                       Electronically signed by Chaitanya Sapp MD at 05/23/24 8266       Balwinder Costa DPM at 05/22/24 1133              [facility] - PODIATRY    Today's Date: 05/22/24    Patient Name: Jd Velazquez  MRN: [id number]  CSN: [id number]  PCP: Dacia Newsome APRN  Referring Provider: Mario Bowman MD  Attending Provider: Art Stevenson MD  Length of Stay: 5    SUBJECTIVE   Chief Complaint:     HPI: Jd Velazquez, a 58 y.o.male, .  Denies any constitutional symptoms. No other pedal complaints  at this time.    Past Medical History:   Diagnosis Date    Allergic rhinitis 01/12/2015    Anesthesia     DIFFICULTY WAKING UP POST SURGERY    CAD (coronary artery disease)     DENIES CP/SOA.    Diabetes mellitus     Hyperlipidemia     Hypertension     Hypothyroidism 04/24/2014    Insomnia, unspecified 06/15/2016    WITHOUT BIPAP    Microalbuminuria due to type 2 diabetes mellitus 10/15/2015    Mixed hyperlipidemia 10/15/2015    Myocardial infarction     Obesity     BMI 32    SHAHLA (obstructive sleep apnea)     WEARS BIPAP    Right great toe amputee     4/2024; 5/2024 ADMITTED WITH INFECTION    Skin cancer     REMOVED    Sleep apnea      Past Surgical History:   Procedure Laterality Date    AMPUTATION DIGIT Right 4/10/2024    Procedure: AMPUTATION DIGIT RIGHT GREAT TOE;  Surgeon: Balwinder Costa DPM;  Location: [facility] MAIN OR;  Service: Podiatry;  Laterality: Right;    AMPUTATION DIGIT Right 5/21/2024    Procedure: SECOND RAY AMPUTATION RIGHT FOOT, INCISION AND DRAINAGE RIGHT FOOT;  Surgeon: Balwinder Costa DPM;  Location: [facility] OR;  Service: Podiatry;  Laterality: Right;    ANKLE ARTHROSCOPY W/ OPEN REPAIR      APPENDECTOMY      CARDIAC CATHETERIZATION  2014    PCI to circumflex    CARDIAC CATHETERIZATION Right 10/26/2023    Procedure: Aortogram with right leg angiogram, possible angioplasty or stenting;  Surgeon: Robert Puga MD;  Location: [facility] CATH INVASIVE LOCATION;  Service: Vascular;  Laterality: Right;    CARDIAC CATHETERIZATION Right 12/15/2023    Procedure: Right leg angiogram, possible angioplasty or stenting;  Surgeon: Robert Puga MD;  Location: [facility] CATH INVASIVE LOCATION;  Service: Vascular;  Laterality: Right;    CORONARY ARTERY BYPASS GRAFT N/A 10/08/2020    Procedure: STERNOTOMY, CORONARY ARTERY BYPASS GRAFTING TIME 4 WITH LEFT KELSI  AND ENDOSCOPICALLY HARVESTED LEFT GREATER SAPHENOUS VEIN AND PRP.;  Surgeon: Jr Girma Chiu MD;  Location: [facility] OR;  Service:  Cardiothoracic;  Laterality: N/A;    FEMORAL TIBIAL BYPASS Right 01/09/2024    Procedure: Right femoral-tibial bypass graft;  Surgeon: Robert Puga MD;  Location: [facility] MAIN OR;  Service: Vascular;  Laterality: Right;    HEEL SPUR SURGERY      HERNIA REPAIR      INCISION AND DRAINAGE OF WOUND Right 4/30/2024    Procedure: INCISION AND DRAINAGE WOUND RIGHT FOOT, DEBRIDEMENT OF WOUND RIGHT FOOT, AMPUTATION REVISION RIGHT FOOT;  Surgeon: Balwinder Costa DPM;  Location: [facility] MAIN OR;  Service: Podiatry;  Laterality: Right;    KNEE ARTHROSCOPY      MULTIPLE TIMES ON BOTH KNEES    SKIN CANCER EXCISION      BACK    TOE SURGERY Right     DEBRIDMENT RIGHT GREAT TOE    TONSILLECTOMY       Family History   Adopted: Yes   Problem Relation Age of Onset    Heart disease Other      Social History     Socioeconomic History    Marital status:    Tobacco Use    Smoking status: Every Day     Current packs/day: 0.25     Average packs/day: 0.3 packs/day for 15.0 years (3.8 ttl pk-yrs)     Types: Cigarettes     Passive exposure: Never    Smokeless tobacco: Never   Vaping Use    Vaping status: Never Used   Substance and Sexual Activity    Alcohol use: Not Currently    Drug use: Never    Sexual activity: Defer     Allergies   Allergen Reactions    Strawberry Itching    Lortab [Hydrocodone-Acetaminophen] Itching     Current Facility-Administered Medications   Medication Dose Route Frequency Provider Last Rate Last Admin    acetaminophen (TYLENOL) tablet 1,000 mg  1,000 mg Oral TID Art Stevenson MD   1,000 mg at 05/22/24 0806    aspirin chewable tablet 81 mg  81 mg Oral Daily Balwinder Costa DPM   81 mg at 05/22/24 0806    clopidogrel (PLAVIX) tablet 75 mg  75 mg Oral Daily Balwinder Costa DPM   75 mg at 05/22/24 0806    dextrose (D50W) (25 g/50 mL) IV injection 25 g  25 g Intravenous Q15 Min PRN Balwinder Costa DPM        dextrose (GLUTOSE) oral gel 15 g  15 g Oral Q15 Min PRN Balwinder Costa DPM         DULoxetine (CYMBALTA) DR capsule 30 mg  30 mg Oral Daily Balwinder Costa DPM   30 mg at 05/22/24 0806    Enoxaparin Sodium (LOVENOX) syringe 40 mg  40 mg Subcutaneous Daily Balwinder Costa DPM   40 mg at 05/22/24 0806    glucagon (GLUCAGEN) injection 1 mg  1 mg Intramuscular Q15 Min PRN Balwinder Costa DPM        lisinopril (PRINIVIL,ZESTRIL) tablet 20 mg  20 mg Oral Q24H Balwinder Costa DPM   20 mg at 05/22/24 0806    And    hydroCHLOROthiazide tablet 12.5 mg  12.5 mg Oral Q24H Balwinder Costa DPM   12.5 mg at 05/22/24 0806    insulin glargine (LANTUS, SEMGLEE) injection 20 Units  20 Units Subcutaneous BID Art Stevenson MD   20 Units at 05/22/24 0806    Insulin Lispro (humaLOG) injection 2-7 Units  2-7 Units Subcutaneous TID With Meals Art Stevenson MD   2 Units at 05/22/24 1245    metoprolol tartrate (LOPRESSOR) tablet 50 mg  50 mg Oral Q12H Art Stevenson MD        morphine injection 2 mg  2 mg Intravenous Q3H PRN Art Stevenson MD        oxyCODONE (ROXICODONE) immediate release tablet 10 mg  10 mg Oral Q4H PRN Art Stevenson MD   10 mg at 05/21/24 1441    oxyCODONE (ROXICODONE) immediate release tablet 5 mg  5 mg Oral Q4H PRN Art Stevenson MD        Pharmacy to dose vancomycin   Does not apply Continuous PRN Balwinder Costa DPM        Pharmacy to Dose Zosyn   Does not apply Continuous PRN Balwinder Costa DPM        piperacillin-tazobactam (ZOSYN) IVPB 3.375 g IVPB in 100 mL NS (VTB)  3.375 g Intravenous Q8H Balwinder Costa DPM   3.375 g at 05/22/24 1218    pregabalin (LYRICA) capsule 225 mg  225 mg Oral BID Balwinder Costa DPM   225 mg at 05/22/24 0806    sodium chloride 0.9 % flush 10 mL  10 mL Intravenous Q12H Balwinder Costa, DPM   10 mL at 05/22/24 0807    sodium chloride 0.9 % flush 10 mL  10 mL Intravenous PRN Balwinder Costa, DPM        sodium chloride 0.9 % infusion 40 mL  40 mL Intravenous PRN Balwinder Costa, DPM        vancomycin 1000 mg/250 mL  0.9% NS IVPB ([facility])  1,000 mg Intravenous Q12H Rotterman, Balwinder T, DPM   1,000 mg at 05/22/24 0923    zolpidem (AMBIEN) tablet 10 mg  10 mg Oral Nightly PRN Rotterman, Balwinder T, DPM   10 mg at 05/19/24 2104     Review of Systems   All other systems reviewed and are negative.      OBJECTIVE     Vitals:    05/22/24 1148   BP: 130/60   Pulse: 59   Resp: 18   Temp: 98.2 °F (36.8 °C)   SpO2: 98%       PHYSICAL EXAM    GEN:   A&Ox3, NAD.     Well coapted incision to right foot with sutures intact.  No erythema no malodor no drainage.    RADIOLOGY/NUCLEAR:  XR Foot 3+ View Right    Result Date: 5/22/2024  Narrative: XR FOOT 3+ VW RIGHT-  Date of Exam: 5/22/2024 12:50 PM  Indication: amputation right foot; M86.471-Chronic osteomyelitis with draining sinus, right ankle and foot; L97.513-Non-pressure chronic ulcer of other part of right foot with necrosis of muscle; L02.611-Cutaneous abscess of right foot.  Comparison: None available  Technique: Three views of the right foot were obtained.  FINDINGS: There are postsurgical changes of transmetatarsal amputation of the first and second digits. There is gas within the soft tissues compatible with recent surgery. There is multifocal degenerative midfoot arthropathy and hindfoot arthropathy at the talonavicular, navicular cuneiform and cuneiform metatarsal joints. There is calcification/ossification of the plantar fascia.      Impression: 1. Postsurgical changes of recent transmetatarsal amputation of the first and second digits. 2. Extensive degenerative hindfoot and midfoot arthropathy.  Electronically Signed By-David Petty MD On:5/22/2024 1:03 PM      MRI Foot Right With & Without Contrast    Result Date: 5/20/2024  Narrative:  MRI FOOT RIGHT W WO CONTRAST-  Date of Exam: 5/18/2024 7:10 AM  Indication: osteo and foot abscess.  Comparison: None available.  Technique:  Routine multiplanar/multisequence sequence images of the right foot were obtained before and after the  uneventful administration of 20 mL MultiHance.    Findings: There has been resection of the first digit at the distal first metatarsal. There is an overlying wound at the medial forefoot. There appears to be surrounding heterogeneous edema signal and enhancement. There is a nonenhancing sinus tract extending from the wound to the distal first metatarsal and to the second MTP joint. This can be seen on series 11 images 21-27. There are some foci of T1 hypointense signal in the soft tissues surrounding the tract which could represent postsurgical changes versus small foci of soft tissue gas.  In the superficial subcutaneous tissues, just posterior to the wound on series 11 images 19 and 20, there is a small rim-enhancing collection measuring approximately 12 x 14 x 7 mm. This is suspicious for a small abscess and may communicate to the sinus tract. There is also an irregular T2 hyperintense signal focus with peripheral enhancement extending anteriorly from the tract in the soft tissues distal to the remaining first metatarsal as seen on series 6 image 12 and series 8 image 27 measuring approximately 9 x 14 x 15 mm which may also represent a small abscess, possibly communicating to the sinus tract.  There is T2 hyperintense and T1 hypointense signal at the remaining distal first metatarsal adjacent to the wound consistent with osteomyelitis. There is also T2 hyperintense edema signal and corresponding T1 hypointense signal at the second metatarsal head and second proximal phalanx surrounding the second MTP joint consistent with osteomyelitis. The wound extends to the second MTP joint where there is para-articular enhancement consistent with joint infection.  No other definite findings of osteomyelitis or joint infection.  There appear to be findings of advanced arthropathy in the midfoot with prominent osteophytosis. Lisfranc alignment appears grossly intact. Lisfranc ligament appears grossly intact.  There is advanced  muscle atrophy with diffuse edema signal and enhancement. This could be associated with denervation or myositis. There is a small amount of fluid signal and enhancement along the extensor digitorum tendons which may indicate infectious tenosynovitis.       Impression: Impression: 1.  Status post amputation of the first digit at the distal first metatarsal. 2.  Wound at the medial forefoot with edema and enhancement surrounding a sinus tract extending to the distal first metatarsal and second MTP joint. 3.  Findings of osteomyelitis at the remaining distal first metatarsal and surrounding the second MTP joint where there are findings of joint infection. 4.  Findings of skin and soft tissue infection surrounding wound. Findings of muscle denervation and/or myositis. 5.  Two small abscesses in the soft tissues which may drain to the sinus tract.   Electronically Signed By-Hunter Steele MD On:5/20/2024 9:37 AM       LABORATORY/CULTURE RESULTS:  Results from last 7 days   Lab Units 05/22/24  0502 05/21/24  0525 05/20/24  0518   WBC 10*3/mm3 10.92* 7.55 8.50   HEMOGLOBIN g/dL 11.2* 11.9* 11.5*   HEMATOCRIT % 36.1* 38.1 36.5*   PLATELETS 10*3/mm3 383 412 406     Results from last 7 days   Lab Units 05/22/24  0502 05/21/24  0525 05/20/24  0518 05/19/24  0532 05/18/24  0555 05/17/24  1236   SODIUM mmol/L 134* 136 136   < > 136 135*   POTASSIUM mmol/L 4.3 4.2 4.4   < > 4.1 4.4   CHLORIDE mmol/L 99 101 99   < > 101 99   CO2 mmol/L 24.8 24.3 25.8   < > 23.9 24.8   BUN mg/dL 22* 24* 13   < > 9 11   CREATININE mg/dL 1.28* 1.39* 0.87   < > 0.75* 0.84   CALCIUM mg/dL 9.1 9.1 9.3   < > 8.7 9.4   BILIRUBIN mg/dL  --   --   --   --  0.3 0.3   ALK PHOS U/L  --   --   --   --  107 107   ALT (SGPT) U/L  --   --   --   --  5 5   AST (SGOT) U/L  --   --   --   --  7 5   GLUCOSE mg/dL 249* 161* 133*   < > 175* 229*    < > = values in this interval not displayed.         Microbiology Results (last 10 days)       Procedure Component Value -  Date/Time    Tissue / Bone Culture - Bone, Toe, Right [[id number]] Collected: 05/21/24 1228    Lab Status: Preliminary result Specimen: Bone from Toe, Right Updated: 05/22/24 0819     Tissue Culture Culture in progress     Gram Stain Few (2+) WBCs seen      Few (2+) Gram positive cocci in pairs and chains    Blood Culture - Blood, Arm, Right [[id number]]  (Normal) Collected: 05/17/24 1415    Lab Status: Preliminary result Specimen: Blood from Arm, Right Updated: 05/21/24 1431     Blood Culture No growth at 4 days    Blood Culture - Blood, Arm, Left [[id number]]  (Normal) Collected: 05/17/24 1415    Lab Status: Preliminary result Specimen: Blood from Arm, Left Updated: 05/21/24 1431     Blood Culture No growth at 4 days    Wound Culture - Wound, Foot, Right [[id number]]  (Abnormal)  (Susceptibility) Collected: 05/13/24 1000    Lab Status: Final result Specimen: Wound from Foot, Right Updated: 05/15/24 0743     Wound Culture Scant growth (1+) Enterococcus faecalis     Gram Stain Few (2+) WBCs seen      Few (2+) Gram positive cocci in pairs    Susceptibility        Enterococcus faecalis      BETH      Ampicillin Susceptible      Vancomycin Susceptible                                    ASSESSMENT/PLAN     Active Hospital Problems:  Active Hospital Problems    Diagnosis     **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     Chronic osteomyelitis of right foot with draining sinus          Patient examined and evaluated  Follow cultures, continue IV antibiotics  No further surgical intervention planned at this time  Daily dressing changes to right foot  Nonweightbearing to right lower extremity      Discussed findings and treatment plan including risks, benefits, and treatment options with patient in detail. Patient agreed with treatment plan.      This document has been electronically signed by Balwinder Costa DPM on May 22, 2024 13:18 EDT               [Media Unavailable] Scan on 5/22/2024 1135 by Balwinder Costa,  [place]          Electronically signed by Balwinder Costa DPM at 24 1319       Art Stevenson MD at 24 1055          [facility] Hospitalist Progress Note  Date: 2024  Patient Name: Jd Velazquez  : 1965  MRN: [id number]  Date of admission: 2024  Consultants:   -Podiatry: Dr. Mushtaq Mathew, Dr. Balwinder Costa    Subjective   Subjective     Chief Complaint: Right foot osteo versus abscess leading to direct admission     Summary:   Jd Velazquez is a 58 y.o. male with CAD, type 2 diabetes mellitus, dyslipidemia, hyperkalemia, peripheral vascular disease and obesity (BMI: 32.36) that presented with worsening right foot wound s/p amputation of great toe in 2024. Podiatry consulted to assist in care. Broad-spectrum antibiotics started.  MRI imaging obtained that demonstrated evidence of osteomyelitis involving distal first metatarsal and surrounding second MTP joint as well as 2 small abscesses in the soft tissue.  Patient taken the OR for secondary amputation of the right foot and incision and drainage of right foot on 2024.    Interval Followup:   No acute issues overnight.  Patient's pain being well-controlled with as needed oxycodone.  Patient denies any chest pain or shortness of breath.    Procedures:  -Secondary amputation right foot, incision and drainage right foot (2024)    Antibiotics:   -Vancomycin  -Zosyn    Objective   Objective     Vitals:   Temp:  [97.3 °F (36.3 °C)-98.4 °F (36.9 °C)] 98 °F (36.7 °C)  Heart Rate:  [46-75] 56  Resp:  [14-18] 18  BP: (111-144)/(45-71) 135/67  Flow (L/min):  [0-4] 0  Physical Exam   Gen: No acute distress, conversant, pleasant, sitting up in chair bedside  Resp: CTAB, No w/r/r, good aeration, equal chest rise bilaterally  Card: RRR, No m/r/g  Abd: Soft, Nontender, Nondistended, + bowel sounds    Result Review    Result Review:  I have personally reviewed the results as below and agree with these  findings:  []  Laboratory:   CMP          5/20/2024    05:18 5/21/2024    05:25 5/22/2024    05:02   CMP   Glucose 133  161  249    BUN 13  24  22    Creatinine 0.87  1.39  1.28    EGFR 100.6  59.1  65.3    Sodium 136  136  134    Potassium 4.4  4.2  4.3    Chloride 99  101  99    Calcium 9.3  9.1  9.1    BUN/Creatinine Ratio 14.9  17.3  17.2    Anion Gap 11.2  10.7  10.2      CBC          5/20/2024    05:18 5/21/2024    05:25 5/22/2024    05:02   CBC   WBC 8.50  7.55  10.92    RBC 3.97  4.09  3.85    Hemoglobin 11.5  11.9  11.2    Hematocrit 36.5  38.1  36.1    MCV 91.9  93.2  93.8    MCH 29.0  29.1  29.1    MCHC 31.5  31.2  31.0    RDW 14.6  14.9  15.1    Platelets 406  412  383    Magnesium within normal limits.  Mild hypoglycemia this a.m.  [x]  Microbiology: Blood culture (05/17/2024): No growth to date  []  Radiology:   []  EKG/Telemetry:    []  Cardiology/Vascular:    []  Pathology:  []  Old records:  []  Other:    Assessment & Plan   Assessment / Plan     Assessment:  Right foot diabetic ulcer with concern for osteomyelitis  Type 2 diabetes mellitus with hyperglycemia  Peripheral vascular disease  Hypomagnesemia  Acute renal failure (creatinine elevated to 1.39 from 0.87 in 24 hours)  CAD  Obesity (BMI: 33.36)    Plan:  -Podiatry consulted and following, appreciate assistance and recommendations in the care of this patient.  -Continue vancomycin and Zosyn.  Tailor antibiotics based on results of infectious workup.  Surgical pathology pending.  Monitor vancomycin level.  -No change to insulin regimen.  -Continue aspirin and Plavix  -Management discussed with podiatry.  Patient to work with PT/OT services.  -Continue appropriate home medications  -Monitor electrolytes and renal function with BMP and magnesium level in the AM  -Monitor WBC and Hgb with CBC in the AM  -Clinical course will dictate further management     DVT Prophylaxis: Lovenox  Diet:   Diet Order   Procedures    Diet: Regular/House, Diabetic;  Consistent Carbohydrate; Fluid Consistency: Thin (IDDSI 0)     Dispo: Pending clinical course     Personally reviewed patients labs and imaging, discussed with patient and nurse at bedside. Discussed management with the following consultants: Podiatry.     Part of this note may be an electronic transcription/translation of spoken language to printed text using the Dragon dictation system.    DVT prophylaxis:  Medical DVT prophylaxis orders are present.        CODE STATUS:   Level Of Support Discussed With: Patient  Code Status (Patient has no pulse and is not breathing): CPR (Attempt to Resuscitate)  Medical Interventions (Patient has pulse or is breathing): Full Support        Electronically signed by Art Stevenson MD, 5/22/2024, 10:58 EDT.    Electronically signed by Art Stevenson MD at 05/22/24 1100       Consult Notes (last 72 hours)  Notes from 05/21/24 1313 through 05/24/24 1313   No notes of this type exist for this encounter.

## 2024-05-24 NOTE — CONSULTS
Met with patient at bedside. Discussed how to apply the Dexcom G7 as well as watched an instructional video. Patient feels like he would easily be able to do this at home. No further questions or assistance needed. Awaiting meds to be delivered from pharmacy.

## 2024-05-24 NOTE — PLAN OF CARE
Goal Outcome Evaluation: Patient alert and oriented, able to make needs known.  Patient not able to sleep much this shift, stated that ambien did not work for him and that hopefully he will get to go home today.  Patient with heart rate in 50s this shift, Lopressor held, hospitalist notified.  Patient with no needs or concerns at this time.  Patient educated on elevating legs and feet due to dependent swelling, patient voiced understanding and did elevate for a while.  Patient blood glucose this shift was 163.  Dressing to RLE remains clean dry and intact.  Continue plan of care.

## 2024-05-24 NOTE — DISCHARGE SUMMARY
[facility]         HOSPITALIST  DISCHARGE SUMMARY    Patient Name: Jd Velazquez  : 1965  MRN: [id number]    Date of Admission: 2024  Date of Discharge:  2024  Primary Care Physician: Dacia Newsome APRN    Consults       Date and Time Order Name Status Description    2024  1:31 PM Inpatient Podiatry Consult Completed             Active and Resolved Hospital Problems:  Active Hospital Problems    Diagnosis POA    **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 [L97.513] Yes    Chronic osteomyelitis of right foot with draining sinus [M86.471] Unknown      Resolved Hospital Problems   No resolved problems to display.       Hospital Course     Hospital Course:  Jd Velazquez is a 58 y.o. male with CAD, type 2 diabetes mellitus, dyslipidemia, hyperkalemia, peripheral vascular disease and obesity (BMI: 32.36) that presented with worsening right foot wound s/p amputation of great toe in 2024. Podiatry consulted to assist in care. Broad-spectrum antibiotics started. MRI imaging obtained that demonstrated evidence of osteomyelitis involving distal first metatarsal and surrounding second MTP joint as well as 2 small abscesses in the soft tissue. Patient taken the OR for second ray amputation of the right foot; incision and drainage of right foot on 2024.  Podiatry saw the patient throughout the hospitalization.  Wound culture grew Enterococcus faecalis which was also present on bone culture which was susceptible to ampicillin and vancomycin.   from infectious disease was consulted, recommended Augmentin for total of 14 days from the time of surgery.  Podiatry recommended no weightbearing to the right lower extremity.  PT/OT evaluated home.  Patient and his daughter also wants to go home for which home health was arranged.  Does not want to do outpatient wound care for which they are willing to do daily dressing and home health will help with  wound care.    Patient is being discharged home with home health.  Patient stable at the time of discharge.  Patient will follow-up with PCP in 3-7 days, needs CBC and chemistries trended during follow-up.  Follow-up with podiatry as outpatient.  Fall precautions.  No weightbearing on right lower extremity.  Wound care as recommended.          DISCHARGE Follow Up Recommendations for labs and diagnostics: As mentioned above.      Day of Discharge     Vital Signs:  Temp:  [98.1 °F (36.7 °C)-98.2 °F (36.8 °C)] 98.1 °F (36.7 °C)  Heart Rate:  [51-58] 51  Resp:  [18] 18  BP: (118-143)/(62-69) 130/62  Physical Exam:   General: Awake, alert, NAD  Skin: dressing present over surgical site.  Clean.  Neuro: Alert, awake, oriented x 3; speech clear; no tremor    Discharge Details        Discharge Medications        New Medications        Instructions Start Date   amoxicillin-clavulanate 875-125 MG per tablet  Commonly known as: AUGMENTIN   1 tablet, Oral, 2 Times Daily      Dexcom G7 Sensor misc   1 each, Does not apply, Every 10 Days      oxyCODONE 5 MG immediate release tablet  Commonly known as: ROXICODONE   5 mg, Oral, Every 8 Hours PRN             Changes to Medications        Instructions Start Date   metoprolol tartrate 50 MG tablet  Commonly known as: LOPRESSOR  What changed:   medication strength  how much to take   50 mg, Oral, Every 12 Hours      traZODone 100 MG tablet  Commonly known as: DESYREL  What changed:   when to take this  reasons to take this   100 mg, Oral, Nightly             Continue These Medications        Instructions Start Date   aspirin 81 MG chewable tablet   81 mg, Oral, Daily, PER DR CHILANGO LOGAN TO CONTINUE ASA UP TO AND INCLUDING DAY OF SURGERY      BASAGLAR KWIKPEN 100 UNIT/ML injection pen   30 Units, Subcutaneous, Nightly      clopidogrel 75 MG tablet  Commonly known as: Plavix   75 mg, Oral, Daily      Cymbalta 30 MG capsule  Generic drug: DULoxetine   30 mg, Oral, Daily       lisinopril-hydrochlorothiazide 20-12.5 MG per tablet  Commonly known as: PRINZIDE,ZESTORETIC   2 tablets, Oral, Daily      metFORMIN  MG 24 hr tablet  Commonly known as: GLUCOPHAGE-XR   500 mg, Oral, Daily With Breakfast      pregabalin 225 MG capsule  Commonly known as: LYRICA   225 mg, Oral, 2 Times Daily      spironolactone 50 MG tablet  Commonly known as: ALDACTONE   50 mg, Oral, Daily      zolpidem 10 MG tablet  Commonly known as: AMBIEN   10 mg, Oral, Nightly PRN             Stop These Medications      ampicillin 500 MG capsule  Commonly known as: PRINCIPEN              Allergies   Allergen Reactions    Strawberry Itching    Lortab [Hydrocodone-Acetaminophen] Itching       Discharge Disposition:  Home-Health Care [facility] [place]    Diet:  Hospital:  Diet Order   Procedures    Diet: Regular/House, Diabetic; Consistent Carbohydrate; Fluid Consistency: Thin (IDDSI 0)       Discharge Activity:   Activity Instructions       Other Activity Instructions      Activity Instructions: No weight bearing on right foot until cleared by physician as outpatient.            CODE STATUS:  Code Status and Medical Interventions:   Ordered at: 05/17/24 1340     Level Of Support Discussed With:    Patient     Code Status (Patient has no pulse and is not breathing):    CPR (Attempt to Resuscitate)     Medical Interventions (Patient has pulse or is breathing):    Full Support       Future Appointments   Date Time Provider Department Center   5/28/2024  8:00 AM Dacia Newsome APRN [facility]   8/7/2024 10:00 AM [facility]       Additional Instructions for the Follow-ups that You Need to Schedule       Discharge Follow-up with PCP   As directed       Currently Documented PCP:    Dacia Newsome APRN    PCP Phone Number:    [phone]     Follow Up Details: In 3-7 days; needs CBC and chemsitries trended during follow up.                Pertinent  and/or Most Recent Results      PROCEDURES:       LAB RESULTS:      Lab 05/24/24  0541 05/23/24  0703 05/22/24  0502 05/21/24  0525 05/20/24  0518 05/19/24  0532 05/18/24  0555 05/17/24  1236 05/17/24  1233 05/17/24  1233 05/17/24  1232   WBC 8.16 8.56 10.92* 7.55 8.50   < > 6.13  --    < > 8.76  --    HEMOGLOBIN 11.5* 11.7* 11.2* 11.9* 11.5*   < > 11.3*  --    < > 11.5*  --    HEMATOCRIT 36.5* 36.8* 36.1* 38.1 36.5*   < > 35.5*  --    < > 36.2*  --    PLATELETS 353 368 383 412 406   < > 393  --    < > 413  --    NEUTROS ABS 3.87  --   --   --   --   --  2.52  --   --   --   --    IMMATURE GRANS (ABS) 0.09*  --   --   --   --   --  0.06*  --   --   --   --    LYMPHS ABS 2.61  --   --   --   --   --  2.49  --   --   --   --    MONOS ABS 1.20*  --   --   --   --   --  0.88  --   --   --   --    EOS ABS 0.30  --   --   --   --   --  0.12  --   --   --   --    MCV 93.4 93.6 93.8 93.2 91.9   < > 91.3  --    < > 91.9  --    SED RATE  --   --   --   --   --   --   --   --   --  26*  --    CRP  --   --   --   --   --   --   --  2.99*  --   --   --    PROCALCITONIN  --   --   --   --   --   --   --  0.06  --   --   --    LACTATE  --   --   --   --   --   --   --   --   --   --  1.8    < > = values in this interval not displayed.         Lab 05/24/24 0541 05/23/24 0703 05/22/24 0502 05/21/24  0525 05/20/24  0518 05/19/24  0532   SODIUM 138 139 134* 136 136 133*   POTASSIUM 3.8 3.8 4.3 4.2 4.4 4.1   CHLORIDE 101 104 99 101 99 97*   CO2 26.8 25.1 24.8 24.3 25.8 24.6   ANION GAP 10.2 9.9 10.2 10.7 11.2 11.4   BUN 16 19 22* 24* 13 12   CREATININE 1.27 1.25 1.28* 1.39* 0.87 0.75*   EGFR 65.9 67.2 65.3 59.1* 100.6 105.3   GLUCOSE 131* 141* 249* 161* 133* 153*   CALCIUM 9.4 9.2 9.1 9.1 9.3 9.2   MAGNESIUM 1.8 1.8 1.9 2.1 1.7 2.0   PHOSPHORUS 3.6 3.4  --  4.2 4.1 3.0         Lab 05/18/24  0555 05/17/24  1236   TOTAL PROTEIN 7.1 7.3   ALBUMIN 3.5 3.7   GLOBULIN 3.6 3.6   ALT (SGPT) 5 5   AST (SGOT) 7 5   BILIRUBIN 0.3 0.3   ALK PHOS 107 107                      Brief Urine Lab Results  (Last result in the past 365 days)        Color   Clarity   Blood   Leuk Est   Nitrite   Protein   CREAT   Urine HCG        03/04/24 0801             22.2         03/04/24 0801 Yellow   Clear   Negative   Negative   Negative   Negative                 Microbiology Results (last 10 days)       Procedure Component Value - Date/Time    Tissue / Bone Culture - Bone, Toe, Right [[id number]]  (Abnormal)  (Susceptibility) Collected: 05/21/24 1228    Lab Status: Final result Specimen: Bone from Toe, Right Updated: 05/24/24 0636     Tissue Culture Light growth (2+) Enterococcus faecalis     Gram Stain Few (2+) WBCs seen      Few (2+) Gram positive cocci in pairs and chains    Susceptibility        Enterococcus faecalis      BETH      Ampicillin Susceptible      Vancomycin Susceptible                           Blood Culture - Blood, Arm, Right [[id number]]  (Normal) Collected: 05/17/24 1415    Lab Status: Final result Specimen: Blood from Arm, Right Updated: 05/22/24 1430     Blood Culture No growth at 5 days    Blood Culture - Blood, Arm, Left [[id number]]  (Normal) Collected: 05/17/24 1415    Lab Status: Final result Specimen: Blood from Arm, Left Updated: 05/22/24 1430     Blood Culture No growth at 5 days            XR Foot 3+ View Right    Result Date: 5/22/2024  1. Postsurgical changes of recent transmetatarsal amputation of the first and second digits. 2. Extensive degenerative hindfoot and midfoot arthropathy.  Electronically Signed By-David Petty MD On:5/22/2024 1:03 PM      MRI Foot Right With & Without Contrast    Result Date: 5/20/2024  Impression: 1.  Status post amputation of the first digit at the distal first metatarsal. 2.  Wound at the medial forefoot with edema and enhancement surrounding a sinus tract extending to the distal first metatarsal and second MTP joint. 3.  Findings of osteomyelitis at the remaining distal first metatarsal and surrounding the second MTP joint where  there are findings of joint infection. 4.  Findings of skin and soft tissue infection surrounding wound. Findings of muscle denervation and/or myositis. 5.  Two small abscesses in the soft tissues which may drain to the sinus tract.   Electronically Signed By-Hunter Steele MD On:5/20/2024 9:37 AM        Results for orders placed during the hospital encounter of 03/27/24    Duplex Lower Extremity Art / Grafts - Right CAR    Interpretation Summary    Right fem-tib bypass graft is patent without elevated velocities or gray scale/color flow evidence of stenosis.  Proximal and distal anastamosis are widely patent.    Proximal right SFA stenosis with eventual occlusion of the distal right popliteal artery.      Results for orders placed during the hospital encounter of 03/27/24    Duplex Lower Extremity Art / Grafts - Right CAR    Interpretation Summary    Right fem-tib bypass graft is patent without elevated velocities or gray scale/color flow evidence of stenosis.  Proximal and distal anastamosis are widely patent.    Proximal right SFA stenosis with eventual occlusion of the distal right popliteal artery.      Results for orders placed in visit on 10/08/20    Emergent/Open-Heart Anesthesia BRET    Narrative  Procedure Performed: Emergent/Open-Heart Anesthesia BRET  Start Time:  10/8/2020 7:35 AM  End Time:   10/8/2020 7:46 AM    Preanesthesia Checklist:  Patient identified, IV assessed, risks and benefits discussed, monitors and equipment assessed, procedure being performed at surgeon's request and anesthesia consent obtained.    General Procedure Information  Diagnostic Indications for Echo:  assessment of ascending aorta, assessment of surgical repair and hemodynamic monitoring  Physician Requesting Echo: Jr Girma Chiu MD  CPT Code:  Atherosclerosis of aorta  Location performed:  OR  Intubated  Bite block placed  Heart visualized  Probe Insertion:  Easy  Probe Type:  Multiplane  Modalities:  Color flow mapping,  pulse wave Doppler and continuous wave Doppler    Echocardiographic and Doppler Measurements    Ventricles    Right Ventricle:  Cavity size normal.  Thrombus not present.  Global function normal.  Left Ventricle:  Cavity size normal.  Hypertrophy present.  Thrombus not present.  Global Function mildly impaired.  Ejection Fraction 55%.        Valves    Aortic Valve:  Annulus normal.  Stenosis not present.  Mean Gradient: 9 mmHg.  Regurgitation mild.  Leaflets normal.  Leaflet motions normal.    Mitral Valve:  Annulus normal.  Stenosis not present.  Regurgitation mild.  Leaflets normal and thickened.  Leaflet motions normal.    Tricuspid Valve:  Annulus normal.  Stenosis not present.  Regurgitation absent.  Leaflets normal.  Leaflet motions normal.  Pulmonic Valve:  Annulus normal.  Regurgitation absent.        Aorta    Ascending Aorta:  Size normal.  Diameter 3.5 cm.  Dissection not present.  Plaque thickness less than 3 mm.  Mobile plaque not present.  Aortic Arch:  Size normal.  Diameter 2.6 cm.  Dissection not present.  Plaque thickness less than 3 mm.  Mobile plaque not present.  Descending Aorta:  Size normal.  Dissection not present.  Plaque thickness less than 3 mm.  Mobile plaque not present.        Atria    Right Atrium:  Spontaneous echo contrast not present.  Thrombus not present.  Tumor not present.  Device not present.    Left Atrium:  Spontaneous echo contrast not present.  Thrombus not present.  Tumor not present.  Device not present.  Left atrial appendage normal.      Septa    Atrial Septum:  Intra-atrial septal morphology aneurysmal.        Diastolic Function Measurements:  Diastolic Dysfunction Grade=  E= ms  A= ms  E/A Ratio= 1  DT= ms  S/D= 1  IVRT=    Other Findings  Pericardium:  normal  Pleural Effusion:  none  Pulmonary Venous Flow:  normal    Anesthesia Information  Performed Personally  Anesthesiologist:  Delbert Cornell MD      Echocardiogram Comments:  Postbypass results:  Post  bypass  Normal RHF  LVEF 55-60% via visual estimation  Mild AI mild MR no AS no MS  No TR or TS      Labs Pending at Discharge:        Time spent on Discharge including face to face service:  35 minutes    Electronically signed by Chaitanya Sapp MD, 05/24/24, 8:15 AM EDT.

## 2024-05-24 NOTE — OUTREACH NOTE
Prep Survey      Flowsheet Row Responses   [facility] patient discharged from? [facility]   Is LACE score < 7 ? No   Eligibility [facility]   Date of Admission 05/17/24   Date of Discharge 05/24/24   Discharge Disposition Home-Health Care [facility]   Discharge diagnosis SECOND RAY AMPUTATION RIGHT FOOT, INCISION AND DRAINAGE RIGHT FOOT   Does the patient have one of the following disease processes/diagnoses(primary or secondary)? General Surgery   Does the patient have Home health ordered? Yes   [facility]   Is there a DME ordered? No   Prep survey completed? Yes            Lilly CURIEL - Registered Nurse

## 2024-05-24 NOTE — PROGRESS NOTES
Transitional Care Follow Up Visit     Patient Name: Jd Velazquez  : 1965   MRN: [id number]     Chief Complaint:    Chief Complaint   Patient presents with    Foot Pain    Hospital Follow Up Visit    Coronary Artery Disease    Diabetes    Hyperlipidemia    Hypertension    Insomnia       History of Present Illness: Jd Velazquez is a 58 y.o. male who presents today for transitional care management visit.  The patient was contacted by our office within 48 hours after the discharge of the patient via telephone to coordinate their follow up care and needs.     presented with worsening right foot wound s/p amputation of great toe in 2024    Date of Admission: 2024  Date of Discharge:  2024       Active and Resolved Hospital Problems:       Active Hospital Problems     Diagnosis POA    **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 [L97.513] Yes    Chronic osteomyelitis of right foot with draining sinus [M86.471] Unknown           Follow up with  Podiatry 24  He says he is doing well and hasn't had too much pain.         Subjective      Review of Systems:   Review of Systems   Constitutional:  Negative for fever.   Respiratory:  Negative for cough.    Cardiovascular:  Negative for chest pain.   Musculoskeletal:  Positive for arthralgias.       I reviewed and discussed the details of that call along with the discharge summary, hospital problems, inpatient lab results, inpatient diagnostic studies, and consultation reports with Jd Velazquez.     Current outpatient and discharge medications have been reconciled for the patient.      2024     5:25 PM   Date of TCM Phone Call   [facility]   Date of Admission 2024   Date of Discharge 2024   Discharge Disposition Home-Health Care [facility]       Medications:     Current Outpatient Medications:     amoxicillin-clavulanate (AUGMENTIN) 875-125 MG per tablet, Take 1 tablet by mouth 2 (Two) Times a Day  for 11 days., Disp: 22 tablet, Rfl: 0    aspirin 81 MG chewable tablet, Chew 1 tablet Daily. PER DR CHILANGO LOGAN TO CONTINUE ASA UP TO AND INCLUDING DAY OF SURGERY, Disp: , Rfl:     clopidogrel (Plavix) 75 MG tablet, Take 1 tablet by mouth Daily for 180 days., Disp: 30 tablet, Rfl: 5    Continuous Glucose Sensor (Dexcom G7 Sensor) misc, Use 1 each Every 10 (Ten) Days for 30 days., Disp: 3 each, Rfl: 0    Continuous Glucose Sensor (Dexcom G7 Sensor) misc, apply sensor to skin for continuous blood sugar monitoring replace every 10 days, Disp: 3 each, Rfl: 0    Cymbalta 30 MG capsule, Take 1 capsule by mouth Daily., Disp: , Rfl:     Insulin Glargine (BASAGLAR KWIKPEN) 100 UNIT/ML injection pen, Inject 30 Units under the skin into the appropriate area as directed Every Night., Disp: 9 mL, Rfl: 5    lisinopril-hydrochlorothiazide (PRINZIDE,ZESTORETIC) 20-12.5 MG per tablet, Take 2 tablets by mouth Daily., Disp: 180 tablet, Rfl: 3    metFORMIN ER (GLUCOPHAGE-XR) 500 MG 24 hr tablet, Take 1 tablet by mouth Daily With Breakfast., Disp: , Rfl:     metoprolol tartrate (LOPRESSOR) 50 MG tablet, Take 1 tablet by mouth Every 12 (Twelve) Hours for 30 days., Disp: 60 tablet, Rfl: 0    pregabalin (LYRICA) 225 MG capsule, Take 1 capsule by mouth 2 (Two) Times a Day., Disp: , Rfl:     spironolactone (ALDACTONE) 50 MG tablet, Take 1 tablet by mouth Daily., Disp: , Rfl:     traZODone (DESYREL) 100 MG tablet, TAKE ONE TABLET BY MOUTH ONCE NIGHTLY, Disp: 30 tablet, Rfl: 1    zolpidem (AMBIEN) 10 MG tablet, Take 1 tablet by mouth At Night As Needed for Sleep., Disp: 90 tablet, Rfl: 0    Evolocumab (REPATHA) solution prefilled syringe injection, Inject 1 mL under the skin into the appropriate area as directed., Disp: , Rfl:     levothyroxine (SYNTHROID, LEVOTHROID) 25 MCG tablet, Take 1 tablet by mouth Every Morning., Disp: , Rfl:     Allergies:   Allergies   Allergen Reactions    Strawberry Itching    Lortab [Hydrocodone-Acetaminophen]  "Itching       Hospital Course Information:  Risk for Readmission (LACE) Score: 15 (5/24/2024  6:00 AM)       Course During Hospital Stay:        PMH, PSH, Care Team, Medications including OTC/CAM and Allergies reviewed and updated as appropriate.     Podiatry consulted to assist in care. Broad-spectrum antibiotics started. MRI imaging obtained that demonstrated evidence of osteomyelitis involving distal first metatarsal and surrounding second MTP joint as well as 2 small abscesses in the soft tissue. Patient taken the OR for second ray amputation of the right foot; incision and drainage of right foot on 5/21/2024.  Podiatry saw the patient throughout the hospitalization.  Wound culture grew Enterococcus faecalis which was also present on bone culture which was susceptible to ampicillin and vancomycin.   from infectious disease was consulted, recommended Augmentin for total of 14 days from the time of surgery.  Podiatry recommended no weightbearing to the right lower extremity.  PT/OT evaluated home.  Patient and his daughter also wants to go home for which home health was arranged.  Does not want to do outpatient wound care for which they are willing to do daily dressing and home health will help with wound care.     Patient is being discharged home with home health.  Patient stable at the time of discharge.  Patient will follow-up with PCP in 3-7 days, needs CBC and chemistries trended during follow-up.  Follow-up with podiatry as outpatient.  Fall precautions.  No weightbearing on right lower extremity.  Wound care as recommended.       Objective     Physical Exam:  Vital Signs:   Vitals:    05/28/24 0753   BP: 134/76   Pulse: 58   Temp: 97.4 °F (36.3 °C)   SpO2: 98%   Weight: 106 kg (234 lb)   Height: 180.3 cm (71\")     Body mass index is 32.64 kg/m².     Physical Exam  Neck:      Vascular: No carotid bruit.   Cardiovascular:      Rate and Rhythm: Normal rate and regular rhythm.      Heart sounds: Normal " heart sounds. No murmur heard.  Pulmonary:      Effort: Pulmonary effort is normal.      Breath sounds: Normal breath sounds.   Musculoskeletal:      Right lower leg: No edema.      Left lower leg: No edema.   Skin:     General: Skin is warm and dry.      Comments: Dressing in place right foot, no drainage on outside of dressing, post op shoe in place    Neurological:      Mental Status: He is alert.      Gait: Gait normal.         Assessment / Plan      Assessment/Plan:   Diagnoses and all orders for this visit:    1. Type 2 diabetes mellitus with hyperglycemia, without long-term current use of insulin (Primary)  -     Hemoglobin A1c  -     Microalbumin / Creatinine Urine Ratio - Urine, Clean Catch  -     Urinalysis With Culture If Indicated -    2. Chronic osteomyelitis of right foot with draining sinus    3. Primary hypertension    4. Hyperlipidemia LDL goal <70  -     Lipid Panel    5. Coronary artery disease of native artery of native heart with stable angina pectoris    6. Anemia, unspecified type  -     CBC Auto Differential  -     Iron Profile  -     Ferritin  -     Vitamin B12    7. Acquired hypothyroidism  -     TSH    8. Vitamin D deficiency  -     Vitamin D,25-Hydroxy    9. Statin intolerance       Diabetes mellitus type 2 with hyperglycemia obtain hemoglobin A1c to monitor patient reports his average glucose reading at home is 156 will continue Basaglar insulin at this time and metformin  Hyperlipidemia currently on Repatha by cardiology reported poor compliance patient is severely statin intolerant will obtain lipid panel to monitor his LDL goal is less than 70  Coronary artery disease currently on Plavix aspirin Repatha per cardiology  Hypertension currently controlled lisinopril-hydrochlorothiazide  Chronic osteomyelitis of right foot with draining sinus patient scheduled to follow-up with podiatry dressing in place as educated by discharge instructions  Anemia will obtain labs monitor no current  "supplementation at this time  Hypothyroidism will obtain TSH to monitor synthroid 25 mcg po daily denies palpitations            I  Follow Up:   Return in about 3 months (around 8/28/2024).      Jerod Pederson, SOHAM    \"Please note that portions of this note were completed with a voice recognition program.\"      *Note: 45807 is for high complexity patients with a face to face visit within 7 days of discharge.  [id number] is for high complexity patients with a face to face on days 8-14 post discharge or medium complexity with face to face visit within 14 days post discharge.   "

## 2024-05-26 ENCOUNTER — TRANSITIONAL CARE MANAGEMENT TELEPHONE ENCOUNTER (OUTPATIENT)
Dept: CALL CENTER | Facility: HOSPITAL | Age: 59
End: 2024-05-26
Payer: COMMERCIAL

## 2024-05-26 NOTE — OUTREACH NOTE
Call Center TCM Note      Flowsheet Row Responses   [facility] patient discharged from? [facility]   Does the patient have one of the following disease processes/diagnoses(primary or secondary)? General Surgery   TCM attempt successful? Yes   Call start time 1434   Call end time 1442   Is patient permission given to speak with other caregiver? Yes   List who call center can speak with Daughter and spouse.   Person spoke with today (if not patient) and relationship DaughterDimple with patient present.   Meds reviewed with patient/caregiver? Yes   Is the patient having any side effects they believe may be caused by any medication additions or changes? No   Does the patient have all medications related to this admission filled (includes all antibiotics, pain medications, etc.) Yes   Is the patient taking all medications as directed (includes completed medication regime)? Yes   Comments PCP hospital f/u appt 05/28/24. Podiatry appt 06/07/24. Cardiology appt 08/07/24.   Does the patient have an appointment with their PCP within 7-14 days of discharge? Yes   Has home health visited the patient within 72 hours of discharge? No   Home health comments Daughter states will contact [facility] if does not receive a call from them tomorrow. Daughter states is doing well currently without HH. Daughter changed dressing with no s/s of infection noted. Daughter is a medical assistant.   Psychosocial issues? No   Did the patient receive a copy of their discharge instructions? Yes   Nursing interventions Reviewed instructions with patient   What is the patient's perception of their health status since discharge? Improving   Nursing interventions Nurse provided patient education   Is the patient /caregiver able to teach back basic post-op care? Practice 'cough and deep breath', Drive as instructed by MD in discharge instructions, Take showers only when approved by MD-sponge bathe until then, No tub bath, swimming, or hot tub until  instructed by MD, Keep incision areas clean,dry and protected, Do not remove steri-strips, Lifting as instructed by MD in discharge instructions   Is the patient/caregiver able to teach back signs and symptoms of incisional infection? Increased redness, swelling or pain at the incisonal site, Increased drainage or bleeding, Incisional warmth, Pus or odor from incision, Fever   Is the patient/caregiver able to teach back steps to recovery at home? Set small, achievable goals for return to baseline health, Rest and rebuild strength, gradually increase activity, Practice good oral hygiene, Eat a well-balance diet   If the patient is a current smoker, are they able to teach back resources for cessation? Smoking cessation medications, [id number]   Is the patient/caregiver able to teach back the hierarchy of who to call/visit for symptoms/problems? PCP, Specialist, Home health nurse, Urgent Care, ED, 911 Yes   TCM call completed? Yes   Wrap up additional comments DaughterDimple, states patient is more rested, improving. Denies any s/s of infection at incision. Denies any needs or concerns today. TCM complete.   Call end time 1442   Would this patient benefit from a Referral to [facility] Social Work? No   Is the patient interested in additional calls from an ambulatory ? No            Pam Novak RN    5/26/2024, 14:43 EDT

## 2024-05-28 ENCOUNTER — LAB (OUTPATIENT)
Dept: LAB | Facility: HOSPITAL | Age: 59
End: 2024-05-28
Payer: COMMERCIAL

## 2024-05-28 ENCOUNTER — OFFICE VISIT (OUTPATIENT)
Dept: FAMILY MEDICINE CLINIC | Facility: CLINIC | Age: 59
End: 2024-05-28
Payer: COMMERCIAL

## 2024-05-28 VITALS
BODY MASS INDEX: 32.76 KG/M2 | DIASTOLIC BLOOD PRESSURE: 76 MMHG | WEIGHT: 234 LBS | HEART RATE: 58 BPM | HEIGHT: 71 IN | TEMPERATURE: 97.4 F | SYSTOLIC BLOOD PRESSURE: 134 MMHG | OXYGEN SATURATION: 98 %

## 2024-05-28 DIAGNOSIS — I25.118 CORONARY ARTERY DISEASE OF NATIVE ARTERY OF NATIVE HEART WITH STABLE ANGINA PECTORIS: ICD-10-CM

## 2024-05-28 DIAGNOSIS — E11.65 TYPE 2 DIABETES MELLITUS WITH HYPERGLYCEMIA, WITHOUT LONG-TERM CURRENT USE OF INSULIN: ICD-10-CM

## 2024-05-28 DIAGNOSIS — E03.9 ACQUIRED HYPOTHYROIDISM: ICD-10-CM

## 2024-05-28 DIAGNOSIS — I10 PRIMARY HYPERTENSION: ICD-10-CM

## 2024-05-28 DIAGNOSIS — E78.5 HYPERLIPIDEMIA LDL GOAL <70: ICD-10-CM

## 2024-05-28 DIAGNOSIS — E55.9 VITAMIN D DEFICIENCY: ICD-10-CM

## 2024-05-28 DIAGNOSIS — Z78.9 STATIN INTOLERANCE: ICD-10-CM

## 2024-05-28 DIAGNOSIS — D64.9 ANEMIA, UNSPECIFIED TYPE: ICD-10-CM

## 2024-05-28 DIAGNOSIS — E11.65 TYPE 2 DIABETES MELLITUS WITH HYPERGLYCEMIA, WITHOUT LONG-TERM CURRENT USE OF INSULIN: Primary | ICD-10-CM

## 2024-05-28 DIAGNOSIS — M86.471 CHRONIC OSTEOMYELITIS OF RIGHT FOOT WITH DRAINING SINUS: ICD-10-CM

## 2024-05-28 LAB
25(OH)D3 SERPL-MCNC: 21.5 NG/ML (ref 30–100)
ALBUMIN SERPL-MCNC: 4.4 G/DL (ref 3.5–5.2)
ALBUMIN/GLOB SERPL: 1.2 G/DL
ALP SERPL-CCNC: 103 U/L (ref 39–117)
ALT SERPL W P-5'-P-CCNC: 5 U/L (ref 1–41)
ANION GAP SERPL CALCULATED.3IONS-SCNC: 13 MMOL/L (ref 5–15)
AST SERPL-CCNC: <5 U/L (ref 1–40)
BASOPHILS # BLD AUTO: 0.12 10*3/MM3 (ref 0–0.2)
BASOPHILS NFR BLD AUTO: 1.6 % (ref 0–1.5)
BILIRUB SERPL-MCNC: 0.3 MG/DL (ref 0–1.2)
BUN SERPL-MCNC: 23 MG/DL (ref 6–20)
BUN/CREAT SERPL: 16.8 (ref 7–25)
CALCIUM SPEC-SCNC: 9.9 MG/DL (ref 8.6–10.5)
CHLORIDE SERPL-SCNC: 101 MMOL/L (ref 98–107)
CHOLEST SERPL-MCNC: 182 MG/DL (ref 0–200)
CO2 SERPL-SCNC: 26 MMOL/L (ref 22–29)
CREAT SERPL-MCNC: 1.37 MG/DL (ref 0.76–1.27)
DEPRECATED RDW RBC AUTO: 45.8 FL (ref 37–54)
EGFRCR SERPLBLD CKD-EPI 2021: 60.2 ML/MIN/1.73
EOSINOPHIL # BLD AUTO: 0.26 10*3/MM3 (ref 0–0.4)
EOSINOPHIL NFR BLD AUTO: 3.5 % (ref 0.3–6.2)
ERYTHROCYTE [DISTWIDTH] IN BLOOD BY AUTOMATED COUNT: 13.5 % (ref 12.3–15.4)
FERRITIN SERPL-MCNC: 209 NG/ML (ref 30–400)
GLOBULIN UR ELPH-MCNC: 3.7 GM/DL
GLUCOSE SERPL-MCNC: 172 MG/DL (ref 65–99)
HBA1C MFR BLD: 9.7 % (ref 4.8–5.6)
HCT VFR BLD AUTO: 36.6 % (ref 37.5–51)
HDLC SERPL-MCNC: 29 MG/DL (ref 40–60)
HGB BLD-MCNC: 11.6 G/DL (ref 13–17.7)
IMM GRANULOCYTES # BLD AUTO: 0.05 10*3/MM3 (ref 0–0.05)
IMM GRANULOCYTES NFR BLD AUTO: 0.7 % (ref 0–0.5)
IRON 24H UR-MRATE: 42 MCG/DL (ref 59–158)
IRON SATN MFR SERPL: 13 % (ref 20–50)
LDLC SERPL CALC-MCNC: 120 MG/DL (ref 0–100)
LDLC/HDLC SERPL: 3.99 {RATIO}
LYMPHOCYTES # BLD AUTO: 2.48 10*3/MM3 (ref 0.7–3.1)
LYMPHOCYTES NFR BLD AUTO: 33.8 % (ref 19.6–45.3)
MCH RBC QN AUTO: 28.9 PG (ref 26.6–33)
MCHC RBC AUTO-ENTMCNC: 31.7 G/DL (ref 31.5–35.7)
MCV RBC AUTO: 91.3 FL (ref 79–97)
MONOCYTES # BLD AUTO: 1.09 10*3/MM3 (ref 0.1–0.9)
MONOCYTES NFR BLD AUTO: 14.9 % (ref 5–12)
NEUTROPHILS NFR BLD AUTO: 3.34 10*3/MM3 (ref 1.7–7)
NEUTROPHILS NFR BLD AUTO: 45.5 % (ref 42.7–76)
NRBC BLD AUTO-RTO: 0 /100 WBC (ref 0–0.2)
PLATELET # BLD AUTO: 351 10*3/MM3 (ref 140–450)
PMV BLD AUTO: 9.8 FL (ref 6–12)
POTASSIUM SERPL-SCNC: 4.2 MMOL/L (ref 3.5–5.2)
PROT SERPL-MCNC: 8.1 G/DL (ref 6–8.5)
RBC # BLD AUTO: 4.01 10*6/MM3 (ref 4.14–5.8)
SODIUM SERPL-SCNC: 140 MMOL/L (ref 136–145)
T4 FREE SERPL-MCNC: 1.17 NG/DL (ref 0.93–1.7)
TIBC SERPL-MCNC: 334 MCG/DL (ref 298–536)
TRANSFERRIN SERPL-MCNC: 224 MG/DL (ref 200–360)
TRIGL SERPL-MCNC: 187 MG/DL (ref 0–150)
TSH SERPL DL<=0.05 MIU/L-ACNC: 4.77 UIU/ML (ref 0.27–4.2)
VIT B12 BLD-MCNC: 417 PG/ML (ref 211–946)
VLDLC SERPL-MCNC: 33 MG/DL (ref 5–40)
WBC NRBC COR # BLD AUTO: 7.34 10*3/MM3 (ref 3.4–10.8)

## 2024-05-28 PROCEDURE — 99495 TRANSJ CARE MGMT MOD F2F 14D: CPT | Performed by: NURSE PRACTITIONER

## 2024-05-28 PROCEDURE — 82306 VITAMIN D 25 HYDROXY: CPT | Performed by: NURSE PRACTITIONER

## 2024-05-28 PROCEDURE — 83036 HEMOGLOBIN GLYCOSYLATED A1C: CPT

## 2024-05-28 PROCEDURE — 82607 VITAMIN B-12: CPT | Performed by: NURSE PRACTITIONER

## 2024-05-28 PROCEDURE — 84466 ASSAY OF TRANSFERRIN: CPT | Performed by: NURSE PRACTITIONER

## 2024-05-28 PROCEDURE — 84439 ASSAY OF FREE THYROXINE: CPT

## 2024-05-28 PROCEDURE — 82728 ASSAY OF FERRITIN: CPT | Performed by: NURSE PRACTITIONER

## 2024-05-28 PROCEDURE — 83540 ASSAY OF IRON: CPT | Performed by: NURSE PRACTITIONER

## 2024-05-28 PROCEDURE — 80061 LIPID PANEL: CPT

## 2024-05-28 PROCEDURE — 80050 GENERAL HEALTH PANEL: CPT

## 2024-05-28 RX ORDER — LEVOTHYROXINE SODIUM 0.03 MG/1
25 TABLET ORAL
COMMUNITY

## 2024-05-29 ENCOUNTER — TELEPHONE (OUTPATIENT)
Dept: FAMILY MEDICINE CLINIC | Facility: CLINIC | Age: 59
End: 2024-05-29
Payer: COMMERCIAL

## 2024-05-29 NOTE — TELEPHONE ENCOUNTER
Patient's daughter Dimple called and said that his blood sugars are up and down. The dexcom is showing upper 200's to 300's. She said the lowest it was 170's.    She said he's still taking Vancomycin and its upsetting his stomach. He's not been eating well. I told her he needs to eat protein and several small meals.    She is asking what she can do to get his sugar down fast if she needs to? I told her if it stays high, he'll need to go back to the hospital.

## 2024-06-03 RX ORDER — TRAZODONE HYDROCHLORIDE 100 MG/1
100 TABLET ORAL NIGHTLY
Qty: 30 TABLET | Refills: 1 | Status: SHIPPED | OUTPATIENT
Start: 2024-06-03

## 2024-06-03 NOTE — TELEPHONE ENCOUNTER
LVM FOR PATIENT TO CALL OUR OFFICE BACK WITH CURRENT DOSE OF INSULIN, AND TO GET HIS BLOOD SUGAR LOGS.

## 2024-06-03 NOTE — TELEPHONE ENCOUNTER
You saw patient on 05/28/2024 for his hospital f/u. Did you just want to see him for blood sugars?

## 2024-06-04 NOTE — TELEPHONE ENCOUNTER
Patient stated he takes 30 units at bed time.    Patient's daughter said she is going to figure out how to get his Dexcom results to us.

## 2024-06-06 NOTE — TELEPHONE ENCOUNTER
Relay: attempted to call patient, no answer & no voicemail kicked in. Need to discuss insulin and blood sugar with patient.

## 2024-06-07 ENCOUNTER — OFFICE VISIT (OUTPATIENT)
Dept: PODIATRY | Facility: CLINIC | Age: 59
End: 2024-06-07
Payer: COMMERCIAL

## 2024-06-07 VITALS
OXYGEN SATURATION: 94 % | HEART RATE: 68 BPM | BODY MASS INDEX: 33.32 KG/M2 | WEIGHT: 238 LBS | DIASTOLIC BLOOD PRESSURE: 56 MMHG | SYSTOLIC BLOOD PRESSURE: 130 MMHG | HEIGHT: 71 IN | TEMPERATURE: 98.2 F

## 2024-06-07 DIAGNOSIS — E11.65 UNCONTROLLED TYPE 2 DIABETES MELLITUS WITH HYPERGLYCEMIA: ICD-10-CM

## 2024-06-07 DIAGNOSIS — I73.9 PERIPHERAL VASCULAR DISEASE: Primary | ICD-10-CM

## 2024-06-07 DIAGNOSIS — M86.60 CHRONIC OSTEOMYELITIS: ICD-10-CM

## 2024-06-07 PROCEDURE — 99213 OFFICE O/P EST LOW 20 MIN: CPT | Performed by: PODIATRIST

## 2024-06-07 RX ORDER — AMOXICILLIN AND CLAVULANATE POTASSIUM 875; 125 MG/1; MG/1
1 TABLET, FILM COATED ORAL 2 TIMES DAILY
Qty: 14 TABLET | Refills: 0 | Status: SHIPPED | OUTPATIENT
Start: 2024-06-07 | End: 2024-06-12 | Stop reason: SDUPTHER

## 2024-06-10 NOTE — PROGRESS NOTES
[facility] - PODIATRY    Today's Date: 06/10/24    Patient Name: Jd Velazquez  MRN: [id number]  CSN: [id number]  PCP: Dacia Newsome APRN,   Referring Provider: Chaitanya Sapp MD    SUBJECTIVE     Chief Complaint   Patient presents with    Right Foot - Follow-up, Post-op Follow-up     Admitted on 5/17/24       HPI: Jd Velazquez, a 58 y.o.male, presents to clinic.    Patient is here for follow-up of his first and second toe amputations.  He presents walking in his surgical shoe.  States there is a little bleeding from his wound.  Is not taking antibiotics.    Past Medical History:   Diagnosis Date    Allergic rhinitis 01/12/2015    Anesthesia     DIFFICULTY WAKING UP POST SURGERY    CAD (coronary artery disease)     DENIES CP/SOA.    Diabetes mellitus     Hyperlipidemia     Hypertension     Hypothyroidism 04/24/2014    Insomnia, unspecified 06/15/2016    WITHOUT BIPAP    Microalbuminuria due to type 2 diabetes mellitus 10/15/2015    Mixed hyperlipidemia 10/15/2015    Myocardial infarction     Obesity     BMI 32    SHAHLA (obstructive sleep apnea)     WEARS BIPAP    Right great toe amputee     4/2024; 5/2024 ADMITTED WITH INFECTION    Skin cancer     REMOVED    Sleep apnea      Past Surgical History:   Procedure Laterality Date    AMPUTATION DIGIT Right 4/10/2024    Procedure: AMPUTATION DIGIT RIGHT GREAT TOE;  Surgeon: Balwinder Costa DPM;  Location: [facility];  Service: Podiatry;  Laterality: Right;    AMPUTATION DIGIT Right 5/21/2024    Procedure: SECOND RAY AMPUTATION RIGHT FOOT, INCISION AND DRAINAGE RIGHT FOOT;  Surgeon: Balwinder Costa DPM;  Location: [facility] OR;  Service: Podiatry;  Laterality: Right;    ANKLE ARTHROSCOPY W/ OPEN REPAIR      APPENDECTOMY      CARDIAC CATHETERIZATION  2014    PCI to circumflex    CARDIAC CATHETERIZATION Right 10/26/2023    Procedure: Aortogram with right leg angiogram, possible angioplasty or stenting;  Surgeon: Vivien  MD Robert;  Location: [facility] CATH INVASIVE LOCATION;  Service: Vascular;  Laterality: Right;    CARDIAC CATHETERIZATION Right 12/15/2023    Procedure: Right leg angiogram, possible angioplasty or stenting;  Surgeon: Robert Puga MD;  Location: [facility] CATH INVASIVE LOCATION;  Service: Vascular;  Laterality: Right;    CORONARY ARTERY BYPASS GRAFT N/A 10/08/2020    Procedure: STERNOTOMY, CORONARY ARTERY BYPASS GRAFTING TIME 4 WITH LEFT KELSI  AND ENDOSCOPICALLY HARVESTED LEFT GREATER SAPHENOUS VEIN AND PRP.;  Surgeon: Jr Girma Chiu MD;  Location: [facility] MAIN OR;  Service: Cardiothoracic;  Laterality: N/A;    FEMORAL TIBIAL BYPASS Right 01/09/2024    Procedure: Right femoral-tibial bypass graft;  Surgeon: Robert Puga MD;  Location: [facility] MAIN OR;  Service: Vascular;  Laterality: Right;    HEEL SPUR SURGERY      HERNIA REPAIR      INCISION AND DRAINAGE OF WOUND Right 4/30/2024    Procedure: INCISION AND DRAINAGE WOUND RIGHT FOOT, DEBRIDEMENT OF WOUND RIGHT FOOT, AMPUTATION REVISION RIGHT FOOT;  Surgeon: Balwinder Costa DPM;  Location: [facility] MAIN OR;  Service: Podiatry;  Laterality: Right;    KNEE ARTHROSCOPY      MULTIPLE TIMES ON BOTH KNEES    SKIN CANCER EXCISION      BACK    TOE SURGERY Right     DEBRIDMENT RIGHT GREAT TOE    TONSILLECTOMY       Family History   Adopted: Yes   Problem Relation Age of Onset    Heart disease Other      Social History     Socioeconomic History    Marital status:    Tobacco Use    Smoking status: Every Day     Current packs/day: 0.25     Average packs/day: 0.3 packs/day for 15.0 years (3.8 ttl pk-yrs)     Types: Cigarettes     Passive exposure: Never    Smokeless tobacco: Never   Vaping Use    Vaping status: Never Used   Substance and Sexual Activity    Alcohol use: Not Currently    Drug use: Never    Sexual activity: Defer     Allergies   Allergen Reactions    Strawberry Itching    Lortab [Hydrocodone-Acetaminophen] Itching     Current Outpatient Medications    Medication Sig Dispense Refill    aspirin 81 MG chewable tablet Chew 1 tablet Daily. PER DR CHILANGO LOGAN TO CONTINUE ASA UP TO AND INCLUDING DAY OF SURGERY      clopidogrel (Plavix) 75 MG tablet Take 1 tablet by mouth Daily for 180 days. 30 tablet 5    Continuous Glucose Sensor (Dexcom G7 Sensor) misc Use 1 each Every 10 (Ten) Days for 30 days. 3 each 0    Continuous Glucose Sensor (Dexcom G7 Sensor) misc apply sensor to skin for continuous blood sugar monitoring replace every 10 days 3 each 0    Cymbalta 30 MG capsule Take 1 capsule by mouth Daily.      Evolocumab (REPATHA) solution prefilled syringe injection Inject 1 mL under the skin into the appropriate area as directed.      Insulin Glargine (BASAGLAR KWIKPEN) 100 UNIT/ML injection pen Inject 30 Units under the skin into the appropriate area as directed Every Night. 9 mL 5    levothyroxine (SYNTHROID, LEVOTHROID) 25 MCG tablet Take 1 tablet by mouth Every Morning.      lisinopril-hydrochlorothiazide (PRINZIDE,ZESTORETIC) 20-12.5 MG per tablet Take 2 tablets by mouth Daily. 180 tablet 3    metFORMIN ER (GLUCOPHAGE-XR) 500 MG 24 hr tablet Take 1 tablet by mouth Daily With Breakfast.      metoprolol tartrate (LOPRESSOR) 50 MG tablet Take 1 tablet by mouth Every 12 (Twelve) Hours for 30 days. 60 tablet 0    pregabalin (LYRICA) 225 MG capsule Take 1 capsule by mouth 2 (Two) Times a Day.      spironolactone (ALDACTONE) 50 MG tablet Take 1 tablet by mouth Daily.      traZODone (DESYREL) 100 MG tablet TAKE ONE TABLET BY MOUTH ONCE NIGHTLY 30 tablet 1    zolpidem (AMBIEN) 10 MG tablet Take 1 tablet by mouth At Night As Needed for Sleep. 90 tablet 0    amoxicillin-clavulanate (AUGMENTIN) 875-125 MG per tablet Take 1 tablet by mouth 2 (Two) Times a Day for 7 days. 14 tablet 0     No current facility-administered medications for this visit.     Review of Systems   Skin:         Ulcer toe   All other systems reviewed and are negative.      OBJECTIVE     Vitals:     06/07/24 1433   BP: 130/56   Pulse: 68   Temp: 98.2 °F (36.8 °C)   SpO2: 94%       WBC   Date Value Ref Range Status   05/28/2024 7.34 3.40 - 10.80 10*3/mm3 Final     RBC   Date Value Ref Range Status   05/28/2024 4.01 (L) 4.14 - 5.80 10*6/mm3 Final     Hemoglobin   Date Value Ref Range Status   05/28/2024 11.6 (L) 13.0 - 17.7 g/dL Final     Hematocrit   Date Value Ref Range Status   05/28/2024 36.6 (L) 37.5 - 51.0 % Final     MCV   Date Value Ref Range Status   05/28/2024 91.3 79.0 - 97.0 fL Final     MCH   Date Value Ref Range Status   05/28/2024 28.9 26.6 - 33.0 pg Final     MCHC   Date Value Ref Range Status   05/28/2024 31.7 31.5 - 35.7 g/dL Final     RDW   Date Value Ref Range Status   05/28/2024 13.5 12.3 - 15.4 % Final     RDW-SD   Date Value Ref Range Status   05/28/2024 45.8 37.0 - 54.0 fl Final     MPV   Date Value Ref Range Status   05/28/2024 9.8 6.0 - 12.0 fL Final     Platelets   Date Value Ref Range Status   05/28/2024 351 140 - 450 10*3/mm3 Final     Neutrophil %   Date Value Ref Range Status   05/28/2024 45.5 42.7 - 76.0 % Final     Lymphocyte %   Date Value Ref Range Status   05/28/2024 33.8 19.6 - 45.3 % Final     Monocyte %   Date Value Ref Range Status   05/28/2024 14.9 (H) 5.0 - 12.0 % Final     Eosinophil %   Date Value Ref Range Status   05/28/2024 3.5 0.3 - 6.2 % Final     Basophil %   Date Value Ref Range Status   05/28/2024 1.6 (H) 0.0 - 1.5 % Final     Immature Grans %   Date Value Ref Range Status   05/28/2024 0.7 (H) 0.0 - 0.5 % Final     Neutrophils, Absolute   Date Value Ref Range Status   05/28/2024 3.34 1.70 - 7.00 10*3/mm3 Final     Lymphocytes, Absolute   Date Value Ref Range Status   05/28/2024 2.48 0.70 - 3.10 10*3/mm3 Final     Monocytes, Absolute   Date Value Ref Range Status   05/28/2024 1.09 (H) 0.10 - 0.90 10*3/mm3 Final     Eosinophils, Absolute   Date Value Ref Range Status   05/28/2024 0.26 0.00 - 0.40 10*3/mm3 Final     Basophils, Absolute   Date Value Ref Range Status    05/28/2024 0.12 0.00 - 0.20 10*3/mm3 Final     Immature Grans, Absolute   Date Value Ref Range Status   05/28/2024 0.05 0.00 - 0.05 10*3/mm3 Final     nRBC   Date Value Ref Range Status   05/28/2024 0.0 0.0 - 0.2 /100 WBC Final         Lab Results   Component Value Date    GLUCOSE 172 (H) 05/28/2024    BUN 23 (H) 05/28/2024    CREATININE 1.37 (H) 05/28/2024    EGFRIFNONA 77 06/17/2021    BCR 16.8 05/28/2024    K 4.2 05/28/2024    CO2 26.0 05/28/2024    CALCIUM 9.9 05/28/2024    ALBUMIN 4.4 05/28/2024    LABIL2 0.9 (L) 10/16/2020    AST <5 05/28/2024    ALT 5 05/28/2024       Patient seen in no apparent distress.      PHYSICAL EXAM:  Sutures are intact to amputation site.  No erythema no malodor no drainage.  Small area of dehiscence to mid incision.  No pain to calf, mild swelling to right foot.    RADIOLOGY:        XR Foot 3+ View Right    Result Date: 5/22/2024  Narrative: XR FOOT 3+ VW RIGHT-  Date of Exam: 5/22/2024 12:50 PM  Indication: amputation right foot; M86.471-Chronic osteomyelitis with draining sinus, right ankle and foot; L97.513-Non-pressure chronic ulcer of other part of right foot with necrosis of muscle; L02.611-Cutaneous abscess of right foot.  Comparison: None available  Technique: Three views of the right foot were obtained.  FINDINGS: There are postsurgical changes of transmetatarsal amputation of the first and second digits. There is gas within the soft tissues compatible with recent surgery. There is multifocal degenerative midfoot arthropathy and hindfoot arthropathy at the talonavicular, navicular cuneiform and cuneiform metatarsal joints. There is calcification/ossification of the plantar fascia.      Impression: 1. Postsurgical changes of recent transmetatarsal amputation of the first and second digits. 2. Extensive degenerative hindfoot and midfoot arthropathy.  Electronically Signed By-David Petty MD On:5/22/2024 1:03 PM      MRI Foot Right With & Without Contrast    Result Date:  5/20/2024  Narrative:  MRI FOOT RIGHT W WO CONTRAST-  Date of Exam: 5/18/2024 7:10 AM  Indication: osteo and foot abscess.  Comparison: None available.  Technique:  Routine multiplanar/multisequence sequence images of the right foot were obtained before and after the uneventful administration of 20 mL MultiHance.    Findings: There has been resection of the first digit at the distal first metatarsal. There is an overlying wound at the medial forefoot. There appears to be surrounding heterogeneous edema signal and enhancement. There is a nonenhancing sinus tract extending from the wound to the distal first metatarsal and to the second MTP joint. This can be seen on series 11 images 21-27. There are some foci of T1 hypointense signal in the soft tissues surrounding the tract which could represent postsurgical changes versus small foci of soft tissue gas.  In the superficial subcutaneous tissues, just posterior to the wound on series 11 images 19 and 20, there is a small rim-enhancing collection measuring approximately 12 x 14 x 7 mm. This is suspicious for a small abscess and may communicate to the sinus tract. There is also an irregular T2 hyperintense signal focus with peripheral enhancement extending anteriorly from the tract in the soft tissues distal to the remaining first metatarsal as seen on series 6 image 12 and series 8 image 27 measuring approximately 9 x 14 x 15 mm which may also represent a small abscess, possibly communicating to the sinus tract.  There is T2 hyperintense and T1 hypointense signal at the remaining distal first metatarsal adjacent to the wound consistent with osteomyelitis. There is also T2 hyperintense edema signal and corresponding T1 hypointense signal at the second metatarsal head and second proximal phalanx surrounding the second MTP joint consistent with osteomyelitis. The wound extends to the second MTP joint where there is para-articular enhancement consistent with joint  infection.  No other definite findings of osteomyelitis or joint infection.  There appear to be findings of advanced arthropathy in the midfoot with prominent osteophytosis. Lisfranc alignment appears grossly intact. Lisfranc ligament appears grossly intact.  There is advanced muscle atrophy with diffuse edema signal and enhancement. This could be associated with denervation or myositis. There is a small amount of fluid signal and enhancement along the extensor digitorum tendons which may indicate infectious tenosynovitis.       Impression: Impression: 1.  Status post amputation of the first digit at the distal first metatarsal. 2.  Wound at the medial forefoot with edema and enhancement surrounding a sinus tract extending to the distal first metatarsal and second MTP joint. 3.  Findings of osteomyelitis at the remaining distal first metatarsal and surrounding the second MTP joint where there are findings of joint infection. 4.  Findings of skin and soft tissue infection surrounding wound. Findings of muscle denervation and/or myositis. 5.  Two small abscesses in the soft tissues which may drain to the sinus tract.   Electronically Signed By-Hunter Steele MD On:5/20/2024 9:37 AM       ASSESSMENT/PLAN     Diagnoses and all orders for this visit:    1. Peripheral vascular disease (Primary)    2. Uncontrolled type 2 diabetes mellitus with hyperglycemia    3. Chronic osteomyelitis    Other orders  -     amoxicillin-clavulanate (AUGMENTIN) 875-125 MG per tablet; Take 1 tablet by mouth 2 (Two) Times a Day for 7 days.  Dispense: 14 tablet; Refill: 0      Antibiotics extended for 7 days    Daily dressing changes to right foot.    Discussed importance of compliance with weightbearing.  Patient is instructed to be nonweightbearing to his right foot.    Return to clinic in 1 week    Comprehensive lower extremity examination and evaluation was performed.    Discussed findings and treatment plan including risks, benefits, and  treatment options with patient in detail. Patient agreed with treatment plan.    Medications and allergies reviewed.  Reviewed available lab values along with other pertinent labs.  These were discussed with the patient.    An After Visit Summary was printed and given to the patient at discharge, including (if requested) any available informative/educational handouts regarding diagnosis, treatment, or medications. All questions were answered to patient/family satisfaction. Should symptoms fail to improve or worsen they agree to call or return to clinic or to go to the Emergency Department. Discussed the importance of following up with any needed screening tests/labs/specialist appointments and any requested follow-up recommended by me today. Importance of maintaining follow-up discussed and patient accepts that missed appointments can delay diagnosis and potentially lead to worsening of conditions.    No follow-ups on file., or sooner if acute issues arise.    This document has been electronically signed by Balwinder Costa DPM on Ghazala 10, 2024 12:32 EDT

## 2024-06-12 RX ORDER — AMOXICILLIN AND CLAVULANATE POTASSIUM 875; 125 MG/1; MG/1
1 TABLET, FILM COATED ORAL 2 TIMES DAILY
Qty: 14 TABLET | Refills: 0 | Status: SHIPPED | OUTPATIENT
Start: 2024-06-12 | End: 2024-06-19

## 2024-06-14 ENCOUNTER — OFFICE VISIT (OUTPATIENT)
Dept: PODIATRY | Facility: CLINIC | Age: 59
End: 2024-06-14
Payer: COMMERCIAL

## 2024-06-14 VITALS
DIASTOLIC BLOOD PRESSURE: 71 MMHG | HEIGHT: 71 IN | OXYGEN SATURATION: 95 % | TEMPERATURE: 98.7 F | HEART RATE: 66 BPM | SYSTOLIC BLOOD PRESSURE: 143 MMHG | BODY MASS INDEX: 33.32 KG/M2 | WEIGHT: 238 LBS

## 2024-06-14 DIAGNOSIS — M86.60 CHRONIC OSTEOMYELITIS: ICD-10-CM

## 2024-06-14 DIAGNOSIS — L97.511 ULCER OF RIGHT FOOT, LIMITED TO BREAKDOWN OF SKIN: ICD-10-CM

## 2024-06-14 DIAGNOSIS — I73.9 PERIPHERAL VASCULAR DISEASE: ICD-10-CM

## 2024-06-14 DIAGNOSIS — E11.65 UNCONTROLLED TYPE 2 DIABETES MELLITUS WITH HYPERGLYCEMIA: Primary | ICD-10-CM

## 2024-06-14 PROCEDURE — 99024 POSTOP FOLLOW-UP VISIT: CPT | Performed by: PODIATRIST

## 2024-06-14 NOTE — PROGRESS NOTES
[facility] - PODIATRY    Today's Date: 06/17/24    Patient Name: Jd Velazquez  MRN: [id number]  CSN: [id number]  PCP: Dacia Newsome APRN,   Referring Provider: No ref. provider found    SUBJECTIVE     Chief Complaint   Patient presents with   • Right Foot - Post-op     HPI: Jd Velazquez, a 58 y.o.male, presents to clinic.    Patient is here for follow-up.    Patient has not picked up his antibiotics.    Past Medical History:   Diagnosis Date   • Allergic rhinitis 01/12/2015   • Anesthesia     DIFFICULTY WAKING UP POST SURGERY   • CAD (coronary artery disease)     DENIES CP/SOA.   • Diabetes mellitus    • Hyperlipidemia    • Hypertension    • Hypothyroidism 04/24/2014   • Insomnia, unspecified 06/15/2016    WITHOUT BIPAP   • Microalbuminuria due to type 2 diabetes mellitus 10/15/2015   • Mixed hyperlipidemia 10/15/2015   • Myocardial infarction    • Obesity     BMI 32   • SHAHLA (obstructive sleep apnea)     WEARS BIPAP   • Right great toe amputee     4/2024; 5/2024 ADMITTED WITH INFECTION   • Skin cancer     REMOVED   • Sleep apnea      Past Surgical History:   Procedure Laterality Date   • AMPUTATION DIGIT Right 4/10/2024    Procedure: AMPUTATION DIGIT RIGHT GREAT TOE;  Surgeon: Balwinder Costa DPM;  Location: [facility] OR;  Service: Podiatry;  Laterality: Right;   • AMPUTATION DIGIT Right 5/21/2024    Procedure: SECOND RAY AMPUTATION RIGHT FOOT, INCISION AND DRAINAGE RIGHT FOOT;  Surgeon: Balwinder Costa DPM;  Location: [facility] MAIN OR;  Service: Podiatry;  Laterality: Right;   • ANKLE ARTHROSCOPY W/ OPEN REPAIR     • APPENDECTOMY     • CARDIAC CATHETERIZATION  2014    PCI to circumflex   • CARDIAC CATHETERIZATION Right 10/26/2023    Procedure: Aortogram with right leg angiogram, possible angioplasty or stenting;  Surgeon: Robert Puga MD;  Location: [facility] CATH INVASIVE LOCATION;  Service: Vascular;  Laterality: Right;   • CARDIAC CATHETERIZATION Right  12/15/2023    Procedure: Right leg angiogram, possible angioplasty or stenting;  Surgeon: Robert Puga MD;  Location: [facility] CATH INVASIVE LOCATION;  Service: Vascular;  Laterality: Right;   • CORONARY ARTERY BYPASS GRAFT N/A 10/08/2020    Procedure: STERNOTOMY, CORONARY ARTERY BYPASS GRAFTING TIME 4 WITH LEFT KELSI  AND ENDOSCOPICALLY HARVESTED LEFT GREATER SAPHENOUS VEIN AND PRP.;  Surgeon: Jr Girma Chiu MD;  Location: [facility] MAIN OR;  Service: Cardiothoracic;  Laterality: N/A;   • FEMORAL TIBIAL BYPASS Right 01/09/2024    Procedure: Right femoral-tibial bypass graft;  Surgeon: Robert Puga MD;  Location: [facility] MAIN OR;  Service: Vascular;  Laterality: Right;   • HEEL SPUR SURGERY     • HERNIA REPAIR     • INCISION AND DRAINAGE OF WOUND Right 4/30/2024    Procedure: INCISION AND DRAINAGE WOUND RIGHT FOOT, DEBRIDEMENT OF WOUND RIGHT FOOT, AMPUTATION REVISION RIGHT FOOT;  Surgeon: Balwinder Costa DPM;  Location: [facility] OR;  Service: Podiatry;  Laterality: Right;   • KNEE ARTHROSCOPY      MULTIPLE TIMES ON BOTH KNEES   • SKIN CANCER EXCISION      BACK   • TOE SURGERY Right     DEBRIDMENT RIGHT GREAT TOE   • TONSILLECTOMY       Family History   Adopted: Yes   Problem Relation Age of Onset   • Heart disease Other      Social History     Socioeconomic History   • Marital status:    Tobacco Use   • Smoking status: Every Day     Current packs/day: 0.25     Average packs/day: 0.3 packs/day for 15.0 years (3.8 ttl pk-yrs)     Types: Cigarettes     Passive exposure: Never   • Smokeless tobacco: Never   Vaping Use   • Vaping status: Never Used   Substance and Sexual Activity   • Alcohol use: Not Currently   • Drug use: Never   • Sexual activity: Defer     Allergies   Allergen Reactions   • Strawberry Itching   • Lortab [Hydrocodone-Acetaminophen] Itching     Current Outpatient Medications   Medication Sig Dispense Refill   • aspirin 81 MG chewable tablet Chew 1 tablet Daily. PER DR CHILANGO LOGAN TO  CONTINUE ASA UP TO AND INCLUDING DAY OF SURGERY     • clopidogrel (Plavix) 75 MG tablet Take 1 tablet by mouth Daily for 180 days. 30 tablet 5   • Continuous Glucose Sensor (Dexcom G7 Sensor) misc Use 1 each Every 10 (Ten) Days for 30 days. 3 each 0   • Continuous Glucose Sensor (Dexcom G7 Sensor) misc apply sensor to skin for continuous blood sugar monitoring replace every 10 days 3 each 0   • Cymbalta 30 MG capsule Take 1 capsule by mouth Daily.     • Evolocumab (REPATHA) solution prefilled syringe injection Inject 1 mL under the skin into the appropriate area as directed.     • Insulin Glargine (BASAGLAR KWIKPEN) 100 UNIT/ML injection pen Inject 30 Units under the skin into the appropriate area as directed Every Night. 9 mL 5   • levothyroxine (SYNTHROID, LEVOTHROID) 25 MCG tablet Take 1 tablet by mouth Every Morning.     • lisinopril-hydrochlorothiazide (PRINZIDE,ZESTORETIC) 20-12.5 MG per tablet Take 2 tablets by mouth Daily. 180 tablet 3   • metFORMIN ER (GLUCOPHAGE-XR) 500 MG 24 hr tablet Take 1 tablet by mouth Daily With Breakfast.     • metoprolol tartrate (LOPRESSOR) 50 MG tablet Take 1 tablet by mouth Every 12 (Twelve) Hours for 30 days. 60 tablet 0   • pregabalin (LYRICA) 225 MG capsule Take 1 capsule by mouth 2 (Two) Times a Day.     • spironolactone (ALDACTONE) 50 MG tablet Take 1 tablet by mouth Daily.     • traZODone (DESYREL) 100 MG tablet TAKE ONE TABLET BY MOUTH ONCE NIGHTLY 30 tablet 1   • amoxicillin-clavulanate (AUGMENTIN) 875-125 MG per tablet Take 1 tablet by mouth 2 (Two) Times a Day for 7 days. (Patient not taking: Reported on 6/14/2024) 14 tablet 0   • zolpidem (AMBIEN) 10 MG tablet Take 1 tablet by mouth At Night As Needed for Sleep. (Patient not taking: Reported on 6/14/2024) 90 tablet 0     No current facility-administered medications for this visit.     Review of Systems   Skin:         Ulcer toe   All other systems reviewed and are negative.      OBJECTIVE     Vitals:    06/14/24 1135    BP: 143/71   Pulse: 66   Temp: 98.7 °F (37.1 °C)   SpO2: 95%       WBC   Date Value Ref Range Status   05/28/2024 7.34 3.40 - 10.80 10*3/mm3 Final     RBC   Date Value Ref Range Status   05/28/2024 4.01 (L) 4.14 - 5.80 10*6/mm3 Final     Hemoglobin   Date Value Ref Range Status   05/28/2024 11.6 (L) 13.0 - 17.7 g/dL Final     Hematocrit   Date Value Ref Range Status   05/28/2024 36.6 (L) 37.5 - 51.0 % Final     MCV   Date Value Ref Range Status   05/28/2024 91.3 79.0 - 97.0 fL Final     MCH   Date Value Ref Range Status   05/28/2024 28.9 26.6 - 33.0 pg Final     MCHC   Date Value Ref Range Status   05/28/2024 31.7 31.5 - 35.7 g/dL Final     RDW   Date Value Ref Range Status   05/28/2024 13.5 12.3 - 15.4 % Final     RDW-SD   Date Value Ref Range Status   05/28/2024 45.8 37.0 - 54.0 fl Final     MPV   Date Value Ref Range Status   05/28/2024 9.8 6.0 - 12.0 fL Final     Platelets   Date Value Ref Range Status   05/28/2024 351 140 - 450 10*3/mm3 Final     Neutrophil %   Date Value Ref Range Status   05/28/2024 45.5 42.7 - 76.0 % Final     Lymphocyte %   Date Value Ref Range Status   05/28/2024 33.8 19.6 - 45.3 % Final     Monocyte %   Date Value Ref Range Status   05/28/2024 14.9 (H) 5.0 - 12.0 % Final     Eosinophil %   Date Value Ref Range Status   05/28/2024 3.5 0.3 - 6.2 % Final     Basophil %   Date Value Ref Range Status   05/28/2024 1.6 (H) 0.0 - 1.5 % Final     Immature Grans %   Date Value Ref Range Status   05/28/2024 0.7 (H) 0.0 - 0.5 % Final     Neutrophils, Absolute   Date Value Ref Range Status   05/28/2024 3.34 1.70 - 7.00 10*3/mm3 Final     Lymphocytes, Absolute   Date Value Ref Range Status   05/28/2024 2.48 0.70 - 3.10 10*3/mm3 Final     Monocytes, Absolute   Date Value Ref Range Status   05/28/2024 1.09 (H) 0.10 - 0.90 10*3/mm3 Final     Eosinophils, Absolute   Date Value Ref Range Status   05/28/2024 0.26 0.00 - 0.40 10*3/mm3 Final     Basophils, Absolute   Date Value Ref Range Status   05/28/2024  0.12 0.00 - 0.20 10*3/mm3 Final     Immature Grans, Absolute   Date Value Ref Range Status   05/28/2024 0.05 0.00 - 0.05 10*3/mm3 Final     nRBC   Date Value Ref Range Status   05/28/2024 0.0 0.0 - 0.2 /100 WBC Final         Lab Results   Component Value Date    GLUCOSE 172 (H) 05/28/2024    BUN 23 (H) 05/28/2024    CREATININE 1.37 (H) 05/28/2024    EGFRIFNONA 77 06/17/2021    BCR 16.8 05/28/2024    K 4.2 05/28/2024    CO2 26.0 05/28/2024    CALCIUM 9.9 05/28/2024    ALBUMIN 4.4 05/28/2024    LABIL2 0.9 (L) 10/16/2020    AST <5 05/28/2024    ALT 5 05/28/2024       Patient seen in no apparent distress.      PHYSICAL EXAM:  Patient with previous first and second ray amputation to his right foot.  Incision well coapted.  Small scab to distal incision site without erythema malodor or drainage.  No tenderness to the calf.  Minimal swelling to right foot.    RADIOLOGY:        XR Foot 3+ View Right    Result Date: 5/22/2024  Narrative: XR FOOT 3+ VW RIGHT-  Date of Exam: 5/22/2024 12:50 PM  Indication: amputation right foot; M86.471-Chronic osteomyelitis with draining sinus, right ankle and foot; L97.513-Non-pressure chronic ulcer of other part of right foot with necrosis of muscle; L02.611-Cutaneous abscess of right foot.  Comparison: None available  Technique: Three views of the right foot were obtained.  FINDINGS: There are postsurgical changes of transmetatarsal amputation of the first and second digits. There is gas within the soft tissues compatible with recent surgery. There is multifocal degenerative midfoot arthropathy and hindfoot arthropathy at the talonavicular, navicular cuneiform and cuneiform metatarsal joints. There is calcification/ossification of the plantar fascia.      Impression: 1. Postsurgical changes of recent transmetatarsal amputation of the first and second digits. 2. Extensive degenerative hindfoot and midfoot arthropathy.  Electronically Signed By-David Petty MD On:5/22/2024 1:03 PM       MRI Foot Right With & Without Contrast    Result Date: 5/20/2024  Narrative:  MRI FOOT RIGHT W WO CONTRAST-  Date of Exam: 5/18/2024 7:10 AM  Indication: osteo and foot abscess.  Comparison: None available.  Technique:  Routine multiplanar/multisequence sequence images of the right foot were obtained before and after the uneventful administration of 20 mL MultiHance.    Findings: There has been resection of the first digit at the distal first metatarsal. There is an overlying wound at the medial forefoot. There appears to be surrounding heterogeneous edema signal and enhancement. There is a nonenhancing sinus tract extending from the wound to the distal first metatarsal and to the second MTP joint. This can be seen on series 11 images 21-27. There are some foci of T1 hypointense signal in the soft tissues surrounding the tract which could represent postsurgical changes versus small foci of soft tissue gas.  In the superficial subcutaneous tissues, just posterior to the wound on series 11 images 19 and 20, there is a small rim-enhancing collection measuring approximately 12 x 14 x 7 mm. This is suspicious for a small abscess and may communicate to the sinus tract. There is also an irregular T2 hyperintense signal focus with peripheral enhancement extending anteriorly from the tract in the soft tissues distal to the remaining first metatarsal as seen on series 6 image 12 and series 8 image 27 measuring approximately 9 x 14 x 15 mm which may also represent a small abscess, possibly communicating to the sinus tract.  There is T2 hyperintense and T1 hypointense signal at the remaining distal first metatarsal adjacent to the wound consistent with osteomyelitis. There is also T2 hyperintense edema signal and corresponding T1 hypointense signal at the second metatarsal head and second proximal phalanx surrounding the second MTP joint consistent with osteomyelitis. The wound extends to the second MTP joint where there is  para-articular enhancement consistent with joint infection.  No other definite findings of osteomyelitis or joint infection.  There appear to be findings of advanced arthropathy in the midfoot with prominent osteophytosis. Lisfranc alignment appears grossly intact. Lisfranc ligament appears grossly intact.  There is advanced muscle atrophy with diffuse edema signal and enhancement. This could be associated with denervation or myositis. There is a small amount of fluid signal and enhancement along the extensor digitorum tendons which may indicate infectious tenosynovitis.       Impression: Impression: 1.  Status post amputation of the first digit at the distal first metatarsal. 2.  Wound at the medial forefoot with edema and enhancement surrounding a sinus tract extending to the distal first metatarsal and second MTP joint. 3.  Findings of osteomyelitis at the remaining distal first metatarsal and surrounding the second MTP joint where there are findings of joint infection. 4.  Findings of skin and soft tissue infection surrounding wound. Findings of muscle denervation and/or myositis. 5.  Two small abscesses in the soft tissues which may drain to the sinus tract.   Electronically Signed By-Hunter Steele MD On:5/20/2024 9:37 AM       ASSESSMENT/PLAN     Diagnoses and all orders for this visit:    1. Uncontrolled type 2 diabetes mellitus with hyperglycemia (Primary)    2. Peripheral vascular disease    3. Chronic osteomyelitis    4. Ulcer of right foot, limited to breakdown of skin      Comprehensive lower extremity examination and evaluation was performed.    Patient uncontrolled diabetic with previous first and second ray amputation.  Has not picked up his antibiotics from last week.  Patient not compliant with instructions.      Patient at high risk for developing further ulcerations and infections and subsequent major amputations due to his uncontrolled diabetes, PVD, smoking, and noncompliance.    Triple  antibiotic and border dressing daily to distal incision site.    Return to clinic in 2 weeks    Discussed findings and treatment plan including risks, benefits, and treatment options with patient in detail. Patient agreed with treatment plan.    Medications and allergies reviewed.  Reviewed available lab values along with other pertinent labs.  These were discussed with the patient.    An After Visit Summary was printed and given to the patient at discharge, including (if requested) any available informative/educational handouts regarding diagnosis, treatment, or medications. All questions were answered to patient/family satisfaction. Should symptoms fail to improve or worsen they agree to call or return to clinic or to go to the Emergency Department. Discussed the importance of following up with any needed screening tests/labs/specialist appointments and any requested follow-up recommended by me today. Importance of maintaining follow-up discussed and patient accepts that missed appointments can delay diagnosis and potentially lead to worsening of conditions.    Return in about 2 weeks (around 6/28/2024)., or sooner if acute issues arise.    This document has been electronically signed by Balwinder Costa DPM on June 17, 2024 07:24 EDT

## 2024-06-17 RX ORDER — ACYCLOVIR 400 MG/1
TABLET ORAL
Qty: 3 EACH | Refills: 0 | Status: CANCELLED | OUTPATIENT
Start: 2024-06-17

## 2024-06-18 RX ORDER — ACYCLOVIR 400 MG/1
TABLET ORAL
Qty: 3 EACH | Refills: 0 | Status: CANCELLED | OUTPATIENT
Start: 2024-06-17

## 2024-06-19 NOTE — PROGRESS NOTES
Follow Up Office Visit      Patient Name: Jd Velazquez  : 1965   MRN: [id number]     Chief Complaint:  DM2, htn, hyperlipidemia, CAD and hypothyroidism     History of Present Illness: Jd Velazquez is a 58 y.o. male who is here today to follow up for DM2, htn, hyperlipidemia, CAD and hypothyroidism   Review lab results     A1C-2024. BS hasn't been great. He says it is still up and down  Fasting glucose 250 this am   Basaglar 30 units nightly. Metformin 500 mg. Mounjaro 10  mg once weekly    Follow up increase dose of synthroid to 50 mcg daily, tolerating well     Eye exam-  Aldo  Foot exam-2024 Dr Costa  Psa-2023        Subjective      Review of Systems:   Review of Systems   Constitutional:  Negative for fever.   Eyes:  Negative for visual disturbance.   Respiratory:  Negative for cough.    Cardiovascular:  Negative for chest pain.   Gastrointestinal:  Negative for abdominal pain.   Endocrine: Negative for polydipsia and polyuria.   Genitourinary:  Negative for dysuria.   Musculoskeletal:  Negative for myalgias.        Past Medical History:   Past Medical History:   Diagnosis Date    Allergic rhinitis 2015    Anesthesia     DIFFICULTY WAKING UP POST SURGERY    CAD (coronary artery disease)     DENIES CP/SOA.    Diabetes mellitus     Hyperlipidemia     Hypertension     Hypothyroidism 2014    Insomnia, unspecified 06/15/2016    WITHOUT BIPAP    Microalbuminuria due to type 2 diabetes mellitus 10/15/2015    Mixed hyperlipidemia 10/15/2015    Myocardial infarction     Obesity     BMI 32    SHAHLA (obstructive sleep apnea)     WEARS BIPAP    Right great toe amputee     2024; 2024 ADMITTED WITH INFECTION    Skin cancer     REMOVED    Sleep apnea        Past Surgical History:   Past Surgical History:   Procedure Laterality Date    AMPUTATION DIGIT Right 4/10/2024    Procedure: AMPUTATION DIGIT RIGHT GREAT TOE;  Surgeon: Balwinder Costa DPM;  Location:  [facility] MAIN OR;  Service: Podiatry;  Laterality: Right;    AMPUTATION DIGIT Right 5/21/2024    Procedure: SECOND RAY AMPUTATION RIGHT FOOT, INCISION AND DRAINAGE RIGHT FOOT;  Surgeon: Balwinder Costa DPM;  Location: [facility] OR;  Service: Podiatry;  Laterality: Right;    ANKLE ARTHROSCOPY W/ OPEN REPAIR      APPENDECTOMY      CARDIAC CATHETERIZATION  2014    PCI to circumflex    CARDIAC CATHETERIZATION Right 10/26/2023    Procedure: Aortogram with right leg angiogram, possible angioplasty or stenting;  Surgeon: Robert Puga MD;  Location: [facility] CATH INVASIVE LOCATION;  Service: Vascular;  Laterality: Right;    CARDIAC CATHETERIZATION Right 12/15/2023    Procedure: Right leg angiogram, possible angioplasty or stenting;  Surgeon: Robert Puga MD;  Location: [facility] CATH INVASIVE LOCATION;  Service: Vascular;  Laterality: Right;    CORONARY ARTERY BYPASS GRAFT N/A 10/08/2020    Procedure: STERNOTOMY, CORONARY ARTERY BYPASS GRAFTING TIME 4 WITH LEFT KELSI  AND ENDOSCOPICALLY HARVESTED LEFT GREATER SAPHENOUS VEIN AND PRP.;  Surgeon: Jr Girma Chiu MD;  Location: [facility] OR;  Service: Cardiothoracic;  Laterality: N/A;    FEMORAL TIBIAL BYPASS Right 01/09/2024    Procedure: Right femoral-tibial bypass graft;  Surgeon: Robert Puga MD;  Location: [facility] OR;  Service: Vascular;  Laterality: Right;    HEEL SPUR SURGERY      HERNIA REPAIR      INCISION AND DRAINAGE OF WOUND Right 4/30/2024    Procedure: INCISION AND DRAINAGE WOUND RIGHT FOOT, DEBRIDEMENT OF WOUND RIGHT FOOT, AMPUTATION REVISION RIGHT FOOT;  Surgeon: Balwinder Costa DPM;  Location: [facility] OR;  Service: Podiatry;  Laterality: Right;    KNEE ARTHROSCOPY      MULTIPLE TIMES ON BOTH KNEES    SKIN CANCER EXCISION      BACK    TOE SURGERY Right     DEBRIDMENT RIGHT GREAT TOE    TONSILLECTOMY         Family History:   Family History   Adopted: Yes   Problem Relation Age of Onset    Heart disease Other        Social History:    Social History     Socioeconomic History    Marital status:    Tobacco Use    Smoking status: Every Day     Current packs/day: 0.25     Average packs/day: 0.3 packs/day for 15.0 years (3.8 ttl pk-yrs)     Types: Cigarettes     Passive exposure: Never    Smokeless tobacco: Never   Vaping Use    Vaping status: Never Used   Substance and Sexual Activity    Alcohol use: Not Currently    Drug use: Never    Sexual activity: Defer       Medications:     Current Outpatient Medications:     aspirin 81 MG chewable tablet, Chew 1 tablet Daily. PER DR CHILANGO LOGAN TO CONTINUE ASA UP TO AND INCLUDING DAY OF SURGERY, Disp: , Rfl:     clopidogrel (Plavix) 75 MG tablet, Take 1 tablet by mouth Daily for 180 days., Disp: 30 tablet, Rfl: 5    Continuous Glucose Sensor (Dexcom G7 Sensor) misc, Use 1 each Every 10 (Ten) Days for 30 days., Disp: 3 each, Rfl: 0    Continuous Glucose Sensor (Dexcom G7 Sensor) misc, apply sensor to skin for continuous blood sugar monitoring replace every 10 days, Disp: 3 each, Rfl: 0    Cymbalta 30 MG capsule, Take 1 capsule by mouth Daily., Disp: , Rfl:     Evolocumab (REPATHA) solution prefilled syringe injection, Inject 1 mL under the skin into the appropriate area as directed., Disp: , Rfl:     Insulin Glargine (BASAGLAR KWIKPEN) 100 UNIT/ML injection pen, Inject 40 Units under the skin into the appropriate area as directed Every Night., Disp: 9 mL, Rfl: 5    levothyroxine (SYNTHROID, LEVOTHROID) 50 MCG tablet, Take 1 tablet by mouth Every Morning., Disp: 90 tablet, Rfl: 1    lisinopril-hydrochlorothiazide (PRINZIDE,ZESTORETIC) 20-12.5 MG per tablet, Take 2 tablets by mouth Daily., Disp: 180 tablet, Rfl: 3    metFORMIN ER (GLUCOPHAGE-XR) 500 MG 24 hr tablet, Take 2 tablets by mouth Daily With Breakfast., Disp: 180 tablet, Rfl: 1    metoprolol tartrate (LOPRESSOR) 50 MG tablet, Take 1 tablet by mouth Every 12 (Twelve) Hours for 30 days., Disp: 60 tablet, Rfl: 0    pregabalin (LYRICA) 225 MG  "capsule, Take 1 capsule by mouth 2 (Two) Times a Day., Disp: , Rfl:     spironolactone (ALDACTONE) 50 MG tablet, Take 1 tablet by mouth Daily., Disp: , Rfl:     traZODone (DESYREL) 100 MG tablet, TAKE ONE TABLET BY MOUTH ONCE NIGHTLY, Disp: 30 tablet, Rfl: 1    Tirzepatide (MOUNJARO) 12.5 MG/0.5ML solution pen-injector pen, Inject 0.5 mL under the skin into the appropriate area as directed 1 (One) Time Per Week., Disp: 2 mL, Rfl: 6    Allergies:   Allergies   Allergen Reactions    Strawberry Itching    Lortab [Hydrocodone-Acetaminophen] Itching               Objective     Physical Exam:  Vital Signs:   Vitals:    06/20/24 1038   BP: 112/69   Pulse: 61   Temp: 98.6 °F (37 °C)   SpO2: 98%   Weight: 108 kg (238 lb)   Height: 180.3 cm (71\")     Body mass index is 33.19 kg/m².           Physical Exam  HENT:      Head: Normocephalic.      Nose: Nose normal.   Eyes:      Conjunctiva/sclera: Conjunctivae normal.   Cardiovascular:      Rate and Rhythm: Normal rate and regular rhythm.      Heart sounds: Normal heart sounds. No murmur heard.  Pulmonary:      Effort: Pulmonary effort is normal.      Breath sounds: Normal breath sounds.   Abdominal:      General: Bowel sounds are normal.      Palpations: Abdomen is soft.   Musculoskeletal:      Right lower leg: No edema.      Left lower leg: No edema.   Skin:     General: Skin is warm and dry.   Neurological:      Mental Status: He is alert.   Psychiatric:         Mood and Affect: Mood normal.         Behavior: Behavior normal.             Assessment / Plan      Assessment/Plan:   Diagnoses and all orders for this visit:    1. Type 2 diabetes mellitus with hyperglycemia, without long-term current use of insulin (Primary)  -     Insulin Glargine (BASAGLAR KWIKPEN) 100 UNIT/ML injection pen; Inject 40 Units under the skin into the appropriate area as directed Every Night.  Dispense: 9 mL; Refill: 5    2. Primary hypertension    3. Hyperlipidemia LDL goal <70    4. Coronary artery " "disease involving native coronary artery of native heart without angina pectoris    5. S/P CABG x 4    6. Statin intolerance    7. Acquired hypothyroidism  -     TSH  -     T4, Free    Other orders  -     Tirzepatide (MOUNJARO) 12.5 MG/0.5ML solution pen-injector pen; Inject 0.5 mL under the skin into the appropriate area as directed 1 (One) Time Per Week.  Dispense: 2 mL; Refill: 6  -     metFORMIN ER (GLUCOPHAGE-XR) 500 MG 24 hr tablet; Take 2 tablets by mouth Daily With Breakfast.  Dispense: 180 tablet; Refill: 1  -     levothyroxine (SYNTHROID, LEVOTHROID) 50 MCG tablet; Take 1 tablet by mouth Every Morning.  Dispense: 90 tablet; Refill: 1         Diabetes mellitus type 2 uncontrolled will increase Basaglar to 40 units nightly and increase Mounjaro to 12.5 mg once weekly and increase metformin to 2 tablets daily do recommend reducing carb intake increasing lean protein intake exercising 30 minutes daily we will follow-up in 4 weeks remove p.o. blood sugar log  Hyperlipidemia LDL below goal of 70 with coronary artery disease status post CABG x 4 and he is statin intolerant patient reports he has missed a few Repatha injections follow-up with cardiology as scheduled  Hypothyroidism will obtain labs to monitor increase Synthroid dose 50 mcg daily denies palpitations      Follow Up:   Return in about 4 weeks (around 7/18/2024).    Nyasia Newsome, SOHAM    \"Please note that portions of this note were completed with a voice recognition program.\"    "

## 2024-06-20 ENCOUNTER — OFFICE VISIT (OUTPATIENT)
Dept: FAMILY MEDICINE CLINIC | Facility: CLINIC | Age: 59
End: 2024-06-20
Payer: COMMERCIAL

## 2024-06-20 VITALS
TEMPERATURE: 98.6 F | WEIGHT: 238 LBS | BODY MASS INDEX: 33.32 KG/M2 | DIASTOLIC BLOOD PRESSURE: 69 MMHG | HEART RATE: 61 BPM | OXYGEN SATURATION: 98 % | SYSTOLIC BLOOD PRESSURE: 112 MMHG | HEIGHT: 71 IN

## 2024-06-20 DIAGNOSIS — Z78.9 STATIN INTOLERANCE: ICD-10-CM

## 2024-06-20 DIAGNOSIS — I25.10 CORONARY ARTERY DISEASE INVOLVING NATIVE CORONARY ARTERY OF NATIVE HEART WITHOUT ANGINA PECTORIS: ICD-10-CM

## 2024-06-20 DIAGNOSIS — Z95.1 S/P CABG X 4: ICD-10-CM

## 2024-06-20 DIAGNOSIS — E03.9 ACQUIRED HYPOTHYROIDISM: ICD-10-CM

## 2024-06-20 DIAGNOSIS — E11.65 TYPE 2 DIABETES MELLITUS WITH HYPERGLYCEMIA, WITHOUT LONG-TERM CURRENT USE OF INSULIN: Primary | ICD-10-CM

## 2024-06-20 DIAGNOSIS — I10 PRIMARY HYPERTENSION: ICD-10-CM

## 2024-06-20 DIAGNOSIS — E78.5 HYPERLIPIDEMIA LDL GOAL <70: ICD-10-CM

## 2024-06-20 PROCEDURE — 99214 OFFICE O/P EST MOD 30 MIN: CPT | Performed by: NURSE PRACTITIONER

## 2024-06-20 RX ORDER — METFORMIN HYDROCHLORIDE 500 MG/1
1000 TABLET, EXTENDED RELEASE ORAL
Qty: 180 TABLET | Refills: 1 | Status: SHIPPED | OUTPATIENT
Start: 2024-06-20

## 2024-06-20 RX ORDER — LEVOTHYROXINE SODIUM 0.05 MG/1
50 TABLET ORAL
Qty: 90 TABLET | Refills: 1 | Status: SHIPPED | OUTPATIENT
Start: 2024-06-20

## 2024-06-20 RX ORDER — INSULIN GLARGINE 100 [IU]/ML
40 INJECTION, SOLUTION SUBCUTANEOUS NIGHTLY
Qty: 9 ML | Refills: 5 | Status: SHIPPED | OUTPATIENT
Start: 2024-06-20

## 2024-06-26 RX ORDER — ACYCLOVIR 400 MG/1
TABLET ORAL
Qty: 3 EACH | Refills: 0 | Status: SHIPPED | OUTPATIENT
Start: 2024-06-26

## 2024-06-26 NOTE — TELEPHONE ENCOUNTER
Caller: Jd Velazquez    Relationship: Self    Best call back number: [phone]     Requested Prescriptions:   Requested Prescriptions     Pending Prescriptions Disp Refills    Continuous Glucose Sensor (Dexcom G7 Sensor) misc 3 each 0     Sig: apply sensor to skin for continuous blood sugar monitoring replace every 10 days        Pharmacy where request should be sent: [facility] [id number] [place], [place] - [address] AT [facility] [address] [facility] [phone] [facility] [phone] FX     Last office visit with prescribing clinician: 6/20/2024   Last telemedicine visit with prescribing clinician: Visit date not found   Next office visit with prescribing clinician: Visit date not found     Does the patient have less than a 3 day supply:  [x] Yes  [] No    Would you like a call back once the refill request has been completed: [x] Yes [] No    If the office needs to give you a call back, can they leave a voicemail: [x] Yes [] No    Zoila Castro, PCT   06/26/24 11:17 EDT

## 2024-06-28 ENCOUNTER — OFFICE VISIT (OUTPATIENT)
Dept: PODIATRY | Facility: CLINIC | Age: 59
End: 2024-06-28
Payer: COMMERCIAL

## 2024-06-28 VITALS
BODY MASS INDEX: 32.36 KG/M2 | SYSTOLIC BLOOD PRESSURE: 127 MMHG | HEART RATE: 60 BPM | TEMPERATURE: 98.4 F | DIASTOLIC BLOOD PRESSURE: 77 MMHG | WEIGHT: 232 LBS | OXYGEN SATURATION: 95 %

## 2024-06-28 DIAGNOSIS — M86.60 CHRONIC OSTEOMYELITIS: ICD-10-CM

## 2024-06-28 DIAGNOSIS — L97.513 ULCER OF RIGHT FOOT WITH NECROSIS OF MUSCLE: ICD-10-CM

## 2024-06-28 DIAGNOSIS — E11.65 UNCONTROLLED TYPE 2 DIABETES MELLITUS WITH HYPERGLYCEMIA: ICD-10-CM

## 2024-06-28 DIAGNOSIS — I73.9 PERIPHERAL VASCULAR DISEASE: Primary | ICD-10-CM

## 2024-06-28 PROCEDURE — 99024 POSTOP FOLLOW-UP VISIT: CPT | Performed by: PODIATRIST

## 2024-07-01 ENCOUNTER — TELEPHONE (OUTPATIENT)
Dept: PODIATRY | Facility: CLINIC | Age: 59
End: 2024-07-01
Payer: COMMERCIAL

## 2024-07-01 NOTE — TELEPHONE ENCOUNTER
Provider: CHILANGO    Caller: KAT    Relationship to Patient: JEREMIAH    Pharmacy:     Phone Number: [phone]    Reason for Call: JEREMIAH CALLING IN REGARDS TO THE FAX THAT WAS RECEIVED 6.10.24 - REGARDING A COMPLAINT - LOOKING TO GET AN UPDATE ON THE WRITTEN RESPONSE TO SEE IF IT HAS BEEN DONE OR NOT YET AND WHEN THEY CAN EXPECT IT

## 2024-07-01 NOTE — PROGRESS NOTES
[facility] - PODIATRY    Today's Date: 07/01/24    Patient Name: Jd Velazquez  MRN: [id number]  CSN: [id number]  PCP: Dacia Newsome APRN,   Referring Provider: No ref. provider found    SUBJECTIVE     Chief Complaint   Patient presents with    Right Foot - Follow-up, Foot Ulcer     HPI: Jd Velazquez, a 58 y.o.male, presents to clinic.    Patient is a 58-year-old male presenting for follow-up of his right foot amputation site.  Has been changing the dressing.    Past Medical History:   Diagnosis Date    Allergic rhinitis 01/12/2015    Anesthesia     DIFFICULTY WAKING UP POST SURGERY    CAD (coronary artery disease)     DENIES CP/SOA.    Diabetes mellitus     Hyperlipidemia     Hypertension     Hypothyroidism 04/24/2014    Insomnia, unspecified 06/15/2016    WITHOUT BIPAP    Microalbuminuria due to type 2 diabetes mellitus 10/15/2015    Mixed hyperlipidemia 10/15/2015    Myocardial infarction     Obesity     BMI 32    SHAHLA (obstructive sleep apnea)     WEARS BIPAP    Right great toe amputee     4/2024; 5/2024 ADMITTED WITH INFECTION    Skin cancer     REMOVED    Sleep apnea      Past Surgical History:   Procedure Laterality Date    AMPUTATION DIGIT Right 4/10/2024    Procedure: AMPUTATION DIGIT RIGHT GREAT TOE;  Surgeon: Balwinder Costa DPM;  Location: [facility] MAIN OR;  Service: Podiatry;  Laterality: Right;    AMPUTATION DIGIT Right 5/21/2024    Procedure: SECOND RAY AMPUTATION RIGHT FOOT, INCISION AND DRAINAGE RIGHT FOOT;  Surgeon: Balwinder Costa DPM;  Location: [facility] MAIN OR;  Service: Podiatry;  Laterality: Right;    ANKLE ARTHROSCOPY W/ OPEN REPAIR      APPENDECTOMY      CARDIAC CATHETERIZATION  2014    PCI to circumflex    CARDIAC CATHETERIZATION Right 10/26/2023    Procedure: Aortogram with right leg angiogram, possible angioplasty or stenting;  Surgeon: Robert Puga MD;  Location: [facility] CATH INVASIVE LOCATION;  Service: Vascular;  Laterality: Right;     CARDIAC CATHETERIZATION Right 12/15/2023    Procedure: Right leg angiogram, possible angioplasty or stenting;  Surgeon: Robert Puga MD;  Location: [facility] CATH INVASIVE LOCATION;  Service: Vascular;  Laterality: Right;    CORONARY ARTERY BYPASS GRAFT N/A 10/08/2020    Procedure: STERNOTOMY, CORONARY ARTERY BYPASS GRAFTING TIME 4 WITH LEFT KELSI  AND ENDOSCOPICALLY HARVESTED LEFT GREATER SAPHENOUS VEIN AND PRP.;  Surgeon: Jr Girma Chiu MD;  Location: [facility] MAIN OR;  Service: Cardiothoracic;  Laterality: N/A;    FEMORAL TIBIAL BYPASS Right 01/09/2024    Procedure: Right femoral-tibial bypass graft;  Surgeon: Robert Puga MD;  Location: [facility] MAIN OR;  Service: Vascular;  Laterality: Right;    HEEL SPUR SURGERY      HERNIA REPAIR      INCISION AND DRAINAGE OF WOUND Right 4/30/2024    Procedure: INCISION AND DRAINAGE WOUND RIGHT FOOT, DEBRIDEMENT OF WOUND RIGHT FOOT, AMPUTATION REVISION RIGHT FOOT;  Surgeon: Balwinder Costa DPM;  Location: [facility] MAIN OR;  Service: Podiatry;  Laterality: Right;    KNEE ARTHROSCOPY      MULTIPLE TIMES ON BOTH KNEES    SKIN CANCER EXCISION      BACK    TOE SURGERY Right     DEBRIDMENT RIGHT GREAT TOE    TONSILLECTOMY       Family History   Adopted: Yes   Problem Relation Age of Onset    Heart disease Other      Social History     Socioeconomic History    Marital status:    Tobacco Use    Smoking status: Every Day     Current packs/day: 0.25     Average packs/day: 0.3 packs/day for 15.0 years (3.8 ttl pk-yrs)     Types: Cigarettes     Passive exposure: Never    Smokeless tobacco: Never   Vaping Use    Vaping status: Never Used   Substance and Sexual Activity    Alcohol use: Not Currently    Drug use: Never    Sexual activity: Defer     Allergies   Allergen Reactions    Strawberry Itching    Lortab [Hydrocodone-Acetaminophen] Itching     Current Outpatient Medications   Medication Sig Dispense Refill    aspirin 81 MG chewable tablet Chew 1 tablet Daily. PER   CHILANGO OK TO CONTINUE ASA UP TO AND INCLUDING DAY OF SURGERY      clopidogrel (Plavix) 75 MG tablet Take 1 tablet by mouth Daily for 180 days. 30 tablet 5    Continuous Glucose Sensor (Dexcom G7 Sensor) misc apply sensor to skin for continuous blood sugar monitoring replace every 10 days 3 each 0    Cymbalta 30 MG capsule Take 1 capsule by mouth Daily.      Evolocumab (REPATHA) solution prefilled syringe injection Inject 1 mL under the skin into the appropriate area as directed.      Insulin Glargine (BASAGLAR KWIKPEN) 100 UNIT/ML injection pen Inject 40 Units under the skin into the appropriate area as directed Every Night. 9 mL 5    levothyroxine (SYNTHROID, LEVOTHROID) 50 MCG tablet Take 1 tablet by mouth Every Morning. 90 tablet 1    lisinopril-hydrochlorothiazide (PRINZIDE,ZESTORETIC) 20-12.5 MG per tablet Take 2 tablets by mouth Daily. 180 tablet 3    metFORMIN ER (GLUCOPHAGE-XR) 500 MG 24 hr tablet Take 2 tablets by mouth Daily With Breakfast. 180 tablet 1    pregabalin (LYRICA) 225 MG capsule Take 1 capsule by mouth 2 (Two) Times a Day.      spironolactone (ALDACTONE) 50 MG tablet Take 1 tablet by mouth Daily.      Tirzepatide (MOUNJARO) 12.5 MG/0.5ML solution pen-injector pen Inject 0.5 mL under the skin into the appropriate area as directed 1 (One) Time Per Week. 2 mL 6    traZODone (DESYREL) 100 MG tablet TAKE ONE TABLET BY MOUTH ONCE NIGHTLY 30 tablet 1    metoprolol tartrate (LOPRESSOR) 50 MG tablet Take 1 tablet by mouth Every 12 (Twelve) Hours for 30 days. 60 tablet 0     No current facility-administered medications for this visit.     Review of Systems   Skin:         Ulcer toe   All other systems reviewed and are negative.      OBJECTIVE     Vitals:    06/28/24 1046   BP: 127/77   Pulse: 60   Temp: 98.4 °F (36.9 °C)   SpO2: 95%       WBC   Date Value Ref Range Status   05/28/2024 7.34 3.40 - 10.80 10*3/mm3 Final     RBC   Date Value Ref Range Status   05/28/2024 4.01 (L) 4.14 - 5.80 10*6/mm3  Final     Hemoglobin   Date Value Ref Range Status   05/28/2024 11.6 (L) 13.0 - 17.7 g/dL Final     Hematocrit   Date Value Ref Range Status   05/28/2024 36.6 (L) 37.5 - 51.0 % Final     MCV   Date Value Ref Range Status   05/28/2024 91.3 79.0 - 97.0 fL Final     MCH   Date Value Ref Range Status   05/28/2024 28.9 26.6 - 33.0 pg Final     MCHC   Date Value Ref Range Status   05/28/2024 31.7 31.5 - 35.7 g/dL Final     RDW   Date Value Ref Range Status   05/28/2024 13.5 12.3 - 15.4 % Final     RDW-SD   Date Value Ref Range Status   05/28/2024 45.8 37.0 - 54.0 fl Final     MPV   Date Value Ref Range Status   05/28/2024 9.8 6.0 - 12.0 fL Final     Platelets   Date Value Ref Range Status   05/28/2024 351 140 - 450 10*3/mm3 Final     Neutrophil %   Date Value Ref Range Status   05/28/2024 45.5 42.7 - 76.0 % Final     Lymphocyte %   Date Value Ref Range Status   05/28/2024 33.8 19.6 - 45.3 % Final     Monocyte %   Date Value Ref Range Status   05/28/2024 14.9 (H) 5.0 - 12.0 % Final     Eosinophil %   Date Value Ref Range Status   05/28/2024 3.5 0.3 - 6.2 % Final     Basophil %   Date Value Ref Range Status   05/28/2024 1.6 (H) 0.0 - 1.5 % Final     Immature Grans %   Date Value Ref Range Status   05/28/2024 0.7 (H) 0.0 - 0.5 % Final     Neutrophils, Absolute   Date Value Ref Range Status   05/28/2024 3.34 1.70 - 7.00 10*3/mm3 Final     Lymphocytes, Absolute   Date Value Ref Range Status   05/28/2024 2.48 0.70 - 3.10 10*3/mm3 Final     Monocytes, Absolute   Date Value Ref Range Status   05/28/2024 1.09 (H) 0.10 - 0.90 10*3/mm3 Final     Eosinophils, Absolute   Date Value Ref Range Status   05/28/2024 0.26 0.00 - 0.40 10*3/mm3 Final     Basophils, Absolute   Date Value Ref Range Status   05/28/2024 0.12 0.00 - 0.20 10*3/mm3 Final     Immature Grans, Absolute   Date Value Ref Range Status   05/28/2024 0.05 0.00 - 0.05 10*3/mm3 Final     nRBC   Date Value Ref Range Status   05/28/2024 0.0 0.0 - 0.2 /100 WBC Final         Lab  Results   Component Value Date    GLUCOSE 172 (H) 05/28/2024    BUN 23 (H) 05/28/2024    CREATININE 1.37 (H) 05/28/2024    EGFRIFNONA 77 06/17/2021    BCR 16.8 05/28/2024    K 4.2 05/28/2024    CO2 26.0 05/28/2024    CALCIUM 9.9 05/28/2024    ALBUMIN 4.4 05/28/2024    LABIL2 0.9 (L) 10/16/2020    AST <5 05/28/2024    ALT 5 05/28/2024       Patient seen in no apparent distress.      PHYSICAL EXAM:  Patient with previous first and second ray amputation to his right foot.  Incision well coapted.  Small scab to distal incision site without erythema malodor or drainage.  No tenderness to the calf.  Minimal swelling to right foot.    RADIOLOGY:        No results found.    ASSESSMENT/PLAN     Diagnoses and all orders for this visit:    1. Peripheral vascular disease (Primary)    2. Uncontrolled type 2 diabetes mellitus with hyperglycemia    3. Chronic osteomyelitis    4. 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        Comprehensive lower extremity examination and evaluation was performed.      Patient at high risk for developing further ulcerations and infections and subsequent major amputations due to his uncontrolled diabetes, PVD, smoking, and noncompliance.    Triple antibiotic and border dressing daily to distal incision site.    Return to clinic in 2 weeks    Discussed findings and treatment plan including risks, benefits, and treatment options with patient in detail. Patient agreed with treatment plan.    Medications and allergies reviewed.  Reviewed available lab values along with other pertinent labs.  These were discussed with the patient.    An After Visit Summary was printed and given to the patient at discharge, including (if requested) any available informative/educational handouts regarding diagnosis, treatment, or medications. All questions were answered to patient/family satisfaction. Should symptoms fail to improve or worsen they agree to call or return to clinic or to go to the Emergency Department.  Discussed the importance of following up with any needed screening tests/labs/specialist appointments and any requested follow-up recommended by me today. Importance of maintaining follow-up discussed and patient accepts that missed appointments can delay diagnosis and potentially lead to worsening of conditions.    Return in about 2 weeks (around 7/12/2024)., or sooner if acute issues arise.    This document has been electronically signed by Balwinder Costa DPM on July 1, 2024 12:55 EDT

## 2024-07-09 DIAGNOSIS — G62.9 NEUROPATHY: Primary | ICD-10-CM

## 2024-07-09 RX ORDER — PREGABALIN 225 MG/1
225 CAPSULE ORAL 2 TIMES DAILY
Qty: 60 CAPSULE | Refills: 1 | Status: SHIPPED | OUTPATIENT
Start: 2024-07-09

## 2024-07-11 ENCOUNTER — OFFICE VISIT (OUTPATIENT)
Dept: PODIATRY | Facility: CLINIC | Age: 59
End: 2024-07-11
Payer: COMMERCIAL

## 2024-07-11 VITALS
HEART RATE: 68 BPM | DIASTOLIC BLOOD PRESSURE: 72 MMHG | WEIGHT: 228 LBS | SYSTOLIC BLOOD PRESSURE: 129 MMHG | TEMPERATURE: 97.6 F | BODY MASS INDEX: 31.8 KG/M2 | OXYGEN SATURATION: 95 %

## 2024-07-11 DIAGNOSIS — L97.513 ULCER OF RIGHT FOOT WITH NECROSIS OF MUSCLE: ICD-10-CM

## 2024-07-11 DIAGNOSIS — E11.65 UNCONTROLLED TYPE 2 DIABETES MELLITUS WITH HYPERGLYCEMIA: Primary | ICD-10-CM

## 2024-07-11 DIAGNOSIS — I73.9 PERIPHERAL VASCULAR DISEASE: ICD-10-CM

## 2024-07-11 NOTE — PROGRESS NOTES
[facility] - PODIATRY    Today's Date: 07/11/24    Patient Name: Jd Velazquez  MRN: [id number]  CSN: [id number]  PCP: Dacia Newsome APRN,   Referring Provider: No ref. provider found    SUBJECTIVE     Chief Complaint   Patient presents with    Right Foot - Follow-up, Foot Ulcer     HPI: Jd Velazquez, a 58 y.o.male, presents to clinic.    Patient is a 58-year-old male presenting for follow-up of his right foot amputation site.  Has been changing the dressing.    Past Medical History:   Diagnosis Date    Allergic rhinitis 01/12/2015    Anesthesia     DIFFICULTY WAKING UP POST SURGERY    CAD (coronary artery disease)     DENIES CP/SOA.    Diabetes mellitus     Hyperlipidemia     Hypertension     Hypothyroidism 04/24/2014    Insomnia, unspecified 06/15/2016    WITHOUT BIPAP    Microalbuminuria due to type 2 diabetes mellitus 10/15/2015    Mixed hyperlipidemia 10/15/2015    Myocardial infarction     Obesity     BMI 32    SHAHLA (obstructive sleep apnea)     WEARS BIPAP    Right great toe amputee     4/2024; 5/2024 ADMITTED WITH INFECTION    Skin cancer     REMOVED    Sleep apnea      Past Surgical History:   Procedure Laterality Date    AMPUTATION DIGIT Right 4/10/2024    Procedure: AMPUTATION DIGIT RIGHT GREAT TOE;  Surgeon: Balwinder Costa DPM;  Location: [facility] MAIN OR;  Service: Podiatry;  Laterality: Right;    AMPUTATION DIGIT Right 5/21/2024    Procedure: SECOND RAY AMPUTATION RIGHT FOOT, INCISION AND DRAINAGE RIGHT FOOT;  Surgeon: Balwinder Costa DPM;  Location: [facility] MAIN OR;  Service: Podiatry;  Laterality: Right;    ANKLE ARTHROSCOPY W/ OPEN REPAIR      APPENDECTOMY      CARDIAC CATHETERIZATION  2014    PCI to circumflex    CARDIAC CATHETERIZATION Right 10/26/2023    Procedure: Aortogram with right leg angiogram, possible angioplasty or stenting;  Surgeon: Robert Puga MD;  Location: [facility] CATH INVASIVE LOCATION;  Service: Vascular;  Laterality: Right;     CARDIAC CATHETERIZATION Right 12/15/2023    Procedure: Right leg angiogram, possible angioplasty or stenting;  Surgeon: Robert Puga MD;  Location: [facility] CATH INVASIVE LOCATION;  Service: Vascular;  Laterality: Right;    CORONARY ARTERY BYPASS GRAFT N/A 10/08/2020    Procedure: STERNOTOMY, CORONARY ARTERY BYPASS GRAFTING TIME 4 WITH LEFT KELSI  AND ENDOSCOPICALLY HARVESTED LEFT GREATER SAPHENOUS VEIN AND PRP.;  Surgeon: Jr Girma Chiu MD;  Location: [facility] MAIN OR;  Service: Cardiothoracic;  Laterality: N/A;    FEMORAL TIBIAL BYPASS Right 01/09/2024    Procedure: Right femoral-tibial bypass graft;  Surgeon: Robert Puga MD;  Location: [facility] MAIN OR;  Service: Vascular;  Laterality: Right;    HEEL SPUR SURGERY      HERNIA REPAIR      INCISION AND DRAINAGE OF WOUND Right 4/30/2024    Procedure: INCISION AND DRAINAGE WOUND RIGHT FOOT, DEBRIDEMENT OF WOUND RIGHT FOOT, AMPUTATION REVISION RIGHT FOOT;  Surgeon: Balwinder Costa DPM;  Location: [facility] MAIN OR;  Service: Podiatry;  Laterality: Right;    KNEE ARTHROSCOPY      MULTIPLE TIMES ON BOTH KNEES    SKIN CANCER EXCISION      BACK    TOE SURGERY Right     DEBRIDMENT RIGHT GREAT TOE    TONSILLECTOMY       Family History   Adopted: Yes   Problem Relation Age of Onset    Heart disease Other      Social History     Socioeconomic History    Marital status:    Tobacco Use    Smoking status: Every Day     Current packs/day: 0.25     Average packs/day: 0.3 packs/day for 15.0 years (3.8 ttl pk-yrs)     Types: Cigarettes     Passive exposure: Never    Smokeless tobacco: Never   Vaping Use    Vaping status: Never Used   Substance and Sexual Activity    Alcohol use: Not Currently    Drug use: Never    Sexual activity: Defer     Allergies   Allergen Reactions    Strawberry Itching    Lortab [Hydrocodone-Acetaminophen] Itching     Current Outpatient Medications   Medication Sig Dispense Refill    aspirin 81 MG chewable tablet Chew 1 tablet Daily. PER   CHILANGO OK TO CONTINUE ASA UP TO AND INCLUDING DAY OF SURGERY      clopidogrel (Plavix) 75 MG tablet Take 1 tablet by mouth Daily for 180 days. 30 tablet 5    Continuous Glucose Sensor (Dexcom G7 Sensor) misc apply sensor to skin for continuous blood sugar monitoring replace every 10 days 3 each 0    Cymbalta 30 MG capsule Take 1 capsule by mouth Daily.      Evolocumab (REPATHA) solution prefilled syringe injection Inject 1 mL under the skin into the appropriate area as directed.      Insulin Glargine (BASAGLAR KWIKPEN) 100 UNIT/ML injection pen Inject 40 Units under the skin into the appropriate area as directed Every Night. 9 mL 5    levothyroxine (SYNTHROID, LEVOTHROID) 50 MCG tablet Take 1 tablet by mouth Every Morning. 90 tablet 1    lisinopril-hydrochlorothiazide (PRINZIDE,ZESTORETIC) 20-12.5 MG per tablet Take 2 tablets by mouth Daily. 180 tablet 3    metFORMIN ER (GLUCOPHAGE-XR) 500 MG 24 hr tablet Take 2 tablets by mouth Daily With Breakfast. 180 tablet 1    pregabalin (LYRICA) 225 MG capsule TAKE 1 CAPSULE BY MOUTH TWICE A DAY 60 capsule 1    spironolactone (ALDACTONE) 50 MG tablet Take 1 tablet by mouth Daily.      Tirzepatide (MOUNJARO) 12.5 MG/0.5ML solution pen-injector pen Inject 0.5 mL under the skin into the appropriate area as directed 1 (One) Time Per Week. 2 mL 6    traZODone (DESYREL) 100 MG tablet TAKE ONE TABLET BY MOUTH ONCE NIGHTLY 30 tablet 1    metoprolol tartrate (LOPRESSOR) 50 MG tablet Take 1 tablet by mouth Every 12 (Twelve) Hours for 30 days. 60 tablet 0     No current facility-administered medications for this visit.     Review of Systems   Skin:         Ulcer toe   All other systems reviewed and are negative.      OBJECTIVE     Vitals:    07/11/24 0853   BP: 129/72   Pulse: 68   Temp: 97.6 °F (36.4 °C)   SpO2: 95%       WBC   Date Value Ref Range Status   05/28/2024 7.34 3.40 - 10.80 10*3/mm3 Final     RBC   Date Value Ref Range Status   05/28/2024 4.01 (L) 4.14 - 5.80 10*6/mm3  Final     Hemoglobin   Date Value Ref Range Status   05/28/2024 11.6 (L) 13.0 - 17.7 g/dL Final     Hematocrit   Date Value Ref Range Status   05/28/2024 36.6 (L) 37.5 - 51.0 % Final     MCV   Date Value Ref Range Status   05/28/2024 91.3 79.0 - 97.0 fL Final     MCH   Date Value Ref Range Status   05/28/2024 28.9 26.6 - 33.0 pg Final     MCHC   Date Value Ref Range Status   05/28/2024 31.7 31.5 - 35.7 g/dL Final     RDW   Date Value Ref Range Status   05/28/2024 13.5 12.3 - 15.4 % Final     RDW-SD   Date Value Ref Range Status   05/28/2024 45.8 37.0 - 54.0 fl Final     MPV   Date Value Ref Range Status   05/28/2024 9.8 6.0 - 12.0 fL Final     Platelets   Date Value Ref Range Status   05/28/2024 351 140 - 450 10*3/mm3 Final     Neutrophil %   Date Value Ref Range Status   05/28/2024 45.5 42.7 - 76.0 % Final     Lymphocyte %   Date Value Ref Range Status   05/28/2024 33.8 19.6 - 45.3 % Final     Monocyte %   Date Value Ref Range Status   05/28/2024 14.9 (H) 5.0 - 12.0 % Final     Eosinophil %   Date Value Ref Range Status   05/28/2024 3.5 0.3 - 6.2 % Final     Basophil %   Date Value Ref Range Status   05/28/2024 1.6 (H) 0.0 - 1.5 % Final     Immature Grans %   Date Value Ref Range Status   05/28/2024 0.7 (H) 0.0 - 0.5 % Final     Neutrophils, Absolute   Date Value Ref Range Status   05/28/2024 3.34 1.70 - 7.00 10*3/mm3 Final     Lymphocytes, Absolute   Date Value Ref Range Status   05/28/2024 2.48 0.70 - 3.10 10*3/mm3 Final     Monocytes, Absolute   Date Value Ref Range Status   05/28/2024 1.09 (H) 0.10 - 0.90 10*3/mm3 Final     Eosinophils, Absolute   Date Value Ref Range Status   05/28/2024 0.26 0.00 - 0.40 10*3/mm3 Final     Basophils, Absolute   Date Value Ref Range Status   05/28/2024 0.12 0.00 - 0.20 10*3/mm3 Final     Immature Grans, Absolute   Date Value Ref Range Status   05/28/2024 0.05 0.00 - 0.05 10*3/mm3 Final     nRBC   Date Value Ref Range Status   05/28/2024 0.0 0.0 - 0.2 /100 WBC Final         Lab  Results   Component Value Date    GLUCOSE 172 (H) 05/28/2024    BUN 23 (H) 05/28/2024    CREATININE 1.37 (H) 05/28/2024    EGFRIFNONA 77 06/17/2021    BCR 16.8 05/28/2024    K 4.2 05/28/2024    CO2 26.0 05/28/2024    CALCIUM 9.9 05/28/2024    ALBUMIN 4.4 05/28/2024    LABIL2 0.9 (L) 10/16/2020    AST <5 05/28/2024    ALT 5 05/28/2024       Patient seen in no apparent distress.      PHYSICAL EXAM:  Patient with previous first and second ray amputation to his right foot.  Incision well coapted.  Small scab to distal incision site without erythema malodor or drainage.  No tenderness to the calf.  Minimal swelling to right foot.    RADIOLOGY:        No results found.    ASSESSMENT/PLAN     Diagnoses and all orders for this visit:    1. Uncontrolled type 2 diabetes mellitus with hyperglycemia (Primary)    2. Peripheral vascular disease    3. 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        Comprehensive lower extremity examination and evaluation was performed.      Patient at high risk for developing further ulcerations and infections and subsequent major amputations due to his uncontrolled diabetes, PVD, smoking, and noncompliance.    Triple antibiotic and border dressing daily to distal incision site.    Return to clinic in 2 weeks    Discussed findings and treatment plan including risks, benefits, and treatment options with patient in detail. Patient agreed with treatment plan.    Medications and allergies reviewed.  Reviewed available lab values along with other pertinent labs.  These were discussed with the patient.    An After Visit Summary was printed and given to the patient at discharge, including (if requested) any available informative/educational handouts regarding diagnosis, treatment, or medications. All questions were answered to patient/family satisfaction. Should symptoms fail to improve or worsen they agree to call or return to clinic or to go to the Emergency Department. Discussed the importance of  following up with any needed screening tests/labs/specialist appointments and any requested follow-up recommended by me today. Importance of maintaining follow-up discussed and patient accepts that missed appointments can delay diagnosis and potentially lead to worsening of conditions.    Return in about 2 weeks (around 7/25/2024)., or sooner if acute issues arise.    This document has been electronically signed by Balwinder Costa DPM on July 11, 2024 10:18 EDT

## 2024-07-23 ENCOUNTER — TELEPHONE (OUTPATIENT)
Dept: FAMILY MEDICINE CLINIC | Facility: CLINIC | Age: 59
End: 2024-07-23
Payer: COMMERCIAL

## 2024-07-23 ENCOUNTER — TELEPHONE (OUTPATIENT)
Dept: FAMILY MEDICINE CLINIC | Facility: CLINIC | Age: 59
End: 2024-07-23

## 2024-07-23 RX ORDER — ACYCLOVIR 400 MG/1
TABLET ORAL
Qty: 3 EACH | Refills: 0 | Status: SHIPPED | OUTPATIENT
Start: 2024-07-23

## 2024-07-23 NOTE — TELEPHONE ENCOUNTER
Caller: Jd Velazquez    Relationship to patient: Self    Best call back number: [phone]     Patient is needing: PLEASE CONTACT PATIENT TO SCHEDULE IN OFFICE LABS TO DRAWN PER PATIENT MESSAGE FROM 7.23.2024.      MYCHART NO, CALL PREFERRED MAY LEAVE VOICEMAIL.

## 2024-07-23 NOTE — TELEPHONE ENCOUNTER
Caller: Jd Velazquez    Relationship: Self    Best call back number: [phone]     What medication are you requesting: DEXCOM G7 SENSORS    What are your current symptoms: DIABETIC MONITORING SUPPLIES    Have you had these symptoms before:    [x] Yes  [] No    Have you been treated for these symptoms before:   [x] Yes  [] No    If a prescription is needed, what is your preferred pharmacy and phone number: [facility] [id number] - [place], [place] - [address] AT [facility] [address] - [phone] [facility] [phone] FX     Additional notes: PATIENT IS ON LAST SENSOR AND WILL LAST UNTIL UNTIL 12PM TODAY 7.24.2024.      MYCHART NO, CALL PREFERRED MAY LEAVE VOICEMAIL.

## 2024-07-25 ENCOUNTER — OFFICE VISIT (OUTPATIENT)
Dept: PODIATRY | Facility: CLINIC | Age: 59
End: 2024-07-25
Payer: COMMERCIAL

## 2024-07-25 VITALS
TEMPERATURE: 97.8 F | HEART RATE: 68 BPM | SYSTOLIC BLOOD PRESSURE: 129 MMHG | WEIGHT: 249 LBS | DIASTOLIC BLOOD PRESSURE: 74 MMHG | HEIGHT: 71 IN | BODY MASS INDEX: 34.86 KG/M2 | OXYGEN SATURATION: 94 %

## 2024-07-25 DIAGNOSIS — E11.65 UNCONTROLLED TYPE 2 DIABETES MELLITUS WITH HYPERGLYCEMIA: Primary | ICD-10-CM

## 2024-07-25 DIAGNOSIS — L97.513 ULCER OF RIGHT FOOT WITH NECROSIS OF MUSCLE: ICD-10-CM

## 2024-07-25 DIAGNOSIS — I73.9 PERIPHERAL VASCULAR DISEASE: ICD-10-CM

## 2024-07-25 NOTE — PROGRESS NOTES
[facility] - PODIATRY    Today's Date: 07/25/24    Patient Name: Jd Velazquez  MRN: [id number]  CSN: [id number]  PCP: Dacia Newsome APRN,   Referring Provider: No ref. provider found    SUBJECTIVE     Chief Complaint   Patient presents with    Right Foot - Follow-up, Foot Ulcer     HPI: Jd Velazquez, a 58 y.o.male, presents to clinic.    Patient is a 58-year-old male presenting for follow-up of his right foot amputation site.  Has been changing the dressing.    Past Medical History:   Diagnosis Date    Allergic rhinitis 01/12/2015    Anesthesia     DIFFICULTY WAKING UP POST SURGERY    CAD (coronary artery disease)     DENIES CP/SOA.    Diabetes mellitus     Hyperlipidemia     Hypertension     Hypothyroidism 04/24/2014    Insomnia, unspecified 06/15/2016    WITHOUT BIPAP    Microalbuminuria due to type 2 diabetes mellitus 10/15/2015    Mixed hyperlipidemia 10/15/2015    Myocardial infarction     Obesity     BMI 32    SHAHLA (obstructive sleep apnea)     WEARS BIPAP    Right great toe amputee     4/2024; 5/2024 ADMITTED WITH INFECTION    Skin cancer     REMOVED    Sleep apnea      Past Surgical History:   Procedure Laterality Date    AMPUTATION DIGIT Right 4/10/2024    Procedure: AMPUTATION DIGIT RIGHT GREAT TOE;  Surgeon: Balwinder Costa DPM;  Location: [facility] MAIN OR;  Service: Podiatry;  Laterality: Right;    AMPUTATION DIGIT Right 5/21/2024    Procedure: SECOND RAY AMPUTATION RIGHT FOOT, INCISION AND DRAINAGE RIGHT FOOT;  Surgeon: Balwinder Costa DPM;  Location: [facility] MAIN OR;  Service: Podiatry;  Laterality: Right;    ANKLE ARTHROSCOPY W/ OPEN REPAIR      APPENDECTOMY      CARDIAC CATHETERIZATION  2014    PCI to circumflex    CARDIAC CATHETERIZATION Right 10/26/2023    Procedure: Aortogram with right leg angiogram, possible angioplasty or stenting;  Surgeon: Robert Puga MD;  Location: [facility] CATH INVASIVE LOCATION;  Service: Vascular;  Laterality: Right;     Physical Therapy Treatment    Patient Name:  Junaid Muñoz   MRN:  [id number]    Recommendations:     Discharge Recommendations:  nursing facility, skilled   Discharge Equipment Recommendations: walker, rolling, bedside commode, wheelchair, shower chair   Barriers to discharge: Decreased caregiver support    Assessment:     Junaid Muñoz is a 75 y.o. male admitted with a medical diagnosis of Spinal epidural abscess.  He presents with the following impairments/functional limitations:  weakness, gait instability, decreased ROM, impaired balance, decreased lower extremity function, impaired joint extensibility, impaired muscle length, orthopedic precautions, impaired self care skills, impaired functional mobilty, decreased coordination, decreased safety awareness.      Patient demonstrates good motivation and fair activity tolerance secondary weakness and fatigue.  Patient demonstrates good willingness to participate but with poor trunk control and B LE weakness limiting functional independence.  Patient required moderated to maximal assistance for balance sitting edge of bed and moderate assistance for sit to stand with rolling walker and CTLSO.  Patient was with decreased stability during ambulation and required Max A for ambulation of 12 ft with rolling walker and assistance to minimize chance of B LE buckling.  Patient continues to benefit from further skilled PT for strengthening and mobility training in an acute care and SNF setting.      Rehab Prognosis: Good; patient continues to benefit from acute skilled PT services to address these deficits and reach maximum level of function.  Patient remains most appropriate to discharge to nursing facility, skilled  Recent Surgery: Procedure(s) (LRB):  FUSION, SPINE, CERVICAL, POSTERIOR APPROACH C2-T3 posterior instrumented fusion (N/A) 14 Days Post-Op    Plan:     During this hospitalization, patient to be seen daily to address the identified rehab impairments via  "gait training, therapeutic activities, therapeutic exercises, neuromuscular re-education and progress toward the following goals:    · Plan of Care Expires:  01/28/20    Subjective     Subjective: "I am ready to try."   Pain/Comfort:  · Pain Rating 1: (patient did not give numeric value)  · Location - Orientation 1: generalized  · Location 1: neck  · Pain Addressed 1: Pre-medicate for activity, Reposition, Cessation of Activity      Objective:     Communicated with RN prior to session.  Patient found supine with bed alarm, SCD upon PT entry to room.     General Precautions: Standard, aspiration, fall   Orthopedic Precautions:spinal precautions   Braces: CTLSO     Functional Mobility:  · Bed Mobility:     · Supine to Sit: moderate assistance  · Transfers:     · Sit to Stand:  moderate assistance with rolling walker and anterior weight shift.  · Gait: Patient ambulated 12 ft with rolling walker and maximal assistance secondary to poor trunk control and B LE instability.      AM-PAC 6 CLICK MOBILITY  Turning over in bed (including adjusting bedclothes, sheets and blankets)?: 2  Sitting down on and standing up from a chair with arms (e.g., wheelchair, bedside commode, etc.): 2  Moving from lying on back to sitting on the side of the bed?: 2  Moving to and from a bed to a chair (including a wheelchair)?: 2  Need to walk in hospital room?: 2  Climbing 3-5 steps with a railing?: 1  Basic Mobility Total Score: 11       Therapeutic Activities and Exercises:  Gait training:  Patient required cues for position in walker, sequencing and upright posture to increase independence and safety.  Patient required cues ~ 50% of the time.  Patient required greatly increased physical assistance for balance and stability secondary to LE weakness and core stability.  Assistance needs increased as fatigue increased.      Patient Education:    Patient educated on bed mobility training, Fall risk, gait training, home safety, Home exercise " CARDIAC CATHETERIZATION Right 12/15/2023    Procedure: Right leg angiogram, possible angioplasty or stenting;  Surgeon: Robert Puga MD;  Location: [facility] CATH INVASIVE LOCATION;  Service: Vascular;  Laterality: Right;    CORONARY ARTERY BYPASS GRAFT N/A 10/08/2020    Procedure: STERNOTOMY, CORONARY ARTERY BYPASS GRAFTING TIME 4 WITH LEFT KELSI  AND ENDOSCOPICALLY HARVESTED LEFT GREATER SAPHENOUS VEIN AND PRP.;  Surgeon: Jr Girma Chiu MD;  Location: [facility] MAIN OR;  Service: Cardiothoracic;  Laterality: N/A;    FEMORAL TIBIAL BYPASS Right 01/09/2024    Procedure: Right femoral-tibial bypass graft;  Surgeon: Robert Puga MD;  Location: [facility] MAIN OR;  Service: Vascular;  Laterality: Right;    HEEL SPUR SURGERY      HERNIA REPAIR      INCISION AND DRAINAGE OF WOUND Right 4/30/2024    Procedure: INCISION AND DRAINAGE WOUND RIGHT FOOT, DEBRIDEMENT OF WOUND RIGHT FOOT, AMPUTATION REVISION RIGHT FOOT;  Surgeon: Balwinder Costa DPM;  Location: [facility] MAIN OR;  Service: Podiatry;  Laterality: Right;    KNEE ARTHROSCOPY      MULTIPLE TIMES ON BOTH KNEES    SKIN CANCER EXCISION      BACK    TOE SURGERY Right     DEBRIDMENT RIGHT GREAT TOE    TONSILLECTOMY       Family History   Adopted: Yes   Problem Relation Age of Onset    Heart disease Other      Social History     Socioeconomic History    Marital status:    Tobacco Use    Smoking status: Every Day     Current packs/day: 0.25     Average packs/day: 0.3 packs/day for 15.0 years (3.8 ttl pk-yrs)     Types: Cigarettes     Passive exposure: Never    Smokeless tobacco: Never   Vaping Use    Vaping status: Never Used   Substance and Sexual Activity    Alcohol use: Not Currently    Drug use: Never    Sexual activity: Defer     Allergies   Allergen Reactions    Strawberry Itching    Lortab [Hydrocodone-Acetaminophen] Itching     Current Outpatient Medications   Medication Sig Dispense Refill    aspirin 81 MG chewable tablet Chew 1 tablet Daily. PER   CHILANGO OK TO CONTINUE ASA UP TO AND INCLUDING DAY OF SURGERY      clopidogrel (Plavix) 75 MG tablet Take 1 tablet by mouth Daily for 180 days. 30 tablet 5    Continuous Glucose Sensor (Dexcom G7 Sensor) misc apply sensor to skin for continuous blood sugar monitoring replace every 10 days 3 each 0    Evolocumab (REPATHA) solution prefilled syringe injection Inject 1 mL under the skin into the appropriate area as directed.      Insulin Glargine (BASAGLAR KWIKPEN) 100 UNIT/ML injection pen Inject 40 Units under the skin into the appropriate area as directed Every Night. 9 mL 5    levothyroxine (SYNTHROID, LEVOTHROID) 50 MCG tablet Take 1 tablet by mouth Every Morning. 90 tablet 1    lisinopril-hydrochlorothiazide (PRINZIDE,ZESTORETIC) 20-12.5 MG per tablet Take 2 tablets by mouth Daily. 180 tablet 3    metFORMIN ER (GLUCOPHAGE-XR) 500 MG 24 hr tablet Take 2 tablets by mouth Daily With Breakfast. 180 tablet 1    metoprolol tartrate (LOPRESSOR) 50 MG tablet Take 1 tablet by mouth Every 12 (Twelve) Hours for 30 days. 60 tablet 0    pregabalin (LYRICA) 225 MG capsule TAKE 1 CAPSULE BY MOUTH TWICE A DAY 60 capsule 1    spironolactone (ALDACTONE) 50 MG tablet Take 1 tablet by mouth Daily.      Tirzepatide (MOUNJARO) 12.5 MG/0.5ML solution pen-injector pen Inject 0.5 mL under the skin into the appropriate area as directed 1 (One) Time Per Week. 2 mL 6    traZODone (DESYREL) 100 MG tablet TAKE ONE TABLET BY MOUTH ONCE NIGHTLY 30 tablet 1    Cymbalta 30 MG capsule Take 1 capsule by mouth Daily. (Patient not taking: Reported on 7/25/2024)       No current facility-administered medications for this visit.     Review of Systems   Skin:         Ulcer toe   All other systems reviewed and are negative.      OBJECTIVE     Vitals:    07/25/24 0748   BP: 129/74   Pulse: 68   Temp: 97.8 °F (36.6 °C)   SpO2: 94%       WBC   Date Value Ref Range Status   05/28/2024 7.34 3.40 - 10.80 10*3/mm3 Final     RBC   Date Value Ref Range Status  program and transfer training by explanation.  Patient was receptive to education and verbalizes understanding.     Patient left up in chair with all lines intact and call button in reach.  Chair alarm under patient and nursing notified alarm is without batteries and no power cord in room.  Nursing reported patient has been ok today.     GOALS:   Multidisciplinary Problems     Physical Therapy Goals        Problem: Physical Therapy Goal    Goal Priority Disciplines Outcome Goal Variances Interventions   Physical Therapy Goal     PT, PT/OT Ongoing, Progressing     Description:  Goals to be met by: 19     Patient will increase functional independence with mobility by performin. Supine to sit with MInimal Assistance  2. Sit to supine with MInimal Assistance  3. Rolling to Left and Right with Modified [place].  4. Sit to stand transfer with Minimal Assistance  5. Bed to chair transfer with Minimal Assistance using Rolling Walker  6. Gait  x 40 feet with Minimal Assistance using Rolling Walker.   7. Sitting at edge of bed x 8 minutes with Minimal Assistance  8. Stand for 5 minutes with Minimal Assistance using Rolling Walker  9. Lower extremity exercise program x15 reps per handout, with independence                      Time Tracking:     PT Received On: 19  PT Start Time: 1313     PT Stop Time: 1340  PT Total Time (min): 27 min     Billable Minutes: Gait Training 27 min    Treatment Type: Treatment  PT/PTA: PT     PTA Visit Number: 0     Tom Connelly, PT  2019       05/28/2024 4.01 (L) 4.14 - 5.80 10*6/mm3 Final     Hemoglobin   Date Value Ref Range Status   05/28/2024 11.6 (L) 13.0 - 17.7 g/dL Final     Hematocrit   Date Value Ref Range Status   05/28/2024 36.6 (L) 37.5 - 51.0 % Final     MCV   Date Value Ref Range Status   05/28/2024 91.3 79.0 - 97.0 fL Final     MCH   Date Value Ref Range Status   05/28/2024 28.9 26.6 - 33.0 pg Final     MCHC   Date Value Ref Range Status   05/28/2024 31.7 31.5 - 35.7 g/dL Final     RDW   Date Value Ref Range Status   05/28/2024 13.5 12.3 - 15.4 % Final     RDW-SD   Date Value Ref Range Status   05/28/2024 45.8 37.0 - 54.0 fl Final     MPV   Date Value Ref Range Status   05/28/2024 9.8 6.0 - 12.0 fL Final     Platelets   Date Value Ref Range Status   05/28/2024 351 140 - 450 10*3/mm3 Final     Neutrophil %   Date Value Ref Range Status   05/28/2024 45.5 42.7 - 76.0 % Final     Lymphocyte %   Date Value Ref Range Status   05/28/2024 33.8 19.6 - 45.3 % Final     Monocyte %   Date Value Ref Range Status   05/28/2024 14.9 (H) 5.0 - 12.0 % Final     Eosinophil %   Date Value Ref Range Status   05/28/2024 3.5 0.3 - 6.2 % Final     Basophil %   Date Value Ref Range Status   05/28/2024 1.6 (H) 0.0 - 1.5 % Final     Immature Grans %   Date Value Ref Range Status   05/28/2024 0.7 (H) 0.0 - 0.5 % Final     Neutrophils, Absolute   Date Value Ref Range Status   05/28/2024 3.34 1.70 - 7.00 10*3/mm3 Final     Lymphocytes, Absolute   Date Value Ref Range Status   05/28/2024 2.48 0.70 - 3.10 10*3/mm3 Final     Monocytes, Absolute   Date Value Ref Range Status   05/28/2024 1.09 (H) 0.10 - 0.90 10*3/mm3 Final     Eosinophils, Absolute   Date Value Ref Range Status   05/28/2024 0.26 0.00 - 0.40 10*3/mm3 Final     Basophils, Absolute   Date Value Ref Range Status   05/28/2024 0.12 0.00 - 0.20 10*3/mm3 Final     Immature Grans, Absolute   Date Value Ref Range Status   05/28/2024 0.05 0.00 - 0.05 10*3/mm3 Final     nRBC   Date Value Ref Range Status   05/28/2024  0.0 0.0 - 0.2 /100 WBC Final         Lab Results   Component Value Date    GLUCOSE 172 (H) 05/28/2024    BUN 23 (H) 05/28/2024    CREATININE 1.37 (H) 05/28/2024    EGFRIFNONA 77 06/17/2021    BCR 16.8 05/28/2024    K 4.2 05/28/2024    CO2 26.0 05/28/2024    CALCIUM 9.9 05/28/2024    ALBUMIN 4.4 05/28/2024    LABIL2 0.9 (L) 10/16/2020    AST <5 05/28/2024    ALT 5 05/28/2024       Patient seen in no apparent distress.      PHYSICAL EXAM:  Patient with previous first and second ray amputation to his right foot.  Incision well coapted.  0.1 x 0.5 x 0.2 cm ulcer to distal amputation site.  100% granular no malodor no drainage.  No tenderness to the calf.  Minimal swelling to right foot.    RADIOLOGY:        No results found.    ASSESSMENT/PLAN     Diagnoses and all orders for this visit:    1. Uncontrolled type 2 diabetes mellitus with hyperglycemia (Primary)    2. 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    3. Peripheral vascular disease        Comprehensive lower extremity examination and evaluation was performed.      Triple antibiotic and border dressing daily to distal incision site.    Discussed importance of smoking cessation to help wound healing.    Patient at high risk for developing further ulcerations and infections and subsequent major amputations due to his uncontrolled diabetes, PVD, smoking, and noncompliance.    Return to clinic in 2 weeks    Discussed findings and treatment plan including risks, benefits, and treatment options with patient in detail. Patient agreed with treatment plan.    Medications and allergies reviewed.  Reviewed available lab values along with other pertinent labs.  These were discussed with the patient.    An After Visit Summary was printed and given to the patient at discharge, including (if requested) any available informative/educational handouts regarding diagnosis, treatment, or medications. All questions were answered to patient/family satisfaction. Should symptoms fail  to improve or worsen they agree to call or return to clinic or to go to the Emergency Department. Discussed the importance of following up with any needed screening tests/labs/specialist appointments and any requested follow-up recommended by me today. Importance of maintaining follow-up discussed and patient accepts that missed appointments can delay diagnosis and potentially lead to worsening of conditions.    Return in about 1 month (around 8/25/2024)., or sooner if acute issues arise.    This document has been electronically signed by Balwinder Costa DPM on July 25, 2024 08:25 EDT

## 2024-07-31 RX ORDER — TRAZODONE HYDROCHLORIDE 100 MG/1
100 TABLET ORAL NIGHTLY
Qty: 30 TABLET | Refills: 1 | Status: SHIPPED | OUTPATIENT
Start: 2024-07-31

## 2024-08-07 ENCOUNTER — OFFICE VISIT (OUTPATIENT)
Dept: CARDIOLOGY | Facility: CLINIC | Age: 59
End: 2024-08-07
Payer: COMMERCIAL

## 2024-08-07 VITALS
SYSTOLIC BLOOD PRESSURE: 135 MMHG | DIASTOLIC BLOOD PRESSURE: 72 MMHG | HEART RATE: 59 BPM | HEIGHT: 71 IN | BODY MASS INDEX: 35.7 KG/M2 | WEIGHT: 255 LBS

## 2024-08-07 DIAGNOSIS — I25.10 CORONARY ARTERY DISEASE INVOLVING NATIVE CORONARY ARTERY OF NATIVE HEART WITHOUT ANGINA PECTORIS: Primary | ICD-10-CM

## 2024-08-07 DIAGNOSIS — I10 ESSENTIAL HYPERTENSION: ICD-10-CM

## 2024-08-07 DIAGNOSIS — Z72.0 TOBACCO ABUSE: ICD-10-CM

## 2024-08-07 DIAGNOSIS — E78.2 MIXED HYPERLIPIDEMIA: ICD-10-CM

## 2024-08-07 RX ORDER — AMLODIPINE BESYLATE 10 MG/1
10 TABLET ORAL DAILY
Qty: 90 TABLET | Refills: 3 | Status: SHIPPED | OUTPATIENT
Start: 2024-08-07

## 2024-08-07 NOTE — PROGRESS NOTES
Chief Complaint  Coronary Artery Disease (6m Follow Up)    Subjective        History of Present Illness  Jd Velazquez presents to [facility] CARDIOLOGY for follow up.   History of Present Illness  Patient is a 58-year-old male with past medical history outlined below, significant for CABG x 4 vessels in 2020, hypertension, hyperlipidemia,  Type 2 diabetes-uncontrolled, smoking who presents for routine follow-up.  He is feeling well from a cardiac standpoint.  He has no chest pain or discomfort.  He denies any dyspnea, orthopnea, edema, palpitations or syncope.  He recently had his first and second toe amputated on the right foot due to poor wound healing.    Past Medical History:   Diagnosis Date    Allergic rhinitis 01/12/2015    Anesthesia     DIFFICULTY WAKING UP POST SURGERY    CAD (coronary artery disease)     DENIES CP/SOA.    Diabetes mellitus     Hyperlipidemia     Hypertension     Hypothyroidism 04/24/2014    Insomnia, unspecified 06/15/2016    WITHOUT BIPAP    Microalbuminuria due to type 2 diabetes mellitus 10/15/2015    Mixed hyperlipidemia 10/15/2015    Myocardial infarction     Obesity     BMI 32    SHAHLA (obstructive sleep apnea)     WEARS BIPAP    Right great toe amputee     4/2024; 5/2024 ADMITTED WITH INFECTION    Skin cancer     REMOVED    Sleep apnea        ALLERGY  Allergies   Allergen Reactions    Strawberry Itching    Lortab [Hydrocodone-Acetaminophen] Itching        Past Surgical History:   Procedure Laterality Date    AMPUTATION DIGIT Right 4/10/2024    Procedure: AMPUTATION DIGIT RIGHT GREAT TOE;  Surgeon: Balwinder Costa DPM;  Location: [facility];  Service: Podiatry;  Laterality: Right;    AMPUTATION DIGIT Right 5/21/2024    Procedure: SECOND RAY AMPUTATION RIGHT FOOT, INCISION AND DRAINAGE RIGHT FOOT;  Surgeon: Balwinder Costa DPM;  Location: [facility] OR;  Service: Podiatry;  Laterality: Right;    ANKLE ARTHROSCOPY W/ OPEN REPAIR      APPENDECTOMY       CARDIAC CATHETERIZATION  2014    PCI to circumflex    CARDIAC CATHETERIZATION Right 10/26/2023    Procedure: Aortogram with right leg angiogram, possible angioplasty or stenting;  Surgeon: Robert Puga MD;  Location: [facility] CATH INVASIVE LOCATION;  Service: Vascular;  Laterality: Right;    CARDIAC CATHETERIZATION Right 12/15/2023    Procedure: Right leg angiogram, possible angioplasty or stenting;  Surgeon: Robert Puga MD;  Location: [facility] CATH INVASIVE LOCATION;  Service: Vascular;  Laterality: Right;    CORONARY ARTERY BYPASS GRAFT N/A 10/08/2020    Procedure: STERNOTOMY, CORONARY ARTERY BYPASS GRAFTING TIME 4 WITH LEFT KELSI  AND ENDOSCOPICALLY HARVESTED LEFT GREATER SAPHENOUS VEIN AND PRP.;  Surgeon: Jr Girma Chiu MD;  Location: [facility] OR;  Service: Cardiothoracic;  Laterality: N/A;    FEMORAL TIBIAL BYPASS Right 01/09/2024    Procedure: Right femoral-tibial bypass graft;  Surgeon: Robert Puga MD;  Location: [facility] MAIN OR;  Service: Vascular;  Laterality: Right;    HEEL SPUR SURGERY      HERNIA REPAIR      INCISION AND DRAINAGE OF WOUND Right 4/30/2024    Procedure: INCISION AND DRAINAGE WOUND RIGHT FOOT, DEBRIDEMENT OF WOUND RIGHT FOOT, AMPUTATION REVISION RIGHT FOOT;  Surgeon: Balwinder Costa DPM;  Location: [facility] MAIN OR;  Service: Podiatry;  Laterality: Right;    KNEE ARTHROSCOPY      MULTIPLE TIMES ON BOTH KNEES    SKIN CANCER EXCISION      BACK    TOE SURGERY Right     DEBRIDMENT RIGHT GREAT TOE    TONSILLECTOMY          Social History     Socioeconomic History    Marital status:    Tobacco Use    Smoking status: Every Day     Current packs/day: 0.25     Average packs/day: 0.3 packs/day for 15.0 years (3.8 ttl pk-yrs)     Types: Cigarettes     Passive exposure: Never    Smokeless tobacco: Never   Vaping Use    Vaping status: Never Used   Substance and Sexual Activity    Alcohol use: Not Currently    Drug use: Never    Sexual activity: Defer       Family History  "  Adopted: Yes   Problem Relation Age of Onset    Heart disease Other         Current Outpatient Medications on File Prior to Visit   Medication Sig    aspirin 81 MG chewable tablet Chew 1 tablet Daily. PER DR CHILANGO LOGAN TO CONTINUE ASA UP TO AND INCLUDING DAY OF SURGERY    clopidogrel (Plavix) 75 MG tablet Take 1 tablet by mouth Daily for 180 days.    Continuous Glucose Sensor (Dexcom G7 Sensor) misc apply sensor to skin for continuous blood sugar monitoring replace every 10 days    Evolocumab (REPATHA) solution prefilled syringe injection Inject 1 mL under the skin into the appropriate area as directed.    Insulin Glargine (BASAGLAR KWIKPEN) 100 UNIT/ML injection pen Inject 40 Units under the skin into the appropriate area as directed Every Night.    levothyroxine (SYNTHROID, LEVOTHROID) 50 MCG tablet Take 1 tablet by mouth Every Morning.    lisinopril-hydrochlorothiazide (PRINZIDE,ZESTORETIC) 20-12.5 MG per tablet Take 2 tablets by mouth Daily.    metFORMIN ER (GLUCOPHAGE-XR) 500 MG 24 hr tablet Take 2 tablets by mouth Daily With Breakfast.    metoprolol tartrate (LOPRESSOR) 50 MG tablet Take 1 tablet by mouth Every 12 (Twelve) Hours for 30 days.    pregabalin (LYRICA) 225 MG capsule TAKE 1 CAPSULE BY MOUTH TWICE A DAY    spironolactone (ALDACTONE) 50 MG tablet Take 1 tablet by mouth Daily.    Tirzepatide (MOUNJARO) 12.5 MG/0.5ML solution pen-injector pen Inject 0.5 mL under the skin into the appropriate area as directed 1 (One) Time Per Week.    traZODone (DESYREL) 100 MG tablet TAKE ONE TABLET BY MOUTH ONCE NIGHTLY    [DISCONTINUED] Cymbalta 30 MG capsule Take 1 capsule by mouth Daily. (Patient not taking: Reported on 7/25/2024)     No current facility-administered medications on file prior to visit.       Objective   Vitals:    08/07/24 0957   BP: 135/72   Pulse: 59   Weight: 116 kg (255 lb)   Height: 180.3 cm (71\")       Physical Exam  Constitutional:       General: He is awake. He is not in acute " distress.     Appearance: Normal appearance.   HENT:      Head: Normocephalic.      Nose: Nose normal. No congestion.   Eyes:      Extraocular Movements: Extraocular movements intact.      Conjunctiva/sclera: Conjunctivae normal.      Pupils: Pupils are equal, round, and reactive to light.   Neck:      Thyroid: No thyromegaly.      Vascular: No JVD.   Cardiovascular:      Rate and Rhythm: Normal rate and regular rhythm.      Chest Wall: PMI is not displaced.      Pulses: Normal pulses.      Heart sounds: Normal heart sounds, S1 normal and S2 normal. No murmur heard.     No friction rub. No gallop. No S3 or S4 sounds.   Pulmonary:      Effort: Pulmonary effort is normal.      Breath sounds: Normal breath sounds. No wheezing, rhonchi or rales.   Abdominal:      General: Bowel sounds are normal.      Palpations: Abdomen is soft.      Tenderness: There is no abdominal tenderness.   Musculoskeletal:      Cervical back: No tenderness.      Right lower leg: No edema.      Left lower leg: No edema.   Lymphadenopathy:      Cervical: No cervical adenopathy.   Skin:     General: Skin is warm and dry.      Capillary Refill: Capillary refill takes less than 2 seconds.      Coloration: Skin is not cyanotic.      Findings: No petechiae or rash.      Nails: There is no clubbing.   Neurological:      Mental Status: He is alert.   Psychiatric:         Mood and Affect: Mood normal.         Behavior: Behavior is cooperative.           Result Review     The following data was reviewed by SOHAM Casillas on 08/07/24.      CMP          5/23/2024    07:03 5/24/2024    05:41 5/28/2024    08:32   CMP   Glucose 141  131  172    BUN 19  16  23    Creatinine 1.25  1.27  1.37    EGFR 67.2  65.9  60.2    Sodium 139  138  140    Potassium 3.8  3.8  4.2    Chloride 104  101  101    Calcium 9.2  9.4  9.9    Total Protein   8.1    Albumin   4.4    Globulin   3.7    Total Bilirubin   0.3    Alkaline Phosphatase   103    AST (SGOT)   <5    ALT  (SGPT)   5    Albumin/Globulin Ratio   1.2    BUN/Creatinine Ratio 15.2  12.6  16.8    Anion Gap 9.9  10.2  13.0      CBC w/diff          5/23/2024    07:03 5/24/2024    05:41 5/28/2024    08:32   CBC w/Diff   WBC 8.56  8.16  7.34    RBC 3.93  3.91  4.01    Hemoglobin 11.7  11.5  11.6    Hematocrit 36.8  36.5  36.6    MCV 93.6  93.4  91.3    MCH 29.8  29.4  28.9    MCHC 31.8  31.5  31.7    RDW 15.0  15.1  13.5    Platelets 368  353  351    Neutrophil Rel %  47.4  45.5    Immature Granulocyte Rel %  1.1  0.7    Lymphocyte Rel %  32.0  33.8    Monocyte Rel %  14.7  14.9    Eosinophil Rel %  3.7  3.5    Basophil Rel %  1.1  1.6       Lipid Panel          11/30/2023    09:30 2/28/2024    09:59 5/28/2024    08:32   Lipid Panel   Total Cholesterol 205  181  182    Triglycerides 439  436  187    HDL Cholesterol 26  28  29    VLDL Cholesterol 75  71  33    LDL Cholesterol  104  82  120    LDL/HDL Ratio 3.51  2.35  3.99                Procedures    Assessment & Plan  Diagnoses and all orders for this visit:    1. Coronary artery disease involving native coronary artery of native heart without angina pectoris (Primary)    2. Essential hypertension    3. Mixed hyperlipidemia    4. Tobacco abuse    Other orders  -     amLODIPine (NORVASC) 10 MG tablet; Take 1 tablet by mouth Daily.  Dispense: 90 tablet; Refill: 3      Assessment & Plan      1.  Status post CABG x 4 vessels in 2020.  Doing well with no angina or anginal-like symptoms.  Continue current medical therapy.  2.  Blood pressure is well-controlled on current antihypertensive regimen which will be continued.  3.  Recent LDL had increased to 120.  Patient is not compliant with his Repatha.  The importance of medication compliance was discussed with him today.  4.  Tobacco cessation was advised      The medical services provided during this encounter are part of ongoing care related to this patient's single serious condition or complex condition.          Follow Up    Return in about 6 months (around 2/7/2025) for With SOHAM Ruiz.    Patient was given instructions and counseling regarding his condition or for health maintenance advice. Please see specific information pulled into the AVS if appropriate.     SOHAM Casillas  08/07/24  10:04 EDT    Dictated Utilizing Dragon Dictation

## 2024-08-08 ENCOUNTER — OFFICE VISIT (OUTPATIENT)
Dept: PODIATRY | Facility: CLINIC | Age: 59
End: 2024-08-08
Payer: COMMERCIAL

## 2024-08-08 VITALS
SYSTOLIC BLOOD PRESSURE: 153 MMHG | WEIGHT: 250.4 LBS | OXYGEN SATURATION: 93 % | TEMPERATURE: 98.4 F | HEART RATE: 67 BPM | DIASTOLIC BLOOD PRESSURE: 78 MMHG | BODY MASS INDEX: 34.92 KG/M2

## 2024-08-08 DIAGNOSIS — I73.9 PERIPHERAL VASCULAR DISEASE: ICD-10-CM

## 2024-08-08 DIAGNOSIS — E11.65 UNCONTROLLED TYPE 2 DIABETES MELLITUS WITH HYPERGLYCEMIA: ICD-10-CM

## 2024-08-08 DIAGNOSIS — L97.513 ULCER OF RIGHT FOOT WITH NECROSIS OF MUSCLE: Primary | ICD-10-CM

## 2024-08-08 NOTE — PROGRESS NOTES
[facility] - PODIATRY    Today's Date: 08/08/24    Patient Name: Jd Velazquez  MRN: [id number]  CSN: [id number]  PCP: Dacia Newsome APRN,   Referring Provider: No ref. provider found    SUBJECTIVE     Chief Complaint   Patient presents with    Right Foot - Follow-up, Foot Ulcer     HPI: Jd Velazquez, a 58 y.o.male, presents to clinic.    Patient is a 58-year-old male presenting for follow-up of his right foot amputation site.  Has been changing the dressing.    Past Medical History:   Diagnosis Date    Allergic rhinitis 01/12/2015    Anesthesia     DIFFICULTY WAKING UP POST SURGERY    CAD (coronary artery disease)     DENIES CP/SOA.    Diabetes mellitus     Hyperlipidemia     Hypertension     Hypothyroidism 04/24/2014    Insomnia, unspecified 06/15/2016    WITHOUT BIPAP    Microalbuminuria due to type 2 diabetes mellitus 10/15/2015    Mixed hyperlipidemia 10/15/2015    Myocardial infarction     Obesity     BMI 32    SHAHLA (obstructive sleep apnea)     WEARS BIPAP    Right great toe amputee     4/2024; 5/2024 ADMITTED WITH INFECTION    Skin cancer     REMOVED    Sleep apnea      Past Surgical History:   Procedure Laterality Date    AMPUTATION DIGIT Right 4/10/2024    Procedure: AMPUTATION DIGIT RIGHT GREAT TOE;  Surgeon: Balwinder Costa DPM;  Location: [facility] MAIN OR;  Service: Podiatry;  Laterality: Right;    AMPUTATION DIGIT Right 5/21/2024    Procedure: SECOND RAY AMPUTATION RIGHT FOOT, INCISION AND DRAINAGE RIGHT FOOT;  Surgeon: Balwinder Costa DPM;  Location: [facility] MAIN OR;  Service: Podiatry;  Laterality: Right;    ANKLE ARTHROSCOPY W/ OPEN REPAIR      APPENDECTOMY      CARDIAC CATHETERIZATION  2014    PCI to circumflex    CARDIAC CATHETERIZATION Right 10/26/2023    Procedure: Aortogram with right leg angiogram, possible angioplasty or stenting;  Surgeon: Robert Puga MD;  Location: [facility] CATH INVASIVE LOCATION;  Service: Vascular;  Laterality: Right;     CARDIAC CATHETERIZATION Right 12/15/2023    Procedure: Right leg angiogram, possible angioplasty or stenting;  Surgeon: Robert Puga MD;  Location: [facility] CATH INVASIVE LOCATION;  Service: Vascular;  Laterality: Right;    CORONARY ARTERY BYPASS GRAFT N/A 10/08/2020    Procedure: STERNOTOMY, CORONARY ARTERY BYPASS GRAFTING TIME 4 WITH LEFT KELSI  AND ENDOSCOPICALLY HARVESTED LEFT GREATER SAPHENOUS VEIN AND PRP.;  Surgeon: Jr Girma Chiu MD;  Location: [facility] MAIN OR;  Service: Cardiothoracic;  Laterality: N/A;    FEMORAL TIBIAL BYPASS Right 01/09/2024    Procedure: Right femoral-tibial bypass graft;  Surgeon: Robert Puga MD;  Location: [facility] MAIN OR;  Service: Vascular;  Laterality: Right;    HEEL SPUR SURGERY      HERNIA REPAIR      INCISION AND DRAINAGE OF WOUND Right 4/30/2024    Procedure: INCISION AND DRAINAGE WOUND RIGHT FOOT, DEBRIDEMENT OF WOUND RIGHT FOOT, AMPUTATION REVISION RIGHT FOOT;  Surgeon: Balwinder Costa DPM;  Location: [facility] MAIN OR;  Service: Podiatry;  Laterality: Right;    KNEE ARTHROSCOPY      MULTIPLE TIMES ON BOTH KNEES    SKIN CANCER EXCISION      BACK    TOE SURGERY Right     DEBRIDMENT RIGHT GREAT TOE    TONSILLECTOMY       Family History   Adopted: Yes   Problem Relation Age of Onset    Heart disease Other      Social History     Socioeconomic History    Marital status:    Tobacco Use    Smoking status: Every Day     Current packs/day: 0.25     Average packs/day: 0.3 packs/day for 15.0 years (3.8 ttl pk-yrs)     Types: Cigarettes     Passive exposure: Never    Smokeless tobacco: Never   Vaping Use    Vaping status: Never Used   Substance and Sexual Activity    Alcohol use: Not Currently    Drug use: Never    Sexual activity: Defer     Allergies   Allergen Reactions    Strawberry Itching    Lortab [Hydrocodone-Acetaminophen] Itching     Current Outpatient Medications   Medication Sig Dispense Refill    amLODIPine (NORVASC) 10 MG tablet Take 1 tablet by mouth Daily.  90 tablet 3    aspirin 81 MG chewable tablet Chew 1 tablet Daily. PER DR CHILANGO LOGAN TO CONTINUE ASA UP TO AND INCLUDING DAY OF SURGERY      clopidogrel (Plavix) 75 MG tablet Take 1 tablet by mouth Daily for 180 days. 30 tablet 5    Continuous Glucose Sensor (Dexcom G7 Sensor) misc apply sensor to skin for continuous blood sugar monitoring replace every 10 days 3 each 0    Evolocumab (REPATHA) solution prefilled syringe injection Inject 1 mL under the skin into the appropriate area as directed.      Insulin Glargine (BASAGLAR KWIKPEN) 100 UNIT/ML injection pen Inject 40 Units under the skin into the appropriate area as directed Every Night. 9 mL 5    levothyroxine (SYNTHROID, LEVOTHROID) 50 MCG tablet Take 1 tablet by mouth Every Morning. 90 tablet 1    lisinopril-hydrochlorothiazide (PRINZIDE,ZESTORETIC) 20-12.5 MG per tablet Take 2 tablets by mouth Daily. 180 tablet 3    metFORMIN ER (GLUCOPHAGE-XR) 500 MG 24 hr tablet Take 2 tablets by mouth Daily With Breakfast. 180 tablet 1    pregabalin (LYRICA) 225 MG capsule TAKE 1 CAPSULE BY MOUTH TWICE A DAY 60 capsule 1    spironolactone (ALDACTONE) 50 MG tablet Take 1 tablet by mouth Daily.      Tirzepatide (MOUNJARO) 12.5 MG/0.5ML solution pen-injector pen Inject 0.5 mL under the skin into the appropriate area as directed 1 (One) Time Per Week. 2 mL 6    traZODone (DESYREL) 100 MG tablet TAKE ONE TABLET BY MOUTH ONCE NIGHTLY 30 tablet 1    metoprolol tartrate (LOPRESSOR) 50 MG tablet Take 1 tablet by mouth Every 12 (Twelve) Hours for 30 days. 60 tablet 0     No current facility-administered medications for this visit.     Review of Systems   Skin:         Ulcer toe   All other systems reviewed and are negative.      OBJECTIVE     Vitals:    08/08/24 0752   BP: 153/78   Pulse: 67   Temp: 98.4 °F (36.9 °C)   SpO2: 93%       WBC   Date Value Ref Range Status   05/28/2024 7.34 3.40 - 10.80 10*3/mm3 Final     RBC   Date Value Ref Range Status   05/28/2024 4.01 (L) 4.14 -  5.80 10*6/mm3 Final     Hemoglobin   Date Value Ref Range Status   05/28/2024 11.6 (L) 13.0 - 17.7 g/dL Final     Hematocrit   Date Value Ref Range Status   05/28/2024 36.6 (L) 37.5 - 51.0 % Final     MCV   Date Value Ref Range Status   05/28/2024 91.3 79.0 - 97.0 fL Final     MCH   Date Value Ref Range Status   05/28/2024 28.9 26.6 - 33.0 pg Final     MCHC   Date Value Ref Range Status   05/28/2024 31.7 31.5 - 35.7 g/dL Final     RDW   Date Value Ref Range Status   05/28/2024 13.5 12.3 - 15.4 % Final     RDW-SD   Date Value Ref Range Status   05/28/2024 45.8 37.0 - 54.0 fl Final     MPV   Date Value Ref Range Status   05/28/2024 9.8 6.0 - 12.0 fL Final     Platelets   Date Value Ref Range Status   05/28/2024 351 140 - 450 10*3/mm3 Final     Neutrophil %   Date Value Ref Range Status   05/28/2024 45.5 42.7 - 76.0 % Final     Lymphocyte %   Date Value Ref Range Status   05/28/2024 33.8 19.6 - 45.3 % Final     Monocyte %   Date Value Ref Range Status   05/28/2024 14.9 (H) 5.0 - 12.0 % Final     Eosinophil %   Date Value Ref Range Status   05/28/2024 3.5 0.3 - 6.2 % Final     Basophil %   Date Value Ref Range Status   05/28/2024 1.6 (H) 0.0 - 1.5 % Final     Immature Grans %   Date Value Ref Range Status   05/28/2024 0.7 (H) 0.0 - 0.5 % Final     Neutrophils, Absolute   Date Value Ref Range Status   05/28/2024 3.34 1.70 - 7.00 10*3/mm3 Final     Lymphocytes, Absolute   Date Value Ref Range Status   05/28/2024 2.48 0.70 - 3.10 10*3/mm3 Final     Monocytes, Absolute   Date Value Ref Range Status   05/28/2024 1.09 (H) 0.10 - 0.90 10*3/mm3 Final     Eosinophils, Absolute   Date Value Ref Range Status   05/28/2024 0.26 0.00 - 0.40 10*3/mm3 Final     Basophils, Absolute   Date Value Ref Range Status   05/28/2024 0.12 0.00 - 0.20 10*3/mm3 Final     Immature Grans, Absolute   Date Value Ref Range Status   05/28/2024 0.05 0.00 - 0.05 10*3/mm3 Final     nRBC   Date Value Ref Range Status   05/28/2024 0.0 0.0 - 0.2 /100 WBC Final          Lab Results   Component Value Date    GLUCOSE 172 (H) 05/28/2024    BUN 23 (H) 05/28/2024    CREATININE 1.37 (H) 05/28/2024    EGFRIFNONA 77 06/17/2021    BCR 16.8 05/28/2024    K 4.2 05/28/2024    CO2 26.0 05/28/2024    CALCIUM 9.9 05/28/2024    ALBUMIN 4.4 05/28/2024    LABIL2 0.9 (L) 10/16/2020    AST <5 05/28/2024    ALT 5 05/28/2024       Patient seen in no apparent distress.      PHYSICAL EXAM:  Patient with previous first and second ray amputation to his right foot.  Incision well coapted.  0.1 x 0.4 x 0.2 cm ulcer to distal amputation site.  100% granular no malodor no drainage.  No tenderness to the calf.  Minimal swelling to right foot.    RADIOLOGY:        No results found.    ASSESSMENT/PLAN     Diagnoses and all orders for this visit:    1. 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 (Primary)    2. Uncontrolled type 2 diabetes mellitus with hyperglycemia    3. Peripheral vascular disease        Comprehensive lower extremity examination and evaluation was performed.      Triple antibiotic and border dressing daily to distal incision site.    Discussed importance of smoking cessation to help wound healing.    Patient at high risk for developing further ulcerations and infections and subsequent major amputations due to his uncontrolled diabetes, PVD, smoking, and noncompliance.    Return to clinic in 2-3 weeks    Discussed findings and treatment plan including risks, benefits, and treatment options with patient in detail. Patient agreed with treatment plan.    Medications and allergies reviewed.  Reviewed available lab values along with other pertinent labs.  These were discussed with the patient.    An After Visit Summary was printed and given to the patient at discharge, including (if requested) any available informative/educational handouts regarding diagnosis, treatment, or medications. All questions were answered to patient/family satisfaction. Should symptoms fail to improve or worsen they  agree to call or return to clinic or to go to the Emergency Department. Discussed the importance of following up with any needed screening tests/labs/specialist appointments and any requested follow-up recommended by me today. Importance of maintaining follow-up discussed and patient accepts that missed appointments can delay diagnosis and potentially lead to worsening of conditions.    Return in about 1 month (around 9/8/2024)., or sooner if acute issues arise.    This document has been electronically signed by Balwinder Costa DPM on August 8, 2024 07:56 EDT

## 2024-08-21 RX ORDER — ACYCLOVIR 400 MG/1
TABLET ORAL
Qty: 3 EACH | Refills: 0 | Status: SHIPPED | OUTPATIENT
Start: 2024-08-21

## 2024-08-21 NOTE — TELEPHONE ENCOUNTER
Caller: Marcus Jdyohannes Hu    Relationship: Self    Best call back number: [phone]    Requested Prescriptions:   Requested Prescriptions     Pending Prescriptions Disp Refills    Continuous Glucose Sensor (Dexcom G7 Sensor) misc 3 each 0     Sig: apply sensor to skin for continuous blood sugar monitoring replace every 10 days        Pharmacy where request should be sent: [facility] [id number] [place] - [address] AT [facility] [address] [facility] [phone] [facility] [phone]      Last office visit with prescribing clinician: 6/20/2024   Last telemedicine visit with prescribing clinician: Visit date not found   Next office visit with prescribing clinician: Visit date not found       Does the patient have less than a 3 day supply:  [x] Yes  [] No    Would you like a call back once the refill request has been completed: [] Yes [] No    If the office needs to give you a call back, can they leave a voicemail: [] Yes [] No    Danielle Basilio Rep   08/21/24 11:02 EDT

## 2024-08-28 ENCOUNTER — OFFICE VISIT (OUTPATIENT)
Dept: PODIATRY | Facility: CLINIC | Age: 59
End: 2024-08-28
Payer: COMMERCIAL

## 2024-08-28 VITALS
DIASTOLIC BLOOD PRESSURE: 82 MMHG | OXYGEN SATURATION: 94 % | WEIGHT: 251 LBS | TEMPERATURE: 98.3 F | BODY MASS INDEX: 35.01 KG/M2 | SYSTOLIC BLOOD PRESSURE: 137 MMHG | HEART RATE: 75 BPM

## 2024-08-28 DIAGNOSIS — I73.9 PERIPHERAL VASCULAR DISEASE: ICD-10-CM

## 2024-08-28 DIAGNOSIS — E11.65 UNCONTROLLED TYPE 2 DIABETES MELLITUS WITH HYPERGLYCEMIA: Primary | ICD-10-CM

## 2024-08-28 DIAGNOSIS — L97.513 ULCER OF RIGHT FOOT WITH NECROSIS OF MUSCLE: ICD-10-CM

## 2024-09-05 NOTE — PROGRESS NOTES
[facility] - PODIATRY    Today's Date: 09/05/24    Patient Name: Jd Velazquez  MRN: [id number]  CSN: [id number]  PCP: Dacia Newsome APRN,   Referring Provider: No ref. provider found    SUBJECTIVE     Chief Complaint   Patient presents with    Right Foot - Follow-up, Foot Ulcer     HPI: Jd Velazquez, a 58 y.o.male, presents to clinic.    Patient is a 58-year-old male presenting for follow-up of his right foot amputation site.  Has been changing the dressing.    Past Medical History:   Diagnosis Date    Allergic rhinitis 01/12/2015    Anesthesia     DIFFICULTY WAKING UP POST SURGERY    CAD (coronary artery disease)     DENIES CP/SOA.    Diabetes mellitus     Hyperlipidemia     Hypertension     Hypothyroidism 04/24/2014    Insomnia, unspecified 06/15/2016    WITHOUT BIPAP    Microalbuminuria due to type 2 diabetes mellitus 10/15/2015    Mixed hyperlipidemia 10/15/2015    Myocardial infarction     Obesity     BMI 32    SHAHLA (obstructive sleep apnea)     WEARS BIPAP    Right great toe amputee     4/2024; 5/2024 ADMITTED WITH INFECTION    Skin cancer     REMOVED    Sleep apnea      Past Surgical History:   Procedure Laterality Date    AMPUTATION DIGIT Right 4/10/2024    Procedure: AMPUTATION DIGIT RIGHT GREAT TOE;  Surgeon: Balwinder Costa DPM;  Location: [facility] MAIN OR;  Service: Podiatry;  Laterality: Right;    AMPUTATION DIGIT Right 5/21/2024    Procedure: SECOND RAY AMPUTATION RIGHT FOOT, INCISION AND DRAINAGE RIGHT FOOT;  Surgeon: Balwinder Costa DPM;  Location: [facility] MAIN OR;  Service: Podiatry;  Laterality: Right;    ANKLE ARTHROSCOPY W/ OPEN REPAIR      APPENDECTOMY      CARDIAC CATHETERIZATION  2014    PCI to circumflex    CARDIAC CATHETERIZATION Right 10/26/2023    Procedure: Aortogram with right leg angiogram, possible angioplasty or stenting;  Surgeon: Robert Puga MD;  Location: [facility] CATH INVASIVE LOCATION;  Service: Vascular;  Laterality: Right;     CARDIAC CATHETERIZATION Right 12/15/2023    Procedure: Right leg angiogram, possible angioplasty or stenting;  Surgeon: Robert Puga MD;  Location: [facility] CATH INVASIVE LOCATION;  Service: Vascular;  Laterality: Right;    CORONARY ARTERY BYPASS GRAFT N/A 10/08/2020    Procedure: STERNOTOMY, CORONARY ARTERY BYPASS GRAFTING TIME 4 WITH LEFT KELSI  AND ENDOSCOPICALLY HARVESTED LEFT GREATER SAPHENOUS VEIN AND PRP.;  Surgeon: Jr Girma Chiu MD;  Location: [facility] MAIN OR;  Service: Cardiothoracic;  Laterality: N/A;    FEMORAL TIBIAL BYPASS Right 01/09/2024    Procedure: Right femoral-tibial bypass graft;  Surgeon: Robert Puga MD;  Location: [facility] MAIN OR;  Service: Vascular;  Laterality: Right;    HEEL SPUR SURGERY      HERNIA REPAIR      INCISION AND DRAINAGE OF WOUND Right 4/30/2024    Procedure: INCISION AND DRAINAGE WOUND RIGHT FOOT, DEBRIDEMENT OF WOUND RIGHT FOOT, AMPUTATION REVISION RIGHT FOOT;  Surgeon: Balwinder Costa DPM;  Location: [facility] MAIN OR;  Service: Podiatry;  Laterality: Right;    KNEE ARTHROSCOPY      MULTIPLE TIMES ON BOTH KNEES    SKIN CANCER EXCISION      BACK    TOE SURGERY Right     DEBRIDMENT RIGHT GREAT TOE    TONSILLECTOMY       Family History   Adopted: Yes   Problem Relation Age of Onset    Heart disease Other      Social History     Socioeconomic History    Marital status:    Tobacco Use    Smoking status: Every Day     Current packs/day: 0.25     Average packs/day: 0.3 packs/day for 15.0 years (3.8 ttl pk-yrs)     Types: Cigarettes     Passive exposure: Never    Smokeless tobacco: Never   Vaping Use    Vaping status: Never Used   Substance and Sexual Activity    Alcohol use: Not Currently    Drug use: Never    Sexual activity: Defer     Allergies   Allergen Reactions    Strawberry Itching    Lortab [Hydrocodone-Acetaminophen] Itching     Current Outpatient Medications   Medication Sig Dispense Refill    amLODIPine (NORVASC) 10 MG tablet Take 1 tablet by mouth Daily.  90 tablet 3    aspirin 81 MG chewable tablet Chew 1 tablet Daily. PER DR CHILANGO LOGAN TO CONTINUE ASA UP TO AND INCLUDING DAY OF SURGERY      clopidogrel (Plavix) 75 MG tablet Take 1 tablet by mouth Daily for 180 days. 30 tablet 5    Continuous Glucose Sensor (Dexcom G7 Sensor) misc apply sensor to skin for continuous blood sugar monitoring replace every 10 days 3 each 0    Evolocumab (REPATHA) solution prefilled syringe injection Inject 1 mL under the skin into the appropriate area as directed.      Insulin Glargine (BASAGLAR KWIKPEN) 100 UNIT/ML injection pen Inject 40 Units under the skin into the appropriate area as directed Every Night. 9 mL 5    levothyroxine (SYNTHROID, LEVOTHROID) 50 MCG tablet Take 1 tablet by mouth Every Morning. 90 tablet 1    lisinopril-hydrochlorothiazide (PRINZIDE,ZESTORETIC) 20-12.5 MG per tablet Take 2 tablets by mouth Daily. 180 tablet 3    metFORMIN ER (GLUCOPHAGE-XR) 500 MG 24 hr tablet Take 2 tablets by mouth Daily With Breakfast. 180 tablet 1    pregabalin (LYRICA) 225 MG capsule TAKE 1 CAPSULE BY MOUTH TWICE A DAY 60 capsule 1    spironolactone (ALDACTONE) 50 MG tablet Take 1 tablet by mouth Daily.      Tirzepatide (MOUNJARO) 12.5 MG/0.5ML solution pen-injector pen Inject 0.5 mL under the skin into the appropriate area as directed 1 (One) Time Per Week. 2 mL 6    traZODone (DESYREL) 100 MG tablet TAKE ONE TABLET BY MOUTH ONCE NIGHTLY 30 tablet 1    metoprolol tartrate (LOPRESSOR) 50 MG tablet Take 1 tablet by mouth Every 12 (Twelve) Hours for 30 days. 60 tablet 0     No current facility-administered medications for this visit.     Review of Systems   Skin:         Ulcer toe   All other systems reviewed and are negative.      OBJECTIVE     Vitals:    08/28/24 1045   BP: 137/82   Pulse: 75   Temp: 98.3 °F (36.8 °C)   SpO2: 94%       WBC   Date Value Ref Range Status   05/28/2024 7.34 3.40 - 10.80 10*3/mm3 Final     RBC   Date Value Ref Range Status   05/28/2024 4.01 (L) 4.14 -  5.80 10*6/mm3 Final     Hemoglobin   Date Value Ref Range Status   05/28/2024 11.6 (L) 13.0 - 17.7 g/dL Final     Hematocrit   Date Value Ref Range Status   05/28/2024 36.6 (L) 37.5 - 51.0 % Final     MCV   Date Value Ref Range Status   05/28/2024 91.3 79.0 - 97.0 fL Final     MCH   Date Value Ref Range Status   05/28/2024 28.9 26.6 - 33.0 pg Final     MCHC   Date Value Ref Range Status   05/28/2024 31.7 31.5 - 35.7 g/dL Final     RDW   Date Value Ref Range Status   05/28/2024 13.5 12.3 - 15.4 % Final     RDW-SD   Date Value Ref Range Status   05/28/2024 45.8 37.0 - 54.0 fl Final     MPV   Date Value Ref Range Status   05/28/2024 9.8 6.0 - 12.0 fL Final     Platelets   Date Value Ref Range Status   05/28/2024 351 140 - 450 10*3/mm3 Final     Neutrophil %   Date Value Ref Range Status   05/28/2024 45.5 42.7 - 76.0 % Final     Lymphocyte %   Date Value Ref Range Status   05/28/2024 33.8 19.6 - 45.3 % Final     Monocyte %   Date Value Ref Range Status   05/28/2024 14.9 (H) 5.0 - 12.0 % Final     Eosinophil %   Date Value Ref Range Status   05/28/2024 3.5 0.3 - 6.2 % Final     Basophil %   Date Value Ref Range Status   05/28/2024 1.6 (H) 0.0 - 1.5 % Final     Immature Grans %   Date Value Ref Range Status   05/28/2024 0.7 (H) 0.0 - 0.5 % Final     Neutrophils, Absolute   Date Value Ref Range Status   05/28/2024 3.34 1.70 - 7.00 10*3/mm3 Final     Lymphocytes, Absolute   Date Value Ref Range Status   05/28/2024 2.48 0.70 - 3.10 10*3/mm3 Final     Monocytes, Absolute   Date Value Ref Range Status   05/28/2024 1.09 (H) 0.10 - 0.90 10*3/mm3 Final     Eosinophils, Absolute   Date Value Ref Range Status   05/28/2024 0.26 0.00 - 0.40 10*3/mm3 Final     Basophils, Absolute   Date Value Ref Range Status   05/28/2024 0.12 0.00 - 0.20 10*3/mm3 Final     Immature Grans, Absolute   Date Value Ref Range Status   05/28/2024 0.05 0.00 - 0.05 10*3/mm3 Final     nRBC   Date Value Ref Range Status   05/28/2024 0.0 0.0 - 0.2 /100 WBC Final          Lab Results   Component Value Date    GLUCOSE 172 (H) 05/28/2024    BUN 23 (H) 05/28/2024    CREATININE 1.37 (H) 05/28/2024    EGFRIFNONA 77 06/17/2021    BCR 16.8 05/28/2024    K 4.2 05/28/2024    CO2 26.0 05/28/2024    CALCIUM 9.9 05/28/2024    ALBUMIN 4.4 05/28/2024    LABIL2 0.9 (L) 10/16/2020    AST <5 05/28/2024    ALT 5 05/28/2024       Patient seen in no apparent distress.      PHYSICAL EXAM:  Patient with previous first and second ray amputation to his right foot.  Incision well coapted.  0.1 x 0.2 x 0.2 cm ulcer to distal amputation site.  100% granular no malodor no drainage.  No tenderness to the calf.  Minimal swelling to right foot.    RADIOLOGY:        No results found.    ASSESSMENT/PLAN     Diagnoses and all orders for this visit:    1. Uncontrolled type 2 diabetes mellitus with hyperglycemia (Primary)    2. 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    3. Peripheral vascular disease        Comprehensive lower extremity examination and evaluation was performed.      Triple antibiotic and border dressing daily to distal incision site.    Discussed importance of smoking cessation to help wound healing.    Patient at high risk for developing further ulcerations and infections and subsequent major amputations due to his uncontrolled diabetes, PVD, smoking, and noncompliance.    Return to clinic in 3 to 4 weeks    Discussed findings and treatment plan including risks, benefits, and treatment options with patient in detail. Patient agreed with treatment plan.    Medications and allergies reviewed.  Reviewed available lab values along with other pertinent labs.  These were discussed with the patient.    An After Visit Summary was printed and given to the patient at discharge, including (if requested) any available informative/educational handouts regarding diagnosis, treatment, or medications. All questions were answered to patient/family satisfaction. Should symptoms fail to improve or worsen  they agree to call or return to clinic or to go to the Emergency Department. Discussed the importance of following up with any needed screening tests/labs/specialist appointments and any requested follow-up recommended by me today. Importance of maintaining follow-up discussed and patient accepts that missed appointments can delay diagnosis and potentially lead to worsening of conditions.    Return in about 1 month (around 9/28/2024)., or sooner if acute issues arise.    This document has been electronically signed by Balwinder Costa DPM on September 5, 2024 12:48 EDT

## 2024-09-16 RX ORDER — TRAZODONE HYDROCHLORIDE 100 MG/1
100 TABLET ORAL NIGHTLY
Qty: 30 TABLET | Refills: 1 | Status: SHIPPED | OUTPATIENT
Start: 2024-09-16

## 2024-09-23 RX ORDER — ACYCLOVIR 400 MG/1
TABLET ORAL
Qty: 3 EACH | Refills: 0 | Status: SHIPPED | OUTPATIENT
Start: 2024-09-23

## 2024-09-25 ENCOUNTER — OFFICE VISIT (OUTPATIENT)
Dept: PODIATRY | Facility: CLINIC | Age: 59
End: 2024-09-25
Payer: COMMERCIAL

## 2024-09-25 VITALS
DIASTOLIC BLOOD PRESSURE: 77 MMHG | OXYGEN SATURATION: 97 % | WEIGHT: 234 LBS | HEART RATE: 78 BPM | BODY MASS INDEX: 32.64 KG/M2 | TEMPERATURE: 98.4 F | SYSTOLIC BLOOD PRESSURE: 158 MMHG

## 2024-09-25 DIAGNOSIS — M86.60 CHRONIC OSTEOMYELITIS: ICD-10-CM

## 2024-09-25 DIAGNOSIS — E11.65 UNCONTROLLED TYPE 2 DIABETES MELLITUS WITH HYPERGLYCEMIA: Primary | ICD-10-CM

## 2024-09-25 DIAGNOSIS — I73.9 PERIPHERAL VASCULAR DISEASE: ICD-10-CM

## 2024-09-25 PROCEDURE — 99213 OFFICE O/P EST LOW 20 MIN: CPT | Performed by: PODIATRIST

## 2024-10-06 DIAGNOSIS — I70.269 ATHEROSCLEROSIS OF NATIVE ARTERY OF LOWER EXTREMITY WITH GANGRENE, UNSPECIFIED LATERALITY: ICD-10-CM

## 2024-10-06 DIAGNOSIS — Z98.890 S/P FEMORAL-TIBIAL BYPASS: ICD-10-CM

## 2024-10-07 RX ORDER — CLOPIDOGREL BISULFATE 75 MG/1
75 TABLET ORAL DAILY
Qty: 30 TABLET | Refills: 5 | Status: SHIPPED | OUTPATIENT
Start: 2024-10-07

## 2024-10-16 DIAGNOSIS — Z12.2 SCREENING FOR LUNG CANCER: Primary | ICD-10-CM

## 2024-10-22 NOTE — PROGRESS NOTES
Follow Up Office Visit      Patient Name: Jd Velazquez  : 1965   MRN: [id number]     Chief Complaint:    Chief Complaint   Patient presents with    Coronary Artery Disease    Diabetes    Hypertension    Hyperlipidemia    Hypothyroidism    Insomnia    Depression       History of Present Illness: Jd Velazquez is a 59 y.o. male who is here today to follow up for    DM2, htn, hyperlipidemia, CAD and hypothyroidism       Follow up increase Basaglar to 40 units nightly and increase Mounjaro to 12.5 mg once weekly and increase metformin to 2 tablets patient reports tolerating fairly well is having some reflux heartburn with increased dose of Mounjaro     . BS have been better states according to dexcom states 98% of the time in goal range, does not have readings with him         A1C-2024.   Eye exam-  Aldo  Foot exam-2024 Dr Costa  Psa-2023      Follow-up cardiology consult per progress note 2024     1.  Status post CABG x 4 vessels in 2020.  Doing well with no angina or anginal-like symptoms.  Continue current medical therapy.  2.  Blood pressure is well-controlled on current antihypertensive regimen which will be continued.  3.  Recent LDL had increased to 120.  Patient is not compliant with his Repatha.  The importance of medication compliance was discussed with him today.  4.  Tobacco cessation was advised      Patient reports since that office visit he has attempted to be more compliant with Repatha      Subjective      Review of Systems:   Review of Systems   Constitutional:  Negative for fever.   HENT:  Negative for ear pain and sore throat.    Eyes:  Negative for visual disturbance.   Respiratory:  Negative for shortness of breath.    Cardiovascular:  Negative for chest pain and palpitations.   Gastrointestinal:  Negative for abdominal pain, constipation, diarrhea, nausea and vomiting.   Endocrine: Negative for polydipsia and polyuria.   Genitourinary:   Negative for dysuria.   Musculoskeletal:  Negative for myalgias.   Neurological:  Negative for headaches.        Past Medical History:   Past Medical History:   Diagnosis Date    Allergic rhinitis 01/12/2015    Anesthesia     DIFFICULTY WAKING UP POST SURGERY    CAD (coronary artery disease)     DENIES CP/SOA.    Diabetes mellitus     Hyperlipidemia     Hypertension     Hypothyroidism 04/24/2014    Insomnia, unspecified 06/15/2016    WITHOUT BIPAP    Microalbuminuria due to type 2 diabetes mellitus 10/15/2015    Mixed hyperlipidemia 10/15/2015    Myocardial infarction     Obesity     BMI 32    SHAHLA (obstructive sleep apnea)     WEARS BIPAP    Right great toe amputee     4/2024; 5/2024 ADMITTED WITH INFECTION    Skin cancer     REMOVED    Sleep apnea        Past Surgical History:   Past Surgical History:   Procedure Laterality Date    AMPUTATION DIGIT Right 4/10/2024    Procedure: AMPUTATION DIGIT RIGHT GREAT TOE;  Surgeon: Balwinder Costa DPM;  Location: [facility] MAIN OR;  Service: Podiatry;  Laterality: Right;    AMPUTATION DIGIT Right 5/21/2024    Procedure: SECOND RAY AMPUTATION RIGHT FOOT, INCISION AND DRAINAGE RIGHT FOOT;  Surgeon: Balwinder Costa DPM;  Location: [facility] MAIN OR;  Service: Podiatry;  Laterality: Right;    ANKLE ARTHROSCOPY W/ OPEN REPAIR      APPENDECTOMY      CARDIAC CATHETERIZATION  2014    PCI to circumflex    CARDIAC CATHETERIZATION Right 10/26/2023    Procedure: Aortogram with right leg angiogram, possible angioplasty or stenting;  Surgeon: Robert Puga MD;  Location: [facility] CATH INVASIVE LOCATION;  Service: Vascular;  Laterality: Right;    CARDIAC CATHETERIZATION Right 12/15/2023    Procedure: Right leg angiogram, possible angioplasty or stenting;  Surgeon: Robert Puga MD;  Location: [facility] CATH INVASIVE LOCATION;  Service: Vascular;  Laterality: Right;    CORONARY ARTERY BYPASS GRAFT N/A 10/08/2020    Procedure: STERNOTOMY, CORONARY ARTERY BYPASS GRAFTING TIME 4 WITH LEFT KELSI   AND ENDOSCOPICALLY HARVESTED LEFT GREATER SAPHENOUS VEIN AND PRP.;  Surgeon: Jr Girma Chiu MD;  Location: [facility] MAIN OR;  Service: Cardiothoracic;  Laterality: N/A;    FEMORAL TIBIAL BYPASS Right 01/09/2024    Procedure: Right femoral-tibial bypass graft;  Surgeon: Robert Puga MD;  Location: [facility] MAIN OR;  Service: Vascular;  Laterality: Right;    HEEL SPUR SURGERY      HERNIA REPAIR      INCISION AND DRAINAGE OF WOUND Right 4/30/2024    Procedure: INCISION AND DRAINAGE WOUND RIGHT FOOT, DEBRIDEMENT OF WOUND RIGHT FOOT, AMPUTATION REVISION RIGHT FOOT;  Surgeon: Balwinder Costa DPM;  Location: [facility] MAIN OR;  Service: Podiatry;  Laterality: Right;    KNEE ARTHROSCOPY      MULTIPLE TIMES ON BOTH KNEES    SKIN CANCER EXCISION      BACK    TOE SURGERY Right     DEBRIDMENT RIGHT GREAT TOE    TONSILLECTOMY         Family History:   Family History   Adopted: Yes   Problem Relation Age of Onset    Heart disease Other        Social History:   Social History     Socioeconomic History    Marital status:    Tobacco Use    Smoking status: Every Day     Current packs/day: 0.25     Average packs/day: 0.3 packs/day for 15.0 years (3.8 ttl pk-yrs)     Types: Cigarettes     Passive exposure: Never    Smokeless tobacco: Never   Vaping Use    Vaping status: Never Used   Substance and Sexual Activity    Alcohol use: Not Currently    Drug use: Never    Sexual activity: Defer       Medications:     Current Outpatient Medications:     amLODIPine (NORVASC) 10 MG tablet, Take 1 tablet by mouth Daily., Disp: 90 tablet, Rfl: 3    clopidogrel (PLAVIX) 75 MG tablet, TAKE 1 TABLET BY MOUTH DAILY, Disp: 30 tablet, Rfl: 5    Continuous Glucose Sensor (Dexcom G7 Sensor) misc, apply sensor to skin for continuous blood sugar monitoring replace every 10 days, Disp: 3 each, Rfl: 0    Evolocumab (REPATHA) solution prefilled syringe injection, Inject 1 mL under the skin into the appropriate area as directed., Disp: , Rfl:      "Insulin Glargine (BASAGLAR KWIKPEN) 100 UNIT/ML injection pen, Inject 40 Units under the skin into the appropriate area as directed Every Night., Disp: 9 mL, Rfl: 5    levothyroxine (SYNTHROID, LEVOTHROID) 50 MCG tablet, Take 1 tablet by mouth Every Morning., Disp: 90 tablet, Rfl: 1    lisinopril-hydrochlorothiazide (PRINZIDE,ZESTORETIC) 20-12.5 MG per tablet, Take 2 tablets by mouth Daily., Disp: 180 tablet, Rfl: 1    metFORMIN ER (GLUCOPHAGE-XR) 500 MG 24 hr tablet, Take 2 tablets by mouth Daily With Breakfast., Disp: 180 tablet, Rfl: 1    metoprolol tartrate (LOPRESSOR) 50 MG tablet, Take 1 tablet by mouth Every 12 (Twelve) Hours for 30 days., Disp: 60 tablet, Rfl: 0    pregabalin (LYRICA) 225 MG capsule, TAKE 1 CAPSULE BY MOUTH TWICE A DAY, Disp: 60 capsule, Rfl: 1    spironolactone (ALDACTONE) 50 MG tablet, Take 1 tablet by mouth Daily., Disp: , Rfl:     Tirzepatide (MOUNJARO) 12.5 MG/0.5ML solution pen-injector pen, Inject 0.5 mL under the skin into the appropriate area as directed 1 (One) Time Per Week., Disp: 2 mL, Rfl: 6    traZODone (DESYREL) 100 MG tablet, Take 1 tablet by mouth Every Night., Disp: 30 tablet, Rfl: 1    famotidine (Pepcid) 20 MG tablet, Take 1 tablet by mouth 2 (Two) Times a Day., Disp: 180 tablet, Rfl: 1    Allergies:   Allergies   Allergen Reactions    Strawberry Itching    Lortab [Hydrocodone-Acetaminophen] Itching           Objective     Physical Exam:  Vital Signs:   Vitals:    10/23/24 1144   BP: 115/70   Pulse: 82   Temp: 98.3 °F (36.8 °C)   SpO2: 99%   Weight: 112 kg (246 lb)   Height: 180.3 cm (71\")     Body mass index is 34.31 kg/m².   BMI is >= 30 and <35. (Class 1 Obesity). The following options were offered after discussion;: exercise counseling/recommendations and nutrition counseling/recommendations       Physical Exam  HENT:      Right Ear: Tympanic membrane normal.      Left Ear: Tympanic membrane normal.      Nose: Nose normal.      Mouth/Throat:      Mouth: Mucous " membranes are moist.   Eyes:      Conjunctiva/sclera: Conjunctivae normal.   Neck:      Vascular: No carotid bruit.   Cardiovascular:      Rate and Rhythm: Normal rate and regular rhythm.      Heart sounds: Normal heart sounds. No murmur heard.  Pulmonary:      Effort: Pulmonary effort is normal.      Breath sounds: Normal breath sounds.   Abdominal:      General: Bowel sounds are normal.      Palpations: Abdomen is soft.   Musculoskeletal:      Right lower leg: No edema.      Left lower leg: No edema.   Skin:     General: Skin is warm and dry.   Neurological:      Mental Status: He is alert.   Psychiatric:         Mood and Affect: Mood normal.         Behavior: Behavior normal.             Assessment / Plan      Assessment/Plan:   Diagnoses and all orders for this visit:    1. Type 2 diabetes mellitus with hyperglycemia, without long-term current use of insulin (Primary)  -     CBC Auto Differential  -     Comprehensive Metabolic Panel  -     Microalbumin / Creatinine Urine Ratio - Urine, Clean Catch  -     Hemoglobin A1c  -     TSH  -     Urinalysis With Culture If Indicated -  -     Insulin Glargine (BASAGLAR KWIKPEN) 100 UNIT/ML injection pen; Inject 40 Units under the skin into the appropriate area as directed Every Night.  Dispense: 9 mL; Refill: 5    2. Primary hypertension  -     lisinopril-hydrochlorothiazide (PRINZIDE,ZESTORETIC) 20-12.5 MG per tablet; Take 2 tablets by mouth Daily.  Dispense: 180 tablet; Refill: 1    3. Hyperlipidemia LDL goal <70  -     Lipid Panel    4. Coronary artery disease involving native coronary artery of native heart without angina pectoris    5. S/P CABG x 4    6. Statin intolerance    7. Neuropathy    8. Vitamin D deficiency  -     Vitamin D,25-Hydroxy    9. Screening PSA (prostate specific antigen)  -     PSA Screen    10. Screening for colon cancer  -     Ambulatory Referral For Screening Colonoscopy    11. Need for influenza vaccination  -     Fluzone >6mos  ([id number])    12. Medication management  -     Urine Drug Screen - Urine, Clean Catch; Future    13. Gastroesophageal reflux disease without esophagitis    Other orders  -     levothyroxine (SYNTHROID, LEVOTHROID) 50 MCG tablet; Take 1 tablet by mouth Every Morning.  Dispense: 90 tablet; Refill: 1  -     metFORMIN ER (GLUCOPHAGE-XR) 500 MG 24 hr tablet; Take 2 tablets by mouth Daily With Breakfast.  Dispense: 180 tablet; Refill: 1  -     Tirzepatide (MOUNJARO) 12.5 MG/0.5ML solution pen-injector pen; Inject 0.5 mL under the skin into the appropriate area as directed 1 (One) Time Per Week.  Dispense: 2 mL; Refill: 6  -     traZODone (DESYREL) 100 MG tablet; Take 1 tablet by mouth Every Night.  Dispense: 30 tablet; Refill: 1  -     metoprolol tartrate (LOPRESSOR) 50 MG tablet; Take 1 tablet by mouth Every 12 (Twelve) Hours for 30 days.  Dispense: 60 tablet; Refill: 0  -     Continuous Glucose Sensor (Dexcom G7 Sensor) misc; apply sensor to skin for continuous blood sugar monitoring replace every 10 days  Dispense: 3 each; Refill: 0  -     famotidine (Pepcid) 20 MG tablet; Take 1 tablet by mouth 2 (Two) Times a Day.  Dispense: 180 tablet; Refill: 1         Diabetes mellitus type 2 with hyperglycemia will obtain hemoglobin A1c to monitor call with results and further recommendations for medications at that time continue Mounjaro and Louisaglar at this time  Hyperlipidemia LDL below goal of 70 will obtain lipid panel to monitor with statin intolerance patient is currently on Repatha reviewed cardiology note with patient  Coronary artery disease currently on Plavix per cardiology  Neuropathy currently controlled with Renee Lazo reviewed will obtain urine drug screen  Vitamin D deficiency recommend 2000 units daily  Reflux will start Pepcid 1 tab twice daily as needed do recommend reducing acid containing products in diet such as tomato products      Follow Up:   Return in about 3 months (around 1/23/2025).    Nyasia  "Jerod, APRN    \"Please note that portions of this note were completed with a voice recognition program.\"    "

## 2024-10-23 ENCOUNTER — LAB (OUTPATIENT)
Dept: LAB | Facility: HOSPITAL | Age: 59
End: 2024-10-23
Payer: COMMERCIAL

## 2024-10-23 ENCOUNTER — OFFICE VISIT (OUTPATIENT)
Dept: FAMILY MEDICINE CLINIC | Facility: CLINIC | Age: 59
End: 2024-10-23
Payer: COMMERCIAL

## 2024-10-23 VITALS
SYSTOLIC BLOOD PRESSURE: 115 MMHG | TEMPERATURE: 98.3 F | HEIGHT: 71 IN | WEIGHT: 246 LBS | HEART RATE: 82 BPM | DIASTOLIC BLOOD PRESSURE: 70 MMHG | OXYGEN SATURATION: 99 % | BODY MASS INDEX: 34.44 KG/M2

## 2024-10-23 DIAGNOSIS — E55.9 VITAMIN D DEFICIENCY: ICD-10-CM

## 2024-10-23 DIAGNOSIS — I25.10 CORONARY ARTERY DISEASE INVOLVING NATIVE CORONARY ARTERY OF NATIVE HEART WITHOUT ANGINA PECTORIS: ICD-10-CM

## 2024-10-23 DIAGNOSIS — Z12.5 SCREENING PSA (PROSTATE SPECIFIC ANTIGEN): ICD-10-CM

## 2024-10-23 DIAGNOSIS — Z79.899 MEDICATION MANAGEMENT: ICD-10-CM

## 2024-10-23 DIAGNOSIS — Z23 NEED FOR INFLUENZA VACCINATION: ICD-10-CM

## 2024-10-23 DIAGNOSIS — Z95.1 S/P CABG X 4: ICD-10-CM

## 2024-10-23 DIAGNOSIS — E78.5 HYPERLIPIDEMIA LDL GOAL <70: ICD-10-CM

## 2024-10-23 DIAGNOSIS — G62.9 NEUROPATHY: ICD-10-CM

## 2024-10-23 DIAGNOSIS — E11.65 TYPE 2 DIABETES MELLITUS WITH HYPERGLYCEMIA, WITHOUT LONG-TERM CURRENT USE OF INSULIN: Primary | ICD-10-CM

## 2024-10-23 DIAGNOSIS — I10 PRIMARY HYPERTENSION: ICD-10-CM

## 2024-10-23 DIAGNOSIS — K21.9 GASTROESOPHAGEAL REFLUX DISEASE WITHOUT ESOPHAGITIS: ICD-10-CM

## 2024-10-23 DIAGNOSIS — Z12.11 SCREENING FOR COLON CANCER: ICD-10-CM

## 2024-10-23 DIAGNOSIS — Z78.9 STATIN INTOLERANCE: ICD-10-CM

## 2024-10-23 LAB
25(OH)D3 SERPL-MCNC: 28.4 NG/ML (ref 30–100)
AMPHET+METHAMPHET UR QL: NEGATIVE
AMPHETAMINES UR QL: NEGATIVE
BARBITURATES UR QL SCN: NEGATIVE
BASOPHILS # BLD AUTO: 0.07 10*3/MM3 (ref 0–0.2)
BASOPHILS NFR BLD AUTO: 0.9 % (ref 0–1.5)
BENZODIAZ UR QL SCN: NEGATIVE
BILIRUB UR QL STRIP: NEGATIVE
BUPRENORPHINE SERPL-MCNC: NEGATIVE NG/ML
CANNABINOIDS SERPL QL: NEGATIVE
CLARITY UR: CLEAR
COCAINE UR QL: NEGATIVE
COLOR UR: YELLOW
DEPRECATED RDW RBC AUTO: 45.1 FL (ref 37–54)
EOSINOPHIL # BLD AUTO: 0.1 10*3/MM3 (ref 0–0.4)
EOSINOPHIL NFR BLD AUTO: 1.3 % (ref 0.3–6.2)
ERYTHROCYTE [DISTWIDTH] IN BLOOD BY AUTOMATED COUNT: 13.3 % (ref 12.3–15.4)
FENTANYL UR-MCNC: NEGATIVE NG/ML
GLUCOSE UR STRIP-MCNC: NEGATIVE MG/DL
HBA1C MFR BLD: 7.3 % (ref 4.8–5.6)
HCT VFR BLD AUTO: 42.5 % (ref 37.5–51)
HGB BLD-MCNC: 14.1 G/DL (ref 13–17.7)
HGB UR QL STRIP.AUTO: NEGATIVE
HOLD SPECIMEN: NORMAL
IMM GRANULOCYTES # BLD AUTO: 0.1 10*3/MM3 (ref 0–0.05)
IMM GRANULOCYTES NFR BLD AUTO: 1.3 % (ref 0–0.5)
KETONES UR QL STRIP: NEGATIVE
LEUKOCYTE ESTERASE UR QL STRIP.AUTO: NEGATIVE
LYMPHOCYTES # BLD AUTO: 2.92 10*3/MM3 (ref 0.7–3.1)
LYMPHOCYTES NFR BLD AUTO: 38.4 % (ref 19.6–45.3)
MCH RBC QN AUTO: 30.9 PG (ref 26.6–33)
MCHC RBC AUTO-ENTMCNC: 33.2 G/DL (ref 31.5–35.7)
MCV RBC AUTO: 93 FL (ref 79–97)
METHADONE UR QL SCN: NEGATIVE
MONOCYTES # BLD AUTO: 0.95 10*3/MM3 (ref 0.1–0.9)
MONOCYTES NFR BLD AUTO: 12.5 % (ref 5–12)
NEUTROPHILS NFR BLD AUTO: 3.47 10*3/MM3 (ref 1.7–7)
NEUTROPHILS NFR BLD AUTO: 45.6 % (ref 42.7–76)
NITRITE UR QL STRIP: NEGATIVE
NRBC BLD AUTO-RTO: 0 /100 WBC (ref 0–0.2)
OPIATES UR QL: NEGATIVE
OXYCODONE UR QL SCN: NEGATIVE
PCP UR QL SCN: NEGATIVE
PH UR STRIP.AUTO: 5.5 [PH] (ref 5–8)
PLATELET # BLD AUTO: 453 10*3/MM3 (ref 140–450)
PMV BLD AUTO: 9.3 FL (ref 6–12)
PROT UR QL STRIP: ABNORMAL
RBC # BLD AUTO: 4.57 10*6/MM3 (ref 4.14–5.8)
SP GR UR STRIP: 1.02 (ref 1–1.03)
TRICYCLICS UR QL SCN: NEGATIVE
UROBILINOGEN UR QL STRIP: ABNORMAL
WBC NRBC COR # BLD AUTO: 7.61 10*3/MM3 (ref 3.4–10.8)

## 2024-10-23 PROCEDURE — 99214 OFFICE O/P EST MOD 30 MIN: CPT | Performed by: NURSE PRACTITIONER

## 2024-10-23 PROCEDURE — 83036 HEMOGLOBIN GLYCOSYLATED A1C: CPT | Performed by: NURSE PRACTITIONER

## 2024-10-23 PROCEDURE — 80307 DRUG TEST PRSMV CHEM ANLYZR: CPT

## 2024-10-23 PROCEDURE — G0103 PSA SCREENING: HCPCS | Performed by: NURSE PRACTITIONER

## 2024-10-23 PROCEDURE — 90656 IIV3 VACC NO PRSV 0.5 ML IM: CPT | Performed by: NURSE PRACTITIONER

## 2024-10-23 PROCEDURE — 82570 ASSAY OF URINE CREATININE: CPT | Performed by: NURSE PRACTITIONER

## 2024-10-23 PROCEDURE — 82043 UR ALBUMIN QUANTITATIVE: CPT | Performed by: NURSE PRACTITIONER

## 2024-10-23 PROCEDURE — 80061 LIPID PANEL: CPT | Performed by: NURSE PRACTITIONER

## 2024-10-23 PROCEDURE — 82306 VITAMIN D 25 HYDROXY: CPT | Performed by: NURSE PRACTITIONER

## 2024-10-23 PROCEDURE — 84439 ASSAY OF FREE THYROXINE: CPT | Performed by: NURSE PRACTITIONER

## 2024-10-23 PROCEDURE — 80050 GENERAL HEALTH PANEL: CPT | Performed by: NURSE PRACTITIONER

## 2024-10-23 PROCEDURE — 90471 IMMUNIZATION ADMIN: CPT | Performed by: NURSE PRACTITIONER

## 2024-10-23 PROCEDURE — 81003 URINALYSIS AUTO W/O SCOPE: CPT | Performed by: NURSE PRACTITIONER

## 2024-10-23 RX ORDER — LEVOTHYROXINE SODIUM 50 UG/1
50 TABLET ORAL
Qty: 90 TABLET | Refills: 1 | Status: SHIPPED | OUTPATIENT
Start: 2024-10-23

## 2024-10-23 RX ORDER — METFORMIN HCL 500 MG
1000 TABLET, EXTENDED RELEASE 24 HR ORAL
Qty: 180 TABLET | Refills: 1 | Status: SHIPPED | OUTPATIENT
Start: 2024-10-23

## 2024-10-23 RX ORDER — PREGABALIN 225 MG/1
225 CAPSULE ORAL 2 TIMES DAILY
Qty: 60 CAPSULE | Refills: 1 | Status: CANCELLED | OUTPATIENT
Start: 2024-10-23

## 2024-10-23 RX ORDER — ACYCLOVIR 400 MG/1
TABLET ORAL
Qty: 3 EACH | Refills: 0 | Status: SHIPPED | OUTPATIENT
Start: 2024-10-23

## 2024-10-23 RX ORDER — METOPROLOL TARTRATE 50 MG
50 TABLET ORAL EVERY 12 HOURS
Qty: 60 TABLET | Refills: 0 | Status: SHIPPED | OUTPATIENT
Start: 2024-10-23 | End: 2024-11-22

## 2024-10-23 RX ORDER — LISINOPRIL AND HYDROCHLOROTHIAZIDE 12.5; 2 MG/1; MG/1
2 TABLET ORAL DAILY
Qty: 180 TABLET | Refills: 1 | Status: SHIPPED | OUTPATIENT
Start: 2024-10-23 | End: 2024-10-24

## 2024-10-23 RX ORDER — FAMOTIDINE 20 MG/1
20 TABLET, FILM COATED ORAL 2 TIMES DAILY
Qty: 180 TABLET | Refills: 1 | Status: SHIPPED | OUTPATIENT
Start: 2024-10-23

## 2024-10-23 RX ORDER — TRAZODONE HYDROCHLORIDE 100 MG/1
100 TABLET ORAL NIGHTLY
Qty: 30 TABLET | Refills: 1 | Status: SHIPPED | OUTPATIENT
Start: 2024-10-23

## 2024-10-23 RX ORDER — INSULIN GLARGINE 100 [IU]/ML
40 INJECTION, SOLUTION SUBCUTANEOUS NIGHTLY
Qty: 9 ML | Refills: 5 | Status: SHIPPED | OUTPATIENT
Start: 2024-10-23

## 2024-10-24 ENCOUNTER — TELEPHONE (OUTPATIENT)
Dept: FAMILY MEDICINE CLINIC | Facility: CLINIC | Age: 59
End: 2024-10-24
Payer: COMMERCIAL

## 2024-10-24 LAB
ALBUMIN SERPL-MCNC: 4.4 G/DL (ref 3.5–5.2)
ALBUMIN UR-MCNC: 5.4 MG/DL
ALBUMIN/GLOB SERPL: 1.2 G/DL
ALP SERPL-CCNC: 106 U/L (ref 39–117)
ALT SERPL W P-5'-P-CCNC: 7 U/L (ref 1–41)
ANION GAP SERPL CALCULATED.3IONS-SCNC: 15.6 MMOL/L (ref 5–15)
AST SERPL-CCNC: 10 U/L (ref 1–40)
BILIRUB SERPL-MCNC: 0.2 MG/DL (ref 0–1.2)
BUN SERPL-MCNC: 29 MG/DL (ref 6–20)
BUN/CREAT SERPL: 20.6 (ref 7–25)
CALCIUM SPEC-SCNC: 10.1 MG/DL (ref 8.6–10.5)
CHLORIDE SERPL-SCNC: 102 MMOL/L (ref 98–107)
CHOLEST SERPL-MCNC: 155 MG/DL (ref 0–200)
CO2 SERPL-SCNC: 18.4 MMOL/L (ref 22–29)
CREAT SERPL-MCNC: 1.41 MG/DL (ref 0.76–1.27)
CREAT UR-MCNC: 116.3 MG/DL
EGFRCR SERPLBLD CKD-EPI 2021: 57.8 ML/MIN/1.73
GLOBULIN UR ELPH-MCNC: 3.6 GM/DL
GLUCOSE SERPL-MCNC: 152 MG/DL (ref 65–99)
HDLC SERPL-MCNC: 23 MG/DL (ref 40–60)
LDLC SERPL CALC-MCNC: 60 MG/DL (ref 0–100)
LDLC/HDLC SERPL: 1.65 {RATIO}
MICROALBUMIN/CREAT UR: 46.4 MG/G (ref 0–29)
POTASSIUM SERPL-SCNC: 4.4 MMOL/L (ref 3.5–5.2)
PROT SERPL-MCNC: 8 G/DL (ref 6–8.5)
PSA SERPL-MCNC: 0.1 NG/ML (ref 0–4)
SODIUM SERPL-SCNC: 136 MMOL/L (ref 136–145)
T4 FREE SERPL-MCNC: 1.27 NG/DL (ref 0.92–1.68)
TRIGL SERPL-MCNC: 470 MG/DL (ref 0–150)
TSH SERPL DL<=0.05 MIU/L-ACNC: 3.92 UIU/ML (ref 0.27–4.2)
VLDLC SERPL-MCNC: 72 MG/DL (ref 5–40)

## 2024-10-24 RX ORDER — LISINOPRIL 20 MG/1
20 TABLET ORAL DAILY
Qty: 90 TABLET | Refills: 1 | Status: SHIPPED | OUTPATIENT
Start: 2024-10-24

## 2024-10-24 NOTE — TELEPHONE ENCOUNTER
"Name: Jd Velazquez \"LUIS\"    Relationship: Self    Best Callback Number [phone]     HUB PROVIDED THE RELAY MESSAGE FROM THE OFFICE   PATIENT VOICED UNDERSTANDING AND HAS NO FURTHER QUESTIONS AT THIS TIME      "

## 2024-10-24 NOTE — TELEPHONE ENCOUNTER
----- Message from Dacia Newsome sent at 10/24/2024  7:35 AM EDT -----  TSH normal continue current dose of levothyroxine

## 2024-10-30 ENCOUNTER — OFFICE VISIT (OUTPATIENT)
Dept: PODIATRY | Facility: CLINIC | Age: 59
End: 2024-10-30
Payer: COMMERCIAL

## 2024-10-30 VITALS
TEMPERATURE: 96.9 F | OXYGEN SATURATION: 93 % | WEIGHT: 246 LBS | DIASTOLIC BLOOD PRESSURE: 70 MMHG | BODY MASS INDEX: 34.31 KG/M2 | HEART RATE: 79 BPM | SYSTOLIC BLOOD PRESSURE: 110 MMHG

## 2024-10-30 DIAGNOSIS — M86.60 CHRONIC OSTEOMYELITIS: ICD-10-CM

## 2024-10-30 DIAGNOSIS — E11.65 UNCONTROLLED TYPE 2 DIABETES MELLITUS WITH HYPERGLYCEMIA: ICD-10-CM

## 2024-10-30 DIAGNOSIS — I73.9 PERIPHERAL VASCULAR DISEASE: Primary | ICD-10-CM

## 2024-10-31 NOTE — PROGRESS NOTES
[facility] - PODIATRY    Today's Date: 10/31/24    Patient Name: Jd Velazquez  MRN: [id number]  CSN: [id number]  PCP: Dacia Newsome APRN,   Referring Provider: No ref. provider found    SUBJECTIVE     Chief Complaint   Patient presents with    Right Foot - Follow-up, Foot Ulcer     HPI: Jd Velazquez, a 59 y.o.male, presents to clinic.    Patient is a 58-year-old male presenting for  patient is doing well.  Patient says he is here for follow-up no new complaints.  Wound is almost healed.  Past Medical History:   Diagnosis Date    Allergic rhinitis 01/12/2015    Anesthesia     DIFFICULTY WAKING UP POST SURGERY    CAD (coronary artery disease)     DENIES CP/SOA.    Diabetes mellitus     Hyperlipidemia     Hypertension     Hypothyroidism 04/24/2014    Insomnia, unspecified 06/15/2016    WITHOUT BIPAP    Microalbuminuria due to type 2 diabetes mellitus 10/15/2015    Mixed hyperlipidemia 10/15/2015    Myocardial infarction     Obesity     BMI 32    SHAHLA (obstructive sleep apnea)     WEARS BIPAP    Right great toe amputee     4/2024; 5/2024 ADMITTED WITH INFECTION    Skin cancer     REMOVED    Sleep apnea      Past Surgical History:   Procedure Laterality Date    AMPUTATION DIGIT Right 4/10/2024    Procedure: AMPUTATION DIGIT RIGHT GREAT TOE;  Surgeon: Balwinder Costa DPM;  Location: [facility] OR;  Service: Podiatry;  Laterality: Right;    AMPUTATION DIGIT Right 5/21/2024    Procedure: SECOND RAY AMPUTATION RIGHT FOOT, INCISION AND DRAINAGE RIGHT FOOT;  Surgeon: Balwinder Costa DPM;  Location: [facility] MAIN OR;  Service: Podiatry;  Laterality: Right;    ANKLE ARTHROSCOPY W/ OPEN REPAIR      APPENDECTOMY      CARDIAC CATHETERIZATION  2014    PCI to circumflex    CARDIAC CATHETERIZATION Right 10/26/2023    Procedure: Aortogram with right leg angiogram, possible angioplasty or stenting;  Surgeon: Robert Puga MD;  Location: [facility] CATH INVASIVE LOCATION;  Service:  Vascular;  Laterality: Right;    CARDIAC CATHETERIZATION Right 12/15/2023    Procedure: Right leg angiogram, possible angioplasty or stenting;  Surgeon: Robert Puga MD;  Location: [facility] CATH INVASIVE LOCATION;  Service: Vascular;  Laterality: Right;    CORONARY ARTERY BYPASS GRAFT N/A 10/08/2020    Procedure: STERNOTOMY, CORONARY ARTERY BYPASS GRAFTING TIME 4 WITH LEFT KELSI  AND ENDOSCOPICALLY HARVESTED LEFT GREATER SAPHENOUS VEIN AND PRP.;  Surgeon: Jr Girma Chiu MD;  Location: [facility] MAIN OR;  Service: Cardiothoracic;  Laterality: N/A;    FEMORAL TIBIAL BYPASS Right 01/09/2024    Procedure: Right femoral-tibial bypass graft;  Surgeon: Robert Puga MD;  Location: [facility] MAIN OR;  Service: Vascular;  Laterality: Right;    HEEL SPUR SURGERY      HERNIA REPAIR      INCISION AND DRAINAGE OF WOUND Right 4/30/2024    Procedure: INCISION AND DRAINAGE WOUND RIGHT FOOT, DEBRIDEMENT OF WOUND RIGHT FOOT, AMPUTATION REVISION RIGHT FOOT;  Surgeon: Balwinder Costa DPM;  Location: [facility] MAIN OR;  Service: Podiatry;  Laterality: Right;    KNEE ARTHROSCOPY      MULTIPLE TIMES ON BOTH KNEES    SKIN CANCER EXCISION      BACK    TOE SURGERY Right     DEBRIDMENT RIGHT GREAT TOE    TONSILLECTOMY       Family History   Adopted: Yes   Problem Relation Age of Onset    Heart disease Other      Social History     Socioeconomic History    Marital status:    Tobacco Use    Smoking status: Every Day     Current packs/day: 0.25     Average packs/day: 0.3 packs/day for 15.0 years (3.8 ttl pk-yrs)     Types: Cigarettes     Passive exposure: Never    Smokeless tobacco: Never   Vaping Use    Vaping status: Never Used   Substance and Sexual Activity    Alcohol use: Not Currently    Drug use: Never    Sexual activity: Defer     Allergies   Allergen Reactions    Strawberry Itching    Lortab [Hydrocodone-Acetaminophen] Itching     Current Outpatient Medications   Medication Sig Dispense Refill    amLODIPine (NORVASC) 10 MG  tablet Take 1 tablet by mouth Daily. 90 tablet 3    clopidogrel (PLAVIX) 75 MG tablet TAKE 1 TABLET BY MOUTH DAILY 30 tablet 5    Continuous Glucose Sensor (Dexcom G7 Sensor) misc apply sensor to skin for continuous blood sugar monitoring replace every 10 days 3 each 0    Evolocumab (REPATHA) solution prefilled syringe injection Inject 1 mL under the skin into the appropriate area as directed.      famotidine (Pepcid) 20 MG tablet Take 1 tablet by mouth 2 (Two) Times a Day. 180 tablet 1    Insulin Glargine (BASAGLAR KWIKPEN) 100 UNIT/ML injection pen Inject 40 Units under the skin into the appropriate area as directed Every Night. 9 mL 5    levothyroxine (SYNTHROID, LEVOTHROID) 50 MCG tablet Take 1 tablet by mouth Every Morning. 90 tablet 1    lisinopril (PRINIVIL,ZESTRIL) 20 MG tablet Take 1 tablet by mouth Daily. 90 tablet 1    metFORMIN ER (GLUCOPHAGE-XR) 500 MG 24 hr tablet Take 2 tablets by mouth Daily With Breakfast. 180 tablet 1    metoprolol tartrate (LOPRESSOR) 50 MG tablet Take 1 tablet by mouth Every 12 (Twelve) Hours for 30 days. 60 tablet 0    pregabalin (LYRICA) 225 MG capsule TAKE 1 CAPSULE BY MOUTH TWICE A DAY 60 capsule 1    spironolactone (ALDACTONE) 50 MG tablet Take 1 tablet by mouth Daily.      Tirzepatide (MOUNJARO) 15 MG/0.5ML solution pen-injector pen Inject 0.5 mL under the skin into the appropriate area as directed 1 (One) Time Per Week. 6 mL 3    traZODone (DESYREL) 100 MG tablet Take 1 tablet by mouth Every Night. 30 tablet 1     No current facility-administered medications for this visit.     Review of Systems   Skin:         Ulcer toe   All other systems reviewed and are negative.      OBJECTIVE     Vitals:    10/30/24 0938   BP: 110/70   Pulse: 79   Temp: 96.9 °F (36.1 °C)   SpO2: 93%       WBC   Date Value Ref Range Status   10/23/2024 7.61 3.40 - 10.80 10*3/mm3 Final     RBC   Date Value Ref Range Status   10/23/2024 4.57 4.14 - 5.80 10*6/mm3 Final     Hemoglobin   Date Value Ref  Range Status   10/23/2024 14.1 13.0 - 17.7 g/dL Final     Hematocrit   Date Value Ref Range Status   10/23/2024 42.5 37.5 - 51.0 % Final     MCV   Date Value Ref Range Status   10/23/2024 93.0 79.0 - 97.0 fL Final     MCH   Date Value Ref Range Status   10/23/2024 30.9 26.6 - 33.0 pg Final     MCHC   Date Value Ref Range Status   10/23/2024 33.2 31.5 - 35.7 g/dL Final     RDW   Date Value Ref Range Status   10/23/2024 13.3 12.3 - 15.4 % Final     RDW-SD   Date Value Ref Range Status   10/23/2024 45.1 37.0 - 54.0 fl Final     MPV   Date Value Ref Range Status   10/23/2024 9.3 6.0 - 12.0 fL Final     Platelets   Date Value Ref Range Status   10/23/2024 453 (H) 140 - 450 10*3/mm3 Final     Neutrophil %   Date Value Ref Range Status   10/23/2024 45.6 42.7 - 76.0 % Final     Lymphocyte %   Date Value Ref Range Status   10/23/2024 38.4 19.6 - 45.3 % Final     Monocyte %   Date Value Ref Range Status   10/23/2024 12.5 (H) 5.0 - 12.0 % Final     Eosinophil %   Date Value Ref Range Status   10/23/2024 1.3 0.3 - 6.2 % Final     Basophil %   Date Value Ref Range Status   10/23/2024 0.9 0.0 - 1.5 % Final     Immature Grans %   Date Value Ref Range Status   10/23/2024 1.3 (H) 0.0 - 0.5 % Final     Neutrophils, Absolute   Date Value Ref Range Status   10/23/2024 3.47 1.70 - 7.00 10*3/mm3 Final     Lymphocytes, Absolute   Date Value Ref Range Status   10/23/2024 2.92 0.70 - 3.10 10*3/mm3 Final     Monocytes, Absolute   Date Value Ref Range Status   10/23/2024 0.95 (H) 0.10 - 0.90 10*3/mm3 Final     Eosinophils, Absolute   Date Value Ref Range Status   10/23/2024 0.10 0.00 - 0.40 10*3/mm3 Final     Basophils, Absolute   Date Value Ref Range Status   10/23/2024 0.07 0.00 - 0.20 10*3/mm3 Final     Immature Grans, Absolute   Date Value Ref Range Status   10/23/2024 0.10 (H) 0.00 - 0.05 10*3/mm3 Final     nRBC   Date Value Ref Range Status   10/23/2024 0.0 0.0 - 0.2 /100 WBC Final         Lab Results   Component Value Date    GLUCOSE  152 (H) 10/23/2024    BUN 29 (H) 10/23/2024    CREATININE 1.41 (H) 10/23/2024    EGFRIFNONA 77 06/17/2021    BCR 20.6 10/23/2024    K 4.4 10/23/2024    CO2 18.4 (L) 10/23/2024    CALCIUM 10.1 10/23/2024    ALBUMIN 4.4 10/23/2024    LABIL2 0.9 (L) 10/16/2020    AST 10 10/23/2024    ALT 7 10/23/2024       Patient seen in no apparent distress.      PHYSICAL EXAM:  Patient with previous first and second ray amputation to his right foot.  Incision well coapted.  0.1 x 0.1 x 0.1 cm ulcer to distal amputation site.  100% granular no malodor no drainage.  No tenderness to the calf.  Minimal swelling to right foot.    RADIOLOGY:        No results found.    ASSESSMENT/PLAN     Diagnoses and all orders for this visit:    1. Peripheral vascular disease (Primary)    2. Uncontrolled type 2 diabetes mellitus with hyperglycemia    3. Chronic osteomyelitis          Comprehensive lower extremity examination and evaluation was performed.      Triple antibiotic and border dressing daily     Discussed importance of smoking cessation to help wound healing.    Patient at high risk for developing further ulcerations and infections and subsequent major amputations due to his uncontrolled diabetes, PVD, smoking, and noncompliance.    Return to clinic in 2 to 3 months or sooner if issues arise or symptoms worsen.    Discussed findings and treatment plan including risks, benefits, and treatment options with patient in detail. Patient agreed with treatment plan.    Medications and allergies reviewed.  Reviewed available lab values along with other pertinent labs.  These were discussed with the patient.    An After Visit Summary was printed and given to the patient at discharge, including (if requested) any available informative/educational handouts regarding diagnosis, treatment, or medications. All questions were answered to patient/family satisfaction. Should symptoms fail to improve or worsen they agree to call or return to clinic or to go  to the Emergency Department. Discussed the importance of following up with any needed screening tests/labs/specialist appointments and any requested follow-up recommended by me today. Importance of maintaining follow-up discussed and patient accepts that missed appointments can delay diagnosis and potentially lead to worsening of conditions.    Return in about 3 months (around 1/30/2025)., or sooner if acute issues arise.    This document has been electronically signed by Balwinder Csota DPM on October 31, 2024 14:23 EDT

## 2024-11-18 RX ORDER — ACYCLOVIR 400 MG/1
TABLET ORAL
Qty: 3 EACH | Refills: 0 | Status: SHIPPED | OUTPATIENT
Start: 2024-11-18

## 2024-11-19 RX ORDER — METOPROLOL TARTRATE 50 MG
50 TABLET ORAL EVERY 12 HOURS
Qty: 60 TABLET | Refills: 0 | Status: SHIPPED | OUTPATIENT
Start: 2024-11-19

## 2024-11-22 DIAGNOSIS — G62.9 NEUROPATHY: ICD-10-CM

## 2024-11-22 RX ORDER — PREGABALIN 225 MG/1
225 CAPSULE ORAL 2 TIMES DAILY
Qty: 60 CAPSULE | Refills: 1 | Status: SHIPPED | OUTPATIENT
Start: 2024-11-22

## 2024-12-17 RX ORDER — METOPROLOL TARTRATE 50 MG
50 TABLET ORAL EVERY 12 HOURS
Qty: 60 TABLET | Refills: 0 | Status: SHIPPED | OUTPATIENT
Start: 2024-12-17

## 2024-12-19 RX ORDER — METFORMIN HYDROCHLORIDE 500 MG/1
1000 TABLET, EXTENDED RELEASE ORAL
Qty: 180 TABLET | Refills: 1 | Status: SHIPPED | OUTPATIENT
Start: 2024-12-19

## 2025-01-07 RX ORDER — ACYCLOVIR 400 MG/1
1 TABLET ORAL
Qty: 3 EACH | Refills: 2 | Status: SHIPPED | OUTPATIENT
Start: 2025-01-07

## 2025-01-07 NOTE — TELEPHONE ENCOUNTER
"  Caller: Jd Velazquez \"LUIS\"    Relationship: Self    Best call back number: [phone]    Requested Prescriptions:   Requested Prescriptions     Pending Prescriptions Disp Refills    Continuous Glucose Sensor (Dexcom G7 Sensor) [facility] 3 each 0        Pharmacy where request should be sent:  [facility] [id number]  [facility] [place] [facility] [address] AT [facility] [address] - [phone]  - [phone]  [phone]     Last office visit with prescribing clinician: 10/23/2024   Last telemedicine visit with prescribing clinician: Visit date not found   Next office visit with prescribing clinician: 1/23/2025     Additional details provided by patient: PATIENT IS OUT OF SENSORS    Does the patient have less than a 3 day supply:  [x] Yes  [] No    Would you like a call back once the refill request has been completed: [] Yes [] No    If the office needs to give you a call back, can they leave a voicemail: [] Yes [] No    Danielle Fischer Rep   01/07/25 12:59 EST           "

## 2025-01-15 RX ORDER — METOPROLOL TARTRATE 50 MG
50 TABLET ORAL EVERY 12 HOURS
Qty: 60 TABLET | Refills: 2 | Status: SHIPPED | OUTPATIENT
Start: 2025-01-15

## 2025-01-20 PROBLEM — Z79.899 MEDICATION MANAGEMENT: Status: ACTIVE | Noted: 2025-01-20

## 2025-01-20 NOTE — PROGRESS NOTES
Follow Up Office Visit      Patient Name: Jd Velazquez  : 1965   MRN: [id number]     Chief Complaint:    Chief Complaint   Patient presents with    Diabetes    Hypertension    Hyperlipidemia    Hypothyroidism    Coronary Artery Disease         History of Present Illness: Jd Velazquez is a 59 y.o. male who is here today to follow up for  DM2, htn, hyperlipidemia, CAD and hypothyroidism     Patient does not take the Baslagar in the past 3 months, states glucose was running on average 140, with some low glucose readings   Mounjaro  15 mg  pt tolerating well      BS running this morning was 140's to 150's fasting and 2 hours after meals       Labs-10/2024   A1C-10/2024.   Eye exam-  Aldo  Pt c/o vision changes, blurred vision, missed his eye appointment last week     Foot exam-2024 Dr Costa, scheduled later this month   Psa-10/2023    Also patient complained of trazodone not working at times not sleeping well    Subjective      Review of Systems:   Review of Systems   Constitutional:  Negative for fever.   HENT:  Negative for ear pain and sore throat.    Eyes:  Positive for visual disturbance.   Respiratory:  Negative for cough.    Cardiovascular:  Negative for chest pain.   Gastrointestinal:  Negative for abdominal pain.   Genitourinary:  Negative for dysuria.   Musculoskeletal:  Negative for myalgias.   Neurological:  Negative for dizziness and headaches.        Past Medical History:   Past Medical History:   Diagnosis Date    Allergic rhinitis 2015    Anesthesia     DIFFICULTY WAKING UP POST SURGERY    CAD (coronary artery disease)     DENIES CP/SOA.    Diabetes mellitus     Hyperlipidemia     Hypertension     Hypothyroidism 2014    Insomnia, unspecified 06/15/2016    WITHOUT BIPAP    Microalbuminuria due to type 2 diabetes mellitus 10/15/2015    Mixed hyperlipidemia 10/15/2015    Myocardial infarction     Obesity     BMI 32    SHAHLA (obstructive sleep  apnea)     WEARS BIPAP    Right great toe amputee     4/2024; 5/2024 ADMITTED WITH INFECTION    Skin cancer     REMOVED    Sleep apnea        Past Surgical History:   Past Surgical History:   Procedure Laterality Date    AMPUTATION DIGIT Right 4/10/2024    Procedure: AMPUTATION DIGIT RIGHT GREAT TOE;  Surgeon: Balwinder Costa DPM;  Location: [facility] OR;  Service: Podiatry;  Laterality: Right;    AMPUTATION DIGIT Right 5/21/2024    Procedure: SECOND RAY AMPUTATION RIGHT FOOT, INCISION AND DRAINAGE RIGHT FOOT;  Surgeon: Balwinder Costa DPM;  Location: [facility] OR;  Service: Podiatry;  Laterality: Right;    ANKLE ARTHROSCOPY W/ OPEN REPAIR      APPENDECTOMY      CARDIAC CATHETERIZATION  2014    PCI to circumflex    CARDIAC CATHETERIZATION Right 10/26/2023    Procedure: Aortogram with right leg angiogram, possible angioplasty or stenting;  Surgeon: Robert Puga MD;  Location: [facility] CATH INVASIVE LOCATION;  Service: Vascular;  Laterality: Right;    CARDIAC CATHETERIZATION Right 12/15/2023    Procedure: Right leg angiogram, possible angioplasty or stenting;  Surgeon: Robert Puga MD;  Location: [facility] CATH INVASIVE LOCATION;  Service: Vascular;  Laterality: Right;    CORONARY ARTERY BYPASS GRAFT N/A 10/08/2020    Procedure: STERNOTOMY, CORONARY ARTERY BYPASS GRAFTING TIME 4 WITH LEFT KELSI  AND ENDOSCOPICALLY HARVESTED LEFT GREATER SAPHENOUS VEIN AND PRP.;  Surgeon: Jr Girma Chiu MD;  Location: [facility];  Service: Cardiothoracic;  Laterality: N/A;    FEMORAL TIBIAL BYPASS Right 01/09/2024    Procedure: Right femoral-tibial bypass graft;  Surgeon: Robert Puga MD;  Location: [facility] OR;  Service: Vascular;  Laterality: Right;    HEEL SPUR SURGERY      HERNIA REPAIR      INCISION AND DRAINAGE OF WOUND Right 4/30/2024    Procedure: INCISION AND DRAINAGE WOUND RIGHT FOOT, DEBRIDEMENT OF WOUND RIGHT FOOT, AMPUTATION REVISION RIGHT FOOT;  Surgeon: Balwinder Costa DPM;  Location:   [facility];  Service: Podiatry;  Laterality: Right;    KNEE ARTHROSCOPY      MULTIPLE TIMES ON BOTH KNEES    SKIN CANCER EXCISION      BACK    TOE SURGERY Right     DEBRIDMENT RIGHT GREAT TOE    TONSILLECTOMY         Family History:   Family History   Adopted: Yes   Problem Relation Age of Onset    Heart disease Other        Social History:   Social History     Socioeconomic History    Marital status:    Tobacco Use    Smoking status: Every Day     Current packs/day: 0.25     Average packs/day: 0.3 packs/day for 15.0 years (3.8 ttl pk-yrs)     Types: Cigarettes     Passive exposure: Never    Smokeless tobacco: Never   Vaping Use    Vaping status: Never Used   Substance and Sexual Activity    Alcohol use: Not Currently    Drug use: Never    Sexual activity: Defer       Medications:     Current Outpatient Medications:     amLODIPine (NORVASC) 10 MG tablet, Take 1 tablet by mouth Daily., Disp: 90 tablet, Rfl: 3    clopidogrel (PLAVIX) 75 MG tablet, TAKE 1 TABLET BY MOUTH DAILY, Disp: 30 tablet, Rfl: 5    Continuous Glucose Sensor (Dexcom G7 Sensor) misc, 1 each by Other route Every 10 (Ten) Days., Disp: 3 each, Rfl: 2    Evolocumab (REPATHA) solution prefilled syringe injection, Inject 1 mL under the skin into the appropriate area as directed., Disp: , Rfl:     famotidine (Pepcid) 20 MG tablet, Take 1 tablet by mouth 2 (Two) Times a Day., Disp: 180 tablet, Rfl: 1    levothyroxine (SYNTHROID, LEVOTHROID) 50 MCG tablet, Take 1 tablet by mouth Every Morning., Disp: 90 tablet, Rfl: 1    lisinopril (PRINIVIL,ZESTRIL) 20 MG tablet, Take 1 tablet by mouth Daily., Disp: 90 tablet, Rfl: 1    metFORMIN ER (GLUCOPHAGE-XR) 500 MG 24 hr tablet, TAKE 2 TABLETS BY MOUTH DAILY WITH BREAKFAST, Disp: 180 tablet, Rfl: 1    metoprolol tartrate (LOPRESSOR) 50 MG tablet, TAKE ONE TABLET BY MOUTH EVERY 12 HOURS, Disp: 60 tablet, Rfl: 2    pregabalin (LYRICA) 225 MG capsule, Take 1 capsule by mouth 2 (Two) Times a Day., Disp: 180  "capsule, Rfl: 1    spironolactone (ALDACTONE) 50 MG tablet, Take 1 tablet by mouth Daily., Disp: 90 tablet, Rfl: 1    Tirzepatide (MOUNJARO) 15 MG/0.5ML solution pen-injector pen, Inject 0.5 mL under the skin into the appropriate area as directed 1 (One) Time Per Week., Disp: 6 mL, Rfl: 3    traZODone (DESYREL) 150 MG tablet, Take 1 tablet by mouth Every Night., Disp: 90 tablet, Rfl: 1    Allergies:   Allergies   Allergen Reactions    Strawberry Itching    Lortab [Hydrocodone-Acetaminophen] Itching             Objective     Physical Exam:  Vital Signs:   Vitals:    01/23/25 1007   BP: 134/71   Pulse: 64   Temp: 97.4 °F (36.3 °C)   SpO2: 98%   Weight: 114 kg (252 lb)   Height: 180.3 cm (71\")   PainSc: 0-No pain     Body mass index is 35.15 kg/m².          Physical Exam  HENT:      Right Ear: Tympanic membrane normal.      Left Ear: Tympanic membrane normal.      Nose: Nose normal.      Mouth/Throat:      Mouth: Mucous membranes are moist.   Eyes:      Conjunctiva/sclera: Conjunctivae normal.   Neck:      Vascular: No carotid bruit.   Cardiovascular:      Rate and Rhythm: Normal rate and regular rhythm.      Heart sounds: Normal heart sounds. No murmur heard.  Pulmonary:      Effort: Pulmonary effort is normal.      Breath sounds: Normal breath sounds.   Abdominal:      General: Bowel sounds are normal.      Palpations: Abdomen is soft.   Musculoskeletal:      Right lower leg: No edema.      Left lower leg: No edema.   Skin:     General: Skin is warm and dry.   Neurological:      Mental Status: He is alert.   Psychiatric:         Mood and Affect: Mood normal.         Behavior: Behavior normal.             Assessment / Plan      Assessment/Plan:   Diagnoses and all orders for this visit:    1. Type 2 diabetes mellitus with hyperglycemia, without long-term current use of insulin (Primary)  -     CBC Auto Differential  -     Comprehensive Metabolic Panel  -     Microalbumin / Creatinine Urine Ratio - Urine, Clean " "Catch  -     Hemoglobin A1c  -     TSH  -     Urinalysis With Culture If Indicated -  -     Ambulatory Referral for Diabetic Eye Exam-Ophthalmology    2. Primary hypertension    3. Hyperlipidemia LDL goal <70  -     Lipid Panel    4. Coronary artery disease involving native coronary artery of native heart without angina pectoris    5. Statin intolerance    6. Neuropathy  -     pregabalin (LYRICA) 225 MG capsule; Take 1 capsule by mouth 2 (Two) Times a Day.  Dispense: 180 capsule; Refill: 1    7. Medication management    8. S/P CABG x 4    9. Primary insomnia    10. SHAHLA treated with BiPAP    Other orders  -     spironolactone (ALDACTONE) 50 MG tablet; Take 1 tablet by mouth Daily.  Dispense: 90 tablet; Refill: 1  -     traZODone (DESYREL) 150 MG tablet; Take 1 tablet by mouth Every Night.  Dispense: 90 tablet; Refill: 1       Diabetes mellitus type 2 with hyperglycemia on last hemoglobin A1c since that time Mounjaro was increased to 15 mg once weekly patient discontinued his insulin patient reports monitoring at home average blood sugars are 140 will obtain hemoglobin A1c to monitor call with results and further recommendations  Hypertension currently controlled with lisinopril amlodipine  Hyperlipidemia with statin intolerance currently on Repatha with cardiology  Coronary artery disease status post CABG x 4 is currently on Plavix  Neuropathy pain controlled with Lyrica Clifton reviewed urine drug screen obtained will provide a refill  Insomnia will increase trazodone to 150 mg at nighttime recommend exercise 30 minutes daily good sleep hygiene will refer back to sleep center for reevaluation of his obstructive sleep apnea last seen August 20 21 no follow-up since that time      Follow Up:   Return in about 6 months (around 7/23/2025).    Nyasia Newsome, SOHAM    \"Please note that portions of this note were completed with a voice recognition program.\"     "

## 2025-01-23 ENCOUNTER — OFFICE VISIT (OUTPATIENT)
Dept: FAMILY MEDICINE CLINIC | Facility: CLINIC | Age: 60
End: 2025-01-23
Payer: COMMERCIAL

## 2025-01-23 VITALS
OXYGEN SATURATION: 98 % | HEART RATE: 64 BPM | BODY MASS INDEX: 35.28 KG/M2 | HEIGHT: 71 IN | DIASTOLIC BLOOD PRESSURE: 71 MMHG | SYSTOLIC BLOOD PRESSURE: 134 MMHG | WEIGHT: 252 LBS | TEMPERATURE: 97.4 F

## 2025-01-23 DIAGNOSIS — Z95.1 S/P CABG X 4: ICD-10-CM

## 2025-01-23 DIAGNOSIS — Z79.899 MEDICATION MANAGEMENT: ICD-10-CM

## 2025-01-23 DIAGNOSIS — G62.9 NEUROPATHY: ICD-10-CM

## 2025-01-23 DIAGNOSIS — I25.10 CORONARY ARTERY DISEASE INVOLVING NATIVE CORONARY ARTERY OF NATIVE HEART WITHOUT ANGINA PECTORIS: ICD-10-CM

## 2025-01-23 DIAGNOSIS — E11.65 TYPE 2 DIABETES MELLITUS WITH HYPERGLYCEMIA, WITHOUT LONG-TERM CURRENT USE OF INSULIN: Primary | ICD-10-CM

## 2025-01-23 DIAGNOSIS — F51.01 PRIMARY INSOMNIA: ICD-10-CM

## 2025-01-23 DIAGNOSIS — E78.5 HYPERLIPIDEMIA LDL GOAL <70: ICD-10-CM

## 2025-01-23 DIAGNOSIS — I10 PRIMARY HYPERTENSION: ICD-10-CM

## 2025-01-23 DIAGNOSIS — Z78.9 STATIN INTOLERANCE: ICD-10-CM

## 2025-01-23 DIAGNOSIS — G47.33 OSA TREATED WITH BIPAP: ICD-10-CM

## 2025-01-23 LAB
ALBUMIN SERPL-MCNC: 4.2 G/DL (ref 3.5–5.2)
ALBUMIN/GLOB SERPL: 1.2 G/DL
ALP SERPL-CCNC: 92 U/L (ref 39–117)
ALT SERPL W P-5'-P-CCNC: 11 U/L (ref 1–41)
ANION GAP SERPL CALCULATED.3IONS-SCNC: 14.6 MMOL/L (ref 5–15)
AST SERPL-CCNC: 11 U/L (ref 1–40)
BASOPHILS # BLD AUTO: 0.09 10*3/MM3 (ref 0–0.2)
BASOPHILS NFR BLD AUTO: 1.3 % (ref 0–1.5)
BILIRUB SERPL-MCNC: 0.2 MG/DL (ref 0–1.2)
BUN SERPL-MCNC: 13 MG/DL (ref 6–20)
BUN/CREAT SERPL: 11.7 (ref 7–25)
CALCIUM SPEC-SCNC: 9.4 MG/DL (ref 8.6–10.5)
CHLORIDE SERPL-SCNC: 102 MMOL/L (ref 98–107)
CHOLEST SERPL-MCNC: 142 MG/DL (ref 0–200)
CO2 SERPL-SCNC: 20.4 MMOL/L (ref 22–29)
CREAT SERPL-MCNC: 1.11 MG/DL (ref 0.76–1.27)
DEPRECATED RDW RBC AUTO: 46.7 FL (ref 37–54)
EGFRCR SERPLBLD CKD-EPI 2021: 77 ML/MIN/1.73
EOSINOPHIL # BLD AUTO: 0.11 10*3/MM3 (ref 0–0.4)
EOSINOPHIL NFR BLD AUTO: 1.6 % (ref 0.3–6.2)
ERYTHROCYTE [DISTWIDTH] IN BLOOD BY AUTOMATED COUNT: 13.7 % (ref 12.3–15.4)
GLOBULIN UR ELPH-MCNC: 3.5 GM/DL
GLUCOSE SERPL-MCNC: 137 MG/DL (ref 65–99)
HBA1C MFR BLD: 7.2 % (ref 4.8–5.6)
HCT VFR BLD AUTO: 40.6 % (ref 37.5–51)
HDLC SERPL-MCNC: 26 MG/DL (ref 40–60)
HGB BLD-MCNC: 13.8 G/DL (ref 13–17.7)
IMM GRANULOCYTES # BLD AUTO: 0.04 10*3/MM3 (ref 0–0.05)
IMM GRANULOCYTES NFR BLD AUTO: 0.6 % (ref 0–0.5)
LDLC SERPL CALC-MCNC: 71 MG/DL (ref 0–100)
LDLC/HDLC SERPL: 2.35 {RATIO}
LYMPHOCYTES # BLD AUTO: 2.46 10*3/MM3 (ref 0.7–3.1)
LYMPHOCYTES NFR BLD AUTO: 36.1 % (ref 19.6–45.3)
MCH RBC QN AUTO: 31.7 PG (ref 26.6–33)
MCHC RBC AUTO-ENTMCNC: 34 G/DL (ref 31.5–35.7)
MCV RBC AUTO: 93.1 FL (ref 79–97)
MONOCYTES # BLD AUTO: 1 10*3/MM3 (ref 0.1–0.9)
MONOCYTES NFR BLD AUTO: 14.7 % (ref 5–12)
NEUTROPHILS NFR BLD AUTO: 3.11 10*3/MM3 (ref 1.7–7)
NEUTROPHILS NFR BLD AUTO: 45.7 % (ref 42.7–76)
NRBC BLD AUTO-RTO: 0 /100 WBC (ref 0–0.2)
PLATELET # BLD AUTO: 394 10*3/MM3 (ref 140–450)
PMV BLD AUTO: 9.7 FL (ref 6–12)
POTASSIUM SERPL-SCNC: 4.6 MMOL/L (ref 3.5–5.2)
PROT SERPL-MCNC: 7.7 G/DL (ref 6–8.5)
RBC # BLD AUTO: 4.36 10*6/MM3 (ref 4.14–5.8)
SODIUM SERPL-SCNC: 137 MMOL/L (ref 136–145)
TRIGL SERPL-MCNC: 274 MG/DL (ref 0–150)
TSH SERPL DL<=0.05 MIU/L-ACNC: 2.99 UIU/ML (ref 0.27–4.2)
VLDLC SERPL-MCNC: 45 MG/DL (ref 5–40)
WBC NRBC COR # BLD AUTO: 6.81 10*3/MM3 (ref 3.4–10.8)

## 2025-01-23 PROCEDURE — 80061 LIPID PANEL: CPT | Performed by: NURSE PRACTITIONER

## 2025-01-23 PROCEDURE — 83036 HEMOGLOBIN GLYCOSYLATED A1C: CPT | Performed by: NURSE PRACTITIONER

## 2025-01-23 PROCEDURE — 80050 GENERAL HEALTH PANEL: CPT | Performed by: NURSE PRACTITIONER

## 2025-01-23 PROCEDURE — 99214 OFFICE O/P EST MOD 30 MIN: CPT | Performed by: NURSE PRACTITIONER

## 2025-01-23 RX ORDER — TRAZODONE HYDROCHLORIDE 100 MG/1
100 TABLET ORAL NIGHTLY
Qty: 30 TABLET | Refills: 1 | Status: CANCELLED | OUTPATIENT
Start: 2025-01-23

## 2025-01-23 RX ORDER — PREGABALIN 225 MG/1
225 CAPSULE ORAL 2 TIMES DAILY
Qty: 180 CAPSULE | Refills: 1 | Status: SHIPPED | OUTPATIENT
Start: 2025-01-23

## 2025-01-23 RX ORDER — TRAZODONE HYDROCHLORIDE 150 MG/1
150 TABLET ORAL NIGHTLY
Qty: 90 TABLET | Refills: 1 | Status: SHIPPED | OUTPATIENT
Start: 2025-01-23

## 2025-01-23 RX ORDER — SPIRONOLACTONE 50 MG/1
50 TABLET, FILM COATED ORAL DAILY
Status: CANCELLED | OUTPATIENT
Start: 2025-01-23

## 2025-01-23 RX ORDER — SPIRONOLACTONE 50 MG/1
50 TABLET, FILM COATED ORAL DAILY
Qty: 90 TABLET | Refills: 1 | Status: SHIPPED | OUTPATIENT
Start: 2025-01-23

## 2025-01-30 ENCOUNTER — OFFICE VISIT (OUTPATIENT)
Dept: PODIATRY | Facility: CLINIC | Age: 60
End: 2025-01-30
Payer: COMMERCIAL

## 2025-01-30 VITALS
HEART RATE: 85 BPM | BODY MASS INDEX: 35.7 KG/M2 | SYSTOLIC BLOOD PRESSURE: 143 MMHG | DIASTOLIC BLOOD PRESSURE: 70 MMHG | HEIGHT: 71 IN | OXYGEN SATURATION: 92 % | WEIGHT: 255 LBS

## 2025-01-30 DIAGNOSIS — I73.9 PERIPHERAL VASCULAR DISEASE: ICD-10-CM

## 2025-01-30 DIAGNOSIS — M86.60 CHRONIC OSTEOMYELITIS: ICD-10-CM

## 2025-01-30 DIAGNOSIS — E11.65 UNCONTROLLED TYPE 2 DIABETES MELLITUS WITH HYPERGLYCEMIA: Primary | ICD-10-CM

## 2025-01-30 DIAGNOSIS — L97.513 ULCER OF RIGHT FOOT WITH NECROSIS OF MUSCLE: ICD-10-CM

## 2025-01-30 PROCEDURE — 99213 OFFICE O/P EST LOW 20 MIN: CPT | Performed by: PODIATRIST

## 2025-02-03 ENCOUNTER — OFFICE VISIT (OUTPATIENT)
Dept: SLEEP MEDICINE | Facility: HOSPITAL | Age: 60
End: 2025-02-03
Payer: COMMERCIAL

## 2025-02-03 VITALS
WEIGHT: 251.6 LBS | BODY MASS INDEX: 34.08 KG/M2 | DIASTOLIC BLOOD PRESSURE: 53 MMHG | HEIGHT: 72 IN | OXYGEN SATURATION: 96 % | HEART RATE: 66 BPM | SYSTOLIC BLOOD PRESSURE: 112 MMHG

## 2025-02-03 DIAGNOSIS — G47.33 OSA (OBSTRUCTIVE SLEEP APNEA): Primary | ICD-10-CM

## 2025-02-03 PROCEDURE — G0463 HOSPITAL OUTPT CLINIC VISIT: HCPCS

## 2025-02-03 PROCEDURE — 99204 OFFICE O/P NEW MOD 45 MIN: CPT | Performed by: STUDENT IN AN ORGANIZED HEALTH CARE EDUCATION/TRAINING PROGRAM

## 2025-02-03 NOTE — PROGRESS NOTES
[facility] - PODIATRY    Today's Date: 02/03/25    Patient Name: Jd Velazquez  MRN: [id number]  CSN: [id number]  PCP: Dacia Newsome APRN,   Referring Provider: No ref. provider found    SUBJECTIVE     Chief Complaint   Patient presents with    Right Foot - Follow-up, Diabetes, Foot Ulcer     Recent blood sugar 164      HPI: Jd Velazquez, a 59 y.o.male, presents to clinic.    Patient is a 58-year-old male presenting for  patient is doing well.  Patient says he is here for follow-up no new complaints.  Past Medical History:   Diagnosis Date    Allergic rhinitis 01/12/2015    Anesthesia     DIFFICULTY WAKING UP POST SURGERY    CAD (coronary artery disease)     DENIES CP/SOA.    Diabetes mellitus     Foot ulcer     Hyperlipidemia     Hypertension     Hypothyroidism 04/24/2014    Insomnia, unspecified 06/15/2016    WITHOUT BIPAP    Microalbuminuria due to type 2 diabetes mellitus 10/15/2015    Mixed hyperlipidemia 10/15/2015    Myocardial infarction     Neuropathy in diabetes     Obesity     BMI 32    SHAHLA (obstructive sleep apnea)     WEARS BIPAP    Plantar fasciitis     Right great toe amputee     4/2024; 5/2024 ADMITTED WITH INFECTION    Skin cancer     REMOVED    Sleep apnea      Past Surgical History:   Procedure Laterality Date    AMPUTATION DIGIT Right 4/10/2024    Procedure: AMPUTATION DIGIT RIGHT GREAT TOE;  Surgeon: Balwinder Costa DPM;  Location: [facility];  Service: Podiatry;  Laterality: Right;    AMPUTATION DIGIT Right 5/21/2024    Procedure: SECOND RAY AMPUTATION RIGHT FOOT, INCISION AND DRAINAGE RIGHT FOOT;  Surgeon: Balwinder Costa DPM;  Location: [facility] OR;  Service: Podiatry;  Laterality: Right;    ANKLE ARTHROSCOPY W/ OPEN REPAIR      APPENDECTOMY      CARDIAC CATHETERIZATION  2014    PCI to circumflex    CARDIAC CATHETERIZATION Right 10/26/2023    Procedure: Aortogram with right leg angiogram, possible angioplasty or stenting;  Surgeon:  Robert Puga MD;  Location: [facility] CATH INVASIVE LOCATION;  Service: Vascular;  Laterality: Right;    CARDIAC CATHETERIZATION Right 12/15/2023    Procedure: Right leg angiogram, possible angioplasty or stenting;  Surgeon: Robert Puga MD;  Location: [facility] CATH INVASIVE LOCATION;  Service: Vascular;  Laterality: Right;    CORONARY ARTERY BYPASS GRAFT N/A 10/08/2020    Procedure: STERNOTOMY, CORONARY ARTERY BYPASS GRAFTING TIME 4 WITH LEFT KELSI  AND ENDOSCOPICALLY HARVESTED LEFT GREATER SAPHENOUS VEIN AND PRP.;  Surgeon: Jr Girma Chiu MD;  Location: [facility] MAIN OR;  Service: Cardiothoracic;  Laterality: N/A;    FEMORAL TIBIAL BYPASS Right 01/09/2024    Procedure: Right femoral-tibial bypass graft;  Surgeon: Robert Puga MD;  Location: [facility] MAIN OR;  Service: Vascular;  Laterality: Right;    HEEL SPUR SURGERY      HERNIA REPAIR      INCISION AND DRAINAGE OF WOUND Right 4/30/2024    Procedure: INCISION AND DRAINAGE WOUND RIGHT FOOT, DEBRIDEMENT OF WOUND RIGHT FOOT, AMPUTATION REVISION RIGHT FOOT;  Surgeon: Balwinder Costa DPM;  Location: [facility] OR;  Service: Podiatry;  Laterality: Right;    KNEE ARTHROSCOPY      MULTIPLE TIMES ON BOTH KNEES    SKIN CANCER EXCISION      BACK    TOE SURGERY Right     DEBRIDMENT RIGHT GREAT TOE    TONSILLECTOMY       Family History   Adopted: Yes   Problem Relation Age of Onset    Heart disease Other      Social History     Socioeconomic History    Marital status:    Tobacco Use    Smoking status: Some Days     Current packs/day: 0.25     Average packs/day: 0.3 packs/day for 15.0 years (3.8 ttl pk-yrs)     Types: Cigarettes     Passive exposure: Never    Smokeless tobacco: Never   Vaping Use    Vaping status: Never Used   Substance and Sexual Activity    Alcohol use: Not Currently    Drug use: Never    Sexual activity: Not Currently     Partners: Female     Birth control/protection: None     Allergies   Allergen Reactions    Strawberry Itching    Lortab  [Hydrocodone-Acetaminophen] Itching     Current Outpatient Medications   Medication Sig Dispense Refill    amLODIPine (NORVASC) 10 MG tablet Take 1 tablet by mouth Daily. 90 tablet 3    clopidogrel (PLAVIX) 75 MG tablet TAKE 1 TABLET BY MOUTH DAILY 30 tablet 5    Continuous Glucose Sensor (Dexcom G7 Sensor) misc 1 each by Other route Every 10 (Ten) Days. 3 each 2    Evolocumab (REPATHA) solution prefilled syringe injection Inject 1 mL under the skin into the appropriate area as directed.      famotidine (Pepcid) 20 MG tablet Take 1 tablet by mouth 2 (Two) Times a Day. 180 tablet 1    levothyroxine (SYNTHROID, LEVOTHROID) 50 MCG tablet Take 1 tablet by mouth Every Morning. 90 tablet 1    lisinopril (PRINIVIL,ZESTRIL) 20 MG tablet Take 1 tablet by mouth Daily. 90 tablet 1    metFORMIN ER (GLUCOPHAGE-XR) 500 MG 24 hr tablet TAKE 2 TABLETS BY MOUTH DAILY WITH BREAKFAST 180 tablet 1    metoprolol tartrate (LOPRESSOR) 50 MG tablet TAKE ONE TABLET BY MOUTH EVERY 12 HOURS 60 tablet 2    pregabalin (LYRICA) 225 MG capsule Take 1 capsule by mouth 2 (Two) Times a Day. 180 capsule 1    spironolactone (ALDACTONE) 50 MG tablet Take 1 tablet by mouth Daily. 90 tablet 1    Tirzepatide (MOUNJARO) 15 MG/0.5ML solution pen-injector pen Inject 0.5 mL under the skin into the appropriate area as directed 1 (One) Time Per Week. 6 mL 3    traZODone (DESYREL) 150 MG tablet Take 1 tablet by mouth Every Night. 90 tablet 1     No current facility-administered medications for this visit.     Review of Systems   Skin:         Ulcer toe   All other systems reviewed and are negative.      OBJECTIVE     Vitals:    01/30/25 0924   BP: 143/70   Pulse: 85   SpO2: 92%       WBC   Date Value Ref Range Status   01/23/2025 6.81 3.40 - 10.80 10*3/mm3 Final     RBC   Date Value Ref Range Status   01/23/2025 4.36 4.14 - 5.80 10*6/mm3 Final     Hemoglobin   Date Value Ref Range Status   01/23/2025 13.8 13.0 - 17.7 g/dL Final     Hematocrit   Date Value Ref  Range Status   01/23/2025 40.6 37.5 - 51.0 % Final     MCV   Date Value Ref Range Status   01/23/2025 93.1 79.0 - 97.0 fL Final     MCH   Date Value Ref Range Status   01/23/2025 31.7 26.6 - 33.0 pg Final     MCHC   Date Value Ref Range Status   01/23/2025 34.0 31.5 - 35.7 g/dL Final     RDW   Date Value Ref Range Status   01/23/2025 13.7 12.3 - 15.4 % Final     RDW-SD   Date Value Ref Range Status   01/23/2025 46.7 37.0 - 54.0 fl Final     MPV   Date Value Ref Range Status   01/23/2025 9.7 6.0 - 12.0 fL Final     Platelets   Date Value Ref Range Status   01/23/2025 394 140 - 450 10*3/mm3 Final     Neutrophil %   Date Value Ref Range Status   01/23/2025 45.7 42.7 - 76.0 % Final     Lymphocyte %   Date Value Ref Range Status   01/23/2025 36.1 19.6 - 45.3 % Final     Monocyte %   Date Value Ref Range Status   01/23/2025 14.7 (H) 5.0 - 12.0 % Final     Eosinophil %   Date Value Ref Range Status   01/23/2025 1.6 0.3 - 6.2 % Final     Basophil %   Date Value Ref Range Status   01/23/2025 1.3 0.0 - 1.5 % Final     Immature Grans %   Date Value Ref Range Status   01/23/2025 0.6 (H) 0.0 - 0.5 % Final     Neutrophils, Absolute   Date Value Ref Range Status   01/23/2025 3.11 1.70 - 7.00 10*3/mm3 Final     Lymphocytes, Absolute   Date Value Ref Range Status   01/23/2025 2.46 0.70 - 3.10 10*3/mm3 Final     Monocytes, Absolute   Date Value Ref Range Status   01/23/2025 1.00 (H) 0.10 - 0.90 10*3/mm3 Final     Eosinophils, Absolute   Date Value Ref Range Status   01/23/2025 0.11 0.00 - 0.40 10*3/mm3 Final     Basophils, Absolute   Date Value Ref Range Status   01/23/2025 0.09 0.00 - 0.20 10*3/mm3 Final     Immature Grans, Absolute   Date Value Ref Range Status   01/23/2025 0.04 0.00 - 0.05 10*3/mm3 Final     nRBC   Date Value Ref Range Status   01/23/2025 0.0 0.0 - 0.2 /100 WBC Final         Lab Results   Component Value Date    GLUCOSE 137 (H) 01/23/2025    BUN 13 01/23/2025    CREATININE 1.11 01/23/2025    EGFRIFNONA 77  06/17/2021    BCR 11.7 01/23/2025    K 4.6 01/23/2025    CO2 20.4 (L) 01/23/2025    CALCIUM 9.4 01/23/2025    ALBUMIN 4.2 01/23/2025    LABIL2 0.9 (L) 10/16/2020    AST 11 01/23/2025    ALT 11 01/23/2025       Patient seen in no apparent distress.      PHYSICAL EXAM:  Patient with previous first and second ray amputation to his right foot.  Incision well coapted.  0.1 x 0.1 x 0.1 cm ulcer to distal amputation site.  100% granular no malodor no drainage.  No tenderness to the calf.  Minimal swelling to right foot.    RADIOLOGY:        No results found.    ASSESSMENT/PLAN     Diagnoses and all orders for this visit:    1. Uncontrolled type 2 diabetes mellitus with hyperglycemia (Primary)    2. Peripheral vascular disease    3. Chronic osteomyelitis    4. 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          Comprehensive lower extremity examination and evaluation was performed.      Triple antibiotic and border dressing daily     Discussed importance of smoking cessation to help wound healing.    Patient at high risk for developing further ulcerations and infections and subsequent major amputations due to his uncontrolled diabetes, PVD, smoking, and noncompliance.    Return to clinic in 2 to 3 months or sooner if issues arise or symptoms worsen.    Discussed findings and treatment plan including risks, benefits, and treatment options with patient in detail. Patient agreed with treatment plan.    Medications and allergies reviewed.  Reviewed available lab values along with other pertinent labs.  These were discussed with the patient.    An After Visit Summary was printed and given to the patient at discharge, including (if requested) any available informative/educational handouts regarding diagnosis, treatment, or medications. All questions were answered to patient/family satisfaction. Should symptoms fail to improve or worsen they agree to call or return to clinic or to go to the Emergency Department. Discussed the  importance of following up with any needed screening tests/labs/specialist appointments and any requested follow-up recommended by me today. Importance of maintaining follow-up discussed and patient accepts that missed appointments can delay diagnosis and potentially lead to worsening of conditions.    Return in about 3 months (around 4/30/2025)., or sooner if acute issues arise.    This document has been electronically signed by Balwinder Costa DPM on February 3, 2025 10:02 EST
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
## 2025-02-07 ENCOUNTER — TELEPHONE (OUTPATIENT)
Dept: FAMILY MEDICINE CLINIC | Facility: CLINIC | Age: 60
End: 2025-02-07
Payer: COMMERCIAL

## 2025-02-07 ENCOUNTER — OFFICE VISIT (OUTPATIENT)
Dept: CARDIOLOGY | Facility: CLINIC | Age: 60
End: 2025-02-07
Payer: COMMERCIAL

## 2025-02-07 VITALS
HEART RATE: 57 BPM | WEIGHT: 252.6 LBS | SYSTOLIC BLOOD PRESSURE: 128 MMHG | DIASTOLIC BLOOD PRESSURE: 70 MMHG | BODY MASS INDEX: 34.21 KG/M2 | OXYGEN SATURATION: 99 % | HEIGHT: 72 IN

## 2025-02-07 DIAGNOSIS — Z78.9 STATIN INTOLERANCE: ICD-10-CM

## 2025-02-07 DIAGNOSIS — E78.2 MIXED HYPERLIPIDEMIA: ICD-10-CM

## 2025-02-07 DIAGNOSIS — Z72.0 TOBACCO ABUSE: ICD-10-CM

## 2025-02-07 DIAGNOSIS — Z95.1 S/P CABG X 4: ICD-10-CM

## 2025-02-07 DIAGNOSIS — I25.10 CORONARY ARTERY DISEASE INVOLVING NATIVE CORONARY ARTERY OF NATIVE HEART WITHOUT ANGINA PECTORIS: Primary | ICD-10-CM

## 2025-02-07 DIAGNOSIS — I10 ESSENTIAL HYPERTENSION: ICD-10-CM

## 2025-02-07 PROCEDURE — 99214 OFFICE O/P EST MOD 30 MIN: CPT

## 2025-02-07 RX ORDER — AMLODIPINE BESYLATE 10 MG/1
10 TABLET ORAL DAILY
Qty: 90 TABLET | Refills: 3 | Status: SHIPPED | OUTPATIENT
Start: 2025-02-07

## 2025-02-07 RX ORDER — ACYCLOVIR 400 MG/1
1 TABLET ORAL
Qty: 3 EACH | Refills: 2 | Status: SHIPPED | OUTPATIENT
Start: 2025-02-07

## 2025-02-07 NOTE — PROGRESS NOTES
Chief Complaint  Coronary artery disease involving native coronary artery of (6 month follow up)    Subjective        History of Present Illness  Jd Velazquez presents to [facility] CARDIOLOGY for follow up.   Patient is a 59-year-old male with past medical history outlined below, significant for CABG x 4 vessels in 2020, hypertension, hyperlipidemia, type 2 diabetes, smoking who presents for routine follow-up.  He is doing well from a cardiac standpoint.  He denies any chest pain or discomfort, dyspnea, palpitations, edema or syncope.    Past Medical History:   Diagnosis Date    Allergic rhinitis 01/12/2015    Anesthesia     DIFFICULTY WAKING UP POST SURGERY    CAD (coronary artery disease)     DENIES CP/SOA.    Diabetes mellitus     Foot ulcer     Heart murmur     Hyperlipidemia     Hypertension     Hypothyroidism 04/24/2014    Insomnia, unspecified 06/15/2016    WITHOUT BIPAP    Microalbuminuria due to type 2 diabetes mellitus 10/15/2015    Mixed hyperlipidemia 10/15/2015    Myocardial infarction     Neuropathy in diabetes     Obesity     BMI 32    SHAHLA (obstructive sleep apnea)     WEARS BIPAP    Plantar fasciitis     Right great toe amputee     4/2024; 5/2024 ADMITTED WITH INFECTION    Skin cancer     REMOVED    Sleep apnea        ALLERGY  Allergies   Allergen Reactions    Strawberry Itching    Lortab [Hydrocodone-Acetaminophen] Itching        Past Surgical History:   Procedure Laterality Date    AMPUTATION      AMPUTATION DIGIT Right 04/10/2024    Procedure: AMPUTATION DIGIT RIGHT GREAT TOE;  Surgeon: Balwinder Costa DPM;  Location: [facility] OR;  Service: Podiatry;  Laterality: Right;    AMPUTATION DIGIT Right 05/21/2024    Procedure: SECOND RAY AMPUTATION RIGHT FOOT, INCISION AND DRAINAGE RIGHT FOOT;  Surgeon: Balwinder Costa DPM;  Location: [facility] OR;  Service: Podiatry;  Laterality: Right;    ANKLE ARTHROSCOPY W/ OPEN REPAIR      APPENDECTOMY      CARDIAC  CATHETERIZATION  2014    PCI to circumflex    CARDIAC CATHETERIZATION Right 10/26/2023    Procedure: Aortogram with right leg angiogram, possible angioplasty or stenting;  Surgeon: Robert Puga MD;  Location: [facility] CATH INVASIVE LOCATION;  Service: Vascular;  Laterality: Right;    CARDIAC CATHETERIZATION Right 12/15/2023    Procedure: Right leg angiogram, possible angioplasty or stenting;  Surgeon: Robert Puga MD;  Location: [facility] CATH INVASIVE LOCATION;  Service: Vascular;  Laterality: Right;    CORONARY ARTERY BYPASS GRAFT N/A 10/08/2020    Procedure: STERNOTOMY, CORONARY ARTERY BYPASS GRAFTING TIME 4 WITH LEFT KELSI  AND ENDOSCOPICALLY HARVESTED LEFT GREATER SAPHENOUS VEIN AND PRP.;  Surgeon: Jr Girma Chiu MD;  Location: [facility] MAIN OR;  Service: Cardiothoracic;  Laterality: N/A;    FEMORAL TIBIAL BYPASS Right 01/09/2024    Procedure: Right femoral-tibial bypass graft;  Surgeon: Robert Puga MD;  Location: [facility] MAIN OR;  Service: Vascular;  Laterality: Right;    HEEL SPUR SURGERY      HERNIA REPAIR      INCISION AND DRAINAGE OF WOUND Right 04/30/2024    Procedure: INCISION AND DRAINAGE WOUND RIGHT FOOT, DEBRIDEMENT OF WOUND RIGHT FOOT, AMPUTATION REVISION RIGHT FOOT;  Surgeon: Balwinder Costa DPM;  Location: [facility] MAIN OR;  Service: Podiatry;  Laterality: Right;    KNEE ARTHROSCOPY      MULTIPLE TIMES ON BOTH KNEES    SKIN CANCER EXCISION      BACK    TOE SURGERY Right     DEBRIDMENT RIGHT GREAT TOE    TONSILLECTOMY          Social History     Socioeconomic History    Marital status:    Tobacco Use    Smoking status: Some Days     Current packs/day: 0.25     Average packs/day: 0.3 packs/day for 15.0 years (3.8 ttl pk-yrs)     Types: Cigarettes     Passive exposure: Never    Smokeless tobacco: Never   Vaping Use    Vaping status: Never Used   Substance and Sexual Activity    Alcohol use: Not Currently    Drug use: Never    Sexual activity: Not Currently     Partners: Female      "Birth control/protection: None       Family History   Adopted: Yes   Problem Relation Age of Onset    Heart disease Other         Current Outpatient Medications on File Prior to Visit   Medication Sig    clopidogrel (PLAVIX) 75 MG tablet TAKE 1 TABLET BY MOUTH DAILY    Continuous Glucose Sensor (Dexcom G7 Sensor) misc 1 each by Other route Every 10 (Ten) Days.    Evolocumab (REPATHA) solution prefilled syringe injection Inject 1 mL under the skin into the appropriate area as directed.    famotidine (Pepcid) 20 MG tablet Take 1 tablet by mouth 2 (Two) Times a Day.    levothyroxine (SYNTHROID, LEVOTHROID) 50 MCG tablet Take 1 tablet by mouth Every Morning.    lisinopril (PRINIVIL,ZESTRIL) 20 MG tablet Take 1 tablet by mouth Daily.    metFORMIN ER (GLUCOPHAGE-XR) 500 MG 24 hr tablet TAKE 2 TABLETS BY MOUTH DAILY WITH BREAKFAST    metoprolol tartrate (LOPRESSOR) 50 MG tablet TAKE ONE TABLET BY MOUTH EVERY 12 HOURS    pregabalin (LYRICA) 225 MG capsule Take 1 capsule by mouth 2 (Two) Times a Day.    spironolactone (ALDACTONE) 50 MG tablet Take 1 tablet by mouth Daily.    Tirzepatide (MOUNJARO) 15 MG/0.5ML solution pen-injector pen Inject 0.5 mL under the skin into the appropriate area as directed 1 (One) Time Per Week.    traZODone (DESYREL) 150 MG tablet Take 1 tablet by mouth Every Night.    [DISCONTINUED] amLODIPine (NORVASC) 10 MG tablet Take 1 tablet by mouth Daily.     No current facility-administered medications on file prior to visit.       Objective   Vitals:    02/07/25 0908   BP: 128/70   BP Location: Left arm   Patient Position: Sitting   Cuff Size: Adult   Pulse: 57   SpO2: 99%   Weight: 115 kg (252 lb 9.6 oz)   Height: 182.9 cm (72.01\")       Physical Exam  Constitutional:       General: He is awake. He is not in acute distress.     Appearance: Normal appearance.   HENT:      Head: Normocephalic.      Nose: Nose normal. No congestion.   Eyes:      Extraocular Movements: Extraocular movements intact.      " Conjunctiva/sclera: Conjunctivae normal.      Pupils: Pupils are equal, round, and reactive to light.   Neck:      Thyroid: No thyromegaly.      Vascular: No JVD.   Cardiovascular:      Rate and Rhythm: Normal rate and regular rhythm.      Chest Wall: PMI is not displaced.      Pulses: Normal pulses.      Heart sounds: Normal heart sounds, S1 normal and S2 normal. No murmur heard.     No friction rub. No gallop. No S3 or S4 sounds.   Pulmonary:      Effort: Pulmonary effort is normal.      Breath sounds: Normal breath sounds. No wheezing, rhonchi or rales.   Abdominal:      General: Bowel sounds are normal.      Palpations: Abdomen is soft.      Tenderness: There is no abdominal tenderness.   Musculoskeletal:      Cervical back: No tenderness.      Right lower leg: No edema.      Left lower leg: No edema.   Lymphadenopathy:      Cervical: No cervical adenopathy.   Skin:     General: Skin is warm and dry.      Capillary Refill: Capillary refill takes less than 2 seconds.      Coloration: Skin is not cyanotic.      Findings: No petechiae or rash.      Nails: There is no clubbing.   Neurological:      Mental Status: He is alert.   Psychiatric:         Mood and Affect: Mood normal.         Behavior: Behavior is cooperative.           Result Review     The following data was reviewed by SOHAM Casillas on 02/07/25.      CMP          5/28/2024    08:32 10/23/2024    12:35 1/23/2025    10:44   CMP   Glucose 172  152  137    BUN 23  29  13    Creatinine 1.37  1.41  1.11    EGFR 60.2  57.8  77.0    Sodium 140  136  137    Potassium 4.2  4.4  4.6    Chloride 101  102  102    Calcium 9.9  10.1  9.4    Total Protein 8.1  8.0  7.7    Albumin 4.4  4.4  4.2    Globulin 3.7  3.6  3.5    Total Bilirubin 0.3  0.2  0.2    Alkaline Phosphatase 103  106  92    AST (SGOT) <5  10  11    ALT (SGPT) 5  7  11    Albumin/Globulin Ratio 1.2  1.2  1.2    BUN/Creatinine Ratio 16.8  20.6  11.7    Anion Gap 13.0  15.6  14.6      CBC  w/diff          5/28/2024    08:32 10/23/2024    12:35 1/23/2025    10:44   CBC w/Diff   WBC 7.34  7.61  6.81    RBC 4.01  4.57  4.36    Hemoglobin 11.6  14.1  13.8    Hematocrit 36.6  42.5  40.6    MCV 91.3  93.0  93.1    MCH 28.9  30.9  31.7    MCHC 31.7  33.2  34.0    RDW 13.5  13.3  13.7    Platelets 351  453  394    Neutrophil Rel % 45.5  45.6  45.7    Immature Granulocyte Rel % 0.7  1.3  0.6    Lymphocyte Rel % 33.8  38.4  36.1    Monocyte Rel % 14.9  12.5  14.7    Eosinophil Rel % 3.5  1.3  1.6    Basophil Rel % 1.6  0.9  1.3       Lipid Panel          5/28/2024    08:32 10/23/2024    12:35 1/23/2025    10:44   Lipid Panel   Total Cholesterol 182  155  142    Triglycerides 187  470  274    HDL Cholesterol 29  23  26    VLDL Cholesterol 33  72  45    LDL Cholesterol  120  60  71    LDL/HDL Ratio 3.99  1.65  2.35          No results found for this or any previous visit.          Procedures      Assessment & Plan  Coronary artery disease involving native coronary artery of native heart without angina pectoris  History of CABG x 4.  Patient notes no angina or anginal-like symptoms.  Continue the Plavix.  Essential hypertension  Blood pressure is well-controlled.  Continue current regimen.  Mixed hyperlipidemia  LDL is at goal.  Continue Repatha.  May consider fenofibrate for hypertriglyceridemia.  Tobacco abuse  Tobacco cessation is advised.  S/P CABG x 4  As above.  Statin intolerance  Continue Repatha.                 The medical services provided during this encounter are part of ongoing care related to this patient's single serious condition or complex condition.    Follow Up   Return in about 6 months (around 8/7/2025) for With Dr. Jimenez.    Patient was given instructions and counseling regarding his condition or for health maintenance advice. Please see specific information pulled into the AVS if appropriate.     Cristel Montalvo, APRN  02/07/25  09:21 EST    Dictated Utilizing Dragon Dictation

## (undated) DEVICE — CORONARY ARTERY BYPASS GRAFT MARKERS, STAINLESS STEEL, DISTAL, WITHOUT HOLDER: Brand: ANASTOMARK CORONARY ARTERY BYPASS GRAFT MARKERS, STAINLESS STEEL, DISTAL

## (undated) DEVICE — GUIDEWIRE WITH ICE™ HYDROPHILIC COATING: Brand: V-14™ CONTROL WIRE™

## (undated) DEVICE — ST ACC MICROPUNCTURE STFF .018 ECHO/PLDM/TP 4F/10CM 21G/7CM

## (undated) DEVICE — PK HEART OPN 40

## (undated) DEVICE — VAGINAL PREP TRAY: Brand: MEDLINE INDUSTRIES, INC.

## (undated) DEVICE — PCH INST SURG INVISISHIELD 2PCKT

## (undated) DEVICE — RADIFOCUS GLIDEWIRE: Brand: GLIDEWIRE

## (undated) DEVICE — Device: Brand: PULSAVAC®

## (undated) DEVICE — BANDAGE,GAUZE,BULKEE II,4.5"X4.1YD,STRL: Brand: MEDLINE

## (undated) DEVICE — BIOPATCH™ ANTIMICROBIAL DRESSING WITH CHLORHEXIDINE GLUCONATE IS A HYDROPHILLIC POLYURETHANE ABSORPTIVE FOAM WITH CHLORHEXIDINE GLUCONATE (CHG) WHICH INHIBITS BACTERIAL GROWTH UNDER THE DRESSING. THE DRESSING IS INTENDED TO BE USED TO ABSORB EXUDATE, COVER A WOUND CAUSED BY VASCULAR AND NONVASCULAR PERCUTANEOUS MEDICAL DEVICES DURING SURGERY, AS WELL AS REDUCE LOCAL INFECTION AND COLONIZATION OF MICROORGANISMS.: Brand: BIOPATCH

## (undated) DEVICE — Device

## (undated) DEVICE — SENSR CERBRL O2 PK/2

## (undated) DEVICE — GOWN,REINFORCE,POLY,SIRUS,BREATH SLV,XLG: Brand: MEDLINE

## (undated) DEVICE — BNDG ELAS ECON W/CLIP 4IN 5YD LF STRL

## (undated) DEVICE — ST ACC MICROPUNCTURE .018 TRANSLSS/PLAT/TP 4F/10CM 21G/10CM

## (undated) DEVICE — PROXIMATE RH ROTATING HEAD SKIN STAPLERS (35 WIDE) CONTAINS 35 STAINLESS STEEL STAPLES: Brand: PROXIMATE

## (undated) DEVICE — DISPOSABLE TOURNIQUET CUFF SINGLE BLADDER, SINGLE PORT AND QUICK CONNECT CONNECTOR: Brand: COLOR CUFF

## (undated) DEVICE — TOWEL,OR,DSP,ST,WHITE,DLX,4/PK,20PK/CS: Brand: MEDLINE

## (undated) DEVICE — SUT PROLN 6/0 C1 D/A 30IN 8706H

## (undated) DEVICE — BLANKT WARM PACU MISTRAL/AIR PREM REFL A/ 85.8X50IN

## (undated) DEVICE — NAVICROSS SUPPORT CATHETER: Brand: NAVICROSS

## (undated) DEVICE — BNDG ELAS CO-FLEX SLF ADHR 4IN5YD LF STRL

## (undated) DEVICE — STERILE POLYISOPRENE POWDER-FREE SURGICAL GLOVES WITH EMOLLIENT COATING: Brand: PROTEXIS

## (undated) DEVICE — GW STARTER JB STR .035 15X180CM

## (undated) DEVICE — STPCK 3WY MARQUIS STD/PRESS SWIVELNUT LT BLU

## (undated) DEVICE — GLV SURG SENSICARE PI ORTHO SZ8 LF STRL

## (undated) DEVICE — SOL ISO/ALC RUB 70PCT 4OZ

## (undated) DEVICE — BLD OPTH BEAVER/MINIBLADE STR 180DEG DBL/BVL SS

## (undated) DEVICE — DEV CLS VASC MYNXCONTROL 5F

## (undated) DEVICE — 32 FR RIGHT ANGLE – SOFT PVC CATHETER: Brand: PVC THORACIC CATHETERS

## (undated) DEVICE — SMALL (GREEN) FOR GRAFTS UP TO 8 MM, 20 1/2" / 52 CM (1/PKG): Brand: SCANLAN® VASCULAR TUNNELER SHEATHS AND BULLET TIPS

## (undated) DEVICE — OPTIFOAM GENTLE SA, POSTOP, 4X12: Brand: MEDLINE

## (undated) DEVICE — TOTAL TRAY, 16FR 10ML SIL FOLEY, URN: Brand: MEDLINE

## (undated) DEVICE — BLOWER/MISTER AXIOUS OPCAB W/TBG

## (undated) DEVICE — PENCL E/S HNDSWCH ROCKR CB

## (undated) DEVICE — SUT PROLN 7/0 BV1 D/A 24IN 8702H

## (undated) DEVICE — CATH OMNI FLUSH 5FR

## (undated) DEVICE — GAUZE,SPONGE,4"X4",16PLY,STRL,LF,10/TRAY: Brand: MEDLINE

## (undated) DEVICE — DISPOSABLE TOURNIQUET CUFF 30"X4", 1-LINE, WHITE, STERILE, 1EA/PK, 10PK/CS: Brand: ASP MEDICAL

## (undated) DEVICE — BLAKE SILICONE DRAINS CARDIO CONNECTOR 2:1: Brand: BLAKE

## (undated) DEVICE — NEEDLE, QUINCKE, 20GX3.5": Brand: MEDLINE

## (undated) DEVICE — ADHS SKIN PREMIERPRO EXOFIN TOPICAL HI/VISC .5ML

## (undated) DEVICE — SUT PROLN MO.5 7/0 DBLARM BV175 6 2X30 BX/12

## (undated) DEVICE — TRY CATH URO TEMP SENSR 16F350ML LF

## (undated) DEVICE — DRP SLUSH WARMR MACH CIR 44X44IN

## (undated) DEVICE — BNDG ESMARK 4IN 12FT LF STRL BLU

## (undated) DEVICE — PENCL E/S SMOKEEVAC W/TELESCP CANN

## (undated) DEVICE — SUT ETHLN 3-0 FS118IN 663H

## (undated) DEVICE — ABDOMINAL VASCULAR-LF: Brand: MEDLINE INDUSTRIES, INC.

## (undated) DEVICE — ANGIOGRAPHY PACK: Brand: MEDLINE INDUSTRIES, INC.

## (undated) DEVICE — CANN ART SOFTFLOW EXT W/SUT/RNG 7MM

## (undated) DEVICE — ST. SORBAVIEW ULTIMATE IJ SYSTEM A,C: Brand: CENTURION

## (undated) DEVICE — ANTIBACTERIAL UNDYED BRAIDED (POLYGLACTIN 910), SYNTHETIC ABSORBABLE SUTURE: Brand: COATED VICRYL

## (undated) DEVICE — PK SUT OPN HEART POLLOCK CUST

## (undated) DEVICE — DRAPE,UNDERBUTTOCKS,PCH,STERILE: Brand: MEDLINE

## (undated) DEVICE — SUT SILK 3/0 TIES 18IN A184H

## (undated) DEVICE — STERILE POLYISOPRENE POWDER-FREE SURGICAL GLOVES: Brand: PROTEXIS

## (undated) DEVICE — LOU OPEN HEART DR POLLOCK: Brand: MEDLINE INDUSTRIES, INC.

## (undated) DEVICE — STCKNT IMPERV 9X36IN STRL

## (undated) DEVICE — SWAB CULT COL W AMIES GEL

## (undated) DEVICE — GLV SURG SENSICARE PI ORTHO PF SZ7 LF STRL

## (undated) DEVICE — BLAD SAW SAG MIC FINE 9.5X25.5X0.04MM PK/5

## (undated) DEVICE — GLV SURG BIOGEL M LTX PF 7 1/2

## (undated) DEVICE — DRSNG SURESITE WNDW 2.38X2.75

## (undated) DEVICE — CVR PROB 96IN LF STRL

## (undated) DEVICE — 3M™ IOBAN™ 2 ANTIMICROBIAL INCISE DRAPE 6651EZ: Brand: IOBAN™ 2

## (undated) DEVICE — INTRO SHEATH PRELUDE/PRO .035 5F 11X50CM GRY LF

## (undated) DEVICE — ESMARK: Brand: DEROYAL

## (undated) DEVICE — DRSNG WND GZ CURAD OIL EMULSION 3X3IN STRL

## (undated) DEVICE — INTENDED FOR TISSUE SEPARATION, AND OTHER PROCEDURES THAT REQUIRE A SHARP SURGICAL BLADE TO PUNCTURE OR CUT.: Brand: BARD-PARKER ® CARBON RIB-BACK BLADES

## (undated) DEVICE — MEDICINE CUP, GRADUATED, STER: Brand: MEDLINE

## (undated) DEVICE — SOL IRR NACL 0.9PCT 3000ML

## (undated) DEVICE — EXTREMITY-LF: Brand: MEDLINE INDUSTRIES, INC.

## (undated) DEVICE — VASOVIEW HEMOPRO: Brand: VASOVIEW HEMOPRO

## (undated) DEVICE — INTRO CHECKFLOW W/RAABE MOD .038 6F55CM

## (undated) DEVICE — CLAMP INSERT: Brand: STEALTH® CLAMP INSERT

## (undated) DEVICE — SYS PERFUS SEP PLATLT W TIPS CUST

## (undated) DEVICE — NDL HYPO PRECISIONGLIDE REG 22G 1 1/2

## (undated) DEVICE — SUT VIC 0 CT1 CR8 27IN JJ41G

## (undated) DEVICE — SI AVANTI+ 6F STD W/GW  NO OBT: Brand: AVANTI

## (undated) DEVICE — SUT MNCRYL PLS ANTIB UD 4/0 PS2 18IN

## (undated) DEVICE — OASIS DRAIN, DUAL, IN-LINE, ATS COMPATIBLE: Brand: OASIS

## (undated) DEVICE — 3F 80 CM NOVASIL SILICONE SINGLE LUMEN EMBOLECTOMY CATHETER, EIFU: Brand: LEMAITRE EMBOLECTOMY CATHETER

## (undated) DEVICE — DEV INFL BASIXTOUCH ANALOG POLYCARBONATE 35ATM 30ML

## (undated) DEVICE — PK PERFUS CUST W/CARDIOPLEGIA

## (undated) DEVICE — SUT VIC 3/0 SH 27IN J416H

## (undated) DEVICE — GLV SURG SIGNATURE ESSENTIAL PF LTX SZ7.5

## (undated) DEVICE — 450 ML BOTTLE OF 0.05% CHLORHEXIDINE GLUCONATE IN 99.95% STERILE WATER FOR IRRIGATION, USP AND APPLICATOR.: Brand: IRRISEPT ANTIMICROBIAL WOUND LAVAGE

## (undated) DEVICE — Device: Brand: LEVEL 1

## (undated) DEVICE — SUT SILK 2/0 TIES 18IN A185H

## (undated) DEVICE — GLV SURG SENSICARE PI LF PF 7.5 GRN STRL

## (undated) DEVICE — SUT SILK 0 CT1 CR8 18IN C021D

## (undated) DEVICE — BALN NANOCROSS OTW .014 4F 3X210 150CM

## (undated) DEVICE — DRN WND CH RND FUL/FLUT NO/TROC 3/8IN 28F

## (undated) DEVICE — DEV CLS VASC MYNXCONTROL 6FTO7F

## (undated) DEVICE — SUT PROLN 5/0 C1 DA 24IN 8725H

## (undated) DEVICE — BALN ADMIRAL INPACT 5F 4X40MM 130CM

## (undated) DEVICE — ADHS SKIN DERMABOND TOP ADVANCED

## (undated) DEVICE — HEMOCONCENTRATOR PERFUS LPS06

## (undated) DEVICE — DRSNG WND GEL FIBR OPTICELL AG PLS W/SLV LF 4X5IN  STRL

## (undated) DEVICE — SYR LL TP 10ML STRL

## (undated) DEVICE — ROTATING SURGICAL PUNCHES, 1 PER POUCH: Brand: A&E MEDICAL / ROTATING SURGICAL PUNCHES

## (undated) DEVICE — TBG INSUFFLATION LUER LOCK: Brand: MEDLINE INDUSTRIES, INC.

## (undated) DEVICE — SOL IRR H2O BTL 1000ML STRL

## (undated) DEVICE — ELECTRD BLD EDGE COAT 3IN

## (undated) DEVICE — SYR LUERLOK 20CC BX/50

## (undated) DEVICE — SOL IRR NACL 0.9PCT BT 1000ML

## (undated) DEVICE — APPL CHLORAPREP HI/LITE 26ML ORNG

## (undated) DEVICE — PK ATS CUST W CARDIOTOMY RESEVOIR

## (undated) DEVICE — GLV SURG BIOGEL SENSR LTX PF SZ7.5

## (undated) DEVICE — GLV SURG SENSICARE PI ORTHO SZ7.5 LF STRL

## (undated) DEVICE — 3M™ IOBAN™ 2 ANTIMICROBIAL INCISE DRAPE 6650EZ: Brand: IOBAN™ 2

## (undated) DEVICE — 12 FOOT DISPOSABLE EXTENSION CABLE WITH SAFE CONNECT / SCREW-DOWN

## (undated) DEVICE — RADIFOCUS GLIDECATH: Brand: GLIDECATH

## (undated) DEVICE — BNDG ELAS MATRX V/CLS 6INX10YD LF